# Patient Record
Sex: MALE | Race: WHITE | NOT HISPANIC OR LATINO | Employment: OTHER | URBAN - METROPOLITAN AREA
[De-identification: names, ages, dates, MRNs, and addresses within clinical notes are randomized per-mention and may not be internally consistent; named-entity substitution may affect disease eponyms.]

---

## 2017-03-08 RX ORDER — AMLODIPINE BESYLATE 10 MG/1
TABLET ORAL
Qty: 90 TABLET | Refills: 1 | Status: SHIPPED | OUTPATIENT
Start: 2017-03-08 | End: 2017-06-12 | Stop reason: SDUPTHER

## 2017-03-27 ENCOUNTER — TELEPHONE (OUTPATIENT)
Dept: FAMILY MEDICINE CLINIC | Facility: CLINIC | Age: 82
End: 2017-03-27

## 2017-03-28 RX ORDER — ALLOPURINOL 300 MG/1
300 TABLET ORAL DAILY
Qty: 90 TABLET | Refills: 0 | Status: SHIPPED | OUTPATIENT
Start: 2017-03-28 | End: 2017-08-02 | Stop reason: SDUPTHER

## 2017-06-02 RX ORDER — LOVASTATIN 20 MG/1
TABLET ORAL
Qty: 90 TABLET | Refills: 0 | Status: SHIPPED | OUTPATIENT
Start: 2017-06-02 | End: 2017-08-09 | Stop reason: SDUPTHER

## 2017-06-12 RX ORDER — AMLODIPINE BESYLATE 10 MG/1
TABLET ORAL
Qty: 90 TABLET | Refills: 0 | Status: SHIPPED | OUTPATIENT
Start: 2017-06-12 | End: 2017-10-03 | Stop reason: SDUPTHER

## 2017-06-29 RX ORDER — AMLODIPINE BESYLATE 10 MG/1
TABLET ORAL
Qty: 90 TABLET | Refills: 1 | OUTPATIENT
Start: 2017-06-29

## 2017-08-02 RX ORDER — ALLOPURINOL 300 MG/1
TABLET ORAL
Qty: 90 TABLET | Refills: 1 | Status: SHIPPED | OUTPATIENT
Start: 2017-08-02 | End: 2018-04-29 | Stop reason: SDUPTHER

## 2017-08-09 RX ORDER — LOVASTATIN 20 MG/1
TABLET ORAL
Qty: 90 TABLET | Refills: 0 | Status: SHIPPED | OUTPATIENT
Start: 2017-08-09 | End: 2017-10-16 | Stop reason: SDUPTHER

## 2017-10-04 RX ORDER — AMLODIPINE BESYLATE 10 MG/1
TABLET ORAL
Qty: 90 TABLET | Refills: 0 | Status: SHIPPED | OUTPATIENT
Start: 2017-10-04 | End: 2018-04-07 | Stop reason: SDUPTHER

## 2017-10-17 RX ORDER — LOVASTATIN 20 MG/1
TABLET ORAL
Qty: 90 TABLET | Refills: 0 | Status: SHIPPED | OUTPATIENT
Start: 2017-10-17 | End: 2018-04-07 | Stop reason: SDUPTHER

## 2018-04-09 RX ORDER — AMLODIPINE BESYLATE 10 MG/1
TABLET ORAL
Qty: 90 TABLET | Refills: 0 | Status: SHIPPED | OUTPATIENT
Start: 2018-04-09 | End: 2018-06-25 | Stop reason: SDUPTHER

## 2018-04-09 RX ORDER — LOVASTATIN 20 MG/1
TABLET ORAL
Qty: 90 TABLET | Refills: 0 | Status: SHIPPED | OUTPATIENT
Start: 2018-04-09 | End: 2018-06-25 | Stop reason: SDUPTHER

## 2018-04-30 RX ORDER — ALLOPURINOL 300 MG/1
TABLET ORAL
Qty: 90 TABLET | Refills: 1 | Status: SHIPPED | OUTPATIENT
Start: 2018-04-30 | End: 2018-10-28 | Stop reason: SDUPTHER

## 2018-06-08 ENCOUNTER — HOSPITAL ENCOUNTER (OUTPATIENT)
Dept: CT IMAGING | Facility: HOSPITAL | Age: 83
Discharge: HOME | End: 2018-06-08
Admitting: FAMILY MEDICINE

## 2018-06-08 ENCOUNTER — OFFICE VISIT (OUTPATIENT)
Dept: FAMILY MEDICINE CLINIC | Facility: CLINIC | Age: 83
End: 2018-06-08

## 2018-06-08 VITALS
HEART RATE: 66 BPM | OXYGEN SATURATION: 98 % | DIASTOLIC BLOOD PRESSURE: 76 MMHG | WEIGHT: 201.5 LBS | BODY MASS INDEX: 28.21 KG/M2 | SYSTOLIC BLOOD PRESSURE: 151 MMHG | TEMPERATURE: 98 F | HEIGHT: 71 IN

## 2018-06-08 DIAGNOSIS — R10.9 ACUTE RIGHT FLANK PAIN: ICD-10-CM

## 2018-06-08 DIAGNOSIS — M10.9 GOUT WITHOUT TOPHUS: ICD-10-CM

## 2018-06-08 DIAGNOSIS — R35.89 POLYURIA: Primary | ICD-10-CM

## 2018-06-08 DIAGNOSIS — I10 ESSENTIAL HYPERTENSION: ICD-10-CM

## 2018-06-08 DIAGNOSIS — E78.5 HYPERLIPIDEMIA, UNSPECIFIED HYPERLIPIDEMIA TYPE: ICD-10-CM

## 2018-06-08 LAB
ALBUMIN SERPL-MCNC: 4 G/DL (ref 3.5–5.2)
ALBUMIN/GLOB SERPL: 1.7 G/DL
ALP SERPL-CCNC: 73 U/L (ref 39–117)
ALT SERPL-CCNC: 15 U/L (ref 1–41)
AST SERPL-CCNC: 11 U/L (ref 1–40)
BASOPHILS # BLD AUTO: 0.04 10*3/MM3 (ref 0–0.2)
BASOPHILS NFR BLD AUTO: 0.5 % (ref 0–1.5)
BILIRUB BLD-MCNC: NEGATIVE MG/DL
BILIRUB SERPL-MCNC: 1 MG/DL (ref 0.1–1.2)
BUN SERPL-MCNC: 17 MG/DL (ref 8–23)
BUN/CREAT SERPL: 15.9 (ref 7–25)
CALCIUM SERPL-MCNC: 9.5 MG/DL (ref 8.6–10.5)
CHLORIDE SERPL-SCNC: 104 MMOL/L (ref 98–107)
CLARITY, POC: CLEAR
CO2 SERPL-SCNC: 24 MMOL/L (ref 22–29)
COLOR UR: YELLOW
CREAT SERPL-MCNC: 1.07 MG/DL (ref 0.76–1.27)
EOSINOPHIL # BLD AUTO: 0.23 10*3/MM3 (ref 0–0.7)
EOSINOPHIL NFR BLD AUTO: 2.9 % (ref 0.3–6.2)
ERYTHROCYTE [DISTWIDTH] IN BLOOD BY AUTOMATED COUNT: 13.3 % (ref 11.5–14.5)
GFR SERPLBLD CREATININE-BSD FMLA CKD-EPI: 66 ML/MIN/1.73
GFR SERPLBLD CREATININE-BSD FMLA CKD-EPI: 79 ML/MIN/1.73
GLOBULIN SER CALC-MCNC: 2.4 GM/DL
GLUCOSE SERPL-MCNC: 87 MG/DL (ref 65–99)
GLUCOSE UR STRIP-MCNC: NEGATIVE MG/DL
HCT VFR BLD AUTO: 48.2 % (ref 40.4–52.2)
HGB BLD-MCNC: 16 G/DL (ref 13.7–17.6)
IMM GRANULOCYTES # BLD: 0.04 10*3/MM3 (ref 0–0.03)
IMM GRANULOCYTES NFR BLD: 0.5 % (ref 0–0.5)
KETONES UR QL: NEGATIVE
LEUKOCYTE EST, POC: ABNORMAL
LYMPHOCYTES # BLD AUTO: 2.1 10*3/MM3 (ref 0.9–4.8)
LYMPHOCYTES NFR BLD AUTO: 26.9 % (ref 19.6–45.3)
MCH RBC QN AUTO: 29.3 PG (ref 27–32.7)
MCHC RBC AUTO-ENTMCNC: 33.2 G/DL (ref 32.6–36.4)
MCV RBC AUTO: 88.3 FL (ref 79.8–96.2)
MONOCYTES # BLD AUTO: 0.71 10*3/MM3 (ref 0.2–1.2)
MONOCYTES NFR BLD AUTO: 9.1 % (ref 5–12)
NEUTROPHILS # BLD AUTO: 4.72 10*3/MM3 (ref 1.9–8.1)
NEUTROPHILS NFR BLD AUTO: 60.6 % (ref 42.7–76)
NITRITE UR-MCNC: NEGATIVE MG/ML
PH UR: 5 [PH] (ref 5–8)
PLATELET # BLD AUTO: 201 10*3/MM3 (ref 140–500)
POTASSIUM SERPL-SCNC: 4.2 MMOL/L (ref 3.5–5.2)
PROT SERPL-MCNC: 6.4 G/DL (ref 6–8.5)
PROT UR STRIP-MCNC: NEGATIVE MG/DL
RBC # BLD AUTO: 5.46 10*6/MM3 (ref 4.6–6)
RBC # UR STRIP: NEGATIVE /UL
SODIUM SERPL-SCNC: 141 MMOL/L (ref 136–145)
SP GR UR: 1 (ref 1–1.03)
URATE SERPL-MCNC: 4 MG/DL (ref 3.4–7)
UROBILINOGEN UR QL: NORMAL
WBC # BLD AUTO: 7.8 10*3/MM3 (ref 4.5–10.7)

## 2018-06-08 PROCEDURE — 81003 URINALYSIS AUTO W/O SCOPE: CPT | Performed by: FAMILY MEDICINE

## 2018-06-08 PROCEDURE — 74176 CT ABD & PELVIS W/O CONTRAST: CPT

## 2018-06-08 PROCEDURE — 99214 OFFICE O/P EST MOD 30 MIN: CPT | Performed by: FAMILY MEDICINE

## 2018-06-08 NOTE — PROGRESS NOTES
Subjective   Izaiah Garcia is a 86 y.o. male presenting with   Chief Complaint   Patient presents with   • Urinary Frequency     pain on the right side , dark urine        86-year-old  white male nonsmoker comes in today with the complaint that for the last 2 weeks he has had bright sided flank pain and he has had polyuria with nocturia every couple of hours.  He denies any burning on urination or fever or chills.  He does not have any nausea or vomiting.  He also denies any chest pain or shortness of breath.  Also denies any change in his bowel movements.  Specifically he has not noticed pale stools.      Urinary Frequency    Associated symptoms include flank pain and frequency.        The following portions of the patient's history were reviewed and updated as appropriate: current medications, past family history, past medical history, past social history, past surgical history and problem list.    Review of Systems   Genitourinary: Positive for flank pain and frequency.   All other systems reviewed and are negative.      Objective   Physical Exam   Constitutional: He is oriented to person, place, and time. He appears well-developed and well-nourished. No distress.   HENT:   Head: Normocephalic and atraumatic.   Mouth/Throat: Oropharynx is clear and moist.   Eyes: EOM are normal. Pupils are equal, round, and reactive to light.   Neck: Normal range of motion. Neck supple. No thyromegaly present.   Cardiovascular: Normal rate and regular rhythm.    No murmur heard.  Pulmonary/Chest: Effort normal and breath sounds normal. No respiratory distress. He has no wheezes.   Abdominal: Soft. Bowel sounds are normal. He exhibits distension (large asymptomatic ventral hernia). He exhibits no mass. There is tenderness (minor tenderness to firm palpation in the right mid abdomen laterally). There is no guarding.   Genitourinary: Rectum normal and prostate normal. Prostate is not enlarged and not tender.    Musculoskeletal: Normal range of motion. He exhibits no edema or tenderness.   Lymphadenopathy:     He has no cervical adenopathy.   Neurological: He is alert and oriented to person, place, and time.   Skin: Skin is warm and dry. He is not diaphoretic.   Psychiatric: He has a normal mood and affect. His behavior is normal.   Nursing note and vitals reviewed.      Assessment/Plan   Izaiah was seen today for urinary frequency.    Diagnoses and all orders for this visit:    Polyuria  -     Comprehensive Metabolic Panel  -     CBC & Differential  -     CT Abdomen Pelvis With Contrast    Hyperlipidemia, unspecified hyperlipidemia type    Essential hypertension  -     Comprehensive Metabolic Panel    Acute right flank pain  -     Comprehensive Metabolic Panel  -     CBC & Differential  -     CT Abdomen Pelvis With Contrast  -     Uric Acid    Gout without tophus  -     Uric Acid                   I would like him to return for another visit in 6 month(s)

## 2018-06-08 NOTE — PATIENT INSTRUCTIONS
This is a very nice 86-year-old gentleman who has had right flank pain and frequent urination with nocturia for the last 2 weeks.  I will request a CT and blood work and call him when the results are available.  If he gets really bad he should go to the emergency department.

## 2018-06-10 LAB
BACTERIA UR CULT: NORMAL
BACTERIA UR CULT: NORMAL

## 2018-06-11 RX ORDER — BACLOFEN 10 MG/1
10 TABLET ORAL 3 TIMES DAILY PRN
Qty: 20 TABLET | Refills: 2 | Status: SHIPPED | OUTPATIENT
Start: 2018-06-11 | End: 2018-06-12 | Stop reason: SDUPTHER

## 2018-06-13 RX ORDER — BACLOFEN 10 MG/1
10 TABLET ORAL 3 TIMES DAILY PRN
Qty: 20 TABLET | Refills: 2 | OUTPATIENT
Start: 2018-06-13 | End: 2018-12-05

## 2018-06-25 RX ORDER — AMLODIPINE BESYLATE 10 MG/1
TABLET ORAL
Qty: 90 TABLET | Refills: 3 | Status: SHIPPED | OUTPATIENT
Start: 2018-06-25 | End: 2019-05-02 | Stop reason: SDUPTHER

## 2018-06-25 RX ORDER — LOVASTATIN 20 MG/1
TABLET ORAL
Qty: 90 TABLET | Refills: 3 | Status: SHIPPED | OUTPATIENT
Start: 2018-06-25 | End: 2019-05-02

## 2018-10-29 RX ORDER — ALLOPURINOL 300 MG/1
TABLET ORAL
Qty: 90 TABLET | Refills: 1 | Status: SHIPPED | OUTPATIENT
Start: 2018-10-29 | End: 2019-05-22

## 2019-05-02 ENCOUNTER — OFFICE VISIT (OUTPATIENT)
Dept: INTERNAL MEDICINE | Facility: CLINIC | Age: 84
End: 2019-05-02

## 2019-05-02 VITALS
OXYGEN SATURATION: 96 % | HEIGHT: 71 IN | DIASTOLIC BLOOD PRESSURE: 74 MMHG | HEART RATE: 61 BPM | WEIGHT: 202 LBS | BODY MASS INDEX: 28.28 KG/M2 | SYSTOLIC BLOOD PRESSURE: 180 MMHG

## 2019-05-02 DIAGNOSIS — R42 WEAKNESS WITH DIZZINESS: ICD-10-CM

## 2019-05-02 DIAGNOSIS — R29.898 WEAKNESS OF BOTH LOWER EXTREMITIES: ICD-10-CM

## 2019-05-02 DIAGNOSIS — I10 BENIGN ESSENTIAL HYPERTENSION: Chronic | ICD-10-CM

## 2019-05-02 DIAGNOSIS — R73.01 IMPAIRED FASTING GLUCOSE: Chronic | ICD-10-CM

## 2019-05-02 DIAGNOSIS — Z87.39 HISTORY OF GOUT: Chronic | ICD-10-CM

## 2019-05-02 DIAGNOSIS — Z86.73 HISTORY OF STROKE: Chronic | ICD-10-CM

## 2019-05-02 DIAGNOSIS — Z91.81 AT RISK FOR FALLS: Chronic | ICD-10-CM

## 2019-05-02 DIAGNOSIS — E55.9 VITAMIN D DEFICIENCY: ICD-10-CM

## 2019-05-02 DIAGNOSIS — R53.1 WEAKNESS WITH DIZZINESS: ICD-10-CM

## 2019-05-02 DIAGNOSIS — Z87.19 HISTORY OF ESOPHAGEAL STRICTURE: Chronic | ICD-10-CM

## 2019-05-02 DIAGNOSIS — E78.5 HYPERLIPIDEMIA, UNSPECIFIED HYPERLIPIDEMIA TYPE: Chronic | ICD-10-CM

## 2019-05-02 DIAGNOSIS — H53.453 DECREASED PERIPHERAL VISION OF BOTH EYES: ICD-10-CM

## 2019-05-02 DIAGNOSIS — R76.8 RHEUMATOID FACTOR POSITIVE: Chronic | ICD-10-CM

## 2019-05-02 DIAGNOSIS — R42 LOSS OF EQUILIBRIUM: Primary | ICD-10-CM

## 2019-05-02 DIAGNOSIS — I63.89 OTHER CEREBRAL INFARCTION (HCC): ICD-10-CM

## 2019-05-02 DIAGNOSIS — G20 PARKINSON'S DISEASE (HCC): ICD-10-CM

## 2019-05-02 DIAGNOSIS — D75.89 MACROCYTOSIS: ICD-10-CM

## 2019-05-02 DIAGNOSIS — R60.0 BILATERAL LOWER EXTREMITY EDEMA: ICD-10-CM

## 2019-05-02 DIAGNOSIS — Z51.81 THERAPEUTIC DRUG MONITORING: ICD-10-CM

## 2019-05-02 PROBLEM — R10.9 ACUTE RIGHT FLANK PAIN: Status: RESOLVED | Noted: 2018-06-08 | Resolved: 2019-05-02

## 2019-05-02 PROBLEM — G20.A1 PARKINSON'S DISEASE: Status: ACTIVE | Noted: 2019-05-02

## 2019-05-02 PROBLEM — H90.3 BILATERAL HIGH FREQUENCY SENSORINEURAL HEARING LOSS: Status: ACTIVE | Noted: 2019-05-02

## 2019-05-02 PROBLEM — H90.3 BILATERAL HIGH FREQUENCY SENSORINEURAL HEARING LOSS: Chronic | Status: ACTIVE | Noted: 2019-05-02

## 2019-05-02 PROBLEM — M06.9 RHEUMATOID ARTHRITIS: Status: ACTIVE | Noted: 2019-05-02

## 2019-05-02 PROBLEM — R35.89 POLYURIA: Status: RESOLVED | Noted: 2018-06-08 | Resolved: 2019-05-02

## 2019-05-02 PROCEDURE — 99204 OFFICE O/P NEW MOD 45 MIN: CPT | Performed by: INTERNAL MEDICINE

## 2019-05-02 RX ORDER — AMLODIPINE BESYLATE 10 MG/1
TABLET ORAL
Qty: 90 TABLET | Refills: 3
Start: 2019-05-02 | End: 2019-05-22

## 2019-05-02 RX ORDER — BENAZEPRIL HYDROCHLORIDE AND HYDROCHLOROTHIAZIDE 20; 12.5 MG/1; MG/1
TABLET ORAL
Qty: 30 TABLET | Refills: 1 | Status: SHIPPED | OUTPATIENT
Start: 2019-05-02 | End: 2019-05-22 | Stop reason: SDUPTHER

## 2019-05-02 NOTE — PROGRESS NOTES
"             05/02/2019    Patient Information  Izaiah Garcia                                                                                          [id number] [facility] [address]      8/31/1931  [unfilled]  There is no work phone number on file.    Chief Complaint:     Complaining of worsening loss of balance/dizziness and weakness in legs.  Follow-up impaired fasting glucose, hypertension, hyperlipidemia, history of gout, esophageal stricture.  Complaining of worsening swelling in lower extremities.  Complaining of decreased peripheral vision.  Complaining of worsening tremor.    History of Present Illness:    Patient with a history of mild impaired fasting glucose, hypertension, hyperlipidemia, gout, recurrent esophageal stricture, history of rheumatoid factor positivity but negative CCP antibodies.  He presents today as a new patient and has several complaints as noted above.  Prior to this, patient has been doing fairly well but his primary issue seems to be worsening weakness in his legs associated with loss of equilibrium.  Patient also has complaints of 6-month history of worsening swelling in his legs that gets better overnight.  The history regarding this issues will be described below.  His past medical history reviewed and updated were necessary including health maintenance parameters.  This reveals he is up-to-date or else accounted for except he does need lab work and subsequent Medicare wellness visit.    The history regarding worsening loss of balance/equilibrium, weakness in both legs, and previous history of cerebellar stroke is as follows:    May 2, 2019--continues to have complaints of \"dizziness\" associated with weakness in his lower extremities.  He is not describing a spinning sensation or anything remotely close to vertigo.  Instead he is describing an off-balance sensation.  He tends to fall forward if not careful and he tends to list towards the right side.  He has marked " difficulty going down an incline.  He has to ambulate with the assistance of a cane.  Patient also is complaining of a hand tremor, right greater than left and he also has episodes where the rest of his body seems to have a tremor.  On exam, patient does have a definite hand tremor.  His Romberg is positive.  He ambulates with a broad-based ataxic gait and his arms do not swing normally.  The tremor seems to be accentuated with ambulation.  He has to push off from a seated position in order to stand up and even then it is quite difficult.  I am concerned about the possibility of parkinsonism and I explained to the patient that I think he needs to see a neurologist to help assist us in proper diagnosis and possible treatment.  I explained to him that there is really nothing we can do regarding the old cerebellar stroke but there is possibly something we can do regarding his worsening difficulty walking if indeed parkinsonism is playing a role.  Neurology referral placed.    June 29, 2016--MRI of the brain performed for complaints of dizziness and weakness. IMPRESSION:  1. There is susceptibility artifact streaking through the anterior left frontal scalp through the anterior left frontal bone in the anterior tipof the left frontal lobe likely from a tiny piece of metal in the  anterior left frontal scalp slightly limits evaluation of these structures.  2. There is mild small vessel disease in the cerebral white matter.  3. Moderate-sized old infarct in the inferior left cerebellum measuring 4.9 x 4 x 3 cm in largest anterior posterior, medial lateral and craniocaudal dimension compatible with moderate-sized old left PICA territory infarct.  4. The remainder of the MRI of the head is normal.     June 29, 2016--84-year-old  white male nonsmoker comes in today for evaluation of persistent dizziness with difficulty walking and proximal leg weakness.  He says he has to use his hands to get up out of a chair.  He  also says he had transient double vision and went to his eye doctor and was given eyedrops.  He mentions that when he urinates standing up he has to hold onto the wall or else he might fall forward.  He has occasional headaches but they are minor without any thunderclap headache or stiff neck or confusion.  He does not have any problem with incontinence of bowel or bladder.  He does not have any difficulty speaking or writing.  He does not have any difficulty finding words to speak.  He does not have any confusion. He is on lovastatin and does not describe muscle soreness, but does describe fairly substantial muscle weakness.    History regarding worsening lower extremity edema:    May 2, 2019--patient who has hypertension and is on amlodipine 10 mg/day is complaining of progressively worsening swelling of the lower extremities that extends up to about mid calf.  Gets worse at the end of the day and tends to get better overnight when he props his feet up.  I think this is definitely related to the amlodipine although patient may have some venous insufficiency.  Medication adjustment indicated.  We will have patient start cutting amlodipine in half each day.  His blood pressure is elevated at 180/74 but patient did not take his amlodipine this morning.  After reviewing his previous blood pressure readings, I do not think that 5 mg of amlodipine alone will control his blood pressure.  Therefore, we will add benazepril HCT.  Plan is as follows: Amlodipine 10 mg, one half p.o. daily.  Benazepril HCT 20/12.5, 1 p.o. daily.  We will reassess his blood pressure in about 2 to 3 weeks.    The history regarding muscular weakness in the legs:    May 2, 2019--patient with signs and symptoms of parkinsonism as well as a previous history of left cerebellar stroke who is on statin therapy for hyperlipidemia.  He presents with complaints of lower extremity muscular weakness bilaterally.  He has a difficulty arising from a seated  position without pushing off.  He has no myalgia.  Although his symptoms may be related to underlying yet to be diagnosed parkinsonism, I do think that it would be worthwhile to discontinue statin therapy at least temporarily to see if the muscular weakness improves.    Review of Systems   Constitution: Negative.   HENT: Negative.    Eyes: Negative.    Cardiovascular: Positive for leg swelling.   Respiratory: Negative.    Endocrine: Negative.    Hematologic/Lymphatic: Negative.    Skin: Negative.    Musculoskeletal: Positive for arthritis, joint pain and muscle weakness. Negative for myalgias.   Gastrointestinal: Negative.    Genitourinary: Negative.    Neurological: Positive for disturbances in coordination, focal weakness, loss of balance and tremors. Negative for aphonia, brief paralysis, difficulty with concentration, excessive daytime sleepiness, dizziness, headaches, light-headedness, numbness, paresthesias, sensory change and vertigo.   Psychiatric/Behavioral: Negative.    Allergic/Immunologic: Negative.        Active Problems:    Patient Active Problem List   Diagnosis   • Hyperlipidemia   • Benign essential hypertension   • History of gout   • Weakness of both lower extremities   • History of esophageal stricture   • History of stroke, 6/29/2016--4.9 x 4 x 3 cm inferior left cerebellar infarct   • Rheumatoid factor positive   • Impaired fasting glucose   • At risk for falls   • Weakness with dizziness   • Bilateral high frequency sensorineural hearing loss, wears hearing aids   • Decreased peripheral vision of both eyes   • Loss of equilibrium   • Bilateral lower extremity edema   • Parkinson's disease (CMS/HCC)   • Therapeutic drug monitoring   • Vitamin D deficiency   • Macrocytosis         Past Medical History:   Diagnosis Date   • At risk for falls 5/2/2019   • Bilateral high frequency sensorineural hearing loss, wears hearing aids 5/2/2019   • History of aspiration pneumonia 5/4/2015   • History of  esophageal stricture 5/4/2015    July 3, 2018--EGD revealed a distal esophageal stricture that was dilated to 18 mm.  There was a small hiatal hernia.  There was mild streaky erythema in the proximal stomach.  Duodenal bulb normal.  April 26, 2016--EGD performed for dysphagia reveals a distal esophageal stricture that was dilated 16.5 mm.   • History of stroke, 6/29/2016--4.9 x 4 x 3 cm inferior left cerebellar infarct 5/4/2015 June 29, 2016--MRI of the brain performed for complaints of dizziness and weakness. IMPRESSION: 1. There is susceptibility artifact streaking through the anterior left frontal scalp through the anterior left frontal bone in the anterior tipof the left frontal lobe likely from a tiny piece of metal in the anterior left frontal scalp slightly limits evaluation of these structures. 2. There is mild s   • Rheumatoid factor positive 5/2/2019 March 25, 2014--rheumatoid factor returned positive at 95.  However, CCP antibodies normal at 8, SHIV negative, both C&P ANCA negative, C-reactive protein normal at 0.24, sed rate normal at 2.         Past Surgical History:   Procedure Laterality Date   • CATARACT EXTRACTION EXTRACAPSULAR W/ INTRAOCULAR LENS IMPLANTATION Bilateral     Bilateral cataract extirpation with intraocular lens implantation   • CHOLECYSTECTOMY     • ESOPHAGEAL DILATATION  04/26/2016 April 26, 2016--EGD performed for dysphagia reveals a distal esophageal stricture that was dilated 16.5 mm.   • ESOPHAGEAL DILATATION  07/03/2018    July 3, 2018--EGD revealed a distal esophageal stricture that was dilated to 18 mm.  There was a small hiatal hernia.  There was mild streaky erythema in the proximal stomach.  Duodenal bulb normal.         No Known Allergies        Current Outpatient Medications:   •  allopurinol (ZYLOPRIM) 300 MG tablet, TAKE 1 TABLET EVERY DAY, Disp: 90 tablet, Rfl: 1  •  amLODIPine (NORVASC) 10 MG tablet, TAKE 1 TABLET EVERY DAY, Disp: 90 tablet, Rfl: 3  •  aspirin  "(ECOTRIN) 325 MG EC tablet, Take 325 mg by mouth Every Other Day., Disp: , Rfl:   •  lovastatin (MEVACOR) 20 MG tablet, TAKE 1 TABLET EVERY NIGHT (NEED MD APPOINTMENT), Disp: 90 tablet, Rfl: 3  •  benazepril-hydrochlorthiazide (LOTENSIN HCT) 20-12.5 MG per tablet, Take 1 p.o. every morning for high blood pressure and leg swelling, Disp: 30 tablet, Rfl: 1      Family History   Problem Relation Age of Onset   • No Known Problems Mother    • No Known Problems Father          Social History     Socioeconomic History   • Marital status:      Spouse name: Not on file   • Number of children: 2   • Years of education: Not on file   • Highest education level: 9th grade   Social Needs   • Financial resource strain: Not hard at all   • Food insecurity:     Worry: Never true     Inability: Never true   • Transportation needs:     Medical: No     Non-medical: No   Tobacco Use   • Smoking status: Never Smoker   • Smokeless tobacco: Never Used   Substance and Sexual Activity   • Alcohol use: Yes     Frequency: 2-4 times a month     Drinks per session: 1 or 2     Comment: occ   • Drug use: No   • Sexual activity: Not Currently     Partners: Female   Lifestyle   • Physical activity:     Days per week: 0 days     Minutes per session: 0 min   • Stress: Not at all   Relationships   • Social connections:     Talks on phone: Once a week     Gets together: Twice a week     Attends [employer] service: Never     Active member of club or organization: Yes     Attends meetings of clubs or organizations: More than 4 times per year     Relationship status:          Vitals:    05/02/19 0906   BP: 180/74   Pulse: 61   SpO2: 96%   Weight: 91.6 kg (202 lb)   Height: 180.3 cm (70.98\")          Physical Exam:    General: Alert and oriented x 3.  No acute distress.  Normal affect. Overweight. HEENT: Pupils equal, round, reactive to light; extraocular movements intact; sclerae nonicteric; pharynx, ear canals and TMs normal.  Neck: Without " JVD, thyromegaly, bruit, or adenopathy.  Lungs: Clear to auscultation in all fields.  Heart: Regular rate and rhythm without murmur, rub, gallop, or click.  Abdomen: Soft, nontender, without hepatosplenomegaly or hernia.  Bowel sounds normal.  : Deferred.  Rectal: Deferred.  Extremities: Without clubbing, cyanosis, edema, or pulse deficit.  Neurologic: Intact without focal deficit.  However, patient ambulates with a broad-based ataxic gait and has to use a cane or else hold onto the wall or furniture.  Romberg is positive.  Patient has a definite hand tremor that seems to accentuate with ambulation.  He also does not swing his arm normally when ambulating.  He also has definite cogwheeling of the upper extremities.  Skin: Without significant lesion.  Musculoskeletal: Changes consistent with diffuse degenerative osteoarthritis.  I see no signs of active synovitis.    Lab/other results:    I reviewed the previous lab work ordered by his previous doctor.  Also reviewed several office visit notes as well as a urgent care visit.  I specifically reviewed the MRI of the brain from 2015.  Also reviewed his EGD reports regarding the esophageal stricture.    Assessment/Plan:     Diagnosis Plan   1. Loss of equilibrium     2. Weakness with dizziness     3. Parkinson's disease (CMS/HCC)     4. History of stroke, 6/29/2016--4.9 x 4 x 3 cm inferior left cerebellar infarct     5. Decreased peripheral vision of both eyes     6. At risk for falls     7. Impaired fasting glucose     8. Benign essential hypertension  benazepril-hydrochlorthiazide (LOTENSIN HCT) 20-12.5 MG per tablet   9. Hyperlipidemia, unspecified hyperlipidemia type     10. History of gout     11. History of esophageal stricture     12. Rheumatoid factor positive     13. Bilateral lower extremity edema  benazepril-hydrochlorthiazide (LOTENSIN HCT) 20-12.5 MG per tablet   14. Weakness of both lower extremities     15. Therapeutic drug monitoring     16. Vitamin D  deficiency     17. Macrocytosis       New patient to get established presents with complaints of worsening loss of balance/equilibrium associated with weakness in his lower extremities but without any sensory complaints.  Patient has a previous history of a cerebellar stroke and I do think that is playing a role.  However, he has several features of parkinsonism and I think that may be the primary issue.  He is complaining of decreased peripheral vision in both eyes but has seen the eye doctor in the not too distant past but did not complain of this.  Patient does have an appointment in the near future.  He is definitely at increased risk for falls.  His impaired fasting glucose as well as hyperlipidemia and gout needs to be assessed with blood work.  His blood pressure is little elevated today but he has not taken his blood pressure medication.  However, his lower extremity edema is the result of amlodipine added 10 mg dose and I do think medication adjustment as indicated.  He has had macrocytosis in the past and given his problems with balance, homocysteine and methylmalonic acid needs to be evaluated.    Plan is as follows: Fasting blood work today including homocysteine, methylmalonic acid, vitamin B12 and folic acid.  Neurology referral given.  Discontinue lovastatin.  I will have patient follow-up in about 2 weeks to reassess the blood pressure, leg swelling, and lab work.  Neurology referral given.  Encourage patient to see the eye doctor in the near future.    Note: Greater than 45 minutes was spent evaluating this new patient with multiple medical problems that are potentially very serious.  [id number] level service justified.  Procedures

## 2019-05-04 LAB
25(OH)D3+25(OH)D2 SERPL-MCNC: 26.8 NG/ML (ref 30–100)
ALBUMIN SERPL-MCNC: 3.7 G/DL (ref 3.5–5.2)
ALBUMIN/GLOB SERPL: 1.4 G/DL
ALP SERPL-CCNC: 66 U/L (ref 39–117)
ALT SERPL-CCNC: 25 U/L (ref 1–41)
AST SERPL-CCNC: 17 U/L (ref 1–40)
BILIRUB SERPL-MCNC: 1.2 MG/DL (ref 0.2–1.2)
BUN SERPL-MCNC: 14 MG/DL (ref 8–23)
BUN/CREAT SERPL: 15.6 (ref 7–25)
CALCIUM SERPL-MCNC: 9.7 MG/DL (ref 8.6–10.5)
CHLORIDE SERPL-SCNC: 108 MMOL/L (ref 98–107)
CHOLEST SERPL-MCNC: 121 MG/DL (ref 100–199)
CK SERPL-CCNC: 35 U/L (ref 20–200)
CO2 SERPL-SCNC: 26.3 MMOL/L (ref 22–29)
CREAT SERPL-MCNC: 0.9 MG/DL (ref 0.76–1.27)
ERYTHROCYTE [DISTWIDTH] IN BLOOD BY AUTOMATED COUNT: 12.6 % (ref 12.3–15.4)
FOLATE SERPL-MCNC: 16.3 NG/ML (ref 4.78–24.2)
GLOBULIN SER CALC-MCNC: 2.7 GM/DL
GLUCOSE SERPL-MCNC: 103 MG/DL (ref 65–99)
HBA1C MFR BLD: 5.7 % (ref 4.8–5.6)
HCT VFR BLD AUTO: 49.3 % (ref 37.5–51)
HCYS SERPL-SCNC: 11.6 UMOL/L (ref 0–15)
HDL SERPL-SCNC: 27.4 UMOL/L
HDLC SERPL-MCNC: 38 MG/DL
HGB BLD-MCNC: 16.2 G/DL (ref 13–17.7)
LDL SERPL QN: 20.5 NM
LDL SERPL-SCNC: 704 NMOL/L
LDL SMALL SERPL-SCNC: 315 NMOL/L
LDLC SERPL CALC-MCNC: 67 MG/DL (ref 0–99)
Lab: ABNORMAL
MCH RBC QN AUTO: 28.3 PG (ref 26.6–33)
MCHC RBC AUTO-ENTMCNC: 32.9 G/DL (ref 31.5–35.7)
MCV RBC AUTO: 86.2 FL (ref 79–97)
METHYLMALONATE SERPL-SCNC: 380 NMOL/L (ref 0–378)
PLATELET # BLD AUTO: 222 10*3/MM3 (ref 140–450)
POTASSIUM SERPL-SCNC: 4.1 MMOL/L (ref 3.5–5.2)
PROT SERPL-MCNC: 6.4 G/DL (ref 6–8.5)
RBC # BLD AUTO: 5.72 10*6/MM3 (ref 4.14–5.8)
SODIUM SERPL-SCNC: 145 MMOL/L (ref 136–145)
T3FREE SERPL-MCNC: 3.2 PG/ML (ref 2–4.4)
T4 FREE SERPL-MCNC: 1.68 NG/DL (ref 0.93–1.7)
TRIGL SERPL-MCNC: 80 MG/DL (ref 0–149)
TSH SERPL DL<=0.005 MIU/L-ACNC: 1.15 MIU/ML (ref 0.27–4.2)
URATE SERPL-MCNC: 3.2 MG/DL (ref 3.4–7)
VIT B12 SERPL-MCNC: 550 PG/ML (ref 211–946)
WBC # BLD AUTO: 7.25 10*3/MM3 (ref 3.4–10.8)

## 2019-05-22 ENCOUNTER — OFFICE VISIT (OUTPATIENT)
Dept: INTERNAL MEDICINE | Facility: CLINIC | Age: 84
End: 2019-05-22

## 2019-05-22 VITALS
OXYGEN SATURATION: 98 % | DIASTOLIC BLOOD PRESSURE: 68 MMHG | SYSTOLIC BLOOD PRESSURE: 168 MMHG | HEIGHT: 71 IN | BODY MASS INDEX: 28.03 KG/M2 | WEIGHT: 200.2 LBS | HEART RATE: 55 BPM

## 2019-05-22 DIAGNOSIS — Z51.81 THERAPEUTIC DRUG MONITORING: ICD-10-CM

## 2019-05-22 DIAGNOSIS — H90.3 BILATERAL HIGH FREQUENCY SENSORINEURAL HEARING LOSS: Chronic | ICD-10-CM

## 2019-05-22 DIAGNOSIS — Z91.81 AT RISK FOR FALLS: Chronic | ICD-10-CM

## 2019-05-22 DIAGNOSIS — I10 BENIGN ESSENTIAL HYPERTENSION: Chronic | ICD-10-CM

## 2019-05-22 DIAGNOSIS — Z00.00 MEDICARE ANNUAL WELLNESS VISIT, SUBSEQUENT: Primary | ICD-10-CM

## 2019-05-22 DIAGNOSIS — R76.8 RHEUMATOID FACTOR POSITIVE: Chronic | ICD-10-CM

## 2019-05-22 DIAGNOSIS — R42 WEAKNESS WITH DIZZINESS: ICD-10-CM

## 2019-05-22 DIAGNOSIS — R60.0 BILATERAL LOWER EXTREMITY EDEMA: Chronic | ICD-10-CM

## 2019-05-22 DIAGNOSIS — D75.89 MACROCYTOSIS: Chronic | ICD-10-CM

## 2019-05-22 DIAGNOSIS — Z86.73 HISTORY OF STROKE: Chronic | ICD-10-CM

## 2019-05-22 DIAGNOSIS — G20 PARKINSON'S DISEASE (HCC): ICD-10-CM

## 2019-05-22 DIAGNOSIS — R73.01 IMPAIRED FASTING GLUCOSE: Chronic | ICD-10-CM

## 2019-05-22 DIAGNOSIS — R42 LOSS OF EQUILIBRIUM: ICD-10-CM

## 2019-05-22 DIAGNOSIS — Z87.39 HISTORY OF GOUT: Chronic | ICD-10-CM

## 2019-05-22 DIAGNOSIS — R29.898 WEAKNESS OF BOTH LOWER EXTREMITIES: ICD-10-CM

## 2019-05-22 DIAGNOSIS — R53.1 WEAKNESS WITH DIZZINESS: ICD-10-CM

## 2019-05-22 DIAGNOSIS — Z87.19 HISTORY OF ESOPHAGEAL STRICTURE: Chronic | ICD-10-CM

## 2019-05-22 DIAGNOSIS — E78.5 HYPERLIPIDEMIA, UNSPECIFIED HYPERLIPIDEMIA TYPE: Chronic | ICD-10-CM

## 2019-05-22 DIAGNOSIS — E53.8 VITAMIN B12 DEFICIENCY: ICD-10-CM

## 2019-05-22 DIAGNOSIS — E55.9 VITAMIN D DEFICIENCY: Chronic | ICD-10-CM

## 2019-05-22 PROCEDURE — G0439 PPPS, SUBSEQ VISIT: HCPCS | Performed by: INTERNAL MEDICINE

## 2019-05-22 PROCEDURE — 96160 PT-FOCUSED HLTH RISK ASSMT: CPT | Performed by: INTERNAL MEDICINE

## 2019-05-22 PROCEDURE — 99214 OFFICE O/P EST MOD 30 MIN: CPT | Performed by: INTERNAL MEDICINE

## 2019-05-22 RX ORDER — BENAZEPRIL HYDROCHLORIDE AND HYDROCHLOROTHIAZIDE 20; 12.5 MG/1; MG/1
TABLET ORAL
Qty: 60 TABLET | Refills: 3 | Status: SHIPPED | OUTPATIENT
Start: 2019-05-22 | End: 2019-06-06

## 2019-05-22 RX ORDER — ALLOPURINOL 300 MG/1
TABLET ORAL
Qty: 90 TABLET | Refills: 1
Start: 2019-05-22 | End: 2019-05-24 | Stop reason: SDUPTHER

## 2019-05-22 NOTE — PROGRESS NOTES
QUICK REFERENCE INFORMATION:  The ABCs of the Annual Wellness Visit    Subsequent Medicare Wellness Visit     HEALTH RISK ASSESSMENT    : 1931     Recent Hospitalizations:  No hospitalization(s) within the last year..  ccc      Current Medical Providers:  Patient Care Team:  Uriah Godinez MD as PCP - General (Internal Medicine)        Smoking Status:  Social History     Tobacco Use   Smoking Status Never Smoker   Smokeless Tobacco Never Used       Alcohol Consumption:  Social History     Substance and Sexual Activity   Alcohol Use Yes   • Frequency: 2-4 times a month   • Drinks per session: 1 or 2    Comment: occ       Depression Screen:   PHQ-2/PHQ-9 Depression Screening 2019   Little interest or pleasure in doing things 0   Feeling down, depressed, or hopeless 0   Trouble falling or staying asleep, or sleeping too much 0   Feeling tired or having little energy 3   Poor appetite or overeating 0   Feeling bad about yourself - or that you are a failure or have let yourself or your family down 3   Trouble concentrating on things, such as reading the newspaper or watching television 0   Moving or speaking so slowly that other people could have noticed. Or the opposite - being so fidgety or restless that you have been moving around a lot more than usual 0   Thoughts that you would be better off dead, or of hurting yourself in some way 0   Total Score 6       Health Habits and Functional and Cognitive Screening:  Functional & Cognitive Status 2019   Do you have difficulty preparing food and eating? No   Do you have difficulty bathing yourself, getting dressed or grooming yourself? No   Do you have difficulty using the toilet? No   Do you have difficulty moving around from place to place? No   Do you have trouble with steps or getting out of a bed or a chair? No   In the past year have you fallen or experienced a near fall? Yes   Current Diet Unhealthy Diet   Dental Exam Up to date   Eye Exam Up to  date   Exercise (times per week) 0 times per week   Current Exercise Activities Include None   Do you need help using the phone?  No   Are you deaf or do you have serious difficulty hearing?  Yes   Do you need help with transportation? No   Do you need help shopping? No   Do you need help preparing meals?  No   Do you need help with housework?  No   Do you need help with laundry? No   Do you need help taking your medications? No   Do you need help managing money? No   Do you ever drive or ride in a car without wearing a seat belt? Yes   Have you felt unusual stress, anger or loneliness in the last month? No   Who do you live with? Spouse   If you need help, do you have trouble finding someone available to you? No   Have you been bothered in the last four weeks by sexual problems? No   Do you have difficulty concentrating, remembering or making decisions? No           Does the patient have evidence of cognitive impairment? No    Asiprin use counseling: Taking ASA appropriately as indicated      Recent Lab Results:    Lab Results   Component Value Date     (H) 05/02/2019     Lab Results   Component Value Date    HGBA1C 5.70 (H) 05/02/2019     Lab Results   Component Value Date    TRIG 80 05/02/2019    HDL 35 (L) 06/29/2016    VLDL 19.4 06/29/2016    LDLHDL 2.13 06/29/2016           Age-appropriate Screening Schedule:  Refer to the list below for future screening recommendations based on patient's age, sex and/or medical conditions. Orders for these recommended tests are listed in the plan section. The patient has been provided with a written plan.    Health Maintenance   Topic Date Due   • INFLUENZA VACCINE  08/01/2019   • LIPID PANEL  05/02/2020   • PNEUMOCOCCAL VACCINES (65+ LOW/MEDIUM RISK)  Discontinued   • TDAP/TD VACCINES  Discontinued   • ZOSTER VACCINE  Discontinued        Subjective   History of Present Illness    Izaiah Garcia is a 87 y.o. male who presents for an Annual Wellness Visit.    The  following portions of the patient's history were reviewed and updated as appropriate: allergies, current medications, past family history, past medical history, past social history, past surgical history and problem list.    Outpatient Medications Prior to Visit   Medication Sig Dispense Refill   • aspirin (ECOTRIN) 325 MG EC tablet Take 325 mg by mouth Every Other Day.     • allopurinol (ZYLOPRIM) 300 MG tablet TAKE 1 TABLET EVERY DAY 90 tablet 1   • amLODIPine (NORVASC) 10 MG tablet Take a half a pill every morning for high blood pressure 90 tablet 3   • benazepril-hydrochlorthiazide (LOTENSIN HCT) 20-12.5 MG per tablet Take 1 p.o. every morning for high blood pressure and leg swelling 30 tablet 1     No facility-administered medications prior to visit.        Patient Active Problem List   Diagnosis   • Hyperlipidemia   • Benign essential hypertension   • History of gout   • Weakness of both lower extremities   • History of esophageal stricture   • History of stroke, 6/29/2016--4.9 x 4 x 3 cm inferior left cerebellar infarct   • Rheumatoid factor positive   • Impaired fasting glucose   • At risk for falls   • Weakness with dizziness   • Bilateral high frequency sensorineural hearing loss, wears hearing aids   • Decreased peripheral vision of both eyes   • Loss of equilibrium   • Bilateral lower extremity edema   • Parkinson's disease (CMS/HCC)   • Therapeutic drug monitoring   • Vitamin D deficiency   • Macrocytosis       Advance Care Planning:  Patient has an advance directive - a copy has not been provided. Have asked the patient to send this to us to add to record    Identification of Risk Factors:  Risk factors include: weight , cardiovascular risk, increased fall risk and hearing limitations.    Review of Systems   Musculoskeletal: Positive for arthralgias, gait problem and myalgias.   Neurological: Positive for tremors and weakness. Negative for dizziness, seizures, syncope, facial asymmetry, speech  "difficulty, light-headedness, numbness and headaches.   All other systems reviewed and are negative.      Compared to one year ago, the patient feels his physical health is better.  Compared to one year ago, the patient feels his mental health is better.    Objective       Physical Exam      General: Alert and oriented x 3.  No acute distress.  Normal affect.  HEENT: Pupils equal, round, reactive to light; extraocular movements intact; sclerae nonicteric; pharynx, ear canals and TMs normal.  Neck: Without JVD, thyromegaly, bruit, or adenopathy.  Lungs: Clear to auscultation in all fields.  Heart: Regular rate and rhythm without murmur, rub, gallop, or click.  Abdomen: Soft, nontender, without hepatosplenomegaly or hernia.  Bowel sounds normal.  : Deferred.  Rectal: Deferred.  Extremities: Without clubbing, cyanosis, edema, or pulse deficit.  Neurologic: Intact without focal deficit.  Patient ambulates with a broad-based ataxic gait with the assistance of a cane.  His tremor is much less evident.  Skin: Without significant lesion.  Musculoskeletal: Unremarkable.    Vitals:    05/22/19 0845   BP: 168/68   BP Location: Right arm   Pulse: 55   SpO2: 98%   Weight: 90.8 kg (200 lb 3.2 oz)   Height: 180.3 cm (70.98\")   PainSc: 0-No pain       Patient's Body mass index is 27.93 kg/m². BMI is above normal parameters. Recommendations include: exercise counseling, nutrition counseling and referral to primary care.      Assessment/Plan   Patient Self-Management and Personalized Health Advice  The patient has been provided with information about: diet, exercise, weight management, prevention of cardiac or vascular disease and fall prevention and preventive services including:   · Counseling for cardiovascular disease risk reduction, Diabetes screening, see lab orders, Exercise counseling provided, Fall Risk assessment done, Glaucoma screening recommended, Nutrition counseling provided, Prostate cancer screening " discussed.    Visit Diagnoses:    ICD-10-CM ICD-9-CM   1. Medicare annual wellness visit, subsequent Z00.00 V70.0   2. Hyperlipidemia, unspecified hyperlipidemia type E78.5 272.4   3. Benign essential hypertension I10 401.1   4. History of gout Z87.39 V12.29   5. History of stroke, 6/29/2016--4.9 x 4 x 3 cm inferior left cerebellar infarct Z86.73 V12.54   6. History of esophageal stricture Z87.19 V12.79   7. Rheumatoid factor positive R76.8 795.79   8. Impaired fasting glucose R73.01 790.21   9. Bilateral lower extremity edema R60.0 782.3   10. Vitamin D deficiency E55.9 268.9   11. Macrocytosis D75.89 289.89   12. Weakness of both lower extremities R29.898 729.89   13. Weakness with dizziness R53.1 780.79    R42 780.4   14. Loss of equilibrium R42 780.4   15. Parkinson's disease (CMS/[facility]) G20 332.0   16. Bilateral high frequency sensorineural hearing loss, wears hearing aids H90.3 389.18   17. At risk for falls Z91.81 V15.88   18. Therapeutic drug monitoring Z51.81 V58.83   19. Vitamin B12 deficiency E53.8 266.2       Orders Placed This Encounter   Procedures   • Methylmalonic Acid, Serum       Outpatient Encounter Medications as of 5/22/2019   Medication Sig Dispense Refill   • allopurinol (ZYLOPRIM) 300 MG tablet Take 1 p.o. daily for gout 90 tablet 1   • aspirin (ECOTRIN) 325 MG EC tablet Take 325 mg by mouth Every Other Day.     • benazepril-hydrochlorthiazide (LOTENSIN HCT) 20-12.5 MG per tablet Take 2 p.o. every morning for high blood pressure and leg swelling 60 tablet 3   • [DISCONTINUED] allopurinol (ZYLOPRIM) 300 MG tablet TAKE 1 TABLET EVERY DAY 90 tablet 1   • [DISCONTINUED] amLODIPine (NORVASC) 10 MG tablet Take a half a pill every morning for high blood pressure 90 tablet 3   • [DISCONTINUED] benazepril-hydrochlorthiazide (LOTENSIN HCT) 20-12.5 MG per tablet Take 1 p.o. every morning for high blood pressure and leg swelling 30 tablet 1     No facility-administered encounter medications on file as of  5/22/2019.        Reviewed use of high risk medication in the elderly: yes  Reviewed for potential of harmful drug interactions in the elderly: yes    Follow Up:  Return in about 3 weeks (around 6/12/2019) for Recheck.     An After Visit Summary and PPPS with all of these plans were given to the patient.

## 2019-05-22 NOTE — PROGRESS NOTES
05/22/2019    Patient Information  Izaiah Garcia                                                                                          [id number]  [facility] [address]      8/31/1931  [unfilled]  There is no work phone number on file.    Chief Complaint:     Subsequent Medicare wellness visit.  Follow-up hyperlipidemia, hypertension, gout, history of stroke, history of esophageal stricture, rheumatoid factor positivity, impaired fasting glucose, bilateral lower extremity edema, evaluation of macrocytosis, complaints of weakness of both lower extremities as well as weakness with dizziness and loss of equilibrium and possible parkinsonism.  Patient reports he feels somewhat better after medication adjustment.    History of Present Illness:    Patient with a history of medical problems as outlined in chief complaint presents today for subsequent Medicare wellness visit.  He is also here to follow-up on several medical issues as listed in chief complaint.  At the last visit I decreased his amlodipine from 10 mg down to 5 mg/day.  Patient is currently cutting the 10 mg in half.  I did this because he had complaints of lower extremity swelling.  Patient reports his swelling is somewhat better after reduction of amlodipine but still is not totally resolved.  Also I discontinued patient's statin medication because of complaints of weakness of both lower extremities.  Patient reports that after medication adjustment he notes his tremor is not as bad but he continues to have the weakness of his lower extremities as well as problems with loss of equilibrium.  His past medical history reviewed and updated were necessary including health maintenance parameters.  This reveals he will be up-to-date after today's visit or else accounted for.    Review of Systems   Constitution: Negative.   HENT: Negative.    Eyes: Negative.    Cardiovascular: Negative.    Respiratory: Negative.    Endocrine: Negative.   "  Hematologic/Lymphatic: Negative.    Skin: Negative.    Musculoskeletal: Positive for muscle weakness.   Gastrointestinal: Negative.    Genitourinary: Negative.    Neurological: Positive for disturbances in coordination, loss of balance and tremors. Negative for difficulty with concentration, dizziness, light-headedness, numbness and paresthesias.   Psychiatric/Behavioral: Negative.    Allergic/Immunologic: Negative.        Active Problems:    Patient Active Problem List   Diagnosis   • Hyperlipidemia   • Benign essential hypertension   • History of gout   • Weakness of both lower extremities   • History of esophageal stricture   • History of stroke, 6/29/2016--4.9 x 4 x 3 cm inferior left cerebellar infarct   • Rheumatoid factor positive   • Impaired fasting glucose   • At risk for falls   • Weakness with dizziness   • Bilateral high frequency sensorineural hearing loss, wears hearing aids   • Decreased peripheral vision of both eyes   • Loss of equilibrium   • Bilateral lower extremity edema   • Parkinson's disease (CMS/HCC)   • Therapeutic drug monitoring   • Vitamin D deficiency   • Macrocytosis         Past Medical History:   Diagnosis Date   • At risk for falls 5/2/2019   • Benign essential hypertension 6/29/2016   • Bilateral high frequency sensorineural hearing loss, wears hearing aids 5/2/2019   • Bilateral lower extremity edema 5/2/2019    May 2, 2019--patient who has hypertension and is on amlodipine 10 mg/day is complaining of progressively worsening swelling of the lower extremities that extends up to about mid calf.  Gets worse at the end of the day and tends to get better overnight when he props his feet up.  I think this is definitely related to the amlodipine although patient may have some venous insufficiency.  Medication ad   • Decreased peripheral vision of both eyes 5/2/2019    May 2, 2019--continues to have complaints of \"dizziness\" associated with weakness in his lower extremities.  He is not " "describing a spinning sensation or anything remotely close to vertigo.  Instead he is describing an off-balance sensation.  He tends to fall forward if not careful and he tends to list towards the right side.  He has marked difficulty going down an incline.  He has to ambulate wit   • History of aspiration pneumonia 5/4/2015   • History of esophageal stricture 5/4/2015    July 3, 2018--EGD revealed a distal esophageal stricture that was dilated to 18 mm.  There was a small hiatal hernia.  There was mild streaky erythema in the proximal stomach.  Duodenal bulb normal.  April 26, 2016--EGD performed for dysphagia reveals a distal esophageal stricture that was dilated 16.5 mm.   • History of gout 6/29/2016   • History of stroke, 6/29/2016--4.9 x 4 x 3 cm inferior left cerebellar infarct 5/4/2015 June 29, 2016--MRI of the brain performed for complaints of dizziness and weakness. IMPRESSION: 1. There is susceptibility artifact streaking through the anterior left frontal scalp through the anterior left frontal bone in the anterior tipof the left frontal lobe likely from a tiny piece of metal in the anterior left frontal scalp slightly limits evaluation of these structures. 2. There is mild s   • Hyperlipidemia 6/29/2016   • Impaired fasting glucose 5/2/2019 April 14, 2015--hemoglobin A1c 5.9.   • Macrocytosis 5/2/2019   • Parkinson's disease (CMS/[facility]) 5/2/2019    May 2, 2019--continues to have complaints of \"dizziness\" associated with weakness in his lower extremities.  He is not describing a spinning sensation or anything remotely close to vertigo.  Instead he is describing an off-balance sensation.  He tends to fall forward if not careful and he tends to list towards the right side.  He has marked difficulty going down an incline.  He has to ambulate wit   • Rheumatoid factor positive 5/2/2019 March 25, 2014--rheumatoid factor returned positive at 95.  However, CCP antibodies normal at 8, SHIV negative, both C&P " ANCA negative, C-reactive protein normal at 0.24, sed rate normal at 2.   • Vitamin D deficiency 5/2/2019         Past Surgical History:   Procedure Laterality Date   • CATARACT EXTRACTION EXTRACAPSULAR W/ INTRAOCULAR LENS IMPLANTATION Bilateral     Bilateral cataract extirpation with intraocular lens implantation   • CHOLECYSTECTOMY     • ESOPHAGEAL DILATATION  04/26/2016 April 26, 2016--EGD performed for dysphagia reveals a distal esophageal stricture that was dilated 16.5 mm.   • ESOPHAGEAL DILATATION  07/03/2018    July 3, 2018--EGD revealed a distal esophageal stricture that was dilated to 18 mm.  There was a small hiatal hernia.  There was mild streaky erythema in the proximal stomach.  Duodenal bulb normal.         No Known Allergies        Current Outpatient Medications:   •  allopurinol (ZYLOPRIM) 300 MG tablet, TAKE 1 TABLET EVERY DAY, Disp: 90 tablet, Rfl: 1  •  amLODIPine (NORVASC) 10 MG tablet, Take a half a pill every morning for high blood pressure, Disp: 90 tablet, Rfl: 3  •  aspirin (ECOTRIN) 325 MG EC tablet, Take 325 mg by mouth Every Other Day., Disp: , Rfl:   •  benazepril-hydrochlorthiazide (LOTENSIN HCT) 20-12.5 MG per tablet, Take 1 p.o. every morning for high blood pressure and leg swelling, Disp: 30 tablet, Rfl: 1      Family History   Problem Relation Age of Onset   • No Known Problems Mother    • No Known Problems Father          Social History     Socioeconomic History   • Marital status:      Spouse name: Not on file   • Number of children: 2   • Years of education: Not on file   • Highest education level: 9th grade   Social Needs   • Financial resource strain: Not hard at all   • Food insecurity:     Worry: Never true     Inability: Never true   • Transportation needs:     Medical: No     Non-medical: No   Tobacco Use   • Smoking status: Never Smoker   • Smokeless tobacco: Never Used   Substance and Sexual Activity   • Alcohol use: Yes     Frequency: 2-4 times a month      "Drinks per session: 1 or 2     Comment: occ   • Drug use: No   • Sexual activity: Not Currently     Partners: Female   Lifestyle   • Physical activity:     Days per week: 0 days     Minutes per session: 0 min   • Stress: Not at all   Relationships   • Social connections:     Talks on phone: Once a week     Gets together: Twice a week     Attends [employer] service: Never     Active member of club or organization: Yes     Attends meetings of clubs or organizations: More than 4 times per year     Relationship status:          Vitals:    05/22/19 0845   BP: 168/68   BP Location: Right arm   Pulse: 55   SpO2: 98%   Weight: 90.8 kg (200 lb 3.2 oz)   Height: 180.3 cm (70.98\")          Physical Exam:    General: Alert and oriented x 3.  No acute distress.  Normal affect.  HEENT: Pupils equal, round, reactive to light; extraocular movements intact; sclerae nonicteric; pharynx, ear canals and TMs normal.  Neck: Without JVD, thyromegaly, bruit, or adenopathy.  Lungs: Clear to auscultation in all fields.  Heart: Regular rate and rhythm without murmur, rub, gallop, or click.  Abdomen: Soft, nontender, without hepatosplenomegaly or hernia.  Bowel sounds normal.  : Deferred.  Rectal: Deferred.  Extremities: Without clubbing, cyanosis, edema, or pulse deficit.  Neurologic: Intact without focal deficit.  Patient ambulates with a broad-based ataxic gait with the assistance of a cane.  His tremor is much less evident.  Skin: Without significant lesion.  Musculoskeletal: Unremarkable.    Lab/other results:    NMR reveals a total cholesterol 121.  Triglycerides are 80.  LDL particle number excellent at 704.  Small LDL particle number excellent at 315.  HDL particle numbers a little low at 27.4.  CMP normal except blood sugar slightly elevated 103.  Chloride is little high at 108.  CBC is normal.  Methylmalonic acid is elevated at 380.  Homocystine is normal at 11.6.  Vitamin B12 is normal at 550.  Thyroid function tests are " normal.  Folic acid is normal.  Vitamin D is a little low at 26.8.  Uric acid low at 3.2.  CPK normal.    Assessment/Plan:     Diagnosis Plan   1. Medicare annual wellness visit, subsequent     2. Hyperlipidemia, unspecified hyperlipidemia type     3. Benign essential hypertension     4. History of gout     5. History of stroke, 6/29/2016--4.9 x 4 x 3 cm inferior left cerebellar infarct     6. History of esophageal stricture     7. Rheumatoid factor positive     8. Impaired fasting glucose     9. Bilateral lower extremity edema     10. Vitamin D deficiency     11. Macrocytosis     12. Weakness of both lower extremities     13. Weakness with dizziness     14. Loss of equilibrium     15. Parkinson's disease (CMS/[facility])     16. Bilateral high frequency sensorineural hearing loss, wears hearing aids     17. At risk for falls     18. Therapeutic drug monitoring       The subsequent Medicare wellness visit is documented on separate note.    Patient has a history of hyperlipidemia and was previously on on lovastatin which I discontinued because of his weakness in the lower extremities and other complaints.  The weakness is really not any better although patient does feel better after the medication adjustment consisting of decreased amlodipine and discontinuation of the lovastatin.  We will reassess the cholesterol status at a later date off of medication and then make a final decision.  His blood pressure is better controlled but not at goal and therefore medication adjustment is indicated.  His lower extremity edema seems improved but not totally resolved after reducing the amlodipine.  Patient does have a remote history of stroke and that may be playing a role in his vision problems as well as his ataxia.  He does have some signs consistent with parkinsonism and has a neurology appointment in July.  He has microcytosis and it appears that he has a mild elevation of methylmalonic acid indicating possible vitamin B12  deficiency.  Certainly this can cause some neurologic issues.  Even though his serum vitamin B12 is normal, patients can still be clinically deficient in vitamin B12 if there methylmalonic acid levels are elevated.  Given that is only mildly elevated, that should be repeated first before we commit him to monthly B12 injections for the rest of his life.    Plan is as follows: Recheck methylmalonic acid.  Discontinue amlodipine altogether and start taking benazepril HCT, 2 p.o. daily.  I will have patient follow-up in about 3 to 4 weeks to reassess the blood pressure as well as the other issues noted above.  Also given the low uric acid levels, we could consider reduction of allopurinol although increasing the hydrochlorothiazide will likely increase the uric acid levels and therefore I will not make a change at this time.    Procedures

## 2019-05-24 DIAGNOSIS — Z87.39 HISTORY OF GOUT: Chronic | ICD-10-CM

## 2019-05-24 RX ORDER — ALLOPURINOL 300 MG/1
TABLET ORAL
Qty: 90 TABLET | Refills: 0
Start: 2019-05-24 | End: 2019-05-30 | Stop reason: SDUPTHER

## 2019-05-25 LAB
Lab: NORMAL
METHYLMALONATE SERPL-SCNC: 356 NMOL/L (ref 0–378)

## 2019-05-30 DIAGNOSIS — Z87.39 HISTORY OF GOUT: Chronic | ICD-10-CM

## 2019-05-30 RX ORDER — ALLOPURINOL 300 MG/1
TABLET ORAL
Qty: 90 TABLET | Refills: 2 | Status: SHIPPED | OUTPATIENT
Start: 2019-05-30 | End: 2019-08-12 | Stop reason: SDUPTHER

## 2019-06-06 ENCOUNTER — OFFICE VISIT (OUTPATIENT)
Dept: INTERNAL MEDICINE | Facility: CLINIC | Age: 84
End: 2019-06-06

## 2019-06-06 VITALS
OXYGEN SATURATION: 98 % | BODY MASS INDEX: 27.86 KG/M2 | SYSTOLIC BLOOD PRESSURE: 160 MMHG | HEIGHT: 71 IN | WEIGHT: 199 LBS | HEART RATE: 65 BPM | DIASTOLIC BLOOD PRESSURE: 84 MMHG

## 2019-06-06 DIAGNOSIS — R53.1 WEAKNESS WITH DIZZINESS: ICD-10-CM

## 2019-06-06 DIAGNOSIS — Z86.73 HISTORY OF STROKE: Chronic | ICD-10-CM

## 2019-06-06 DIAGNOSIS — R60.0 BILATERAL LOWER EXTREMITY EDEMA: Primary | Chronic | ICD-10-CM

## 2019-06-06 DIAGNOSIS — R42 WEAKNESS WITH DIZZINESS: ICD-10-CM

## 2019-06-06 DIAGNOSIS — R42 LOSS OF EQUILIBRIUM: ICD-10-CM

## 2019-06-06 DIAGNOSIS — D75.89 MACROCYTOSIS: Chronic | ICD-10-CM

## 2019-06-06 DIAGNOSIS — I10 BENIGN ESSENTIAL HYPERTENSION: Chronic | ICD-10-CM

## 2019-06-06 DIAGNOSIS — Z87.39 HISTORY OF GOUT: Chronic | ICD-10-CM

## 2019-06-06 DIAGNOSIS — G20 PARKINSON'S DISEASE (HCC): ICD-10-CM

## 2019-06-06 DIAGNOSIS — R29.898 WEAKNESS OF BOTH LOWER EXTREMITIES: ICD-10-CM

## 2019-06-06 DIAGNOSIS — E53.8 VITAMIN B12 DEFICIENCY: ICD-10-CM

## 2019-06-06 PROCEDURE — 99214 OFFICE O/P EST MOD 30 MIN: CPT | Performed by: INTERNAL MEDICINE

## 2019-06-06 PROCEDURE — 96372 THER/PROPH/DIAG INJ SC/IM: CPT | Performed by: INTERNAL MEDICINE

## 2019-06-06 RX ORDER — CYANOCOBALAMIN 1000 UG/ML
2000 INJECTION, SOLUTION INTRAMUSCULAR; SUBCUTANEOUS ONCE
Status: COMPLETED | OUTPATIENT
Start: 2019-06-06 | End: 2019-06-06

## 2019-06-06 RX ADMIN — CYANOCOBALAMIN 2000 MCG: 1000 INJECTION, SOLUTION INTRAMUSCULAR; SUBCUTANEOUS at 10:14

## 2019-06-06 NOTE — PROGRESS NOTES
06/06/2019    Patient Information  Izaiah Garcia                                                                                          [address] [facility] [address]      8/31/1931  [unfilled]  There is no work phone number on file.    Chief Complaint:     Follow-up hypertension, lower extremity edema, suspected vitamin B12 deficiency and macrocytosis, suspected parkinsonism with loss of equilibrium, weakness and dizziness, and weakness in his lower extremities.  No new acute complaints.    History of Present Illness:    Patient with a history of medical problems as outlined in the chief complaint presents today to follow-up on several issues.  His blood pressure not quite been at goal and he has had complaints of lower extremity edema and therefore amlodipine was discontinued.  His lower extremity swelling is much better after discontinuation of the amlodipine.  Also patient was noted to have an elevated methylmalonic acid and may have vitamin B12 deficiency and we will review that today as well.  Also patient has problems with lower extremity weakness and has difficulty with his balance and he has features consistent with parkinsonism.  I have been holding off treatment for this condition because I would like for him to be evaluated by the neurologist while not on medication obviously.  His past medical history reviewed and updated were necessary including health maintenance parameters.  This reveals he is up-to-date or else accounted for.    Review of Systems   Constitution: Negative.   HENT: Negative.    Eyes: Negative.    Cardiovascular: Negative.    Respiratory: Negative.    Endocrine: Negative.    Hematologic/Lymphatic: Negative.    Skin: Negative.    Musculoskeletal: Positive for arthritis and muscle weakness.   Gastrointestinal: Negative.    Genitourinary: Negative.    Neurological: Positive for disturbances in coordination, loss of balance and tremors. Negative for numbness and  "paresthesias.   Psychiatric/Behavioral: Negative.    Allergic/Immunologic: Negative.        Active Problems:    Patient Active Problem List   Diagnosis   • Hyperlipidemia   • Benign essential hypertension   • History of gout   • Weakness of both lower extremities   • History of esophageal stricture   • History of stroke, 6/29/2016--4.9 x 4 x 3 cm inferior left cerebellar infarct   • Rheumatoid factor positive   • Impaired fasting glucose   • At risk for falls   • Weakness with dizziness   • Bilateral high frequency sensorineural hearing loss, wears hearing aids   • Decreased peripheral vision of both eyes   • Loss of equilibrium   • Bilateral lower extremity edema   • Parkinson's disease (CMS/HCC)   • Therapeutic drug monitoring   • Vitamin D deficiency   • Macrocytosis   • Vitamin B12 deficiency         Past Medical History:   Diagnosis Date   • At risk for falls 5/2/2019   • Benign essential hypertension 6/29/2016   • Bilateral high frequency sensorineural hearing loss, wears hearing aids 5/2/2019   • Bilateral lower extremity edema 5/2/2019    May 2, 2019--patient who has hypertension and is on amlodipine 10 mg/day is complaining of progressively worsening swelling of the lower extremities that extends up to about mid calf.  Gets worse at the end of the day and tends to get better overnight when he props his feet up.  I think this is definitely related to the amlodipine although patient may have some venous insufficiency.  Medication ad   • Decreased peripheral vision of both eyes 5/2/2019    May 2, 2019--continues to have complaints of \"dizziness\" associated with weakness in his lower extremities.  He is not describing a spinning sensation or anything remotely close to vertigo.  Instead he is describing an off-balance sensation.  He tends to fall forward if not careful and he tends to list towards the right side.  He has marked difficulty going down an incline.  He has to ambulate wit   • History of aspiration " "pneumonia 5/4/2015   • History of esophageal stricture 5/4/2015    July 3, 2018--EGD revealed a distal esophageal stricture that was dilated to 18 mm.  There was a small hiatal hernia.  There was mild streaky erythema in the proximal stomach.  Duodenal bulb normal.  April 26, 2016--EGD performed for dysphagia reveals a distal esophageal stricture that was dilated 16.5 mm.   • History of gout 6/29/2016   • History of stroke, 6/29/2016--4.9 x 4 x 3 cm inferior left cerebellar infarct 5/4/2015 June 29, 2016--MRI of the brain performed for complaints of dizziness and weakness. IMPRESSION: 1. There is susceptibility artifact streaking through the anterior left frontal scalp through the anterior left frontal bone in the anterior tipof the left frontal lobe likely from a tiny piece of metal in the anterior left frontal scalp slightly limits evaluation of these structures. 2. There is mild s   • Hyperlipidemia 6/29/2016   • Impaired fasting glucose 5/2/2019 April 14, 2015--hemoglobin A1c 5.9.   • Macrocytosis 5/2/2019   • Parkinson's disease (CMS/HCC) 5/2/2019    May 2, 2019--continues to have complaints of \"dizziness\" associated with weakness in his lower extremities.  He is not describing a spinning sensation or anything remotely close to vertigo.  Instead he is describing an off-balance sensation.  He tends to fall forward if not careful and he tends to list towards the right side.  He has marked difficulty going down an incline.  He has to ambulate wit   • Rheumatoid factor positive 5/2/2019 March 25, 2014--rheumatoid factor returned positive at 95.  However, CCP antibodies normal at 8, SHIV negative, both C&P ANCA negative, C-reactive protein normal at 0.24, sed rate normal at 2.   • Vitamin B12 deficiency 6/6/2019 June 6, 2019--patient recently had mildly elevated methylmalonic acid of 380.  Repeat methylmalonic acid was upper limit of normal at 356.  Given patient's neurologic symptoms and suspected " parkinsonism, I have a low threshold for treating vitamin B12 deficiency.  We will initiate vitamin B12 injections today.  2000 mcg subcutaneously given today.   • Vitamin D deficiency 5/2/2019         Past Surgical History:   Procedure Laterality Date   • CATARACT EXTRACTION EXTRACAPSULAR W/ INTRAOCULAR LENS IMPLANTATION Bilateral     Bilateral cataract extirpation with intraocular lens implantation   • CHOLECYSTECTOMY     • ESOPHAGEAL DILATATION  04/26/2016 April 26, 2016--EGD performed for dysphagia reveals a distal esophageal stricture that was dilated 16.5 mm.   • ESOPHAGEAL DILATATION  07/03/2018    July 3, 2018--EGD revealed a distal esophageal stricture that was dilated to 18 mm.  There was a small hiatal hernia.  There was mild streaky erythema in the proximal stomach.  Duodenal bulb normal.         No Known Allergies        Current Outpatient Medications:   •  allopurinol (ZYLOPRIM) 300 MG tablet, Take 1 p.o. daily for gout, Disp: 90 tablet, Rfl: 2  •  aspirin (ECOTRIN) 325 MG EC tablet, Take 325 mg by mouth Every Other Day., Disp: , Rfl:   •  Azilsartan-Chlorthalidone (EDARBYCLOR) 40-25 MG tablet, Take 1 p.o. every morning for high blood pressure, Disp: 30 tablet, Rfl: 6      Family History   Problem Relation Age of Onset   • No Known Problems Mother    • No Known Problems Father          Social History     Socioeconomic History   • Marital status:      Spouse name: Not on file   • Number of children: 2   • Years of education: Not on file   • Highest education level: 9th grade   Social Needs   • Financial resource strain: Not hard at all   • Food insecurity:     Worry: Never true     Inability: Never true   • Transportation needs:     Medical: No     Non-medical: No   Tobacco Use   • Smoking status: Never Smoker   • Smokeless tobacco: Never Used   Substance and Sexual Activity   • Alcohol use: Yes     Frequency: 2-4 times a month     Drinks per session: 1 or 2     Comment: occ   • Drug use: No  "  • Sexual activity: Not Currently     Partners: Female   Lifestyle   • Physical activity:     Days per week: 0 days     Minutes per session: 0 min   • Stress: To some extent   Relationships   • Social connections:     Talks on phone: Once a week     Gets together: Twice a week     Attends Yazidi service: Never     Active member of club or organization: Yes     Attends meetings of clubs or organizations: More than 4 times per year     Relationship status:          Vitals:    06/06/19 0923   BP: 160/84   BP Location: Left arm   Pulse: 65   SpO2: 98%   Weight: 90.3 kg (199 lb)   Height: 180.3 cm (70.98\")          Physical Exam:    General: Alert and oriented x 3.  No acute distress.  Normal affect.  HEENT: Pupils equal, round, reactive to light; extraocular movements intact; sclerae nonicteric; pharynx, ear canals and TMs normal.  Neck: Without JVD, thyromegaly, bruit, or adenopathy.  Lungs: Clear to auscultation in all fields.  Heart: Regular rate and rhythm without murmur, rub, gallop, or click.  Abdomen: Soft, nontender, without hepatosplenomegaly or hernia.  Bowel sounds normal.  : Deferred.  Rectal: Deferred.  Extremities: Without clubbing, cyanosis, or pulse deficit.  Lower extremity edema bilaterally is now down to trace to 1+.  Neurologic: Intact without focal deficit.  Patient ambulates with a somewhat ataxic gait with the assistance of a cane.  Skin: Without significant lesion.  Musculoskeletal: Unremarkable.    Lab/other results:    Initial methylmalonic acid elevated at 380.  Repeat methylmalonic acid upper range of normal at 356.    Assessment/Plan:     Diagnosis Plan   1. Bilateral lower extremity edema     2. Benign essential hypertension  Azilsartan-Chlorthalidone (EDARBYCLOR) 40-25 MG tablet   3. Vitamin B12 deficiency     4. Macrocytosis     5. Parkinson's disease (CMS/HCC)     6. Loss of equilibrium     7. Weakness with dizziness     8. Weakness of both lower extremities       Patient's " lower extremity edema continues to improve.  However, his blood pressure is not at goal.  It appears that patient may have a mild vitamin B12 deficiency and that would explain his macrocytosis.  Given his neurologic problems and suspected parkinsonism, I would have a low threshold for initiation vitamin D supplementation.  Patient continues to have loss of equilibrium and a sense of being off balance as well as weakness in both of his lower extremities.  He does have a neurology appointment but this is not until July.    Plan is as follows: Discontinue benazepril HCT and start Edarbyclor 40/25, 1 p.o. daily.  Cyanocobalamin 2000 mcg subcutaneously x1.  Patient will follow-up in 3 weeks to reassess her blood pressure, leg swelling, and lower extremity weakness.        Procedures

## 2019-06-10 ENCOUNTER — TELEPHONE (OUTPATIENT)
Dept: INTERNAL MEDICINE | Facility: CLINIC | Age: 84
End: 2019-06-10

## 2019-06-10 NOTE — TELEPHONE ENCOUNTER
Get a PA.  He has failed multiple other medications.  We might try that pharmacy up in [facility].  In the meantime, give him samples of 40/12.5.

## 2019-06-10 NOTE — TELEPHONE ENCOUNTER
Pt called and said that you told him to stop his old BP medicine and to start the edarbyclor 40/25.    This medicine is not covered.  Do you want me to do a PA?  What do you want him to do in the meantime?  We only have samples of 40/12.5

## 2019-06-13 ENCOUNTER — TELEPHONE (OUTPATIENT)
Dept: INTERNAL MEDICINE | Facility: CLINIC | Age: 84
End: 2019-06-13

## 2019-06-13 NOTE — TELEPHONE ENCOUNTER
PA (prior authorization) office called requesting additional information for medication AZILSARTAN-CHLORTHALIDONE 40-25 mg tablet.    Reference #[id number].  Ph #[phone].    Thank you.

## 2019-06-26 ENCOUNTER — TELEPHONE (OUTPATIENT)
Dept: INTERNAL MEDICINE | Facility: CLINIC | Age: 84
End: 2019-06-26

## 2019-06-26 NOTE — TELEPHONE ENCOUNTER
Spoke with pt's wife regarding upcoming appt and elevated BP, per Dr Godinez it is ok to keep original Friday appt 6/28/19.

## 2019-06-26 NOTE — TELEPHONE ENCOUNTER
Pt's wife called this morning stating that her  Izaiah's BP has been running a bit high. She stated that this mornings reading was 170/ 80 something. Pt has appt on Friday 6/28/19, wife would like to know if he should be seen sooner or just wait until Friday to be seen, she did say that is has been running a bit high for the past 10 days.

## 2019-06-28 DIAGNOSIS — I10 BENIGN ESSENTIAL HYPERTENSION: Chronic | ICD-10-CM

## 2019-06-28 DIAGNOSIS — I10 BENIGN ESSENTIAL HYPERTENSION: Primary | Chronic | ICD-10-CM

## 2019-06-28 RX ORDER — AMLODIPINE BESYLATE 2.5 MG/1
TABLET ORAL
Qty: 30 TABLET | Refills: 1 | Status: SHIPPED | OUTPATIENT
Start: 2019-06-28 | End: 2019-07-15

## 2019-06-28 NOTE — TELEPHONE ENCOUNTER
Pt's wife (on HIPAA) cld to confirm Dr. Godinez wants patient to stay on Edarbyclor.  Per Dr. Godinez, he does want patient to continue medication.  Per wife, sending refill to [facility] mail order.

## 2019-07-15 ENCOUNTER — OFFICE VISIT (OUTPATIENT)
Dept: INTERNAL MEDICINE | Facility: CLINIC | Age: 84
End: 2019-07-15

## 2019-07-15 VITALS
DIASTOLIC BLOOD PRESSURE: 80 MMHG | HEART RATE: 88 BPM | SYSTOLIC BLOOD PRESSURE: 160 MMHG | BODY MASS INDEX: 27.27 KG/M2 | WEIGHT: 194.8 LBS | HEIGHT: 71 IN | OXYGEN SATURATION: 98 %

## 2019-07-15 DIAGNOSIS — R42 WEAKNESS WITH DIZZINESS: ICD-10-CM

## 2019-07-15 DIAGNOSIS — R29.898 WEAKNESS OF BOTH LOWER EXTREMITIES: ICD-10-CM

## 2019-07-15 DIAGNOSIS — I10 BENIGN ESSENTIAL HYPERTENSION: Primary | Chronic | ICD-10-CM

## 2019-07-15 DIAGNOSIS — R42 LOSS OF EQUILIBRIUM: ICD-10-CM

## 2019-07-15 DIAGNOSIS — G20 PARKINSON'S DISEASE (HCC): ICD-10-CM

## 2019-07-15 DIAGNOSIS — R53.1 WEAKNESS WITH DIZZINESS: ICD-10-CM

## 2019-07-15 DIAGNOSIS — E53.8 VITAMIN B12 DEFICIENCY: ICD-10-CM

## 2019-07-15 PROCEDURE — 99214 OFFICE O/P EST MOD 30 MIN: CPT | Performed by: INTERNAL MEDICINE

## 2019-07-15 PROCEDURE — 96372 THER/PROPH/DIAG INJ SC/IM: CPT | Performed by: INTERNAL MEDICINE

## 2019-07-15 RX ORDER — AMLODIPINE BESYLATE 5 MG/1
TABLET ORAL
Qty: 30 TABLET | Refills: 3 | Status: SHIPPED | OUTPATIENT
Start: 2019-07-15 | End: 2019-08-12 | Stop reason: SDUPTHER

## 2019-07-15 RX ORDER — VALSARTAN AND HYDROCHLOROTHIAZIDE 320; 25 MG/1; MG/1
1 TABLET, FILM COATED ORAL DAILY
Qty: 30 TABLET | Refills: 11 | Status: SHIPPED | OUTPATIENT
Start: 2019-07-15 | End: 2019-08-12 | Stop reason: SDUPTHER

## 2019-07-15 RX ORDER — CYANOCOBALAMIN 1000 UG/ML
2000 INJECTION, SOLUTION INTRAMUSCULAR; SUBCUTANEOUS ONCE
Status: COMPLETED | OUTPATIENT
Start: 2019-07-15 | End: 2019-07-15

## 2019-07-15 RX ADMIN — CYANOCOBALAMIN 2000 MCG: 1000 INJECTION, SOLUTION INTRAMUSCULAR; SUBCUTANEOUS at 12:23

## 2019-07-15 NOTE — PROGRESS NOTES
07/15/2019    Patient Information  Izaiah Garcia                                                                                          [address] [facility] [address]      8/31/1931  [unfilled]  There is no work phone number on file.    Chief Complaint:     Follow-up hypertension, recent medication adjustment, vitamin B12 deficiency, problems with loss of equilibrium and weakness in the lower extremities, questionable parkinsonism, recent neurology consultation.    History of Present Illness:    Patient with a history of medical problems as outlined in chief complaint presents today to follow-up on several issues.  The first issue is that of his blood pressure which has not been well controlled.  We have made several adjustments and apparently will need to continue to do so to get it under control.  Also patient was recently diagnosed with vitamin B12 deficiency and needs to have a B12 injection today.  Patient also have problems with his balance and he has a questionable history consistent with possible Parkinson's.  He saw the neurologist recently but unfortunately I do not have any of that documentation.  Patient's wife indicates that the neurologist does not feel that patient has parkinsonism or at least he does not have anything that he thinks we should put him on a medication at the present time.  Past medical history reviewed and updated were necessary including health maintenance parameters.  This reveals he is up-to-date or else accounted for.    Review of Systems   Constitution: Negative.   HENT: Negative.    Eyes: Negative.    Cardiovascular: Negative.    Respiratory: Negative.    Endocrine: Negative.    Hematologic/Lymphatic: Negative.    Skin: Negative.    Musculoskeletal: Positive for muscle weakness.   Gastrointestinal: Negative.    Genitourinary: Negative.    Neurological: Positive for disturbances in coordination and loss of balance. Negative for numbness and paresthesias.  "  Psychiatric/Behavioral: Negative.    Allergic/Immunologic: Negative.        Active Problems:    Patient Active Problem List   Diagnosis   • Hyperlipidemia   • Benign essential hypertension   • History of gout   • Weakness of both lower extremities   • History of esophageal stricture   • History of stroke, 6/29/2016--4.9 x 4 x 3 cm inferior left cerebellar infarct   • Rheumatoid factor positive   • Impaired fasting glucose   • At risk for falls   • Weakness with dizziness   • Bilateral high frequency sensorineural hearing loss, wears hearing aids   • Decreased peripheral vision of both eyes   • Loss of equilibrium   • Bilateral lower extremity edema   • Parkinson's disease (CMS/HCC)   • Therapeutic drug monitoring   • Vitamin D deficiency   • Macrocytosis   • Vitamin B12 deficiency         Past Medical History:   Diagnosis Date   • At risk for falls 5/2/2019   • Benign essential hypertension 6/29/2016   • Bilateral high frequency sensorineural hearing loss, wears hearing aids 5/2/2019   • Bilateral lower extremity edema 5/2/2019    May 2, 2019--patient who has hypertension and is on amlodipine 10 mg/day is complaining of progressively worsening swelling of the lower extremities that extends up to about mid calf.  Gets worse at the end of the day and tends to get better overnight when he props his feet up.  I think this is definitely related to the amlodipine although patient may have some venous insufficiency.  Medication ad   • Decreased peripheral vision of both eyes 5/2/2019    May 2, 2019--continues to have complaints of \"dizziness\" associated with weakness in his lower extremities.  He is not describing a spinning sensation or anything remotely close to vertigo.  Instead he is describing an off-balance sensation.  He tends to fall forward if not careful and he tends to list towards the right side.  He has marked difficulty going down an incline.  He has to ambulate wit   • History of aspiration pneumonia " 5/4/2015   • History of esophageal stricture 5/4/2015    July 3, 2018--EGD revealed a distal esophageal stricture that was dilated to 18 mm.  There was a small hiatal hernia.  There was mild streaky erythema in the proximal stomach.  Duodenal bulb normal.  April 26, 2016--EGD performed for dysphagia reveals a distal esophageal stricture that was dilated 16.5 mm.   • History of gout 6/29/2016   • History of stroke, 6/29/2016--4.9 x 4 x 3 cm inferior left cerebellar infarct 5/4/2015 June 29, 2016--MRI of the brain performed for complaints of dizziness and weakness. IMPRESSION: 1. There is susceptibility artifact streaking through the anterior left frontal scalp through the anterior left frontal bone in the anterior tipof the left frontal lobe likely from a tiny piece of metal in the anterior left frontal scalp slightly limits evaluation of these structures. 2. There is mild s   • Hyperlipidemia 6/29/2016   • Impaired fasting glucose 5/2/2019 April 14, 2015--hemoglobin A1c 5.9.   • Macrocytosis 5/2/2019   • Rheumatoid factor positive 5/2/2019 March 25, 2014--rheumatoid factor returned positive at 95.  However, CCP antibodies normal at 8, SHIV negative, both C&P ANCA negative, C-reactive protein normal at 0.24, sed rate normal at 2.   • Vitamin B12 deficiency 6/6/2019 June 6, 2019--patient recently had mildly elevated methylmalonic acid of 380.  Repeat methylmalonic acid was upper limit of normal at 356.  Given patient's neurologic symptoms and suspected parkinsonism, I have a low threshold for treating vitamin B12 deficiency.  We will initiate vitamin B12 injections today.  2000 mcg subcutaneously given today.   • Vitamin D deficiency 5/2/2019         Past Surgical History:   Procedure Laterality Date   • CATARACT EXTRACTION EXTRACAPSULAR W/ INTRAOCULAR LENS IMPLANTATION Bilateral     Bilateral cataract extirpation with intraocular lens implantation   • CHOLECYSTECTOMY     • ESOPHAGEAL DILATATION  04/26/2016     April 26, 2016--EGD performed for dysphagia reveals a distal esophageal stricture that was dilated 16.5 mm.   • ESOPHAGEAL DILATATION  07/03/2018    July 3, 2018--EGD revealed a distal esophageal stricture that was dilated to 18 mm.  There was a small hiatal hernia.  There was mild streaky erythema in the proximal stomach.  Duodenal bulb normal.         No Known Allergies        Current Outpatient Medications:   •  allopurinol (ZYLOPRIM) 300 MG tablet, Take 1 p.o. daily for gout, Disp: 90 tablet, Rfl: 2  •  amLODIPine (NORVASC) 2.5 MG tablet, Take 1 p.o. every morning for high blood pressure, Disp: 30 tablet, Rfl: 1  •  aspirin (ECOTRIN) 325 MG EC tablet, Take 325 mg by mouth Every Other Day., Disp: , Rfl:   •  Azilsartan-Chlorthalidone (EDARBYCLOR) 40-25 MG tablet, Take 1 p.o. every morning for high blood pressure, Disp: 90 tablet, Rfl: 2      Family History   Problem Relation Age of Onset   • No Known Problems Mother    • No Known Problems Father          Social History     Socioeconomic History   • Marital status:      Spouse name: Not on file   • Number of children: 2   • Years of education: Not on file   • Highest education level: 9th grade   Social Needs   • Financial resource strain: Not hard at all   • Food insecurity:     Worry: Never true     Inability: Never true   • Transportation needs:     Medical: No     Non-medical: No   Tobacco Use   • Smoking status: Never Smoker   • Smokeless tobacco: Never Used   Substance and Sexual Activity   • Alcohol use: Yes     Frequency: 2-4 times a month     Drinks per session: 1 or 2     Comment: occ   • Drug use: No   • Sexual activity: Not Currently     Partners: Female   Lifestyle   • Physical activity:     Days per week: 0 days     Minutes per session: 0 min   • Stress: Only a little   Relationships   • Social connections:     Talks on phone: Once a week     Gets together: Twice a week     Attends [employer]ism service: Never     Active member of club or  "organization: Yes     Attends meetings of clubs or organizations: More than 4 times per year     Relationship status:          Vitals:    07/15/19 1130   BP: 160/80   BP Location: Left arm   Pulse: 88   SpO2: 98%   Weight: 88.4 kg (194 lb 12.8 oz)   Height: 180.3 cm (70.98\")          Physical Exam:    General: Alert and oriented x 3.  No acute distress.  Normal affect.  HEENT: Pupils equal, round, reactive to light; extraocular movements intact; sclerae nonicteric; pharynx, ear canals and TMs normal.  Neck: Without JVD, thyromegaly, bruit, or adenopathy.  Lungs: Clear to auscultation in all fields.  Heart: Regular rate and rhythm without murmur, rub, gallop, or click.  Abdomen: Soft, nontender, without hepatosplenomegaly or hernia.  Bowel sounds normal.  : Deferred.  Rectal: Deferred.  Extremities: Without clubbing, cyanosis, edema, or pulse deficit.  Neurologic: Intact without focal deficit.  Patient ambulates with a broad-based gait with the assistance of a cane.  Ataxia is present.  Skin: Without significant lesion.  Musculoskeletal: Unremarkable.    Lab/other results:      Assessment/Plan:     Diagnosis Plan   1. Benign essential hypertension     2. Vitamin B12 deficiency     3. Loss of equilibrium     4. Parkinson's disease (CMS/HCC)     5. Weakness with dizziness     6. Weakness of both lower extremities       Patient's blood pressure remains poorly controlled despite addition of 5 mg of amlodipine.  Medication adjustment further indicated.  Patient has a vitamin B12 deficiency which may or may not be contributing to his ataxia/lower extremity weakness.  Patient was recently evaluated by the neurologist and I do not have that documentation and will obtain it today.    Plan is as follows: Cyanocobalamin 2000 mcg subcutaneously x1.  Increase amlodipine to a total of 5 mg/day.  Stay on Edarbyclor 40/25, 1 p.o. daily and also stay on allopurinol.  I will have patient follow-up in about 4 weeks to reassess " the situation.    Addendum: At the end of the visit, I was made aware that the Edarbyclor was going to cost $100 per month which is too costly.  Discontinue Edarbyclor and start valsartan /25, 1 p.o. daily.  Keep follow-up in 1 month as planned.  Proceed with increased dose of amlodipine to 5 mg/day.    Procedures

## 2019-08-12 ENCOUNTER — OFFICE VISIT (OUTPATIENT)
Dept: INTERNAL MEDICINE | Facility: CLINIC | Age: 84
End: 2019-08-12

## 2019-08-12 VITALS
SYSTOLIC BLOOD PRESSURE: 142 MMHG | DIASTOLIC BLOOD PRESSURE: 60 MMHG | BODY MASS INDEX: 26.91 KG/M2 | HEART RATE: 63 BPM | HEIGHT: 71 IN | WEIGHT: 192.2 LBS | OXYGEN SATURATION: 98 %

## 2019-08-12 DIAGNOSIS — G20 PARKINSON'S DISEASE (HCC): ICD-10-CM

## 2019-08-12 DIAGNOSIS — R42 LOSS OF EQUILIBRIUM: ICD-10-CM

## 2019-08-12 DIAGNOSIS — Z91.81 AT RISK FOR FALLS: Chronic | ICD-10-CM

## 2019-08-12 DIAGNOSIS — E78.5 HYPERLIPIDEMIA, UNSPECIFIED HYPERLIPIDEMIA TYPE: Chronic | ICD-10-CM

## 2019-08-12 DIAGNOSIS — E53.8 VITAMIN B12 DEFICIENCY: ICD-10-CM

## 2019-08-12 DIAGNOSIS — I10 BENIGN ESSENTIAL HYPERTENSION: Primary | Chronic | ICD-10-CM

## 2019-08-12 DIAGNOSIS — Z86.73 HISTORY OF STROKE: Chronic | ICD-10-CM

## 2019-08-12 DIAGNOSIS — Z87.39 HISTORY OF GOUT: Chronic | ICD-10-CM

## 2019-08-12 DIAGNOSIS — R29.898 WEAKNESS OF BOTH LOWER EXTREMITIES: ICD-10-CM

## 2019-08-12 DIAGNOSIS — D75.89 MACROCYTOSIS: Chronic | ICD-10-CM

## 2019-08-12 DIAGNOSIS — R73.01 IMPAIRED FASTING GLUCOSE: Chronic | ICD-10-CM

## 2019-08-12 DIAGNOSIS — R60.0 BILATERAL LOWER EXTREMITY EDEMA: Chronic | ICD-10-CM

## 2019-08-12 LAB
ALBUMIN SERPL-MCNC: 4 G/DL (ref 3.5–5.2)
ALBUMIN/GLOB SERPL: 2 G/DL
ALP SERPL-CCNC: 86 U/L (ref 39–117)
ALT SERPL-CCNC: 19 U/L (ref 1–41)
AST SERPL-CCNC: 16 U/L (ref 1–40)
BILIRUB SERPL-MCNC: 1.1 MG/DL (ref 0.2–1.2)
BUN SERPL-MCNC: 18 MG/DL (ref 8–23)
BUN/CREAT SERPL: 17 (ref 7–25)
CALCIUM SERPL-MCNC: 9.2 MG/DL (ref 8.6–10.5)
CHLORIDE SERPL-SCNC: 101 MMOL/L (ref 98–107)
CHOLEST SERPL-MCNC: 131 MG/DL (ref 0–200)
CHOLEST/HDLC SERPL: 4.37 {RATIO}
CO2 SERPL-SCNC: 27.4 MMOL/L (ref 22–29)
CREAT SERPL-MCNC: 1.06 MG/DL (ref 0.76–1.27)
GLOBULIN SER CALC-MCNC: 2 GM/DL
GLUCOSE SERPL-MCNC: 103 MG/DL (ref 65–99)
HDLC SERPL-MCNC: 30 MG/DL (ref 40–60)
LDLC SERPL CALC-MCNC: 80 MG/DL (ref 0–100)
POTASSIUM SERPL-SCNC: 4.6 MMOL/L (ref 3.5–5.2)
PROT SERPL-MCNC: 6 G/DL (ref 6–8.5)
SODIUM SERPL-SCNC: 139 MMOL/L (ref 136–145)
TRIGL SERPL-MCNC: 106 MG/DL (ref 0–150)
VLDLC SERPL CALC-MCNC: 21.2 MG/DL

## 2019-08-12 PROCEDURE — 96372 THER/PROPH/DIAG INJ SC/IM: CPT | Performed by: INTERNAL MEDICINE

## 2019-08-12 PROCEDURE — 99214 OFFICE O/P EST MOD 30 MIN: CPT | Performed by: INTERNAL MEDICINE

## 2019-08-12 RX ORDER — VALSARTAN AND HYDROCHLOROTHIAZIDE 320; 25 MG/1; MG/1
1 TABLET, FILM COATED ORAL DAILY
Qty: 90 TABLET | Refills: 3 | Status: SHIPPED | OUTPATIENT
Start: 2019-08-12 | End: 2020-03-20 | Stop reason: HOSPADM

## 2019-08-12 RX ORDER — CYANOCOBALAMIN 1000 UG/ML
1000 INJECTION, SOLUTION INTRAMUSCULAR; SUBCUTANEOUS
Status: DISCONTINUED | OUTPATIENT
Start: 2019-08-12 | End: 2021-12-02

## 2019-08-12 RX ORDER — ALLOPURINOL 300 MG/1
TABLET ORAL
Qty: 90 TABLET | Refills: 3 | Status: SHIPPED | OUTPATIENT
Start: 2019-08-12 | End: 2020-07-13

## 2019-08-12 RX ORDER — AMLODIPINE BESYLATE 5 MG/1
TABLET ORAL
Qty: 90 TABLET | Refills: 3 | Status: SHIPPED | OUTPATIENT
Start: 2019-08-12 | End: 2020-03-20 | Stop reason: HOSPADM

## 2019-08-12 RX ADMIN — CYANOCOBALAMIN 1000 MCG: 1000 INJECTION, SOLUTION INTRAMUSCULAR; SUBCUTANEOUS at 09:39

## 2019-08-12 NOTE — PROGRESS NOTES
08/12/2019    Patient Information  Izaiah Garcia                                                                                          [address] [facility] [address]      8/31/1931  [unfilled]  There is no work phone number on file.    Chief Complaint:     Follow-up hypertension, recent medication adjustments, impaired fasting glucose, lower extremity edema, hyperlipidemia, macrocytosis and vitamin B12 deficiency, possible Parkinson's disease with weakness of both lower extremities, loss of equilibrium, and at increased risk for falls.  Patient continued to complain of generalized weakness, particularly his lower extremities.    History of Present Illness:    Patient with a history of medical problems as outlined in chief complaint presents today for follow-up.  We have been adjusting his blood pressure medication in order to try to get it under good control.  It appears we may be very close to accomplishing that.  Also patient recently diagnosed with vitamin B12 deficiency and needs a B12 injection today.  His lower extremity edema seems to be controlled on the current regimen.  We added amlodipine and increase the dose at the last visit and this has not resulted in worsening lower extremity edema.  Patient has seen a neurologist regarding his problems with equilibrium and suspected Parkinson's.  I still have not yet received any documentation from the neurologist despite requesting it.  Apparently the neurologist does not feel there is enough at the present time to make of diagnosis of parkinsonism but feels that close observation is indicated.  Also patient was referred for physical therapy and is not sure if that is doing him any good although the therapist thinks that it may be.  We are trying to help with his lower extremity weakness and gait problems.  Past medical history reviewed and updated were necessary including health maintenance parameters.  This reveals he is up-to-date or  else accounted for after today's visit.    Review of Systems   Constitution: Negative.   HENT: Negative.    Eyes: Negative.    Cardiovascular: Negative.    Respiratory: Negative.    Endocrine: Negative.    Hematologic/Lymphatic: Negative.    Skin: Negative.    Musculoskeletal: Positive for arthritis and muscle weakness.   Gastrointestinal: Negative.    Genitourinary: Negative.    Neurological: Positive for disturbances in coordination and loss of balance.   Psychiatric/Behavioral: Negative.    Allergic/Immunologic: Negative.        Active Problems:    Patient Active Problem List   Diagnosis   • Hyperlipidemia   • Benign essential hypertension   • History of gout   • Weakness of both lower extremities   • History of esophageal stricture   • History of stroke, 6/29/2016--4.9 x 4 x 3 cm inferior left cerebellar infarct   • Rheumatoid factor positive   • Impaired fasting glucose   • At risk for falls   • Weakness with dizziness   • Bilateral high frequency sensorineural hearing loss, wears hearing aids   • Decreased peripheral vision of both eyes   • Loss of equilibrium   • Bilateral lower extremity edema   • Parkinson's disease (CMS/HCC)   • Therapeutic drug monitoring   • Vitamin D deficiency   • Macrocytosis   • Vitamin B12 deficiency         Past Medical History:   Diagnosis Date   • At risk for falls 5/2/2019   • Benign essential hypertension 6/29/2016   • Bilateral high frequency sensorineural hearing loss, wears hearing aids 5/2/2019   • Bilateral lower extremity edema 5/2/2019    May 2, 2019--patient who has hypertension and is on amlodipine 10 mg/day is complaining of progressively worsening swelling of the lower extremities that extends up to about mid calf.  Gets worse at the end of the day and tends to get better overnight when he props his feet up.  I think this is definitely related to the amlodipine although patient may have some venous insufficiency.  Medication ad   • Decreased peripheral vision of both  "eyes 5/2/2019    May 2, 2019--continues to have complaints of \"dizziness\" associated with weakness in his lower extremities.  He is not describing a spinning sensation or anything remotely close to vertigo.  Instead he is describing an off-balance sensation.  He tends to fall forward if not careful and he tends to list towards the right side.  He has marked difficulty going down an incline.  He has to ambulate wit   • History of aspiration pneumonia 5/4/2015   • History of esophageal stricture 5/4/2015    July 3, 2018--EGD revealed a distal esophageal stricture that was dilated to 18 mm.  There was a small hiatal hernia.  There was mild streaky erythema in the proximal stomach.  Duodenal bulb normal.  April 26, 2016--EGD performed for dysphagia reveals a distal esophageal stricture that was dilated 16.5 mm.   • History of gout 6/29/2016   • History of stroke, 6/29/2016--4.9 x 4 x 3 cm inferior left cerebellar infarct 5/4/2015 June 29, 2016--MRI of the brain performed for complaints of dizziness and weakness. IMPRESSION: 1. There is susceptibility artifact streaking through the anterior left frontal scalp through the anterior left frontal bone in the anterior tipof the left frontal lobe likely from a tiny piece of metal in the anterior left frontal scalp slightly limits evaluation of these structures. 2. There is mild s   • Hyperlipidemia 6/29/2016   • Impaired fasting glucose 5/2/2019 April 14, 2015--hemoglobin A1c 5.9.   • Macrocytosis 5/2/2019   • Rheumatoid factor positive 5/2/2019 March 25, 2014--rheumatoid factor returned positive at 95.  However, CCP antibodies normal at 8, SHIV negative, both C&P ANCA negative, C-reactive protein normal at 0.24, sed rate normal at 2.   • Vitamin B12 deficiency 6/6/2019 June 6, 2019--patient recently had mildly elevated methylmalonic acid of 380.  Repeat methylmalonic acid was upper limit of normal at 356.  Given patient's neurologic symptoms and suspected " parkinsonism, I have a low threshold for treating vitamin B12 deficiency.  We will initiate vitamin B12 injections today.  2000 mcg subcutaneously given today.   • Vitamin D deficiency 5/2/2019         Past Surgical History:   Procedure Laterality Date   • CATARACT EXTRACTION EXTRACAPSULAR W/ INTRAOCULAR LENS IMPLANTATION Bilateral     Bilateral cataract extirpation with intraocular lens implantation   • CHOLECYSTECTOMY     • ESOPHAGEAL DILATATION  04/26/2016 April 26, 2016--EGD performed for dysphagia reveals a distal esophageal stricture that was dilated 16.5 mm.   • ESOPHAGEAL DILATATION  07/03/2018    July 3, 2018--EGD revealed a distal esophageal stricture that was dilated to 18 mm.  There was a small hiatal hernia.  There was mild streaky erythema in the proximal stomach.  Duodenal bulb normal.         No Known Allergies        Current Outpatient Medications:   •  allopurinol (ZYLOPRIM) 300 MG tablet, Take 1 p.o. daily for gout, Disp: 90 tablet, Rfl: 2  •  amLODIPine (NORVASC) 5 MG tablet, Take 1 p.o. every morning for high blood pressure, Disp: 30 tablet, Rfl: 3  •  aspirin (ECOTRIN) 325 MG EC tablet, Take 325 mg by mouth Every Other Day., Disp: , Rfl:   •  valsartan-hydrochlorothiazide (DIOVAN-HCT) 320-25 MG per tablet, Take 1 tablet by mouth Daily., Disp: 30 tablet, Rfl: 11      Family History   Problem Relation Age of Onset   • No Known Problems Mother    • No Known Problems Father          Social History     Socioeconomic History   • Marital status:      Spouse name: Not on file   • Number of children: 2   • Years of education: Not on file   • Highest education level: 9th grade   Social Needs   • Financial resource strain: Not hard at all   • Food insecurity:     Worry: Never true     Inability: Never true   • Transportation needs:     Medical: No     Non-medical: No   Tobacco Use   • Smoking status: Never Smoker   • Smokeless tobacco: Never Used   Substance and Sexual Activity   • Alcohol use:  "Yes     Frequency: 2-4 times a month     Drinks per session: 1 or 2     Comment: occ   • Drug use: No   • Sexual activity: Not Currently     Partners: Female   Lifestyle   • Physical activity:     Days per week: 0 days     Minutes per session: 0 min   • Stress: Only a little   Relationships   • Social connections:     Talks on phone: Once a week     Gets together: Twice a week     Attends [employer] service: Never     Active member of club or organization: Yes     Attends meetings of clubs or organizations: More than 4 times per year     Relationship status:          Vitals:    08/12/19 0848   BP: 142/60   BP Location: Left arm   Patient Position: Sitting   Cuff Size: Adult   Pulse: 63   SpO2: 98%   Weight: 87.2 kg (192 lb 3.2 oz)   Height: 180.3 cm (70.98\")          Physical Exam:    General: Alert and oriented x 3.  No acute distress.  Normal affect.  HEENT: Pupils equal, round, reactive to light; extraocular movements intact; sclerae nonicteric; pharynx, ear canals and TMs normal.  Neck: Without JVD, thyromegaly, bruit, or adenopathy.  Lungs: Clear to auscultation in all fields.  Heart: Regular rate and rhythm without murmur, rub, gallop, or click.  Abdomen: Soft, nontender, without hepatosplenomegaly or hernia.  Bowel sounds normal.  : Deferred.  Rectal: Deferred.  Extremities: Without clubbing, cyanosis, edema, or pulse deficit.  Neurologic: Intact without focal deficit.  Patient walks with a broad-based ataxic gait and uses a cane for assistance.  Skin: Without significant lesion.  Musculoskeletal: Weakness in the lower extremities noted.    Lab/other results:      Assessment/Plan:     Diagnosis Plan   1. Benign essential hypertension     2. Impaired fasting glucose     3. Bilateral lower extremity edema     4. Hyperlipidemia, unspecified hyperlipidemia type     5. Macrocytosis     6. Vitamin B12 deficiency     7. Parkinson's disease (CMS/HCC)     8. Weakness of both lower extremities     9. Loss of " equilibrium     10. At risk for falls     11. History of stroke, 6/29/2016--4.9 x 4 x 3 cm inferior left cerebellar infarct       Patient's blood pressure is now adequately controlled.  He has impaired fasting glucose and no overt diabetes.  His lower extremity edema is well controlled at present time.  Patient has hyperlipidemia but we discontinued statin therapy due to his lower extremity weakness.  This did not seem to make any difference in his symptoms but I am somewhat reluctant at this time to reinitiate lipid therapy.  This does need to be reassessed with lab work however.  Patient has vitamin B12 deficiency which may or may not be contributing to his neurologic symptoms.  He needs to start monthly B12 injections.  We have given him to loading dose B12 injections and we will start 1000 mcg every month beginning today.  The diagnosis of parkinsonism is not for certain at the present time.  I really cannot come up with any other cause for his weakness and loss of equilibrium.  Patient is at risk for falls.    Plan is as follows: Check CMP and NMR profile today.  Patient will follow-up on the phone for the results and possible further instructions.  We will not make any changes in his current medical regimen.  I will have him follow-up in 3 months to reassess the situation.  We can check lab at that time if necessary.  I encouraged patient to get his monthly B12 injections about the middle of each month.        Procedures

## 2019-08-13 ENCOUNTER — TELEPHONE (OUTPATIENT)
Dept: INTERNAL MEDICINE | Facility: CLINIC | Age: 84
End: 2019-08-13

## 2019-08-13 NOTE — TELEPHONE ENCOUNTER
----- Message from Uriah Spaulding MD sent at 8/13/2019  7:11 AM EDT -----  Tell patient or patient's wife that the lab work looks great and his kidney function is actually better.  His cholesterol also looks good and I do not think he needs medication at this time.  Keep follow-up in November as planned.    I spoke with pt and informed him per dr spaulding

## 2019-08-28 ENCOUNTER — TELEPHONE (OUTPATIENT)
Dept: INTERNAL MEDICINE | Facility: CLINIC | Age: 84
End: 2019-08-28

## 2019-08-28 ENCOUNTER — CLINICAL SUPPORT (OUTPATIENT)
Dept: INTERNAL MEDICINE | Facility: CLINIC | Age: 84
End: 2019-08-28

## 2019-08-28 DIAGNOSIS — Z51.81 THERAPEUTIC DRUG MONITORING: ICD-10-CM

## 2019-08-28 DIAGNOSIS — E53.8 VITAMIN B12 DEFICIENCY: ICD-10-CM

## 2019-08-28 PROCEDURE — 96372 THER/PROPH/DIAG INJ SC/IM: CPT | Performed by: INTERNAL MEDICINE

## 2019-08-28 RX ADMIN — CYANOCOBALAMIN 1000 MCG: 1000 INJECTION, SOLUTION INTRAMUSCULAR; SUBCUTANEOUS at 10:23

## 2019-10-02 ENCOUNTER — CLINICAL SUPPORT (OUTPATIENT)
Dept: INTERNAL MEDICINE | Facility: CLINIC | Age: 84
End: 2019-10-02

## 2019-10-02 DIAGNOSIS — E53.8 VITAMIN B12 DEFICIENCY: ICD-10-CM

## 2019-10-02 DIAGNOSIS — E55.9 VITAMIN D DEFICIENCY: Primary | Chronic | ICD-10-CM

## 2019-10-02 PROCEDURE — 96372 THER/PROPH/DIAG INJ SC/IM: CPT | Performed by: INTERNAL MEDICINE

## 2019-10-02 RX ADMIN — CYANOCOBALAMIN 1000 MCG: 1000 INJECTION, SOLUTION INTRAMUSCULAR; SUBCUTANEOUS at 10:45

## 2019-10-23 ENCOUNTER — CLINICAL SUPPORT (OUTPATIENT)
Dept: INTERNAL MEDICINE | Facility: CLINIC | Age: 84
End: 2019-10-23

## 2019-10-23 DIAGNOSIS — E53.8 VITAMIN B12 DEFICIENCY: Primary | ICD-10-CM

## 2019-10-23 PROCEDURE — 96372 THER/PROPH/DIAG INJ SC/IM: CPT | Performed by: INTERNAL MEDICINE

## 2019-10-23 RX ADMIN — CYANOCOBALAMIN 1000 MCG: 1000 INJECTION, SOLUTION INTRAMUSCULAR; SUBCUTANEOUS at 10:28

## 2019-11-14 ENCOUNTER — OFFICE VISIT (OUTPATIENT)
Dept: INTERNAL MEDICINE | Facility: CLINIC | Age: 84
End: 2019-11-14

## 2019-11-14 VITALS
DIASTOLIC BLOOD PRESSURE: 78 MMHG | BODY MASS INDEX: 27.72 KG/M2 | SYSTOLIC BLOOD PRESSURE: 140 MMHG | OXYGEN SATURATION: 96 % | HEIGHT: 71 IN | HEART RATE: 60 BPM | WEIGHT: 198 LBS

## 2019-11-14 DIAGNOSIS — R53.1 WEAKNESS WITH DIZZINESS: ICD-10-CM

## 2019-11-14 DIAGNOSIS — N40.0 BENIGN PROSTATIC HYPERPLASIA WITHOUT LOWER URINARY TRACT SYMPTOMS: ICD-10-CM

## 2019-11-14 DIAGNOSIS — D75.89 MACROCYTOSIS: Chronic | ICD-10-CM

## 2019-11-14 DIAGNOSIS — R42 LOSS OF EQUILIBRIUM: ICD-10-CM

## 2019-11-14 DIAGNOSIS — R42 WEAKNESS WITH DIZZINESS: ICD-10-CM

## 2019-11-14 DIAGNOSIS — R73.01 IMPAIRED FASTING GLUCOSE: Primary | Chronic | ICD-10-CM

## 2019-11-14 DIAGNOSIS — E55.9 VITAMIN D DEFICIENCY: Chronic | ICD-10-CM

## 2019-11-14 DIAGNOSIS — I10 BENIGN ESSENTIAL HYPERTENSION: Chronic | ICD-10-CM

## 2019-11-14 DIAGNOSIS — Z86.73 HISTORY OF STROKE: Chronic | ICD-10-CM

## 2019-11-14 DIAGNOSIS — R60.0 BILATERAL LOWER EXTREMITY EDEMA: Chronic | ICD-10-CM

## 2019-11-14 DIAGNOSIS — Z51.81 THERAPEUTIC DRUG MONITORING: ICD-10-CM

## 2019-11-14 DIAGNOSIS — Z87.39 HISTORY OF GOUT: Chronic | ICD-10-CM

## 2019-11-14 DIAGNOSIS — Z23 NEED FOR INFLUENZA VACCINATION: ICD-10-CM

## 2019-11-14 DIAGNOSIS — E78.5 HYPERLIPIDEMIA, UNSPECIFIED HYPERLIPIDEMIA TYPE: Chronic | ICD-10-CM

## 2019-11-14 DIAGNOSIS — Z91.81 AT RISK FOR FALLS: Chronic | ICD-10-CM

## 2019-11-14 DIAGNOSIS — E53.8 VITAMIN B12 DEFICIENCY: ICD-10-CM

## 2019-11-14 DIAGNOSIS — R29.898 WEAKNESS OF BOTH LOWER EXTREMITIES: ICD-10-CM

## 2019-11-14 DIAGNOSIS — G20 PARKINSON'S DISEASE (HCC): ICD-10-CM

## 2019-11-14 PROCEDURE — G0008 ADMIN INFLUENZA VIRUS VAC: HCPCS | Performed by: INTERNAL MEDICINE

## 2019-11-14 PROCEDURE — 99214 OFFICE O/P EST MOD 30 MIN: CPT | Performed by: INTERNAL MEDICINE

## 2019-11-14 PROCEDURE — 96372 THER/PROPH/DIAG INJ SC/IM: CPT | Performed by: INTERNAL MEDICINE

## 2019-11-14 PROCEDURE — 90653 IIV ADJUVANT VACCINE IM: CPT | Performed by: INTERNAL MEDICINE

## 2019-11-14 RX ORDER — CYANOCOBALAMIN 1000 UG/ML
1000 INJECTION, SOLUTION INTRAMUSCULAR; SUBCUTANEOUS ONCE
Status: COMPLETED | OUTPATIENT
Start: 2019-11-14 | End: 2019-11-14

## 2019-11-14 RX ADMIN — CYANOCOBALAMIN 1000 MCG: 1000 INJECTION, SOLUTION INTRAMUSCULAR; SUBCUTANEOUS at 10:17

## 2019-11-14 NOTE — PROGRESS NOTES
11/14/2019    Patient Information  Izaiah Garcia                                                                                          [address]  [facility] [address]      8/31/1931  [unfilled]  There is no work phone number on file.    Chief Complaint:     Follow-up impaired fasting glucose, hyperlipidemia, hypertension, history of gout, history of stroke, at risk for falls due to parkinsonism, vitamin B12 deficiency and vitamin D deficiency, macrocytosis, bilateral lower extremity edema, weakness of both lower extremities as well as generalized weakness and dizziness and loss of equilibrium.  No new acute complaints.    History of Present Illness:    Patient with a history of medical problems as outlined in the chief complaint presents today for a follow-up of his multiple medical issues.  His past medical history reviewed and updated were necessary including health maintenance parameters.  This reveals he is up-to-date or else accounted for after today's visit.    Review of Systems   Constitution: Negative.   HENT: Negative.    Eyes: Negative.    Cardiovascular: Negative.    Respiratory: Negative.    Endocrine: Negative.    Hematologic/Lymphatic: Negative.    Skin: Negative.    Musculoskeletal: Positive for arthritis and muscle weakness.   Gastrointestinal: Negative.    Genitourinary: Negative.    Neurological: Positive for disturbances in coordination, loss of balance and tremors.   Psychiatric/Behavioral: Negative.    Allergic/Immunologic: Negative.        Active Problems:    Patient Active Problem List   Diagnosis   • Hyperlipidemia   • Benign essential hypertension   • History of gout   • Weakness of both lower extremities   • History of esophageal stricture   • History of stroke, 6/29/2016--4.9 x 4 x 3 cm inferior left cerebellar infarct   • Rheumatoid factor positive   • Impaired fasting glucose   • At risk for falls   • Weakness with dizziness   • Bilateral high frequency  "sensorineural hearing loss, wears hearing aids   • Decreased peripheral vision of both eyes   • Loss of equilibrium   • Bilateral lower extremity edema   • Parkinson's disease (CMS/HCC)   • Therapeutic drug monitoring   • Vitamin D deficiency   • Macrocytosis   • Vitamin B12 deficiency         Past Medical History:   Diagnosis Date   • At risk for falls 5/2/2019   • Benign essential hypertension 6/29/2016   • Bilateral high frequency sensorineural hearing loss, wears hearing aids 5/2/2019   • Bilateral lower extremity edema 5/2/2019    May 2, 2019--patient who has hypertension and is on amlodipine 10 mg/day is complaining of progressively worsening swelling of the lower extremities that extends up to about mid calf.  Gets worse at the end of the day and tends to get better overnight when he props his feet up.  I think this is definitely related to the amlodipine although patient may have some venous insufficiency.  Medication ad   • Decreased peripheral vision of both eyes 5/2/2019    May 2, 2019--continues to have complaints of \"dizziness\" associated with weakness in his lower extremities.  He is not describing a spinning sensation or anything remotely close to vertigo.  Instead he is describing an off-balance sensation.  He tends to fall forward if not careful and he tends to list towards the right side.  He has marked difficulty going down an incline.  He has to ambulate wit   • History of aspiration pneumonia 5/4/2015   • History of esophageal stricture 5/4/2015    July 3, 2018--EGD revealed a distal esophageal stricture that was dilated to 18 mm.  There was a small hiatal hernia.  There was mild streaky erythema in the proximal stomach.  Duodenal bulb normal.  April 26, 2016--EGD performed for dysphagia reveals a distal esophageal stricture that was dilated 16.5 mm.   • History of gout 6/29/2016   • History of stroke, 6/29/2016--4.9 x 4 x 3 cm inferior left cerebellar infarct 5/4/2015 June 29, 2016--MRI of " the brain performed for complaints of dizziness and weakness. IMPRESSION: 1. There is susceptibility artifact streaking through the anterior left frontal scalp through the anterior left frontal bone in the anterior tipof the left frontal lobe likely from a tiny piece of metal in the anterior left frontal scalp slightly limits evaluation of these structures. 2. There is mild s   • Hyperlipidemia 6/29/2016   • Impaired fasting glucose 5/2/2019 April 14, 2015--hemoglobin A1c 5.9.   • Macrocytosis 5/2/2019   • Rheumatoid factor positive 5/2/2019 March 25, 2014--rheumatoid factor returned positive at 95.  However, CCP antibodies normal at 8, SHIV negative, both C&P ANCA negative, C-reactive protein normal at 0.24, sed rate normal at 2.   • Vitamin B12 deficiency 6/6/2019 June 6, 2019--patient recently had mildly elevated methylmalonic acid of 380.  Repeat methylmalonic acid was upper limit of normal at 356.  Given patient's neurologic symptoms and suspected parkinsonism, I have a low threshold for treating vitamin B12 deficiency.  We will initiate vitamin B12 injections today.  2000 mcg subcutaneously given today.   • Vitamin D deficiency 5/2/2019         Past Surgical History:   Procedure Laterality Date   • CATARACT EXTRACTION EXTRACAPSULAR W/ INTRAOCULAR LENS IMPLANTATION Bilateral     Bilateral cataract extirpation with intraocular lens implantation   • CHOLECYSTECTOMY     • ESOPHAGEAL DILATATION  04/26/2016 April 26, 2016--EGD performed for dysphagia reveals a distal esophageal stricture that was dilated 16.5 mm.   • ESOPHAGEAL DILATATION  07/03/2018    July 3, 2018--EGD revealed a distal esophageal stricture that was dilated to 18 mm.  There was a small hiatal hernia.  There was mild streaky erythema in the proximal stomach.  Duodenal bulb normal.         No Known Allergies        Current Outpatient Medications:   •  allopurinol (ZYLOPRIM) 300 MG tablet, Take 1 p.o. daily for gout, Disp: 90 tablet, Rfl:  "3  •  amLODIPine (NORVASC) 5 MG tablet, Take 1 p.o. every morning for high blood pressure, Disp: 90 tablet, Rfl: 3  •  aspirin (ECOTRIN) 325 MG EC tablet, Take 325 mg by mouth Every Other Day., Disp: , Rfl:   •  valsartan-hydrochlorothiazide (DIOVAN-HCT) 320-25 MG per tablet, Take 1 tablet by mouth Daily., Disp: 90 tablet, Rfl: 3    Current Facility-Administered Medications:   •  cyanocobalamin injection 1,000 mcg, 1,000 mcg, Subcutaneous, Q30 Days, Uriah Godinez MD, 1,000 mcg at 10/23/19 1028      Family History   Problem Relation Age of Onset   • No Known Problems Mother    • No Known Problems Father          Social History     Socioeconomic History   • Marital status:      Spouse name: Not on file   • Number of children: 2   • Years of education: Not on file   • Highest education level: 9th grade   Social Needs   • Financial resource strain: Not hard at all   • Food insecurity:     Worry: Never true     Inability: Never true   • Transportation needs:     Medical: No     Non-medical: No   Tobacco Use   • Smoking status: Never Smoker   • Smokeless tobacco: Never Used   Substance and Sexual Activity   • Alcohol use: Yes     Frequency: 2-4 times a month     Drinks per session: 1 or 2     Binge frequency: Never     Comment: occ   • Drug use: No   • Sexual activity: Not Currently     Partners: Female   Lifestyle   • Physical activity:     Days per week: 0 days     Minutes per session: 0 min   • Stress: Not at all   Relationships   • Social connections:     Talks on phone: Once a week     Gets together: Twice a week     Attends [employer] service: Never     Active member of club or organization: Yes     Attends meetings of clubs or organizations: More than 4 times per year     Relationship status:          Vitals:    11/14/19 0920   BP: 140/78   BP Location: Left arm   Pulse: 60   SpO2: 96%   Weight: 89.8 kg (198 lb)   Height: 180.3 cm (70.98\")        Body mass index is 27.63 kg/m².      Physical " Exam:    General: Alert and oriented x 3.  No acute distress.  Normal affect.  HEENT: Pupils equal, round, reactive to light; extraocular movements intact; sclerae nonicteric; pharynx, ear canals and TMs normal.  Neck: Without JVD, thyromegaly, bruit, or adenopathy.  Lungs: Clear to auscultation in all fields.  Heart: Regular rate and rhythm without murmur, rub, gallop, or click.  Abdomen: Soft, nontender, without hepatosplenomegaly or hernia.  Bowel sounds normal.  : Deferred.  Rectal: Deferred.  Extremities: Without clubbing, cyanosis, edema, or pulse deficit.  Neurologic: Intact without focal deficit.  Patient ambulates with a broad-based ataxic gait.  Cogwheeling of the upper extremities noted.  There is a tremor of his right hand.  Skin: Without significant lesion.  Musculoskeletal: Unremarkable.    Lab/other results:    I reviewed the lab work from 3 months ago and his total cholesterol is 131.  Triglycerides 106.  HDL cholesterol low at 30, LDL cholesterol fairly good at 80.  CMP normal except blood sugar 103.    Assessment/Plan:     Diagnosis Plan   1. Impaired fasting glucose     2. Hyperlipidemia, unspecified hyperlipidemia type     3. Benign essential hypertension     4. History of gout     5. History of stroke, 6/29/2016--4.9 x 4 x 3 cm inferior left cerebellar infarct     6. At risk for falls     7. Parkinson's disease (CMS/HCC)     8. Vitamin B12 deficiency     9. Macrocytosis     10. Vitamin D deficiency     11. Bilateral lower extremity edema     12. Weakness of both lower extremities     13. Weakness with dizziness     14. Loss of equilibrium     15. Therapeutic drug monitoring     16. Need for influenza vaccination       Patient has impaired fasting glucose which appears to be mild and does not require medication.  His blood pressure is under adequate control, particularly given his suspected parkinsonism and risk for low blood pressure.  Patient has a history of gout and is on allopurinol.  He  had a history of stroke back in 2016 with no definite residual.  Patient is at risk for falls and I feel he has features of parkinsonism although the neurologist evaluated him back in July and did not feel that he had any signs or symptoms consistent with this.  However, he does have an appointment to see him next Monday and I will be very interested to see what his opinion is.  Patient has vitamin B12 deficiency and macrocytosis and and has been getting monthly B12 injections.  He is not sure if he feels any different but I explained to him that that does not indicate that he does not need to get them.  We certainly do not need any other neurologic complications which lack of vitamin B12 can certainly cause.  The weakness in the lower extremities and loss of equilibrium I feel is related to his neurologic problem.  Lower extremity edema seems to be well controlled.    Plan is as follows: High-dose influenza vaccine given.  Cyanocobalamin 1000 mcg subcutaneously x1.  We will schedule fasting lab work and follow-up sometime in January 2020.  Otherwise, patient will follow-up as needed.        Procedures

## 2019-12-18 ENCOUNTER — CLINICAL SUPPORT (OUTPATIENT)
Dept: INTERNAL MEDICINE | Facility: CLINIC | Age: 84
End: 2019-12-18

## 2019-12-18 DIAGNOSIS — E53.8 VITAMIN B12 DEFICIENCY: Primary | ICD-10-CM

## 2019-12-18 PROCEDURE — 96372 THER/PROPH/DIAG INJ SC/IM: CPT | Performed by: INTERNAL MEDICINE

## 2019-12-18 RX ADMIN — CYANOCOBALAMIN 1000 MCG: 1000 INJECTION, SOLUTION INTRAMUSCULAR; SUBCUTANEOUS at 10:22

## 2020-01-03 DIAGNOSIS — N40.0 BENIGN PROSTATIC HYPERPLASIA WITHOUT LOWER URINARY TRACT SYMPTOMS: ICD-10-CM

## 2020-01-03 DIAGNOSIS — E55.9 VITAMIN D DEFICIENCY: Chronic | ICD-10-CM

## 2020-01-03 DIAGNOSIS — Z87.39 HISTORY OF GOUT: Chronic | ICD-10-CM

## 2020-01-03 DIAGNOSIS — E78.5 HYPERLIPIDEMIA, UNSPECIFIED HYPERLIPIDEMIA TYPE: Chronic | ICD-10-CM

## 2020-01-03 DIAGNOSIS — R73.01 IMPAIRED FASTING GLUCOSE: Chronic | ICD-10-CM

## 2020-01-03 DIAGNOSIS — D75.89 MACROCYTOSIS: Chronic | ICD-10-CM

## 2020-01-04 LAB
25(OH)D3+25(OH)D2 SERPL-MCNC: 25 NG/ML (ref 30–100)
ALBUMIN SERPL-MCNC: 4 G/DL (ref 3.5–5.2)
ALBUMIN/GLOB SERPL: 1.7 G/DL
ALP SERPL-CCNC: 89 U/L (ref 39–117)
ALT SERPL-CCNC: 12 U/L (ref 1–41)
AST SERPL-CCNC: 12 U/L (ref 1–40)
BILIRUB SERPL-MCNC: 1.2 MG/DL (ref 0.2–1.2)
BUN SERPL-MCNC: 22 MG/DL (ref 8–23)
BUN/CREAT SERPL: 19 (ref 7–25)
CALCIUM SERPL-MCNC: 9.6 MG/DL (ref 8.6–10.5)
CHLORIDE SERPL-SCNC: 104 MMOL/L (ref 98–107)
CHOLEST SERPL-MCNC: 177 MG/DL (ref 100–199)
CO2 SERPL-SCNC: 24.1 MMOL/L (ref 22–29)
CREAT SERPL-MCNC: 1.16 MG/DL (ref 0.76–1.27)
ERYTHROCYTE [DISTWIDTH] IN BLOOD BY AUTOMATED COUNT: 13.2 % (ref 12.3–15.4)
GLOBULIN SER CALC-MCNC: 2.4 GM/DL
GLUCOSE SERPL-MCNC: 138 MG/DL (ref 65–99)
HBA1C MFR BLD: 5.8 % (ref 4.8–5.6)
HCT VFR BLD AUTO: 46.1 % (ref 37.5–51)
HDL SERPL-SCNC: 23.9 UMOL/L
HDLC SERPL-MCNC: 31 MG/DL
HGB BLD-MCNC: 15.5 G/DL (ref 13–17.7)
LDL SERPL QN: 20.3 NM
LDL SERPL-SCNC: 1244 NMOL/L
LDL SMALL SERPL-SCNC: 670 NMOL/L
LDLC SERPL CALC-MCNC: 115 MG/DL (ref 0–99)
MCH RBC QN AUTO: 28.1 PG (ref 26.6–33)
MCHC RBC AUTO-ENTMCNC: 33.6 G/DL (ref 31.5–35.7)
MCV RBC AUTO: 83.7 FL (ref 79–97)
PLATELET # BLD AUTO: 280 10*3/MM3 (ref 140–450)
POTASSIUM SERPL-SCNC: 4.5 MMOL/L (ref 3.5–5.2)
PROT SERPL-MCNC: 6.4 G/DL (ref 6–8.5)
PSA SERPL-MCNC: 1.57 NG/ML (ref 0–4)
RBC # BLD AUTO: 5.51 10*6/MM3 (ref 4.14–5.8)
SODIUM SERPL-SCNC: 141 MMOL/L (ref 136–145)
T3FREE SERPL-MCNC: 2.8 PG/ML (ref 2–4.4)
T4 FREE SERPL-MCNC: 1.53 NG/DL (ref 0.93–1.7)
TRIGL SERPL-MCNC: 157 MG/DL (ref 0–149)
TSH SERPL DL<=0.005 MIU/L-ACNC: 0.99 UIU/ML (ref 0.27–4.2)
URATE SERPL-MCNC: 4.4 MG/DL (ref 3.4–7)
WBC # BLD AUTO: 9.82 10*3/MM3 (ref 3.4–10.8)

## 2020-01-15 ENCOUNTER — OFFICE VISIT (OUTPATIENT)
Dept: INTERNAL MEDICINE | Facility: CLINIC | Age: 85
End: 2020-01-15

## 2020-01-15 VITALS
SYSTOLIC BLOOD PRESSURE: 140 MMHG | BODY MASS INDEX: 27.58 KG/M2 | WEIGHT: 197 LBS | DIASTOLIC BLOOD PRESSURE: 62 MMHG | HEART RATE: 64 BPM | HEIGHT: 71 IN | OXYGEN SATURATION: 94 %

## 2020-01-15 DIAGNOSIS — Z86.73 HISTORY OF STROKE: Chronic | ICD-10-CM

## 2020-01-15 DIAGNOSIS — Z91.81 AT RISK FOR FALLS: Chronic | ICD-10-CM

## 2020-01-15 DIAGNOSIS — R73.01 IMPAIRED FASTING GLUCOSE: Primary | Chronic | ICD-10-CM

## 2020-01-15 DIAGNOSIS — E55.9 VITAMIN D DEFICIENCY: Chronic | ICD-10-CM

## 2020-01-15 DIAGNOSIS — Z51.81 THERAPEUTIC DRUG MONITORING: ICD-10-CM

## 2020-01-15 DIAGNOSIS — I10 BENIGN ESSENTIAL HYPERTENSION: Chronic | ICD-10-CM

## 2020-01-15 DIAGNOSIS — R60.0 BILATERAL LOWER EXTREMITY EDEMA: Chronic | ICD-10-CM

## 2020-01-15 DIAGNOSIS — E53.8 VITAMIN B12 DEFICIENCY: Chronic | ICD-10-CM

## 2020-01-15 DIAGNOSIS — R76.8 RHEUMATOID FACTOR POSITIVE: Chronic | ICD-10-CM

## 2020-01-15 DIAGNOSIS — E78.2 MIXED HYPERLIPIDEMIA: Chronic | ICD-10-CM

## 2020-01-15 DIAGNOSIS — G20 PARKINSON'S DISEASE (HCC): Chronic | ICD-10-CM

## 2020-01-15 DIAGNOSIS — Z87.39 HISTORY OF GOUT: Chronic | ICD-10-CM

## 2020-01-15 DIAGNOSIS — D75.89 MACROCYTOSIS: Chronic | ICD-10-CM

## 2020-01-15 DIAGNOSIS — Z23 NEED FOR PNEUMOCOCCAL VACCINATION: ICD-10-CM

## 2020-01-15 PROBLEM — G20.A1 PARKINSON'S DISEASE: Chronic | Status: ACTIVE | Noted: 2019-05-02

## 2020-01-15 PROBLEM — R42 LOSS OF EQUILIBRIUM: Status: RESOLVED | Noted: 2019-05-02 | Resolved: 2020-01-15

## 2020-01-15 PROBLEM — R53.1 WEAKNESS WITH DIZZINESS: Status: RESOLVED | Noted: 2019-05-02 | Resolved: 2020-01-15

## 2020-01-15 PROBLEM — H53.453 DECREASED PERIPHERAL VISION OF BOTH EYES: Status: RESOLVED | Noted: 2019-05-02 | Resolved: 2020-01-15

## 2020-01-15 PROBLEM — R42 WEAKNESS WITH DIZZINESS: Status: RESOLVED | Noted: 2019-05-02 | Resolved: 2020-01-15

## 2020-01-15 PROCEDURE — 96372 THER/PROPH/DIAG INJ SC/IM: CPT | Performed by: INTERNAL MEDICINE

## 2020-01-15 PROCEDURE — G0009 ADMIN PNEUMOCOCCAL VACCINE: HCPCS | Performed by: INTERNAL MEDICINE

## 2020-01-15 PROCEDURE — 90732 PPSV23 VACC 2 YRS+ SUBQ/IM: CPT | Performed by: INTERNAL MEDICINE

## 2020-01-15 PROCEDURE — 99214 OFFICE O/P EST MOD 30 MIN: CPT | Performed by: INTERNAL MEDICINE

## 2020-01-15 RX ORDER — LOVASTATIN 20 MG/1
TABLET ORAL
Qty: 90 TABLET | Refills: 3 | Status: SHIPPED | OUTPATIENT
Start: 2020-01-15 | End: 2020-12-10

## 2020-01-15 RX ADMIN — CYANOCOBALAMIN 1000 MCG: 1000 INJECTION, SOLUTION INTRAMUSCULAR; SUBCUTANEOUS at 09:31

## 2020-01-15 NOTE — PROGRESS NOTES
01/15/2020    Patient Information  Izaiah Garcia                                                                                          [address] [facility] [address]      8/31/1931  [unfilled]  There is no work phone number on file.    Chief Complaint:     Follow-up lab work in order to monitor several chronic medical issues.  No new acute complaints.  Patient reports he thinks he is getting better.    History of Present Illness:    Patient with a history of impaired fasting glucose, hyperlipidemia, hypertension, history of gout, history of stroke, rheumatoid factor positive, Parkinson's and high risk for falls, bilateral lower extremity edema, vitamin D deficiency, macrocytosis due to vitamin B12 deficiency.  He presents today for follow-up with lab prior in order to monitor his chronic medical issues.  He currently is feeling well and reports he is actually getting better after the neurologist placed him on carbidopa/levodopa for suspected parkinsonism.  This has definitely help with his movement disorder and tremor.  Past medical history reviewed and updated were necessary including health maintenance parameters.  This reveals he needs pneumococcal vaccine.    Review of Systems   Constitution: Negative.   HENT: Negative.    Eyes: Negative.    Cardiovascular: Negative.    Respiratory: Negative.    Endocrine: Negative.    Hematologic/Lymphatic: Negative.    Skin: Negative.    Musculoskeletal: Negative.    Gastrointestinal: Negative.    Genitourinary: Negative.    Neurological: Positive for disturbances in coordination and loss of balance.   Psychiatric/Behavioral: Negative.    Allergic/Immunologic: Negative.        Active Problems:    Patient Active Problem List   Diagnosis   • Hyperlipidemia   • Benign essential hypertension   • History of gout   • History of esophageal stricture   • History of stroke, 6/29/2016--4.9 x 4 x 3 cm inferior left cerebellar infarct   • Rheumatoid factor  "positive   • Impaired fasting glucose   • At risk for falls   • Bilateral high frequency sensorineural hearing loss, wears hearing aids   • Bilateral lower extremity edema   • Parkinson's disease (CMS/HCC)   • Therapeutic drug monitoring   • Vitamin D deficiency   • Macrocytosis   • Vitamin B12 deficiency         Past Medical History:   Diagnosis Date   • At risk for falls 5/2/2019   • Benign essential hypertension 6/29/2016   • Bilateral high frequency sensorineural hearing loss, wears hearing aids 5/2/2019   • Bilateral lower extremity edema 5/2/2019    May 2, 2019--patient who has hypertension and is on amlodipine 10 mg/day is complaining of progressively worsening swelling of the lower extremities that extends up to about mid calf.  Gets worse at the end of the day and tends to get better overnight when he props his feet up.  I think this is definitely related to the amlodipine although patient may have some venous insufficiency.  Medication ad   • Decreased peripheral vision of both eyes 5/2/2019    May 2, 2019--continues to have complaints of \"dizziness\" associated with weakness in his lower extremities.  He is not describing a spinning sensation or anything remotely close to vertigo.  Instead he is describing an off-balance sensation.  He tends to fall forward if not careful and he tends to list towards the right side.  He has marked difficulty going down an incline.  He has to ambulate wit   • History of aspiration pneumonia 5/4/2015   • History of esophageal stricture 5/4/2015    July 3, 2018--EGD revealed a distal esophageal stricture that was dilated to 18 mm.  There was a small hiatal hernia.  There was mild streaky erythema in the proximal stomach.  Duodenal bulb normal.  April 26, 2016--EGD performed for dysphagia reveals a distal esophageal stricture that was dilated 16.5 mm.   • History of gout 6/29/2016   • History of stroke, 6/29/2016--4.9 x 4 x 3 cm inferior left cerebellar infarct 5/4/2015    " "June 29, 2016--MRI of the brain performed for complaints of dizziness and weakness. IMPRESSION: 1. There is susceptibility artifact streaking through the anterior left frontal scalp through the anterior left frontal bone in the anterior tipof the left frontal lobe likely from a tiny piece of metal in the anterior left frontal scalp slightly limits evaluation of these structures. 2. There is mild s   • Hyperlipidemia 6/29/2016   • Impaired fasting glucose 5/2/2019 April 14, 2015--hemoglobin A1c 5.9.   • Macrocytosis 5/2/2019   • Parkinson's disease (CMS/HCC) 5/2/2019    May 2, 2019--continues to have complaints of \"dizziness\" associated with weakness in his lower extremities.  He is not describing a spinning sensation or anything remotely close to vertigo.  Instead he is describing an off-balance sensation.  He tends to fall forward if not careful and he tends to list towards the right side.  He has marked difficulty going down an incline.  He has to ambulate wit   • Rheumatoid factor positive 5/2/2019 March 25, 2014--rheumatoid factor returned positive at 95.  However, CCP antibodies normal at 8, SHIV negative, both C&P ANCA negative, C-reactive protein normal at 0.24, sed rate normal at 2.   • Vitamin B12 deficiency 6/6/2019 June 6, 2019--patient recently had mildly elevated methylmalonic acid of 380.  Repeat methylmalonic acid was upper limit of normal at 356.  Given patient's neurologic symptoms and suspected parkinsonism, I have a low threshold for treating vitamin B12 deficiency.  We will initiate vitamin B12 injections today.  2000 mcg subcutaneously given today.   • Vitamin D deficiency 5/2/2019         Past Surgical History:   Procedure Laterality Date   • CATARACT EXTRACTION EXTRACAPSULAR W/ INTRAOCULAR LENS IMPLANTATION Bilateral     Bilateral cataract extirpation with intraocular lens implantation   • CHOLECYSTECTOMY     • ESOPHAGEAL DILATATION  04/26/2016 April 26, 2016--EGD performed for " dysphagia reveals a distal esophageal stricture that was dilated 16.5 mm.   • ESOPHAGEAL DILATATION  07/03/2018    July 3, 2018--EGD revealed a distal esophageal stricture that was dilated to 18 mm.  There was a small hiatal hernia.  There was mild streaky erythema in the proximal stomach.  Duodenal bulb normal.         No Known Allergies        Current Outpatient Medications:   •  allopurinol (ZYLOPRIM) 300 MG tablet, Take 1 p.o. daily for gout, Disp: 90 tablet, Rfl: 3  •  amLODIPine (NORVASC) 5 MG tablet, Take 1 p.o. every morning for high blood pressure, Disp: 90 tablet, Rfl: 3  •  aspirin (ECOTRIN) 325 MG EC tablet, Take 325 mg by mouth Every Other Day., Disp: , Rfl:   •  carbidopa-levodopa (SINEMET)  MG per tablet, Take 1 tablet by mouth 2 (Two) Times a Day., Disp: , Rfl:   •  valsartan-hydrochlorothiazide (DIOVAN-HCT) 320-25 MG per tablet, Take 1 tablet by mouth Daily., Disp: 90 tablet, Rfl: 3  •  lovastatin (MEVACOR) 20 MG tablet, Take 1 p.o. daily for high cholesterol, Disp: 90 tablet, Rfl: 3    Current Facility-Administered Medications:   •  cyanocobalamin injection 1,000 mcg, 1,000 mcg, Subcutaneous, Q30 Days, Uriah Godinez MD, 1,000 mcg at 01/15/20 0931      Family History   Problem Relation Age of Onset   • No Known Problems Mother    • No Known Problems Father          Social History     Socioeconomic History   • Marital status:      Spouse name: Not on file   • Number of children: 2   • Years of education: Not on file   • Highest education level: 9th grade   Social Needs   • Financial resource strain: Not hard at all   • Food insecurity:     Worry: Never true     Inability: Never true   • Transportation needs:     Medical: No     Non-medical: No   Tobacco Use   • Smoking status: Never Smoker   • Smokeless tobacco: Never Used   Substance and Sexual Activity   • Alcohol use: Yes     Frequency: 2-4 times a month     Drinks per session: 1 or 2     Binge frequency: Never     Comment: occ  "  • Drug use: No   • Sexual activity: Not Currently     Partners: Female   Lifestyle   • Physical activity:     Days per week: 0 days     Minutes per session: 0 min   • Stress: Not at all   Relationships   • Social connections:     Talks on phone: Once a week     Gets together: Twice a week     Attends Bahai service: Never     Active member of club or organization: Yes     Attends meetings of clubs or organizations: More than 4 times per year     Relationship status:          Vitals:    01/15/20 0914   BP: 140/62   BP Location: Left arm   Pulse: 64   SpO2: 94%   Weight: 89.4 kg (197 lb)   Height: 180.3 cm (70.98\")        Body mass index is 27.49 kg/m².      Physical Exam:    General: Alert and oriented x 3.  No acute distress.  Normal affect.  HEENT: Pupils equal, round, reactive to light; extraocular movements intact; sclerae nonicteric; pharynx, ear canals and TMs normal.  Neck: Without JVD, thyromegaly, bruit, or adenopathy.  Lungs: Clear to auscultation in all fields.  Heart: Regular rate and rhythm without murmur, rub, gallop, or click.  Abdomen: Soft, nontender, without hepatosplenomegaly or hernia.  Bowel sounds normal.  : Deferred.  Rectal: Deferred.  Extremities: Without clubbing, cyanosis, edema, or pulse deficit.  Neurologic: Intact without focal deficit.  Normal station and gait observed during ingress and egress from the examination room.  Skin: Without significant lesion.  Musculoskeletal: Unremarkable.    Lab/other results:    NMR reveals a total cholesterol 177.  Triglycerides slightly elevated 157.  LDL particle number mildly elevated 1244.  Small LDL particle number elevated at 670.  HDL particle number low at 23.9.  CMP normal except glucose 138.  CBC is normal.  Hemoglobin A1c 5.7.  Thyroid function tests are normal.  PSA normal at 1.57.  Vitamin D low at 25.  Uric acid normal.    Assessment/Plan:     Diagnosis Plan   1. Impaired fasting glucose  Comprehensive Metabolic Panel    " Hemoglobin A1c   2. Hyperlipidemia  Comprehensive Metabolic Panel    NMR LipoProfile    TSH    T4, Free    T3, Free    lovastatin (MEVACOR) 20 MG tablet   3. Benign essential hypertension     4. History of gout     5. History of stroke, 6/29/2016--4.9 x 4 x 3 cm inferior left cerebellar infarct     6. Rheumatoid factor positive     7. At risk for falls     8. Parkinson's disease (CMS/HCC)     9. Bilateral lower extremity edema     10. Vitamin D deficiency     11. Macrocytosis  CBC (No Diff)   12. Vitamin B12 deficiency  CBC (No Diff)   13. Therapeutic drug monitoring  CBC (No Diff)     Patient has impaired fasting glucose that does not require medication.  He has hyperlipidemia and his NMR profile is not all that bad and given his age as well as other underlying conditions, I do not think we will start a statin at this time.  Blood pressure seems to be adequately controlled.  He has a history of gout and his uric acid levels are in the therapeutic/normal range with allopurinol.  Patient has a history of remote stroke with no significant residual.  He has rheumatoid factor positivity of uncertain significance.  His Parkinson's disease seems better after the neurologist initiated carbidopa.  His vitamin D is a little low and given his history of kidney stones, I would prefer that he not take vitamin D.  Macrocytosis has resolved and I think this is directly related to his B12 injections.    Plan is as follows: No change in current medical regimen.  Patient will follow-up after May 22, 2020 with lab prior and this will also be his subsequent Medicare wellness visit.    Addendum: After his wife brought it to my attention, patient was previously on Mevacor and tolerating it well.  I discontinued it about 9 months ago when he was complaining of the weakness which was likely related to the parkinsonism.  We will have him restart the generic Mevacor.    Procedures

## 2020-01-30 ENCOUNTER — TELEPHONE (OUTPATIENT)
Dept: INTERNAL MEDICINE | Facility: CLINIC | Age: 85
End: 2020-01-30

## 2020-02-03 ENCOUNTER — OFFICE VISIT (OUTPATIENT)
Dept: INTERNAL MEDICINE | Facility: CLINIC | Age: 85
End: 2020-02-03

## 2020-02-03 VITALS
HEIGHT: 71 IN | DIASTOLIC BLOOD PRESSURE: 50 MMHG | OXYGEN SATURATION: 97 % | BODY MASS INDEX: 28 KG/M2 | SYSTOLIC BLOOD PRESSURE: 102 MMHG | WEIGHT: 200 LBS | HEART RATE: 62 BPM

## 2020-02-03 DIAGNOSIS — R19.7 ACUTE DIARRHEA: Primary | ICD-10-CM

## 2020-02-03 PROCEDURE — 99214 OFFICE O/P EST MOD 30 MIN: CPT | Performed by: INTERNAL MEDICINE

## 2020-02-03 RX ORDER — CIPROFLOXACIN 500 MG/1
TABLET, FILM COATED ORAL
Qty: 20 TABLET | Refills: 0 | Status: SHIPPED | OUTPATIENT
Start: 2020-02-03 | End: 2020-02-22 | Stop reason: HOSPADM

## 2020-02-03 RX ORDER — METRONIDAZOLE 500 MG/1
TABLET ORAL
Qty: 20 TABLET | Refills: 0 | Status: SHIPPED | OUTPATIENT
Start: 2020-02-03 | End: 2020-02-22 | Stop reason: HOSPADM

## 2020-02-03 NOTE — PROGRESS NOTES
02/03/2020    Patient Information  Izaiah Garcia                                                                                          [address] [facility] [address]      8/31/1931  [unfilled]  There is no work phone number on file.    Chief Complaint:     Complaining of acute but persistent diarrhea.    History of Present Illness:    Patient with a history of hyperlipidemia, hypertension, gout, history of stroke, impaired fasting glucose, parkinsonism.  He presents today with complaints of acute diarrhea as described below.  Past medical history reviewed and updated were necessary including health maintenance parameters.  This reveals he is up-to-date or else accounted for.    Review of Systems   Constitution: Negative. Negative for chills and fever.   HENT: Negative.    Eyes: Negative.    Cardiovascular: Negative.    Respiratory: Negative.    Endocrine: Negative.    Hematologic/Lymphatic: Negative.    Skin: Negative.    Musculoskeletal: Negative.    Gastrointestinal: Positive for diarrhea and flatus. Negative for hematemesis, hematochezia, melena, nausea and vomiting.   Genitourinary: Negative.    Neurological: Negative.    Psychiatric/Behavioral: Negative.    Allergic/Immunologic: Negative.        Active Problems:    Patient Active Problem List   Diagnosis   • Hyperlipidemia   • Benign essential hypertension   • History of gout   • History of esophageal stricture   • History of stroke, 6/29/2016--4.9 x 4 x 3 cm inferior left cerebellar infarct   • Rheumatoid factor positive   • Impaired fasting glucose   • At risk for falls   • Bilateral high frequency sensorineural hearing loss, wears hearing aids   • Bilateral lower extremity edema   • Parkinson's disease (CMS/HCC)   • Therapeutic drug monitoring   • Vitamin D deficiency   • Macrocytosis   • Vitamin B12 deficiency   • Acute diarrhea         Past Medical History:   Diagnosis Date   • At risk for falls 5/2/2019   • Benign essential  "hypertension 6/29/2016   • Bilateral high frequency sensorineural hearing loss, wears hearing aids 5/2/2019   • Bilateral lower extremity edema 5/2/2019    May 2, 2019--patient who has hypertension and is on amlodipine 10 mg/day is complaining of progressively worsening swelling of the lower extremities that extends up to about mid calf.  Gets worse at the end of the day and tends to get better overnight when he props his feet up.  I think this is definitely related to the amlodipine although patient may have some venous insufficiency.  Medication ad   • Decreased peripheral vision of both eyes 5/2/2019    May 2, 2019--continues to have complaints of \"dizziness\" associated with weakness in his lower extremities.  He is not describing a spinning sensation or anything remotely close to vertigo.  Instead he is describing an off-balance sensation.  He tends to fall forward if not careful and he tends to list towards the right side.  He has marked difficulty going down an incline.  He has to ambulate wit   • History of aspiration pneumonia 5/4/2015   • History of esophageal stricture 5/4/2015    July 3, 2018--EGD revealed a distal esophageal stricture that was dilated to 18 mm.  There was a small hiatal hernia.  There was mild streaky erythema in the proximal stomach.  Duodenal bulb normal.  April 26, 2016--EGD performed for dysphagia reveals a distal esophageal stricture that was dilated 16.5 mm.   • History of gout 6/29/2016   • History of stroke, 6/29/2016--4.9 x 4 x 3 cm inferior left cerebellar infarct 5/4/2015 June 29, 2016--MRI of the brain performed for complaints of dizziness and weakness. IMPRESSION: 1. There is susceptibility artifact streaking through the anterior left frontal scalp through the anterior left frontal bone in the anterior tipof the left frontal lobe likely from a tiny piece of metal in the anterior left frontal scalp slightly limits evaluation of these structures. 2. There is mild s   • " "Hyperlipidemia 6/29/2016   • Impaired fasting glucose 5/2/2019 April 14, 2015--hemoglobin A1c 5.9.   • Macrocytosis 5/2/2019   • Parkinson's disease (CMS/[facility]) 5/2/2019    May 2, 2019--continues to have complaints of \"dizziness\" associated with weakness in his lower extremities.  He is not describing a spinning sensation or anything remotely close to vertigo.  Instead he is describing an off-balance sensation.  He tends to fall forward if not careful and he tends to list towards the right side.  He has marked difficulty going down an incline.  He has to ambulate wit   • Rheumatoid factor positive 5/2/2019 March 25, 2014--rheumatoid factor returned positive at 95.  However, CCP antibodies normal at 8, SHIV negative, both C&P ANCA negative, C-reactive protein normal at 0.24, sed rate normal at 2.   • Vitamin B12 deficiency 6/6/2019 June 6, 2019--patient recently had mildly elevated methylmalonic acid of 380.  Repeat methylmalonic acid was upper limit of normal at 356.  Given patient's neurologic symptoms and suspected parkinsonism, I have a low threshold for treating vitamin B12 deficiency.  We will initiate vitamin B12 injections today.  2000 mcg subcutaneously given today.   • Vitamin D deficiency 5/2/2019         Past Surgical History:   Procedure Laterality Date   • CATARACT EXTRACTION EXTRACAPSULAR W/ INTRAOCULAR LENS IMPLANTATION Bilateral     Bilateral cataract extirpation with intraocular lens implantation   • CHOLECYSTECTOMY     • ESOPHAGEAL DILATATION  04/26/2016 April 26, 2016--EGD performed for dysphagia reveals a distal esophageal stricture that was dilated 16.5 mm.   • ESOPHAGEAL DILATATION  07/03/2018    July 3, 2018--EGD revealed a distal esophageal stricture that was dilated to 18 mm.  There was a small hiatal hernia.  There was mild streaky erythema in the proximal stomach.  Duodenal bulb normal.         No Known Allergies        Current Outpatient Medications:   •  allopurinol (ZYLOPRIM) " 300 MG tablet, Take 1 p.o. daily for gout, Disp: 90 tablet, Rfl: 3  •  amLODIPine (NORVASC) 5 MG tablet, Take 1 p.o. every morning for high blood pressure, Disp: 90 tablet, Rfl: 3  •  aspirin (ECOTRIN) 325 MG EC tablet, Take 325 mg by mouth Every Other Day., Disp: , Rfl:   •  carbidopa-levodopa (SINEMET)  MG per tablet, Take 1 tablet by mouth 2 (Two) Times a Day., Disp: , Rfl:   •  lovastatin (MEVACOR) 20 MG tablet, Take 1 p.o. daily for high cholesterol, Disp: 90 tablet, Rfl: 3  •  valsartan-hydrochlorothiazide (DIOVAN-HCT) 320-25 MG per tablet, Take 1 tablet by mouth Daily., Disp: 90 tablet, Rfl: 3    Current Facility-Administered Medications:   •  cyanocobalamin injection 1,000 mcg, 1,000 mcg, Subcutaneous, Q30 Days, Uriah Godinez MD, 1,000 mcg at 01/15/20 0931      Family History   Problem Relation Age of Onset   • No Known Problems Mother    • No Known Problems Father          Social History     Socioeconomic History   • Marital status:      Spouse name: Not on file   • Number of children: 2   • Years of education: Not on file   • Highest education level: 9th grade   Social Needs   • Financial resource strain: Not hard at all   • Food insecurity:     Worry: Never true     Inability: Never true   • Transportation needs:     Medical: No     Non-medical: No   Tobacco Use   • Smoking status: Never Smoker   • Smokeless tobacco: Never Used   Substance and Sexual Activity   • Alcohol use: Yes     Frequency: 2-4 times a month     Drinks per session: 1 or 2     Binge frequency: Never     Comment: occ   • Drug use: No   • Sexual activity: Not Currently     Partners: Female   Lifestyle   • Physical activity:     Days per week: 0 days     Minutes per session: 0 min   • Stress: Not at all   Relationships   • Social connections:     Talks on phone: Once a week     Gets together: Twice a week     Attends Sabianism service: Never     Active member of club or organization: Yes     Attends meetings of clubs or  "organizations: More than 4 times per year     Relationship status:          Vitals:    02/03/20 1042   BP: 102/50   BP Location: Left arm   Pulse: 62   SpO2: 97%   Weight: 90.7 kg (200 lb)   Height: 180.3 cm (70.98\")        Body mass index is 27.91 kg/m².      Physical Exam:    General: Alert and oriented x 3.  No acute distress.  Normal affect.  HEENT: Pupils equal, round, reactive to light; extraocular movements intact; sclerae nonicteric; pharynx, ear canals and TMs normal.  Neck: Without JVD, thyromegaly, bruit, or adenopathy.  Lungs: Clear to auscultation in all fields.  Heart: Regular rate and rhythm without murmur, rub, gallop, or click.  Abdomen: Soft, nontender, without hepatosplenomegaly or hernia.  Bowel sounds normal.  : Deferred.  Rectal: Deferred.  Extremities: Without clubbing, cyanosis, edema, or pulse deficit.  Neurologic: Intact without focal deficit.  Patient ambulates with the assistance of a cane.  Skin: Without significant lesion.  Musculoskeletal: Unremarkable.    Lab/other results:      Assessment/Plan:     Diagnosis Plan   1. Acute diarrhea       February 3, 2020--patient presents with less than 1 week history of watery diarrhea that is variable in intensity, amount, and frequency.  No associated fever, chills, or other systemic signs or symptoms and there is no noticeable blood.  The stools are not black.  Very foul-smelling.  The urgency is so bad that he can barely make it to the bathroom.  He does not feel ill.  No significant abdominal pain.  Abdominal exam is benign.  I think the best course of action is to go ahead and treat him for infectious diarrhea.  Patient has been using Imodium A.D. but this only works temporarily.      Plan is to treat with empiric Cipro 500 mg p.o. twice daily and Flagyl 500 mg p.o. twice daily for 10 days.  Patient instructed to contact me if his symptoms persist and certainly if they worsen or if he develops severe abdominal pain, fever, or obvious " bleeding/dark stools.            Procedures

## 2020-02-07 ENCOUNTER — CLINICAL SUPPORT (OUTPATIENT)
Dept: INTERNAL MEDICINE | Facility: CLINIC | Age: 85
End: 2020-02-07

## 2020-02-07 ENCOUNTER — TELEPHONE (OUTPATIENT)
Dept: INTERNAL MEDICINE | Facility: CLINIC | Age: 85
End: 2020-02-07

## 2020-02-07 DIAGNOSIS — E53.8 VITAMIN B12 DEFICIENCY: Primary | Chronic | ICD-10-CM

## 2020-02-07 PROCEDURE — 96372 THER/PROPH/DIAG INJ SC/IM: CPT | Performed by: INTERNAL MEDICINE

## 2020-02-07 RX ORDER — ONDANSETRON 4 MG/1
TABLET, FILM COATED ORAL
Qty: 20 TABLET | Refills: 0 | Status: SHIPPED | OUTPATIENT
Start: 2020-02-07 | End: 2021-06-02

## 2020-02-07 RX ADMIN — CYANOCOBALAMIN 1000 MCG: 1000 INJECTION, SOLUTION INTRAMUSCULAR; SUBCUTANEOUS at 10:16

## 2020-02-07 NOTE — TELEPHONE ENCOUNTER
He will have to use his own judgment in regards to flying.  I will send in a prescription for nausea medicine.

## 2020-02-07 NOTE — TELEPHONE ENCOUNTER
Says pt is still going to the bathroom a lot and he's been nauseous.  She says they are going out of town soon and wants to know if it would be ok for him to fly. She also wants to know if he could be prescribed something for nausea

## 2020-02-16 ENCOUNTER — HOSPITAL ENCOUNTER (INPATIENT)
Facility: HOSPITAL | Age: 85
LOS: 5 days | Discharge: NURSING, REHAB OR LONG-TERM CARE | End: 2020-02-22
Attending: EMERGENCY MEDICINE | Admitting: HOSPITALIST

## 2020-02-16 ENCOUNTER — APPOINTMENT (OUTPATIENT)
Dept: GENERAL RADIOLOGY | Facility: HOSPITAL | Age: 85
End: 2020-02-16

## 2020-02-16 DIAGNOSIS — N39.0 ACUTE UTI: ICD-10-CM
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
## 2020-02-16 DIAGNOSIS — A41.9 SEPSIS, DUE TO UNSPECIFIED ORGANISM, UNSPECIFIED WHETHER ACUTE ORGAN DYSFUNCTION PRESENT (HCC): ICD-10-CM

## 2020-02-16 DIAGNOSIS — A09 DIARRHEA OF INFECTIOUS ORIGIN: ICD-10-CM

## 2020-02-16 DIAGNOSIS — J18.9 HAP (HOSPITAL-ACQUIRED PNEUMONIA): Primary | ICD-10-CM

## 2020-02-16 LAB
ALBUMIN SERPL-MCNC: 2.8 G/DL (ref 3.5–5.2)
ALBUMIN/GLOB SERPL: 1 G/DL
ALP SERPL-CCNC: 40 U/L (ref 39–117)
ALT SERPL W P-5'-P-CCNC: 10 U/L (ref 1–41)
ANION GAP SERPL CALCULATED.3IONS-SCNC: 11.6 MMOL/L (ref 5–15)
AST SERPL-CCNC: 13 U/L (ref 1–40)
BASOPHILS # BLD AUTO: 0.08 10*3/MM3 (ref 0–0.2)
BASOPHILS NFR BLD AUTO: 0.3 % (ref 0–1.5)
BILIRUB SERPL-MCNC: 0.8 MG/DL (ref 0.2–1.2)
BUN BLD-MCNC: 18 MG/DL (ref 8–23)
BUN/CREAT SERPL: 15.8 (ref 7–25)
CALCIUM SPEC-SCNC: 8.5 MG/DL (ref 8.6–10.5)
CHLORIDE SERPL-SCNC: 98 MMOL/L (ref 98–107)
CO2 SERPL-SCNC: 22.4 MMOL/L (ref 22–29)
CREAT BLD-MCNC: 1.14 MG/DL (ref 0.76–1.27)
D-LACTATE SERPL-SCNC: 1.6 MMOL/L (ref 0.5–2)
DEPRECATED RDW RBC AUTO: 40.2 FL (ref 37–54)
EOSINOPHIL # BLD AUTO: 0.02 10*3/MM3 (ref 0–0.4)
EOSINOPHIL NFR BLD AUTO: 0.1 % (ref 0.3–6.2)
ERYTHROCYTE [DISTWIDTH] IN BLOOD BY AUTOMATED COUNT: 13.1 % (ref 12.3–15.4)
GFR SERPL CREATININE-BSD FRML MDRD: 61 ML/MIN/1.73
GLOBULIN UR ELPH-MCNC: 2.9 GM/DL
GLUCOSE BLD-MCNC: 153 MG/DL (ref 65–99)
HCT VFR BLD AUTO: 40.2 % (ref 37.5–51)
HGB BLD-MCNC: 13.5 G/DL (ref 13–17.7)
IMM GRANULOCYTES # BLD AUTO: 0.57 10*3/MM3 (ref 0–0.05)
IMM GRANULOCYTES NFR BLD AUTO: 2.3 % (ref 0–0.5)
LYMPHOCYTES # BLD AUTO: 1.97 10*3/MM3 (ref 0.7–3.1)
LYMPHOCYTES NFR BLD AUTO: 7.8 % (ref 19.6–45.3)
MCH RBC QN AUTO: 28.3 PG (ref 26.6–33)
MCHC RBC AUTO-ENTMCNC: 33.6 G/DL (ref 31.5–35.7)
MCV RBC AUTO: 84.3 FL (ref 79–97)
MONOCYTES # BLD AUTO: 3.19 10*3/MM3 (ref 0.1–0.9)
MONOCYTES NFR BLD AUTO: 12.7 % (ref 5–12)
NEUTROPHILS # BLD AUTO: 19.3 10*3/MM3 (ref 1.7–7)
NEUTROPHILS NFR BLD AUTO: 76.8 % (ref 42.7–76)
NRBC BLD AUTO-RTO: 0 /100 WBC (ref 0–0.2)
PLATELET # BLD AUTO: 269 10*3/MM3 (ref 140–450)
PMV BLD AUTO: 9.9 FL (ref 6–12)
POTASSIUM BLD-SCNC: 3.4 MMOL/L (ref 3.5–5.2)
PROCALCITONIN SERPL-MCNC: 0.44 NG/ML (ref 0.1–0.25)
PROT SERPL-MCNC: 5.7 G/DL (ref 6–8.5)
RBC # BLD AUTO: 4.77 10*6/MM3 (ref 4.14–5.8)
SODIUM BLD-SCNC: 132 MMOL/L (ref 136–145)
WBC NRBC COR # BLD: 25.13 10*3/MM3 (ref 3.4–10.8)

## 2020-02-16 PROCEDURE — 85025 COMPLETE CBC W/AUTO DIFF WBC: CPT | Performed by: PHYSICIAN ASSISTANT

## 2020-02-16 PROCEDURE — 36415 COLL VENOUS BLD VENIPUNCTURE: CPT

## 2020-02-16 PROCEDURE — 80053 COMPREHEN METABOLIC PANEL: CPT | Performed by: PHYSICIAN ASSISTANT

## 2020-02-16 PROCEDURE — 87040 BLOOD CULTURE FOR BACTERIA: CPT | Performed by: PHYSICIAN ASSISTANT

## 2020-02-16 PROCEDURE — 83605 ASSAY OF LACTIC ACID: CPT | Performed by: PHYSICIAN ASSISTANT

## 2020-02-16 PROCEDURE — 71046 X-RAY EXAM CHEST 2 VIEWS: CPT

## 2020-02-16 PROCEDURE — 99285 EMERGENCY DEPT VISIT HI MDM: CPT

## 2020-02-16 PROCEDURE — 84145 PROCALCITONIN (PCT): CPT | Performed by: PHYSICIAN ASSISTANT

## 2020-02-16 PROCEDURE — P9612 CATHETERIZE FOR URINE SPEC: HCPCS

## 2020-02-16 RX ORDER — SODIUM CHLORIDE 0.9 % (FLUSH) 0.9 %
10 SYRINGE (ML) INJECTION AS NEEDED
Status: DISCONTINUED | OUTPATIENT
Start: 2020-02-16 | End: 2020-02-22 | Stop reason: HOSPADM

## 2020-02-16 RX ORDER — SODIUM CHLORIDE 9 MG/ML
125 INJECTION, SOLUTION INTRAVENOUS CONTINUOUS
Status: DISCONTINUED | OUTPATIENT
Start: 2020-02-16 | End: 2020-02-17

## 2020-02-16 RX ADMIN — SODIUM CHLORIDE 125 ML/HR: 9 INJECTION, SOLUTION INTRAVENOUS at 22:33

## 2020-02-17 ENCOUNTER — APPOINTMENT (OUTPATIENT)
Dept: CT IMAGING | Facility: HOSPITAL | Age: 85
End: 2020-02-17

## 2020-02-17 ENCOUNTER — TELEPHONE (OUTPATIENT)
Dept: INTERNAL MEDICINE | Facility: CLINIC | Age: 85
End: 2020-02-17

## 2020-02-17 PROBLEM — J18.9 HOSPITAL-ACQUIRED PNEUMONIA: Status: ACTIVE | Noted: 2020-02-17

## 2020-02-17 PROBLEM — G20 PARKINSON DISEASE: Status: ACTIVE | Noted: 2020-02-10

## 2020-02-17 PROBLEM — G20.A1 PARKINSON DISEASE: Status: ACTIVE | Noted: 2020-02-10

## 2020-02-17 PROBLEM — N39.0 ACUTE UTI (URINARY TRACT INFECTION): Status: ACTIVE | Noted: 2020-02-17

## 2020-02-17 PROBLEM — K52.9 COLITIS: Status: ACTIVE | Noted: 2020-02-10

## 2020-02-17 PROBLEM — J18.9 HAP (HOSPITAL-ACQUIRED PNEUMONIA): Status: ACTIVE | Noted: 2020-02-17
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
## 2020-02-17 PROBLEM — R53.1 WEAKNESS: Status: ACTIVE | Noted: 2020-02-17
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
## 2020-02-17 LAB
ADV 40+41 DNA STL QL NAA+NON-PROBE: NOT DETECTED
ANION GAP SERPL CALCULATED.3IONS-SCNC: 11.2 MMOL/L (ref 5–15)
ASTRO TYP 1-8 RNA STL QL NAA+NON-PROBE: NOT DETECTED
B PARAPERT DNA SPEC QL NAA+PROBE: NOT DETECTED
B PERT DNA SPEC QL NAA+PROBE: NOT DETECTED
BACTERIA UR QL AUTO: ABNORMAL /HPF
BASOPHILS # BLD AUTO: 0.07 10*3/MM3 (ref 0–0.2)
BASOPHILS NFR BLD AUTO: 0.3 % (ref 0–1.5)
BILIRUB UR QL STRIP: NEGATIVE
BUN BLD-MCNC: 18 MG/DL (ref 8–23)
BUN/CREAT SERPL: 17.6 (ref 7–25)
C CAYETANENSIS DNA STL QL NAA+NON-PROBE: NOT DETECTED
C DIFF TOX GENS STL QL NAA+PROBE: NEGATIVE
C PNEUM DNA NPH QL NAA+NON-PROBE: NOT DETECTED
CALCIUM SPEC-SCNC: 8.2 MG/DL (ref 8.6–10.5)
CAMPY SP DNA.DIARRHEA STL QL NAA+PROBE: NOT DETECTED
CHLORIDE SERPL-SCNC: 103 MMOL/L (ref 98–107)
CLARITY UR: CLEAR
CO2 SERPL-SCNC: 18.8 MMOL/L (ref 22–29)
COLOR UR: ABNORMAL
CREAT BLD-MCNC: 1.02 MG/DL (ref 0.76–1.27)
CRYPTOSP STL CULT: NOT DETECTED
DEPRECATED RDW RBC AUTO: 39.6 FL (ref 37–54)
E COLI DNA SPEC QL NAA+PROBE: NOT DETECTED
E HISTOLYT AG STL-ACNC: NOT DETECTED
EAEC PAA PLAS AGGR+AATA ST NAA+NON-PRB: NOT DETECTED
EC STX1 + STX2 GENES STL NAA+PROBE: NOT DETECTED
EOSINOPHIL # BLD AUTO: 0.02 10*3/MM3 (ref 0–0.4)
EOSINOPHIL NFR BLD AUTO: 0.1 % (ref 0.3–6.2)
EPEC EAE GENE STL QL NAA+NON-PROBE: NOT DETECTED
ERYTHROCYTE [DISTWIDTH] IN BLOOD BY AUTOMATED COUNT: 13.1 % (ref 12.3–15.4)
ETEC LTA+ST1A+ST1B TOX ST NAA+NON-PROBE: NOT DETECTED
FLUAV H1 2009 PAND RNA NPH QL NAA+PROBE: NOT DETECTED
FLUAV H1 HA GENE NPH QL NAA+PROBE: NOT DETECTED
FLUAV H3 RNA NPH QL NAA+PROBE: NOT DETECTED
FLUAV SUBTYP SPEC NAA+PROBE: NOT DETECTED
FLUBV RNA ISLT QL NAA+PROBE: NOT DETECTED
G LAMBLIA DNA SPEC QL NAA+PROBE: NOT DETECTED
GFR SERPL CREATININE-BSD FRML MDRD: 69 ML/MIN/1.73
GLUCOSE BLD-MCNC: 123 MG/DL (ref 65–99)
GLUCOSE UR STRIP-MCNC: ABNORMAL MG/DL
HADV DNA SPEC NAA+PROBE: NOT DETECTED
HCOV 229E RNA SPEC QL NAA+PROBE: NOT DETECTED
HCOV HKU1 RNA SPEC QL NAA+PROBE: NOT DETECTED
HCOV NL63 RNA SPEC QL NAA+PROBE: NOT DETECTED
HCOV OC43 RNA SPEC QL NAA+PROBE: NOT DETECTED
HCT VFR BLD AUTO: 36 % (ref 37.5–51)
HGB BLD-MCNC: 12.2 G/DL (ref 13–17.7)
HGB UR QL STRIP.AUTO: NEGATIVE
HMPV RNA NPH QL NAA+NON-PROBE: NOT DETECTED
HPIV1 RNA SPEC QL NAA+PROBE: NOT DETECTED
HPIV2 RNA SPEC QL NAA+PROBE: NOT DETECTED
HPIV3 RNA NPH QL NAA+PROBE: NOT DETECTED
HPIV4 P GENE NPH QL NAA+PROBE: NOT DETECTED
HYALINE CASTS UR QL AUTO: ABNORMAL /LPF
IMM GRANULOCYTES # BLD AUTO: 0.57 10*3/MM3 (ref 0–0.05)
IMM GRANULOCYTES NFR BLD AUTO: 2.5 % (ref 0–0.5)
KETONES UR QL STRIP: ABNORMAL
LEUKOCYTE ESTERASE UR QL STRIP.AUTO: ABNORMAL
LYMPHOCYTES # BLD AUTO: 1.35 10*3/MM3 (ref 0.7–3.1)
LYMPHOCYTES NFR BLD AUTO: 5.9 % (ref 19.6–45.3)
M PNEUMO IGG SER IA-ACNC: NOT DETECTED
MCH RBC QN AUTO: 28.3 PG (ref 26.6–33)
MCHC RBC AUTO-ENTMCNC: 33.9 G/DL (ref 31.5–35.7)
MCV RBC AUTO: 83.5 FL (ref 79–97)
MONOCYTES # BLD AUTO: 2.57 10*3/MM3 (ref 0.1–0.9)
MONOCYTES NFR BLD AUTO: 11.2 % (ref 5–12)
MRSA DNA SPEC QL NAA+PROBE: NORMAL
NEUTROPHILS # BLD AUTO: 18.46 10*3/MM3 (ref 1.7–7)
NEUTROPHILS NFR BLD AUTO: 80 % (ref 42.7–76)
NITRITE UR QL STRIP: POSITIVE
NOROVIRUS GI+II RNA STL QL NAA+NON-PROBE: NOT DETECTED
NRBC BLD AUTO-RTO: 0 /100 WBC (ref 0–0.2)
P SHIGELLOIDES DNA STL QL NAA+PROBE: NOT DETECTED
PH UR STRIP.AUTO: 5.5 [PH] (ref 5–8)
PLATELET # BLD AUTO: 260 10*3/MM3 (ref 140–450)
PMV BLD AUTO: 10 FL (ref 6–12)
POTASSIUM BLD-SCNC: 3.4 MMOL/L (ref 3.5–5.2)
PROT UR QL STRIP: ABNORMAL
RBC # BLD AUTO: 4.31 10*6/MM3 (ref 4.14–5.8)
RBC # UR: ABNORMAL /HPF
REF LAB TEST METHOD: ABNORMAL
RHINOVIRUS RNA SPEC NAA+PROBE: NOT DETECTED
RSV RNA NPH QL NAA+NON-PROBE: NOT DETECTED
RV RNA STL NAA+PROBE: NOT DETECTED
SALMONELLA DNA SPEC QL NAA+PROBE: NOT DETECTED
SAPO I+II+IV+V RNA STL QL NAA+NON-PROBE: NOT DETECTED
SHIGELLA SP+EIEC IPAH STL QL NAA+PROBE: NOT DETECTED
SODIUM BLD-SCNC: 133 MMOL/L (ref 136–145)
SP GR UR STRIP: >=1.03 (ref 1–1.03)
SQUAMOUS #/AREA URNS HPF: ABNORMAL /HPF
UROBILINOGEN UR QL STRIP: ABNORMAL
V CHOLERAE DNA SPEC QL NAA+PROBE: NOT DETECTED
VIBRIO DNA SPEC NAA+PROBE: NOT DETECTED
WBC NRBC COR # BLD: 23.04 10*3/MM3 (ref 3.4–10.8)
WBC UR QL AUTO: ABNORMAL /HPF
YERSINIA STL CULT: NOT DETECTED

## 2020-02-17 PROCEDURE — 36415 COLL VENOUS BLD VENIPUNCTURE: CPT | Performed by: NURSE PRACTITIONER

## 2020-02-17 PROCEDURE — 71250 CT THORAX DX C-: CPT

## 2020-02-17 PROCEDURE — 81001 URINALYSIS AUTO W/SCOPE: CPT | Performed by: PHYSICIAN ASSISTANT

## 2020-02-17 PROCEDURE — 0100U HC BIOFIRE FILMARRAY RESP PANEL 2: CPT | Performed by: HOSPITALIST

## 2020-02-17 PROCEDURE — 25010000002 PIPERACILLIN SOD-TAZOBACTAM PER 1 G: Performed by: NURSE PRACTITIONER

## 2020-02-17 PROCEDURE — 85025 COMPLETE CBC W/AUTO DIFF WBC: CPT | Performed by: NURSE PRACTITIONER

## 2020-02-17 PROCEDURE — 87493 C DIFF AMPLIFIED PROBE: CPT | Performed by: PHYSICIAN ASSISTANT

## 2020-02-17 PROCEDURE — 97162 PT EVAL MOD COMPLEX 30 MIN: CPT

## 2020-02-17 PROCEDURE — 0097U HC BIOFIRE FILMARRAY GI PANEL: CPT | Performed by: PHYSICIAN ASSISTANT

## 2020-02-17 PROCEDURE — 25010000002 PIPERACILLIN SOD-TAZOBACTAM PER 1 G: Performed by: PHYSICIAN ASSISTANT

## 2020-02-17 PROCEDURE — 25010000002 ONDANSETRON PER 1 MG: Performed by: NURSE PRACTITIONER

## 2020-02-17 PROCEDURE — 97110 THERAPEUTIC EXERCISES: CPT

## 2020-02-17 PROCEDURE — 80048 BASIC METABOLIC PNL TOTAL CA: CPT | Performed by: NURSE PRACTITIONER

## 2020-02-17 PROCEDURE — 87641 MR-STAPH DNA AMP PROBE: CPT | Performed by: NURSE PRACTITIONER

## 2020-02-17 PROCEDURE — 25010000002 VANCOMYCIN 10 G RECONSTITUTED SOLUTION: Performed by: PHYSICIAN ASSISTANT

## 2020-02-17 RX ORDER — CEFDINIR 300 MG/1
300 CAPSULE ORAL
COMMUNITY
Start: 2020-02-14 | End: 2020-02-22 | Stop reason: HOSPADM

## 2020-02-17 RX ORDER — ACETAMINOPHEN 325 MG/1
650 TABLET ORAL EVERY 4 HOURS PRN
Status: DISCONTINUED | OUTPATIENT
Start: 2020-02-17 | End: 2020-02-22 | Stop reason: HOSPADM

## 2020-02-17 RX ORDER — POTASSIUM CHLORIDE 1.5 G/1.77G
40 POWDER, FOR SOLUTION ORAL AS NEEDED
Status: DISCONTINUED | OUTPATIENT
Start: 2020-02-17 | End: 2020-02-17

## 2020-02-17 RX ORDER — POTASSIUM CHLORIDE 750 MG/1
40 CAPSULE, EXTENDED RELEASE ORAL AS NEEDED
Status: DISCONTINUED | OUTPATIENT
Start: 2020-02-17 | End: 2020-02-17

## 2020-02-17 RX ORDER — SODIUM CHLORIDE 9 MG/ML
100 INJECTION, SOLUTION INTRAVENOUS CONTINUOUS
Status: DISCONTINUED | OUTPATIENT
Start: 2020-02-17 | End: 2020-02-17

## 2020-02-17 RX ORDER — ACETAMINOPHEN 650 MG/1
650 SUPPOSITORY RECTAL EVERY 4 HOURS PRN
Status: DISCONTINUED | OUTPATIENT
Start: 2020-02-17 | End: 2020-02-22 | Stop reason: HOSPADM

## 2020-02-17 RX ORDER — ATORVASTATIN CALCIUM 10 MG/1
10 TABLET, FILM COATED ORAL DAILY
Status: DISCONTINUED | OUTPATIENT
Start: 2020-02-17 | End: 2020-02-22 | Stop reason: HOSPADM

## 2020-02-17 RX ORDER — NITROGLYCERIN 0.4 MG/1
0.4 TABLET SUBLINGUAL
Status: DISCONTINUED | OUTPATIENT
Start: 2020-02-17 | End: 2020-02-22 | Stop reason: HOSPADM

## 2020-02-17 RX ORDER — POTASSIUM CHLORIDE 750 MG/1
40 CAPSULE, EXTENDED RELEASE ORAL EVERY 4 HOURS
Status: COMPLETED | OUTPATIENT
Start: 2020-02-17 | End: 2020-02-17

## 2020-02-17 RX ORDER — ONDANSETRON 2 MG/ML
4 INJECTION INTRAMUSCULAR; INTRAVENOUS EVERY 6 HOURS PRN
Status: DISCONTINUED | OUTPATIENT
Start: 2020-02-17 | End: 2020-02-22 | Stop reason: HOSPADM

## 2020-02-17 RX ORDER — SODIUM CHLORIDE 0.9 % (FLUSH) 0.9 %
10 SYRINGE (ML) INJECTION AS NEEDED
Status: DISCONTINUED | OUTPATIENT
Start: 2020-02-17 | End: 2020-02-22 | Stop reason: HOSPADM

## 2020-02-17 RX ORDER — POTASSIUM CHLORIDE 7.45 MG/ML
10 INJECTION INTRAVENOUS
Status: DISCONTINUED | OUTPATIENT
Start: 2020-02-17 | End: 2020-02-17

## 2020-02-17 RX ORDER — AMLODIPINE BESYLATE 5 MG/1
5 TABLET ORAL
Status: DISCONTINUED | OUTPATIENT
Start: 2020-02-17 | End: 2020-02-22 | Stop reason: HOSPADM

## 2020-02-17 RX ORDER — SODIUM CHLORIDE 0.9 % (FLUSH) 0.9 %
10 SYRINGE (ML) INJECTION EVERY 12 HOURS SCHEDULED
Status: DISCONTINUED | OUTPATIENT
Start: 2020-02-17 | End: 2020-02-22 | Stop reason: HOSPADM

## 2020-02-17 RX ORDER — ACETAMINOPHEN 160 MG/5ML
650 SOLUTION ORAL EVERY 4 HOURS PRN
Status: DISCONTINUED | OUTPATIENT
Start: 2020-02-17 | End: 2020-02-22 | Stop reason: HOSPADM

## 2020-02-17 RX ORDER — VALSARTAN 320 MG/1
320 TABLET ORAL
Status: DISCONTINUED | OUTPATIENT
Start: 2020-02-17 | End: 2020-02-22 | Stop reason: HOSPADM

## 2020-02-17 RX ORDER — ASPIRIN 81 MG/1
81 TABLET ORAL DAILY
Status: DISCONTINUED | OUTPATIENT
Start: 2020-02-17 | End: 2020-02-22 | Stop reason: HOSPADM

## 2020-02-17 RX ADMIN — AMLODIPINE BESYLATE 5 MG: 5 TABLET ORAL at 09:03

## 2020-02-17 RX ADMIN — ATORVASTATIN CALCIUM 10 MG: 10 TABLET, FILM COATED ORAL at 09:03

## 2020-02-17 RX ADMIN — ONDANSETRON 4 MG: 2 INJECTION INTRAMUSCULAR; INTRAVENOUS at 19:03

## 2020-02-17 RX ADMIN — CARBIDOPA AND LEVODOPA 1 TABLET: 25; 100 TABLET ORAL at 09:03

## 2020-02-17 RX ADMIN — POTASSIUM CHLORIDE 40 MEQ: 750 CAPSULE, EXTENDED RELEASE ORAL at 06:50

## 2020-02-17 RX ADMIN — SODIUM CHLORIDE 100 ML/HR: 9 INJECTION, SOLUTION INTRAVENOUS at 06:52

## 2020-02-17 RX ADMIN — CARBIDOPA AND LEVODOPA 1 TABLET: 25; 100 TABLET ORAL at 20:06

## 2020-02-17 RX ADMIN — TAZOBACTAM SODIUM AND PIPERACILLIN SODIUM 3.38 G: 375; 3 INJECTION, SOLUTION INTRAVENOUS at 23:33

## 2020-02-17 RX ADMIN — SODIUM CHLORIDE, PRESERVATIVE FREE 10 ML: 5 INJECTION INTRAVENOUS at 01:39

## 2020-02-17 RX ADMIN — TAZOBACTAM SODIUM AND PIPERACILLIN SODIUM 3.38 G: 375; 3 INJECTION, SOLUTION INTRAVENOUS at 01:02

## 2020-02-17 RX ADMIN — TAZOBACTAM SODIUM AND PIPERACILLIN SODIUM 3.38 G: 375; 3 INJECTION, SOLUTION INTRAVENOUS at 14:57

## 2020-02-17 RX ADMIN — TAZOBACTAM SODIUM AND PIPERACILLIN SODIUM 3.38 G: 375; 3 INJECTION, SOLUTION INTRAVENOUS at 06:50

## 2020-02-17 RX ADMIN — POTASSIUM CHLORIDE 40 MEQ: 750 CAPSULE, EXTENDED RELEASE ORAL at 17:59

## 2020-02-17 RX ADMIN — POTASSIUM CHLORIDE 40 MEQ: 750 CAPSULE, EXTENDED RELEASE ORAL at 13:14

## 2020-02-17 RX ADMIN — SODIUM CHLORIDE, PRESERVATIVE FREE 10 ML: 5 INJECTION INTRAVENOUS at 09:03

## 2020-02-17 RX ADMIN — VALSARTAN 320 MG: 320 TABLET ORAL at 14:57

## 2020-02-17 RX ADMIN — ASPIRIN 81 MG: 81 TABLET, COATED ORAL at 09:03

## 2020-02-17 RX ADMIN — SODIUM CHLORIDE, PRESERVATIVE FREE 10 ML: 5 INJECTION INTRAVENOUS at 20:07

## 2020-02-17 RX ADMIN — ACETAMINOPHEN 650 MG: 325 TABLET, FILM COATED ORAL at 20:06

## 2020-02-17 RX ADMIN — VANCOMYCIN HYDROCHLORIDE 2000 MG: 10 INJECTION, POWDER, LYOPHILIZED, FOR SOLUTION INTRAVENOUS at 01:38

## 2020-02-18 ENCOUNTER — APPOINTMENT (OUTPATIENT)
Dept: CARDIOLOGY | Facility: HOSPITAL | Age: 85
End: 2020-02-18

## 2020-02-18 PROBLEM — J69.0 ASPIRATION PNEUMONIA OF RIGHT LOWER LOBE DUE TO VOMIT (HCC): Status: ACTIVE | Noted: 2020-02-18

## 2020-02-18 LAB
AORTIC DIMENSIONLESS INDEX: 0.9 (DI)
BH CV ECHO MEAS - ACS: 2.1 CM
BH CV ECHO MEAS - AO MAX PG: 9 MMHG
BH CV ECHO MEAS - AO MEAN PG (FULL): 1 MMHG
BH CV ECHO MEAS - AO MEAN PG: 4 MMHG
BH CV ECHO MEAS - AO ROOT AREA (BSA CORRECTED): 1.6
BH CV ECHO MEAS - AO ROOT AREA: 9.1 CM^2
BH CV ECHO MEAS - AO ROOT DIAM: 3.4 CM
BH CV ECHO MEAS - AO V2 MAX: 150 CM/SEC
BH CV ECHO MEAS - AO V2 MEAN: 88 CM/SEC
BH CV ECHO MEAS - AO V2 VTI: 29.8 CM
BH CV ECHO MEAS - AVA(I,A): 3.9 CM^2
BH CV ECHO MEAS - AVA(I,D): 3.9 CM^2
BH CV ECHO MEAS - BSA(HAYCOCK): 2.2 M^2
BH CV ECHO MEAS - BSA: 2.2 M^2
BH CV ECHO MEAS - BZI_BMI: 29.4 KILOGRAMS/M^2
BH CV ECHO MEAS - BZI_METRIC_HEIGHT: 180.3 CM
BH CV ECHO MEAS - BZI_METRIC_WEIGHT: 95.7 KG
BH CV ECHO MEAS - EDV(CUBED): 157.5 ML
BH CV ECHO MEAS - EDV(MOD-SP2): 71 ML
BH CV ECHO MEAS - EDV(MOD-SP4): 75 ML
BH CV ECHO MEAS - EDV(TEICH): 141.3 ML
BH CV ECHO MEAS - EF(CUBED): 72.8 %
BH CV ECHO MEAS - EF(MOD-BP): 71 %
BH CV ECHO MEAS - EF(MOD-SP2): 67.6 %
BH CV ECHO MEAS - EF(MOD-SP4): 74.7 %
BH CV ECHO MEAS - EF(TEICH): 64 %
BH CV ECHO MEAS - ESV(CUBED): 42.9 ML
BH CV ECHO MEAS - ESV(MOD-SP2): 23 ML
BH CV ECHO MEAS - ESV(MOD-SP4): 19 ML
BH CV ECHO MEAS - ESV(TEICH): 50.9 ML
BH CV ECHO MEAS - FS: 35.2 %
BH CV ECHO MEAS - IVS/LVPW: 0.92
BH CV ECHO MEAS - IVSD: 1.2 CM
BH CV ECHO MEAS - LAT PEAK E' VEL: 7 CM/SEC
BH CV ECHO MEAS - LV DIASTOLIC VOL/BSA (35-75): 34.8 ML/M^2
BH CV ECHO MEAS - LV MASS(C)D: 279.8 GRAMS
BH CV ECHO MEAS - LV MASS(C)DI: 129.7 GRAMS/M^2
BH CV ECHO MEAS - LV MAX PG: 7 MMHG
BH CV ECHO MEAS - LV MEAN PG: 3 MMHG
BH CV ECHO MEAS - LV SYSTOLIC VOL/BSA (12-30): 8.8 ML/M^2
BH CV ECHO MEAS - LV V1 MAX: 129 CM/SEC
BH CV ECHO MEAS - LV V1 MEAN: 80.2 CM/SEC
BH CV ECHO MEAS - LV V1 VTI: 25.9 CM
BH CV ECHO MEAS - LVIDD: 5.4 CM
BH CV ECHO MEAS - LVIDS: 3.5 CM
BH CV ECHO MEAS - LVLD AP2: 7.5 CM
BH CV ECHO MEAS - LVLD AP4: 7.4 CM
BH CV ECHO MEAS - LVLS AP2: 6.4 CM
BH CV ECHO MEAS - LVLS AP4: 6.2 CM
BH CV ECHO MEAS - LVOT AREA (M): 4.5 CM^2
BH CV ECHO MEAS - LVOT AREA: 4.5 CM^2
BH CV ECHO MEAS - LVOT DIAM: 2.4 CM
BH CV ECHO MEAS - LVPWD: 1.3 CM
BH CV ECHO MEAS - MED PEAK E' VEL: 7 CM/SEC
BH CV ECHO MEAS - MR MAX PG: 59.6 MMHG
BH CV ECHO MEAS - MR MAX VEL: 386 CM/SEC
BH CV ECHO MEAS - MV A DUR: 0.11 SEC
BH CV ECHO MEAS - MV A MAX VEL: 112 CM/SEC
BH CV ECHO MEAS - MV DEC SLOPE: 187 CM/SEC^2
BH CV ECHO MEAS - MV DEC TIME: 250 SEC
BH CV ECHO MEAS - MV E MAX VEL: 76.1 CM/SEC
BH CV ECHO MEAS - MV E/A: 0.68
BH CV ECHO MEAS - MV MEAN PG: 1 MMHG
BH CV ECHO MEAS - MV P1/2T MAX VEL: 70.4 CM/SEC
BH CV ECHO MEAS - MV P1/2T: 110.3 MSEC
BH CV ECHO MEAS - MV V2 MEAN: 56.5 CM/SEC
BH CV ECHO MEAS - MV V2 VTI: 26.8 CM
BH CV ECHO MEAS - MVA P1/2T LCG: 3.1 CM^2
BH CV ECHO MEAS - MVA(P1/2T): 2 CM^2
BH CV ECHO MEAS - MVA(VTI): 4.4 CM^2
BH CV ECHO MEAS - PA ACC SLOPE: 891 CM/SEC^2
BH CV ECHO MEAS - PA ACC TIME: 0.08 SEC
BH CV ECHO MEAS - PA MAX PG (FULL): 1.2 MMHG
BH CV ECHO MEAS - PA MAX PG: 2.3 MMHG
BH CV ECHO MEAS - PA PR(ACCEL): 42.1 MMHG
BH CV ECHO MEAS - PA V2 MAX: 76.4 CM/SEC
BH CV ECHO MEAS - PULM A REVS DUR: 0.13 SEC
BH CV ECHO MEAS - PULM A REVS VEL: 39.8 CM/SEC
BH CV ECHO MEAS - PULM DIAS VEL: 40.3 CM/SEC
BH CV ECHO MEAS - PULM S/D: 1.3
BH CV ECHO MEAS - PULM SYS VEL: 51.1 CM/SEC
BH CV ECHO MEAS - PVA(V,A): 5.6 CM^2
BH CV ECHO MEAS - PVA(V,D): 5.6 CM^2
BH CV ECHO MEAS - QP/QS: 0.69
BH CV ECHO MEAS - RAP SYSTOLE: 3 MMHG
BH CV ECHO MEAS - RV MAX PG: 1.1 MMHG
BH CV ECHO MEAS - RV MEAN PG: 1 MMHG
BH CV ECHO MEAS - RV V1 MAX: 52.9 CM/SEC
BH CV ECHO MEAS - RV V1 MEAN: 38.2 CM/SEC
BH CV ECHO MEAS - RV V1 VTI: 10.1 CM
BH CV ECHO MEAS - RVOT AREA: 8 CM^2
BH CV ECHO MEAS - RVOT DIAM: 3.2 CM
BH CV ECHO MEAS - RVSP: 28.6 MMHG
BH CV ECHO MEAS - SI(AO): 125.4 ML/M^2
BH CV ECHO MEAS - SI(CUBED): 53.1 ML/M^2
BH CV ECHO MEAS - SI(LVOT): 54.3 ML/M^2
BH CV ECHO MEAS - SI(MOD-SP2): 22.2 ML/M^2
BH CV ECHO MEAS - SI(MOD-SP4): 26 ML/M^2
BH CV ECHO MEAS - SI(TEICH): 41.9 ML/M^2
BH CV ECHO MEAS - SV(AO): 270.6 ML
BH CV ECHO MEAS - SV(CUBED): 114.6 ML
BH CV ECHO MEAS - SV(LVOT): 117.2 ML
BH CV ECHO MEAS - SV(MOD-SP2): 48 ML
BH CV ECHO MEAS - SV(MOD-SP4): 56 ML
BH CV ECHO MEAS - SV(RVOT): 81.2 ML
BH CV ECHO MEAS - SV(TEICH): 90.4 ML
BH CV ECHO MEAS - TAPSE (>1.6): 2 CM2
BH CV ECHO MEAS - TR MAX VEL: 253 CM/SEC
BH CV ECHO MEASUREMENTS AVERAGE E/E' RATIO: 10.87
BH CV VAS BP RIGHT ARM: NORMAL MMHG
BH CV XLRA - RV BASE: 3 CM
BH CV XLRA - TDI S': 16 CM/SEC
DEPRECATED RDW RBC AUTO: 40.1 FL (ref 37–54)
ERYTHROCYTE [DISTWIDTH] IN BLOOD BY AUTOMATED COUNT: 13.1 % (ref 12.3–15.4)
HCT VFR BLD AUTO: 35.9 % (ref 37.5–51)
HGB BLD-MCNC: 12.2 G/DL (ref 13–17.7)
LEFT ATRIUM VOLUME INDEX: 34 ML/M2
MAXIMAL PREDICTED HEART RATE: 132 BPM
MCH RBC QN AUTO: 28.4 PG (ref 26.6–33)
MCHC RBC AUTO-ENTMCNC: 34 G/DL (ref 31.5–35.7)
MCV RBC AUTO: 83.7 FL (ref 79–97)
PLATELET # BLD AUTO: 261 10*3/MM3 (ref 140–450)
PMV BLD AUTO: 9.9 FL (ref 6–12)
RBC # BLD AUTO: 4.29 10*6/MM3 (ref 4.14–5.8)
STRESS TARGET HR: 112 BPM
WBC NRBC COR # BLD: 17.83 10*3/MM3 (ref 3.4–10.8)

## 2020-02-18 PROCEDURE — 85027 COMPLETE CBC AUTOMATED: CPT | Performed by: HOSPITALIST

## 2020-02-18 PROCEDURE — 25010000002 PIPERACILLIN SOD-TAZOBACTAM PER 1 G: Performed by: NURSE PRACTITIONER

## 2020-02-18 PROCEDURE — 93306 TTE W/DOPPLER COMPLETE: CPT

## 2020-02-18 PROCEDURE — 93306 TTE W/DOPPLER COMPLETE: CPT | Performed by: INTERNAL MEDICINE

## 2020-02-18 RX ORDER — POTASSIUM CHLORIDE 750 MG/1
40 CAPSULE, EXTENDED RELEASE ORAL AS NEEDED
Status: DISCONTINUED | OUTPATIENT
Start: 2020-02-18 | End: 2020-02-19

## 2020-02-18 RX ORDER — POTASSIUM CHLORIDE 7.45 MG/ML
10 INJECTION INTRAVENOUS
Status: DISCONTINUED | OUTPATIENT
Start: 2020-02-18 | End: 2020-02-19

## 2020-02-18 RX ORDER — FAMOTIDINE 20 MG/1
20 TABLET, FILM COATED ORAL
Status: DISCONTINUED | OUTPATIENT
Start: 2020-02-18 | End: 2020-02-22 | Stop reason: HOSPADM

## 2020-02-18 RX ORDER — POTASSIUM CHLORIDE 1.5 G/1.77G
40 POWDER, FOR SOLUTION ORAL AS NEEDED
Status: DISCONTINUED | OUTPATIENT
Start: 2020-02-18 | End: 2020-02-19

## 2020-02-18 RX ADMIN — TAZOBACTAM SODIUM AND PIPERACILLIN SODIUM 3.38 G: 375; 3 INJECTION, SOLUTION INTRAVENOUS at 23:57

## 2020-02-18 RX ADMIN — ASPIRIN 81 MG: 81 TABLET, COATED ORAL at 08:11

## 2020-02-18 RX ADMIN — TAZOBACTAM SODIUM AND PIPERACILLIN SODIUM 3.38 G: 375; 3 INJECTION, SOLUTION INTRAVENOUS at 14:00

## 2020-02-18 RX ADMIN — CARBIDOPA AND LEVODOPA 1 TABLET: 25; 100 TABLET ORAL at 08:11

## 2020-02-18 RX ADMIN — SODIUM CHLORIDE, PRESERVATIVE FREE 10 ML: 5 INJECTION INTRAVENOUS at 08:11

## 2020-02-18 RX ADMIN — AMLODIPINE BESYLATE 5 MG: 5 TABLET ORAL at 08:11

## 2020-02-18 RX ADMIN — ATORVASTATIN CALCIUM 10 MG: 10 TABLET, FILM COATED ORAL at 08:11

## 2020-02-18 RX ADMIN — VALSARTAN 320 MG: 320 TABLET ORAL at 08:11

## 2020-02-18 RX ADMIN — TAZOBACTAM SODIUM AND PIPERACILLIN SODIUM 3.38 G: 375; 3 INJECTION, SOLUTION INTRAVENOUS at 06:29

## 2020-02-18 RX ADMIN — ACETAMINOPHEN 650 MG: 325 TABLET, FILM COATED ORAL at 08:17

## 2020-02-18 RX ADMIN — ACETAMINOPHEN 650 MG: 325 TABLET, FILM COATED ORAL at 20:31

## 2020-02-18 RX ADMIN — FAMOTIDINE 20 MG: 20 TABLET, FILM COATED ORAL at 16:40

## 2020-02-19 LAB
ANION GAP SERPL CALCULATED.3IONS-SCNC: 9.4 MMOL/L (ref 5–15)
BUN BLD-MCNC: 21 MG/DL (ref 8–23)
BUN/CREAT SERPL: 28 (ref 7–25)
CALCIUM SPEC-SCNC: 8.5 MG/DL (ref 8.6–10.5)
CHLORIDE SERPL-SCNC: 105 MMOL/L (ref 98–107)
CO2 SERPL-SCNC: 19.6 MMOL/L (ref 22–29)
CREAT BLD-MCNC: 0.75 MG/DL (ref 0.76–1.27)
DEPRECATED RDW RBC AUTO: 39.4 FL (ref 37–54)
ERYTHROCYTE [DISTWIDTH] IN BLOOD BY AUTOMATED COUNT: 13.1 % (ref 12.3–15.4)
GFR SERPL CREATININE-BSD FRML MDRD: 98 ML/MIN/1.73
GLUCOSE BLD-MCNC: 104 MG/DL (ref 65–99)
HCT VFR BLD AUTO: 35.5 % (ref 37.5–51)
HGB BLD-MCNC: 12.1 G/DL (ref 13–17.7)
MCH RBC QN AUTO: 28.3 PG (ref 26.6–33)
MCHC RBC AUTO-ENTMCNC: 34.1 G/DL (ref 31.5–35.7)
MCV RBC AUTO: 82.9 FL (ref 79–97)
PLATELET # BLD AUTO: 283 10*3/MM3 (ref 140–450)
PMV BLD AUTO: 10.1 FL (ref 6–12)
POTASSIUM BLD-SCNC: 3.6 MMOL/L (ref 3.5–5.2)
RBC # BLD AUTO: 4.28 10*6/MM3 (ref 4.14–5.8)
SODIUM BLD-SCNC: 134 MMOL/L (ref 136–145)
WBC NRBC COR # BLD: 15.53 10*3/MM3 (ref 3.4–10.8)

## 2020-02-19 PROCEDURE — 25010000002 FUROSEMIDE PER 20 MG: Performed by: HOSPITALIST

## 2020-02-19 PROCEDURE — 25010000002 PIPERACILLIN SOD-TAZOBACTAM PER 1 G: Performed by: NURSE PRACTITIONER

## 2020-02-19 PROCEDURE — 80048 BASIC METABOLIC PNL TOTAL CA: CPT | Performed by: HOSPITALIST

## 2020-02-19 PROCEDURE — 85027 COMPLETE CBC AUTOMATED: CPT | Performed by: HOSPITALIST

## 2020-02-19 PROCEDURE — 92610 EVALUATE SWALLOWING FUNCTION: CPT

## 2020-02-19 PROCEDURE — 97110 THERAPEUTIC EXERCISES: CPT

## 2020-02-19 RX ORDER — SACCHAROMYCES BOULARDII 250 MG
250 CAPSULE ORAL 2 TIMES DAILY
Status: DISCONTINUED | OUTPATIENT
Start: 2020-02-19 | End: 2020-02-22 | Stop reason: HOSPADM

## 2020-02-19 RX ORDER — FUROSEMIDE 10 MG/ML
40 INJECTION INTRAMUSCULAR; INTRAVENOUS ONCE
Status: COMPLETED | OUTPATIENT
Start: 2020-02-19 | End: 2020-02-19

## 2020-02-19 RX ORDER — POTASSIUM CHLORIDE 750 MG/1
40 CAPSULE, EXTENDED RELEASE ORAL EVERY 4 HOURS
Status: COMPLETED | OUTPATIENT
Start: 2020-02-19 | End: 2020-02-19

## 2020-02-19 RX ADMIN — ASPIRIN 81 MG: 81 TABLET, COATED ORAL at 09:44

## 2020-02-19 RX ADMIN — POTASSIUM CHLORIDE 40 MEQ: 750 CAPSULE, EXTENDED RELEASE ORAL at 12:34

## 2020-02-19 RX ADMIN — SODIUM CHLORIDE, PRESERVATIVE FREE 10 ML: 5 INJECTION INTRAVENOUS at 09:49

## 2020-02-19 RX ADMIN — AMLODIPINE BESYLATE 5 MG: 5 TABLET ORAL at 09:39

## 2020-02-19 RX ADMIN — TAZOBACTAM SODIUM AND PIPERACILLIN SODIUM 3.38 G: 375; 3 INJECTION, SOLUTION INTRAVENOUS at 06:21

## 2020-02-19 RX ADMIN — TAZOBACTAM SODIUM AND PIPERACILLIN SODIUM 3.38 G: 375; 3 INJECTION, SOLUTION INTRAVENOUS at 14:18

## 2020-02-19 RX ADMIN — FAMOTIDINE 20 MG: 20 TABLET, FILM COATED ORAL at 17:36

## 2020-02-19 RX ADMIN — Medication 250 MG: at 20:21

## 2020-02-19 RX ADMIN — SODIUM CHLORIDE, PRESERVATIVE FREE 10 ML: 5 INJECTION INTRAVENOUS at 20:21

## 2020-02-19 RX ADMIN — FAMOTIDINE 20 MG: 20 TABLET, FILM COATED ORAL at 06:22

## 2020-02-19 RX ADMIN — Medication 250 MG: at 12:33

## 2020-02-19 RX ADMIN — ATORVASTATIN CALCIUM 10 MG: 10 TABLET, FILM COATED ORAL at 09:45

## 2020-02-19 RX ADMIN — CARBIDOPA AND LEVODOPA 1 TABLET: 25; 100 TABLET ORAL at 20:21

## 2020-02-19 RX ADMIN — CARBIDOPA AND LEVODOPA 1 TABLET: 25; 100 TABLET ORAL at 09:46

## 2020-02-19 RX ADMIN — POTASSIUM CHLORIDE 40 MEQ: 750 CAPSULE, EXTENDED RELEASE ORAL at 17:36

## 2020-02-19 RX ADMIN — TAZOBACTAM SODIUM AND PIPERACILLIN SODIUM 3.38 G: 375; 3 INJECTION, SOLUTION INTRAVENOUS at 23:22

## 2020-02-19 RX ADMIN — FUROSEMIDE 40 MG: 10 INJECTION, SOLUTION INTRAMUSCULAR; INTRAVENOUS at 12:34

## 2020-02-19 RX ADMIN — VALSARTAN 320 MG: 320 TABLET ORAL at 09:47

## 2020-02-20 ENCOUNTER — APPOINTMENT (OUTPATIENT)
Dept: GENERAL RADIOLOGY | Facility: HOSPITAL | Age: 85
End: 2020-02-20

## 2020-02-20 LAB
ANION GAP SERPL CALCULATED.3IONS-SCNC: 11.7 MMOL/L (ref 5–15)
BUN BLD-MCNC: 21 MG/DL (ref 8–23)
BUN/CREAT SERPL: 22.8 (ref 7–25)
CALCIUM SPEC-SCNC: 8.5 MG/DL (ref 8.6–10.5)
CHLORIDE SERPL-SCNC: 104 MMOL/L (ref 98–107)
CO2 SERPL-SCNC: 21.3 MMOL/L (ref 22–29)
CREAT BLD-MCNC: 0.92 MG/DL (ref 0.76–1.27)
DEPRECATED RDW RBC AUTO: 39 FL (ref 37–54)
ERYTHROCYTE [DISTWIDTH] IN BLOOD BY AUTOMATED COUNT: 13 % (ref 12.3–15.4)
GFR SERPL CREATININE-BSD FRML MDRD: 78 ML/MIN/1.73
GLUCOSE BLD-MCNC: 111 MG/DL (ref 65–99)
HCT VFR BLD AUTO: 35.9 % (ref 37.5–51)
HGB BLD-MCNC: 11.8 G/DL (ref 13–17.7)
MCH RBC QN AUTO: 27.6 PG (ref 26.6–33)
MCHC RBC AUTO-ENTMCNC: 32.9 G/DL (ref 31.5–35.7)
MCV RBC AUTO: 84.1 FL (ref 79–97)
PLATELET # BLD AUTO: 312 10*3/MM3 (ref 140–450)
PMV BLD AUTO: 10.1 FL (ref 6–12)
POTASSIUM BLD-SCNC: 4.1 MMOL/L (ref 3.5–5.2)
RBC # BLD AUTO: 4.27 10*6/MM3 (ref 4.14–5.8)
SODIUM BLD-SCNC: 137 MMOL/L (ref 136–145)
WBC NRBC COR # BLD: 15.66 10*3/MM3 (ref 3.4–10.8)

## 2020-02-20 PROCEDURE — 92611 MOTION FLUOROSCOPY/SWALLOW: CPT

## 2020-02-20 PROCEDURE — 85027 COMPLETE CBC AUTOMATED: CPT | Performed by: HOSPITALIST

## 2020-02-20 PROCEDURE — 80048 BASIC METABOLIC PNL TOTAL CA: CPT | Performed by: HOSPITALIST

## 2020-02-20 PROCEDURE — 74230 X-RAY XM SWLNG FUNCJ C+: CPT

## 2020-02-20 PROCEDURE — 25010000002 PIPERACILLIN SOD-TAZOBACTAM PER 1 G: Performed by: NURSE PRACTITIONER

## 2020-02-20 RX ORDER — HYDROCHLOROTHIAZIDE 25 MG/1
25 TABLET ORAL DAILY
Status: DISCONTINUED | OUTPATIENT
Start: 2020-02-20 | End: 2020-02-21

## 2020-02-20 RX ADMIN — TAZOBACTAM SODIUM AND PIPERACILLIN SODIUM 3.38 G: 375; 3 INJECTION, SOLUTION INTRAVENOUS at 22:58

## 2020-02-20 RX ADMIN — HYDROCHLOROTHIAZIDE 25 MG: 25 TABLET ORAL at 14:15

## 2020-02-20 RX ADMIN — Medication 250 MG: at 20:04

## 2020-02-20 RX ADMIN — TAZOBACTAM SODIUM AND PIPERACILLIN SODIUM 3.38 G: 375; 3 INJECTION, SOLUTION INTRAVENOUS at 06:45

## 2020-02-20 RX ADMIN — BARIUM SULFATE 4 ML: 980 POWDER, FOR SUSPENSION ORAL at 09:11

## 2020-02-20 RX ADMIN — ASPIRIN 81 MG: 81 TABLET, COATED ORAL at 10:16

## 2020-02-20 RX ADMIN — CARBIDOPA AND LEVODOPA 1 TABLET: 25; 100 TABLET ORAL at 10:15

## 2020-02-20 RX ADMIN — Medication 250 MG: at 10:15

## 2020-02-20 RX ADMIN — TAZOBACTAM SODIUM AND PIPERACILLIN SODIUM 3.38 G: 375; 3 INJECTION, SOLUTION INTRAVENOUS at 16:13

## 2020-02-20 RX ADMIN — ATORVASTATIN CALCIUM 10 MG: 10 TABLET, FILM COATED ORAL at 10:16

## 2020-02-20 RX ADMIN — SODIUM CHLORIDE, PRESERVATIVE FREE 10 ML: 5 INJECTION INTRAVENOUS at 10:16

## 2020-02-20 RX ADMIN — VALSARTAN 320 MG: 320 TABLET ORAL at 10:15

## 2020-02-20 RX ADMIN — SODIUM CHLORIDE, PRESERVATIVE FREE 10 ML: 5 INJECTION INTRAVENOUS at 20:03

## 2020-02-20 RX ADMIN — BARIUM SULFATE 50 ML: 400 SUSPENSION ORAL at 09:11

## 2020-02-20 RX ADMIN — BARIUM SULFATE 55 ML: 0.81 POWDER, FOR SUSPENSION ORAL at 09:11

## 2020-02-20 RX ADMIN — CARBIDOPA AND LEVODOPA 1 TABLET: 25; 100 TABLET ORAL at 20:04

## 2020-02-20 RX ADMIN — FAMOTIDINE 20 MG: 20 TABLET, FILM COATED ORAL at 06:45

## 2020-02-20 RX ADMIN — FAMOTIDINE 20 MG: 20 TABLET, FILM COATED ORAL at 17:44

## 2020-02-20 RX ADMIN — AMLODIPINE BESYLATE 5 MG: 5 TABLET ORAL at 10:16

## 2020-02-21 ENCOUNTER — APPOINTMENT (OUTPATIENT)
Dept: GENERAL RADIOLOGY | Facility: HOSPITAL | Age: 85
End: 2020-02-21

## 2020-02-21 LAB
BACTERIA SPEC AEROBE CULT: NORMAL
BACTERIA SPEC AEROBE CULT: NORMAL
DEPRECATED RDW RBC AUTO: 41.5 FL (ref 37–54)
ERYTHROCYTE [DISTWIDTH] IN BLOOD BY AUTOMATED COUNT: 13.2 % (ref 12.3–15.4)
HCT VFR BLD AUTO: 35.4 % (ref 37.5–51)
HGB BLD-MCNC: 11.5 G/DL (ref 13–17.7)
MCH RBC QN AUTO: 27.6 PG (ref 26.6–33)
MCHC RBC AUTO-ENTMCNC: 32.5 G/DL (ref 31.5–35.7)
MCV RBC AUTO: 84.9 FL (ref 79–97)
PLATELET # BLD AUTO: 339 10*3/MM3 (ref 140–450)
PMV BLD AUTO: 10.2 FL (ref 6–12)
RBC # BLD AUTO: 4.17 10*6/MM3 (ref 4.14–5.8)
WBC NRBC COR # BLD: 18.45 10*3/MM3 (ref 3.4–10.8)

## 2020-02-21 PROCEDURE — 25010000002 PIPERACILLIN SOD-TAZOBACTAM PER 1 G: Performed by: NURSE PRACTITIONER

## 2020-02-21 PROCEDURE — 85027 COMPLETE CBC AUTOMATED: CPT | Performed by: HOSPITALIST

## 2020-02-21 PROCEDURE — 71046 X-RAY EXAM CHEST 2 VIEWS: CPT

## 2020-02-21 RX ORDER — POTASSIUM CHLORIDE 750 MG/1
40 CAPSULE, EXTENDED RELEASE ORAL ONCE
Status: COMPLETED | OUTPATIENT
Start: 2020-02-21 | End: 2020-02-21

## 2020-02-21 RX ORDER — FUROSEMIDE 40 MG/1
40 TABLET ORAL DAILY
Status: DISCONTINUED | OUTPATIENT
Start: 2020-02-21 | End: 2020-02-22 | Stop reason: HOSPADM

## 2020-02-21 RX ADMIN — FAMOTIDINE 20 MG: 20 TABLET, FILM COATED ORAL at 17:38

## 2020-02-21 RX ADMIN — ATORVASTATIN CALCIUM 10 MG: 10 TABLET, FILM COATED ORAL at 09:02

## 2020-02-21 RX ADMIN — Medication 250 MG: at 09:02

## 2020-02-21 RX ADMIN — FUROSEMIDE 40 MG: 40 TABLET ORAL at 15:18

## 2020-02-21 RX ADMIN — VALSARTAN 320 MG: 320 TABLET ORAL at 09:02

## 2020-02-21 RX ADMIN — TAZOBACTAM SODIUM AND PIPERACILLIN SODIUM 3.38 G: 375; 3 INJECTION, SOLUTION INTRAVENOUS at 06:48

## 2020-02-21 RX ADMIN — SODIUM CHLORIDE, PRESERVATIVE FREE 10 ML: 5 INJECTION INTRAVENOUS at 20:00

## 2020-02-21 RX ADMIN — CARBIDOPA AND LEVODOPA 1 TABLET: 25; 100 TABLET ORAL at 20:00

## 2020-02-21 RX ADMIN — ASPIRIN 81 MG: 81 TABLET, COATED ORAL at 09:02

## 2020-02-21 RX ADMIN — SODIUM CHLORIDE, PRESERVATIVE FREE 10 ML: 5 INJECTION INTRAVENOUS at 09:02

## 2020-02-21 RX ADMIN — TAZOBACTAM SODIUM AND PIPERACILLIN SODIUM 3.38 G: 375; 3 INJECTION, SOLUTION INTRAVENOUS at 15:18

## 2020-02-21 RX ADMIN — Medication 250 MG: at 20:00

## 2020-02-21 RX ADMIN — CARBIDOPA AND LEVODOPA 1 TABLET: 25; 100 TABLET ORAL at 09:02

## 2020-02-21 RX ADMIN — HYDROCHLOROTHIAZIDE 25 MG: 25 TABLET ORAL at 09:02

## 2020-02-21 RX ADMIN — FAMOTIDINE 20 MG: 20 TABLET, FILM COATED ORAL at 06:49

## 2020-02-21 RX ADMIN — AMLODIPINE BESYLATE 5 MG: 5 TABLET ORAL at 09:02

## 2020-02-21 RX ADMIN — TAZOBACTAM SODIUM AND PIPERACILLIN SODIUM 3.38 G: 375; 3 INJECTION, SOLUTION INTRAVENOUS at 22:26

## 2020-02-21 RX ADMIN — POTASSIUM CHLORIDE 40 MEQ: 750 CAPSULE, EXTENDED RELEASE ORAL at 15:18

## 2020-02-22 VITALS
HEART RATE: 86 BPM | WEIGHT: 211 LBS | SYSTOLIC BLOOD PRESSURE: 130 MMHG | TEMPERATURE: 98.1 F | OXYGEN SATURATION: 94 % | BODY MASS INDEX: 29.54 KG/M2 | RESPIRATION RATE: 18 BRPM | DIASTOLIC BLOOD PRESSURE: 74 MMHG | HEIGHT: 71 IN

## 2020-02-22 LAB
ANION GAP SERPL CALCULATED.3IONS-SCNC: 11 MMOL/L (ref 5–15)
BUN BLD-MCNC: 15 MG/DL (ref 8–23)
BUN/CREAT SERPL: 16 (ref 7–25)
CALCIUM SPEC-SCNC: 8.5 MG/DL (ref 8.6–10.5)
CHLORIDE SERPL-SCNC: 103 MMOL/L (ref 98–107)
CO2 SERPL-SCNC: 23 MMOL/L (ref 22–29)
CREAT BLD-MCNC: 0.94 MG/DL (ref 0.76–1.27)
DEPRECATED RDW RBC AUTO: 38.8 FL (ref 37–54)
ERYTHROCYTE [DISTWIDTH] IN BLOOD BY AUTOMATED COUNT: 12.8 % (ref 12.3–15.4)
GFR SERPL CREATININE-BSD FRML MDRD: 76 ML/MIN/1.73
GLUCOSE BLD-MCNC: 136 MG/DL (ref 65–99)
HCT VFR BLD AUTO: 34.1 % (ref 37.5–51)
HGB BLD-MCNC: 11.3 G/DL (ref 13–17.7)
MCH RBC QN AUTO: 27.7 PG (ref 26.6–33)
MCHC RBC AUTO-ENTMCNC: 33.1 G/DL (ref 31.5–35.7)
MCV RBC AUTO: 83.6 FL (ref 79–97)
PLATELET # BLD AUTO: 329 10*3/MM3 (ref 140–450)
PMV BLD AUTO: 10 FL (ref 6–12)
POTASSIUM BLD-SCNC: 3.5 MMOL/L (ref 3.5–5.2)
RBC # BLD AUTO: 4.08 10*6/MM3 (ref 4.14–5.8)
SODIUM BLD-SCNC: 137 MMOL/L (ref 136–145)
WBC NRBC COR # BLD: 16.42 10*3/MM3 (ref 3.4–10.8)

## 2020-02-22 PROCEDURE — 85027 COMPLETE CBC AUTOMATED: CPT | Performed by: HOSPITALIST

## 2020-02-22 PROCEDURE — 80048 BASIC METABOLIC PNL TOTAL CA: CPT | Performed by: HOSPITALIST

## 2020-02-22 RX ORDER — AMOXICILLIN AND CLAVULANATE POTASSIUM 875; 125 MG/1; MG/1
1 TABLET, FILM COATED ORAL EVERY 12 HOURS SCHEDULED
Status: DISCONTINUED | OUTPATIENT
Start: 2020-02-22 | End: 2020-02-22 | Stop reason: HOSPADM

## 2020-02-22 RX ORDER — SACCHAROMYCES BOULARDII 250 MG
250 CAPSULE ORAL 2 TIMES DAILY
Start: 2020-02-22 | End: 2020-03-23 | Stop reason: SDUPTHER

## 2020-02-22 RX ORDER — AMOXICILLIN AND CLAVULANATE POTASSIUM 875; 125 MG/1; MG/1
1 TABLET, FILM COATED ORAL EVERY 12 HOURS SCHEDULED
Qty: 10 TABLET | Refills: 0
Start: 2020-02-22 | End: 2020-02-27

## 2020-02-22 RX ADMIN — FAMOTIDINE 20 MG: 20 TABLET, FILM COATED ORAL at 06:32

## 2020-02-22 RX ADMIN — CARBIDOPA AND LEVODOPA 1 TABLET: 25; 100 TABLET ORAL at 09:55

## 2020-02-22 RX ADMIN — SODIUM CHLORIDE, PRESERVATIVE FREE 10 ML: 5 INJECTION INTRAVENOUS at 09:58

## 2020-02-22 RX ADMIN — AMOXICILLIN AND CLAVULANATE POTASSIUM 1 TABLET: 875; 125 TABLET, FILM COATED ORAL at 12:20

## 2020-02-22 RX ADMIN — AMLODIPINE BESYLATE 5 MG: 5 TABLET ORAL at 09:56

## 2020-02-22 RX ADMIN — FUROSEMIDE 40 MG: 40 TABLET ORAL at 09:54

## 2020-02-22 RX ADMIN — ATORVASTATIN CALCIUM 10 MG: 10 TABLET, FILM COATED ORAL at 09:55

## 2020-02-22 RX ADMIN — VALSARTAN 320 MG: 320 TABLET ORAL at 09:55

## 2020-02-22 RX ADMIN — ASPIRIN 81 MG: 81 TABLET, COATED ORAL at 09:56

## 2020-02-22 RX ADMIN — Medication 250 MG: at 09:55

## 2020-02-29 ENCOUNTER — APPOINTMENT (OUTPATIENT)
Dept: CARDIOLOGY | Facility: HOSPITAL | Age: 85
End: 2020-02-29

## 2020-02-29 ENCOUNTER — APPOINTMENT (OUTPATIENT)
Dept: CT IMAGING | Facility: HOSPITAL | Age: 85
End: 2020-02-29

## 2020-02-29 ENCOUNTER — APPOINTMENT (OUTPATIENT)
Dept: GENERAL RADIOLOGY | Facility: HOSPITAL | Age: 85
End: 2020-02-29

## 2020-02-29 ENCOUNTER — HOSPITAL ENCOUNTER (INPATIENT)
Facility: HOSPITAL | Age: 85
LOS: 20 days | Discharge: HOME HEALTH | End: 2020-03-20
Attending: EMERGENCY MEDICINE | Admitting: INTERNAL MEDICINE

## 2020-02-29 ENCOUNTER — APPOINTMENT (OUTPATIENT)
Dept: ULTRASOUND IMAGING | Facility: HOSPITAL | Age: 85
End: 2020-02-29

## 2020-02-29 DIAGNOSIS — J18.9 HEALTHCARE-ASSOCIATED PNEUMONIA: Primary | ICD-10-CM

## 2020-02-29 DIAGNOSIS — J96.01 ACUTE HYPOXEMIC RESPIRATORY FAILURE (HCC): ICD-10-CM

## 2020-02-29 DIAGNOSIS — I26.02 ACUTE SADDLE PULMONARY EMBOLISM WITH ACUTE COR PULMONALE (HCC): ICD-10-CM

## 2020-02-29 DIAGNOSIS — I26.99 BILATERAL PULMONARY EMBOLISM (HCC): ICD-10-CM

## 2020-02-29 LAB
ALBUMIN SERPL-MCNC: 2.9 G/DL (ref 3.5–5.2)
ALBUMIN/GLOB SERPL: 0.9 G/DL
ALP SERPL-CCNC: 123 U/L (ref 39–117)
ALT SERPL W P-5'-P-CCNC: 25 U/L (ref 1–41)
ANION GAP SERPL CALCULATED.3IONS-SCNC: 14.8 MMOL/L (ref 5–15)
AORTIC DIMENSIONLESS INDEX: 0.7 (DI)
APTT PPP: 28.9 SECONDS (ref 22.7–35.4)
APTT PPP: 30.9 SECONDS (ref 22.7–35.4)
ARTERIAL PATENCY WRIST A: POSITIVE
AST SERPL-CCNC: 32 U/L (ref 1–40)
ATMOSPHERIC PRESS: 756.9 MMHG
B PARAPERT DNA SPEC QL NAA+PROBE: NOT DETECTED
B PERT DNA SPEC QL NAA+PROBE: NOT DETECTED
BACTERIA UR QL AUTO: ABNORMAL /HPF
BASE EXCESS BLDA CALC-SCNC: 5 MMOL/L (ref 0–2)
BASOPHILS # BLD AUTO: 0.05 10*3/MM3 (ref 0–0.2)
BASOPHILS # BLD AUTO: 0.12 10*3/MM3 (ref 0–0.2)
BASOPHILS NFR BLD AUTO: 0.2 % (ref 0–1.5)
BASOPHILS NFR BLD AUTO: 0.8 % (ref 0–1.5)
BDY SITE: ABNORMAL
BH CV ECHO MEAS - AO MAX PG (FULL): 3.9 MMHG
BH CV ECHO MEAS - AO MAX PG: 7.4 MMHG
BH CV ECHO MEAS - AO MEAN PG (FULL): 2 MMHG
BH CV ECHO MEAS - AO MEAN PG: 4 MMHG
BH CV ECHO MEAS - AO ROOT AREA (BSA CORRECTED): 1.6
BH CV ECHO MEAS - AO ROOT AREA: 9.1 CM^2
BH CV ECHO MEAS - AO ROOT DIAM: 3.4 CM
BH CV ECHO MEAS - AO V2 MAX: 136 CM/SEC
BH CV ECHO MEAS - AO V2 MEAN: 93.7 CM/SEC
BH CV ECHO MEAS - AO V2 VTI: 25.4 CM
BH CV ECHO MEAS - AVA(I,A): 3.3 CM^2
BH CV ECHO MEAS - AVA(I,D): 3.3 CM^2
BH CV ECHO MEAS - AVA(V,A): 3.1 CM^2
BH CV ECHO MEAS - AVA(V,D): 3.1 CM^2
BH CV ECHO MEAS - BSA(HAYCOCK): 2.2 M^2
BH CV ECHO MEAS - BSA: 2.1 M^2
BH CV ECHO MEAS - BZI_BMI: 30 KILOGRAMS/M^2
BH CV ECHO MEAS - BZI_METRIC_HEIGHT: 177.8 CM
BH CV ECHO MEAS - BZI_METRIC_WEIGHT: 94.8 KG
BH CV ECHO MEAS - EDV(CUBED): 91.1 ML
BH CV ECHO MEAS - EDV(MOD-SP2): 75 ML
BH CV ECHO MEAS - EDV(MOD-SP4): 90 ML
BH CV ECHO MEAS - EDV(TEICH): 92.4 ML
BH CV ECHO MEAS - EF(CUBED): 56.9 %
BH CV ECHO MEAS - EF(MOD-BP): 54 %
BH CV ECHO MEAS - EF(MOD-SP2): 57.3 %
BH CV ECHO MEAS - EF(MOD-SP4): 51.1 %
BH CV ECHO MEAS - EF(TEICH): 48.7 %
BH CV ECHO MEAS - ESV(CUBED): 39.3 ML
BH CV ECHO MEAS - ESV(MOD-SP2): 32 ML
BH CV ECHO MEAS - ESV(MOD-SP4): 44 ML
BH CV ECHO MEAS - ESV(TEICH): 47.4 ML
BH CV ECHO MEAS - FS: 24.4 %
BH CV ECHO MEAS - IVS/LVPW: 1.2
BH CV ECHO MEAS - IVSD: 1.6 CM
BH CV ECHO MEAS - LV DIASTOLIC VOL/BSA (35-75): 42.3 ML/M^2
BH CV ECHO MEAS - LV MASS(C)D: 261.9 GRAMS
BH CV ECHO MEAS - LV MASS(C)DI: 123.2 GRAMS/M^2
BH CV ECHO MEAS - LV MAX PG: 3.5 MMHG
BH CV ECHO MEAS - LV MEAN PG: 2 MMHG
BH CV ECHO MEAS - LV SYSTOLIC VOL/BSA (12-30): 20.7 ML/M^2
BH CV ECHO MEAS - LV V1 MAX: 93.1 CM/SEC
BH CV ECHO MEAS - LV V1 MEAN: 63.9 CM/SEC
BH CV ECHO MEAS - LV V1 VTI: 18.7 CM
BH CV ECHO MEAS - LVIDD: 4.5 CM
BH CV ECHO MEAS - LVIDS: 3.4 CM
BH CV ECHO MEAS - LVLD AP2: 7.3 CM
BH CV ECHO MEAS - LVLD AP4: 7.7 CM
BH CV ECHO MEAS - LVLS AP2: 6.2 CM
BH CV ECHO MEAS - LVLS AP4: 6.9 CM
BH CV ECHO MEAS - LVOT AREA (M): 4.5 CM^2
BH CV ECHO MEAS - LVOT AREA: 4.5 CM^2
BH CV ECHO MEAS - LVOT DIAM: 2.4 CM
BH CV ECHO MEAS - LVPWD: 1.3 CM
BH CV ECHO MEAS - MED PEAK E' VEL: 3.4 CM/SEC
BH CV ECHO MEAS - MV A MAX VEL: 84.4 CM/SEC
BH CV ECHO MEAS - MV DEC SLOPE: 243 CM/SEC^2
BH CV ECHO MEAS - MV DEC TIME: 0.19 SEC
BH CV ECHO MEAS - MV E MAX VEL: 51.9 CM/SEC
BH CV ECHO MEAS - MV E/A: 0.61
BH CV ECHO MEAS - MV MAX PG: 3.9 MMHG
BH CV ECHO MEAS - MV MEAN PG: 2 MMHG
BH CV ECHO MEAS - MV P1/2T MAX VEL: 56.4 CM/SEC
BH CV ECHO MEAS - MV P1/2T: 68 MSEC
BH CV ECHO MEAS - MV V2 MAX: 99.2 CM/SEC
BH CV ECHO MEAS - MV V2 MEAN: 61.3 CM/SEC
BH CV ECHO MEAS - MV V2 VTI: 20.7 CM
BH CV ECHO MEAS - MVA P1/2T LCG: 3.9 CM^2
BH CV ECHO MEAS - MVA(P1/2T): 3.2 CM^2
BH CV ECHO MEAS - MVA(VTI): 4.1 CM^2
BH CV ECHO MEAS - RAP SYSTOLE: 8 MMHG
BH CV ECHO MEAS - SI(AO): 108.4 ML/M^2
BH CV ECHO MEAS - SI(CUBED): 24.4 ML/M^2
BH CV ECHO MEAS - SI(LVOT): 39.8 ML/M^2
BH CV ECHO MEAS - SI(MOD-SP2): 20.2 ML/M^2
BH CV ECHO MEAS - SI(MOD-SP4): 21.6 ML/M^2
BH CV ECHO MEAS - SI(TEICH): 21.2 ML/M^2
BH CV ECHO MEAS - SV(AO): 230.6 ML
BH CV ECHO MEAS - SV(CUBED): 51.8 ML
BH CV ECHO MEAS - SV(LVOT): 84.6 ML
BH CV ECHO MEAS - SV(MOD-SP2): 43 ML
BH CV ECHO MEAS - SV(MOD-SP4): 46 ML
BH CV ECHO MEAS - SV(TEICH): 45 ML
BH CV ECHO MEAS - TAPSE (>1.6): 2.5 CM2
BH CV XLRA - RV BASE: 4.6 CM
BH CV XLRA - TDI S': 15.3 CM/SEC
BILIRUB SERPL-MCNC: 1.4 MG/DL (ref 0.2–1.2)
BILIRUB UR QL STRIP: NEGATIVE
BUN BLD-MCNC: 20 MG/DL (ref 8–23)
BUN/CREAT SERPL: 22 (ref 7–25)
C PNEUM DNA NPH QL NAA+NON-PROBE: NOT DETECTED
CALCIUM SPEC-SCNC: 8.3 MG/DL (ref 8.6–10.5)
CHLORIDE SERPL-SCNC: 96 MMOL/L (ref 98–107)
CLARITY UR: CLEAR
CO2 SERPL-SCNC: 27.2 MMOL/L (ref 22–29)
COARSE GRAN CASTS URNS QL MICRO: ABNORMAL /LPF
COLOR UR: ABNORMAL
CREAT BLD-MCNC: 0.91 MG/DL (ref 0.76–1.27)
D DIMER PPP FEU-MCNC: 6.5 MCGFEU/ML (ref 0–0.49)
D-LACTATE SERPL-SCNC: 1.5 MMOL/L (ref 0.5–2)
DEPRECATED RDW RBC AUTO: 36.4 FL (ref 37–54)
DEPRECATED RDW RBC AUTO: 38.5 FL (ref 37–54)
EOSINOPHIL # BLD AUTO: 0.01 10*3/MM3 (ref 0–0.4)
EOSINOPHIL # BLD AUTO: 0.01 10*3/MM3 (ref 0–0.4)
EOSINOPHIL NFR BLD AUTO: 0 % (ref 0.3–6.2)
EOSINOPHIL NFR BLD AUTO: 0.1 % (ref 0.3–6.2)
ERYTHROCYTE [DISTWIDTH] IN BLOOD BY AUTOMATED COUNT: 12.1 % (ref 12.3–15.4)
ERYTHROCYTE [DISTWIDTH] IN BLOOD BY AUTOMATED COUNT: 12.6 % (ref 12.3–15.4)
FLUAV H1 2009 PAND RNA NPH QL NAA+PROBE: NOT DETECTED
FLUAV H1 HA GENE NPH QL NAA+PROBE: NOT DETECTED
FLUAV H3 RNA NPH QL NAA+PROBE: NOT DETECTED
FLUAV SUBTYP SPEC NAA+PROBE: NOT DETECTED
FLUBV RNA ISLT QL NAA+PROBE: NOT DETECTED
GFR SERPL CREATININE-BSD FRML MDRD: 79 ML/MIN/1.73
GLOBULIN UR ELPH-MCNC: 3.4 GM/DL
GLUCOSE BLD-MCNC: 177 MG/DL (ref 65–99)
GLUCOSE BLDC GLUCOMTR-MCNC: 172 MG/DL (ref 70–130)
GLUCOSE BLDC GLUCOMTR-MCNC: 194 MG/DL (ref 70–130)
GLUCOSE UR STRIP-MCNC: NEGATIVE MG/DL
HADV DNA SPEC NAA+PROBE: NOT DETECTED
HCO3 BLDA-SCNC: 28.4 MMOL/L (ref 22–28)
HCOV 229E RNA SPEC QL NAA+PROBE: NOT DETECTED
HCOV HKU1 RNA SPEC QL NAA+PROBE: NOT DETECTED
HCOV NL63 RNA SPEC QL NAA+PROBE: NOT DETECTED
HCOV OC43 RNA SPEC QL NAA+PROBE: NOT DETECTED
HCT VFR BLD AUTO: 33.4 % (ref 37.5–51)
HCT VFR BLD AUTO: 49.5 % (ref 37.5–51)
HGB BLD-MCNC: 10.9 G/DL (ref 13–17.7)
HGB BLD-MCNC: 16.4 G/DL (ref 13–17.7)
HGB UR QL STRIP.AUTO: NEGATIVE
HMPV RNA NPH QL NAA+NON-PROBE: NOT DETECTED
HOROWITZ INDEX BLD+IHG-RTO: 100 %
HPIV1 RNA SPEC QL NAA+PROBE: NOT DETECTED
HPIV2 RNA SPEC QL NAA+PROBE: NOT DETECTED
HPIV3 RNA NPH QL NAA+PROBE: NOT DETECTED
HPIV4 P GENE NPH QL NAA+PROBE: NOT DETECTED
HYALINE CASTS UR QL AUTO: ABNORMAL /LPF
IMM GRANULOCYTES # BLD AUTO: 0.68 10*3/MM3 (ref 0–0.05)
IMM GRANULOCYTES # BLD AUTO: 0.88 10*3/MM3 (ref 0–0.05)
IMM GRANULOCYTES NFR BLD AUTO: 4.2 % (ref 0–0.5)
IMM GRANULOCYTES NFR BLD AUTO: 4.3 % (ref 0–0.5)
INR PPP: 1.34 (ref 0.9–1.1)
INR PPP: 1.39 (ref 0.9–1.1)
KETONES UR QL STRIP: ABNORMAL
LEFT ATRIUM VOLUME INDEX: 15 ML/M2
LEUKOCYTE ESTERASE UR QL STRIP.AUTO: ABNORMAL
LV EF 2D ECHO EST: 54 %
LYMPHOCYTES # BLD AUTO: 1.16 10*3/MM3 (ref 0.7–3.1)
LYMPHOCYTES # BLD AUTO: 1.64 10*3/MM3 (ref 0.7–3.1)
LYMPHOCYTES NFR BLD AUTO: 10.3 % (ref 19.6–45.3)
LYMPHOCYTES NFR BLD AUTO: 5.6 % (ref 19.6–45.3)
M PNEUMO IGG SER IA-ACNC: NOT DETECTED
MCH RBC QN AUTO: 27.4 PG (ref 26.6–33)
MCH RBC QN AUTO: 28.3 PG (ref 26.6–33)
MCHC RBC AUTO-ENTMCNC: 32.6 G/DL (ref 31.5–35.7)
MCHC RBC AUTO-ENTMCNC: 33.1 G/DL (ref 31.5–35.7)
MCV RBC AUTO: 83.9 FL (ref 79–97)
MCV RBC AUTO: 85.3 FL (ref 79–97)
MODALITY: ABNORMAL
MONOCYTES # BLD AUTO: 0.7 10*3/MM3 (ref 0.1–0.9)
MONOCYTES # BLD AUTO: 1.03 10*3/MM3 (ref 0.1–0.9)
MONOCYTES NFR BLD AUTO: 3.4 % (ref 5–12)
MONOCYTES NFR BLD AUTO: 6.5 % (ref 5–12)
NEUTROPHILS # BLD AUTO: 12.45 10*3/MM3 (ref 1.7–7)
NEUTROPHILS # BLD AUTO: 18.08 10*3/MM3 (ref 1.7–7)
NEUTROPHILS NFR BLD AUTO: 78 % (ref 42.7–76)
NEUTROPHILS NFR BLD AUTO: 86.6 % (ref 42.7–76)
NITRITE UR QL STRIP: NEGATIVE
NRBC BLD AUTO-RTO: 0 /100 WBC (ref 0–0.2)
NRBC BLD AUTO-RTO: 0.1 /100 WBC (ref 0–0.2)
NT-PROBNP SERPL-MCNC: 5113 PG/ML (ref 5–1800)
O2 A-A PPRESDIFF RESPIRATORY: 0.1 MMHG
PCO2 BLDA: 36.8 MM HG (ref 35–45)
PH BLDA: 7.5 PH UNITS (ref 7.35–7.45)
PH UR STRIP.AUTO: <=5 [PH] (ref 5–8)
PLATELET # BLD AUTO: 291 10*3/MM3 (ref 140–450)
PLATELET # BLD AUTO: 381 10*3/MM3 (ref 140–450)
PMV BLD AUTO: 10 FL (ref 6–12)
PMV BLD AUTO: 10.2 FL (ref 6–12)
PO2 BLDA: 90.7 MM HG (ref 80–100)
POTASSIUM BLD-SCNC: 3 MMOL/L (ref 3.5–5.2)
PROCALCITONIN SERPL-MCNC: 0.21 NG/ML (ref 0.1–0.25)
PROT SERPL-MCNC: 6.3 G/DL (ref 6–8.5)
PROT UR QL STRIP: ABNORMAL
PROTHROMBIN TIME: 16.3 SECONDS (ref 11.7–14.2)
PROTHROMBIN TIME: 16.7 SECONDS (ref 11.7–14.2)
RBC # BLD AUTO: 3.98 10*6/MM3 (ref 4.14–5.8)
RBC # BLD AUTO: 5.8 10*6/MM3 (ref 4.14–5.8)
RBC # UR: ABNORMAL /HPF
REF LAB TEST METHOD: ABNORMAL
RHINOVIRUS RNA SPEC NAA+PROBE: NOT DETECTED
RSV RNA NPH QL NAA+NON-PROBE: NOT DETECTED
SAO2 % BLDCOA: 97.7 % (ref 92–99)
SET MECH RESP RATE: 16
SODIUM BLD-SCNC: 138 MMOL/L (ref 136–145)
SP GR UR STRIP: 1.02 (ref 1–1.03)
SQUAMOUS #/AREA URNS HPF: ABNORMAL /HPF
TOTAL RATE: 34 BREATHS/MINUTE
TRI-PHOS CRY URNS QL MICRO: ABNORMAL /HPF
TROPONIN T SERPL-MCNC: 0.18 NG/ML (ref 0–0.03)
UROBILINOGEN UR QL STRIP: ABNORMAL
VENTILATOR MODE: ABNORMAL
VT ON VENT VENT: 550 ML
WBC NRBC COR # BLD: 15.93 10*3/MM3 (ref 3.4–10.8)
WBC NRBC COR # BLD: 20.88 10*3/MM3 (ref 3.4–10.8)
WBC UR QL AUTO: ABNORMAL /HPF

## 2020-02-29 PROCEDURE — 85025 COMPLETE CBC W/AUTO DIFF WBC: CPT | Performed by: INTERNAL MEDICINE

## 2020-02-29 PROCEDURE — 36600 WITHDRAWAL OF ARTERIAL BLOOD: CPT

## 2020-02-29 PROCEDURE — 94799 UNLISTED PULMONARY SVC/PX: CPT

## 2020-02-29 PROCEDURE — 99223 1ST HOSP IP/OBS HIGH 75: CPT | Performed by: INTERNAL MEDICINE

## 2020-02-29 PROCEDURE — C1769 GUIDE WIRE: HCPCS | Performed by: INTERNAL MEDICINE

## 2020-02-29 PROCEDURE — 3E06317 INTRODUCTION OF OTHER THROMBOLYTIC INTO CENTRAL ARTERY, PERCUTANEOUS APPROACH: ICD-10-PCS | Performed by: INTERNAL MEDICINE

## 2020-02-29 PROCEDURE — 85730 THROMBOPLASTIN TIME PARTIAL: CPT | Performed by: INTERNAL MEDICINE

## 2020-02-29 PROCEDURE — C1751 CATH, INF, PER/CENT/MIDLINE: HCPCS | Performed by: INTERNAL MEDICINE

## 2020-02-29 PROCEDURE — 87040 BLOOD CULTURE FOR BACTERIA: CPT | Performed by: EMERGENCY MEDICINE

## 2020-02-29 PROCEDURE — 85610 PROTHROMBIN TIME: CPT | Performed by: EMERGENCY MEDICINE

## 2020-02-29 PROCEDURE — C1894 INTRO/SHEATH, NON-LASER: HCPCS | Performed by: INTERNAL MEDICINE

## 2020-02-29 PROCEDURE — 82962 GLUCOSE BLOOD TEST: CPT

## 2020-02-29 PROCEDURE — 84484 ASSAY OF TROPONIN QUANT: CPT | Performed by: EMERGENCY MEDICINE

## 2020-02-29 PROCEDURE — 94640 AIRWAY INHALATION TREATMENT: CPT

## 2020-02-29 PROCEDURE — 02HQ33Z INSERTION OF INFUSION DEVICE INTO RIGHT PULMONARY ARTERY, PERCUTANEOUS APPROACH: ICD-10-PCS | Performed by: INTERNAL MEDICINE

## 2020-02-29 PROCEDURE — 84145 PROCALCITONIN (PCT): CPT | Performed by: EMERGENCY MEDICINE

## 2020-02-29 PROCEDURE — 85025 COMPLETE CBC W/AUTO DIFF WBC: CPT | Performed by: EMERGENCY MEDICINE

## 2020-02-29 PROCEDURE — 83605 ASSAY OF LACTIC ACID: CPT | Performed by: EMERGENCY MEDICINE

## 2020-02-29 PROCEDURE — 85347 COAGULATION TIME ACTIVATED: CPT

## 2020-02-29 PROCEDURE — 85379 FIBRIN DEGRADATION QUANT: CPT | Performed by: EMERGENCY MEDICINE

## 2020-02-29 PROCEDURE — 85610 PROTHROMBIN TIME: CPT | Performed by: INTERNAL MEDICINE

## 2020-02-29 PROCEDURE — 82803 BLOOD GASES ANY COMBINATION: CPT

## 2020-02-29 PROCEDURE — 81001 URINALYSIS AUTO W/SCOPE: CPT | Performed by: EMERGENCY MEDICINE

## 2020-02-29 PROCEDURE — 25010000002 CEFEPIME PER 500 MG: Performed by: EMERGENCY MEDICINE

## 2020-02-29 PROCEDURE — 25010000002 FENTANYL CITRATE (PF) 100 MCG/2ML SOLUTION: Performed by: INTERNAL MEDICINE

## 2020-02-29 PROCEDURE — 85730 THROMBOPLASTIN TIME PARTIAL: CPT | Performed by: EMERGENCY MEDICINE

## 2020-02-29 PROCEDURE — 25010000002 VANCOMYCIN 10 G RECONSTITUTED SOLUTION: Performed by: EMERGENCY MEDICINE

## 2020-02-29 PROCEDURE — 36014 PLACE CATHETER IN ARTERY: CPT | Performed by: INTERNAL MEDICINE

## 2020-02-29 PROCEDURE — 25010000002 MIDAZOLAM PER 1 MG: Performed by: INTERNAL MEDICINE

## 2020-02-29 PROCEDURE — 0100U HC BIOFIRE FILMARRAY RESP PANEL 2: CPT | Performed by: EMERGENCY MEDICINE

## 2020-02-29 PROCEDURE — 94660 CPAP INITIATION&MGMT: CPT

## 2020-02-29 PROCEDURE — 71275 CT ANGIOGRAPHY CHEST: CPT

## 2020-02-29 PROCEDURE — 25010000002 ALTEPLASE PER 1 MG: Performed by: INTERNAL MEDICINE

## 2020-02-29 PROCEDURE — 76536 US EXAM OF HEAD AND NECK: CPT

## 2020-02-29 PROCEDURE — 0 IOPAMIDOL PER 1 ML: Performed by: INTERNAL MEDICINE

## 2020-02-29 PROCEDURE — 93306 TTE W/DOPPLER COMPLETE: CPT | Performed by: INTERNAL MEDICINE

## 2020-02-29 PROCEDURE — 93005 ELECTROCARDIOGRAM TRACING: CPT | Performed by: EMERGENCY MEDICINE

## 2020-02-29 PROCEDURE — 80053 COMPREHEN METABOLIC PANEL: CPT | Performed by: EMERGENCY MEDICINE

## 2020-02-29 PROCEDURE — 25010000002 HEPARIN (PORCINE) PER 1000 UNITS: Performed by: INTERNAL MEDICINE

## 2020-02-29 PROCEDURE — 99285 EMERGENCY DEPT VISIT HI MDM: CPT

## 2020-02-29 PROCEDURE — 83880 ASSAY OF NATRIURETIC PEPTIDE: CPT | Performed by: EMERGENCY MEDICINE

## 2020-02-29 PROCEDURE — 37211 THROMBOLYTIC ART THERAPY: CPT | Performed by: INTERNAL MEDICINE

## 2020-02-29 PROCEDURE — 93010 ELECTROCARDIOGRAM REPORT: CPT | Performed by: INTERNAL MEDICINE

## 2020-02-29 PROCEDURE — 93306 TTE W/DOPPLER COMPLETE: CPT

## 2020-02-29 PROCEDURE — 02HR33Z INSERTION OF INFUSION DEVICE INTO LEFT PULMONARY ARTERY, PERCUTANEOUS APPROACH: ICD-10-PCS | Performed by: INTERNAL MEDICINE

## 2020-02-29 PROCEDURE — 71045 X-RAY EXAM CHEST 1 VIEW: CPT

## 2020-02-29 PROCEDURE — 4A023N6 MEASUREMENT OF CARDIAC SAMPLING AND PRESSURE, RIGHT HEART, PERCUTANEOUS APPROACH: ICD-10-PCS | Performed by: INTERNAL MEDICINE

## 2020-02-29 RX ORDER — FENTANYL CITRATE 50 UG/ML
INJECTION, SOLUTION INTRAMUSCULAR; INTRAVENOUS AS NEEDED
Status: DISCONTINUED | OUTPATIENT
Start: 2020-02-29 | End: 2020-02-29 | Stop reason: HOSPADM

## 2020-02-29 RX ORDER — IPRATROPIUM BROMIDE AND ALBUTEROL SULFATE 2.5; .5 MG/3ML; MG/3ML
3 SOLUTION RESPIRATORY (INHALATION)
Status: DISCONTINUED | OUTPATIENT
Start: 2020-02-29 | End: 2020-03-20 | Stop reason: HOSPADM

## 2020-02-29 RX ORDER — SACCHAROMYCES BOULARDII 250 MG
250 CAPSULE ORAL 2 TIMES DAILY
Status: DISCONTINUED | OUTPATIENT
Start: 2020-02-29 | End: 2020-03-20 | Stop reason: HOSPADM

## 2020-02-29 RX ORDER — AMOXICILLIN AND CLAVULANATE POTASSIUM 875; 125 MG/1; MG/1
1 TABLET, FILM COATED ORAL 2 TIMES DAILY
COMMUNITY
End: 2020-03-20 | Stop reason: HOSPADM

## 2020-02-29 RX ORDER — HEPARIN SODIUM 5000 [USP'U]/ML
80 INJECTION, SOLUTION INTRAVENOUS; SUBCUTANEOUS ONCE
Status: COMPLETED | OUTPATIENT
Start: 2020-02-29 | End: 2020-02-29

## 2020-02-29 RX ORDER — HEPARIN SODIUM 10000 [USP'U]/100ML
400 INJECTION, SOLUTION INTRAVENOUS
Status: DISCONTINUED | OUTPATIENT
Start: 2020-02-29 | End: 2020-03-01

## 2020-02-29 RX ORDER — BUDESONIDE 0.5 MG/2ML
0.5 INHALANT ORAL
COMMUNITY
End: 2021-06-02

## 2020-02-29 RX ORDER — HEPARIN SODIUM 1000 [USP'U]/ML
INJECTION, SOLUTION INTRAVENOUS; SUBCUTANEOUS AS NEEDED
Status: DISCONTINUED | OUTPATIENT
Start: 2020-02-29 | End: 2020-02-29 | Stop reason: HOSPADM

## 2020-02-29 RX ORDER — BUDESONIDE 0.5 MG/2ML
0.5 INHALANT ORAL
Status: DISCONTINUED | OUTPATIENT
Start: 2020-02-29 | End: 2020-03-20 | Stop reason: HOSPADM

## 2020-02-29 RX ORDER — HEPARIN SODIUM 5000 [USP'U]/ML
40-80 INJECTION, SOLUTION INTRAVENOUS; SUBCUTANEOUS EVERY 6 HOURS PRN
Status: DISCONTINUED | OUTPATIENT
Start: 2020-02-29 | End: 2020-02-29

## 2020-02-29 RX ORDER — SODIUM CHLORIDE 9 MG/ML
INJECTION, SOLUTION INTRAVENOUS CONTINUOUS PRN
Status: COMPLETED | OUTPATIENT
Start: 2020-02-29 | End: 2020-02-29

## 2020-02-29 RX ORDER — SODIUM CHLORIDE 0.9 % (FLUSH) 0.9 %
10 SYRINGE (ML) INJECTION AS NEEDED
Status: DISCONTINUED | OUTPATIENT
Start: 2020-02-29 | End: 2020-03-20 | Stop reason: HOSPADM

## 2020-02-29 RX ORDER — IPRATROPIUM BROMIDE AND ALBUTEROL SULFATE 2.5; .5 MG/3ML; MG/3ML
3 SOLUTION RESPIRATORY (INHALATION)
COMMUNITY
End: 2021-06-02

## 2020-02-29 RX ORDER — SODIUM CHLORIDE 9 MG/ML
125 INJECTION, SOLUTION INTRAVENOUS CONTINUOUS
Status: DISCONTINUED | OUTPATIENT
Start: 2020-02-29 | End: 2020-03-01

## 2020-02-29 RX ORDER — GUAIFENESIN 600 MG/1
1200 TABLET, EXTENDED RELEASE ORAL 2 TIMES DAILY
COMMUNITY
End: 2020-03-20 | Stop reason: HOSPADM

## 2020-02-29 RX ORDER — ASPIRIN 81 MG/1
81 TABLET ORAL DAILY
Status: DISCONTINUED | OUTPATIENT
Start: 2020-02-29 | End: 2020-03-03

## 2020-02-29 RX ORDER — HEPARIN SODIUM 10000 [USP'U]/100ML
200 INJECTION, SOLUTION INTRAVENOUS CONTINUOUS
Status: DISCONTINUED | OUTPATIENT
Start: 2020-02-29 | End: 2020-03-01

## 2020-02-29 RX ORDER — DOXYCYCLINE HYCLATE 100 MG
100 TABLET ORAL 2 TIMES DAILY
COMMUNITY
End: 2020-03-20 | Stop reason: HOSPADM

## 2020-02-29 RX ORDER — LIDOCAINE HYDROCHLORIDE 20 MG/ML
INJECTION, SOLUTION INFILTRATION; PERINEURAL AS NEEDED
Status: DISCONTINUED | OUTPATIENT
Start: 2020-02-29 | End: 2020-02-29 | Stop reason: HOSPADM

## 2020-02-29 RX ORDER — MIDAZOLAM HYDROCHLORIDE 1 MG/ML
INJECTION INTRAMUSCULAR; INTRAVENOUS AS NEEDED
Status: DISCONTINUED | OUTPATIENT
Start: 2020-02-29 | End: 2020-02-29 | Stop reason: HOSPADM

## 2020-02-29 RX ORDER — SODIUM CHLORIDE 0.9 % (FLUSH) 0.9 %
10 SYRINGE (ML) INJECTION EVERY 12 HOURS SCHEDULED
Status: DISCONTINUED | OUTPATIENT
Start: 2020-02-29 | End: 2020-03-20 | Stop reason: HOSPADM

## 2020-02-29 RX ORDER — HEPARIN SODIUM 10000 [USP'U]/100ML
18 INJECTION, SOLUTION INTRAVENOUS
Status: DISCONTINUED | OUTPATIENT
Start: 2020-02-29 | End: 2020-02-29

## 2020-02-29 RX ORDER — ACETAMINOPHEN 650 MG/1
650 SUPPOSITORY RECTAL ONCE
Status: COMPLETED | OUTPATIENT
Start: 2020-02-29 | End: 2020-02-29

## 2020-02-29 RX ADMIN — SODIUM CHLORIDE, PRESERVATIVE FREE 10 ML: 5 INJECTION INTRAVENOUS at 18:47

## 2020-02-29 RX ADMIN — SODIUM CHLORIDE 125 ML/HR: 9 INJECTION, SOLUTION INTRAVENOUS at 08:25

## 2020-02-29 RX ADMIN — HEPARIN SODIUM 6500 UNITS: 5000 INJECTION INTRAVENOUS; SUBCUTANEOUS at 14:19

## 2020-02-29 RX ADMIN — SODIUM CHLORIDE 500 ML: 9 INJECTION, SOLUTION INTRAVENOUS at 09:39

## 2020-02-29 RX ADMIN — HEPARIN SODIUM 18 UNITS/KG/HR: 10000 INJECTION, SOLUTION INTRAVENOUS at 14:22

## 2020-02-29 RX ADMIN — SODIUM CHLORIDE, PRESERVATIVE FREE 10 ML: 5 INJECTION INTRAVENOUS at 20:19

## 2020-02-29 RX ADMIN — IPRATROPIUM BROMIDE AND ALBUTEROL SULFATE 3 ML: 2.5; .5 SOLUTION RESPIRATORY (INHALATION) at 15:43

## 2020-02-29 RX ADMIN — ALTEPLASE 0.5 MG/HR: KIT at 17:16

## 2020-02-29 RX ADMIN — ALTEPLASE 0.5 MG/HR: KIT at 17:25

## 2020-02-29 RX ADMIN — CEFEPIME HYDROCHLORIDE 2 G: 2 INJECTION, POWDER, FOR SOLUTION INTRAVENOUS at 08:30

## 2020-02-29 RX ADMIN — SODIUM CHLORIDE 125 ML/HR: 9 INJECTION, SOLUTION INTRAVENOUS at 14:26

## 2020-02-29 RX ADMIN — VANCOMYCIN HYDROCHLORIDE 2000 MG: 10 INJECTION, POWDER, LYOPHILIZED, FOR SOLUTION INTRAVENOUS at 08:22

## 2020-02-29 RX ADMIN — SODIUM CHLORIDE 125 ML/HR: 9 INJECTION, SOLUTION INTRAVENOUS at 09:44

## 2020-02-29 RX ADMIN — BUDESONIDE 0.5 MG: 0.5 INHALANT RESPIRATORY (INHALATION) at 15:50

## 2020-02-29 RX ADMIN — IOPAMIDOL 95 ML: 755 INJECTION, SOLUTION INTRAVENOUS at 12:02

## 2020-02-29 RX ADMIN — ACETAMINOPHEN 650 MG: 650 SUPPOSITORY RECTAL at 08:34

## 2020-02-29 RX ADMIN — SODIUM CHLORIDE 125 ML/HR: 9 INJECTION, SOLUTION INTRAVENOUS at 22:25

## 2020-03-01 ENCOUNTER — APPOINTMENT (OUTPATIENT)
Dept: CARDIOLOGY | Facility: HOSPITAL | Age: 85
End: 2020-03-01

## 2020-03-01 LAB
ALBUMIN SERPL-MCNC: 2.3 G/DL (ref 3.5–5.2)
ANION GAP SERPL CALCULATED.3IONS-SCNC: 14.5 MMOL/L (ref 5–15)
APTT PPP: 30.3 SECONDS (ref 22.7–35.4)
APTT PPP: 31.1 SECONDS (ref 22.7–35.4)
APTT PPP: 35.7 SECONDS (ref 22.7–35.4)
APTT PPP: 64 SECONDS (ref 22.7–35.4)
BASOPHILS # BLD MANUAL: 0.2 10*3/MM3 (ref 0–0.2)
BASOPHILS # BLD MANUAL: 0.21 10*3/MM3 (ref 0–0.2)
BASOPHILS NFR BLD AUTO: 1 % (ref 0–1.5)
BASOPHILS NFR BLD AUTO: 1 % (ref 0–1.5)
BH CV LOW VAS LEFT EXTERNAL ILIAC AUGMENT: NORMAL
BH CV LOW VAS LEFT EXTERNAL ILIAC COMPRESS: NORMAL
BH CV LOW VAS LEFT GASTRONEMIUS VESSEL: 1
BH CV LOW VAS RIGHT COMMON FEMORAL SPONT: 1
BH CV LOW VAS RIGHT EXTERNAL ILIAC SPONT: 1
BH CV LOW VAS RIGHT GASTRONEMIUS VESSEL: 1
BH CV LOWER VASCULAR LEFT COMMON FEMORAL AUGMENT: NORMAL
BH CV LOWER VASCULAR LEFT COMMON FEMORAL COMPETENT: NORMAL
BH CV LOWER VASCULAR LEFT COMMON FEMORAL COMPRESS: NORMAL
BH CV LOWER VASCULAR LEFT COMMON FEMORAL PHASIC: NORMAL
BH CV LOWER VASCULAR LEFT COMMON FEMORAL SPONT: NORMAL
BH CV LOWER VASCULAR LEFT DISTAL FEMORAL COMPRESS: NORMAL
BH CV LOWER VASCULAR LEFT EXTERNAL ILIAC COMPETENT: NORMAL
BH CV LOWER VASCULAR LEFT EXTERNAL ILIAC PHASIC: NORMAL
BH CV LOWER VASCULAR LEFT EXTERNAL ILIAC SPONT: NORMAL
BH CV LOWER VASCULAR LEFT GASTRONEMIUS COMPRESS: NORMAL
BH CV LOWER VASCULAR LEFT GASTRONEMIUS THROMBUS: NORMAL
BH CV LOWER VASCULAR LEFT GREATER SAPH AK COMPRESS: NORMAL
BH CV LOWER VASCULAR LEFT GREATER SAPH BK COMPRESS: NORMAL
BH CV LOWER VASCULAR LEFT MID FEMORAL AUGMENT: NORMAL
BH CV LOWER VASCULAR LEFT MID FEMORAL COMPETENT: NORMAL
BH CV LOWER VASCULAR LEFT MID FEMORAL COMPRESS: NORMAL
BH CV LOWER VASCULAR LEFT MID FEMORAL PHASIC: NORMAL
BH CV LOWER VASCULAR LEFT MID FEMORAL SPONT: NORMAL
BH CV LOWER VASCULAR LEFT PERONEAL COMPRESS: NORMAL
BH CV LOWER VASCULAR LEFT POPLITEAL AUGMENT: NORMAL
BH CV LOWER VASCULAR LEFT POPLITEAL COMPETENT: NORMAL
BH CV LOWER VASCULAR LEFT POPLITEAL COMPRESS: NORMAL
BH CV LOWER VASCULAR LEFT POPLITEAL PHASIC: NORMAL
BH CV LOWER VASCULAR LEFT POPLITEAL SPONT: NORMAL
BH CV LOWER VASCULAR LEFT POSTERIOR TIBIAL COMPRESS: NORMAL
BH CV LOWER VASCULAR LEFT PROFUNDA FEMORAL COMPRESS: NORMAL
BH CV LOWER VASCULAR LEFT PROXIMAL FEMORAL COMPRESS: NORMAL
BH CV LOWER VASCULAR LEFT SAPHENOFEMORAL JUNCTION COMPRESS: NORMAL
BH CV LOWER VASCULAR RIGHT COMMON FEMORAL AUGMENT: NORMAL
BH CV LOWER VASCULAR RIGHT COMMON FEMORAL COMPETENT: NORMAL
BH CV LOWER VASCULAR RIGHT COMMON FEMORAL COMPRESS: NORMAL
BH CV LOWER VASCULAR RIGHT COMMON FEMORAL PHASIC: NORMAL
BH CV LOWER VASCULAR RIGHT COMMON FEMORAL SPONT: NORMAL
BH CV LOWER VASCULAR RIGHT COMMON FEMORAL THROMBUS: NORMAL
BH CV LOWER VASCULAR RIGHT DISTAL FEMORAL COMPRESS: NORMAL
BH CV LOWER VASCULAR RIGHT EXTERNAL ILIAC AUGMENT: NORMAL
BH CV LOWER VASCULAR RIGHT EXTERNAL ILIAC COMPETENT: NORMAL
BH CV LOWER VASCULAR RIGHT EXTERNAL ILIAC COMPRESS: NORMAL
BH CV LOWER VASCULAR RIGHT EXTERNAL ILIAC PHASIC: NORMAL
BH CV LOWER VASCULAR RIGHT EXTERNAL ILIAC SPONT: NORMAL
BH CV LOWER VASCULAR RIGHT EXTERNAL ILIAC THROMBUS: NORMAL
BH CV LOWER VASCULAR RIGHT GASTRONEMIUS COMPRESS: NORMAL
BH CV LOWER VASCULAR RIGHT GASTRONEMIUS THROMBUS: NORMAL
BH CV LOWER VASCULAR RIGHT GREATER SAPH AK COMPRESS: NORMAL
BH CV LOWER VASCULAR RIGHT GREATER SAPH BK COMPRESS: NORMAL
BH CV LOWER VASCULAR RIGHT MID FEMORAL AUGMENT: NORMAL
BH CV LOWER VASCULAR RIGHT MID FEMORAL COMPETENT: NORMAL
BH CV LOWER VASCULAR RIGHT MID FEMORAL COMPRESS: NORMAL
BH CV LOWER VASCULAR RIGHT MID FEMORAL PHASIC: NORMAL
BH CV LOWER VASCULAR RIGHT MID FEMORAL SPONT: NORMAL
BH CV LOWER VASCULAR RIGHT PERONEAL COMPRESS: NORMAL
BH CV LOWER VASCULAR RIGHT POPLITEAL AUGMENT: NORMAL
BH CV LOWER VASCULAR RIGHT POPLITEAL COMPETENT: NORMAL
BH CV LOWER VASCULAR RIGHT POPLITEAL COMPRESS: NORMAL
BH CV LOWER VASCULAR RIGHT POPLITEAL PHASIC: NORMAL
BH CV LOWER VASCULAR RIGHT POPLITEAL SPONT: NORMAL
BH CV LOWER VASCULAR RIGHT POSTERIOR TIBIAL COMPRESS: NORMAL
BH CV LOWER VASCULAR RIGHT PROFUNDA FEMORAL COMPRESS: NORMAL
BH CV LOWER VASCULAR RIGHT PROXIMAL FEMORAL COMPRESS: NORMAL
BH CV LOWER VASCULAR RIGHT SAPHENOFEMORAL JUNCTION COMPRESS: NORMAL
BUN BLD-MCNC: 26 MG/DL (ref 8–23)
BUN/CREAT SERPL: 31.7 (ref 7–25)
CALCIUM SPEC-SCNC: 7.6 MG/DL (ref 8.6–10.5)
CHLORIDE SERPL-SCNC: 104 MMOL/L (ref 98–107)
CO2 SERPL-SCNC: 25.5 MMOL/L (ref 22–29)
CREAT BLD-MCNC: 0.82 MG/DL (ref 0.76–1.27)
DEPRECATED RDW RBC AUTO: 36.6 FL (ref 37–54)
DEPRECATED RDW RBC AUTO: 36.7 FL (ref 37–54)
DEPRECATED RDW RBC AUTO: 37.9 FL (ref 37–54)
DEPRECATED RDW RBC AUTO: 38.2 FL (ref 37–54)
ERYTHROCYTE [DISTWIDTH] IN BLOOD BY AUTOMATED COUNT: 12.1 % (ref 12.3–15.4)
ERYTHROCYTE [DISTWIDTH] IN BLOOD BY AUTOMATED COUNT: 12.2 % (ref 12.3–15.4)
ERYTHROCYTE [DISTWIDTH] IN BLOOD BY AUTOMATED COUNT: 12.2 % (ref 12.3–15.4)
ERYTHROCYTE [DISTWIDTH] IN BLOOD BY AUTOMATED COUNT: 12.3 % (ref 12.3–15.4)
GFR SERPL CREATININE-BSD FRML MDRD: 89 ML/MIN/1.73
GLUCOSE BLD-MCNC: 145 MG/DL (ref 65–99)
GLUCOSE BLDC GLUCOMTR-MCNC: 150 MG/DL (ref 70–130)
GLUCOSE BLDC GLUCOMTR-MCNC: 155 MG/DL (ref 70–130)
HCT VFR BLD AUTO: 29.9 % (ref 37.5–51)
HCT VFR BLD AUTO: 31 % (ref 37.5–51)
HCT VFR BLD AUTO: 31.2 % (ref 37.5–51)
HCT VFR BLD AUTO: 32 % (ref 37.5–51)
HGB BLD-MCNC: 10 G/DL (ref 13–17.7)
HGB BLD-MCNC: 10.1 G/DL (ref 13–17.7)
HGB BLD-MCNC: 10.2 G/DL (ref 13–17.7)
HGB BLD-MCNC: 10.5 G/DL (ref 13–17.7)
INR PPP: 1.34 (ref 0.9–1.1)
INR PPP: 1.35 (ref 0.9–1.1)
INR PPP: 1.36 (ref 0.9–1.1)
INR PPP: 1.36 (ref 0.9–1.1)
LYMPHOCYTES # BLD MANUAL: 1 10*3/MM3 (ref 0.7–3.1)
LYMPHOCYTES # BLD MANUAL: 1.79 10*3/MM3 (ref 0.7–3.1)
LYMPHOCYTES # BLD MANUAL: 1.99 10*3/MM3 (ref 0.7–3.1)
LYMPHOCYTES # BLD MANUAL: 2.68 10*3/MM3 (ref 0.7–3.1)
LYMPHOCYTES NFR BLD MANUAL: 1 % (ref 5–12)
LYMPHOCYTES NFR BLD MANUAL: 10 % (ref 19.6–45.3)
LYMPHOCYTES NFR BLD MANUAL: 13 % (ref 19.6–45.3)
LYMPHOCYTES NFR BLD MANUAL: 4 % (ref 5–12)
LYMPHOCYTES NFR BLD MANUAL: 5 % (ref 19.6–45.3)
LYMPHOCYTES NFR BLD MANUAL: 5 % (ref 5–12)
LYMPHOCYTES NFR BLD MANUAL: 5 % (ref 5–12)
LYMPHOCYTES NFR BLD MANUAL: 9 % (ref 19.6–45.3)
MCH RBC QN AUTO: 27.7 PG (ref 26.6–33)
MCH RBC QN AUTO: 27.9 PG (ref 26.6–33)
MCHC RBC AUTO-ENTMCNC: 32.4 G/DL (ref 31.5–35.7)
MCHC RBC AUTO-ENTMCNC: 32.8 G/DL (ref 31.5–35.7)
MCHC RBC AUTO-ENTMCNC: 32.9 G/DL (ref 31.5–35.7)
MCHC RBC AUTO-ENTMCNC: 33.4 G/DL (ref 31.5–35.7)
MCV RBC AUTO: 83.3 FL (ref 79–97)
MCV RBC AUTO: 84.7 FL (ref 79–97)
MCV RBC AUTO: 84.9 FL (ref 79–97)
MCV RBC AUTO: 85.7 FL (ref 79–97)
METAMYELOCYTES NFR BLD MANUAL: 1 % (ref 0–0)
MONOCYTES # BLD AUTO: 0.2 10*3/MM3 (ref 0.1–0.9)
MONOCYTES # BLD AUTO: 0.8 10*3/MM3 (ref 0.1–0.9)
MONOCYTES # BLD AUTO: 1 10*3/MM3 (ref 0.1–0.9)
MONOCYTES # BLD AUTO: 1.03 10*3/MM3 (ref 0.1–0.9)
NEUTROPHILS # BLD AUTO: 16.73 10*3/MM3 (ref 1.7–7)
NEUTROPHILS # BLD AUTO: 16.92 10*3/MM3 (ref 1.7–7)
NEUTROPHILS # BLD AUTO: 17.51 10*3/MM3 (ref 1.7–7)
NEUTROPHILS # BLD AUTO: 18.15 10*3/MM3 (ref 1.7–7)
NEUTROPHILS NFR BLD MANUAL: 81 % (ref 42.7–76)
NEUTROPHILS NFR BLD MANUAL: 85 % (ref 42.7–76)
NEUTROPHILS NFR BLD MANUAL: 88 % (ref 42.7–76)
NEUTROPHILS NFR BLD MANUAL: 91 % (ref 42.7–76)
PHOSPHATE SERPL-MCNC: 3.5 MG/DL (ref 2.5–4.5)
PLAT MORPH BLD: NORMAL
PLATELET # BLD AUTO: 358 10*3/MM3 (ref 140–450)
PLATELET # BLD AUTO: 367 10*3/MM3 (ref 140–450)
PLATELET # BLD AUTO: 378 10*3/MM3 (ref 140–450)
PLATELET # BLD AUTO: 398 10*3/MM3 (ref 140–450)
PMV BLD AUTO: 10.2 FL (ref 6–12)
PMV BLD AUTO: 10.5 FL (ref 6–12)
POTASSIUM BLD-SCNC: 3.1 MMOL/L (ref 3.5–5.2)
PROTHROMBIN TIME: 16.3 SECONDS (ref 11.7–14.2)
PROTHROMBIN TIME: 16.3 SECONDS (ref 11.7–14.2)
PROTHROMBIN TIME: 16.4 SECONDS (ref 11.7–14.2)
PROTHROMBIN TIME: 16.5 SECONDS (ref 11.7–14.2)
RBC # BLD AUTO: 3.59 10*6/MM3 (ref 4.14–5.8)
RBC # BLD AUTO: 3.64 10*6/MM3 (ref 4.14–5.8)
RBC # BLD AUTO: 3.66 10*6/MM3 (ref 4.14–5.8)
RBC # BLD AUTO: 3.77 10*6/MM3 (ref 4.14–5.8)
RBC MORPH BLD: NORMAL
SODIUM BLD-SCNC: 144 MMOL/L (ref 136–145)
TROPONIN T SERPL-MCNC: 0.1 NG/ML (ref 0–0.03)
WBC MORPH BLD: NORMAL
WBC NRBC COR # BLD: 19.9 10*3/MM3 (ref 3.4–10.8)
WBC NRBC COR # BLD: 19.91 10*3/MM3 (ref 3.4–10.8)
WBC NRBC COR # BLD: 19.95 10*3/MM3 (ref 3.4–10.8)
WBC NRBC COR # BLD: 20.65 10*3/MM3 (ref 3.4–10.8)

## 2020-03-01 PROCEDURE — 85730 THROMBOPLASTIN TIME PARTIAL: CPT | Performed by: INTERNAL MEDICINE

## 2020-03-01 PROCEDURE — 80069 RENAL FUNCTION PANEL: CPT | Performed by: INTERNAL MEDICINE

## 2020-03-01 PROCEDURE — 25010000002 FUROSEMIDE PER 20 MG: Performed by: INTERNAL MEDICINE

## 2020-03-01 PROCEDURE — 85025 COMPLETE CBC W/AUTO DIFF WBC: CPT | Performed by: INTERNAL MEDICINE

## 2020-03-01 PROCEDURE — 85007 BL SMEAR W/DIFF WBC COUNT: CPT | Performed by: INTERNAL MEDICINE

## 2020-03-01 PROCEDURE — 99232 SBSQ HOSP IP/OBS MODERATE 35: CPT | Performed by: INTERNAL MEDICINE

## 2020-03-01 PROCEDURE — 94799 UNLISTED PULMONARY SVC/PX: CPT

## 2020-03-01 PROCEDURE — 85610 PROTHROMBIN TIME: CPT | Performed by: INTERNAL MEDICINE

## 2020-03-01 PROCEDURE — 82962 GLUCOSE BLOOD TEST: CPT

## 2020-03-01 PROCEDURE — 84484 ASSAY OF TROPONIN QUANT: CPT | Performed by: INTERNAL MEDICINE

## 2020-03-01 PROCEDURE — 93970 EXTREMITY STUDY: CPT

## 2020-03-01 RX ORDER — POTASSIUM CHLORIDE 1.5 G/1.77G
40 POWDER, FOR SOLUTION ORAL AS NEEDED
Status: DISCONTINUED | OUTPATIENT
Start: 2020-03-01 | End: 2020-03-20 | Stop reason: HOSPADM

## 2020-03-01 RX ORDER — HEPARIN SODIUM 5000 [USP'U]/ML
40-80 INJECTION, SOLUTION INTRAVENOUS; SUBCUTANEOUS EVERY 6 HOURS PRN
Status: DISCONTINUED | OUTPATIENT
Start: 2020-03-01 | End: 2020-03-03

## 2020-03-01 RX ORDER — HEPARIN SODIUM 10000 [USP'U]/100ML
18 INJECTION, SOLUTION INTRAVENOUS
Status: DISCONTINUED | OUTPATIENT
Start: 2020-03-01 | End: 2020-03-03

## 2020-03-01 RX ORDER — FUROSEMIDE 10 MG/ML
20 INJECTION INTRAMUSCULAR; INTRAVENOUS ONCE
Status: COMPLETED | OUTPATIENT
Start: 2020-03-01 | End: 2020-03-01

## 2020-03-01 RX ORDER — POTASSIUM CHLORIDE 7.45 MG/ML
10 INJECTION INTRAVENOUS
Status: DISCONTINUED | OUTPATIENT
Start: 2020-03-01 | End: 2020-03-20 | Stop reason: HOSPADM

## 2020-03-01 RX ORDER — POTASSIUM CHLORIDE 750 MG/1
40 CAPSULE, EXTENDED RELEASE ORAL AS NEEDED
Status: DISCONTINUED | OUTPATIENT
Start: 2020-03-01 | End: 2020-03-20 | Stop reason: HOSPADM

## 2020-03-01 RX ADMIN — BUDESONIDE 0.5 MG: 0.5 INHALANT RESPIRATORY (INHALATION) at 08:00

## 2020-03-01 RX ADMIN — POTASSIUM CHLORIDE 40 MEQ: 750 CAPSULE, EXTENDED RELEASE ORAL at 20:15

## 2020-03-01 RX ADMIN — IPRATROPIUM BROMIDE AND ALBUTEROL SULFATE 3 ML: 2.5; .5 SOLUTION RESPIRATORY (INHALATION) at 08:00

## 2020-03-01 RX ADMIN — Medication 250 MG: at 20:15

## 2020-03-01 RX ADMIN — POTASSIUM CHLORIDE 40 MEQ: 750 CAPSULE, EXTENDED RELEASE ORAL at 15:23

## 2020-03-01 RX ADMIN — CARBIDOPA AND LEVODOPA 1 TABLET: 25; 100 TABLET ORAL at 10:13

## 2020-03-01 RX ADMIN — FUROSEMIDE 20 MG: 10 INJECTION, SOLUTION INTRAMUSCULAR; INTRAVENOUS at 11:53

## 2020-03-01 RX ADMIN — BUDESONIDE 0.5 MG: 0.5 INHALANT RESPIRATORY (INHALATION) at 20:35

## 2020-03-01 RX ADMIN — CARBIDOPA AND LEVODOPA 1 TABLET: 25; 100 TABLET ORAL at 20:15

## 2020-03-01 RX ADMIN — SODIUM CHLORIDE, PRESERVATIVE FREE 10 ML: 5 INJECTION INTRAVENOUS at 20:17

## 2020-03-01 RX ADMIN — IPRATROPIUM BROMIDE AND ALBUTEROL SULFATE 3 ML: 2.5; .5 SOLUTION RESPIRATORY (INHALATION) at 20:35

## 2020-03-01 RX ADMIN — SODIUM CHLORIDE 125 ML/HR: 9 INJECTION, SOLUTION INTRAVENOUS at 06:29

## 2020-03-01 RX ADMIN — ASPIRIN 81 MG: 81 TABLET, COATED ORAL at 10:13

## 2020-03-01 RX ADMIN — POTASSIUM CHLORIDE 40 MEQ: 750 CAPSULE, EXTENDED RELEASE ORAL at 10:13

## 2020-03-01 RX ADMIN — Medication 250 MG: at 10:13

## 2020-03-01 RX ADMIN — SODIUM CHLORIDE, PRESERVATIVE FREE 10 ML: 5 INJECTION INTRAVENOUS at 10:13

## 2020-03-01 NOTE — PROGRESS NOTES
"  Daily Progress Note.   [facility] CORONARY CARE  3/1/2020    Patient:  Name:  Izaiah Garcia  MRN:  [id number]  8/31/1931  88 y.o.  male         CC: Short of breath hypoxia     History of Present Illness:  Patient is an 88-year-old male who was recently discharged from the hospital after stay treated for pneumonia.  I reviewed his discharge summary from the 22nd.  He presented to the emergency room today from [place] after he was found to be hypoxic despite his 6 L nasal cannula.  Oxygen increased to 15 L and he was brought in by emergency medical services.  He is little bit more short of breath ongoing for the past few days however this acutely changed and worsened earlier today.  He was being given IV antibiotics at the nursing home.  He had a PICC line for this.     Patient denies chest pain hemoptysis.  Denies fever or chills.  No rigors.  Denies any emesis difficulty swallowing pain sputum production or diarrhea.  Does feel generalized weakness really has not gotten much strength since hospital discharge.     History somewhat limited by use of BIPAP.    Interval History:  Feeling much better after intervention, ekos placed yesterday - bilateral.  No chest pain.  Up in bed eating a banana.  He is hard of hearing however denies worsening shortness of breath feels much better than arrival no pleuritic chest pain no chest pressure no hemoptysis.  Tolerated procedure well.  Still on high flow (was dced last visit on 5-6LNC)      ROS: No fever, no diarrhea, no chest pain  PMFSSH: no change    Physical Exam:  /74   Pulse 82   Temp 98.5 °F (36.9 °C) (Oral)   Resp 18   Ht 177.8 cm (70\")   Wt 92 kg (202 lb 13.2 oz)   SpO2 91%   BMI 29.10 kg/m²   Body mass index is 29.1 kg/m².    Intake/Output Summary (Last 24 hours) at 3/1/2020 0816  Last data filed at 3/1/2020 0545  Gross per 24 hour   Intake 4016 ml   Output 400 ml   Net 3616 ml     General appearance:non toxic awake alert conversant "   Eyes: anicteric sclerae, moist conjunctivae; no lid-lag; PERRLA  HENT: cant palpate any mass, no lad, mmm  Neck:trachela midline, supple  Lungs: CTAB without wheeze or rhonchi, diminished at bilateral base, with normal respiratory effort and nointercostal retractions  CV: RRR, no rub  Abdomen: Soft, non-tender; no masses or HSM  Extremities: +pitting LE peripheral edema left > right or extremity lymphadenopathy  Skin: Normal temperature, turgor and texture; no rash, ulcers or subcutaneous nodules  Psych: Appropriate affect, alert and oriented to person, place and time  Neuro: noteable [place], CNs 2-12 intact sensation intact moves ext  Data Review:  Notable Labs:  Results from last 7 days   Lab Units 03/01/20  0356 03/01/20  0009 02/29/20  1319 02/29/20  0808   WBC 10*3/mm3 20.65* 19.90* 20.88* 15.93*   HEMOGLOBIN g/dL 10.0* 10.2* 10.9* 16.4   PLATELETS 10*3/mm3 367 358 381 291     Results from last 7 days   Lab Units 03/01/20  0356 02/29/20  0808   SODIUM mmol/L 144 138   POTASSIUM mmol/L 3.1* 3.0*   CHLORIDE mmol/L 104 96*   CO2 mmol/L 25.5 27.2   BUN mg/dL 26* 20   CREATININE mg/dL 0.82 0.91   GLUCOSE mg/dL 145* 177*   CALCIUM mg/dL 7.6* 8.3*   PHOSPHORUS mg/dL 3.5  --    Estimated Creatinine Clearance: 71 mL/min (by C-G formula based on SCr of 0.82 mg/dL).    Imaging:  Reviewed chest images personally from past 3 days  Thyroid us neg    Scheduled meds:      aspirin 81 mg Oral Daily   budesonide 0.5 mg Nebulization BID - RT   carbidopa-levodopa 1 tablet Oral BID   ipratropium-albuterol 3 mL Nebulization BID - RT   saccharomyces boulardii 250 mg Oral BID   sodium chloride 10 mL Intravenous Q12H       ASSESSMENT  /  PLAN:  Acute hypoxic resp failure  Management of NIV  BPE s/p ekos catheter on 2/29  Troponemia suspect due to BPE  RV strain  HLD  Esphageal stricture  Gen weakness  Parkinsons Disease  Vit D def  RF factor positive  HTN  Abnormal mass around thyroid   Bilateral pleural effusion R greater than  Left  hypokalemia    S/p B ekos with tpa infusion locally with good clinical response  D/w Dr Katz  Will see if can wean down oxygen needs, if not may have to look at brief hold of heparin for thoracentesis tomorrow pending on cxr in AM.    Ill try a  20mgh IV dose of lasix today to see if some diuresis can have some therapeutic effect monitoring his hemodynamics closely.  Hold any further IVFs - were given appropriately to promote dye excretion post procedure.    Le duplex pending    Thyroid us normal.    Chetan Eugene MD  [facility]  03/01/20  1047am

## 2020-03-01 NOTE — PLAN OF CARE
Problem: Patient Care Overview  Goal: Plan of Care Review  Outcome: Ongoing (interventions implemented as appropriate)  Flowsheets  Taken 3/1/2020 0453  Progress: improving  Outcome Summary: Pt remains on CCU. Stayed on Bipap until around 0330, and went to 15L High flow NC, maintaining in the upper 80's-low 90's. Alteplase treatment completed at 0325. Heparin ordered changed from 400 units/hr to VTE prophylaxis and weight based protocol, started at 18 units/kg/hr. Pt slightly disoriented, but comes back around very quickly. Other VSS. CTM.  Taken 3/1/2020 0400  Plan of Care Reviewed With: patient

## 2020-03-01 NOTE — PROGRESS NOTES
"Patient Care Team:  Uriah Godinez MD as PCP - General (Internal Medicine)    Chief Complaint: Follow-up bilateral pulmonary emboli with acute cor pulmonale    Interval History:   Patient states he is not short of breath, however still with significant oxygen requirements.  No chest pain.    Objective   Vital Signs  Temp:  [98.2 °F (36.8 °C)-99 °F (37.2 °C)] 98.5 °F (36.9 °C)  Heart Rate:  [80-95] 82  Resp:  [18-35] 18  BP: (133-158)/() 143/74  FiO2 (%):  [100 %] 100 %    Intake/Output Summary (Last 24 hours) at 3/1/2020 0817  Last data filed at 3/1/2020 0545  Gross per 24 hour   Intake 4016 ml   Output 400 ml   Net 3616 ml     Flowsheet Rows      First Filed Value   Admission Height  177.8 cm (70\") Documented at 02/29/2020 0800   Admission Weight  95 kg (209 lb 7 oz) Documented at 02/29/2020 0800          General Appearance:    Alert, cooperative, in no acute distress.  Elderly.   Head:    Normocephalic, without obvious abnormality, atraumatic       Neck:   No adenopathy, supple, no thyromegaly, no carotid bruit, no    JVD   Lungs:    Decreased breath sounds bilateral bases.    Heart:    Normal rate, regular rhythm, no murmur, no rub, no gallop   Chest Wall:    No abnormalities observed   Abdomen:     Normal bowel sounds, soft, nontender, nondistended,            no rebound tenderness   Extremities:   No cyanosis, clubbing, or edema   Pulses:   Pulses palpable and equal bilaterally   Skin:   No bleeding or rash               aspirin 81 mg Oral Daily   budesonide 0.5 mg Nebulization BID - RT   carbidopa-levodopa 1 tablet Oral BID   ipratropium-albuterol 3 mL Nebulization BID - RT   saccharomyces boulardii 250 mg Oral BID   sodium chloride 10 mL Intravenous Q12H         alteplase (ACTIVASE) 10 mg in 0.9% NaCl 250 mL- Cathflow for Angiography 0.5 mg/hr Last Rate: Stopped (03/01/20 0334)   alteplase (ACTIVASE) 10 mg in 0.9% NaCl 250 mL- Cathflow for Angiography 0.5 mg/hr Last Rate: Stopped (03/01/20 0334) "   heparin (porcine) 18 Units/kg/hr Last Rate: 18 Units/kg/hr (03/01/20 0338)   sodium chloride 125 mL/hr Last Rate: 125 mL/hr (02/29/20 2225)   sodium chloride 125 mL/hr Last Rate: 125 mL/hr (03/01/20 0629)       Results Review:    Results from last 7 days   Lab Units 03/01/20  0356   SODIUM mmol/L 144   POTASSIUM mmol/L 3.1*   CHLORIDE mmol/L 104   CO2 mmol/L 25.5   BUN mg/dL 26*   CREATININE mg/dL 0.82   GLUCOSE mg/dL 145*   CALCIUM mg/dL 7.6*     Results from last 7 days   Lab Units 03/01/20  0356 02/29/20  0808   TROPONIN T ng/mL 0.103* 0.183*     Results from last 7 days   Lab Units 03/01/20  0356   WBC 10*3/mm3 20.65*   HEMOGLOBIN g/dL 10.0*   HEMATOCRIT % 29.9*   PLATELETS 10*3/mm3 367     Results from last 7 days   Lab Units 03/01/20  0356 03/01/20  0009 02/29/20  1319   INR  1.34* 1.36* 1.34*   APTT seconds 30.3 31.1 28.9                 @LABNT(bnp)@  I reviewed the patient's new clinical results.  I personally viewed and interpreted the patient's EKG/Telemetry data          Assessment/Plan   1.  Bilateral pulmonary emboli with acute cor pulmonale  2.  Acute hypoxic respiratory failure  3.  Pleural effusions: Bilateral  4.  Anemia  5.  Leukocytosis   6. HCAP    -Blood pressure mildly elevated but acceptable.  -Still with significant hypoxia.  Heart rate within normal limits.  -I do think we probably need to consider thoracentesis if his oxygenation does not improve over the next 24 hours.  Really should not require a break in his heparin for more than 2 hours to do this.  I think that would be low risk.  -I will leave him on a heparin drip for now until we decide about the thoracentesis.

## 2020-03-01 NOTE — PLAN OF CARE
VSS, EKOS catheter removed this am, sheath removed as well. Site WNL. No complaints of pain. Doppler positive for multiple DVT's in BLE. IV Lasix administered with appropriate results. BLE edema worse today. Weaned from 15L HF to 9L HF. Patient and family updated on plan of care. Will continue to closely monitor.       Problem: Patient Care Overview  Goal: Plan of Care Review  Outcome: Ongoing (interventions implemented as appropriate)  Goal: Individualization and Mutuality  Outcome: Ongoing (interventions implemented as appropriate)  Goal: Discharge Needs Assessment  Outcome: Ongoing (interventions implemented as appropriate)  Goal: Interprofessional Rounds/Family Conf  Outcome: Ongoing (interventions implemented as appropriate)     Problem: Skin Injury Risk (Adult)  Goal: Identify Related Risk Factors and Signs and Symptoms  Outcome: Ongoing (interventions implemented as appropriate)  Goal: Skin Health and Integrity  Outcome: Ongoing (interventions implemented as appropriate)     Problem: Fall Risk (Adult)  Goal: Identify Related Risk Factors and Signs and Symptoms  Outcome: Ongoing (interventions implemented as appropriate)  Goal: Absence of Fall  Outcome: Ongoing (interventions implemented as appropriate)     Problem: VTE, DVT and PE (Adult)  Goal: Signs and Symptoms of Listed Potential Problems Will be Absent, Minimized or Managed (VTE, DVT and PE)  Outcome: Ongoing (interventions implemented as appropriate)     Problem: Pain, Acute (Adult)  Goal: Identify Related Risk Factors and Signs and Symptoms  Outcome: Ongoing (interventions implemented as appropriate)  Goal: Acceptable Pain Control/Comfort Level  Outcome: Ongoing (interventions implemented as appropriate)

## 2020-03-02 ENCOUNTER — APPOINTMENT (OUTPATIENT)
Dept: ULTRASOUND IMAGING | Facility: HOSPITAL | Age: 85
End: 2020-03-02

## 2020-03-02 ENCOUNTER — APPOINTMENT (OUTPATIENT)
Dept: GENERAL RADIOLOGY | Facility: HOSPITAL | Age: 85
End: 2020-03-02

## 2020-03-02 LAB
ACT BLD: 175 SECONDS (ref 82–152)
ALBUMIN SERPL-MCNC: 2.4 G/DL (ref 3.5–5.2)
ANION GAP SERPL CALCULATED.3IONS-SCNC: 10.4 MMOL/L (ref 5–15)
APPEARANCE FLD: ABNORMAL
APTT PPP: 28.2 SECONDS (ref 22.7–35.4)
APTT PPP: 36.5 SECONDS (ref 22.7–35.4)
APTT PPP: 98 SECONDS (ref 22.7–35.4)
BUN BLD-MCNC: 29 MG/DL (ref 8–23)
BUN/CREAT SERPL: 34.9 (ref 7–25)
CALCIUM SPEC-SCNC: 7.7 MG/DL (ref 8.6–10.5)
CHLORIDE SERPL-SCNC: 106 MMOL/L (ref 98–107)
CO2 SERPL-SCNC: 25.6 MMOL/L (ref 22–29)
COLOR FLD: ABNORMAL
CREAT BLD-MCNC: 0.83 MG/DL (ref 0.76–1.27)
DEPRECATED RDW RBC AUTO: 39 FL (ref 37–54)
ERYTHROCYTE [DISTWIDTH] IN BLOOD BY AUTOMATED COUNT: 12.5 % (ref 12.3–15.4)
GFR SERPL CREATININE-BSD FRML MDRD: 87 ML/MIN/1.73
GIE STN SPEC: NORMAL
GLUCOSE BLD-MCNC: 141 MG/DL (ref 65–99)
GLUCOSE FLD-MCNC: 123 MG/DL
HCT VFR BLD AUTO: 32 % (ref 37.5–51)
HGB BLD-MCNC: 10.3 G/DL (ref 13–17.7)
LDH FLD-CCNC: 610 U/L
LYMPHOCYTES # BLD MANUAL: 0.65 10*3/MM3 (ref 0.7–3.1)
LYMPHOCYTES NFR BLD MANUAL: 2 % (ref 5–12)
LYMPHOCYTES NFR BLD MANUAL: 4 % (ref 19.6–45.3)
LYMPHOCYTES NFR FLD MANUAL: 27 %
MCH RBC QN AUTO: 27.7 PG (ref 26.6–33)
MCHC RBC AUTO-ENTMCNC: 32.2 G/DL (ref 31.5–35.7)
MCV RBC AUTO: 86 FL (ref 79–97)
METAMYELOCYTES NFR BLD MANUAL: 2 % (ref 0–0)
MONOCYTES # BLD AUTO: 0.33 10*3/MM3 (ref 0.1–0.9)
MONOS+MACROS NFR FLD: 15 %
NEUTROPHILS # BLD AUTO: 15.05 10*3/MM3 (ref 1.7–7)
NEUTROPHILS NFR BLD MANUAL: 92 % (ref 42.7–76)
NEUTROPHILS NFR FLD MANUAL: 58 %
OTHER CELLS FLUID PER 100/WBCS: 2 /100 WBCS
PH FLD: 7.84 [PH]
PHOSPHATE SERPL-MCNC: 2.1 MG/DL (ref 2.5–4.5)
PLAT MORPH BLD: NORMAL
PLATELET # BLD AUTO: 416 10*3/MM3 (ref 140–450)
PMV BLD AUTO: 10.2 FL (ref 6–12)
POTASSIUM BLD-SCNC: 3.9 MMOL/L (ref 3.5–5.2)
PROT FLD-MCNC: 2.3 G/DL
RBC # BLD AUTO: 3.72 10*6/MM3 (ref 4.14–5.8)
RBC # FLD AUTO: ABNORMAL /MM3
RBC MORPH BLD: NORMAL
SODIUM BLD-SCNC: 142 MMOL/L (ref 136–145)
WBC # FLD AUTO: 589 /MM3
WBC MORPH BLD: NORMAL
WBC NRBC COR # BLD: 16.36 10*3/MM3 (ref 3.4–10.8)

## 2020-03-02 PROCEDURE — 85730 THROMBOPLASTIN TIME PARTIAL: CPT | Performed by: INTERNAL MEDICINE

## 2020-03-02 PROCEDURE — 87205 SMEAR GRAM STAIN: CPT | Performed by: INTERNAL MEDICINE

## 2020-03-02 PROCEDURE — 87186 SC STD MICRODIL/AGAR DIL: CPT | Performed by: INTERNAL MEDICINE

## 2020-03-02 PROCEDURE — 87206 SMEAR FLUORESCENT/ACID STAI: CPT | Performed by: INTERNAL MEDICINE

## 2020-03-02 PROCEDURE — 80069 RENAL FUNCTION PANEL: CPT | Performed by: INTERNAL MEDICINE

## 2020-03-02 PROCEDURE — 84311 SPECTROPHOTOMETRY: CPT | Performed by: INTERNAL MEDICINE

## 2020-03-02 PROCEDURE — 87070 CULTURE OTHR SPECIMN AEROBIC: CPT | Performed by: INTERNAL MEDICINE

## 2020-03-02 PROCEDURE — 88305 TISSUE EXAM BY PATHOLOGIST: CPT | Performed by: INTERNAL MEDICINE

## 2020-03-02 PROCEDURE — 94799 UNLISTED PULMONARY SVC/PX: CPT

## 2020-03-02 PROCEDURE — 25010000002 FUROSEMIDE PER 20 MG: Performed by: INTERNAL MEDICINE

## 2020-03-02 PROCEDURE — 88185 FLOWCYTOMETRY/TC ADD-ON: CPT

## 2020-03-02 PROCEDURE — 99222 1ST HOSP IP/OBS MODERATE 55: CPT | Performed by: INTERNAL MEDICINE

## 2020-03-02 PROCEDURE — 87015 SPECIMEN INFECT AGNT CONCNTJ: CPT | Performed by: INTERNAL MEDICINE

## 2020-03-02 PROCEDURE — 71045 X-RAY EXAM CHEST 1 VIEW: CPT

## 2020-03-02 PROCEDURE — 83615 LACTATE (LD) (LDH) ENZYME: CPT | Performed by: INTERNAL MEDICINE

## 2020-03-02 PROCEDURE — 82945 GLUCOSE OTHER FLUID: CPT | Performed by: INTERNAL MEDICINE

## 2020-03-02 PROCEDURE — 88182 CELL MARKER STUDY: CPT | Performed by: INTERNAL MEDICINE

## 2020-03-02 PROCEDURE — 85007 BL SMEAR W/DIFF WBC COUNT: CPT | Performed by: INTERNAL MEDICINE

## 2020-03-02 PROCEDURE — 88184 FLOWCYTOMETRY/ TC 1 MARKER: CPT

## 2020-03-02 PROCEDURE — 88112 CYTOPATH CELL ENHANCE TECH: CPT | Performed by: INTERNAL MEDICINE

## 2020-03-02 PROCEDURE — 87075 CULTR BACTERIA EXCEPT BLOOD: CPT | Performed by: INTERNAL MEDICINE

## 2020-03-02 PROCEDURE — 87077 CULTURE AEROBIC IDENTIFY: CPT | Performed by: INTERNAL MEDICINE

## 2020-03-02 PROCEDURE — 84157 ASSAY OF PROTEIN OTHER: CPT | Performed by: INTERNAL MEDICINE

## 2020-03-02 PROCEDURE — 89051 BODY FLUID CELL COUNT: CPT | Performed by: INTERNAL MEDICINE

## 2020-03-02 PROCEDURE — 87102 FUNGUS ISOLATION CULTURE: CPT | Performed by: INTERNAL MEDICINE

## 2020-03-02 PROCEDURE — 83986 ASSAY PH BODY FLUID NOS: CPT | Performed by: INTERNAL MEDICINE

## 2020-03-02 PROCEDURE — 25010000002 HEPARIN (PORCINE) PER 1000 UNITS: Performed by: INTERNAL MEDICINE

## 2020-03-02 PROCEDURE — 87116 MYCOBACTERIA CULTURE: CPT | Performed by: INTERNAL MEDICINE

## 2020-03-02 PROCEDURE — 0W993ZX DRAINAGE OF RIGHT PLEURAL CAVITY, PERCUTANEOUS APPROACH, DIAGNOSTIC: ICD-10-PCS | Performed by: RADIOLOGY

## 2020-03-02 PROCEDURE — 25010000003 LIDOCAINE 1 % SOLUTION: Performed by: RADIOLOGY

## 2020-03-02 PROCEDURE — 76942 ECHO GUIDE FOR BIOPSY: CPT

## 2020-03-02 PROCEDURE — 85025 COMPLETE CBC W/AUTO DIFF WBC: CPT | Performed by: INTERNAL MEDICINE

## 2020-03-02 PROCEDURE — 99232 SBSQ HOSP IP/OBS MODERATE 35: CPT | Performed by: INTERNAL MEDICINE

## 2020-03-02 RX ORDER — FUROSEMIDE 10 MG/ML
40 INJECTION INTRAMUSCULAR; INTRAVENOUS ONCE
Status: DISCONTINUED | OUTPATIENT
Start: 2020-03-02 | End: 2020-03-02

## 2020-03-02 RX ORDER — FUROSEMIDE 10 MG/ML
40 INJECTION INTRAMUSCULAR; INTRAVENOUS ONCE
Status: COMPLETED | OUTPATIENT
Start: 2020-03-02 | End: 2020-03-02

## 2020-03-02 RX ORDER — LIDOCAINE HYDROCHLORIDE 10 MG/ML
20 INJECTION, SOLUTION INFILTRATION; PERINEURAL ONCE
Status: COMPLETED | OUTPATIENT
Start: 2020-03-02 | End: 2020-03-02

## 2020-03-02 RX ADMIN — SODIUM CHLORIDE, PRESERVATIVE FREE 10 ML: 5 INJECTION INTRAVENOUS at 10:42

## 2020-03-02 RX ADMIN — CARBIDOPA AND LEVODOPA 1 TABLET: 25; 100 TABLET ORAL at 21:59

## 2020-03-02 RX ADMIN — BUDESONIDE 0.5 MG: 0.5 INHALANT RESPIRATORY (INHALATION) at 19:28

## 2020-03-02 RX ADMIN — SODIUM CHLORIDE, PRESERVATIVE FREE 10 ML: 5 INJECTION INTRAVENOUS at 10:41

## 2020-03-02 RX ADMIN — HEPARIN SODIUM 24 UNITS/KG/HR: 10000 INJECTION, SOLUTION INTRAVENOUS at 01:30

## 2020-03-02 RX ADMIN — SODIUM CHLORIDE, PRESERVATIVE FREE 10 ML: 5 INJECTION INTRAVENOUS at 22:01

## 2020-03-02 RX ADMIN — SODIUM CHLORIDE, PRESERVATIVE FREE 10 ML: 5 INJECTION INTRAVENOUS at 14:11

## 2020-03-02 RX ADMIN — CARBIDOPA AND LEVODOPA 1 TABLET: 25; 100 TABLET ORAL at 10:40

## 2020-03-02 RX ADMIN — LIDOCAINE HYDROCHLORIDE 18 ML: 10 INJECTION, SOLUTION INFILTRATION; PERINEURAL at 13:35

## 2020-03-02 RX ADMIN — IPRATROPIUM BROMIDE AND ALBUTEROL SULFATE 3 ML: 2.5; .5 SOLUTION RESPIRATORY (INHALATION) at 08:07

## 2020-03-02 RX ADMIN — HEPARIN SODIUM 18 UNITS/KG/HR: 10000 INJECTION, SOLUTION INTRAVENOUS at 21:59

## 2020-03-02 RX ADMIN — FUROSEMIDE 40 MG: 10 INJECTION, SOLUTION INTRAMUSCULAR; INTRAVENOUS at 10:41

## 2020-03-02 RX ADMIN — SODIUM CHLORIDE, PRESERVATIVE FREE 10 ML: 5 INJECTION INTRAVENOUS at 10:43

## 2020-03-02 RX ADMIN — IPRATROPIUM BROMIDE AND ALBUTEROL SULFATE 3 ML: 2.5; .5 SOLUTION RESPIRATORY (INHALATION) at 19:27

## 2020-03-02 RX ADMIN — Medication 250 MG: at 21:59

## 2020-03-02 RX ADMIN — Medication 250 MG: at 10:40

## 2020-03-02 RX ADMIN — ASPIRIN 81 MG: 81 TABLET, COATED ORAL at 18:43

## 2020-03-02 RX ADMIN — BUDESONIDE 0.5 MG: 0.5 INHALANT RESPIRATORY (INHALATION) at 08:07

## 2020-03-02 NOTE — PROGRESS NOTES
"Izaiah Garcia  8/31/1931 88 y.o.  [id number]      Patient Care Team:  Uriah Godinez MD as PCP - General (Internal Medicine)    CC: Submassive pulmonary embolus, recent pneumonia, bilateral lower extremity DVTs    Interval History: He is improved but is still requiring a fair amount of oxygen      Objective   Vital Signs  Temp:  [98.3 °F (36.8 °C)-98.7 °F (37.1 °C)] 98.4 °F (36.9 °C)  Heart Rate:  [75-86] 81  Resp:  [18-20] 20  BP: (137-158)/(68-89) 153/74    Intake/Output Summary (Last 24 hours) at 3/2/2020 0838  Last data filed at 3/2/2020 0500  Gross per 24 hour   Intake 1301.07 ml   Output 1325 ml   Net -23.93 ml     Flowsheet Rows      First Filed Value   Admission Height  177.8 cm (70\") Documented at 02/29/2020 0800   Admission Weight  95 kg (209 lb 7 oz) Documented at 02/29/2020 0800          Physical Exam:   General Appearance:    Alert,oriented, in no acute distress   Lungs:     Clear to auscultation,BS are equal    Heart:    Normal S1 and S2, RRR without murmur, gallop or rub   HEENT:    Sclerae are clear, no JVD or adenopathy   Abdomen:     Normal bowel sounds, soft non-tender, non-distended, no HSM   Extremities:   Moves all extremities well, no edema, no cyanosis, no             Redness, no rash     Medication Review:        aspirin 81 mg Oral Daily   budesonide 0.5 mg Nebulization BID - RT   carbidopa-levodopa 1 tablet Oral BID   ipratropium-albuterol 3 mL Nebulization BID - RT   saccharomyces boulardii 250 mg Oral BID   sodium chloride 10 mL Intravenous Q12H       alteplase (ACTIVASE) 10 mg in 0.9% NaCl 250 mL- Cathflow for Angiography 0.5 mg/hr Last Rate: Stopped (03/01/20 0334)   alteplase (ACTIVASE) 10 mg in 0.9% NaCl 250 mL- Cathflow for Angiography 0.5 mg/hr Last Rate: Stopped (03/01/20 0334)   heparin (porcine) 18 Units/kg/hr Last Rate: 22 Units/kg/hr (03/02/20 0159)         I reviewed the patient's new clinical results.  I personally viewed and interpreted the patient's EKG/Telemetry " data    Assessment/Plan  Active Hospital Problems    Diagnosis  POA   • **Acute saddle pulmonary embolism with acute cor pulmonale (CMS/HCC) [I26.02]  Unknown     Priority: High   • Pulmonary emboli (CMS/HCC) [I26.99]  Yes      Resolved Hospital Problems   No resolved problems to display.       From a PE standpoint he is much improved.  Still requiring a lot of oxygen I will talk with pulmonary about a possible thoracentesis see if it would help.  Once we are sure that were not going to do any more procedures on him I would start him on Sarah Griffiths MD  03/02/20  8:38 AM

## 2020-03-02 NOTE — CONSULTS
"     [facility] CONSULT      Patient Identification:  NAME:  Izaiah Garcia  Age:  88 y.o.   Sex:  male   :  1931   MRN:  [id number]       Chief complaint: Gross hematuria    History of present illness:    88 year old man with history of recent pneumonia, admitted 2020 with sudden shortness of breath - Bilateral DVT, bilateral pulmonary embolism and at high risk. Started on EKOX with TPA infusion locally.  Now on Aspirin and Heparin gtt.    Gross blood at urethral meatus developed this admission. No prior contact with [facility]. No history of BPH or urologic intervention in the past. Denies penile trauma.        Past medical history:  Past Medical History:   Diagnosis Date   • At risk for falls 2019   • Benign essential hypertension 2016   • Bilateral high frequency sensorineural hearing loss, wears hearing aids 2019   • Bilateral lower extremity edema 2019    May 2, 2019--patient who has hypertension and is on amlodipine 10 mg/day is complaining of progressively worsening swelling of the lower extremities that extends up to about mid calf.  Gets worse at the end of the day and tends to get better overnight when he props his feet up.  I think this is definitely related to the amlodipine although patient may have some venous insufficiency.  Medication ad   • Decreased peripheral vision of both eyes 2019    May 2, 2019--continues to have complaints of \"dizziness\" associated with weakness in his lower extremities.  He is not describing a spinning sensation or anything remotely close to vertigo.  Instead he is describing an off-balance sensation.  He tends to fall forward if not careful and he tends to list towards the right side.  He has marked difficulty going down an incline.  He has to ambulate wit   • History of aspiration pneumonia 2015   • History of esophageal stricture 2015    July 3, 2018--EGD revealed a distal esophageal stricture that was dilated to 18 mm.  " "There was a small hiatal hernia.  There was mild streaky erythema in the proximal stomach.  Duodenal bulb normal.  April 26, 2016--EGD performed for dysphagia reveals a distal esophageal stricture that was dilated 16.5 mm.   • History of gout 6/29/2016   • History of stroke, 6/29/2016--4.9 x 4 x 3 cm inferior left cerebellar infarct 5/4/2015 June 29, 2016--MRI of the brain performed for complaints of dizziness and weakness. IMPRESSION: 1. There is susceptibility artifact streaking through the anterior left frontal scalp through the anterior left frontal bone in the anterior tipof the left frontal lobe likely from a tiny piece of metal in the anterior left frontal scalp slightly limits evaluation of these structures. 2. There is mild s   • Hyperlipidemia 6/29/2016   • Impaired fasting glucose 5/2/2019 April 14, 2015--hemoglobin A1c 5.9.   • Macrocytosis 5/2/2019   • Parkinson's disease (CMS/[facility]) 5/2/2019    May 2, 2019--continues to have complaints of \"dizziness\" associated with weakness in his lower extremities.  He is not describing a spinning sensation or anything remotely close to vertigo.  Instead he is describing an off-balance sensation.  He tends to fall forward if not careful and he tends to list towards the right side.  He has marked difficulty going down an incline.  He has to ambulate wit   • Rheumatoid factor positive 5/2/2019 March 25, 2014--rheumatoid factor returned positive at 95.  However, CCP antibodies normal at 8, SIHV negative, both C&P ANCA negative, C-reactive protein normal at 0.24, sed rate normal at 2.   • Vitamin B12 deficiency 6/6/2019 June 6, 2019--patient recently had mildly elevated methylmalonic acid of 380.  Repeat methylmalonic acid was upper limit of normal at 356.  Given patient's neurologic symptoms and suspected parkinsonism, I have a low threshold for treating vitamin B12 deficiency.  We will initiate vitamin B12 injections today.  2000 mcg subcutaneously given " today.   • Vitamin D deficiency 5/2/2019       Past surgical history:  Past Surgical History:   Procedure Laterality Date   • CATARACT EXTRACTION EXTRACAPSULAR W/ INTRAOCULAR LENS IMPLANTATION Bilateral     Bilateral cataract extirpation with intraocular lens implantation   • CHOLECYSTECTOMY     • ESOPHAGEAL DILATATION  04/26/2016 April 26, 2016--EGD performed for dysphagia reveals a distal esophageal stricture that was dilated 16.5 mm.   • ESOPHAGEAL DILATATION  07/03/2018    July 3, 2018--EGD revealed a distal esophageal stricture that was dilated to 18 mm.  There was a small hiatal hernia.  There was mild streaky erythema in the proximal stomach.  Duodenal bulb normal.       Allergies:  Patient has no known allergies.    Home medications:  Facility-Administered Medications Prior to Admission   Medication Dose Route Frequency Provider Last Rate Last Dose   • cyanocobalamin injection 1,000 mcg  1,000 mcg Subcutaneous Q30 Days Uriah Godinez MD   1,000 mcg at 02/07/20 1016     Medications Prior to Admission   Medication Sig Dispense Refill Last Dose   • allopurinol (ZYLOPRIM) 300 MG tablet Take 1 p.o. daily for gout 90 tablet 3 Taking   • amLODIPine (NORVASC) 5 MG tablet Take 1 p.o. every morning for high blood pressure 90 tablet 3 Taking   • amoxicillin-clavulanate (AUGMENTIN) 875-125 MG per tablet Take 1 tablet by mouth 2 (Two) Times a Day.      • aspirin (ECOTRIN) 325 MG EC tablet Take 81 mg by mouth Daily.   Taking   • budesonide (PULMICORT) 0.5 MG/2ML nebulizer solution Take 0.5 mg by nebulization 2 (Two) Times a Day.      • carbidopa-levodopa (SINEMET)  MG per tablet Take 1 tablet by mouth 2 (Two) Times a Day.   Taking   • cefTRIAXone Sodium (ROCEPHIN IV) Infuse 1 vial into a venous catheter. 1 vial is equivalent to 1 gram      • doxycycline (VIBRAMYICN) 100 MG tablet Take 100 mg by mouth 2 (Two) Times a Day.      • guaiFENesin (MUCINEX) 600 MG 12 hr tablet Take 1,200 mg by mouth 2 (Two) Times a  Day.      • ipratropium-albuterol (DUO-NEB) 0.5-2.5 mg/3 ml nebulizer Take 3 mL by nebulization 2 (Two) Times a Day.      • lovastatin (MEVACOR) 20 MG tablet Take 1 p.o. daily for high cholesterol 90 tablet 3 Taking   • ondansetron (ZOFRAN) 4 MG tablet 1 p.o. every 6 to 8 hours as needed nausea 20 tablet 0    • saccharomyces boulardii (FLORASTOR) 250 MG capsule Take 1 capsule by mouth 2 (Two) Times a Day.      • valsartan-hydrochlorothiazide (DIOVAN-HCT) 320-25 MG per tablet Take 1 tablet by mouth Daily. 90 tablet 3 Taking        Hospital medications:    aspirin 81 mg Oral Daily   budesonide 0.5 mg Nebulization BID - RT   carbidopa-levodopa 1 tablet Oral BID   ipratropium-albuterol 3 mL Nebulization BID - RT   saccharomyces boulardii 250 mg Oral BID   sodium chloride 10 mL Intravenous Q12H       heparin (porcine) 18 Units/kg/hr Last Rate: 18 Units/kg/hr (03/02/20 1411)   hold 1 each      heparin (porcine)  •  hold  •  potassium chloride **OR** potassium chloride **OR** potassium chloride  •  [COMPLETED] Insert peripheral IV **AND** sodium chloride  •  sodium chloride    Family history:  Family History   Problem Relation Age of Onset   • No Known Problems Mother    • No Known Problems Father        Social history:  Social History     Tobacco Use   • Smoking status: Never Smoker   • Smokeless tobacco: Never Used   Substance Use Topics   • Alcohol use: Yes     Frequency: 2-4 times a month     Drinks per session: 1 or 2     Binge frequency: Never     Comment: occ   • Drug use: No       REVIEW OF SYSTEMS:  Constitutional - Negative for fevers/chills  Eyes/Ears/Nose/Mouth/Throat - Negative for changes in vision  Cardiovascular - Currently on Anticoagulation for bilateral DVT/PE  Respiratory - See HPI  Gastrointestinal - Negative for nausea or vomiting  Genitourinary - Negative for dysuria  Hematologic/Lymphatic - Negative for bruising  Skin - Negative for erythema  Endocrine - Negative for history of  diabetes    Objective:  TMax 24 hours:   Temp (24hrs), Av.5 °F (36.9 °C), Min:98.3 °F (36.8 °C), Max:98.7 °F (37.1 °C)      Vitals Ranges:   Temp:  [98.3 °F (36.8 °C)-98.7 °F (37.1 °C)] 98.3 °F (36.8 °C)  Heart Rate:  [71-88] 73  Resp:  [18-20] 18  BP: (122-167)/(70-88) 159/80    Intake/Output Last 3 shifts:  I/O last 3 completed shifts:  In: 5217.1 [P.O.:850; I.V.:4367.1]  Out: 1725 [Urine:1725]     Physical Exam:    General Appearance:    Alert, cooperative, NAD   HEENT:    No trauma, pupils reactive, hearing intact   Back:     No CVA tenderness   Lungs:     Respirations unlabored, no wheezing    Heart:    RRR, intact peripheral pulses   Abdomen:     Soft, NDNT, no masses, no guarding   :    Testes descended bilaterally, no nodules.  Penis normal - dried blood at urethral meatus - catheter in place draining clear/yellow urine.    No scrotal or penile rashes noted   Extremities:   No edema, no deformity   Lymphatic:   No neck or groin LAD   Skin:   No bleeding, bruising or rashes   Neuro/Psych:   Orientation intact, mood/affect pleasant, no focal findings       Results review:   I reviewed the patient's new clinical results.    Data review:  Lab Results (last 24 hours)     Procedure Component Value Units Date/Time    AFB Culture - Body Fluid, Pleural Cavity [[id number]] Collected:  20    Specimen:  Body Fluid from Pleural Cavity Updated:  20 134    Body Fluid Culture - Body Fluid, Pleural Cavity [[id number]] Collected:  20 133    Specimen:  Body Fluid from Pleural Cavity Updated:  20 134    Fungus Culture - Body Fluid, Pleural Cavity [[id number]] Collected:  20 133    Specimen:  Body Fluid from Pleural Cavity Updated:  20 134    KOH Prep - Body Fluid, Pleural Cavity [[id number]] Collected:  20 133    Specimen:  Body Fluid from Pleural Cavity Updated:  20 1345    Fungus Smear - Body Fluid, Pleural Cavity [[id number]] Collected:  20 1336    Specimen:   Body Fluid from Pleural Cavity Updated:  03/02/20 1345    Body Fluid Cell Count With Differential - Pleural Fluid, Pleural Cavity [[id number]] Collected:  03/02/20 1337    Specimen:  Pleural Fluid from Pleural Cavity Updated:  03/02/20 1345    Narrative:       The following orders were created for panel order Body Fluid Cell Count With Differential - Pleural Fluid, Pleural Cavity.  Procedure                               Abnormality         Status                     ---------                               -----------         ------                     Body fluid cell count - ...[[id number]]                      In process                   Please view results for these tests on the individual orders.    pH, Body Fluid - Pleural Fluid, Pleural Cavity [[id number]] Collected:  03/02/20 1337    Specimen:  Pleural Fluid from Pleural Cavity Updated:  03/02/20 1345    Anaerobic Culture - Pleural Fluid, Pleural Cavity [[id number]] Collected:  03/02/20 1337    Specimen:  Pleural Fluid from Pleural Cavity Updated:  03/02/20 1345    Body fluid cell count - Pleural Fluid, Pleural Cavity [[id number]] Collected:  03/02/20 1337    Specimen:  Pleural Fluid from Pleural Cavity Updated:  03/02/20 1345    Protein, Body Fluid - Pleural Fluid, Pleural Cavity [[id number]] Collected:  03/02/20 1337    Specimen:  Pleural Fluid from Pleural Cavity Updated:  03/02/20 1345    Lactate Dehydrogenase, Body Fluid - Pleural Fluid, Pleural Cavity [[id number]] Collected:  03/02/20 1337    Specimen:  Pleural Fluid from Pleural Cavity Updated:  03/02/20 1345    Glucose, Body Fluid - Pleural Fluid, Pleural Cavity [[id number]] Collected:  03/02/20 1337    Specimen:  Pleural Fluid from Pleural Cavity Updated:  03/02/20 1345    Cholesterol, Body Fluid - Pleural Fluid, Pleural Cavity [[id number]] Collected:  03/02/20 1337    Specimen:  Pleural Fluid from Pleural Cavity Updated:  03/02/20 1345    Flow Cytometry [[id number]] Collected:  03/02/20 1336    Specimen:   Body Fluid from Pleural Cavity Updated:  03/02/20 1345    Blood Culture - Blood, Arm, Left [[id number]] Collected:  02/29/20 0807    Specimen:  Blood from Arm, Left Updated:  03/02/20 0845     Blood Culture No growth at 2 days    Blood Culture - Blood, Arm, Right [[id number]] Collected:  02/29/20 0820    Specimen:  Blood from Arm, Right Updated:  03/02/20 0830     Blood Culture No growth at 2 days    POC Activated Clotting Time [[id number]]  (Abnormal) Collected:  02/29/20 1701    Specimen:  Blood Updated:  03/02/20 0620     Activated Clotting Time  175 Seconds      Comment: Serial Number: [id number]:  [id number]       CBC & Differential [[id number]] Collected:  03/02/20 0311    Specimen:  Blood Updated:  03/02/20 0413    Narrative:       The following orders were created for panel order CBC & Differential.  Procedure                               Abnormality         Status                     ---------                               -----------         ------                     CBC Auto Differential[[id number]]        Abnormal            Final result                 Please view results for these tests on the individual orders.    CBC Auto Differential [[id number]]  (Abnormal) Collected:  03/02/20 0311    Specimen:  Blood Updated:  03/02/20 0413     WBC 16.36 10*3/mm3      RBC 3.72 10*6/mm3      Hemoglobin 10.3 g/dL      Hematocrit 32.0 %      MCV 86.0 fL      MCH 27.7 pg      MCHC 32.2 g/dL      RDW 12.5 %      RDW-SD 39.0 fl      MPV 10.2 fL      Platelets 416 10*3/mm3     Manual Differential [[id number]]  (Abnormal) Collected:  03/02/20 0311    Specimen:  Blood Updated:  03/02/20 0413     Neutrophil % 92.0 %      Lymphocyte % 4.0 %      Monocyte % 2.0 %      Metamyelocyte % 2.0 %      Neutrophils Absolute 15.05 10*3/mm3      Lymphocytes Absolute 0.65 10*3/mm3      Monocytes Absolute 0.33 10*3/mm3      RBC Morphology Normal     WBC Morphology Normal     Platelet Morphology Normal    Renal Function Panel [[id number]]   (Abnormal) Collected:  03/02/20 0311    Specimen:  Blood Updated:  03/02/20 0411     Glucose 141 mg/dL      BUN 29 mg/dL      Creatinine 0.83 mg/dL      Sodium 142 mmol/L      Potassium 3.9 mmol/L      Chloride 106 mmol/L      CO2 25.6 mmol/L      Calcium 7.7 mg/dL      Albumin 2.40 g/dL      Phosphorus 2.1 mg/dL      Anion Gap 10.4 mmol/L      BUN/Creatinine Ratio 34.9     eGFR Non African Amer 87 mL/min/1.73     Narrative:       GFR Normal >60  Chronic Kidney Disease <60  Kidney Failure <15      aPTT [[id number]]  (Abnormal) Collected:  03/02/20 0032    Specimen:  Blood Updated:  03/02/20 0139     PTT 98.0 seconds     CBC & Differential [[id number]] Collected:  03/01/20 1750    Specimen:  Blood Updated:  03/01/20 1838    Narrative:       The following orders were created for panel order CBC & Differential.  Procedure                               Abnormality         Status                     ---------                               -----------         ------                     CBC Auto Differential[[id number]]        Abnormal            Final result                 Please view results for these tests on the individual orders.    CBC Auto Differential [[id number]]  (Abnormal) Collected:  03/01/20 1750    Specimen:  Blood Updated:  03/01/20 1838     WBC 19.95 10*3/mm3      RBC 3.77 10*6/mm3      Hemoglobin 10.5 g/dL      Hematocrit 32.0 %      MCV 84.9 fL      MCH 27.9 pg      MCHC 32.8 g/dL      RDW 12.2 %      RDW-SD 37.9 fl      MPV 10.2 fL      Platelets 398 10*3/mm3     Manual Differential [[id number]]  (Abnormal) Collected:  03/01/20 1750    Specimen:  Blood Updated:  03/01/20 1838     Neutrophil % 91.0 %      Lymphocyte % 5.0 %      Monocyte % 4.0 %      Neutrophils Absolute 18.15 10*3/mm3      Lymphocytes Absolute 1.00 10*3/mm3      Monocytes Absolute 0.80 10*3/mm3      RBC Morphology Normal     WBC Morphology Normal     Platelet Morphology Normal    Protime-INR [[id number]]  (Abnormal) Collected:  03/01/20 1750     Specimen:  Blood Updated:  03/01/20 1830     Protime 16.4 Seconds      INR 1.36    aPTT [[id number]]  (Abnormal) Collected:  03/01/20 1750    Specimen:  Blood Updated:  03/01/20 1830     PTT 64.0 seconds     POC Glucose Once [[id number]]  (Abnormal) Collected:  03/01/20 1742    Specimen:  Blood Updated:  03/01/20 1743     Glucose 155 mg/dL            Imaging:  Imaging Results (Last 24 Hours)     Procedure Component Value Units Date/Time    US Thoracentesis [[id number]] Resulted:  03/02/20 1321    Specimen:  Body Fluid Updated:  03/02/20 1350    XR Chest 1 View [[id number]] Collected:  03/02/20 0748     Updated:  03/02/20 0748    Narrative:       PORTABLE CHEST X-RAY     CLINICAL HISTORY: eval effusion infiltrates; J18.9-Pneumonia,  unspecified organism; J96.01-Acute respiratory failure with hypoxia;  I26.99-Other pulmonary embolism without acute cor pulmonale;  I26.02-Saddle embolus of pulmonary artery with acute cor pulmonale     COMPARISON: 02/29/2020.     FINDINGS: Portable AP view of the chest was obtained with overlying  monitor leads in place. Right greater than left basilar opacities likely  airspace disease and effusions are similar to previous. Stable  cardiomegaly. Normal vascularity. Right PICC line unchanged.             Impression:          Stable appearance of the chest by portable radiography.                US Thyroid [[id number]] Collected:  03/01/20 0725     Updated:  03/01/20 1928    Narrative:       THYROID ULTRASOUND     HISTORY: Abnormal CT; J18.9-Pneumonia, unspecified organism;  J96.01-Acute respiratory failure with hypoxia; I26.99-Other pulmonary  embolism without acute cor pulmonale; I26.02-Saddle embolus of pulmonary  artery with acute cor pulmonale.      COMPARISON: None.      FINDINGS: Longitudinal and transverse images of the thyroid gland were  obtained. The right thyroid lobe measures 5.1 x 2 x 1.8 cm.  The left  thyroid lobe measures 3.1 x 2 x 1.4 cm.  Isthmus measures 3 mm. There  are  solitary bilateral thyroid lobe cysts. The slightly larger is on the  left side measuring 11 mm. The right-sided cyst measures 8 mm. No solid  or suspicious mass.       Impression:       Solitary small cysts bilaterally, no solid or suspicious  abnormality.      This report was finalized on 3/1/2020 7:25 PM by Isrrael Wang M.D.              Assessment:       Acute saddle pulmonary embolism with acute cor pulmonale (CMS/HCC)    Pulmonary emboli (CMS/HCC)    Gross blood at urethral meatus - dried, resolved currently with urethral Ledezma catheter in place.  Like the result of recent Ledezma catheter placement and anticoagulation on board - Does not appear to be gross hematuria.    Plan:     1. Maintain catheter until tomorrow AM and then Voiding trial at 0600  2. Will follow for any urinary retention of hematuria.  3. No acute urologic intervention     Colten Russell MD  03/02/20  2:41 PM

## 2020-03-02 NOTE — CONSULTS
Subjective     REASON FOR CONSULTATION:    1.  DVT with bilateral pulmonary emboli  2.  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  Provide an opinion on any further workup or treatment                             REQUESTING PHYSICIAN: Chetan Eugene MD    RECORDS OBTAINED:  Records of the patients history including those obtained from the referring provider were reviewed and summarized in detail.    HISTORY OF PRESENT ILLNESS:  The patient is a 88 y.o. year old male who is here for an opinion about the above issue.  He had recently been discharged from the hospital after treatment for an aspiration pneumonia.  He was at the [facility] but was transferred to the emergency room and admitted on 2/29/2020 with shortness of breath and hypoxic respiratory failure.    He underwent a CT angiogram in the ER showing bilateral pulmonary emboli.  The radiologist also commented on a right paratracheal mass.  He subsequently underwent a thyroid ultrasound and it appears that this is a cystic mass from the thyroid.    He currently is in the CCU and on a heparin drip for therapeutic PTT of 98 seconds.  His venous Doppler study demonstrated proximal and distal acute DVT in the right leg and an acute calf vein thrombus in the left leg..    History of Present Illness     Past Medical History:   Diagnosis Date   • At risk for falls 5/2/2019   • Benign essential hypertension 6/29/2016   • Bilateral high frequency sensorineural hearing loss, wears hearing aids 5/2/2019   • Bilateral lower extremity edema 5/2/2019    May 2, 2019--patient who has hypertension and is on amlodipine 10 mg/day is complaining of progressively worsening swelling of the lower extremities that extends up to about mid calf.  Gets worse at the end of the day and tends to get better overnight when he props his feet up.  I think this is definitely related to the amlodipine although patient may have some venous  "insufficiency.  Medication ad   • Decreased peripheral vision of both eyes 5/2/2019    May 2, 2019--continues to have complaints of \"dizziness\" associated with weakness in his lower extremities.  He is not describing a spinning sensation or anything remotely close to vertigo.  Instead he is describing an off-balance sensation.  He tends to fall forward if not careful and he tends to list towards the right side.  He has marked difficulty going down an incline.  He has to ambulate wit   • History of aspiration pneumonia 5/4/2015   • History of esophageal stricture 5/4/2015    July 3, 2018--EGD revealed a distal esophageal stricture that was dilated to 18 mm.  There was a small hiatal hernia.  There was mild streaky erythema in the proximal stomach.  Duodenal bulb normal.  April 26, 2016--EGD performed for dysphagia reveals a distal esophageal stricture that was dilated 16.5 mm.   • History of gout 6/29/2016   • History of stroke, 6/29/2016--4.9 x 4 x 3 cm inferior left cerebellar infarct 5/4/2015 June 29, 2016--MRI of the brain performed for complaints of dizziness and weakness. IMPRESSION: 1. There is susceptibility artifact streaking through the anterior left frontal scalp through the anterior left frontal bone in the anterior tipof the left frontal lobe likely from a tiny piece of metal in the anterior left frontal scalp slightly limits evaluation of these structures. 2. There is mild s   • Hyperlipidemia 6/29/2016   • Impaired fasting glucose 5/2/2019 April 14, 2015--hemoglobin A1c 5.9.   • Macrocytosis 5/2/2019   • Parkinson's disease (CMS/[facility]) 5/2/2019    May 2, 2019--continues to have complaints of \"dizziness\" associated with weakness in his lower extremities.  He is not describing a spinning sensation or anything remotely close to vertigo.  Instead he is describing an off-balance sensation.  He tends to fall forward if not careful and he tends to list towards the right side.  He has marked difficulty " going down an incline.  He has to ambulate wit   • Rheumatoid factor positive 5/2/2019 March 25, 2014--rheumatoid factor returned positive at 95.  However, CCP antibodies normal at 8, SHIV negative, both C&P ANCA negative, C-reactive protein normal at 0.24, sed rate normal at 2.   • Vitamin B12 deficiency 6/6/2019 June 6, 2019--patient recently had mildly elevated methylmalonic acid of 380.  Repeat methylmalonic acid was upper limit of normal at 356.  Given patient's neurologic symptoms and suspected parkinsonism, I have a low threshold for treating vitamin B12 deficiency.  We will initiate vitamin B12 injections today.  2000 mcg subcutaneously given today.   • Vitamin D deficiency 5/2/2019        Past Surgical History:   Procedure Laterality Date   • CATARACT EXTRACTION EXTRACAPSULAR W/ INTRAOCULAR LENS IMPLANTATION Bilateral     Bilateral cataract extirpation with intraocular lens implantation   • CHOLECYSTECTOMY     • ESOPHAGEAL DILATATION  04/26/2016 April 26, 2016--EGD performed for dysphagia reveals a distal esophageal stricture that was dilated 16.5 mm.   • ESOPHAGEAL DILATATION  07/03/2018    July 3, 2018--EGD revealed a distal esophageal stricture that was dilated to 18 mm.  There was a small hiatal hernia.  There was mild streaky erythema in the proximal stomach.  Duodenal bulb normal.        No current facility-administered medications on file prior to encounter.      Current Outpatient Medications on File Prior to Encounter   Medication Sig Dispense Refill   • allopurinol (ZYLOPRIM) 300 MG tablet Take 1 p.o. daily for gout 90 tablet 3   • amLODIPine (NORVASC) 5 MG tablet Take 1 p.o. every morning for high blood pressure 90 tablet 3   • amoxicillin-clavulanate (AUGMENTIN) 875-125 MG per tablet Take 1 tablet by mouth 2 (Two) Times a Day.     • aspirin (ECOTRIN) 325 MG EC tablet Take 81 mg by mouth Daily.     • budesonide (PULMICORT) 0.5 MG/2ML nebulizer solution Take 0.5 mg by nebulization 2 (Two)  Times a Day.     • carbidopa-levodopa (SINEMET)  MG per tablet Take 1 tablet by mouth 2 (Two) Times a Day.     • cefTRIAXone Sodium (ROCEPHIN IV) Infuse 1 vial into a venous catheter. 1 vial is equivalent to 1 gram     • doxycycline (VIBRAMYICN) 100 MG tablet Take 100 mg by mouth 2 (Two) Times a Day.     • guaiFENesin (MUCINEX) 600 MG 12 hr tablet Take 1,200 mg by mouth 2 (Two) Times a Day.     • ipratropium-albuterol (DUO-NEB) 0.5-2.5 mg/3 ml nebulizer Take 3 mL by nebulization 2 (Two) Times a Day.     • lovastatin (MEVACOR) 20 MG tablet Take 1 p.o. daily for high cholesterol 90 tablet 3   • ondansetron (ZOFRAN) 4 MG tablet 1 p.o. every 6 to 8 hours as needed nausea 20 tablet 0   • saccharomyces boulardii (FLORASTOR) 250 MG capsule Take 1 capsule by mouth 2 (Two) Times a Day.     • valsartan-hydrochlorothiazide (DIOVAN-HCT) 320-25 MG per tablet Take 1 tablet by mouth Daily. 90 tablet 3        ALLERGIES:  No Known Allergies     Social History     Socioeconomic History   • Marital status:      Spouse name: Not on file   • Number of children: 2   • Years of education: Not on file   • Highest education level: 9th grade   Social Needs   • Financial resource strain: Not hard at all   • Food insecurity:     Worry: Never true     Inability: Never true   • Transportation needs:     Medical: No     Non-medical: No   Tobacco Use   • Smoking status: Never Smoker   • Smokeless tobacco: Never Used   Substance and Sexual Activity   • Alcohol use: Yes     Frequency: 2-4 times a month     Drinks per session: 1 or 2     Binge frequency: Never     Comment: occ   • Drug use: No   • Sexual activity: Not Currently     Partners: Female   Lifestyle   • Physical activity:     Days per week: 0 days     Minutes per session: 0 min   • Stress: Not at all   Relationships   • Social connections:     Talks on phone: Once a week     Gets together: Twice a week     Attends Anabaptist service: Never     Active member of club or  organization: Yes     Attends meetings of clubs or organizations: More than 4 times per year     Relationship status:         Family History   Problem Relation Age of Onset   • No Known Problems Mother    • No Known Problems Father         Review of Systems   Constitutional: Negative for activity change, chills, fatigue and fever.   HENT: Negative for mouth sores, trouble swallowing and voice change.    Eyes: Negative for pain and visual disturbance.   Respiratory: Positive for cough and shortness of breath. Negative for wheezing.    Cardiovascular: Positive for chest pain. Negative for palpitations.   Gastrointestinal: Negative for abdominal pain, constipation, diarrhea, nausea and vomiting.   Genitourinary: Negative for difficulty urinating, frequency and urgency.   Musculoskeletal: Negative for arthralgias and joint swelling.   Skin: Negative for rash.   Neurological: Negative for dizziness, seizures, weakness and headaches.   Hematological: Negative for adenopathy. Does not bruise/bleed easily.   Psychiatric/Behavioral: Negative for behavioral problems and confusion. The patient is not nervous/anxious.         Objective     Vitals:    03/02/20 1000 03/02/20 1109 03/02/20 1200 03/02/20 1300   BP: 134/88  167/87 156/82   BP Location:   Left arm    Patient Position:   Lying    Pulse: 84  81 76   Resp:   18    Temp:  98.3 °F (36.8 °C)     TempSrc:  Oral     SpO2: 91%  94% 92%   Weight:       Height:         No flowsheet data found.    Physical Exam   Constitutional: He is oriented to person, place, and time. He appears well-developed and well-nourished. No distress.   HENT:   Head: Normocephalic.   Eyes: Pupils are equal, round, and reactive to light. Conjunctivae and EOM are normal. No scleral icterus.   Neck: Normal range of motion. Neck supple. No JVD present. No thyromegaly present.   Cardiovascular: Normal rate and regular rhythm. Exam reveals no gallop and no friction rub.   No murmur  heard.  Pulmonary/Chest: Effort normal and breath sounds normal. He has no wheezes. He has no rales.   Abdominal: Soft. He exhibits no distension and no mass. There is no tenderness.   Musculoskeletal: Normal range of motion. He exhibits no edema or deformity.   Lymphadenopathy:     He has no cervical adenopathy.   Neurological: He is alert and oriented to person, place, and time. He has normal reflexes. No cranial nerve deficit.   Skin: Skin is warm and dry. No rash noted. No erythema.   Psychiatric: He has a normal mood and affect. His behavior is normal. Judgment normal.         RECENT LABS:  Hematology WBC   Date Value Ref Range Status   03/02/2020 16.36 (H) 3.40 - 10.80 10*3/mm3 Final     RBC   Date Value Ref Range Status   03/02/2020 3.72 (L) 4.14 - 5.80 10*6/mm3 Final     Hemoglobin   Date Value Ref Range Status   03/02/2020 10.3 (L) 13.0 - 17.7 g/dL Final     Hematocrit   Date Value Ref Range Status   03/02/2020 32.0 (L) 37.5 - 51.0 % Final     Platelets   Date Value Ref Range Status   03/02/2020 416 140 - 450 10*3/mm3 Final        Lab Results   Component Value Date    PTT 98.0 (H) 03/02/2020       Lab Results   Component Value Date    GLUCOSE 141 (H) 03/02/2020    BUN 29 (H) 03/02/2020    CREATININE 0.83 03/02/2020    EGFRIFNONA 87 03/02/2020    EGFRIFAFRI 72 01/03/2020    BCR 34.9 (H) 03/02/2020    K 3.9 03/02/2020    CO2 25.6 03/02/2020    CALCIUM 7.7 (L) 03/02/2020    PROTENTOTREF 6.4 01/03/2020    ALBUMIN 2.40 (L) 03/02/2020    LABIL2 1.7 01/03/2020    AST 32 02/29/2020    ALT 25 02/29/2020     VENOUS DOPPLER 3/1/2020  Interpretation Summary     · Acute right lower extremity deep vein thrombosis noted in the common femoral and gastrocnemius/soleal.  · Acute left lower extremity deep vein thrombosis noted in the gastrocnemius/soleal.  · All other veins appeared normal bilaterally.       CT ANGIOGRAM CHEST-2/29/2020  1.Critical test result. Fairly extensive bilateral pulmonary embolic  disease with  increased RV LV ratio compatible with right heart strain.  Right more than left pleural effusions and lower lung atelectasis.  2. Borderline prominent mediastinal lymph nodes. Indeterminate right  paratracheal mass, as described.    THYROID ULTRASOUND   HISTORY: Abnormal CT; J18.9-Pneumonia, unspecified organism;  J96.01-Acute respiratory failure with hypoxia; I26.99-Other pulmonary  embolism without acute cor pulmonale; I26.02-Saddle embolus of pulmonary  artery with acute cor pulmonale.      COMPARISON: None.      FINDINGS: Longitudinal and transverse images of the thyroid gland were  obtained. The right thyroid lobe measures 5.1 x 2 x 1.8 cm.  The left  thyroid lobe measures 3.1 x 2 x 1.4 cm.  Isthmus measures 3 mm. There  are solitary bilateral thyroid lobe cysts. The slightly larger is on the  left side measuring 11 mm. The right-sided cyst measures 8 mm. No solid  or suspicious mass.     IMPRESSION:  Solitary small cysts bilaterally, no solid or suspicious  abnormality.     Assessment/Plan   1.  Bilateral DVT with bilateral pulmonary emboli in a gentleman who had recently been hospitalized for treatment of aspiration pneumonia.  Immobility was probably his biggest risk factor.  2.  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  We do not think any additional work-up would be necessary for this issue.  3.  Therapeutic PTT on a heparin drip.  Patient has normal renal function should be a candidate for transition to Eliquis at some point    Recommendations  1.  As noted above, we do not think further work-up is necessary at this point for the cystic lesion noted on CT scan and thyroid ultrasound.  2.  We do not see any indication at this time to pursue a laboratory hypercoagulable work-up as we do not think it would change our approach in this gentleman.  He likely will require long-term anticoagulation.  3.  We feel that he could be transition from heparin drip to  Eliquis since he does have normal renal function.  Because of his advanced age of 88 years it may be wise to keep him on a lower dose of Eliquis long-term 2.5 mg p.o. every 12 hours.    We will not be following actively but please feel free to call us again if new concerns arise.

## 2020-03-02 NOTE — PROGRESS NOTES
Discharge Planning Assessment  [facility]     Patient Name: Izaiah Gacria  MRN: [id number]  Today's Date: 3/2/2020    Admit Date: 2/29/2020    Discharge Needs Assessment     Row Name 03/02/20 0163       Living Environment    Lives With  spouse    Current Living Arrangements  home/apartment/condo    Primary Care Provided by  self;spouse/significant other    Provides Primary Care For  no one    Family Caregiver if Needed  spouse;child(jess), adult;grandchild(jess), adult    Quality of Family Relationships  involved;supportive    Able to Return to Prior Arrangements  yes       Resource/Environmental Concerns    Resource/Environmental Concerns  none    Transportation Concerns  car, none       Transition Planning    Patient/Family Anticipates Transition to  home with family    Patient/Family Anticipated Services at Transition  rehabilitation services       Discharge Needs Assessment    Concerns to be Addressed  discharge planning    Equipment Currently Used at Home  walker, rolling;cane, straight    Anticipated Changes Related to Illness  none    Equipment Needed After Discharge  none    Discharge Facility/Level of Care Needs  nursing facility, skilled    Provided Post Acute Provider List?  N/A        Discharge Plan     Row Name 03/02/20 5709       Plan    Plan  Return to [place]     Provided Post Acute Provider Quality & Resource List?  N/A    Patient/Family in Agreement with Plan  yes    Plan Comments  IMM noted.  CCP spoke with patient's wife Chago and her grandson Jared at bedside to discuss d/c planning. Face sheet verified. CCP role explained.  Pt emergency contact is his wife [phone].    His primary care provider is Dr. Uriah Godinez.  Pt is dependent with some ADL's.  He uses a cane or a walker  to ambulate.   He lives in a one story house with his wife however he is currently in rehab at [facility].  Pt has no Home Health history.   Plan is to return to [facility].  CCP following  [place] childress for  [facility]         Destination      Coordination has not been started for this encounter.      Durable Medical Equipment      Coordination has not been started for this encounter.      Dialysis/Infusion      Coordination has not been started for this encounter.      Home Medical Care      Coordination has not been started for this encounter.      Therapy      Coordination has not been started for this encounter.      Community Resources      Coordination has not been started for this encounter.          Demographic Summary    No documentation.       Functional Status    No documentation.       Psychosocial    No documentation.       Abuse/Neglect    No documentation.       Legal    No documentation.       Substance Abuse    No documentation.       Patient Forms    No documentation.           Vivian Ramey RN

## 2020-03-02 NOTE — PLAN OF CARE
Problem: Patient Care Overview  Goal: Plan of Care Review  Outcome: Ongoing (interventions implemented as appropriate)  Flowsheets (Taken 3/2/2020 3842)  Progress: improving  Plan of Care Reviewed With: patient; family  Outcome Summary: Had thoracentesis today. Remains on oxygen. Still not much appetite, able to drink strawberry boost. Remains on heparin drip.

## 2020-03-02 NOTE — PROGRESS NOTES
"  Daily Progress Note.   [facility] CORONARY CARE  3/2/2020    Patient:  Name:  Izaiah Garcia  MRN:  [id number]  8/31/1931  88 y.o.  male         CC: Short of breath hypoxia     History of Present Illness:  Patient is an 88-year-old male who was recently discharged from the hospital after stay treated for pneumonia.  I reviewed his discharge summary from the 22nd.  He presented to the emergency room today from [place] after he was found to be hypoxic despite his 6 L nasal cannula.  Oxygen increased to 15 L and he was brought in by emergency medical services.  He is little bit more short of breath ongoing for the past few days however this acutely changed and worsened earlier today.  He was being given IV antibiotics at the nursing home.  He had a PICC line for this.     Patient denies chest pain hemoptysis.  Denies fever or chills.  No rigors.  Denies any emesis difficulty swallowing pain sputum production or diarrhea.  Does feel generalized weakness really has not gotten much strength since hospital discharge.  History somewhat limited by use of BIPAP.    Feeling much better after intervention, ekos placed 2/29- bilateral.    Interval History/ROS:   Doing well  Appetite down  No chest pain  No hemoptysis  C/o swelling  No abd pain  No diarrhea no melena no hematochezia    No fever, no diarrhea, no chest pain  PMFSSH: no change    Physical Exam:  /74   Pulse 81   Temp 98.4 °F (36.9 °C) (Oral)   Resp 20   Ht 177.8 cm (70\")   Wt 94.6 kg (208 lb 8.9 oz)   SpO2 91%   BMI 29.92 kg/m²   Body mass index is 29.92 kg/m².    Intake/Output Summary (Last 24 hours) at 3/2/2020 0900  Last data filed at 3/2/2020 0500  Gross per 24 hour   Intake 1301.07 ml   Output 1325 ml   Net -23.93 ml     General appearance:non toxic awake alert conversant   Eyes: anicteric sclerae, moist conjunctivae; no lid-lag; PERRLA  HENT: cant palpate any mass, no lad, mmm  Neck:trachela midline, supple  Lungs: CTAB " without wheeze or rhonchi, diminished at bilateral base, with normal respiratory effort and nointercostal retractions  CV: RRR, no rub  Abdomen: Soft, non-tender; no masses or HSM  Extremities: +pitting LE peripheral edema left > right or extremity lymphadenopathy  Skin: Normal temperature, turgor and texture; no rash, ulcers or subcutaneous nodules  Psych: Appropriate affect, alert and oriented to person, place and time  Neuro: noteable [place], CNs 2-12 intact sensation intact moves ext  Data Review:  Notable Labs:  Results from last 7 days   Lab Units 03/02/20  0311 03/01/20  1750 03/01/20  1159 03/01/20  0356 03/01/20  0009 02/29/20  1319 02/29/20  0808   WBC 10*3/mm3 16.36* 19.95* 19.91* 20.65* 19.90* 20.88* 15.93*   HEMOGLOBIN g/dL 10.3* 10.5* 10.1* 10.0* 10.2* 10.9* 16.4   PLATELETS 10*3/mm3 416 398 378 367 358 381 291     Results from last 7 days   Lab Units 03/02/20  0311 03/01/20  0356 02/29/20  0808   SODIUM mmol/L 142 144 138   POTASSIUM mmol/L 3.9 3.1* 3.0*   CHLORIDE mmol/L 106 104 96*   CO2 mmol/L 25.6 25.5 27.2   BUN mg/dL 29* 26* 20   CREATININE mg/dL 0.83 0.82 0.91   GLUCOSE mg/dL 141* 145* 177*   CALCIUM mg/dL 7.7* 7.6* 8.3*   PHOSPHORUS mg/dL 2.1* 3.5  --    Estimated Creatinine Clearance: 71 mL/min (by C-G formula based on SCr of 0.83 mg/dL).    Imaging:  Reviewed chest images personally from past 3 days  Thyroid us neg    Scheduled meds:      aspirin 81 mg Oral Daily   budesonide 0.5 mg Nebulization BID - RT   carbidopa-levodopa 1 tablet Oral BID   furosemide 40 mg Intravenous Once   ipratropium-albuterol 3 mL Nebulization BID - RT   saccharomyces boulardii 250 mg Oral BID   sodium chloride 10 mL Intravenous Q12H       ASSESSMENT  /  PLAN:  Acute hypoxic resp failure  Management of NIV  BPE s/p ekos catheter on 2/29  Troponemia suspect due to BPE  RV strain  HLD  Esphageal stricture  Gen weakness  Parkinsons Disease  Vit D def  RF factor positive  HTN  Abnormal mass around thyroid   Bilateral  pleural effusion R greater than Left  hypokalemia    S/p B ekos with tpa infusion locally with good clinical response  Pt on 5-5L when dc from hosopital last admission, 8 now down from bipap when he arrived.  Still with fluid in settin gof recent pna, will perform thora for diagnostic and therapeutic measures.  D/w Dr Griffiths, agrees that at this time, risks of holding heparin for brief time less risk than undiagnosed empyema parapneumonic effusion and may help us wean his oxygen down to a level he can live outside hospital at a reasonable o2 requirement.  Best to do this now when on heparin vs NOAC.  D/w patient and familymember at his permission / request.    Another dose of lasix iv today 40mg    Thyroid us normal but ct chest showing abn mass around thyroid but US neg.    Not sure that he would be stable for any surgery or biopsy at this point.  I will hold off on consult, will need outpatient follow up - pet scan as outpatient may be best option.  Ill get oncology to see to help brainstorm best approach    Chetan Eugene MD  [facility]  03/02/20  828d

## 2020-03-02 NOTE — DISCHARGE PLACEMENT REQUEST
"Sarkis Garcia (88 y.o. Male)     Date of Birth Social Security Number Address Home Phone MRN    08/31/1931  [address] [phone] [id number]    Hindu Marital Status          Jehovah's witness        Admission Date Admission Type Admitting Provider Attending Provider Department, Room/Bed    2/29/20 Emergency Chetan Eugene MD Kellie, Brandon John, MD [facility] CORONARY CARE, N333/1    Discharge Date Discharge Disposition Discharge Destination                       Attending Provider:  Chetan Eugene MD    Allergies:  No Known Allergies    Isolation:  None   Infection:  None   Code Status:  CPR    Ht:  177.8 cm (70\")   Wt:  94.6 kg (208 lb 8.9 oz)    Admission Cmt:  None   Principal Problem:  Acute saddle pulmonary embolism with acute cor pulmonale (CMS/HCC) [I26.02] More...                 Active Insurance as of 2/29/2020     Primary Coverage     Payor Plan Insurance Group Employer/Plan Group    HUMANA MEDICARE REPLACEMENT HUMANA MEDICARE REPLACEMENT [id number]     Payor Plan Address Payor Plan Phone Number Payor Plan Fax Number Effective Dates    [address] [address] [phone]  1/1/2014 - None Entered    [facility] [id number]       Subscriber Name Subscriber Birth Date Member ID       SARKIS GARCIA 8/31/1931 [id number]                 Emergency Contacts      (Rel.) Home Phone Work Phone Mobile Phone    RadhaRosy whelancarisa (Spouse) [phone] -- --    Jared Ward (Grandchild) -- -- [phone]              "

## 2020-03-02 NOTE — PROGRESS NOTES
1630 BLE Venous doppler study complete. Preliminary RLE  positive for acute DVT and Left positive for acute calf DVT.Report to Celine POWERS

## 2020-03-03 LAB
ALBUMIN SERPL-MCNC: 2.4 G/DL (ref 3.5–5.2)
ANION GAP SERPL CALCULATED.3IONS-SCNC: 9.8 MMOL/L (ref 5–15)
APTT PPP: 177 SECONDS (ref 22.7–35.4)
BUN BLD-MCNC: 25 MG/DL (ref 8–23)
BUN/CREAT SERPL: 32.9 (ref 7–25)
C DIFF TOX GENS STL QL NAA+PROBE: NEGATIVE
CALCIUM SPEC-SCNC: 7.7 MG/DL (ref 8.6–10.5)
CHLORIDE SERPL-SCNC: 106 MMOL/L (ref 98–107)
CO2 SERPL-SCNC: 26.2 MMOL/L (ref 22–29)
CREAT BLD-MCNC: 0.76 MG/DL (ref 0.76–1.27)
DEPRECATED RDW RBC AUTO: 37.6 FL (ref 37–54)
EOSINOPHIL # BLD MANUAL: 0.14 10*3/MM3 (ref 0–0.4)
EOSINOPHIL NFR BLD MANUAL: 1 % (ref 0.3–6.2)
ERYTHROCYTE [DISTWIDTH] IN BLOOD BY AUTOMATED COUNT: 12.2 % (ref 12.3–15.4)
GFR SERPL CREATININE-BSD FRML MDRD: 97 ML/MIN/1.73
GLUCOSE BLD-MCNC: 134 MG/DL (ref 65–99)
GLUCOSE BLDC GLUCOMTR-MCNC: 127 MG/DL (ref 70–130)
GLUCOSE BLDC GLUCOMTR-MCNC: 135 MG/DL (ref 70–130)
GLUCOSE BLDC GLUCOMTR-MCNC: 139 MG/DL (ref 70–130)
GLUCOSE BLDC GLUCOMTR-MCNC: 148 MG/DL (ref 70–130)
HCT VFR BLD AUTO: 29.7 % (ref 37.5–51)
HGB BLD-MCNC: 9.9 G/DL (ref 13–17.7)
LYMPHOCYTES # BLD MANUAL: 0.68 10*3/MM3 (ref 0.7–3.1)
LYMPHOCYTES NFR BLD MANUAL: 10 % (ref 5–12)
LYMPHOCYTES NFR BLD MANUAL: 5 % (ref 19.6–45.3)
MCH RBC QN AUTO: 28.2 PG (ref 26.6–33)
MCHC RBC AUTO-ENTMCNC: 33.3 G/DL (ref 31.5–35.7)
MCV RBC AUTO: 84.6 FL (ref 79–97)
MONOCYTES # BLD AUTO: 1.37 10*3/MM3 (ref 0.1–0.9)
NEUTROPHILS # BLD AUTO: 11.47 10*3/MM3 (ref 1.7–7)
NEUTROPHILS NFR BLD MANUAL: 84 % (ref 42.7–76)
PHOSPHATE SERPL-MCNC: 3.3 MG/DL (ref 2.5–4.5)
PLAT MORPH BLD: NORMAL
PLATELET # BLD AUTO: 359 10*3/MM3 (ref 140–450)
PMV BLD AUTO: 10.2 FL (ref 6–12)
POLYCHROMASIA BLD QL SMEAR: ABNORMAL
POTASSIUM BLD-SCNC: 3.6 MMOL/L (ref 3.5–5.2)
RBC # BLD AUTO: 3.51 10*6/MM3 (ref 4.14–5.8)
REF LAB TEST METHOD: NORMAL
SODIUM BLD-SCNC: 142 MMOL/L (ref 136–145)
WBC MORPH BLD: NORMAL
WBC NRBC COR # BLD: 13.65 10*3/MM3 (ref 3.4–10.8)

## 2020-03-03 PROCEDURE — 94799 UNLISTED PULMONARY SVC/PX: CPT

## 2020-03-03 PROCEDURE — 80069 RENAL FUNCTION PANEL: CPT | Performed by: INTERNAL MEDICINE

## 2020-03-03 PROCEDURE — 85730 THROMBOPLASTIN TIME PARTIAL: CPT | Performed by: INTERNAL MEDICINE

## 2020-03-03 PROCEDURE — 87493 C DIFF AMPLIFIED PROBE: CPT | Performed by: INTERNAL MEDICINE

## 2020-03-03 PROCEDURE — 85007 BL SMEAR W/DIFF WBC COUNT: CPT | Performed by: INTERNAL MEDICINE

## 2020-03-03 PROCEDURE — 99232 SBSQ HOSP IP/OBS MODERATE 35: CPT | Performed by: INTERNAL MEDICINE

## 2020-03-03 PROCEDURE — 25010000002 HEPARIN (PORCINE) PER 1000 UNITS: Performed by: INTERNAL MEDICINE

## 2020-03-03 PROCEDURE — 25010000002 FUROSEMIDE PER 20 MG: Performed by: INTERNAL MEDICINE

## 2020-03-03 PROCEDURE — 82962 GLUCOSE BLOOD TEST: CPT

## 2020-03-03 PROCEDURE — 85025 COMPLETE CBC W/AUTO DIFF WBC: CPT | Performed by: INTERNAL MEDICINE

## 2020-03-03 RX ORDER — HYDRALAZINE HYDROCHLORIDE 20 MG/ML
10 INJECTION INTRAMUSCULAR; INTRAVENOUS EVERY 6 HOURS PRN
Status: DISCONTINUED | OUTPATIENT
Start: 2020-03-03 | End: 2020-03-20 | Stop reason: HOSPADM

## 2020-03-03 RX ORDER — FUROSEMIDE 10 MG/ML
40 INJECTION INTRAMUSCULAR; INTRAVENOUS EVERY 12 HOURS
Status: DISCONTINUED | OUTPATIENT
Start: 2020-03-03 | End: 2020-03-06

## 2020-03-03 RX ADMIN — Medication 250 MG: at 08:00

## 2020-03-03 RX ADMIN — SODIUM CHLORIDE, PRESERVATIVE FREE 10 ML: 5 INJECTION INTRAVENOUS at 08:35

## 2020-03-03 RX ADMIN — FUROSEMIDE 40 MG: 10 INJECTION, SOLUTION INTRAMUSCULAR; INTRAVENOUS at 21:34

## 2020-03-03 RX ADMIN — APIXABAN 10 MG: 5 TABLET, FILM COATED ORAL at 12:43

## 2020-03-03 RX ADMIN — CARBIDOPA AND LEVODOPA 1 TABLET: 25; 100 TABLET ORAL at 20:48

## 2020-03-03 RX ADMIN — HEPARIN SODIUM 7600 UNITS: 5000 INJECTION INTRAVENOUS; SUBCUTANEOUS at 00:46

## 2020-03-03 RX ADMIN — APIXABAN 10 MG: 5 TABLET, FILM COATED ORAL at 20:48

## 2020-03-03 RX ADMIN — IPRATROPIUM BROMIDE AND ALBUTEROL SULFATE 3 ML: 2.5; .5 SOLUTION RESPIRATORY (INHALATION) at 08:19

## 2020-03-03 RX ADMIN — FUROSEMIDE 40 MG: 10 INJECTION, SOLUTION INTRAMUSCULAR; INTRAVENOUS at 11:36

## 2020-03-03 RX ADMIN — IPRATROPIUM BROMIDE AND ALBUTEROL SULFATE 3 ML: 2.5; .5 SOLUTION RESPIRATORY (INHALATION) at 19:54

## 2020-03-03 RX ADMIN — BUDESONIDE 0.5 MG: 0.5 INHALANT RESPIRATORY (INHALATION) at 19:54

## 2020-03-03 RX ADMIN — BUDESONIDE 0.5 MG: 0.5 INHALANT RESPIRATORY (INHALATION) at 08:19

## 2020-03-03 RX ADMIN — ASPIRIN 81 MG: 81 TABLET, COATED ORAL at 08:00

## 2020-03-03 RX ADMIN — Medication 250 MG: at 20:48

## 2020-03-03 RX ADMIN — CARBIDOPA AND LEVODOPA 1 TABLET: 25; 100 TABLET ORAL at 08:00

## 2020-03-03 NOTE — PROGRESS NOTES
"Izaiah Garcia  8/31/1931 88 y.o.  [id number]      Patient Care Team:  Uriah Godinez MD as PCP - General (Internal Medicine)    CC: Submassive pulmonary embolus, recent pneumonia, bilateral lower extremity DVTs    Interval History: He is improved still requiring oxygen      Objective   Vital Signs  Temp:  [98.3 °F (36.8 °C)-98.8 °F (37.1 °C)] 98.4 °F (36.9 °C)  Heart Rate:  [68-90] 77  Resp:  [18-30] 24  BP: (134-173)/() 159/100    Intake/Output Summary (Last 24 hours) at 3/3/2020 0947  Last data filed at 3/3/2020 0755  Gross per 24 hour   Intake 1533 ml   Output 2490 ml   Net -957 ml     Flowsheet Rows      First Filed Value   Admission Height  177.8 cm (70\") Documented at 02/29/2020 0800   Admission Weight  95 kg (209 lb 7 oz) Documented at 02/29/2020 0800          Physical Exam:   General Appearance:    Alert,oriented, in no acute distress   Lungs:     Clear to auscultation,BS are equal    Heart:    Normal S1 and S2, RRR without murmur, gallop or rub   HEENT:    Sclerae are clear, no JVD or adenopathy   Abdomen:     Normal bowel sounds, soft non-tender, non-distended, no HSM   Extremities:   Moves all extremities well, no edema, no cyanosis, no             Redness, no rash     Medication Review:        aspirin 81 mg Oral Daily   budesonide 0.5 mg Nebulization BID - RT   carbidopa-levodopa 1 tablet Oral BID   ipratropium-albuterol 3 mL Nebulization BID - RT   saccharomyces boulardii 250 mg Oral BID   sodium chloride 10 mL Intravenous Q12H       heparin (porcine) 18 Units/kg/hr Last Rate: 19 Units/kg/hr (03/03/20 0833)   hold 1 each          I reviewed the patient's new clinical results.  I personally viewed and interpreted the patient's EKG/Telemetry data    Assessment/Plan  Active Hospital Problems    Diagnosis  POA   • **Acute saddle pulmonary embolism with acute cor pulmonale (CMS/HCC) [I26.02]  Unknown     Priority: High   • Pulmonary emboli (CMS/HCC) [I26.99]  Yes      Resolved Hospital Problems "   No resolved problems to display.       Is doing better I think we can probably stop the heparin and switch him over to Eliquis I would empirically give him a little bit of diuretic and see if that helps with his oxygenation    Jason Griffiths MD  03/03/20  9:47 AM

## 2020-03-03 NOTE — PROGRESS NOTES
"  FIRST UROLOGY DAILY PROGRESS NOTE    Patient Identification  Name: Izaiah Garcia  Age: 88 y.o.  Sex: male  :  1931  MRN: [id number]    Date: 3/3/2020             Subjective:  Interval History:   Sleeping  Mildly labored breathing     Objective:    Scheduled Meds:  aspirin 81 mg Oral Daily   budesonide 0.5 mg Nebulization BID - RT   carbidopa-levodopa 1 tablet Oral BID   ipratropium-albuterol 3 mL Nebulization BID - RT   saccharomyces boulardii 250 mg Oral BID   sodium chloride 10 mL Intravenous Q12H     Continuous Infusions:  heparin (porcine) 18 Units/kg/hr Last Rate: 19 Units/kg/hr (20 0833)   hold 1 each      PRN Meds:heparin (porcine)  •  hold  •  potassium chloride **OR** potassium chloride **OR** potassium chloride  •  [COMPLETED] Insert peripheral IV **AND** sodium chloride  •  sodium chloride    Vital signs in last 24 hours:  Temp:  [98.3 °F (36.8 °C)-98.8 °F (37.1 °C)] 98.4 °F (36.9 °C)  Heart Rate:  [68-90] 77  Resp:  [18-30] 24  BP: (134-173)/() 159/100    Intake/Output:    Intake/Output Summary (Last 24 hours) at 3/3/2020 0913  Last data filed at 3/3/2020 0755  Gross per 24 hour   Intake 1533 ml   Output 2490 ml   Net -957 ml       Exam:  /100   Pulse 77   Temp 98.4 °F (36.9 °C) (Oral)   Resp 24   Ht 177.8 cm (70\")   Wt 94.1 kg (207 lb 7.3 oz)   SpO2 (!) 87%   BMI 29.77 kg/m²     General Appearance:    Alert, cooperative, no distress, appears stated age   Back:     Symmetric, no CVA tenderness   Lungs:     Clear to auscultation bilaterally, respirations unlabored   Heart:    Regular rate and rhythm, strong peripheral pulses   Abdomen:    Soft   Genitalia:   Dried blood at meatus   Extremities:   Extremities normal, atraumatic, no cyanosis or edema   Skin:   Skin color, texture, turgor normal, no rashes or lesions        Data Review:  All labs (24hrs):   Recent Results (from the past 24 hour(s))   Body Fluid Culture - Body Fluid, Pleural Cavity    Collection Time: " 03/02/20  1:36 PM   Result Value Ref Range    Body Fluid Culture No growth at 2 days    Fungus Smear - Body Fluid, Pleural Cavity    Collection Time: 03/02/20  1:36 PM   Result Value Ref Range    Fungal Stain No fungal elements seen    pH, Body Fluid - Pleural Fluid, Pleural Cavity    Collection Time: 03/02/20  1:37 PM   Result Value Ref Range    pH, Fluid 7.84    Protein, Body Fluid - Pleural Fluid, Pleural Cavity    Collection Time: 03/02/20  1:37 PM   Result Value Ref Range    Protein, Total, Fluid 2.3 g/dL   Lactate Dehydrogenase, Body Fluid - Pleural Fluid, Pleural Cavity    Collection Time: 03/02/20  1:37 PM   Result Value Ref Range    Lactate Dehydrogenase (LD), Fluid 610 U/L   Glucose, Body Fluid - Pleural Fluid, Pleural Cavity    Collection Time: 03/02/20  1:37 PM   Result Value Ref Range    Glucose, Fluid 123 mg/dL   Body fluid cell count - Pleural Fluid, Pleural Cavity    Collection Time: 03/02/20  1:37 PM   Result Value Ref Range    Color, Fluid Red     Appearance, Fluid Turbid (A) Clear    WBC, Fluid 589 /mm3    RBC, Fluid 46,000 /mm3   Body fluid differential - Pleural Fluid, Pleural Cavity    Collection Time: 03/02/20  1:37 PM   Result Value Ref Range    Neutrophils, Fluid 58 %    Lymphocytes, Fluid 27 %    Mononuclear, Fluid 15 %    Other Cells/100 WBCs, Fluid 2 /100 WBCs   aPTT    Collection Time: 03/02/20  2:20 PM   Result Value Ref Range    PTT 28.2 22.7 - 35.4 seconds   aPTT    Collection Time: 03/02/20 10:09 PM   Result Value Ref Range    PTT 36.5 (H) 22.7 - 35.4 seconds   POC Glucose Once    Collection Time: 03/03/20  1:02 AM   Result Value Ref Range    Glucose 139 (H) 70 - 130 mg/dL   aPTT    Collection Time: 03/03/20  6:16 AM   Result Value Ref Range    .0 (C) 22.7 - 35.4 seconds   Renal Function Panel    Collection Time: 03/03/20  6:16 AM   Result Value Ref Range    Glucose 134 (H) 65 - 99 mg/dL    BUN 25 (H) 8 - 23 mg/dL    Creatinine 0.76 0.76 - 1.27 mg/dL    Sodium 142 136 - 145  mmol/L    Potassium 3.6 3.5 - 5.2 mmol/L    Chloride 106 98 - 107 mmol/L    CO2 26.2 22.0 - 29.0 mmol/L    Calcium 7.7 (L) 8.6 - 10.5 mg/dL    Albumin 2.40 (L) 3.50 - 5.20 g/dL    Phosphorus 3.3 2.5 - 4.5 mg/dL    Anion Gap 9.8 5.0 - 15.0 mmol/L    BUN/Creatinine Ratio 32.9 (H) 7.0 - 25.0    eGFR Non African Amer 97 >60 mL/min/1.73   CBC Auto Differential    Collection Time: 03/03/20  6:16 AM   Result Value Ref Range    WBC 13.65 (H) 3.40 - 10.80 10*3/mm3    RBC 3.51 (L) 4.14 - 5.80 10*6/mm3    Hemoglobin 9.9 (L) 13.0 - 17.7 g/dL    Hematocrit 29.7 (L) 37.5 - 51.0 %    MCV 84.6 79.0 - 97.0 fL    MCH 28.2 26.6 - 33.0 pg    MCHC 33.3 31.5 - 35.7 g/dL    RDW 12.2 (L) 12.3 - 15.4 %    RDW-SD 37.6 37.0 - 54.0 fl    MPV 10.2 6.0 - 12.0 fL    Platelets 359 140 - 450 10*3/mm3   Manual Differential    Collection Time: 03/03/20  6:16 AM   Result Value Ref Range    Neutrophil % 84.0 (H) 42.7 - 76.0 %    Lymphocyte % 5.0 (L) 19.6 - 45.3 %    Monocyte % 10.0 5.0 - 12.0 %    Eosinophil % 1.0 0.3 - 6.2 %    Neutrophils Absolute 11.47 (H) 1.70 - 7.00 10*3/mm3    Lymphocytes Absolute 0.68 (L) 0.70 - 3.10 10*3/mm3    Monocytes Absolute 1.37 (H) 0.10 - 0.90 10*3/mm3    Eosinophils Absolute 0.14 0.00 - 0.40 10*3/mm3    Polychromasia Slight/1+ None Seen    WBC Morphology Normal Normal    Platelet Morphology Normal Normal   POC Glucose Once    Collection Time: 03/03/20  7:18 AM   Result Value Ref Range    Glucose 127 70 - 130 mg/dL       Assessment:    Acute saddle pulmonary embolism with acute cor pulmonale (CMS/HCC)    Pulmonary emboli (CMS/HCC)      Gross blood at urethral meatus - dried, resolved, catheter removed this AM  Like the result of recent Ledezma catheter placement and anticoagulation on board - Does not appear to be gross hematuria.    Plan:    1. Undergoing Voiding trial presently with serial PVRs  2. Will follow for any urinary retention of hematuria.  3. No acute urologic intervention - call for any issues       [place]  ANDIE Russell MD  3/3/2020  9:13 AM

## 2020-03-03 NOTE — PROGRESS NOTES
"  Daily Progress Note.   [facility] CORONARY CARE  3/3/2020    Patient:  Name:  Izaiah Garcia  MRN:  [id number]  8/31/1931  88 y.o.  male         CC: Short of breath hypoxia     History of Present Illness:  Patient is an 88-year-old male who was recently discharged from the hospital after stay treated for pneumonia.  I reviewed his discharge summary from the 22nd.  He presented to the emergency room today from [place] after he was found to be hypoxic despite his 6 L nasal cannula.  Oxygen increased to 15 L and he was brought in by emergency medical services.  He is little bit more short of breath ongoing for the past few days however this acutely changed and worsened earlier today.  He was being given IV antibiotics at the nursing home.  He had a PICC line for this.     Patient denies chest pain hemoptysis.  Denies fever or chills.  No rigors.  Denies any emesis difficulty swallowing pain sputum production or diarrhea.  Does feel generalized weakness really has not gotten much strength since hospital discharge.  History somewhat limited by use of BIPAP.    Feeling much better after intervention, ekos placed 2/29- bilateral.    Interval History/ROS:   Doing well  Patient is awake alert.  He is diuresing well.  No acute events overnight tolerated thoracentesis no pain in his back no shoulder pain.  He has had multiple bowel movements.  They are very liquid.  Ongoing for 3 bowel movements already today.  Upon discussion the patient's wife states that she was recently hospitalized and he had been treated for 2 weeks worth of antibiotics for diarrhea.  Not sure if it was termed C. difficile      No fever, no diarrhea, no chest pain  PMFSSH: no change    Physical Exam:  /71   Pulse 72   Temp 98 °F (36.7 °C) (Oral)   Resp 24   Ht 177.8 cm (70\")   Wt 94.1 kg (207 lb 7.3 oz)   SpO2 92%   BMI 29.77 kg/m²   Body mass index is 29.77 kg/m².    Intake/Output Summary (Last 24 hours) at 3/3/2020 " 1357  Last data filed at 3/3/2020 0755  Gross per 24 hour   Intake 1173 ml   Output 1275 ml   Net -102 ml     General appearance:non toxic awake alert conversant   Eyes: anicteric sclerae, moist conjunctivae; no lid-lag; PERRLA  HENT: cant palpate any mass, no lad, mmm  Neck:trachela midline, supple  Lungs: CTAB without wheeze or rhonchi, diminished at bilateral base, with normal respiratory effort and nointercostal retractions  CV: RRR, no rub  Abdomen: Soft, non-tender; no masses or HSM  Extremities: +pitting LE peripheral edema left > right or extremity lymphadenopathy  Skin: Normal temperature, turgor and texture; no rash, ulcers or subcutaneous nodules  Psych: Appropriate affect, alert and oriented to person, place and time  Neuro: noteable [place], CNs 2-12 intact sensation intact moves ext  Data Review:  Notable Labs:  Results from last 7 days   Lab Units 03/03/20  0616 03/02/20  0311 03/01/20  1750 03/01/20  1159 03/01/20  0356 03/01/20  0009 02/29/20  1319   WBC 10*3/mm3 13.65* 16.36* 19.95* 19.91* 20.65* 19.90* 20.88*   HEMOGLOBIN g/dL 9.9* 10.3* 10.5* 10.1* 10.0* 10.2* 10.9*   PLATELETS 10*3/mm3 359 416 398 378 367 358 381     Results from last 7 days   Lab Units 03/03/20  0616 03/02/20  0311 03/01/20  0356 02/29/20  0808   SODIUM mmol/L 142 142 144 138   POTASSIUM mmol/L 3.6 3.9 3.1* 3.0*   CHLORIDE mmol/L 106 106 104 96*   CO2 mmol/L 26.2 25.6 25.5 27.2   BUN mg/dL 25* 29* 26* 20   CREATININE mg/dL 0.76 0.83 0.82 0.91   GLUCOSE mg/dL 134* 141* 145* 177*   CALCIUM mg/dL 7.7* 7.7* 7.6* 8.3*   PHOSPHORUS mg/dL 3.3 2.1* 3.5  --    Estimated Creatinine Clearance: 73.5 mL/min (by C-G formula based on SCr of 0.76 mg/dL).    Imaging:  Reviewed chest images personally from past 3 days  Thyroid us neg    Scheduled meds:      apixaban 10 mg Oral Q12H   Followed by      [START ON 3/10/2020] apixaban 5 mg Oral Q12H   budesonide 0.5 mg Nebulization BID - RT   carbidopa-levodopa 1 tablet Oral BID   furosemide 40 mg  Intravenous Q12H   ipratropium-albuterol 3 mL Nebulization BID - RT   saccharomyces boulardii 250 mg Oral BID   sodium chloride 10 mL Intravenous Q12H       ASSESSMENT  /  PLAN:  Acute hypoxic resp failure  Management of NIV  BPE s/p ekos catheter on 2/29  Troponemia suspect due to BPE  RV strain  HLD  Esphageal stricture  Gen weakness  Parkinsons Disease  Vit D def  RF factor positive  HTN  Abnormal mass around thyroid   Bilateral pleural effusion R greater than Left  hypokalemia    S/p B ekos with tpa infusion locally with good clinical response  Effusion studies look like parapneumonic.  Does not look to be actively infected    Continue with diuresis  Send for C. difficile testing of stool  Lasix twice daily for now monitoring renal function      Thyroid us normal but ct chest showing abn mass around thyroid but US neg.    Not sure that he would be stable for any surgery or biopsy at this point.  I will hold off on consult, will need outpatient follow up - pet scan as outpatient may be best option.  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    Chetan Eugene MD  [facility]  03/03/20  3675

## 2020-03-04 ENCOUNTER — APPOINTMENT (OUTPATIENT)
Dept: GENERAL RADIOLOGY | Facility: HOSPITAL | Age: 85
End: 2020-03-04

## 2020-03-04 LAB
ALBUMIN SERPL-MCNC: 2.2 G/DL (ref 3.5–5.2)
ANION GAP SERPL CALCULATED.3IONS-SCNC: 9.4 MMOL/L (ref 5–15)
BUN BLD-MCNC: 20 MG/DL (ref 8–23)
BUN/CREAT SERPL: 26.3 (ref 7–25)
CALCIUM SPEC-SCNC: 8 MG/DL (ref 8.6–10.5)
CHLORIDE SERPL-SCNC: 99 MMOL/L (ref 98–107)
CHOLEST FLD-MCNC: 38 MG/DL
CO2 SERPL-SCNC: 26.6 MMOL/L (ref 22–29)
CREAT BLD-MCNC: 0.76 MG/DL (ref 0.76–1.27)
CYTO UR: NORMAL
DEPRECATED RDW RBC AUTO: 37.4 FL (ref 37–54)
EOSINOPHIL # BLD MANUAL: 0.41 10*3/MM3 (ref 0–0.4)
EOSINOPHIL NFR BLD MANUAL: 3 % (ref 0.3–6.2)
ERYTHROCYTE [DISTWIDTH] IN BLOOD BY AUTOMATED COUNT: 12.2 % (ref 12.3–15.4)
GFR SERPL CREATININE-BSD FRML MDRD: 97 ML/MIN/1.73
GLUCOSE BLD-MCNC: 132 MG/DL (ref 65–99)
HCT VFR BLD AUTO: 31.1 % (ref 37.5–51)
HGB BLD-MCNC: 10.2 G/DL (ref 13–17.7)
LAB AP CASE REPORT: NORMAL
LAB AP DIAGNOSIS COMMENT: NORMAL
LYMPHOCYTES # BLD MANUAL: 1.08 10*3/MM3 (ref 0.7–3.1)
LYMPHOCYTES NFR BLD MANUAL: 6 % (ref 5–12)
LYMPHOCYTES NFR BLD MANUAL: 8 % (ref 19.6–45.3)
MCH RBC QN AUTO: 27.6 PG (ref 26.6–33)
MCHC RBC AUTO-ENTMCNC: 32.8 G/DL (ref 31.5–35.7)
MCV RBC AUTO: 84.3 FL (ref 79–97)
MONOCYTES # BLD AUTO: 0.81 10*3/MM3 (ref 0.1–0.9)
NEUTROPHILS # BLD AUTO: 11.25 10*3/MM3 (ref 1.7–7)
NEUTROPHILS NFR BLD MANUAL: 83 % (ref 42.7–76)
PATH REPORT.FINAL DX SPEC: NORMAL
PATH REPORT.GROSS SPEC: NORMAL
PHOSPHATE SERPL-MCNC: 3.5 MG/DL (ref 2.5–4.5)
PLAT MORPH BLD: NORMAL
PLATELET # BLD AUTO: 328 10*3/MM3 (ref 140–450)
PMV BLD AUTO: 9.9 FL (ref 6–12)
POTASSIUM BLD-SCNC: 3.2 MMOL/L (ref 3.5–5.2)
POTASSIUM BLD-SCNC: 3.8 MMOL/L (ref 3.5–5.2)
RBC # BLD AUTO: 3.69 10*6/MM3 (ref 4.14–5.8)
RBC MORPH BLD: NORMAL
SODIUM BLD-SCNC: 135 MMOL/L (ref 136–145)
WBC MORPH BLD: NORMAL
WBC NRBC COR # BLD: 13.55 10*3/MM3 (ref 3.4–10.8)

## 2020-03-04 PROCEDURE — 85025 COMPLETE CBC W/AUTO DIFF WBC: CPT | Performed by: INTERNAL MEDICINE

## 2020-03-04 PROCEDURE — 84132 ASSAY OF SERUM POTASSIUM: CPT | Performed by: INTERNAL MEDICINE

## 2020-03-04 PROCEDURE — 71045 X-RAY EXAM CHEST 1 VIEW: CPT

## 2020-03-04 PROCEDURE — 97162 PT EVAL MOD COMPLEX 30 MIN: CPT

## 2020-03-04 PROCEDURE — 94799 UNLISTED PULMONARY SVC/PX: CPT

## 2020-03-04 PROCEDURE — 80069 RENAL FUNCTION PANEL: CPT | Performed by: INTERNAL MEDICINE

## 2020-03-04 PROCEDURE — 25010000002 HYDRALAZINE PER 20 MG: Performed by: INTERNAL MEDICINE

## 2020-03-04 PROCEDURE — 85007 BL SMEAR W/DIFF WBC COUNT: CPT | Performed by: INTERNAL MEDICINE

## 2020-03-04 PROCEDURE — 99232 SBSQ HOSP IP/OBS MODERATE 35: CPT | Performed by: INTERNAL MEDICINE

## 2020-03-04 PROCEDURE — 97110 THERAPEUTIC EXERCISES: CPT

## 2020-03-04 PROCEDURE — 97165 OT EVAL LOW COMPLEX 30 MIN: CPT | Performed by: OCCUPATIONAL THERAPIST

## 2020-03-04 PROCEDURE — 25010000002 FUROSEMIDE PER 20 MG: Performed by: INTERNAL MEDICINE

## 2020-03-04 PROCEDURE — 94660 CPAP INITIATION&MGMT: CPT

## 2020-03-04 RX ORDER — LIDOCAINE 50 MG/G
1 PATCH TOPICAL
Status: DISCONTINUED | OUTPATIENT
Start: 2020-03-04 | End: 2020-03-20 | Stop reason: HOSPADM

## 2020-03-04 RX ADMIN — APIXABAN 10 MG: 5 TABLET, FILM COATED ORAL at 09:06

## 2020-03-04 RX ADMIN — APIXABAN 10 MG: 5 TABLET, FILM COATED ORAL at 20:33

## 2020-03-04 RX ADMIN — Medication 250 MG: at 20:33

## 2020-03-04 RX ADMIN — POTASSIUM CHLORIDE 40 MEQ: 750 CAPSULE, EXTENDED RELEASE ORAL at 13:51

## 2020-03-04 RX ADMIN — FUROSEMIDE 40 MG: 10 INJECTION, SOLUTION INTRAMUSCULAR; INTRAVENOUS at 09:06

## 2020-03-04 RX ADMIN — IPRATROPIUM BROMIDE AND ALBUTEROL SULFATE 3 ML: 2.5; .5 SOLUTION RESPIRATORY (INHALATION) at 10:55

## 2020-03-04 RX ADMIN — BUDESONIDE 0.5 MG: 0.5 INHALANT RESPIRATORY (INHALATION) at 20:00

## 2020-03-04 RX ADMIN — LIDOCAINE 1 PATCH: 50 PATCH CUTANEOUS at 20:32

## 2020-03-04 RX ADMIN — IPRATROPIUM BROMIDE AND ALBUTEROL SULFATE 3 ML: 2.5; .5 SOLUTION RESPIRATORY (INHALATION) at 20:00

## 2020-03-04 RX ADMIN — Medication 250 MG: at 09:06

## 2020-03-04 RX ADMIN — POTASSIUM CHLORIDE 40 MEQ: 750 CAPSULE, EXTENDED RELEASE ORAL at 06:58

## 2020-03-04 RX ADMIN — BUDESONIDE 0.5 MG: 0.5 INHALANT RESPIRATORY (INHALATION) at 10:55

## 2020-03-04 RX ADMIN — SODIUM CHLORIDE, PRESERVATIVE FREE 10 ML: 5 INJECTION INTRAVENOUS at 20:33

## 2020-03-04 RX ADMIN — CARBIDOPA AND LEVODOPA 1 TABLET: 25; 100 TABLET ORAL at 20:33

## 2020-03-04 RX ADMIN — CARBIDOPA AND LEVODOPA 1 TABLET: 25; 100 TABLET ORAL at 09:06

## 2020-03-04 RX ADMIN — HYDRALAZINE HYDROCHLORIDE 10 MG: 20 INJECTION INTRAMUSCULAR; INTRAVENOUS at 02:27

## 2020-03-04 RX ADMIN — FUROSEMIDE 40 MG: 10 INJECTION, SOLUTION INTRAMUSCULAR; INTRAVENOUS at 22:32

## 2020-03-04 NOTE — PLAN OF CARE
Problem: Patient Care Overview  Goal: Plan of Care Review  Flowsheets  Taken 3/4/2020 1029  Plan of Care Reviewed With: patient (Pended)  Taken 3/4/2020 1024  Outcome Summary: Pt presents to therapy following admission for B LE DVT and PE. Pt exhibits decreased strength, endurance, balance and coordination that impact gait and functional mobility. PT will follow pt to address these deficits and monitor progress to plan discharge appropriately. PT anticipates pt to attend SNF to continue to address remaining deficits. (Pended)

## 2020-03-04 NOTE — PROGRESS NOTES
"  Daily Progress Note.   [facility] CARDIOVASC UNIT  3/4/2020    Patient:  Name:  Izaiah Garcia  MRN:  [id number]  8/31/1931  88 y.o.  male         CC: Short of breath hypoxia     History of Present Illness:  Patient is an 88-year-old male who was recently discharged from the hospital after stay treated for pneumonia.  I reviewed his discharge summary from the 22nd.  He presented to the emergency room today from [place] after he was found to be hypoxic despite his 6 L nasal cannula.  Oxygen increased to 15 L and he was brought in by emergency medical services.  He is little bit more short of breath ongoing for the past few days however this acutely changed and worsened earlier today.  He was being given IV antibiotics at the nursing home.  He had a PICC line for this.     Patient denies chest pain hemoptysis.  Denies fever or chills.  No rigors.  Denies any emesis difficulty swallowing pain sputum production or diarrhea.  Does feel generalized weakness really has not gotten much strength since hospital discharge.  History somewhat limited by use of BIPAP.    Feeling much better after intervention, ekos placed 2/29- bilateral.    Interval History/ROS:   Doing well no acute events.  Seems having some left upper quadrant pain this is new complaint to me and his wife however the patient says is been going on for 2 to 3 days.  No increase shortness of breath in fact he says he feels much better.  He is down to 2 L oxygen requirement.  No hemoptysis no melena no hematochezia    No fever, no diarrhea, no chest pain  PMFSSH: no change    Physical Exam:  /72 (BP Location: Left arm, Patient Position: Lying)   Pulse 69   Temp 98 °F (36.7 °C) (Oral)   Resp 18   Ht 177.8 cm (70\")   Wt 90.4 kg (199 lb 4.7 oz)   SpO2 94%   BMI 28.60 kg/m²   Body mass index is 28.6 kg/m².    Intake/Output Summary (Last 24 hours) at 3/4/2020 1847  Last data filed at 3/4/2020 1737  Gross per 24 hour   Intake 1040 ml "   Output 2025 ml   Net -985 ml     General appearance:non toxic awake alert conversant   Eyes: anicteric sclerae, moist conjunctivae; no lid-lag; PERRLA  HENT: cant palpate any mass, no lad, mmm  Neck:trachela midline, supple  Lungs: CTAB without wheeze or rhonchi, diminished at bilateral base, with normal respiratory effort and nointercostal retractions  CV: RRR, no rub  Abdomen: Soft, non-tender; no masses or HSM no pain in left upper abd no ecchymosis  Extremities: +pitting LE peripheral edema left > right or extremity lymphadenopathy  Skin: Normal temperature, turgor and texture; no rash, ulcers or subcutaneous nodules  Psych: Appropriate affect, alert and oriented to person, place and time  Neuro: noteable [place], CNs 2-12 intact sensation intact moves ext  Data Review:  Notable Labs:  Results from last 7 days   Lab Units 03/04/20  0440 03/03/20  0616 03/02/20  0311 03/01/20  1750 03/01/20  1159 03/01/20  0356 03/01/20  0009   WBC 10*3/mm3 13.55* 13.65* 16.36* 19.95* 19.91* 20.65* 19.90*   HEMOGLOBIN g/dL 10.2* 9.9* 10.3* 10.5* 10.1* 10.0* 10.2*   PLATELETS 10*3/mm3 328 359 416 398 378 367 358     Results from last 7 days   Lab Units 03/04/20  1724 03/04/20  0440 03/03/20  0616 03/02/20  0311 03/01/20  0356 02/29/20  0808   SODIUM mmol/L  --  135* 142 142 144 138   POTASSIUM mmol/L 3.8 3.2* 3.6 3.9 3.1* 3.0*   CHLORIDE mmol/L  --  99 106 106 104 96*   CO2 mmol/L  --  26.6 26.2 25.6 25.5 27.2   BUN mg/dL  --  20 25* 29* 26* 20   CREATININE mg/dL  --  0.76 0.76 0.83 0.82 0.91   GLUCOSE mg/dL  --  132* 134* 141* 145* 177*   CALCIUM mg/dL  --  8.0* 7.7* 7.7* 7.6* 8.3*   PHOSPHORUS mg/dL  --  3.5 3.3 2.1* 3.5  --    Estimated Creatinine Clearance: 72.2 mL/min (by C-G formula based on SCr of 0.76 mg/dL).    Imaging:  Reviewed chest images personally from past 3 days  Thyroid us neg    Scheduled meds:      apixaban 10 mg Oral Q12H   Followed by      [START ON 3/10/2020] apixaban 5 mg Oral Q12H   budesonide 0.5 mg  Nebulization BID - RT   carbidopa-levodopa 1 tablet Oral BID   furosemide 40 mg Intravenous Q12H   ipratropium-albuterol 3 mL Nebulization BID - RT   saccharomyces boulardii 250 mg Oral BID   sodium chloride 10 mL Intravenous Q12H       ASSESSMENT  /  PLAN:  Acute hypoxic resp failure  BPE s/p ekos catheter on 2/29  Troponemia suspect due to BPE  RV strain  HLD  Esphageal stricture  Gen weakness  Parkinsons Disease  Vit D def  RF factor positive  HTN  Abnormal mass around thyroid   Bilateral pleural effusion R greater than Left  hypokalemia    S/p B ekos with tpa infusion locally with good clinical response  Effusion studies look like parapneumonic.  Does not look to be actively infected  Continue with diuresis  Lasix twice daily for now monitoring renal function  Down from 5-6L last discharge, nrb then bipap on admission to now 2L with good saturation.  Great to see    Thyroid us normal but ct chest showing abn mass around thyroid but US neg.    Not sure that he would be stable for any surgery or biopsy at this point.  I will hold off on consult, will need outpatient follow up - pet scan as outpatient may be best option.  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    Lidoderm patch for pain      Chetan Eugene MD  [facility]  03/04/20  649p    cxr for am ordered.  Chetan Eugene MD  [facility]  03/04/20  6:49 PM

## 2020-03-04 NOTE — PLAN OF CARE
Problem: Patient Care Overview  Goal: Plan of Care Review  Flowsheets (Taken 3/4/2020 2408)  Progress: improving  Plan of Care Reviewed With: patient;spouse;family  Outcome Summary: pt evaluated for OT. pt was up not too long ago for a few hrs. already washed up w. a lot of A from [facility]. per family. Pt completed B UE AROM to incr strength and endurance for ADLs and tsf. pt to cont OT and recommend rehab at [facility]

## 2020-03-04 NOTE — THERAPY EVALUATION
Acute Care - Occupational Therapy Initial Evaluation  [facility]     Patient Name: Izaiah Garcia  : 1931  MRN: [id number]  Today's Date: 3/4/2020  Onset of Illness/Injury or Date of Surgery: 20          Admit Date: 2020       ICD-10-CM ICD-9-CM   1. Healthcare-associated pneumonia J18.9 486   2. Acute hypoxemic respiratory failure (CMS/[facility]) J96.01 518.81   3. Bilateral pulmonary embolism (CMS/[facility]) I26.99 415.19   4. Acute saddle pulmonary embolism with acute cor pulmonale (CMS/[facility]) I26.02 415.13     415.0     Patient Active Problem List   Diagnosis   • Hyperlipidemia   • Benign essential hypertension   • History of gout   • History of esophageal stricture   • History of stroke, 2016--4.9 x 4 x 3 cm inferior left cerebellar infarct   • Rheumatoid factor positive   • Impaired fasting glucose   • At risk for falls   • Generalized weakness   • Bilateral high frequency sensorineural hearing loss, wears hearing aids   • Bilateral lower extremity edema   • Parkinson's disease (CMS/[facility])   • Therapeutic drug monitoring   • Vitamin D deficiency   • Macrocytosis   • Vitamin B12 deficiency   • Acute diarrhea   • Hospital-acquired pneumonia   • HAP (hospital-acquired pneumonia)   • Acute UTI (urinary tract infection)   • Colitis   • Parkinson disease (CMS/[facility])   • Weakness   • Aspiration pneumonia of right lower lobe due to vomit (CMS/[facility])   • Pulmonary emboli (CMS/[facility])   • Acute saddle pulmonary embolism with acute cor pulmonale (CMS/[facility])     Past Medical History:   Diagnosis Date   • At risk for falls 2019   • Benign essential hypertension 2016   • Bilateral high frequency sensorineural hearing loss, wears hearing aids 2019   • Bilateral lower extremity edema 2019    May 2, 2019--patient who has hypertension and is on amlodipine 10 mg/day is complaining of progressively worsening swelling of the lower extremities that extends up to about mid calf.  Gets worse at the end of the day  "and tends to get better overnight when he props his feet up.  I think this is definitely related to the amlodipine although patient may have some venous insufficiency.  Medication ad   • Decreased peripheral vision of both eyes 5/2/2019    May 2, 2019--continues to have complaints of \"dizziness\" associated with weakness in his lower extremities.  He is not describing a spinning sensation or anything remotely close to vertigo.  Instead he is describing an off-balance sensation.  He tends to fall forward if not careful and he tends to list towards the right side.  He has marked difficulty going down an incline.  He has to ambulate wit   • History of aspiration pneumonia 5/4/2015   • History of esophageal stricture 5/4/2015    July 3, 2018--EGD revealed a distal esophageal stricture that was dilated to 18 mm.  There was a small hiatal hernia.  There was mild streaky erythema in the proximal stomach.  Duodenal bulb normal.  April 26, 2016--EGD performed for dysphagia reveals a distal esophageal stricture that was dilated 16.5 mm.   • History of gout 6/29/2016   • History of stroke, 6/29/2016--4.9 x 4 x 3 cm inferior left cerebellar infarct 5/4/2015 June 29, 2016--MRI of the brain performed for complaints of dizziness and weakness. IMPRESSION: 1. There is susceptibility artifact streaking through the anterior left frontal scalp through the anterior left frontal bone in the anterior tipof the left frontal lobe likely from a tiny piece of metal in the anterior left frontal scalp slightly limits evaluation of these structures. 2. There is mild s   • Hyperlipidemia 6/29/2016   • Impaired fasting glucose 5/2/2019 April 14, 2015--hemoglobin A1c 5.9.   • Macrocytosis 5/2/2019   • Parkinson's disease (CMS/[facility]) 5/2/2019    May 2, 2019--continues to have complaints of \"dizziness\" associated with weakness in his lower extremities.  He is not describing a spinning sensation or anything remotely close to vertigo.  Instead he is " describing an off-balance sensation.  He tends to fall forward if not careful and he tends to list towards the right side.  He has marked difficulty going down an incline.  He has to ambulate wit   • Rheumatoid factor positive 5/2/2019 March 25, 2014--rheumatoid factor returned positive at 95.  However, CCP antibodies normal at 8, SHIV negative, both C&P ANCA negative, C-reactive protein normal at 0.24, sed rate normal at 2.   • Vitamin B12 deficiency 6/6/2019 June 6, 2019--patient recently had mildly elevated methylmalonic acid of 380.  Repeat methylmalonic acid was upper limit of normal at 356.  Given patient's neurologic symptoms and suspected parkinsonism, I have a low threshold for treating vitamin B12 deficiency.  We will initiate vitamin B12 injections today.  2000 mcg subcutaneously given today.   • Vitamin D deficiency 5/2/2019     Past Surgical History:   Procedure Laterality Date   • CARDIAC CATHETERIZATION N/A 2/29/2020    Procedure: Thrombolytic Therapy- EKOS;  Surgeon: Jason Griffiths MD;  Location: [facility] CATH INVASIVE LOCATION;  Service: Cardiovascular;  Laterality: N/A;   • CATARACT EXTRACTION EXTRACAPSULAR W/ INTRAOCULAR LENS IMPLANTATION Bilateral     Bilateral cataract extirpation with intraocular lens implantation   • CHOLECYSTECTOMY     • EKOS CATHETER PLACEMENT Bilateral 2/29/2020    Procedure: Ekos catheter placement;  Surgeon: Jason Griffiths MD;  Location:  [facility] CATH INVASIVE LOCATION;  Service: Cardiovascular;  Laterality: Bilateral;   • ESOPHAGEAL DILATATION  04/26/2016 April 26, 2016--EGD performed for dysphagia reveals a distal esophageal stricture that was dilated 16.5 mm.   • ESOPHAGEAL DILATATION  07/03/2018    July 3, 2018--EGD revealed a distal esophageal stricture that was dilated to 18 mm.  There was a small hiatal hernia.  There was mild streaky erythema in the proximal stomach.  Duodenal bulb normal.   • INTERVENTIONAL RADIOLOGY PROCEDURE Bilateral 2/29/2020     Procedure: Pulmonary Angiogram;  Surgeon: Jason Griffiths MD;  Location:  [facility] CATH INVASIVE LOCATION;  Service: Cardiovascular;  Laterality: Bilateral;          OT ASSESSMENT FLOWSHEET (last 12 hours)      Occupational Therapy Evaluation     Row Name 03/04/20 1518                   OT Evaluation Time/Intention    Subjective Information  complains of;fatigue  -        Document Type  evaluation  -        Mode of Treatment  individual therapy;occupational therapy  -        Patient Effort  good  -KP        Symptoms Noted During/After Treatment  none  -           General Information    Patient Profile Reviewed?  yes  -        Onset of Illness/Injury or Date of Surgery  02/29/20  -        Patient Observations  alert;cooperative;agree to therapy  -        Patient/Family Observations  pt supine in bed. family present.  -        Prior Level of Function  independent:;transfer;ADL's  -        Existing Precautions/Restrictions  fall  -        Benefits Reviewed  patient and family:;improve function;increase independence;increase strength;increase balance  -           Cognitive Assessment/Intervention- PT/OT    Orientation Status (Cognition)  oriented x 3  -        Follows Commands (Cognition)  WFL  -        Safety Deficit (Cognitive)  mild deficit  -           Bed Mobility Assessment/Treatment    Comment (Bed Mobility)  pt was just up to chair earlier and tired.  -           ADL Assessment/Intervention    BADL Assessment/Intervention  grooming  -           Grooming Assessment/Training    Comment (Grooming)  already washed up, required a lot of A per family. has been through a lot  -           General ROM    GENERAL ROM COMMENTS  B UE 8/8  -KP           MMT (Manual Muscle Testing)    General MMT Comments  B UE 3+/5  -KP           Motor Assessment/Interventions    Additional Documentation  Balance (Group);Balance Interventions (Group);Fine Motor Testing & Training (Group);Therapeutic Exercise  (Group);Therapeutic Exercise Interventions (Group)  -           Therapeutic Exercise    Upper Extremity (Therapeutic Exercise)  bicep curl, left;bicep curl, right  -KP        Upper Extremity Range of Motion (Therapeutic Exercise)  shoulder flexion/extension, left;shoulder flexion/extension, right;elbow flexion/extension, right;elbow flexion/extension, left;forearm supination/pronation, left;forearm supination/pronation, right;other (see comments) chest press  -KP        Exercise Type (Therapeutic Exercise)  AROM (active range of motion)  -KP        Position (Therapeutic Exercise)  supine  -KP        Sets/Reps (Therapeutic Exercise)  10x2  -KP           Fine Motor Testing & Training    Comment, Fine Motor Coordination  FMC imp R intact L for opposition  -KP           Positioning and Restraints    Pre-Treatment Position  in bed  -KP        Post Treatment Position  bed  -KP        In Bed  supine;call light within reach;encouraged to call for assist;exit alarm on;notified ns;with family/caregiver  -           Pain Scale: Numbers Pre/Post-Treatment    Pain Scale: Numbers, Pretreatment  0/10 - no pain  -KP        Pain Scale: Numbers, Post-Treatment  0/10 - no pain  -KP           Wound 03/01/20  Right anterior groin Puncture    Wound - Properties Group Date first assessed: 03/01/20  -KB Time first assessed: --  -KB, when EKOS pulled, puncture wound left in place  Present on Hospital Admission: N  -KB Side: Right  -KB Orientation: anterior  -KB Location: groin  -KB Primary Wound Type: Puncture  -KB       Plan of Care Review    Plan of Care Reviewed With  patient;spouse;family  -        Progress  improving  -        Outcome Summary  pt evaluated for OT. pt was up not too long ago for a few hrs. already washed up w. a lot of A from nsg aid. per family. Pt completed B UE AROM to incr strength and endurance for ADLs and tsf. pt to cont OT and recommend rehab at [facility]           Clinical Impression (OT)    Criteria for  Skilled Therapeutic Interventions Met (OT Eval)  treatment indicated;yes  -        Rehab Potential (OT Eval)  good, to achieve stated therapy goals  -        Therapy Frequency (OT Eval)  5 times/wk  -        Anticipated Discharge Disposition (OT)  skilled nursing facility  -           OT Goals    Bathing Goal Selection (OT)  bathing, OT goal 1  -        Strength Goal Selection (OT)  strength, OT goal 1  -        Additional Documentation  Strength Goal Selection (OT) (Row)  -           Bathing Goal 1 (OT)    Activity/Assistive Device (Bathing Goal 1, OT)  bathing skills, all;grab bar/tub rail  -        [place] Level/Cues Needed (Bathing Goal 1, OT)  set-up required;moderate assist (50-74% patient effort)  -        Time Frame (Bathing Goal 1, OT)  long term goal (LTG);by discharge  -        Progress/Outcomes (Bathing Goal 1, OT)  goal ongoing  -           Strength Goal 1 (OT)    Strength Goal 1 (OT)  to incr B UE to 4/5  -        Time Frame (Strength Goal 1, OT)  long term goal (LTG);by discharge  -        Progress/Outcome (Strength Goal 1, OT)  goal ongoing  -          User Key  (r) = Recorded By, (t) = Taken By, (c) = Cosigned By    Initials Name Effective Dates     Stacy Chiu, OTR 06/08/18 -     Tootie Pan, RN 06/20/16 -                OT Recommendation and Plan  Outcome Summary/Treatment Plan (OT)  Anticipated Discharge Disposition (OT): skilled nursing facility  Therapy Frequency (OT Eval): 5 times/wk  Plan of Care Review  Plan of Care Reviewed With: patient, spouse, family  Plan of Care Reviewed With: patient, spouse, family  Outcome Summary: pt evaluated for OT. pt was up not too long ago for a few hrs. already washed up w. a lot of A from nsg aid. per family. Pt completed B UE AROM to incr strength and endurance for ADLs and tsf. pt to cont OT and recommend rehab at [facility]    Outcome Measures     Row Name 03/04/20 5964             How much help from another  is currently needed...    Putting on and taking off regular lower body clothing?  1  -KP      Bathing (including washing, rinsing, and drying)  1  -KP      Toileting (which includes using toilet bed pan or urinal)  1  -KP      Putting on and taking off regular upper body clothing  2  -KP      Taking care of personal grooming (such as brushing teeth)  2  -KP      Eating meals  1  -KP      AM-PAC 6 Clicks Score (OT)  8  -         Functional Assessment    Outcome Measure Options  AM-PAC 6 Clicks Daily Activity (OT)  -        User Key  (r) = Recorded By, (t) = Taken By, (c) = Cosigned By    Initials Name Provider Type    Stacy Mchugh OTR Occupational Therapist          Time Calculation:   Time Calculation- OT     Row Name 03/04/20 1524             Time Calculation- OT    OT Start Time  1502  -      OT Stop Time  1515  -      OT Time Calculation (min)  13 min  -      OT Received On  03/04/20  -      OT Goal Re-Cert Due Date  03/11/20  -        User Key  (r) = Recorded By, (t) = Taken By, (c) = Cosigned By    Initials Name Provider Type    Stacy Mchugh OTR Occupational Therapist        Therapy Charges for Today     Code Description Service Date Service Provider Modifiers Qty    79158053712 HC OT EVAL LOW COMPLEXITY 2 3/4/2020 Stacy Chiu OTR GO 1               ABIOLA Maria  3/4/2020

## 2020-03-04 NOTE — PLAN OF CARE
Pt remains in CCU, waiting on tele bed.  Pt is on 5L NC with BiPap PRN.  Heparin gtt stopped and pt transitioned to PO Eliquis.  Pt had multiple liquid BM's today, sent sample down to test for c-diff,  test came back negative.   Will continue to monitor.        Problem: Patient Care Overview  Goal: Plan of Care Review  Outcome: Ongoing (interventions implemented as appropriate)  Flowsheets (Taken 3/3/2020 1205)  Plan of Care Reviewed With: patient;family     Problem: VTE, DVT and PE (Adult)  Goal: Signs and Symptoms of Listed Potential Problems Will be Absent, Minimized or Managed (VTE, DVT and PE)  Outcome: Ongoing (interventions implemented as appropriate)  Flowsheets  Taken 3/3/2020 1902 by Jeri William RN  Problems Assessed (VTE, DVT, PE): all  Taken 3/2/2020 1839 by Su Keen RN  Problems Present (VTE, DVT, PE): deep vein thrombosis;pulmonary embolism     Problem: Pain, Acute (Adult)  Goal: Identify Related Risk Factors and Signs and Symptoms  Outcome: Ongoing (interventions implemented as appropriate)  Goal: Acceptable Pain Control/Comfort Level  Outcome: Ongoing (interventions implemented as appropriate)  Flowsheets (Taken 3/3/2020 1902)  Acceptable Pain Control/Comfort Level: making progress toward outcome

## 2020-03-04 NOTE — PROGRESS NOTES
Discharge Planning Assessment  [facility]     Patient Name: Izaiah Garcia  MRN: [id number]  Today's Date: 3/4/2020    Admit Date: 2/29/2020      Discharge Plan     Row Name 03/04/20 1214       Plan    Plan  Possibly return to [facility]    Plan Comments  I met with and his wife Chago due to CCP referral about possibly needing anothter place at discharge.  Pt's wife said she wants to speak with her grandchildren before deciding on SNF, she said there was a couple of things that went on when he was there last time that she was not happy with, she said she will make up her mind by tomorrow, she confirmed she has the list of area SNF and their medicare rating.     Sisi Ross RN        Destination      Service Provider Request Status Selected Services Address Phone Number Fax Number    [facility] Pending - Request Sent N/A [id number] [phone] [phone]         Sisi Ross, PRIYA

## 2020-03-04 NOTE — THERAPY EVALUATION
Patient Name: Izaiah Garcia  : 1931    MRN: [id number]                              Today's Date: 3/4/2020       Admit Date: 2020    Visit Dx:     ICD-10-CM ICD-9-CM   1. Healthcare-associated pneumonia J18.9 486   2. Acute hypoxemic respiratory failure (CMS/HCC) J96.01 518.81   3. Bilateral pulmonary embolism (CMS/HCC) I26.99 415.19   4. Acute saddle pulmonary embolism with acute cor pulmonale (CMS/HCC) I26.02 415.13     415.0     Patient Active Problem List   Diagnosis   • Hyperlipidemia   • Benign essential hypertension   • History of gout   • History of esophageal stricture   • History of stroke, 2016--4.9 x 4 x 3 cm inferior left cerebellar infarct   • Rheumatoid factor positive   • Impaired fasting glucose   • At risk for falls   • Generalized weakness   • Bilateral high frequency sensorineural hearing loss, wears hearing aids   • Bilateral lower extremity edema   • Parkinson's disease (CMS/HCC)   • Therapeutic drug monitoring   • Vitamin D deficiency   • Macrocytosis   • Vitamin B12 deficiency   • Acute diarrhea   • Hospital-acquired pneumonia   • HAP (hospital-acquired pneumonia)   • Acute UTI (urinary tract infection)   • Colitis   • Parkinson disease (CMS/HCC)   • Weakness   • Aspiration pneumonia of right lower lobe due to vomit (CMS/HCC)   • Pulmonary emboli (CMS/HCC)   • Acute saddle pulmonary embolism with acute cor pulmonale (CMS/HCC)     Past Medical History:   Diagnosis Date   • At risk for falls 2019   • Benign essential hypertension 2016   • Bilateral high frequency sensorineural hearing loss, wears hearing aids 2019   • Bilateral lower extremity edema 2019    May 2, 2019--patient who has hypertension and is on amlodipine 10 mg/day is complaining of progressively worsening swelling of the lower extremities that extends up to about mid calf.  Gets worse at the end of the day and tends to get better overnight when he props his feet up.  I think this is definitely  "related to the amlodipine although patient may have some venous insufficiency.  Medication ad   • Decreased peripheral vision of both eyes 5/2/2019    May 2, 2019--continues to have complaints of \"dizziness\" associated with weakness in his lower extremities.  He is not describing a spinning sensation or anything remotely close to vertigo.  Instead he is describing an off-balance sensation.  He tends to fall forward if not careful and he tends to list towards the right side.  He has marked difficulty going down an incline.  He has to ambulate wit   • History of aspiration pneumonia 5/4/2015   • History of esophageal stricture 5/4/2015    July 3, 2018--EGD revealed a distal esophageal stricture that was dilated to 18 mm.  There was a small hiatal hernia.  There was mild streaky erythema in the proximal stomach.  Duodenal bulb normal.  April 26, 2016--EGD performed for dysphagia reveals a distal esophageal stricture that was dilated 16.5 mm.   • History of gout 6/29/2016   • History of stroke, 6/29/2016--4.9 x 4 x 3 cm inferior left cerebellar infarct 5/4/2015 June 29, 2016--MRI of the brain performed for complaints of dizziness and weakness. IMPRESSION: 1. There is susceptibility artifact streaking through the anterior left frontal scalp through the anterior left frontal bone in the anterior tipof the left frontal lobe likely from a tiny piece of metal in the anterior left frontal scalp slightly limits evaluation of these structures. 2. There is mild s   • Hyperlipidemia 6/29/2016   • Impaired fasting glucose 5/2/2019 April 14, 2015--hemoglobin A1c 5.9.   • Macrocytosis 5/2/2019   • Parkinson's disease (CMS/[facility]) 5/2/2019    May 2, 2019--continues to have complaints of \"dizziness\" associated with weakness in his lower extremities.  He is not describing a spinning sensation or anything remotely close to vertigo.  Instead he is describing an off-balance sensation.  He tends to fall forward if not careful and he " tends to list towards the right side.  He has marked difficulty going down an incline.  He has to ambulate wit   • Rheumatoid factor positive 5/2/2019 March 25, 2014--rheumatoid factor returned positive at 95.  However, CCP antibodies normal at 8, SHIV negative, both C&P ANCA negative, C-reactive protein normal at 0.24, sed rate normal at 2.   • Vitamin B12 deficiency 6/6/2019 June 6, 2019--patient recently had mildly elevated methylmalonic acid of 380.  Repeat methylmalonic acid was upper limit of normal at 356.  Given patient's neurologic symptoms and suspected parkinsonism, I have a low threshold for treating vitamin B12 deficiency.  We will initiate vitamin B12 injections today.  2000 mcg subcutaneously given today.   • Vitamin D deficiency 5/2/2019     Past Surgical History:   Procedure Laterality Date   • CARDIAC CATHETERIZATION N/A 2/29/2020    Procedure: Thrombolytic Therapy- EKOS;  Surgeon: Jason Griffiths MD;  Location: [facility] INVASIVE LOCATION;  Service: Cardiovascular;  Laterality: N/A;   • CATARACT EXTRACTION EXTRACAPSULAR W/ INTRAOCULAR LENS IMPLANTATION Bilateral     Bilateral cataract extirpation with intraocular lens implantation   • CHOLECYSTECTOMY     • EKOS CATHETER PLACEMENT Bilateral 2/29/2020    Procedure: Ekos catheter placement;  Surgeon: Jason Griffiths MD;  Location:  [facility];  Service: Cardiovascular;  Laterality: Bilateral;   • ESOPHAGEAL DILATATION  04/26/2016 April 26, 2016--EGD performed for dysphagia reveals a distal esophageal stricture that was dilated 16.5 mm.   • ESOPHAGEAL DILATATION  07/03/2018    July 3, 2018--EGD revealed a distal esophageal stricture that was dilated to 18 mm.  There was a small hiatal hernia.  There was mild streaky erythema in the proximal stomach.  Duodenal bulb normal.   • INTERVENTIONAL RADIOLOGY PROCEDURE Bilateral 2/29/2020    Procedure: Pulmonary Angiogram;  Surgeon: Jason Griffiths MD;  Location: [facility]  INVASIVE LOCATION;  Service: Cardiovascular;  Laterality: Bilateral;     General Information     [facility] Name 03/04/20 1017          PT Evaluation Time/Intention    Document Type  evaluation  (Pended)   -TW     Mode of Treatment  individual therapy;physical therapy  (Pended)   -     Row Name 03/04/20 1017          General Information    Prior Level of Function  independent:;gait;transfer;bed mobility  (Pended)  Pt lives at an assisted living facility so is not completely independent, but was able to ambulate short distances and get around in bed  -TW     Existing Precautions/Restrictions  fall  (Pended)   -TW     Barriers to Rehab  medically complex;hearing deficit  (Pended)   -     Row Name 03/04/20 1017          Relationship/Environment    Lives With  spouse  (Pended)   -     Row Name 03/04/20 1017          Resource/Environmental Concerns    Current Living Arrangements  extended care facility;independent/assisted living facility  (Pended)   -[facility] Name 03/04/20 1017          Cognitive Assessment/Intervention- PT/OT    Orientation Status (Cognition)  oriented x 3  (Pended)   -[facility] Name 03/04/20 1017          Safety Issues, Functional Mobility    Impairments Affecting Function (Mobility)  balance;coordination;endurance/activity tolerance;strength;shortness of breath  (Pended)   -TW       User Key  (r) = Recorded By, (t) = Taken By, (c) = Cosigned By    Initials Name Provider Type    TW Rufino Pradhan, PT Student PT Student        Mobility     [facility] Name 03/04/20 1019          Bed Mobility Assessment/Treatment    Bed Mobility Assessment/Treatment  supine-sit  (Pended)   -TW     Supine-Sit [place] (Bed Mobility)  moderate assist (50% patient effort);2 person assist;verbal cues  (Pended)   -TW     Comment (Bed Mobility)  Pt required some assistance with LE and trunk control to come to seated position  (Pended)   -     Row Name 03/04/20 1019          Transfer Assessment/Treatment    Comment (Transfers)   Pt was able to come to standing position with mod A x2 and was a little unstable at first, but was able to find his balance.  (Pended)   -     Row Name 03/04/20 1019          Sit-Stand Transfer    Sit-Stand [place] (Transfers)  moderate assist (50% patient effort);2 person assist;verbal cues  (Pended)   -TW     Assistive Device (Sit-Stand Transfers)  walker, front-wheeled  (Pended)   -     Row Name 03/04/20 1019          Gait/Stairs Assessment/Training    Gait/Stairs Assessment/Training  gait/ambulation assistive device  (Pended)   -TW     [place] Level (Gait)  moderate assist (50% patient effort);2 person assist;verbal cues  (Pended)   -TW     Assistive Device (Gait)  walker, front-wheeled  (Pended)   -TW     Distance in Feet (Gait)  6  (Pended)   -TW     Pattern (Gait)  step-to  (Pended)   -TW     Deviations/Abnormal Patterns (Gait)  erika decreased;festinating/shuffling;gait speed decreased;stride length decreased  (Pended)   -TW     Bilateral Gait Deviations  forward flexed posture;heel strike decreased  (Pended)   -TW     Comment (Gait/Stairs)  Pt was able to ambulate from bedside to nearby chair. Pt was very slow, but showed no LOB. Pt had decreased heel strike and exhibited shuffling pattern.   (Pended)   -TW       User Key  (r) = Recorded By, (t) = Taken By, (c) = Cosigned By    Initials Name Provider Type    TW Rufino Pradhan, PT Student PT Student        Obj/Interventions     Row Name 03/04/20 1022          General ROM    GENERAL ROM COMMENTS  ROM slightly limited in B ankle due to swelling. All other WFL  (Pended)   -     Row Name 03/04/20 1022          MMT (Manual Muscle Testing)    General MMT Comments  MMT grossly 3+/5  (Pended)   -     Row Name 03/04/20 1022          Therapeutic Exercise    Comment (Therapeutic Exercise)  B AP and LAQ x 5 reps  (Pended)   -     Row Name 03/04/20 1022          Static Sitting Balance    Level of [place] (Unsupported Sitting, Static Balance)   contact guard assist;1 person assist  (Pended)   -     Sitting Position (Unsupported Sitting, Static Balance)  sitting on edge of bed  (Pended)   -[facility] Name 03/04/20 1022          Dynamic Sitting Balance    Level of [place], Reaches Outside Midline (Sitting, Dynamic Balance)  contact guard assist;1 person assist  (Pended)   -     Sitting Position, Reaches Outside Midline (Sitting, Dynamic Balance)  sitting on edge of bed  (Pended)   -[facility] Name 03/04/20 1022          Static Standing Balance    Level of [place] (Supported Standing, Static Balance)  minimal assist, 75% patient effort;1 person assist  (Pended)   -     Assistive Device Utilized (Supported Standing, Static Balance)  walker, rolling  (Pended)   -[facility] Name 03/04/20 1022          Dynamic Standing Balance    Level of [place], Reaches Outside Midline (Standing, Dynamic Balance)  moderate assist, 50 to 74% patient effort;2 person assist  (Pended)   -     Assistive Device Utilized (Supported Standing, Dynamic Balance)  walker, rolling  (Pended)   -       User Key  (r) = Recorded By, (t) = Taken By, (c) = Cosigned By    Initials Name Provider Type    TW Rufino Pradhan, PT Student PT Student        Goals/Plan     [facility] Name 03/04/20 1027          Bed Mobility Goal 1 (PT)    Activity/Assistive Device (Bed Mobility Goal 1, PT)  bed mobility activities, all  (Pended)   -     [place] Level/Cues Needed (Bed Mobility Goal 1, PT)  supervision required  (Pended)   -     Time Frame (Bed Mobility Goal 1, PT)  1 week  (Pended)   -[facility] Name 03/04/20 1027          Transfer Goal 1 (PT)    Activity/Assistive Device (Transfer Goal 1, PT)  transfers, all  (Pended)   -     [place] Level/Cues Needed (Transfer Goal 1, PT)  supervision required  (Pended)   -     Time Frame (Transfer Goal 1, PT)  1 week  (Pended)   -[facility] Name 03/04/20 1027          Gait Training Goal 1 (PT)    Activity/Assistive Device (Gait Training  Goal 1, PT)  gait (walking locomotion);assistive device use;walker, rolling  (Pended)   -TW     [place] Level (Gait Training Goal 1, PT)  contact guard assist  (Pended)   -TW     Distance (Gait Goal 1, PT)  100  (Pended)   -TW     Time Frame (Gait Training Goal 1, PT)  1 week  (Pended)   -TW       User Key  (r) = Recorded By, (t) = Taken By, (c) = Cosigned By    Initials Name Provider Type    TW Rufino Pradhan, PT Student PT Student        Clinical Impression     Row Name 03/04/20 1024          Pain Assessment    Additional Documentation  Pain Scale: Numbers Pre/Post-Treatment (Group)  (Pended)   -     Row Name 03/04/20 1024          Pain Scale: Numbers Pre/Post-Treatment    Pain Scale: Numbers, Pretreatment  0/10 - no pain  (Pended)   -TW     Pain Scale: Numbers, Post-Treatment  0/10 - no pain  (Pended)   -TW     Pre/Post Treatment Pain Comment  Pt states that he is not in any pain at the moment  (Pended)   -     Row Name 03/04/20 1024          Plan of Care Review    Plan of Care Reviewed With  patient;family  (Pended)   -TW     Outcome Summary  Pt presents to therapy following admission for B LE DVT and PE. Pt exhibits decreased strength, endurance, balance and coordination that impact gait and functional mobility. PT will follow pt to address these deficits and monitor progress to plan discharge appropriately. PT anticipates pt to attend SNF to continue to address remaining deficits.  (Pended)   -     Row Name 03/04/20 1024          Physical Therapy Clinical Impression    Patient/Family Goals Statement (PT Clinical Impression)  Return to PLOF  (Pended)   -TW     Criteria for Skilled Interventions Met (PT Clinical Impression)  yes;treatment indicated  (Pended)   -TW     Rehab Potential (PT Clinical Summary)  good, to achieve stated therapy goals  (Pended)   -     Row Name 03/04/20 1024          Vital Signs    Pre SpO2 (%)  93  (Pended)   -TW     O2 Delivery Pre Treatment  supplemental O2  (Pended)    -TW     Intra SpO2 (%)  95  (Pended)   -TW     O2 Delivery Intra Treatment  supplemental O2  (Pended)   -TW     Post SpO2 (%)  95  (Pended)   -TW     O2 Delivery Post Treatment  supplemental O2  (Pended)   -TW     Row Name 03/04/20 1024          Positioning and Restraints    Pre-Treatment Position  in bed  (Pended)   -TW     Post Treatment Position  chair  (Pended)   -TW     In Chair  sitting;call light within reach;encouraged to call for assist;with family/caregiver  (Pended)   -TW       User Key  (r) = Recorded By, (t) = Taken By, (c) = Cosigned By    Initials Name Provider Type    TW Ruifno Pradhan, PT Student PT Student        Outcome Measures     Row Name 03/04/20 1028          How much help from another person do you currently need...    Turning from your back to your side while in flat bed without using bedrails?  2  (Pended)   -TW     Moving from lying on back to sitting on the side of a flat bed without bedrails?  2  (Pended)   -TW     Moving to and from a bed to a chair (including a wheelchair)?  2  (Pended)   -TW     Standing up from a chair using your arms (e.g., wheelchair, bedside chair)?  2  (Pended)   -TW     Climbing 3-5 steps with a railing?  1  (Pended)   -TW     To walk in hospital room?  2  (Pended)   -TW     AM-PAC 6 Clicks Score (PT)  11  (Pended)   -TW     Row Name 03/04/20 1028          Functional Assessment    Outcome Measure Options  AM-PAC 6 Clicks Basic Mobility (PT)  (Pended)   -TW       User Key  (r) = Recorded By, (t) = Taken By, (c) = Cosigned By    Initials Name Provider Type    Rufino Velez, PT Student PT Student          PT Recommendation and Plan  Planned Therapy Interventions (PT Eval): (P) balance training, bed mobility training, gait training, home exercise program, patient/family education, stair training, strengthening, transfer training  Outcome Summary/Treatment Plan (PT)  Anticipated Discharge Disposition (PT): (P) skilled nursing facility  Plan of Care Reviewed With:  (P) patient  Outcome Summary: (P) Pt presents to therapy following admission for B LE DVT and PE. Pt exhibits decreased strength, endurance, balance and coordination that impact gait and functional mobility. PT will follow pt to address these deficits and monitor progress to plan discharge appropriately. PT anticipates pt to attend SNF to continue to address remaining deficits.     Time Calculation:   PT Charges     Row Name 03/04/20 1029             Time Calculation    Start Time  0955  (Pended)   -TW      Stop Time  1012  (Pended)   -TW      Time Calculation (min)  17 min  (Pended)   -TW      PT Received On  03/04/20  (Pended)   -TW      PT - Next Appointment  03/05/20  (Pended)   -TW      PT Goal Re-Cert Due Date  03/11/20  (Pended)   -TW         Time Calculation- PT    Total Timed Code Minutes- PT  9 minute(s)  (Pended)   -TW        User Key  (r) = Recorded By, (t) = Taken By, (c) = Cosigned By    Initials Name Provider Type    TW Rufino Pradhan, PT Student PT Student        Therapy Charges for Today     Code Description Service Date Service Provider Modifiers Qty    17425483416 HC PT EVAL MOD COMPLEXITY 2 3/4/2020 Rufino Pradhan, PT Student GP 1    16451982896 HC PT THER PROC EA 15 MIN 3/4/2020 Rufino Pradhan, PT Student GP 1    45785355181 HC PT THER SUPP EA 15 MIN 3/4/2020 Rufino Pradhan, PT Student GP 1          PT G-Codes  Outcome Measure Options: (P) AM-PAC 6 Clicks Basic Mobility (PT)  AM-PAC 6 Clicks Score (PT): (P) 11    Rufino Pradhan PT Student  3/4/2020

## 2020-03-05 DIAGNOSIS — I26.99 ACUTE MASSIVE PULMONARY EMBOLISM (HCC): Primary | ICD-10-CM

## 2020-03-05 LAB
ALBUMIN SERPL-MCNC: 2.2 G/DL (ref 3.5–5.2)
ANION GAP SERPL CALCULATED.3IONS-SCNC: 11.6 MMOL/L (ref 5–15)
BACTERIA SPEC AEROBE CULT: NORMAL
BACTERIA SPEC AEROBE CULT: NORMAL
BUN BLD-MCNC: 18 MG/DL (ref 8–23)
BUN/CREAT SERPL: 24.7 (ref 7–25)
CALCIUM SPEC-SCNC: 8 MG/DL (ref 8.6–10.5)
CHLORIDE SERPL-SCNC: 99 MMOL/L (ref 98–107)
CO2 SERPL-SCNC: 24.4 MMOL/L (ref 22–29)
CREAT BLD-MCNC: 0.73 MG/DL (ref 0.76–1.27)
DEPRECATED RDW RBC AUTO: 39.4 FL (ref 37–54)
ERYTHROCYTE [DISTWIDTH] IN BLOOD BY AUTOMATED COUNT: 12.6 % (ref 12.3–15.4)
GFR SERPL CREATININE-BSD FRML MDRD: 101 ML/MIN/1.73
GLUCOSE BLD-MCNC: 118 MG/DL (ref 65–99)
HCT VFR BLD AUTO: 31.2 % (ref 37.5–51)
HGB BLD-MCNC: 10 G/DL (ref 13–17.7)
LYMPHOCYTES # BLD MANUAL: 1.76 10*3/MM3 (ref 0.7–3.1)
LYMPHOCYTES NFR BLD MANUAL: 12.1 % (ref 19.6–45.3)
LYMPHOCYTES NFR BLD MANUAL: 4 % (ref 5–12)
MCH RBC QN AUTO: 27.5 PG (ref 26.6–33)
MCHC RBC AUTO-ENTMCNC: 32.1 G/DL (ref 31.5–35.7)
MCV RBC AUTO: 85.7 FL (ref 79–97)
MONOCYTES # BLD AUTO: 0.58 10*3/MM3 (ref 0.1–0.9)
NEUTROPHILS # BLD AUTO: 12.06 10*3/MM3 (ref 1.7–7)
NEUTROPHILS NFR BLD MANUAL: 82.8 % (ref 42.7–76)
PHOSPHATE SERPL-MCNC: 3.3 MG/DL (ref 2.5–4.5)
PLAT MORPH BLD: NORMAL
PLATELET # BLD AUTO: 305 10*3/MM3 (ref 140–450)
PMV BLD AUTO: 10 FL (ref 6–12)
POTASSIUM BLD-SCNC: 3.9 MMOL/L (ref 3.5–5.2)
RBC # BLD AUTO: 3.64 10*6/MM3 (ref 4.14–5.8)
RBC MORPH BLD: NORMAL
SODIUM BLD-SCNC: 135 MMOL/L (ref 136–145)
VARIANT LYMPHS NFR BLD MANUAL: 1 % (ref 0–5)
WBC MORPH BLD: NORMAL
WBC NRBC COR # BLD: 14.57 10*3/MM3 (ref 3.4–10.8)

## 2020-03-05 PROCEDURE — 25010000002 FUROSEMIDE PER 20 MG: Performed by: INTERNAL MEDICINE

## 2020-03-05 PROCEDURE — 97112 NEUROMUSCULAR REEDUCATION: CPT | Performed by: OCCUPATIONAL THERAPIST

## 2020-03-05 PROCEDURE — 97110 THERAPEUTIC EXERCISES: CPT

## 2020-03-05 PROCEDURE — 80069 RENAL FUNCTION PANEL: CPT | Performed by: INTERNAL MEDICINE

## 2020-03-05 PROCEDURE — 25010000002 VANCOMYCIN 10 G RECONSTITUTED SOLUTION: Performed by: INTERNAL MEDICINE

## 2020-03-05 PROCEDURE — 99232 SBSQ HOSP IP/OBS MODERATE 35: CPT | Performed by: INTERNAL MEDICINE

## 2020-03-05 PROCEDURE — 94799 UNLISTED PULMONARY SVC/PX: CPT

## 2020-03-05 PROCEDURE — 97110 THERAPEUTIC EXERCISES: CPT | Performed by: OCCUPATIONAL THERAPIST

## 2020-03-05 PROCEDURE — 97530 THERAPEUTIC ACTIVITIES: CPT

## 2020-03-05 PROCEDURE — 25010000002 CEFEPIME 2 G/NS 100 ML SOLUTION: Performed by: INTERNAL MEDICINE

## 2020-03-05 PROCEDURE — 85007 BL SMEAR W/DIFF WBC COUNT: CPT | Performed by: INTERNAL MEDICINE

## 2020-03-05 PROCEDURE — 85025 COMPLETE CBC W/AUTO DIFF WBC: CPT | Performed by: INTERNAL MEDICINE

## 2020-03-05 RX ORDER — VANCOMYCIN HYDROCHLORIDE 1 G/200ML
1000 INJECTION, SOLUTION INTRAVENOUS EVERY 12 HOURS
Status: DISCONTINUED | OUTPATIENT
Start: 2020-03-05 | End: 2020-03-05 | Stop reason: DRUGHIGH

## 2020-03-05 RX ADMIN — SODIUM CHLORIDE, PRESERVATIVE FREE 10 ML: 5 INJECTION INTRAVENOUS at 08:59

## 2020-03-05 RX ADMIN — APIXABAN 10 MG: 5 TABLET, FILM COATED ORAL at 21:08

## 2020-03-05 RX ADMIN — BUDESONIDE 0.5 MG: 0.5 INHALANT RESPIRATORY (INHALATION) at 08:08

## 2020-03-05 RX ADMIN — SODIUM CHLORIDE, PRESERVATIVE FREE 10 ML: 5 INJECTION INTRAVENOUS at 21:08

## 2020-03-05 RX ADMIN — FUROSEMIDE 40 MG: 10 INJECTION, SOLUTION INTRAMUSCULAR; INTRAVENOUS at 22:18

## 2020-03-05 RX ADMIN — VANCOMYCIN HYDROCHLORIDE 1750 MG: 10 INJECTION, POWDER, LYOPHILIZED, FOR SOLUTION INTRAVENOUS at 22:18

## 2020-03-05 RX ADMIN — CARBIDOPA AND LEVODOPA 1 TABLET: 25; 100 TABLET ORAL at 21:08

## 2020-03-05 RX ADMIN — Medication 250 MG: at 08:59

## 2020-03-05 RX ADMIN — APIXABAN 10 MG: 5 TABLET, FILM COATED ORAL at 08:59

## 2020-03-05 RX ADMIN — FUROSEMIDE 40 MG: 10 INJECTION, SOLUTION INTRAMUSCULAR; INTRAVENOUS at 09:00

## 2020-03-05 RX ADMIN — Medication 250 MG: at 21:08

## 2020-03-05 RX ADMIN — IPRATROPIUM BROMIDE AND ALBUTEROL SULFATE 3 ML: 2.5; .5 SOLUTION RESPIRATORY (INHALATION) at 08:07

## 2020-03-05 RX ADMIN — LIDOCAINE 1 PATCH: 50 PATCH CUTANEOUS at 09:00

## 2020-03-05 RX ADMIN — CARBIDOPA AND LEVODOPA 1 TABLET: 25; 100 TABLET ORAL at 08:59

## 2020-03-05 RX ADMIN — CEFEPIME 2 G: 2 INJECTION, POWDER, FOR SOLUTION INTRAVENOUS at 19:42

## 2020-03-05 NOTE — THERAPY TREATMENT NOTE
Acute Care - Occupational Therapy Treatment Note  [facility]     Patient Name: Izaiah Garcia  : 1931  MRN: [id number]  Today's Date: 3/5/2020  Onset of Illness/Injury or Date of Surgery: 20          Admit Date: 2020       ICD-10-CM ICD-9-CM   1. Healthcare-associated pneumonia J18.9 486   2. Acute hypoxemic respiratory failure (CMS/[facility]) J96.01 518.81   3. Bilateral pulmonary embolism (CMS/[facility]) I26.99 415.19   4. Acute saddle pulmonary embolism with acute cor pulmonale (CMS/[facility]) I26.02 415.13     415.0     Patient Active Problem List   Diagnosis   • Hyperlipidemia   • Benign essential hypertension   • History of gout   • History of esophageal stricture   • History of stroke, 2016--4.9 x 4 x 3 cm inferior left cerebellar infarct   • Rheumatoid factor positive   • Impaired fasting glucose   • At risk for falls   • Generalized weakness   • Bilateral high frequency sensorineural hearing loss, wears hearing aids   • Bilateral lower extremity edema   • Parkinson's disease (CMS/[facility])   • Therapeutic drug monitoring   • Vitamin D deficiency   • Macrocytosis   • Vitamin B12 deficiency   • Acute diarrhea   • Hospital-acquired pneumonia   • HAP (hospital-acquired pneumonia)   • Acute UTI (urinary tract infection)   • Colitis   • Parkinson disease (CMS/[facility])   • Weakness   • Aspiration pneumonia of right lower lobe due to vomit (CMS/[facility])   • Pulmonary emboli (CMS/[facility])   • Acute saddle pulmonary embolism with acute cor pulmonale (CMS/HCC)     Past Medical History:   Diagnosis Date   • At risk for falls 2019   • Benign essential hypertension 2016   • Bilateral high frequency sensorineural hearing loss, wears hearing aids 2019   • Bilateral lower extremity edema 2019    May 2, 2019--patient who has hypertension and is on amlodipine 10 mg/day is complaining of progressively worsening swelling of the lower extremities that extends up to about mid calf.  Gets worse at the end of the day and  "tends to get better overnight when he props his feet up.  I think this is definitely related to the amlodipine although patient may have some venous insufficiency.  Medication ad   • Decreased peripheral vision of both eyes 5/2/2019    May 2, 2019--continues to have complaints of \"dizziness\" associated with weakness in his lower extremities.  He is not describing a spinning sensation or anything remotely close to vertigo.  Instead he is describing an off-balance sensation.  He tends to fall forward if not careful and he tends to list towards the right side.  He has marked difficulty going down an incline.  He has to ambulate wit   • History of aspiration pneumonia 5/4/2015   • History of esophageal stricture 5/4/2015    July 3, 2018--EGD revealed a distal esophageal stricture that was dilated to 18 mm.  There was a small hiatal hernia.  There was mild streaky erythema in the proximal stomach.  Duodenal bulb normal.  April 26, 2016--EGD performed for dysphagia reveals a distal esophageal stricture that was dilated 16.5 mm.   • History of gout 6/29/2016   • History of stroke, 6/29/2016--4.9 x 4 x 3 cm inferior left cerebellar infarct 5/4/2015 June 29, 2016--MRI of the brain performed for complaints of dizziness and weakness. IMPRESSION: 1. There is susceptibility artifact streaking through the anterior left frontal scalp through the anterior left frontal bone in the anterior tipof the left frontal lobe likely from a tiny piece of metal in the anterior left frontal scalp slightly limits evaluation of these structures. 2. There is mild s   • Hyperlipidemia 6/29/2016   • Impaired fasting glucose 5/2/2019 April 14, 2015--hemoglobin A1c 5.9.   • Macrocytosis 5/2/2019   • Parkinson's disease (CMS/[facility]) 5/2/2019    May 2, 2019--continues to have complaints of \"dizziness\" associated with weakness in his lower extremities.  He is not describing a spinning sensation or anything remotely close to vertigo.  Instead he is " describing an off-balance sensation.  He tends to fall forward if not careful and he tends to list towards the right side.  He has marked difficulty going down an incline.  He has to ambulate wit   • Rheumatoid factor positive 5/2/2019 March 25, 2014--rheumatoid factor returned positive at 95.  However, CCP antibodies normal at 8, SHIV negative, both C&P ANCA negative, C-reactive protein normal at 0.24, sed rate normal at 2.   • Vitamin B12 deficiency 6/6/2019 June 6, 2019--patient recently had mildly elevated methylmalonic acid of 380.  Repeat methylmalonic acid was upper limit of normal at 356.  Given patient's neurologic symptoms and suspected parkinsonism, I have a low threshold for treating vitamin B12 deficiency.  We will initiate vitamin B12 injections today.  2000 mcg subcutaneously given today.   • Vitamin D deficiency 5/2/2019     Past Surgical History:   Procedure Laterality Date   • CARDIAC CATHETERIZATION N/A 2/29/2020    Procedure: Thrombolytic Therapy- EKOS;  Surgeon: Jason Griffiths MD;  Location: [facility] CATH INVASIVE LOCATION;  Service: Cardiovascular;  Laterality: N/A;   • CATARACT EXTRACTION EXTRACAPSULAR W/ INTRAOCULAR LENS IMPLANTATION Bilateral     Bilateral cataract extirpation with intraocular lens implantation   • CHOLECYSTECTOMY     • EKOS CATHETER PLACEMENT Bilateral 2/29/2020    Procedure: Ekos catheter placement;  Surgeon: Jaosn Griffiths MD;  Location:  [facility] CATH INVASIVE LOCATION;  Service: Cardiovascular;  Laterality: Bilateral;   • ESOPHAGEAL DILATATION  04/26/2016 April 26, 2016--EGD performed for dysphagia reveals a distal esophageal stricture that was dilated 16.5 mm.   • ESOPHAGEAL DILATATION  07/03/2018    July 3, 2018--EGD revealed a distal esophageal stricture that was dilated to 18 mm.  There was a small hiatal hernia.  There was mild streaky erythema in the proximal stomach.  Duodenal bulb normal.   • INTERVENTIONAL RADIOLOGY PROCEDURE Bilateral 2/29/2020     Procedure: Pulmonary Angiogram;  Surgeon: Jason Griffiths MD;  Location: [facility] INVASIVE LOCATION;  Service: Cardiovascular;  Laterality: Bilateral;       Therapy Treatment    Rehabilitation Treatment Summary     Row Name 03/05/20 1520             Treatment Time/Intention    Discipline  occupational therapist  -SG      Document Type  therapy note (daily note)  -SG      Subjective Information  no complaints  -SG      Mode of Treatment  individual therapy;occupational therapy  -SG      Patient/Family Observations  Pt up in chair  -SG      Patient Effort  good  -SG      Existing Precautions/Restrictions  fall  -SG      Recorded by [SG] Dary Monzon OTR 03/05/20 1522      Row Name 03/05/20 1520             Cognitive Assessment/Intervention- PT/OT    Orientation Status (Cognition)  oriented x 3  -SG      Follows Commands (Cognition)  WFL  -SG      Recorded by [SG] Dary Monzon OT 03/05/20 1522      Row Name 03/05/20 1520             Transfer Assessment/Treatment    Transfer Assessment/Treatment  sit-stand transfer;stand-sit transfer  -SG      Recorded by [SG] Dary Monzon OTR 03/05/20 1522      Row Name 03/05/20 1520             Sit-Stand Transfer    Sit-Stand [place] (Transfers)  moderate assist (50% patient effort);verbal cues;nonverbal cues (demo/gesture) 2x  -SG      Assistive Device (Sit-Stand Transfers)  walker, front-wheeled  -SG      Recorded by [SG] Dary Monzon OTR 03/05/20 1522      Row Name 03/05/20 1520             Stand-Sit Transfer    Stand-Sit [place] (Transfers)  moderate assist (50% patient effort);verbal cues;nonverbal cues (demo/gesture)  -SG      Recorded by [SG] Dary Monzon OTR 03/05/20 1522      Row Name 03/05/20 1520             ADL Assessment/Intervention    BADL Assessment/Intervention  grooming  -SG      Recorded by [SG] Dary Monzon OTR 03/05/20 1522      Row Name 03/05/20 1520             Grooming Assessment/Training    [place] Level  (Grooming)  grooming skills;hair care, combing/brushing;wash face, hands;minimum assist (75% patient effort);verbal cues;nonverbal cues (demo/gesture)  -SG      Assistive Devices (Grooming)  hand over hand  -SG      Grooming Position  supported sitting  -SG      Recorded by [SG] Dary Monzon OTR 03/05/20 1522      Row Name 03/05/20 1520             Motor Skills Assessment/Interventions    Additional Documentation  Balance (Group);Therapeutic Exercise (Group)  -SG      Recorded by [SG] Dary Monzon OTR 03/05/20 1522      Row Name 03/05/20 1520             Therapeutic Exercise    Therapeutic Exercise  seated, upper extremities  -SG      Additional Documentation  Therapeutic Exercise (Row)  -SG      Recorded by [SG] Dary Monzon OTR 03/05/20 1522      Row Name 03/05/20 1520             Upper Extremity Seated Therapeutic Exercise    Performed, Seated Upper Extremity (Therapeutic Exercise)  shoulder flexion/extension;scapular protraction/retraction;elbow flexion/extension  -SG      Exercise Type, Seated Upper Extremity (Therapeutic Exercise)  AROM (active range of motion)  -SG      Expected Outcomes, Seated Upper Extremity (Therapeutic Exercise)  improve functional tolerance, self-care activity;improve functional tolerance, single extremity activity;improve performance, reaching for objects;improve performance, reaching/manipulating objects;improve performance, reaching above shoulder level  -SG      Sets/Reps Detail, Seated Upper Extremity (Therapeutic Exercise)  2/10  -SG      Recorded by [SG] Dary Monzon OTR 03/05/20 1522      Row Name 03/05/20 1520             Static Standing Balance    Level of [place] (Supported Standing, Static Balance)  moderate assist, 50 to 74% patient effort  -SG      Assistive Device Utilized (Supported Standing, Static Balance)  walker, rolling  -SG      Recorded by [SG] Dary Monzon OTR 03/05/20 1522      Row Name 03/05/20 1520             Positioning and  Restraints    Pre-Treatment Position  sitting in chair/recliner  -SG      Post Treatment Position  chair  -SG      In Chair  sitting;call light within reach;encouraged to call for assist;exit alarm on;with family/caregiver  -SG      Recorded by [SG] Dary Monzon OTR 03/05/20 1522      Row Name                Wound 03/01/20  Right anterior groin Puncture    Wound - Properties Group Date first assessed: 03/01/20 [KB] Time first assessed: -- [KB], when EKOS pulled, puncture wound left in place  Present on Hospital Admission: N [KB] Side: Right [KB] Orientation: anterior [KB] Location: groin [KB] Primary Wound Type: Puncture [KB] Recorded by:  [KB] Tootie Decker RN 03/02/20 7641      User Key  (r) = Recorded By, (t) = Taken By, (c) = Cosigned By    Initials Name Effective Dates Discipline    SG Dary Monzon OTR 12/26/18 -  OT    Tootie Pan RN 06/20/16 -  Nurse        Wound 03/01/20  Right anterior groin Puncture (Active)   Dressing Appearance open to air 3/5/2020 12:22 PM   Closure Open to air 3/5/2020 12:22 PM   Periwound intact 3/5/2020 12:22 PM   Periwound Temperature warm 3/5/2020 12:22 PM   Periwound Skin Turgor soft 3/5/2020 12:22 PM   Dressing Care, Wound open to air 3/5/2020  4:02 AM           OT Recommendation and Plan     Outcome Summary: Pt worked on standing balance and UE exercises this pm. Pt with very poor standing balance and tolerance, requires modA with rwx for several minutes. Very fatigued after ther ex.  Outcome Measures     Row Name 03/05/20 1500 03/04/20 1524          How much help from another is currently needed...    Putting on and taking off regular lower body clothing?  1  -SG  1  -KP     Bathing (including washing, rinsing, and drying)  1  -SG  1  -KP     Toileting (which includes using toilet bed pan or urinal)  1  -SG  1  -KP     Putting on and taking off regular upper body clothing  2  -SG  2  -KP     Taking care of personal grooming (such as brushing teeth)  2   -SG  2  -     Eating meals  2  -SG  1  -     AM-PAC 6 Clicks Score (OT)  9  -SG  8  -        Functional Assessment    Outcome Measure Options  AM-PAC 6 Clicks Daily Activity (OT)  -SG  AM-PAC 6 Clicks Daily Activity (OT)  -       User Key  (r) = Recorded By, (t) = Taken By, (c) = Cosigned By    Initials Name Provider Type    Dary Hernandez OTR Occupational Therapist     tSacy Chiu OTR Occupational Therapist           Time Calculation:   Time Calculation- OT     Row Name 03/05/20 1524 03/05/20 1159          Time Calculation- OT    OT Start Time  1409  -  --     OT Stop Time  1432  -  --     OT Time Calculation (min)  23 min  -  --     Total Timed Code Minutes- OT  23 minute(s)  -  --     OT Received On  03/05/20  -  --     OT - Next Appointment  --  03/06/20  -       User Key  (r) = Recorded By, (t) = Taken By, (c) = Cosigned By    Initials Name Provider Type     Dary Monzon OTR Occupational Therapist        Therapy Charges for Today     Code Description Service Date Service Provider Modifiers Qty    41890481158 HC OT NEUROMUSC RE EDUCATION EA 15 MIN 3/5/2020 Dary Monzon OTR GO 1    40176570554 HC OT THER PROC EA 15 MIN 3/5/2020 Dary Monzon OTR GO 1               ABIOLA Fink  3/5/2020

## 2020-03-05 NOTE — PLAN OF CARE
Problem: Patient Care Overview  Goal: Plan of Care Review  Outcome: Ongoing (interventions implemented as appropriate)  Flowsheets (Taken 3/5/2020 5417)  Progress: improving  Plan of Care Reviewed With: patient  Outcome Summary: VSS throughout shift. 2L NC. 1.2L output, still 3-4+ edema to BLE. Q2 turn.  Lidoderm patch applied to left lower abdomen/ flank with pain relief per pt. WBC 14.57. Albumin 2.20. Will continue to monitor.

## 2020-03-05 NOTE — PROGRESS NOTES
"Izaiah Garcia  8/31/1931 88 y.o.  [id number]      Patient Care Team:  Uriah Godinez MD as PCP - General (Internal Medicine)    CC: Submassive pulmonary emboli status post treatment    Interval History: He is feeling better      Objective   Vital Signs  Temp:  [98 °F (36.7 °C)-99.4 °F (37.4 °C)] 99.4 °F (37.4 °C)  Heart Rate:  [69-81] 81  Resp:  [18-20] 18  BP: (130-151)/(65-72) 130/65    Intake/Output Summary (Last 24 hours) at 3/5/2020 1119  Last data filed at 3/5/2020 0600  Gross per 24 hour   Intake 530 ml   Output 1675 ml   Net -1145 ml     Flowsheet Rows      First Filed Value   Admission Height  177.8 cm (70\") Documented at 02/29/2020 0800   Admission Weight  95 kg (209 lb 7 oz) Documented at 02/29/2020 0800          Physical Exam:   General Appearance:    Alert,oriented, in no acute distress   Lungs:     Clear to auscultation,BS are equal    Heart:    Normal S1 and S2, RRR without murmur, gallop or rub   HEENT:    Sclerae are clear, no JVD or adenopathy   Abdomen:     Normal bowel sounds, soft non-tender, non-distended, no HSM   Extremities:   Moves all extremities well, no edema, no cyanosis, no             Redness, no rash     Medication Review:        apixaban 10 mg Oral Q12H   Followed by      [START ON 3/10/2020] apixaban 5 mg Oral Q12H   budesonide 0.5 mg Nebulization BID - RT   carbidopa-levodopa 1 tablet Oral BID   furosemide 40 mg Intravenous Q12H   ipratropium-albuterol 3 mL Nebulization BID - RT   lidocaine 1 patch Transdermal Q24H   saccharomyces boulardii 250 mg Oral BID   sodium chloride 10 mL Intravenous Q12H       hold 1 each         I reviewed the patient's new clinical results.  I personally viewed and interpreted the patient's EKG/Telemetry data    Assessment/Plan  Active Hospital Problems    Diagnosis  POA   • **Acute saddle pulmonary embolism with acute cor pulmonale (CMS/HCC) [I26.02]  Unknown     Priority: High   • Pulmonary emboli (CMS/HCC) [I26.99]  Yes      Resolved Hospital " Problems   No resolved problems to display.       I think from cardiac standpoint he is doing well I would switch him over to Eliquis which he has been on we will see him in a month for follow-up and make sure his echo is recovered    Jason Griffiths MD  03/05/20  11:19 AM

## 2020-03-05 NOTE — PROGRESS NOTES
Pharmacy Services-Eliquis Teaching    Izaiah Garcia is a new start on Eliquis for acute saddle PE. Counseling points included the followin) Eliquis' indication, patient's need for the medication, and dosing/frequency.  2) Enforced the importance of taking Eliquis as instructed every day, and that it is a twice a day medication.  3) Explained possible side effects of Eliquis therapy, including increased risk of bleeding, s/sx of bleeding, and increase in cold intolerance. Also talked about ways to control bleeding for minor cuts and scrapes.  4) Emphasized the importance of going to the emergency room if any of the following occur: Falling and hitting your head; noticing bright red blood in urine or dark/tarry stools; vomiting up blood or vomit has a coffee-ground like texture; coughing up blood.  5) Discussed the importance of informing any physician or dentist that they have been started on Eliquis, in case they need to be taken off for a procedure.  6) Discussed all important drug interactions, including over-the-counter medications and supplements.  7) Instructed the patient not to begin or discontinue any medications without informing his physician/pharmacist.     I also explained to the patient that this medication may have a high copay associated with it and to let the cardiologist know if it is unaffordable.     Patient expressed understanding and had no further questions.  Patient's wife was also involved in the conversation.     Tootie Torres, Pharm.D., [facility]  Clinical Staff Pharmacist

## 2020-03-05 NOTE — PROGRESS NOTES
"Pharmacokinetic Consult - Vancomycin Dosing (Initial Note)    Pharmacy has been consulted to dose vancomycin for Izaiah Garcia for an indication of sepsis and gram+ fluid cx per Dr. Chetan Eugene's request. Goal trough: 15-20 mcg/mL.    Duration of Therapy: 7 days    Other Antimicrobials: Cefepime 2g IV q8h    Relevant clinical data and objective history reviewed:  88 y.o. male 177.8 cm (70\") 91.3 kg (201 lb 4.8 oz)    Vitals:    03/05/20 0710 03/05/20 0808 03/05/20 1030 03/05/20 1620   BP: 150/65  130/65 140/68   BP Location:       Pulse: 72 74 81 75   Resp: 18 18 18 18   Temp:    98.8 °F (37.1 °C)   TempSrc:    Oral   SpO2: 91% 96% 94% 93%     Creatinine   Date Value Ref Range Status   03/05/2020 0.73 (L) 0.76 - 1.27 mg/dL Final   03/04/2020 0.76 0.76 - 1.27 mg/dL Final   03/03/2020 0.76 0.76 - 1.27 mg/dL Final     BUN   Date Value Ref Range Status   03/05/2020 18 8 - 23 mg/dL Final     Estimated Creatinine Clearance: 72.5 mL/min (A) (by C-G formula based on SCr of 0.73 mg/dL (L)).    Lab Results   Component Value Date    WBC 14.57 (H) 03/05/2020     Temp Readings from Last 3 Encounters:   03/05/20 98.8 °F (37.1 °C) (Oral)      Baseline culture/source/susceptibility:   2/29: Bcx-negative  2/29: RVP-negative  3/2: Pleural cavity fluid cx-GPC  3/2: AFB cx-NGTD  3/2: Fungal cx-pending  3/3: C. Diff antigen-negative    Assessment/Plan    1. Will give a vancomycin loading dose of 1750 mg (20 mg/kg) IV once now, then start a maintenance dose of 750 mg (~8 mg/kg) IV q12h tomorrow based on patient parameters.     2. Will schedule a trough on Saturday morning 3/7 before the 0900 dose (before the 4th total dose) to determine if regimen is appropriate.     3. Will monitor serum creatinine every 24 hours for the first 3 days then at least every 48 hours per dosing recommendations. SCr continues to be stable at 0.73. If SCr starts to rise or UOP decreases, check a level sooner to Saturday morning    4. Pharmacy will " continue to follow daily while on vancomycin and adjust as needed.     Thank you for allowing me to participate in your patient's care,  Tootie Torres, Pharm.D., [facility]  Clinical Staff Pharmacist

## 2020-03-05 NOTE — PROGRESS NOTES
"  Daily Progress Note.   [facility] CARDIOVASC UNIT  3/5/2020    Patient:  Name:  Izaiah Garcia  MRN:  [id number]  8/31/1931  88 y.o.  male         CC: Short of breath hypoxia     History of Present Illness:  Patient is an 88-year-old male who was recently discharged from the hospital after stay treated for pneumonia.  I reviewed his discharge summary from the 22nd.  He presented to the emergency room today from [facility] after he was found to be hypoxic despite his 6 L nasal cannula.  Oxygen increased to 15 L and he was brought in by emergency medical services.  He is little bit more short of breath ongoing for the past few days however this acutely changed and worsened earlier today.  He was being given IV antibiotics at the nursing home.  He had a PICC line for this.     Patient denies chest pain hemoptysis.  Denies fever or chills.  No rigors.  Denies any emesis difficulty swallowing pain sputum production or diarrhea.  Does feel generalized weakness really has not gotten much strength since hospital discharge.  History somewhat limited by use of BIPAP.    Feeling much better after intervention, ekos placed 2/29- bilateral.    Interval History/ROS:   .  Pain in his flank is much better at present time.  No hemoptysis no worsening shortness of breath ox requirements have steadily decreased now on 2 L less than he was discharged on last admission a week ago.  Decreased appetite chronic issue for him.  Generalized weakness    No fever, no diarrhea, no chest pain  PMFSSH: no change    Physical Exam:  /68   Pulse 75   Temp 98.8 °F (37.1 °C) (Oral)   Resp 18   Ht 177.8 cm (70\")   Wt 91.3 kg (201 lb 4.8 oz)   SpO2 93%   BMI 28.88 kg/m²   Body mass index is 28.88 kg/m².    Intake/Output Summary (Last 24 hours) at 3/5/2020 1702  Last data filed at 3/5/2020 1224  Gross per 24 hour   Intake 410 ml   Output 2250 ml   Net -1840 ml     General appearance:non toxic awake alert conversant   "   Eyes: anicteric sclerae, moist conjunctivae; no lid-lag; PERRLA  HENT: cant palpate any mass, no lad, mmm  Neck:trachela midline, supple  Lungs: CTAB without wheeze or rhonchi, diminished at bilateral base, with normal respiratory effort and nointercostal retractions  CV: RRR, no rub  Abdomen: Soft, non-tender; no masses or HSM no pain in left upper abd no ecchymosis  Extremities: +pitting LE peripheral edema left > right or extremity lymphadenopathy  Skin: Normal temperature, turgor and texture; no rash, ulcers or subcutaneous nodules  Psych: Appropriate affect, alert and oriented to person, place and time  Neuro: noteable [place], CNs 2-12 intact sensation intact moves ext  Data Review:  Notable Labs:  Results from last 7 days   Lab Units 03/05/20  0400 03/04/20  0440 03/03/20  0616 03/02/20  0311 03/01/20  1750 03/01/20  1159 03/01/20  0356   WBC 10*3/mm3 14.57* 13.55* 13.65* 16.36* 19.95* 19.91* 20.65*   HEMOGLOBIN g/dL 10.0* 10.2* 9.9* 10.3* 10.5* 10.1* 10.0*   PLATELETS 10*3/mm3 305 328 359 416 398 378 367     Results from last 7 days   Lab Units 03/05/20  0400 03/04/20  1724 03/04/20  0440 03/03/20  0616 03/02/20  0311 03/01/20  0356 02/29/20  0808   SODIUM mmol/L 135*  --  135* 142 142 144 138   POTASSIUM mmol/L 3.9 3.8 3.2* 3.6 3.9 3.1* 3.0*   CHLORIDE mmol/L 99  --  99 106 106 104 96*   CO2 mmol/L 24.4  --  26.6 26.2 25.6 25.5 27.2   BUN mg/dL 18  --  20 25* 29* 26* 20   CREATININE mg/dL 0.73*  --  0.76 0.76 0.83 0.82 0.91   GLUCOSE mg/dL 118*  --  132* 134* 141* 145* 177*   CALCIUM mg/dL 8.0*  --  8.0* 7.7* 7.7* 7.6* 8.3*   PHOSPHORUS mg/dL 3.3  --  3.5 3.3 2.1* 3.5  --    Estimated Creatinine Clearance: 72.5 mL/min (A) (by C-G formula based on SCr of 0.73 mg/dL (L)).    Imaging:  Reviewed chest images personally from past 3 days  Thyroid us neg    Scheduled meds:      apixaban 10 mg Oral Q12H   Followed by      [START ON 3/10/2020] apixaban 5 mg Oral Q12H   budesonide 0.5 mg Nebulization BID - RT    carbidopa-levodopa 1 tablet Oral BID   furosemide 40 mg Intravenous Q12H   ipratropium-albuterol 3 mL Nebulization BID - RT   lidocaine 1 patch Transdermal Q24H   saccharomyces boulardii 250 mg Oral BID   sodium chloride 10 mL Intravenous Q12H       ASSESSMENT  /  PLAN:  Acute hypoxic resp failure  BPE s/p ekos catheter on 2/29  Troponemia suspect due to BPE  RV strain  HLD  Esphageal stricture  Gen weakness  Parkinsons Disease  Vit D def  RF factor positive  HTN  Abnormal mass around thyroid   Bilateral pleural effusion R greater than Left exudative with gram positive cocci growth now  hypokalemia    S/p B ekos with tpa infusion locally with good clinical response  Effusion studies look like parapneumonic.  Does not look to be actively infected  Lasix to po  Start vanco, gram positive cocci growing from pleural fluid  Consult to thoracic surgery    Down from 5-6L last discharge, nrb then bipap on admission to now 2L with good saturation.  Great to see    Thyroid us normal but ct chest showing abn mass around thyroid but US neg.    Not sure that he would be stable for any surgery or biopsy at this point.  I will hold off on consult, will need outpatient follow up - pet scan as outpatient may be best option.  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    Lidoderm patch for pain      Chetan Eugene MD  [facility]  03/05/20  255s

## 2020-03-05 NOTE — PROGRESS NOTES
Continued Stay Note  Psychiatric     Patient Name: Izaiah Garcia  MRN: [id number]  Today's Date: 3/5/2020    Admit Date: 2/29/2020    Discharge Plan     Row Name 03/05/20 1555       Plan    Plan  [facility];  MD please advise when you want PRECERT started.      Plan Comments  S/W Pt at bedside and she informed CCP that she spoke with [facility] liaison at length about her concerns with their facility.  Pt and spouse are now agreeable to Pt returning to [facility] at discharge.  Pt will require [facility] Precert.  Md to advise CCP when they want precert started.  Packet on CCP desk.  Pt will require ambulance transport at d/c.  S.PRIYA PANDYA/CCP         Discharge Codes    No documentation.             Yulia Pandya RN

## 2020-03-05 NOTE — PLAN OF CARE
Problem: Patient Care Overview  Goal: Plan of Care Review  Outcome: Ongoing (interventions implemented as appropriate)  Flowsheets (Taken 3/5/2020 9383)  Progress: improving  Plan of Care Reviewed With: patient  Outcome Summary: Pt tolerated treatment with c/o pain. Pt is ModA for supine to sit with verbal cueing. Pt is MinAX2 for STS transfers. Pt performed 2 transfers with verbal cueing to correct posterior lean. Pt very shaky with standing today and having difficulty taking steps. Pt took a few shuffling steps from bed to chair. Pt became more fatigued and bilateral knees became more flexed. Constant cues for pt to stand up straight. Pt performed a few bilateral LE exercises to improve strength. Will continue to progress pt as able.

## 2020-03-05 NOTE — THERAPY TREATMENT NOTE
Patient Name: Izaiah Garcia  : 1931    MRN: [id number]                              Today's Date: 3/5/2020       Admit Date: 2020    Visit Dx:     ICD-10-CM ICD-9-CM   1. Healthcare-associated pneumonia J18.9 486   2. Acute hypoxemic respiratory failure (CMS/HCC) J96.01 518.81   3. Bilateral pulmonary embolism (CMS/HCC) I26.99 415.19   4. Acute saddle pulmonary embolism with acute cor pulmonale (CMS/HCC) I26.02 415.13     415.0     Patient Active Problem List   Diagnosis   • Hyperlipidemia   • Benign essential hypertension   • History of gout   • History of esophageal stricture   • History of stroke, 2016--4.9 x 4 x 3 cm inferior left cerebellar infarct   • Rheumatoid factor positive   • Impaired fasting glucose   • At risk for falls   • Generalized weakness   • Bilateral high frequency sensorineural hearing loss, wears hearing aids   • Bilateral lower extremity edema   • Parkinson's disease (CMS/HCC)   • Therapeutic drug monitoring   • Vitamin D deficiency   • Macrocytosis   • Vitamin B12 deficiency   • Acute diarrhea   • Hospital-acquired pneumonia   • HAP (hospital-acquired pneumonia)   • Acute UTI (urinary tract infection)   • Colitis   • Parkinson disease (CMS/HCC)   • Weakness   • Aspiration pneumonia of right lower lobe due to vomit (CMS/HCC)   • Pulmonary emboli (CMS/HCC)   • Acute saddle pulmonary embolism with acute cor pulmonale (CMS/HCC)     Past Medical History:   Diagnosis Date   • At risk for falls 2019   • Benign essential hypertension 2016   • Bilateral high frequency sensorineural hearing loss, wears hearing aids 2019   • Bilateral lower extremity edema 2019    May 2, 2019--patient who has hypertension and is on amlodipine 10 mg/day is complaining of progressively worsening swelling of the lower extremities that extends up to about mid calf.  Gets worse at the end of the day and tends to get better overnight when he props his feet up.  I think this is definitely  "related to the amlodipine although patient may have some venous insufficiency.  Medication ad   • Decreased peripheral vision of both eyes 5/2/2019    May 2, 2019--continues to have complaints of \"dizziness\" associated with weakness in his lower extremities.  He is not describing a spinning sensation or anything remotely close to vertigo.  Instead he is describing an off-balance sensation.  He tends to fall forward if not careful and he tends to list towards the right side.  He has marked difficulty going down an incline.  He has to ambulate wit   • History of aspiration pneumonia 5/4/2015   • History of esophageal stricture 5/4/2015    July 3, 2018--EGD revealed a distal esophageal stricture that was dilated to 18 mm.  There was a small hiatal hernia.  There was mild streaky erythema in the proximal stomach.  Duodenal bulb normal.  April 26, 2016--EGD performed for dysphagia reveals a distal esophageal stricture that was dilated 16.5 mm.   • History of gout 6/29/2016   • History of stroke, 6/29/2016--4.9 x 4 x 3 cm inferior left cerebellar infarct 5/4/2015 June 29, 2016--MRI of the brain performed for complaints of dizziness and weakness. IMPRESSION: 1. There is susceptibility artifact streaking through the anterior left frontal scalp through the anterior left frontal bone in the anterior tipof the left frontal lobe likely from a tiny piece of metal in the anterior left frontal scalp slightly limits evaluation of these structures. 2. There is mild s   • Hyperlipidemia 6/29/2016   • Impaired fasting glucose 5/2/2019 April 14, 2015--hemoglobin A1c 5.9.   • Macrocytosis 5/2/2019   • Parkinson's disease (CMS/[facility]) 5/2/2019    May 2, 2019--continues to have complaints of \"dizziness\" associated with weakness in his lower extremities.  He is not describing a spinning sensation or anything remotely close to vertigo.  Instead he is describing an off-balance sensation.  He tends to fall forward if not careful and he " tends to list towards the right side.  He has marked difficulty going down an incline.  He has to ambulate wit   • Rheumatoid factor positive 5/2/2019 March 25, 2014--rheumatoid factor returned positive at 95.  However, CCP antibodies normal at 8, SHIV negative, both C&P ANCA negative, C-reactive protein normal at 0.24, sed rate normal at 2.   • Vitamin B12 deficiency 6/6/2019 June 6, 2019--patient recently had mildly elevated methylmalonic acid of 380.  Repeat methylmalonic acid was upper limit of normal at 356.  Given patient's neurologic symptoms and suspected parkinsonism, I have a low threshold for treating vitamin B12 deficiency.  We will initiate vitamin B12 injections today.  2000 mcg subcutaneously given today.   • Vitamin D deficiency 5/2/2019     Past Surgical History:   Procedure Laterality Date   • CARDIAC CATHETERIZATION N/A 2/29/2020    Procedure: Thrombolytic Therapy- EKOS;  Surgeon: Jason Griffiths MD;  Location: [facility] INVASIVE LOCATION;  Service: Cardiovascular;  Laterality: N/A;   • CATARACT EXTRACTION EXTRACAPSULAR W/ INTRAOCULAR LENS IMPLANTATION Bilateral     Bilateral cataract extirpation with intraocular lens implantation   • CHOLECYSTECTOMY     • EKOS CATHETER PLACEMENT Bilateral 2/29/2020    Procedure: Ekos catheter placement;  Surgeon: Jason Griffiths MD;  Location:  [facility] INVASIVE LOCATION;  Service: Cardiovascular;  Laterality: Bilateral;   • ESOPHAGEAL DILATATION  04/26/2016 April 26, 2016--EGD performed for dysphagia reveals a distal esophageal stricture that was dilated 16.5 mm.   • ESOPHAGEAL DILATATION  07/03/2018    July 3, 2018--EGD revealed a distal esophageal stricture that was dilated to 18 mm.  There was a small hiatal hernia.  There was mild streaky erythema in the proximal stomach.  Duodenal bulb normal.   • INTERVENTIONAL RADIOLOGY PROCEDURE Bilateral 2/29/2020    Procedure: Pulmonary Angiogram;  Surgeon: Jason Griffiths MD;  Location: [facility]  INVASIVE LOCATION;  Service: Cardiovascular;  Laterality: Bilateral;     General Information     [facility] Name 03/05/20 0936          PT Evaluation Time/Intention    Document Type  therapy note (daily note)  -     Mode of Treatment  occupational therapy  -[facility] Name 03/05/20 09          General Information    Existing Precautions/Restrictions  fall  -EH     Row Name 03/05/20 09          Cognitive Assessment/Intervention- PT/OT    Orientation Status (Cognition)  oriented x 3  -[facility] Name 03/05/20 09          Safety Issues, Functional Mobility    Impairments Affecting Function (Mobility)  balance;endurance/activity tolerance;strength;shortness of breath;coordination  -       User Key  (r) = Recorded By, (t) = Taken By, (c) = Cosigned By    Initials Name Provider Type     Ivis Magallon PTA Physical Therapy Assistant        Mobility     [facility] Name 03/05/20 0936          Bed Mobility Assessment/Treatment    Supine-Sit [place] (Bed Mobility)  moderate assist (50% patient effort);verbal cues  -     Assistive Device (Bed Mobility)  bed rails;head of bed elevated;draw sheet  -EH     Row Name 03/05/20 09          Transfer Assessment/Treatment    Comment (Transfers)  2 STS transfers.    -EH     Row Name 03/05/20 09          Bed-Chair Transfer    Bed-Chair [place] (Transfers)  moderate assist (50% patient effort);2 person assist;verbal cues  -     Assistive Device (Bed-Chair Transfers)  walker, front-wheeled  -EH     Row Name 03/05/20 09          Sit-Stand Transfer    Sit-Stand [place] (Transfers)  minimum assist (75% patient effort);2 person assist;verbal cues  -     Assistive Device (Sit-Stand Transfers)  walker, front-wheeled  -EH     Row Name 03/05/20 0936          Gait/Stairs Assessment/Training    Comment (Gait/Stairs)  Pt having difficulty advancing gait. Pt very shaky with standing. Cues given for pt to correct posture as pt leaning back upon standing.  Pt able to take a few  shuffling steps from bed to chair.   -       User Key  (r) = Recorded By, (t) = Taken By, (c) = Cosigned By    Initials Name Provider Type     Ivis Magallon PTA Physical Therapy Assistant        Obj/Interventions     Row Name 03/05/20 0939          Therapeutic Exercise    Lower Extremity (Therapeutic Exercise)  LAQ (long arc quad), bilateral;quad sets, bilateral  -     Lower Extremity Range of Motion (Therapeutic Exercise)  hip abduction/adduction, bilateral;ankle dorsiflexion/plantar flexion, bilateral  -     Exercise Type (Therapeutic Exercise)  AROM (active range of motion)  -     Position (Therapeutic Exercise)  seated reclined  -     Sets/Reps (Therapeutic Exercise)  X10  -     Row Name 03/05/20 0939          Static Sitting Balance    Level of [place] (Unsupported Sitting, Static Balance)  contact guard assist  -     Sitting Position (Unsupported Sitting, Static Balance)  sitting on edge of bed  -       User Key  (r) = Recorded By, (t) = Taken By, (c) = Cosigned By    Initials Name Provider Type     Ivis Magallon PTA Physical Therapy Assistant        Goals/Plan    No documentation.       Clinical Impression     Row Name 03/05/20 0940          Plan of Care Review    Plan of Care Reviewed With  patient  -     Progress  improving  -     Outcome Summary  Pt tolerated treatment with c/o pain. Pt is ModA for supine to sit with verbal cueing. Pt is MinAX2 for STS transfers. Pt performed 2 transfers with verbal cueing to correct posterior lean. Pt very shaky with standing today and having difficulty taking steps. Pt took a few shuffling steps from bed to chair. Pt became more fatigued and bilateral knees became more flexed. Constant cues for pt to stand up straight. Pt performed a few bilateral LE exercises to improve strength. Will continue to progress pt as able.   -     Row Name 03/05/20 0940          Vital Signs    O2 Delivery Pre Treatment  supplemental O2  -     O2 Delivery  Intra Treatment  supplemental O2  -EH     O2 Delivery Post Treatment  supplemental O2  -EH     Row Name 03/05/20 4616          Positioning and Restraints    Pre-Treatment Position  in bed  -EH     Post Treatment Position  chair  -EH     In Chair  reclined;call light within reach;encouraged to call for assist;exit alarm on  -       User Key  (r) = Recorded By, (t) = Taken By, (c) = Cosigned By    Initials Name Provider Type     Ivis Magallon PTA Physical Therapy Assistant        Outcome Measures     Row Name 03/05/20 6544          How much help from another person do you currently need...    Turning from your back to your side while in flat bed without using bedrails?  2  -EH     Moving from lying on back to sitting on the side of a flat bed without bedrails?  2  -EH     Moving to and from a bed to a chair (including a wheelchair)?  2  -EH     Standing up from a chair using your arms (e.g., wheelchair, bedside chair)?  2  -EH     Climbing 3-5 steps with a railing?  1  -EH     To walk in hospital room?  2  -EH     AM-PAC 6 Clicks Score (PT)  11  -EH       User Key  (r) = Recorded By, (t) = Taken By, (c) = Cosigned By    Initials Name Provider Type     Ivis Magallon PTA Physical Therapy Assistant          PT Recommendation and Plan     Plan of Care Reviewed With: patient  Progress: improving  Outcome Summary: Pt tolerated treatment with c/o pain. Pt is ModA for supine to sit with verbal cueing. Pt is MinAX2 for STS transfers. Pt performed 2 transfers with verbal cueing to correct posterior lean. Pt very shaky with standing today and having difficulty taking steps. Pt took a few shuffling steps from bed to chair. Pt became more fatigued and bilateral knees became more flexed. Constant cues for pt to stand up straight. Pt performed a few bilateral LE exercises to improve strength. Will continue to progress pt as able.      Time Calculation:   PT Charges     Row Name 03/05/20 6157             Time Calculation     Start Time  0903  -      Stop Time  0926  -      Time Calculation (min)  23 min  -      PT Received On  03/05/20  -      PT - Next Appointment  03/06/20  -         Time Calculation- PT    Total Timed Code Minutes- PT  23 minute(s)  -        User Key  (r) = Recorded By, (t) = Taken By, (c) = Cosigned By    Initials Name Provider Type     Ivis Magallon PTA Physical Therapy Assistant        Therapy Charges for Today     Code Description Service Date Service Provider Modifiers Qty    72523096810 HC PT THER PROC EA 15 MIN 3/5/2020 Ivis Magallon PTA GP 1    64411659496 HC PT THERAPEUTIC ACT EA 15 MIN 3/5/2020 Ivis Magallon PTA GP 1    38575882971 HC PT THER SUPP EA 15 MIN 3/5/2020 Ivsi Magallon PTA GP 2          PT G-Codes  Outcome Measure Options: AM-PAC 6 Clicks Daily Activity (OT)  AM-PAC 6 Clicks Score (PT): 11  AM-PAC 6 Clicks Score (OT): 8    Ivis Magallon PTA  3/5/2020

## 2020-03-05 NOTE — PLAN OF CARE
Problem: Patient Care Overview  Goal: Plan of Care Review  Flowsheets (Taken 3/5/2020 5203)  Outcome Summary: Pt worked on standing balance and UE exercises this pm. Pt with very poor standing balance and tolerance, requires modA with rwx for several minutes. Very fatigued after ther ex.

## 2020-03-06 ENCOUNTER — APPOINTMENT (OUTPATIENT)
Dept: CT IMAGING | Facility: HOSPITAL | Age: 85
End: 2020-03-06

## 2020-03-06 PROBLEM — J86.9 EMPYEMA OF PLEURA (HCC): Status: ACTIVE | Noted: 2020-03-06

## 2020-03-06 LAB
ALBUMIN SERPL-MCNC: 2.3 G/DL (ref 3.5–5.2)
ANION GAP SERPL CALCULATED.3IONS-SCNC: 9.7 MMOL/L (ref 5–15)
APTT PPP: 39.6 SECONDS (ref 22.7–35.4)
APTT PPP: 56.2 SECONDS (ref 22.7–35.4)
BACTERIA FLD CULT: ABNORMAL
BASOPHILS # BLD AUTO: 0.06 10*3/MM3 (ref 0–0.2)
BASOPHILS # BLD AUTO: 0.1 10*3/MM3 (ref 0–0.2)
BASOPHILS NFR BLD AUTO: 0.3 % (ref 0–1.5)
BASOPHILS NFR BLD AUTO: 0.6 % (ref 0–1.5)
BUN BLD-MCNC: 17 MG/DL (ref 8–23)
BUN/CREAT SERPL: 22.7 (ref 7–25)
CALCIUM SPEC-SCNC: 8 MG/DL (ref 8.6–10.5)
CHLORIDE SERPL-SCNC: 97 MMOL/L (ref 98–107)
CO2 SERPL-SCNC: 26.3 MMOL/L (ref 22–29)
CREAT BLD-MCNC: 0.75 MG/DL (ref 0.76–1.27)
DEPRECATED RDW RBC AUTO: 37.6 FL (ref 37–54)
DEPRECATED RDW RBC AUTO: 40.5 FL (ref 37–54)
EOSINOPHIL # BLD AUTO: 0.09 10*3/MM3 (ref 0–0.4)
EOSINOPHIL # BLD AUTO: 0.15 10*3/MM3 (ref 0–0.4)
EOSINOPHIL NFR BLD AUTO: 0.5 % (ref 0.3–6.2)
EOSINOPHIL NFR BLD AUTO: 0.8 % (ref 0.3–6.2)
ERYTHROCYTE [DISTWIDTH] IN BLOOD BY AUTOMATED COUNT: 12.7 % (ref 12.3–15.4)
ERYTHROCYTE [DISTWIDTH] IN BLOOD BY AUTOMATED COUNT: 12.8 % (ref 12.3–15.4)
GFR SERPL CREATININE-BSD FRML MDRD: 98 ML/MIN/1.73
GLUCOSE BLD-MCNC: 101 MG/DL (ref 65–99)
GRAM STN SPEC: ABNORMAL
GRAM STN SPEC: ABNORMAL
HCT VFR BLD AUTO: 33.1 % (ref 37.5–51)
HCT VFR BLD AUTO: 33.3 % (ref 37.5–51)
HGB BLD-MCNC: 10.6 G/DL (ref 13–17.7)
HGB BLD-MCNC: 10.7 G/DL (ref 13–17.7)
IMM GRANULOCYTES # BLD AUTO: 0.63 10*3/MM3 (ref 0–0.05)
IMM GRANULOCYTES # BLD AUTO: 0.72 10*3/MM3 (ref 0–0.05)
IMM GRANULOCYTES NFR BLD AUTO: 3.3 % (ref 0–0.5)
IMM GRANULOCYTES NFR BLD AUTO: 4 % (ref 0–0.5)
INR PPP: 1.98 (ref 0.9–1.1)
LYMPHOCYTES # BLD AUTO: 1.79 10*3/MM3 (ref 0.7–3.1)
LYMPHOCYTES # BLD AUTO: 1.9 10*3/MM3 (ref 0.7–3.1)
LYMPHOCYTES NFR BLD AUTO: 10.5 % (ref 19.6–45.3)
LYMPHOCYTES NFR BLD AUTO: 9.3 % (ref 19.6–45.3)
MCH RBC QN AUTO: 27.4 PG (ref 26.6–33)
MCH RBC QN AUTO: 27.7 PG (ref 26.6–33)
MCHC RBC AUTO-ENTMCNC: 31.8 G/DL (ref 31.5–35.7)
MCHC RBC AUTO-ENTMCNC: 32.3 G/DL (ref 31.5–35.7)
MCV RBC AUTO: 84.7 FL (ref 79–97)
MCV RBC AUTO: 87.2 FL (ref 79–97)
MONOCYTES # BLD AUTO: 1.45 10*3/MM3 (ref 0.1–0.9)
MONOCYTES # BLD AUTO: 1.45 10*3/MM3 (ref 0.1–0.9)
MONOCYTES NFR BLD AUTO: 7.5 % (ref 5–12)
MONOCYTES NFR BLD AUTO: 8 % (ref 5–12)
NEUTROPHILS # BLD AUTO: 13.74 10*3/MM3 (ref 1.7–7)
NEUTROPHILS # BLD AUTO: 15.2 10*3/MM3 (ref 1.7–7)
NEUTROPHILS NFR BLD AUTO: 76.1 % (ref 42.7–76)
NEUTROPHILS NFR BLD AUTO: 79.1 % (ref 42.7–76)
NRBC BLD AUTO-RTO: 0 /100 WBC (ref 0–0.2)
NRBC BLD AUTO-RTO: 0 /100 WBC (ref 0–0.2)
PHOSPHATE SERPL-MCNC: 3.4 MG/DL (ref 2.5–4.5)
PLATELET # BLD AUTO: 273 10*3/MM3 (ref 140–450)
PLATELET # BLD AUTO: 295 10*3/MM3 (ref 140–450)
PMV BLD AUTO: 9.8 FL (ref 6–12)
PMV BLD AUTO: 9.8 FL (ref 6–12)
POTASSIUM BLD-SCNC: 3.3 MMOL/L (ref 3.5–5.2)
PROCALCITONIN SERPL-MCNC: 0.2 NG/ML (ref 0.1–0.25)
PROTHROMBIN TIME: 22.2 SECONDS (ref 11.7–14.2)
RBC # BLD AUTO: 3.82 10*6/MM3 (ref 4.14–5.8)
RBC # BLD AUTO: 3.91 10*6/MM3 (ref 4.14–5.8)
SODIUM BLD-SCNC: 133 MMOL/L (ref 136–145)
WBC NRBC COR # BLD: 18.06 10*3/MM3 (ref 3.4–10.8)
WBC NRBC COR # BLD: 19.22 10*3/MM3 (ref 3.4–10.8)

## 2020-03-06 PROCEDURE — 25010000002 CEFEPIME PER 500 MG: Performed by: INTERNAL MEDICINE

## 2020-03-06 PROCEDURE — 71250 CT THORAX DX C-: CPT

## 2020-03-06 PROCEDURE — 94799 UNLISTED PULMONARY SVC/PX: CPT

## 2020-03-06 PROCEDURE — 97112 NEUROMUSCULAR REEDUCATION: CPT | Performed by: OCCUPATIONAL THERAPIST

## 2020-03-06 PROCEDURE — 85025 COMPLETE CBC W/AUTO DIFF WBC: CPT | Performed by: INTERNAL MEDICINE

## 2020-03-06 PROCEDURE — 85730 THROMBOPLASTIN TIME PARTIAL: CPT | Performed by: INTERNAL MEDICINE

## 2020-03-06 PROCEDURE — 25010000002 HEPARIN (PORCINE) PER 1000 UNITS: Performed by: NURSE PRACTITIONER

## 2020-03-06 PROCEDURE — 80069 RENAL FUNCTION PANEL: CPT | Performed by: INTERNAL MEDICINE

## 2020-03-06 PROCEDURE — 99223 1ST HOSP IP/OBS HIGH 75: CPT | Performed by: NURSE PRACTITIONER

## 2020-03-06 PROCEDURE — 85025 COMPLETE CBC W/AUTO DIFF WBC: CPT | Performed by: NURSE PRACTITIONER

## 2020-03-06 PROCEDURE — 99223 1ST HOSP IP/OBS HIGH 75: CPT | Performed by: INTERNAL MEDICINE

## 2020-03-06 PROCEDURE — 25010000002 VANCOMYCIN 750 MG RECONSTITUTED SOLUTION: Performed by: INTERNAL MEDICINE

## 2020-03-06 PROCEDURE — 25010000002 FUROSEMIDE PER 20 MG: Performed by: INTERNAL MEDICINE

## 2020-03-06 PROCEDURE — 97110 THERAPEUTIC EXERCISES: CPT

## 2020-03-06 PROCEDURE — 85730 THROMBOPLASTIN TIME PARTIAL: CPT | Performed by: NURSE PRACTITIONER

## 2020-03-06 PROCEDURE — 84145 PROCALCITONIN (PCT): CPT | Performed by: INTERNAL MEDICINE

## 2020-03-06 PROCEDURE — 85610 PROTHROMBIN TIME: CPT | Performed by: NURSE PRACTITIONER

## 2020-03-06 RX ORDER — HEPARIN SODIUM 10000 [USP'U]/100ML
18 INJECTION, SOLUTION INTRAVENOUS
Status: DISCONTINUED | OUTPATIENT
Start: 2020-03-06 | End: 2020-03-17

## 2020-03-06 RX ORDER — HEPARIN SODIUM 5000 [USP'U]/ML
40-80 INJECTION, SOLUTION INTRAVENOUS; SUBCUTANEOUS EVERY 6 HOURS PRN
Status: DISCONTINUED | OUTPATIENT
Start: 2020-03-06 | End: 2020-03-19

## 2020-03-06 RX ORDER — FUROSEMIDE 40 MG/1
40 TABLET ORAL DAILY
Status: DISCONTINUED | OUTPATIENT
Start: 2020-03-06 | End: 2020-03-20 | Stop reason: HOSPADM

## 2020-03-06 RX ADMIN — BUDESONIDE 0.5 MG: 0.5 INHALANT RESPIRATORY (INHALATION) at 20:31

## 2020-03-06 RX ADMIN — POTASSIUM CHLORIDE 40 MEQ: 750 CAPSULE, EXTENDED RELEASE ORAL at 18:44

## 2020-03-06 RX ADMIN — CEFEPIME HYDROCHLORIDE 2 G: 2 INJECTION, POWDER, FOR SOLUTION INTRAVENOUS at 02:57

## 2020-03-06 RX ADMIN — Medication 250 MG: at 20:45

## 2020-03-06 RX ADMIN — HEPARIN SODIUM 18 UNITS/KG/HR: 10000 INJECTION, SOLUTION INTRAVENOUS at 15:18

## 2020-03-06 RX ADMIN — SODIUM CHLORIDE, PRESERVATIVE FREE 10 ML: 5 INJECTION INTRAVENOUS at 20:45

## 2020-03-06 RX ADMIN — IPRATROPIUM BROMIDE AND ALBUTEROL SULFATE 3 ML: 2.5; .5 SOLUTION RESPIRATORY (INHALATION) at 08:36

## 2020-03-06 RX ADMIN — FUROSEMIDE 40 MG: 40 TABLET ORAL at 18:44

## 2020-03-06 RX ADMIN — HEPARIN SODIUM 6776 UNITS: 5000 INJECTION INTRAVENOUS; SUBCUTANEOUS at 22:25

## 2020-03-06 RX ADMIN — FUROSEMIDE 40 MG: 10 INJECTION, SOLUTION INTRAMUSCULAR; INTRAVENOUS at 09:15

## 2020-03-06 RX ADMIN — CARBIDOPA AND LEVODOPA 1 TABLET: 25; 100 TABLET ORAL at 20:45

## 2020-03-06 RX ADMIN — SODIUM CHLORIDE 750 MG: 900 INJECTION, SOLUTION INTRAVENOUS at 09:17

## 2020-03-06 RX ADMIN — SODIUM CHLORIDE, PRESERVATIVE FREE 10 ML: 5 INJECTION INTRAVENOUS at 09:15

## 2020-03-06 RX ADMIN — CEFEPIME HYDROCHLORIDE 2 G: 2 INJECTION, POWDER, FOR SOLUTION INTRAVENOUS at 12:16

## 2020-03-06 RX ADMIN — Medication 250 MG: at 09:15

## 2020-03-06 RX ADMIN — CARBIDOPA AND LEVODOPA 1 TABLET: 25; 100 TABLET ORAL at 09:15

## 2020-03-06 RX ADMIN — IPRATROPIUM BROMIDE AND ALBUTEROL SULFATE 3 ML: 2.5; .5 SOLUTION RESPIRATORY (INHALATION) at 20:31

## 2020-03-06 RX ADMIN — LIDOCAINE 1 PATCH: 50 PATCH CUTANEOUS at 09:14

## 2020-03-06 RX ADMIN — BUDESONIDE 0.5 MG: 0.5 INHALANT RESPIRATORY (INHALATION) at 08:36

## 2020-03-06 NOTE — THERAPY TREATMENT NOTE
Acute Care - Occupational Therapy Treatment Note  [facility]     Patient Name: Izaiah Garcia  : 1931  MRN: [id number]  Today's Date: 3/6/2020  Onset of Illness/Injury or Date of Surgery: 20          Admit Date: 2020       ICD-10-CM ICD-9-CM   1. Healthcare-associated pneumonia J18.9 486   2. Acute hypoxemic respiratory failure (CMS/[facility]) J96.01 518.81   3. Bilateral pulmonary embolism (CMS/[facility]) I26.99 415.19   4. Acute saddle pulmonary embolism with acute cor pulmonale (CMS/HCC) I26.02 415.13     415.0     Patient Active Problem List   Diagnosis   • Hyperlipidemia   • Benign essential hypertension   • History of gout   • History of esophageal stricture   • History of stroke, 2016--4.9 x 4 x 3 cm inferior left cerebellar infarct   • Rheumatoid factor positive   • Impaired fasting glucose   • At risk for falls   • Generalized weakness   • Bilateral high frequency sensorineural hearing loss, wears hearing aids   • Bilateral lower extremity edema   • Parkinson's disease (CMS/[facility])   • Therapeutic drug monitoring   • Vitamin D deficiency   • Macrocytosis   • Vitamin B12 deficiency   • Acute diarrhea   • Hospital-acquired pneumonia   • HAP (hospital-acquired pneumonia)   • Acute UTI (urinary tract infection)   • Colitis   • Parkinson disease (CMS/[facility])   • Weakness   • Aspiration pneumonia of right lower lobe due to vomit (CMS/HCC)   • Pulmonary emboli (CMS/[facility])   • Acute saddle pulmonary embolism with acute cor pulmonale (CMS/[facility])   • Empyema of pleura (CMS/[facility])     Past Medical History:   Diagnosis Date   • At risk for falls 2019   • Benign essential hypertension 2016   • Bilateral high frequency sensorineural hearing loss, wears hearing aids 2019   • Bilateral lower extremity edema 2019    May 2, 2019--patient who has hypertension and is on amlodipine 10 mg/day is complaining of progressively worsening swelling of the lower extremities that extends up to about mid calf.  Gets  "worse at the end of the day and tends to get better overnight when he props his feet up.  I think this is definitely related to the amlodipine although patient may have some venous insufficiency.  Medication ad   • Decreased peripheral vision of both eyes 5/2/2019    May 2, 2019--continues to have complaints of \"dizziness\" associated with weakness in his lower extremities.  He is not describing a spinning sensation or anything remotely close to vertigo.  Instead he is describing an off-balance sensation.  He tends to fall forward if not careful and he tends to list towards the right side.  He has marked difficulty going down an incline.  He has to ambulate wit   • History of aspiration pneumonia 5/4/2015   • History of esophageal stricture 5/4/2015    July 3, 2018--EGD revealed a distal esophageal stricture that was dilated to 18 mm.  There was a small hiatal hernia.  There was mild streaky erythema in the proximal stomach.  Duodenal bulb normal.  April 26, 2016--EGD performed for dysphagia reveals a distal esophageal stricture that was dilated 16.5 mm.   • History of gout 6/29/2016   • History of stroke, 6/29/2016--4.9 x 4 x 3 cm inferior left cerebellar infarct 5/4/2015 June 29, 2016--MRI of the brain performed for complaints of dizziness and weakness. IMPRESSION: 1. There is susceptibility artifact streaking through the anterior left frontal scalp through the anterior left frontal bone in the anterior tipof the left frontal lobe likely from a tiny piece of metal in the anterior left frontal scalp slightly limits evaluation of these structures. 2. There is mild s   • Hyperlipidemia 6/29/2016   • Impaired fasting glucose 5/2/2019 April 14, 2015--hemoglobin A1c 5.9.   • Macrocytosis 5/2/2019   • Parkinson's disease (CMS/[facility]) 5/2/2019    May 2, 2019--continues to have complaints of \"dizziness\" associated with weakness in his lower extremities.  He is not describing a spinning sensation or anything remotely " close to vertigo.  Instead he is describing an off-balance sensation.  He tends to fall forward if not careful and he tends to list towards the right side.  He has marked difficulty going down an incline.  He has to ambulate wit   • Rheumatoid factor positive 5/2/2019 March 25, 2014--rheumatoid factor returned positive at 95.  However, CCP antibodies normal at 8, SHIV negative, both C&P ANCA negative, C-reactive protein normal at 0.24, sed rate normal at 2.   • Vitamin B12 deficiency 6/6/2019 June 6, 2019--patient recently had mildly elevated methylmalonic acid of 380.  Repeat methylmalonic acid was upper limit of normal at 356.  Given patient's neurologic symptoms and suspected parkinsonism, I have a low threshold for treating vitamin B12 deficiency.  We will initiate vitamin B12 injections today.  2000 mcg subcutaneously given today.   • Vitamin D deficiency 5/2/2019     Past Surgical History:   Procedure Laterality Date   • CARDIAC CATHETERIZATION N/A 2/29/2020    Procedure: Thrombolytic Therapy- EKOS;  Surgeon: Jason Griffiths MD;  Location: [facility] INVASIVE LOCATION;  Service: Cardiovascular;  Laterality: N/A;   • CATARACT EXTRACTION EXTRACAPSULAR W/ INTRAOCULAR LENS IMPLANTATION Bilateral     Bilateral cataract extirpation with intraocular lens implantation   • CHOLECYSTECTOMY     • EKOS CATHETER PLACEMENT Bilateral 2/29/2020    Procedure: Ekos catheter placement;  Surgeon: Jason Griffiths MD;  Location: [facility] CATH INVASIVE LOCATION;  Service: Cardiovascular;  Laterality: Bilateral;   • ESOPHAGEAL DILATATION  04/26/2016 April 26, 2016--EGD performed for dysphagia reveals a distal esophageal stricture that was dilated 16.5 mm.   • ESOPHAGEAL DILATATION  07/03/2018    July 3, 2018--EGD revealed a distal esophageal stricture that was dilated to 18 mm.  There was a small hiatal hernia.  There was mild streaky erythema in the proximal stomach.  Duodenal bulb normal.   • INTERVENTIONAL RADIOLOGY  PROCEDURE Bilateral 2/29/2020    Procedure: Pulmonary Angiogram;  Surgeon: Jason Griffiths MD;  Location: [facility] INVASIVE LOCATION;  Service: Cardiovascular;  Laterality: Bilateral;       Therapy Treatment    Rehabilitation Treatment Summary     Row Name 03/06/20 1154             Treatment Time/Intention    Discipline  occupational therapist  -SG      Document Type  therapy note (daily note)  -SG      Subjective Information  complains of;fatigue  -SG      Mode of Treatment  individual therapy;occupational therapy  -SG      Patient/Family Observations  Pt sitting up in chair  -SG      Patient Effort  good  -SG      Existing Precautions/Restrictions  fall  -SG      Recorded by [SG] Dary Monzon OTR 03/06/20 1155      Row Name 03/06/20 1154             Cognitive Assessment/Intervention- PT/OT    Orientation Status (Cognition)  oriented to;person;place  -SG      Follows Commands (Cognition)  WFL  -SG      Recorded by [SG] Dary Monzon OTR 03/06/20 1155      Row Name 03/06/20 1154             Bed Mobility Assessment/Treatment    Comment (Bed Mobility)  Up in chair  -SG      Recorded by [SG] Dary Monzon OTR 03/06/20 1158      Row Name 03/06/20 1154             Transfer Assessment/Treatment    Transfer Assessment/Treatment  sit-stand transfer;stand-sit transfer  -SG      Recorded by [SG] Dary Monzon OTR 03/06/20 1158      Row Name 03/06/20 1154             Sit-Stand Transfer    Sit-Stand [place] (Transfers)  moderate assist (50% patient effort);2 person assist;verbal cues;nonverbal cues (demo/gesture)  -SG      Assistive Device (Sit-Stand Transfers)  walker, front-wheeled  -SG      Recorded by [SG] Dary Monzon OTR 03/06/20 1158      Row Name 03/06/20 1154             Stand-Sit Transfer    Stand-Sit [place] (Transfers)  moderate assist (50% patient effort);2 person assist;verbal cues;nonverbal cues (demo/gesture)  -SG      Recorded by [SG] Dary Monzon OTR 03/06/20 1159       Row Name 03/06/20 1154             Upper Extremity Seated Therapeutic Exercise    Performed, Seated Upper Extremity (Therapeutic Exercise)  shoulder flexion/extension;shoulder horizontal abduction/adduction;scapular protraction/retraction;elbow flexion/extension  -SG      Exercise Type, Seated Upper Extremity (Therapeutic Exercise)  AROM (active range of motion)  -SG      Expected Outcomes, Seated Upper Extremity (Therapeutic Exercise)  improve functional tolerance, self-care activity;improve functional tolerance, single extremity activity;improve postural control;strengthen normal movement patterns;strengthen, facilitate independent active range of motion  -SG      Comment, Seated Upper Extremity (Therapeutic Exercise)  Tactile cues for full ROM  -SG      Recorded by [SG] Dary Monzon OTR 03/06/20 1158      Row Name 03/06/20 1154             Static Standing Balance    Level of [place] (Supported Standing, Static Balance)  minimal assist, 75% patient effort;2 person assist  -SG      Assistive Device Utilized (Supported Standing, Static Balance)  -- 1 minx2  -SG      Recorded by [SG] Dary Monzon OTR 03/06/20 1158      Row Name 03/06/20 1154             Positioning and Restraints    Pre-Treatment Position  sitting in chair/recliner  -SG      Post Treatment Position  chair  -SG      In Chair  sitting;call light within reach;encouraged to call for assist;exit alarm on;with family/caregiver  -SG      Recorded by [SG] Dary Monzon OTR 03/06/20 1158      Row Name                Wound 03/01/20  Right anterior groin Puncture    Wound - Properties Group Date first assessed: 03/01/20 [KB] Time first assessed: -- [KB], when EKOS pulled, puncture wound left in place  Present on Hospital Admission: N [KB] Side: Right [KB] Orientation: anterior [KB] Location: groin [KB] Primary Wound Type: Puncture [KB] Recorded by:  [KB] Tootie Decker, RN 03/02/20 2228      User Key  (r) = Recorded By, (t) = Taken By, (c)  = Cosigned By    Initials Name Effective Dates Discipline    Dary Hernandez OTR 12/26/18 -  OT    Tootie Pan, RN 06/20/16 -  Nurse        Wound 03/01/20  Right anterior groin Puncture (Active)   Dressing Appearance open to air 3/6/2020  8:00 AM   Closure None 3/6/2020  8:00 AM   Base clean;dry;pink 3/6/2020  8:00 AM   Periwound ecchymotic 3/6/2020  8:00 AM   Periwound Temperature warm 3/6/2020  8:00 AM   Periwound Skin Turgor soft 3/6/2020  8:00 AM   Dressing Care, Wound open to air 3/6/2020  4:56 AM           OT Recommendation and Plan     Plan of Care Review  Plan of Care Reviewed With: patient, spouse  Plan of Care Reviewed With: patient, spouse  Outcome Summary: Progressing slowly, static standing balance slightly improved but does not tolerate >1 min standing. Completes UE ther ex.  Outcome Measures     Row Name 03/06/20 1100 03/05/20 1500 03/04/20 1524       How much help from another is currently needed...    Putting on and taking off regular lower body clothing?  1  -SG  1  -SG  1  -KP    Bathing (including washing, rinsing, and drying)  1  -SG  1  -SG  1  -KP    Toileting (which includes using toilet bed pan or urinal)  1  -SG  1  -SG  1  -KP    Putting on and taking off regular upper body clothing  2  -SG  2  -SG  2  -KP    Taking care of personal grooming (such as brushing teeth)  2  -SG  2  -SG  2  -KP    Eating meals  2  -SG  2  -SG  1  -KP    AM-PAC 6 Clicks Score (OT)  9  -SG  9  -SG  8  -KP       Functional Assessment    Outcome Measure Options  AM-PAC 6 Clicks Daily Activity (OT)  -SG  AM-PAC 6 Clicks Daily Activity (OT)  -SG  AM-PAC 6 Clicks Daily Activity (OT)  -KP      User Key  (r) = Recorded By, (t) = Taken By, (c) = Cosigned By    Initials Name Provider Type    Dary Hernandez, OTR Occupational Therapist    Stacy Mchugh OTR Occupational Therapist           Time Calculation:   Time Calculation- OT     Row Name 03/06/20 1159             Time Calculation- OT     OT Start Time  1052  -      OT Stop Time  1107  -      OT Time Calculation (min)  15 min  -      Total Timed Code Minutes- OT  15 minute(s)  -      OT Non-Billable Time (min)  15 min  -      OT Received On  03/06/20  -        User Key  (r) = Recorded By, (t) = Taken By, (c) = Cosigned By    Initials Name Provider Type     Dary Monzon OTR Occupational Therapist        Therapy Charges for Today     Code Description Service Date Service Provider Modifiers Qty    97041566222 HC OT NEUROMUSC RE EDUCATION EA 15 MIN 3/5/2020 Dary Monzon OTR GO 1    97035152435 HC OT THER PROC EA 15 MIN 3/5/2020 Dary Monzon OTR GO 1    34317398774 HC OT THER SUPP EA 15 MIN 3/6/2020 Dary Monzon OTR GO 1    75887503827 HC OT NEUROMUSC RE EDUCATION EA 15 MIN 3/6/2020 Dary Monzon OTR GO 1               ABIOLA Fink  3/6/2020

## 2020-03-06 NOTE — THERAPY TREATMENT NOTE
Patient Name: Izaiah Garcia  : 1931    MRN: [id number]                              Today's Date: 3/6/2020       Admit Date: 2020    Visit Dx:     ICD-10-CM ICD-9-CM   1. Healthcare-associated pneumonia J18.9 486   2. Acute hypoxemic respiratory failure (CMS/HCC) J96.01 518.81   3. Bilateral pulmonary embolism (CMS/HCC) I26.99 415.19   4. Acute saddle pulmonary embolism with acute cor pulmonale (CMS/HCC) I26.02 415.13     415.0     Patient Active Problem List   Diagnosis   • Hyperlipidemia   • Benign essential hypertension   • History of gout   • History of esophageal stricture   • History of stroke, 2016--4.9 x 4 x 3 cm inferior left cerebellar infarct   • Rheumatoid factor positive   • Impaired fasting glucose   • At risk for falls   • Generalized weakness   • Bilateral high frequency sensorineural hearing loss, wears hearing aids   • Bilateral lower extremity edema   • Parkinson's disease (CMS/HCC)   • Therapeutic drug monitoring   • Vitamin D deficiency   • Macrocytosis   • Vitamin B12 deficiency   • Acute diarrhea   • Hospital-acquired pneumonia   • HAP (hospital-acquired pneumonia)   • Acute UTI (urinary tract infection)   • Colitis   • Parkinson disease (CMS/HCC)   • Weakness   • Aspiration pneumonia of right lower lobe due to vomit (CMS/HCC)   • Pulmonary emboli (CMS/HCC)   • Acute saddle pulmonary embolism with acute cor pulmonale (CMS/HCC)     Past Medical History:   Diagnosis Date   • At risk for falls 2019   • Benign essential hypertension 2016   • Bilateral high frequency sensorineural hearing loss, wears hearing aids 2019   • Bilateral lower extremity edema 2019    May 2, 2019--patient who has hypertension and is on amlodipine 10 mg/day is complaining of progressively worsening swelling of the lower extremities that extends up to about mid calf.  Gets worse at the end of the day and tends to get better overnight when he props his feet up.  I think this is definitely  "related to the amlodipine although patient may have some venous insufficiency.  Medication ad   • Decreased peripheral vision of both eyes 5/2/2019    May 2, 2019--continues to have complaints of \"dizziness\" associated with weakness in his lower extremities.  He is not describing a spinning sensation or anything remotely close to vertigo.  Instead he is describing an off-balance sensation.  He tends to fall forward if not careful and he tends to list towards the right side.  He has marked difficulty going down an incline.  He has to ambulate wit   • History of aspiration pneumonia 5/4/2015   • History of esophageal stricture 5/4/2015    July 3, 2018--EGD revealed a distal esophageal stricture that was dilated to 18 mm.  There was a small hiatal hernia.  There was mild streaky erythema in the proximal stomach.  Duodenal bulb normal.  April 26, 2016--EGD performed for dysphagia reveals a distal esophageal stricture that was dilated 16.5 mm.   • History of gout 6/29/2016   • History of stroke, 6/29/2016--4.9 x 4 x 3 cm inferior left cerebellar infarct 5/4/2015 June 29, 2016--MRI of the brain performed for complaints of dizziness and weakness. IMPRESSION: 1. There is susceptibility artifact streaking through the anterior left frontal scalp through the anterior left frontal bone in the anterior tipof the left frontal lobe likely from a tiny piece of metal in the anterior left frontal scalp slightly limits evaluation of these structures. 2. There is mild s   • Hyperlipidemia 6/29/2016   • Impaired fasting glucose 5/2/2019 April 14, 2015--hemoglobin A1c 5.9.   • Macrocytosis 5/2/2019   • Parkinson's disease (CMS/formerly [facility]) 5/2/2019    May 2, 2019--continues to have complaints of \"dizziness\" associated with weakness in his lower extremities.  He is not describing a spinning sensation or anything remotely close to vertigo.  Instead he is describing an off-balance sensation.  He tends to fall forward if not careful and he " tends to list towards the right side.  He has marked difficulty going down an incline.  He has to ambulate wit   • Rheumatoid factor positive 5/2/2019 March 25, 2014--rheumatoid factor returned positive at 95.  However, CCP antibodies normal at 8, SHIV negative, both C&P ANCA negative, C-reactive protein normal at 0.24, sed rate normal at 2.   • Vitamin B12 deficiency 6/6/2019 June 6, 2019--patient recently had mildly elevated methylmalonic acid of 380.  Repeat methylmalonic acid was upper limit of normal at 356.  Given patient's neurologic symptoms and suspected parkinsonism, I have a low threshold for treating vitamin B12 deficiency.  We will initiate vitamin B12 injections today.  2000 mcg subcutaneously given today.   • Vitamin D deficiency 5/2/2019     Past Surgical History:   Procedure Laterality Date   • CARDIAC CATHETERIZATION N/A 2/29/2020    Procedure: Thrombolytic Therapy- EKOS;  Surgeon: Jason Griffiths MD;  Location: [facility] INVASIVE LOCATION;  Service: Cardiovascular;  Laterality: N/A;   • CATARACT EXTRACTION EXTRACAPSULAR W/ INTRAOCULAR LENS IMPLANTATION Bilateral     Bilateral cataract extirpation with intraocular lens implantation   • CHOLECYSTECTOMY     • EKOS CATHETER PLACEMENT Bilateral 2/29/2020    Procedure: Ekos catheter placement;  Surgeon: Jason Griffiths MD;  Location:  [facility] INVASIVE LOCATION;  Service: Cardiovascular;  Laterality: Bilateral;   • ESOPHAGEAL DILATATION  04/26/2016 April 26, 2016--EGD performed for dysphagia reveals a distal esophageal stricture that was dilated 16.5 mm.   • ESOPHAGEAL DILATATION  07/03/2018    July 3, 2018--EGD revealed a distal esophageal stricture that was dilated to 18 mm.  There was a small hiatal hernia.  There was mild streaky erythema in the proximal stomach.  Duodenal bulb normal.   • INTERVENTIONAL RADIOLOGY PROCEDURE Bilateral 2/29/2020    Procedure: Pulmonary Angiogram;  Surgeon: Jason Griffiths MD;  Location: [facility]  INVASIVE LOCATION;  Service: Cardiovascular;  Laterality: Bilateral;     General Information     Row Name 03/06/20 0922          PT Evaluation Time/Intention    Document Type  therapy note (daily note)  -AR     Mode of Treatment  physical therapy  -AR     Row Name 03/06/20 0922          General Information    Patient Profile Reviewed?  yes  -AR     Existing Precautions/Restrictions  fall  -AR     Row Name 03/06/20 0922          Cognitive Assessment/Intervention- PT/OT    Orientation Status (Cognition)  oriented to;person;place  -AR     Row Name 03/06/20 0922          Safety Issues, Functional Mobility    Impairments Affecting Function (Mobility)  balance;endurance/activity tolerance;strength;shortness of breath;coordination  -AR       User Key  (r) = Recorded By, (t) = Taken By, (c) = Cosigned By    Initials Name Provider Type    AR Idania Madrid PT Physical Therapist        Mobility     Row Name 03/06/20 0923          Bed Mobility Assessment/Treatment    Bed Mobility Assessment/Treatment  supine-sit  -AR     Supine-Sit Muir (Bed Mobility)  moderate assist (50% patient effort)  -AR     Assistive Device (Bed Mobility)  bed rails;draw sheet;head of bed elevated  -AR     Comment (Bed Mobility)  VC for UE placement and sequencing  -AR     Row Name 03/06/20 0923          Transfer Assessment/Treatment    Comment (Transfers)  once from bed and twice from chair w/ mod A then min-mod A x2  -AR     Row Name 03/06/20 0923          Bed-Chair Transfer    Bed-Chair Muir (Transfers)  moderate assist (50% patient effort)  -AR     Assistive Device (Bed-Chair Transfers)  walker, front-wheeled  -AR     Row Name 03/06/20 0923          Sit-Stand Transfer    Sit-Stand Muir (Transfers)  moderate assist (50% patient effort)  -AR     Assistive Device (Sit-Stand Transfers)  walker, front-wheeled  -AR     Row Name 03/06/20 0923          Gait/Stairs Assessment/Training    Gait/Stairs Assessment/Training   gait/ambulation assistive device  -AR     Tyler Level (Gait)  moderate assist (50% patient effort)  -AR     Assistive Device (Gait)  walker, front-wheeled  -AR     Distance in Feet (Gait)  1' few shuffling steps bed>chair, cues for posture, limited by SOB   -AR     Pattern (Gait)  step-to  -AR     Deviations/Abnormal Patterns (Gait)  erika decreased;festinating/shuffling;gait speed decreased;stride length decreased  -AR     Bilateral Gait Deviations  forward flexed posture;heel strike decreased  -AR       User Key  (r) = Recorded By, (t) = Taken By, (c) = Cosigned By    Initials Name Provider Type    AR Idania Madrid, PT Physical Therapist        Obj/Interventions     Row Name 03/06/20 0930          Therapeutic Exercise    Comment (Therapeutic Exercise)  B AP, LAQ 10x   -AR     Row Name 03/06/20 0930          Static Sitting Balance    Level of Tyler (Unsupported Sitting, Static Balance)  contact guard assist  -AR     Sitting Position (Unsupported Sitting, Static Balance)  sitting on edge of bed  -AR     Time Able to Maintain Position (Unsupported Sitting, Static Balance)  more than 5 minutes  -AR     Row Name 03/06/20 0930          Dynamic Sitting Balance    Level of [place], Reaches Outside Midline (Sitting, Dynamic Balance)  contact guard assist;minimal assist, 75% patient effort  -AR     Sitting Position, Reaches Outside Midline (Sitting, Dynamic Balance)  sitting on edge of bed  -AR     Row Name 03/06/20 0930          Static Standing Balance    Level of [place] (Supported Standing, Static Balance)  minimal assist, 75% patient effort;moderate assist, 50 to 74% patient effort  -AR     Assistive Device Utilized (Supported Standing, Static Balance)  walker, rolling  -AR     Row Name 03/06/20 0930          Dynamic Standing Balance    Level of [place], Reaches Outside Midline (Standing, Dynamic Balance)  moderate assist, 50 to 74% patient effort  -AR     Assistive Device Utilized  (Supported Standing, Dynamic Balance)  walker, rolling  -AR       User Key  (r) = Recorded By, (t) = Taken By, (c) = Cosigned By    Initials Name Provider Type    Idania Erazo, PT Physical Therapist        Goals/Plan    No documentation.       Clinical Impression     Row Name 03/06/20 0930          Pain Assessment    Additional Documentation  Pain Scale: Numbers Pre/Post-Treatment (Group)  -AR     Row Name 03/06/20 0930          Pain Scale: Numbers Pre/Post-Treatment    Pain Scale: Numbers, Pretreatment  0/10 - no pain  -AR     Pain Scale: Numbers, Post-Treatment  0/10 - no pain  -AR     Pain Intervention(s)  Repositioned  -AR     Row Name 03/06/20 0930          Plan of Care Review    Plan of Care Reviewed With  patient  -AR     Outcome Summary  Progress towards goals during PT.  Able to stand and take few shuffling steps bed>chair w/ mod assist using RW, limited by fatigue, weakness and shortness of breath.  Education with pt, family, and nursing staff on pt performance and activity recommendations.    -AR     Row Name 03/06/20 0930          Physical Therapy Clinical Impression    Criteria for Skilled Interventions Met (PT Clinical Impression)  yes  -AR     Rehab Potential (PT Clinical Summary)  good, to achieve stated therapy goals  -AR     Row Name 03/06/20 0930          Vital Signs    Pre SpO2 (%)  90 3L  -AR     O2 Delivery Pre Treatment  supplemental O2  -AR     Intra SpO2 (%)  86 4L  -AR     O2 Delivery Intra Treatment  supplemental O2  -AR     Post SpO2 (%)  -- 4L  -AR     O2 Delivery Post Treatment  supplemental O2  -AR     Row Name 03/06/20 0930          Positioning and Restraints    Pre-Treatment Position  in bed  -AR     Post Treatment Position  chair  -AR     In Chair  notified nsg;reclined;call light within reach;encouraged to call for assist;sitting;exit alarm on;with family/caregiver;with nsg  -AR       User Key  (r) = Recorded By, (t) = Taken By, (c) = Cosigned By    Initials Name Provider  Type    AR Idania Madrid PT Physical Therapist        Outcome Measures     Row Name 03/06/20 0932          How much help from another person do you currently need...    Turning from your back to your side while in flat bed without using bedrails?  2  -AR     Moving from lying on back to sitting on the side of a flat bed without bedrails?  2  -AR     Moving to and from a bed to a chair (including a wheelchair)?  2  -AR     Standing up from a chair using your arms (e.g., wheelchair, bedside chair)?  2  -AR     Climbing 3-5 steps with a railing?  1  -AR     To walk in hospital room?  1  -AR     AM-PAC 6 Clicks Score (PT)  10  -AR     Row Name 03/06/20 0932          Functional Assessment    Outcome Measure Options  AM-PAC 6 Clicks Basic Mobility (PT)  -AR       User Key  (r) = Recorded By, (t) = Taken By, (c) = Cosigned By    Initials Name Provider Type    AR Idania Madrid PT Physical Therapist          PT Recommendation and Plan     Outcome Summary/Treatment Plan (PT)  Anticipated Discharge Disposition (PT): skilled nursing facility  Plan of Care Reviewed With: patient  Outcome Summary: Progress towards goals during PT.  Able to stand and take few shuffling steps bed>chair w/ mod assist using RW, limited by fatigue, weakness and shortness of breath.  Education with pt, family, and nursing staff on pt performance and activity recommendations.       Time Calculation:   PT Charges     Row Name 03/06/20 0921             Time Calculation    Start Time  0857  -AR      Stop Time  0921  -AR      Time Calculation (min)  24 min  -AR      PT Received On  03/06/20  -AR      PT - Next Appointment  03/07/20  -AR        User Key  (r) = Recorded By, (t) = Taken By, (c) = Cosigned By    Initials Name Provider Type    Idania Erazo PT Physical Therapist        Therapy Charges for Today     Code Description Service Date Service Provider Modifiers Qty    29076158114 HC PT THER PROC EA 15 MIN 3/6/2020 Idania Madrid, ALEX GP  2          PT G-Codes  Outcome Measure Options: AM-PAC 6 Clicks Basic Mobility (PT)  AM-PAC 6 Clicks Score (PT): 10  AM-PAC 6 Clicks Score (OT): 9    Idania Madrid PT  3/6/2020

## 2020-03-06 NOTE — PROGRESS NOTES
"  Daily Progress Note.   [facility] CARDIOVASC UNIT  3/6/2020    Patient:  Name:  Izaiah Garcia  MRN:  [id number]  8/31/1931  88 y.o.  male         CC: Short of breath hypoxia     History of Present Illness:  Patient is an 88-year-old male who was recently discharged from the hospital after stay treated for pneumonia.  I reviewed his discharge summary from the 22nd.  He presented to the emergency room today from [facility] after he was found to be hypoxic despite his 6 L nasal cannula.  Oxygen increased to 15 L and he was brought in by emergency medical services.  He is little bit more short of breath ongoing for the past few days however this acutely changed and worsened earlier today.  He was being given IV antibiotics at the nursing home.  He had a PICC line for this.     Patient denies chest pain hemoptysis.  Denies fever or chills.  No rigors.  Denies any emesis difficulty swallowing pain sputum production or diarrhea.  Does feel generalized weakness really has not gotten much strength since hospital discharge.  History somewhat limited by use of BIPAP.    Feeling much better after intervention, ekos placed 2/29- bilateral.    Interval History/ROS:   No increased soa.  Follow up for pleural fluid  Still no appetite  No chills  No fever, no diarrhea, no chest pain  PMFSSH: no change    Physical Exam:  /68 (BP Location: Left arm, Patient Position: Lying)   Pulse 72   Temp 97.2 °F (36.2 °C) (Oral)   Resp 16   Ht 177.8 cm (70\")   Wt 84.7 kg (186 lb 11.2 oz) Comment: Verified with nurse  SpO2 96%   BMI 26.79 kg/m²   Body mass index is 26.79 kg/m².    Intake/Output Summary (Last 24 hours) at 3/6/2020 1228  Last data filed at 3/6/2020 1216  Gross per 24 hour   Intake 400 ml   Output 725 ml   Net -325 ml     General appearance:non toxic awake alert conversant   Eyes: anicteric sclerae, moist conjunctivae; no lid-lag; PERRLA  HENT: cant palpate any mass, no lad, mmm  Neck:trachela " midline, supple  Lungs: CTAB without wheeze or rhonchi, diminished at bilateral base, with normal respiratory effort and nointercostal retractions  CV: RRR, no rub  Abdomen: Soft, non-tender; no masses or HSM no pain in left upper abd no ecchymosis  Extremities: +pitting LE peripheral edema left > right or extremity lymphadenopathy  Skin: Normal temperature, turgor and texture; no rash, ulcers or subcutaneous nodules  Psych: Appropriate affect, alert and oriented to person, place and time  Neuro: noteable [place], CNs 2-12 intact sensation intact moves ext  Data Review:  Notable Labs:  Results from last 7 days   Lab Units 03/06/20  0729 03/05/20  0400 03/04/20  0440 03/03/20  0616 03/02/20  0311 03/01/20  1750 03/01/20  1159   WBC 10*3/mm3 18.06* 14.57* 13.55* 13.65* 16.36* 19.95* 19.91*   HEMOGLOBIN g/dL 10.6* 10.0* 10.2* 9.9* 10.3* 10.5* 10.1*   PLATELETS 10*3/mm3 273 305 328 359 416 398 378     Results from last 7 days   Lab Units 03/06/20  0729 03/05/20  0400 03/04/20  1724 03/04/20  0440 03/03/20  0616 03/02/20  0311 03/01/20  0356 02/29/20  0808   SODIUM mmol/L 133* 135*  --  135* 142 142 144 138   POTASSIUM mmol/L 3.3* 3.9 3.8 3.2* 3.6 3.9 3.1* 3.0*   CHLORIDE mmol/L 97* 99  --  99 106 106 104 96*   CO2 mmol/L 26.3 24.4  --  26.6 26.2 25.6 25.5 27.2   BUN mg/dL 17 18  --  20 25* 29* 26* 20   CREATININE mg/dL 0.75* 0.73*  --  0.76 0.76 0.83 0.82 0.91   GLUCOSE mg/dL 101* 118*  --  132* 134* 141* 145* 177*   CALCIUM mg/dL 8.0* 8.0*  --  8.0* 7.7* 7.7* 7.6* 8.3*   PHOSPHORUS mg/dL 3.4 3.3  --  3.5 3.3 2.1* 3.5  --    Estimated Creatinine Clearance: 76.5 mL/min (A) (by C-G formula based on SCr of 0.75 mg/dL (L)).    Imaging:  Reviewed chest images personally from past 3 days  Thyroid us neg    Scheduled meds:      apixaban 10 mg Oral Q12H   Followed by      [START ON 3/10/2020] apixaban 5 mg Oral Q12H   budesonide 0.5 mg Nebulization BID - RT   carbidopa-levodopa 1 tablet Oral BID   cefepime 2 g Intravenous Q8H      furosemide 40 mg Intravenous Q12H   ipratropium-albuterol 3 mL Nebulization BID - RT   lidocaine 1 patch Transdermal Q24H   saccharomyces boulardii 250 mg Oral BID   sodium chloride 10 mL Intravenous Q12H   vancomycin 750 mg Intravenous Q12H       ASSESSMENT  /  PLAN:  Acute hypoxic resp failure  BPE s/p ekos catheter on 2/29  Troponemia suspect due to BPE  RV strain  HLD  Esphageal stricture  Gen weakness  Parkinsons Disease  Vit D def  RF factor positive  HTN  Abnormal mass around thyroid   Bilateral pleural effusion R greater than Left exudative with gram positive cocci growth now  hypokalemia    S/p B ekos with tpa infusion locally with good clinical response  Effusion studies look like parapneumonic.  Does not look to be actively infected but has growth - further growth looks like contaminint which would correlate with fluid analysis more than empyema.  Lasix to po today  Ab per ID, will monitor off  Consult to thoracic surgery - will see what ct chest shows   D/w thoracic surgery team.    Down from 5-6L last discharge, nrb then bipap on admission to now 2L with good saturation.  Great to see    Thyroid us normal but ct chest showing abn mass around thyroid but US neg.    Not sure that he would be stable for any surgery or biopsy at this point. - pet scan as outpatient may be best option.  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    Lidoderm patch for pain      Chetan Eugene MD  [facility]  03/06/20  718p

## 2020-03-06 NOTE — PLAN OF CARE
Problem: Patient Care Overview  Goal: Plan of Care Review  Flowsheets (Taken 3/6/2020 2842)  Outcome Summary: Progress towards goals during PT.  Able to stand and take few shuffling steps bed>chair w/ mod assist using RW, limited by fatigue, weakness and shortness of breath.  Education with pt, family, and nursing staff on pt performance and activity recommendations.

## 2020-03-06 NOTE — CONSULTS
Referring Provider: Dr PHOEBE Eugene    Subjective   History of present illness:    This very nice 88-year-old we are asked for evaluation opinion regarding positive pleural culture.    Per review of the past medical records, patient was recently discharged from [facility] on 2/22/2020 for aspiration pneumonia.  He was initially treated with IV antibiotics including Zosyn and changed over to Augmentin to complete 5 days of therapy.  He was readmitted to [facility] on 2/29/2020 with severe, acute dyspnea and found to have large bilateral saddle pulmonary emboli which were treated with EKOS catheter placement for TPA.  He improved but with more recently found to have pleural effusion and underwent thoracentesis on 3/2/2020.  He had about 100 mL of blood colored fluid drained from the right pleural space.  Those cultures today resulted and staph hominis and broth alone so we were asked to evaluate in case of empyema.  He did receive a dose of vancomycin and cefepime upon admission but these were held up until yesterday when he was restarted on vancomycin and cefepime.    Today, he is remarkably asymptomatic.  He reports that his shortness of breath is significantly improved since admission with management of his PE as well as diuresis.  He still does not have much appetite but denies any associated fevers or chills or night sweats    Past medical history: Stroke, gout, hyperlipidemia, RF positive, Parkinson's disease, esophageal stricture      Past Surgical History:   Procedure Laterality Date   • CARDIAC CATHETERIZATION N/A 2/29/2020    Procedure: Thrombolytic Therapy- EKOS;  Surgeon: Jason Griffiths MD;  Location: [facility] INVASIVE LOCATION;  Service: Cardiovascular;  Laterality: N/A;   • CATARACT EXTRACTION EXTRACAPSULAR W/ INTRAOCULAR LENS IMPLANTATION Bilateral     Bilateral cataract extirpation with intraocular lens implantation   • CHOLECYSTECTOMY     • EKOS CATHETER PLACEMENT  Bilateral 2/29/2020    Procedure: Ekos catheter placement;  Surgeon: Jason Griffiths MD;  Location:  [facility];  Service: Cardiovascular;  Laterality: Bilateral;   • ESOPHAGEAL DILATATION  04/26/2016 April 26, 2016--EGD performed for dysphagia reveals a distal esophageal stricture that was dilated 16.5 mm.   • ESOPHAGEAL DILATATION  07/03/2018    July 3, 2018--EGD revealed a distal esophageal stricture that was dilated to 18 mm.  There was a small hiatal hernia.  There was mild streaky erythema in the proximal stomach.  Duodenal bulb normal.   • INTERVENTIONAL RADIOLOGY PROCEDURE Bilateral 2/29/2020    Procedure: Pulmonary Angiogram;  Surgeon: Jason Griffiths MD;  Location:  [facility];  Service: Cardiovascular;  Laterality: Bilateral;     No family history infectious diseases  Social history: He is retired and lives with his wife here in [place], [place]    No Known Allergies    Medication:  Antibiotics:  Anti-Infectives (From admission, onward)    Ordered     Dose/Rate Route Frequency Start Stop    03/05/20 1751  vancomycin 750 mg/250 mL 0.9% NS add-vantage  Review   Ordering Provider:  Chetan Eugene MD    750 mg  over 75 Minutes Intravenous Every 12 Hours 03/06/20 0900 03/13/20 0859    03/05/20 1712  cefepime (MAXIPIME) 2 g/100 mL 0.9% NS (mbp)  Review   Ordering Provider:  Chetan Eugene MD    2 g  over 4 Hours Intravenous Every 8 Hours 03/06/20 0200 03/13/20 0159    03/05/20 1746  vancomycin 1750 mg/500 mL 0.9% NS IVPB ([facility])     Ordering Provider:  Chetan Eugene MD    20 mg/kg × 91.3 kg  over 150 Minutes Intravenous Once 03/05/20 2000 03/06/20 0048    03/05/20 1712  cefepime (MAXIPIME) 2 g/100 mL 0.9% NS (mbp)     Ordering Provider:  Chetan Eugene MD    2 g  200 mL/hr over 30 Minutes Intravenous Once 03/05/20 1800 03/05/20 2012 03/05/20 1711  Pharmacy to dose vancomycin  Review   Ordering Provider:  Chetan Eugene MD     Does  not apply Continuous PRN 03/05/20 1711 03/12/20 1810    02/29/20 0806  vancomycin 2000 mg/500 mL 0.9% NS IVPB ([facility])     Ordering Provider:  Jason Durbin MD    20 mg/kg × 95 kg Intravenous Once 02/29/20 0808 02/29/20 0822    02/29/20 0806  cefepime (MAXIPIME) 2 g/100 mL 0.9% NS (mbp)     Ordering Provider:  Jason Durbin MD    2 g  200 mL/hr over 30 Minutes Intravenous Once 02/29/20 0808 02/29/20 0906          Review of Systems  Pertinent items are noted in HPI, all other systems reviewed and negative    Objective     Physical Exam:   Vital Signs   Temp:  [97.2 °F (36.2 °C)-98.8 °F (37.1 °C)] 97.2 °F (36.2 °C)  Heart Rate:  [71-77] 72  Resp:  [16-18] 16  BP: (140-152)/(68-70) 152/68    GENERAL: Awake and alert, in no acute distress.   HEENT: Oropharynx is clear. Hearing is grossly hard of hearing.   EYES: PERRL. No conjunctival injection. No lid lag.   LYMPHATICS: No lymphadenopathy of the neck or inguinal regions.   HEART: Regular rate and regular rhythm.  2-3+ peripheral edema.   LUNGS: Diminished at the bases with normal respiratory effort.   GI: Soft, nontender, nondistended. No appreciable organomegaly.   SKIN: Warm and dry without cutaneous eruptions   PSYCHIATRIC: Appropriate mood, affect, insight, and judgment.     Results Review:  WBC 18.1    H/H 10.6/33  Cr 0.75    Microbiology:  2/16 BCx neg  2/17 c diff/gi pcr neg  2/17 MRSA screen, RPP neg  2/29 BCx neg  2/29 RPP neg  3/2 Pleural studies: WBC 64528;  (P58, L 27, M 15), , glc 123 cx-staph hominis in broth alone    3/3 c diff neg      Radiology:  Duplex venography from 2/29/2020 acute right left lower DVT in the gastrocnemius/soleal with right DVT in the common femoral  Chest x-ray and apparently interpreted from 3/2/2020 shows right greater than left opacities with small effusions    Assessment/Plan   1.  Pleural effusion  2.  Bilateral PE    +pleural culture grew skin tyra in broth alone and is contaminant.  Has bloody  effusion (only 589 wbc compared to 46K WBC) possibly related to large PE and lung necrosis or alternatively due to parapneumonic effusion made more bloody from anticoagulation.  Doubt empyema.  F/u CT chest and I'll check procal but if both reassuring then I will stop abx.    Discussed with Dr. Chetan Eugene  regarding above  Thank you for this consult.        Jose Davidson MD  03/06/20  1:02 PM

## 2020-03-06 NOTE — PROGRESS NOTES
"Pharmacokinetics by Pharmacy - Vancomycin    Izaiah Garcia is a 88 y.o. male receiving vancomycin 750mg IV Q12H day 2 for pneumonia  Patient is also receiving cefepime    Objective:  [Ht: 177.8 cm (70\"); Wt: 84.7 kg (186 lb 11.2 oz)]     Lab Results   Component Value Date    WBC 18.06 (H) 03/06/2020    WBC 14.57 (H) 03/05/2020    WBC 13.55 (H) 03/04/2020    WBC 14.66 (H) 02/14/2020    WBC 18.15 (H) 02/13/2020    WBC 14.99 (H) 02/12/2020      Lab Results   Component Value Date    CRP 0.24 03/25/2014    LACTATE 1.5 02/29/2020    LACTATE 1.6 02/16/2020      Temp Readings from Last 1 Encounters:   03/06/20 97.2 °F (36.2 °C) (Oral)     Estimated Creatinine Clearance: 76.5 mL/min (A) (by C-G formula based on SCr of 0.75 mg/dL (L)).   Lab Results   Component Value Date    CREATININE 0.75 (L) 03/06/2020    CREATININE 0.73 (L) 03/05/2020    CREATININE 0.76 03/04/2020    CREATININE 0.8 02/14/2020    CREATININE 0.9 02/13/2020    CREATININE 0.9 02/12/2020       No results found for: VANCOPEAK, VANCABIOLAOUGH, VANCCARRIEOM    Culture Results:  Microbiology Results (last 10 days)       Procedure Component Value - Date/Time    Clostridium Difficile Toxin - Stool, Per Rectum [[id number]] Collected:  03/03/20 1407    Lab Status:  Final result Specimen:  Stool from Per Rectum Updated:  03/03/20 1533    Narrative:       The following orders were created for panel order Clostridium Difficile Toxin - Stool, Per Rectum.  Procedure                               Abnormality         Status                     ---------                               -----------         ------                     Clostridium Difficile To...[[id number]]  Normal              Final result                 Please view results for these tests on the individual orders.    Clostridium Difficile Toxin, PCR - Stool, Per Rectum [[id number]]  (Normal) Collected:  03/03/20 1407    Lab Status:  Final result Specimen:  Stool from Per Rectum Updated:  03/03/20 1533     C. " Difficile Toxins by PCR Negative    AFB Culture - Body Fluid, Pleural Cavity [[id number]] Collected:  03/02/20 1336    Lab Status:  Preliminary result Specimen:  Body Fluid from Pleural Cavity Updated:  03/03/20 1344     AFB Stain No acid fast bacilli seen on direct smear    Body Fluid Culture - Body Fluid, Pleural Cavity [[id number]]  (Abnormal)  (Susceptibility) Collected:  03/02/20 1336    Lab Status:  Final result Specimen:  Body Fluid from Pleural Cavity Updated:  03/06/20 0730     Body Fluid Culture Staphylococcus hominis ssp hominis     Comment: In broth only.        Gram Stain Rare (1+) WBCs seen      No organisms seen    Susceptibility        Staphylococcus hominis ssp hominis     ERIC     Clindamycin Resistant     Oxacillin Resistant     Tetracycline Resistant     Trimethoprim + Sulfamethoxazole Resistant     Vancomycin Susceptible                  Susceptibility Comments       Staphylococcus hominis ssp hominis    This isolate is presumed to be clindamycin resistant based on detection of inducible clindamycin resistance.  Clindamycin may still be effective in some patients.               Fungus Smear - Body Fluid, Pleural Cavity [[id number]] Collected:  03/02/20 1336    Lab Status:  Final result Specimen:  Body Fluid from Pleural Cavity Updated:  03/02/20 1659     Fungal Stain No fungal elements seen    Respiratory Panel, PCR - Swab, Nasopharynx [[id number]]  (Normal) Collected:  02/29/20 0843    Lab Status:  Final result Specimen:  Swab from Nasopharynx Updated:  02/29/20 1011     ADENOVIRUS, PCR Not Detected     Coronavirus 229E Not Detected     Coronavirus HKU1 Not Detected     Coronavirus NL63 Not Detected     Coronavirus OC43 Not Detected     Human Metapneumovirus Not Detected     Human Rhinovirus/Enterovirus Not Detected     Influenza B PCR Not Detected     Parainfluenza Virus 1 Not Detected     Parainfluenza Virus 2 Not Detected     Parainfluenza Virus 3 Not Detected     Parainfluenza Virus 4 Not  Detected     Bordetella pertussis pcr Not Detected     Influenza A H1 2009 PCR Not Detected     Chlamydophila pneumoniae PCR Not Detected     Mycoplasma pneumo by PCR Not Detected     Influenza A PCR Not Detected     Influenza A H3 Not Detected     Influenza A H1 Not Detected     RSV, PCR Not Detected     Bordetella parapertussis PCR Not Detected    Blood Culture - Blood, Arm, Right [[id number]] Collected:  02/29/20 0820    Lab Status:  Final result Specimen:  Blood from Arm, Right Updated:  03/05/20 0830     Blood Culture No growth at 5 days    Blood Culture - Blood, Arm, Left [[id number]] Collected:  02/29/20 0807    Lab Status:  Final result Specimen:  Blood from Arm, Left Updated:  03/05/20 0845     Blood Culture No growth at 5 days          No results found for: RESPCX      Assessment:  Continue vancomycin as ordered.  Renal function remains stable (SCr stable, urine output stable), afebrile, vss, WBC 18.06.     Vancomycin trough goal 15-20    Plan:  1. Continue vancomycin 750mg IV Q12H  2. Vancomycin trough ordered for 3/7   3. Pharmacy will monitor renal function and adjust dose accordingly.      Jose Deras, Js, BCIDP, BCPS  03/06/20 1:17 PM

## 2020-03-06 NOTE — CONSULTS
Inpatient Thoracic Surgery Consult  Consult performed by: Verito Roldan APRN  Consult ordered by: Chetan Eugene MD  Reason for consult: Exudative effusion with positive culture growth          Patient Care Team:  Uriah Godinez MD as PCP - General (Internal Medicine)    Chief Complaint   Patient presents with   • Respiratory Distress   • Shortness of Breath       Subjective     History of Present Illness     Izaiah Garcia is a pleasant 88-year-old gentleman who was recently discharged after a hospitalization last month for pneumonia.  Apparently, he presented to the emergency department at [facility] on 2/29/2020 via EMS from the [place] after he was found to be hypoxic despite his 6 L nasal cannula.  He had a PICC line for administration of antibiotics at the nursing home for his pneumonia.  The patient was placed on BiPAP.  CT angiogram demonstrates large bilateral pulmonary emboli with elevated RV to LV ratio, increased troponin and proBNP.  Echo demonstrated coarse dilated RV with decreased RV function.  No clot in transit..  Patient was initiated on heparin drip and interventional cardiology team was consulted.  EKOS catheter was placed.  Doppler identified bilateral lower extremity DVTs.  So identified a right parapneumonic effusion.  Thoracentesis performed on 3/2/2020.  Approximately 100 cc of blood colored fluid was drained from his chest.  Fluid was positive for Staphylococcus hominis.  Vancomycin has been initiated and we have been consulted for further assessment and treatment.    Review of Systems   HENT: Negative.    Respiratory: Positive for shortness of breath. Negative for cough.    Cardiovascular: Positive for leg swelling.   Gastrointestinal: Negative for diarrhea, nausea and vomiting.   Genitourinary: Positive for flank pain and hematuria.   Neurological: Positive for weakness.   All other systems reviewed and are negative.       Patient Active Problem  "List   Diagnosis   • Hyperlipidemia   • Benign essential hypertension   • History of gout   • History of esophageal stricture   • History of stroke, 6/29/2016--4.9 x 4 x 3 cm inferior left cerebellar infarct   • Rheumatoid factor positive   • Impaired fasting glucose   • At risk for falls   • Generalized weakness   • Bilateral high frequency sensorineural hearing loss, wears hearing aids   • Bilateral lower extremity edema   • Parkinson's disease (CMS/HCC)   • Therapeutic drug monitoring   • Vitamin D deficiency   • Macrocytosis   • Vitamin B12 deficiency   • Acute diarrhea   • Hospital-acquired pneumonia   • HAP (hospital-acquired pneumonia)   • Acute UTI (urinary tract infection)   • Colitis   • Parkinson disease (CMS/HCC)   • Weakness   • Aspiration pneumonia of right lower lobe due to vomit (CMS/HCC)   • Pulmonary emboli (CMS/HCC)   • Acute saddle pulmonary embolism with acute cor pulmonale (CMS/HCC)   • Empyema of pleura (CMS/HCC)     Past Medical History:   Diagnosis Date   • At risk for falls 5/2/2019   • Benign essential hypertension 6/29/2016   • Bilateral high frequency sensorineural hearing loss, wears hearing aids 5/2/2019   • Bilateral lower extremity edema 5/2/2019    May 2, 2019--patient who has hypertension and is on amlodipine 10 mg/day is complaining of progressively worsening swelling of the lower extremities that extends up to about mid calf.  Gets worse at the end of the day and tends to get better overnight when he props his feet up.  I think this is definitely related to the amlodipine although patient may have some venous insufficiency.  Medication ad   • Decreased peripheral vision of both eyes 5/2/2019    May 2, 2019--continues to have complaints of \"dizziness\" associated with weakness in his lower extremities.  He is not describing a spinning sensation or anything remotely close to vertigo.  Instead he is describing an off-balance sensation.  He tends to fall forward if not careful and he " "tends to list towards the right side.  He has marked difficulty going down an incline.  He has to ambulate wit   • History of aspiration pneumonia 5/4/2015   • History of esophageal stricture 5/4/2015    July 3, 2018--EGD revealed a distal esophageal stricture that was dilated to 18 mm.  There was a small hiatal hernia.  There was mild streaky erythema in the proximal stomach.  Duodenal bulb normal.  April 26, 2016--EGD performed for dysphagia reveals a distal esophageal stricture that was dilated 16.5 mm.   • History of gout 6/29/2016   • History of stroke, 6/29/2016--4.9 x 4 x 3 cm inferior left cerebellar infarct 5/4/2015 June 29, 2016--MRI of the brain performed for complaints of dizziness and weakness. IMPRESSION: 1. There is susceptibility artifact streaking through the anterior left frontal scalp through the anterior left frontal bone in the anterior tipof the left frontal lobe likely from a tiny piece of metal in the anterior left frontal scalp slightly limits evaluation of these structures. 2. There is mild s   • Hyperlipidemia 6/29/2016   • Impaired fasting glucose 5/2/2019 April 14, 2015--hemoglobin A1c 5.9.   • Macrocytosis 5/2/2019   • Parkinson's disease (CMS/[facility]) 5/2/2019    May 2, 2019--continues to have complaints of \"dizziness\" associated with weakness in his lower extremities.  He is not describing a spinning sensation or anything remotely close to vertigo.  Instead he is describing an off-balance sensation.  He tends to fall forward if not careful and he tends to list towards the right side.  He has marked difficulty going down an incline.  He has to ambulate wit   • Rheumatoid factor positive 5/2/2019 March 25, 2014--rheumatoid factor returned positive at 95.  However, CCP antibodies normal at 8, SHIV negative, both C&P ANCA negative, C-reactive protein normal at 0.24, sed rate normal at 2.   • Vitamin B12 deficiency 6/6/2019 June 6, 2019--patient recently had mildly elevated " methylmalonic acid of 380.  Repeat methylmalonic acid was upper limit of normal at 356.  Given patient's neurologic symptoms and suspected parkinsonism, I have a low threshold for treating vitamin B12 deficiency.  We will initiate vitamin B12 injections today.  2000 mcg subcutaneously given today.   • Vitamin D deficiency 5/2/2019     Past Surgical History:   Procedure Laterality Date   • CARDIAC CATHETERIZATION N/A 2/29/2020    Procedure: Thrombolytic Therapy- EKOS;  Surgeon: Jason Griffiths MD;  Location: [facility] CATH INVASIVE LOCATION;  Service: Cardiovascular;  Laterality: N/A;   • CATARACT EXTRACTION EXTRACAPSULAR W/ INTRAOCULAR LENS IMPLANTATION Bilateral     Bilateral cataract extirpation with intraocular lens implantation   • CHOLECYSTECTOMY     • EKOS CATHETER PLACEMENT Bilateral 2/29/2020    Procedure: Ekos catheter placement;  Surgeon: Jason Griffiths MD;  Location: [facility] CATH INVASIVE LOCATION;  Service: Cardiovascular;  Laterality: Bilateral;   • ESOPHAGEAL DILATATION  04/26/2016 April 26, 2016--EGD performed for dysphagia reveals a distal esophageal stricture that was dilated 16.5 mm.   • ESOPHAGEAL DILATATION  07/03/2018    July 3, 2018--EGD revealed a distal esophageal stricture that was dilated to 18 mm.  There was a small hiatal hernia.  There was mild streaky erythema in the proximal stomach.  Duodenal bulb normal.   • INTERVENTIONAL RADIOLOGY PROCEDURE Bilateral 2/29/2020    Procedure: Pulmonary Angiogram;  Surgeon: Jason Griffiths MD;  Location: [facility] INVASIVE LOCATION;  Service: Cardiovascular;  Laterality: Bilateral;     Family History   Problem Relation Age of Onset   • No Known Problems Mother    • No Known Problems Father      Social History     Socioeconomic History   • Marital status:      Spouse name: Not on file   • Number of children: 2   • Years of education: Not on file   • Highest education level: 9th grade   Social Needs   • Financial resource strain: Not hard at  all   • Food insecurity:     Worry: Never true     Inability: Never true   • Transportation needs:     Medical: No     Non-medical: No   Tobacco Use   • Smoking status: Never Smoker   • Smokeless tobacco: Never Used   Substance and Sexual Activity   • Alcohol use: Yes     Frequency: 2-4 times a month     Drinks per session: 1 or 2     Binge frequency: Never     Comment: occ   • Drug use: No   • Sexual activity: Not Currently     Partners: Female   Lifestyle   • Physical activity:     Days per week: 0 days     Minutes per session: 0 min   • Stress: Not at all   Relationships   • Social connections:     Talks on phone: Once a week     Gets together: Twice a week     Attends [employer] service: Never     Active member of club or organization: Yes     Attends meetings of clubs or organizations: More than 4 times per year     Relationship status:      Facility-Administered Medications Prior to Admission   Medication Dose Route Frequency Provider Last Rate Last Dose   • cyanocobalamin injection 1,000 mcg  1,000 mcg Subcutaneous Q30 Days Uriah Godinez MD   1,000 mcg at 02/07/20 1016     Medications Prior to Admission   Medication Sig Dispense Refill Last Dose   • allopurinol (ZYLOPRIM) 300 MG tablet Take 1 p.o. daily for gout 90 tablet 3 Taking   • amLODIPine (NORVASC) 5 MG tablet Take 1 p.o. every morning for high blood pressure 90 tablet 3 Taking   • amoxicillin-clavulanate (AUGMENTIN) 875-125 MG per tablet Take 1 tablet by mouth 2 (Two) Times a Day.      • aspirin (ECOTRIN) 325 MG EC tablet Take 81 mg by mouth Daily.   Taking   • budesonide (PULMICORT) 0.5 MG/2ML nebulizer solution Take 0.5 mg by nebulization 2 (Two) Times a Day.      • carbidopa-levodopa (SINEMET)  MG per tablet Take 1 tablet by mouth 2 (Two) Times a Day.   Taking   • cefTRIAXone Sodium (ROCEPHIN IV) Infuse 1 vial into a venous catheter. 1 vial is equivalent to 1 gram      • doxycycline (VIBRAMYICN) 100 MG tablet Take 100 mg by mouth 2  (Two) Times a Day.      • guaiFENesin (MUCINEX) 600 MG 12 hr tablet Take 1,200 mg by mouth 2 (Two) Times a Day.      • ipratropium-albuterol (DUO-NEB) 0.5-2.5 mg/3 ml nebulizer Take 3 mL by nebulization 2 (Two) Times a Day.      • lovastatin (MEVACOR) 20 MG tablet Take 1 p.o. daily for high cholesterol 90 tablet 3 Taking   • ondansetron (ZOFRAN) 4 MG tablet 1 p.o. every 6 to 8 hours as needed nausea 20 tablet 0    • saccharomyces boulardii (FLORASTOR) 250 MG capsule Take 1 capsule by mouth 2 (Two) Times a Day.      • valsartan-hydrochlorothiazide (DIOVAN-HCT) 320-25 MG per tablet Take 1 tablet by mouth Daily. 90 tablet 3 Taking     No Known Allergies    Objective      Vital Signs  Temp:  [97.2 °F (36.2 °C)-98.8 °F (37.1 °C)] 97.2 °F (36.2 °C)  Heart Rate:  [71-77] 72  Resp:  [16-18] 16  BP: (140-152)/(68-70) 152/68    Intake & Output (last day)       03/05 0701 - 03/06 0700 03/06 0701 - 03/07 0700    P.O. 50     IV Piggyback  250    Total Intake(mL/kg) 50 (0.6) 250 (3)    Urine (mL/kg/hr) 1550 (0.8)     Stool 0     Total Output 1550     Net -1500 +250          Urine Unmeasured Occurrence 3 x     Stool Unmeasured Occurrence 3 x 1 x          Physical Exam   Constitutional: He is oriented to person, place, and time. He appears well-developed and well-nourished. No distress.   HENT:   Head: Normocephalic and atraumatic.   Eyes: Conjunctivae and EOM are normal. No scleral icterus.   Neck: Normal range of motion. Neck supple. No JVD present. No tracheal deviation present.   Cardiovascular: Normal rate, regular rhythm, normal heart sounds and intact distal pulses.   No murmur heard.  Pulmonary/Chest: Effort normal. No stridor. No respiratory distress. He has decreased breath sounds in the right lower field and the left lower field. He has no wheezes. He has no rhonchi. He has no rales.   Abdominal: Soft. Bowel sounds are normal. He exhibits no mass.   Musculoskeletal: Normal range of motion. He exhibits edema (BLE).      Lymphadenopathy:     He has no cervical adenopathy.   Neurological: He is alert and oriented to person, place, and time.   Skin: Skin is warm and dry. Capillary refill takes less than 2 seconds. He is not diaphoretic.   Psychiatric: He has a normal mood and affect. His behavior is normal. Judgment and thought content normal.   Nursing note and vitals reviewed.      Results Review:    I reviewed the patient's new clinical results.  I reviewed the patient's new imaging results and agree with the interpretation.  I reviewed the patient's other test results and agree with the interpretation  Discussed with patient, RN and Dr. Leon.    Imaging Results (Last 24 Hours)     ** No results found for the last 24 hours. **          Lab Results:  Lab Results (last 24 hours)     Procedure Component Value Units Date/Time    Renal Function Panel [[id number]]  (Abnormal) Collected:  03/06/20 0729    Specimen:  Blood from Hand, Left Updated:  03/06/20 0804     Glucose 101 mg/dL      BUN 17 mg/dL      Creatinine 0.75 mg/dL      Sodium 133 mmol/L      Potassium 3.3 mmol/L      Chloride 97 mmol/L      CO2 26.3 mmol/L      Calcium 8.0 mg/dL      Albumin 2.30 g/dL      Phosphorus 3.4 mg/dL      Anion Gap 9.7 mmol/L      BUN/Creatinine Ratio 22.7     eGFR Non African Amer 98 mL/min/1.73     Narrative:       GFR Normal >60  Chronic Kidney Disease <60  Kidney Failure <15      CBC & Differential [[id number]] Collected:  03/06/20 0729    Specimen:  Blood Updated:  03/06/20 0738    Narrative:       The following orders were created for panel order CBC & Differential.  Procedure                               Abnormality         Status                     ---------                               -----------         ------                     CBC Auto Differential[[id number]]        Abnormal            Final result                 Please view results for these tests on the individual orders.    CBC Auto Differential [[id number]]  (Abnormal)  Collected:  03/06/20 0729    Specimen:  Blood Updated:  03/06/20 0738     WBC 18.06 10*3/mm3      RBC 3.82 10*6/mm3      Hemoglobin 10.6 g/dL      Hematocrit 33.3 %      MCV 87.2 fL      MCH 27.7 pg      MCHC 31.8 g/dL      RDW 12.8 %      RDW-SD 40.5 fl      MPV 9.8 fL      Platelets 273 10*3/mm3      Neutrophil % 76.1 %      Lymphocyte % 10.5 %      Monocyte % 8.0 %      Eosinophil % 0.8 %      Basophil % 0.6 %      Immature Grans % 4.0 %      Neutrophils, Absolute 13.74 10*3/mm3      Lymphocytes, Absolute 1.90 10*3/mm3      Monocytes, Absolute 1.45 10*3/mm3      Eosinophils, Absolute 0.15 10*3/mm3      Basophils, Absolute 0.10 10*3/mm3      Immature Grans, Absolute 0.72 10*3/mm3      nRBC 0.0 /100 WBC     Body Fluid Culture - Body Fluid, Pleural Cavity [[id number]]  (Abnormal)  (Susceptibility) Collected:  03/02/20 1336    Specimen:  Body Fluid from Pleural Cavity Updated:  03/06/20 0730     Body Fluid Culture Staphylococcus hominis ssp hominis     Comment: In broth only.        Gram Stain Rare (1+) WBCs seen      No organisms seen    Susceptibility      Staphylococcus hominis ssp hominis     ERIC     Clindamycin Resistant     Oxacillin Resistant     Tetracycline Resistant     Trimethoprim + Sulfamethoxazole Resistant     Vancomycin Susceptible                Susceptibility Comments     Staphylococcus hominis ssp hominis    This isolate is presumed to be clindamycin resistant based on detection of inducible clindamycin resistance.  Clindamycin may still be effective in some patients.                       Assessment/Plan       Acute saddle pulmonary embolism with acute cor pulmonale (CMS/HCC)    Pulmonary emboli (CMS/HCC)    Empyema of pleura (CMS/HCC)      Assessment & Plan     We will get a CT of the chest without contrast to better assess the current status of the effusion. Given his comorbidities, he is not a candidate for surgical intervention, however he may need a CT-guided smallbore chest tube placed to  drain the fluid with possible TPA intrapleural administration.  I did asked the RN to hold this morning's dose of Eliquis just until we can review the CT imaging make a determination if intervention is needed. It may be necessary to reinitiate his heparin gtt if Eliquis needs to be held for a procedure.    I discussed the patients findings and our recommendations with patient, nursing staff and Dr. Leon.    Thank you for this consult and allowing us to participate in the care of your patient.  We will follow along with you during this hospitalization.       EBEN Rivers  [facility]  03/06/20  11:02 AM

## 2020-03-06 NOTE — PLAN OF CARE
Problem: Patient Care Overview  Goal: Plan of Care Review  Flowsheets (Taken 3/6/2020 1285)  Progress: improving  Plan of Care Reviewed With: patient; spouse  Outcome Summary: Progressing slowly, static standing balance slightly improved but does not tolerate >1 min standing. Completes UE ther ex.

## 2020-03-06 NOTE — PLAN OF CARE
Problem: Patient Care Overview  Goal: Plan of Care Review  Outcome: Ongoing (interventions implemented as appropriate)  Flowsheets (Taken 3/6/2020 0502)  Progress: improving  Plan of Care Reviewed With: patient  Outcome Summary: VSS. SR. 4L NC with sleep, pt still refusing BiPaP. Thoracic surgery to see today. Will continue to monitor.  Goal: Individualization and Mutuality  Outcome: Ongoing (interventions implemented as appropriate)  Goal: Discharge Needs Assessment  Outcome: Ongoing (interventions implemented as appropriate)  Goal: Interprofessional Rounds/Family Conf  Outcome: Ongoing (interventions implemented as appropriate)     Problem: Skin Injury Risk (Adult)  Goal: Identify Related Risk Factors and Signs and Symptoms  Outcome: Ongoing (interventions implemented as appropriate)  Flowsheets (Taken 3/6/2020 0502)  Related Risk Factors (Skin Injury Risk): edema; mobility impaired  Goal: Skin Health and Integrity  Outcome: Ongoing (interventions implemented as appropriate)  Flowsheets (Taken 3/6/2020 0502)  Skin Health and Integrity: making progress toward outcome     Problem: Fall Risk (Adult)  Goal: Identify Related Risk Factors and Signs and Symptoms  Outcome: Ongoing (interventions implemented as appropriate)  Flowsheets (Taken 3/6/2020 0502)  Related Risk Factors (Fall Risk): gait/mobility problems; environment unfamiliar  Signs and Symptoms (Fall Risk): presence of risk factors  Goal: Absence of Fall  Outcome: Ongoing (interventions implemented as appropriate)  Flowsheets (Taken 3/6/2020 0502)  Absence of Fall: making progress toward outcome     Problem: VTE, DVT and PE (Adult)  Goal: Signs and Symptoms of Listed Potential Problems Will be Absent, Minimized or Managed (VTE, DVT and PE)  Outcome: Ongoing (interventions implemented as appropriate)  Flowsheets (Taken 3/6/2020 0502)  Problems Assessed (VTE, DVT, PE): all  Problems Present (VTE, DVT, PE): pulmonary embolism; deep vein thrombosis     Problem:  Pain, Acute (Adult)  Goal: Identify Related Risk Factors and Signs and Symptoms  Outcome: Ongoing (interventions implemented as appropriate)  Flowsheets (Taken 3/6/2020 0502)  Related Risk Factors (Acute Pain): disease process; procedure/treatment  Signs and Symptoms (Acute Pain): verbalization of pain descriptors  Goal: Acceptable Pain Control/Comfort Level  Outcome: Ongoing (interventions implemented as appropriate)  Flowsheets (Taken 3/6/2020 0502)  Acceptable Pain Control/Comfort Level: making progress toward outcome

## 2020-03-06 NOTE — PROGRESS NOTES
"  FIRST UROLOGY DAILY PROGRESS NOTE    Patient Identification  Name: Izaiah Garcia  Age: 88 y.o.  Sex: male  :  1931  MRN: [id number]    Date: 3/6/2020             Subjective:  Interval History:   Sleeping  Mildly labored breathing     Objective:    Scheduled Meds:    apixaban 10 mg Oral Q12H   Followed by      [START ON 3/10/2020] apixaban 5 mg Oral Q12H   budesonide 0.5 mg Nebulization BID - RT   carbidopa-levodopa 1 tablet Oral BID   cefepime 2 g Intravenous Q8H   furosemide 40 mg Intravenous Q12H   ipratropium-albuterol 3 mL Nebulization BID - RT   lidocaine 1 patch Transdermal Q24H   saccharomyces boulardii 250 mg Oral BID   sodium chloride 10 mL Intravenous Q12H   vancomycin 750 mg Intravenous Q12H     Continuous Infusions:    Pharmacy to dose vancomycin      PRN Meds:hydrALAZINE  •  Pharmacy to dose vancomycin  •  potassium chloride **OR** potassium chloride **OR** potassium chloride  •  [COMPLETED] Insert peripheral IV **AND** sodium chloride  •  sodium chloride    Vital signs in last 24 hours:  Temp:  [98.2 °F (36.8 °C)-98.8 °F (37.1 °C)] 98.4 °F (36.9 °C)  Heart Rate:  [72-81] 76  Resp:  [18] 18  BP: (130-152)/(65-70) 152/68    Intake/Output:    Intake/Output Summary (Last 24 hours) at 3/6/2020 0655  Last data filed at 3/6/2020 0055  Gross per 24 hour   Intake 50 ml   Output 1550 ml   Net -1500 ml       Exam:  /68 (BP Location: Left arm, Patient Position: Lying)   Pulse 76   Temp 98.4 °F (36.9 °C) (Oral)   Resp 18   Ht 177.8 cm (70\")   Wt 84.7 kg (186 lb 11.2 oz) Comment: Verified with nurse  SpO2 93%   BMI 26.79 kg/m²     General Appearance:    Alert, cooperative, no distress, appears stated age   Back:     Symmetric, no CVA tenderness   Lungs:     Clear to auscultation bilaterally, respirations unlabored   Heart:    Regular rate and rhythm, strong peripheral pulses   Abdomen:    Soft   Genitalia:    Soft, no blood or evidence of previous bleeding from meatus   Extremities:   " Extremities normal, atraumatic, no cyanosis or edema   Skin:   Skin color, texture, turgor normal, no rashes or lesions        Data Review:  All labs (24hrs):   No results found for this or any previous visit (from the past 24 hour(s)).    Assessment:    Acute saddle pulmonary embolism with acute cor pulmonale (CMS/HCC)    Pulmonary emboli (CMS/HCC)      Gross blood at urethral meatus - resolved  Like the result of recent Ledezma catheter placement and anticoagulation on board  Voiding on his own    Plan:    1. Recommend serial PVR to make sure he isn't retaining urine  2. No acute urologic intervention - call for any issues       oClten Russell MD  3/6/2020  6:55 AM

## 2020-03-07 LAB
ALBUMIN SERPL-MCNC: 2 G/DL (ref 3.5–5.2)
ANION GAP SERPL CALCULATED.3IONS-SCNC: 10 MMOL/L (ref 5–15)
APTT PPP: 95.8 SECONDS (ref 22.7–35.4)
APTT PPP: 98.5 SECONDS (ref 22.7–35.4)
BACTERIA SPEC ANAEROBE CULT: NORMAL
BASOPHILS # BLD AUTO: 0.06 10*3/MM3 (ref 0–0.2)
BASOPHILS NFR BLD AUTO: 0.4 % (ref 0–1.5)
BUN BLD-MCNC: 17 MG/DL (ref 8–23)
BUN/CREAT SERPL: 21 (ref 7–25)
CALCIUM SPEC-SCNC: 7.8 MG/DL (ref 8.6–10.5)
CHLORIDE SERPL-SCNC: 98 MMOL/L (ref 98–107)
CO2 SERPL-SCNC: 26 MMOL/L (ref 22–29)
CREAT BLD-MCNC: 0.81 MG/DL (ref 0.76–1.27)
DEPRECATED RDW RBC AUTO: 37.6 FL (ref 37–54)
EOSINOPHIL # BLD AUTO: 0.22 10*3/MM3 (ref 0–0.4)
EOSINOPHIL NFR BLD AUTO: 1.5 % (ref 0.3–6.2)
ERYTHROCYTE [DISTWIDTH] IN BLOOD BY AUTOMATED COUNT: 12.3 % (ref 12.3–15.4)
GFR SERPL CREATININE-BSD FRML MDRD: 90 ML/MIN/1.73
GLUCOSE BLD-MCNC: 120 MG/DL (ref 65–99)
HCT VFR BLD AUTO: 29.8 % (ref 37.5–51)
HGB BLD-MCNC: 9.8 G/DL (ref 13–17.7)
IMM GRANULOCYTES # BLD AUTO: 0.64 10*3/MM3 (ref 0–0.05)
IMM GRANULOCYTES NFR BLD AUTO: 4.2 % (ref 0–0.5)
LYMPHOCYTES # BLD AUTO: 1.62 10*3/MM3 (ref 0.7–3.1)
LYMPHOCYTES NFR BLD AUTO: 10.7 % (ref 19.6–45.3)
MCH RBC QN AUTO: 27.8 PG (ref 26.6–33)
MCHC RBC AUTO-ENTMCNC: 32.9 G/DL (ref 31.5–35.7)
MCV RBC AUTO: 84.7 FL (ref 79–97)
MONOCYTES # BLD AUTO: 1.59 10*3/MM3 (ref 0.1–0.9)
MONOCYTES NFR BLD AUTO: 10.6 % (ref 5–12)
NEUTROPHILS # BLD AUTO: 10.94 10*3/MM3 (ref 1.7–7)
NEUTROPHILS NFR BLD AUTO: 72.6 % (ref 42.7–76)
NRBC BLD AUTO-RTO: 0 /100 WBC (ref 0–0.2)
PHOSPHATE SERPL-MCNC: 3.3 MG/DL (ref 2.5–4.5)
PLATELET # BLD AUTO: 252 10*3/MM3 (ref 140–450)
PMV BLD AUTO: 10 FL (ref 6–12)
POTASSIUM BLD-SCNC: 3.6 MMOL/L (ref 3.5–5.2)
RBC # BLD AUTO: 3.52 10*6/MM3 (ref 4.14–5.8)
SODIUM BLD-SCNC: 134 MMOL/L (ref 136–145)
VANCOMYCIN TROUGH SERPL-MCNC: 8.5 MCG/ML (ref 5–20)
WBC NRBC COR # BLD: 15.07 10*3/MM3 (ref 3.4–10.8)

## 2020-03-07 PROCEDURE — 85025 COMPLETE CBC W/AUTO DIFF WBC: CPT | Performed by: NURSE PRACTITIONER

## 2020-03-07 PROCEDURE — 85730 THROMBOPLASTIN TIME PARTIAL: CPT | Performed by: INTERNAL MEDICINE

## 2020-03-07 PROCEDURE — 94799 UNLISTED PULMONARY SVC/PX: CPT

## 2020-03-07 PROCEDURE — 99232 SBSQ HOSP IP/OBS MODERATE 35: CPT | Performed by: THORACIC SURGERY (CARDIOTHORACIC VASCULAR SURGERY)

## 2020-03-07 PROCEDURE — 25010000003 POTASSIUM CHLORIDE 10 MEQ/100ML SOLUTION: Performed by: INTERNAL MEDICINE

## 2020-03-07 PROCEDURE — 99232 SBSQ HOSP IP/OBS MODERATE 35: CPT | Performed by: INTERNAL MEDICINE

## 2020-03-07 PROCEDURE — 80069 RENAL FUNCTION PANEL: CPT | Performed by: INTERNAL MEDICINE

## 2020-03-07 PROCEDURE — 80202 ASSAY OF VANCOMYCIN: CPT | Performed by: INTERNAL MEDICINE

## 2020-03-07 PROCEDURE — 97110 THERAPEUTIC EXERCISES: CPT

## 2020-03-07 PROCEDURE — 25010000002 HEPARIN (PORCINE) PER 1000 UNITS: Performed by: NURSE PRACTITIONER

## 2020-03-07 PROCEDURE — 85730 THROMBOPLASTIN TIME PARTIAL: CPT | Performed by: THORACIC SURGERY (CARDIOTHORACIC VASCULAR SURGERY)

## 2020-03-07 RX ORDER — ACETAMINOPHEN 325 MG/1
650 TABLET ORAL EVERY 4 HOURS PRN
Status: DISCONTINUED | OUTPATIENT
Start: 2020-03-07 | End: 2020-03-20 | Stop reason: HOSPADM

## 2020-03-07 RX ADMIN — IPRATROPIUM BROMIDE AND ALBUTEROL SULFATE 3 ML: 2.5; .5 SOLUTION RESPIRATORY (INHALATION) at 20:20

## 2020-03-07 RX ADMIN — POTASSIUM CHLORIDE 10 MEQ: 7.46 INJECTION, SOLUTION INTRAVENOUS at 10:39

## 2020-03-07 RX ADMIN — POTASSIUM CHLORIDE 10 MEQ: 7.46 INJECTION, SOLUTION INTRAVENOUS at 12:43

## 2020-03-07 RX ADMIN — HEPARIN SODIUM 22 UNITS/KG/HR: 10000 INJECTION, SOLUTION INTRAVENOUS at 05:11

## 2020-03-07 RX ADMIN — Medication 250 MG: at 09:25

## 2020-03-07 RX ADMIN — BUDESONIDE 0.5 MG: 0.5 INHALANT RESPIRATORY (INHALATION) at 20:20

## 2020-03-07 RX ADMIN — SODIUM CHLORIDE, PRESERVATIVE FREE 10 ML: 5 INJECTION INTRAVENOUS at 09:28

## 2020-03-07 RX ADMIN — BUDESONIDE 0.5 MG: 0.5 INHALANT RESPIRATORY (INHALATION) at 07:46

## 2020-03-07 RX ADMIN — LIDOCAINE 1 PATCH: 50 PATCH CUTANEOUS at 09:23

## 2020-03-07 RX ADMIN — POTASSIUM CHLORIDE 10 MEQ: 7.46 INJECTION, SOLUTION INTRAVENOUS at 11:49

## 2020-03-07 RX ADMIN — CARBIDOPA AND LEVODOPA 1 TABLET: 25; 100 TABLET ORAL at 09:23

## 2020-03-07 RX ADMIN — FUROSEMIDE 40 MG: 40 TABLET ORAL at 09:23

## 2020-03-07 RX ADMIN — POTASSIUM CHLORIDE 10 MEQ: 7.46 INJECTION, SOLUTION INTRAVENOUS at 09:34

## 2020-03-07 RX ADMIN — CARBIDOPA AND LEVODOPA 1 TABLET: 25; 100 TABLET ORAL at 20:32

## 2020-03-07 RX ADMIN — Medication 250 MG: at 20:32

## 2020-03-07 RX ADMIN — ACETAMINOPHEN 650 MG: 325 TABLET, FILM COATED ORAL at 22:46

## 2020-03-07 RX ADMIN — HEPARIN SODIUM 20 UNITS/KG/HR: 10000 INJECTION, SOLUTION INTRAVENOUS at 20:36

## 2020-03-07 RX ADMIN — IPRATROPIUM BROMIDE AND ALBUTEROL SULFATE 3 ML: 2.5; .5 SOLUTION RESPIRATORY (INHALATION) at 07:46

## 2020-03-07 NOTE — NURSING NOTE
Patient educated about need for potassium replacement. Patient stated he did not want to take replacement at this time.

## 2020-03-07 NOTE — PROGRESS NOTES
INFECTIOUS DISEASES PROGRESS NOTE    CC: Follow-up pleural effusion    S:   Feels well.  He says his breathing continues to improve.  No fevers or chills or night sweats    O:  Physical Exam:  Temp:  [97.4 °F (36.3 °C)-98.4 °F (36.9 °C)] 98 °F (36.7 °C)  Heart Rate:  [68-76] 72  Resp:  [15-18] 18  BP: (137-150)/(64-72) 141/72  Physical Exam   Constitutional: He appears well-developed. No distress.   Pulmonary/Chest: Effort normal. He has decreased breath sounds.   Abdominal: Soft. He exhibits no distension. There is no tenderness.   Neurological: He is alert.   Skin: Skin is warm.        Diagnostics:    White count 15.1  Hemoglobin 9.8  Platelets 252  Creatinine 0.81    Microbiology:  2/16 BCx neg  2/17 c diff/gi pcr neg  2/17 MRSA screen, RPP neg  2/29 BCx neg  2/29 RPP neg  3/2 Pleural studies: WBC 04355;  (P58, L 27, M 15), , glc 123 cx-staph hominis in broth alone  3/3 c diff neg         Assessment/Plan   1.  Pleural effusion  2.  Bilateral PE  3.  Leukocytosis, secondary to above    Doubt pleural space or intrapulmonary infection.  Continue to hold antibiotics.  White count has come down and procalcitonin negative.  Possible parapneumonic effusion versus effusion secondary to PE and anticoagulation      Jose Davidson MD  10:16 AM  03/07/20

## 2020-03-07 NOTE — PLAN OF CARE
Problem: Patient Care Overview  Goal: Plan of Care Review  Outcome: Ongoing (interventions implemented as appropriate)  Flowsheets (Taken 3/7/2020 1010)  Outcome Summary: pt extremely [place], req tactile cues to keep on task-easily distracted; seems anxious/shaky w/amb; still req assist of 2 and rwx to get bed to chair

## 2020-03-07 NOTE — THERAPY TREATMENT NOTE
Acute Care - Physical Therapy Treatment Note  [facility]     Patient Name: Izaiah Garcia  : 1931  MRN: [id number]  Today's Date: 3/7/2020  Onset of Illness/Injury or Date of Surgery: 20          Admit Date: 2020    Visit Dx:    ICD-10-CM ICD-9-CM   1. Healthcare-associated pneumonia J18.9 486   2. Acute hypoxemic respiratory failure (CMS/HCC) J96.01 518.81   3. Bilateral pulmonary embolism (CMS/HCC) I26.99 415.19   4. Acute saddle pulmonary embolism with acute cor pulmonale (CMS/HCC) I26.02 415.13     415.0     Patient Active Problem List   Diagnosis   • Hyperlipidemia   • Benign essential hypertension   • History of gout   • History of esophageal stricture   • History of stroke, 2016--4.9 x 4 x 3 cm inferior left cerebellar infarct   • Rheumatoid factor positive   • Impaired fasting glucose   • At risk for falls   • Generalized weakness   • Bilateral high frequency sensorineural hearing loss, wears hearing aids   • Bilateral lower extremity edema   • Parkinson's disease (CMS/HCC)   • Therapeutic drug monitoring   • Vitamin D deficiency   • Macrocytosis   • Vitamin B12 deficiency   • Acute diarrhea   • Hospital-acquired pneumonia   • HAP (hospital-acquired pneumonia)   • Acute UTI (urinary tract infection)   • Colitis   • Parkinson disease (CMS/HCC)   • Weakness   • Aspiration pneumonia of right lower lobe due to vomit (CMS/HCC)   • Pulmonary emboli (CMS/HCC)   • Acute saddle pulmonary embolism with acute cor pulmonale (CMS/HCC)   • Empyema of pleura (CMS/HCC)       Therapy Treatment    Rehabilitation Treatment Summary     Row Name 20 0940             Treatment Time/Intention    Discipline  physical therapy assistant  -      Document Type  therapy note (daily note)  -ANGELO      Subjective Information  complains of;weakness;fatigue;pain;dyspnea  -ANGELO      Mode of Treatment  individual therapy;physical therapy  -ANGELO      Care Plan Review  patient/other agree to care plan  -ANGELO       Comment  [place]  -      Existing Precautions/Restrictions  fall;oxygen therapy device and L/min 4L  -      Treatment Considerations/Comments  fatigues quickly, easily off task  -      Recorded by [] Eliana Cook, PTA 03/07/20 0946      Row Name 03/07/20 0940             Bed Mobility Assessment/Treatment    Supine-Sit [place] (Bed Mobility)  2 person assist;moderate assist (50% patient effort)  -      Bed Mobility, Safety Issues  decreased use of arms for pushing/pulling;decreased use of legs for bridging/pushing  -      Assistive Device (Bed Mobility)  draw sheet  -      Recorded by [] Eliana Cook, PTA 03/07/20 0946      Row Name 03/07/20 0940             Sit-Stand Transfer    Sit-Stand [place] (Transfers)  2 person assist;moderate assist (50% patient effort) elevated bed  -      Assistive Device (Sit-Stand Transfers)  walker, front-wheeled  -      Recorded by [] Eliana Cook PTA 03/07/20 0946      Row Name 03/07/20 0940             Stand-Sit Transfer    Stand-Sit [place] (Transfers)  2 person assist;minimum assist (75% patient effort)  -      Assistive Device (Stand-Sit Transfers)  walker, front-wheeled  -      Recorded by [] Eliana Cook, PTA 03/07/20 0946      Row Name 03/07/20 0940             Gait/Stairs Assessment/Training    [place] Level (Gait)  2 person assist;moderate assist (50% patient effort)  -      Assistive Device (Gait)  walker, front-wheeled  -      Deviations/Abnormal Patterns (Gait)  base of support, wide;erika decreased;festinating/shuffling;stride length decreased  -      Bilateral Gait Deviations  forward flexed posture  -      Comment (Gait/Stairs)  6 steps bed to chair, assist to guide rwx and cues to stay on task, anxious/shaky  -      Recorded by [] Eliana Cook, PTA 03/07/20 0946      Row Name 03/07/20 0940             Therapeutic Exercise    Lower Extremity (Therapeutic Exercise)  LAQ (long arc quad),  bilateral;marching while seated  -      Lower Extremity Range of Motion (Therapeutic Exercise)  ankle dorsiflexion/plantar flexion, bilateral  -      Sets/Reps (Therapeutic Exercise)  10 reps  -      Recorded by [] Eliana Cook PTA 03/07/20 0946      Row Name 03/07/20 0940             Positioning and Restraints    Pre-Treatment Position  in bed  -      In Chair  reclined;call light within reach;encouraged to call for assist;exit alarm on;heels elevated  -      Recorded by [] Eliana Cook PTA 03/07/20 0946      Row Name 03/07/20 0940             Pain Scale: Numbers Pre/Post-Treatment    Pain Scale: Numbers, Pretreatment  0/10 - no pain  -      Pain Location - Side  Left  -      Pain Location  chest  -      Recorded by [] Eliana Cook PTA 03/07/20 0946      Row Name 03/07/20 0940             Pain Scale: Word Pre/Post-Treatment    Pain: Word Scale, Post-Treatment  2 - mild pain  -      Recorded by [] Eliana Cook PTA 03/07/20 0946      Row Name                Wound 03/01/20  Right anterior groin Puncture    Wound - Properties Group Date first assessed: 03/01/20 [KB] Time first assessed: -- [KB], when EKOS pulled, puncture wound left in place  Present on Hospital Admission: N [KB] Side: Right [KB] Orientation: anterior [KB] Location: groin [KB] Primary Wound Type: Puncture [KB] Recorded by:  [KB] Tootie Decker, RN 03/02/20 1721      User Key  (r) = Recorded By, (t) = Taken By, (c) = Cosigned By    Initials Name Effective Dates Discipline     Eliana Cook PTA 03/07/18 -  PT    Tootie Pan, RN 06/20/16 -  Nurse          Wound 03/01/20  Right anterior groin Puncture (Active)   Dressing Appearance open to air 3/7/2020  8:55 AM   Closure None 3/7/2020 12:02 AM   Base clean;dry;pink 3/7/2020  8:55 AM   Periwound ecchymotic 3/7/2020 12:02 AM   Periwound Temperature warm 3/6/2020  4:41 PM   Periwound Skin Turgor soft 3/7/2020  8:55 AM   Care, Wound barrier  applied 3/6/2020  8:45 PM   Dressing Care, Wound open to air 3/7/2020  8:55 AM               PT Recommendation and Plan     Outcome Summary: pt extremely [place], req tactile cues to keep on task-easily distracted; seems anxious/shaky w/amb; still req assist of 2 and rwx to get bed to chair  Outcome Measures     Row Name 03/07/20 1000 03/06/20 1100 03/05/20 1500       How much help from another person do you currently need...    Turning from your back to your side while in flat bed without using bedrails?  2  -JM  --  --    Moving from lying on back to sitting on the side of a flat bed without bedrails?  2  -JM  --  --    Moving to and from a bed to a chair (including a wheelchair)?  2  -JM  --  --    Standing up from a chair using your arms (e.g., wheelchair, bedside chair)?  2  -JM  --  --    Climbing 3-5 steps with a railing?  1  -JM  --  --    To walk in hospital room?  1  -JM  --  --    AM-PAC 6 Clicks Score (PT)  10  -JM  --  --       How much help from another is currently needed...    Putting on and taking off regular lower body clothing?  --  1  -SG  1  -SG    Bathing (including washing, rinsing, and drying)  --  1  -SG  1  -SG    Toileting (which includes using toilet bed pan or urinal)  --  1  -SG  1  -SG    Putting on and taking off regular upper body clothing  --  2  -SG  2  -SG    Taking care of personal grooming (such as brushing teeth)  --  2  -SG  2  -SG    Eating meals  --  2  -SG  2  -SG    AM-PAC 6 Clicks Score (OT)  --  9  -SG  9  -SG       Functional Assessment    Outcome Measure Options  --  AM-PAC 6 Clicks Daily Activity (OT)  -SG  AM-PAC 6 Clicks Daily Activity (OT)  -SG    Row Name 03/04/20 1524             How much help from another is currently needed...    Putting on and taking off regular lower body clothing?  1  -KP      Bathing (including washing, rinsing, and drying)  1  -KP      Toileting (which includes using toilet bed pan or urinal)  1  -KP      Putting on and taking off regular  upper body clothing  2  -KP      Taking care of personal grooming (such as brushing teeth)  2  -KP      Eating meals  1  -KP      AM-PAC 6 Clicks Score (OT)  8  -KP         Functional Assessment    Outcome Measure Options  AM-PAC 6 Clicks Daily Activity (OT)  -        User Key  (r) = Recorded By, (t) = Taken By, (c) = Cosigned By    Initials Name Provider Type     Dary Monzon, OTR Occupational Therapist    Stacy Mchugh, OTR Occupational Therapist    Eliana Alcaraz PTA Physical Therapy Assistant         Time Calculation:   PT Charges     Row Name 03/07/20 1013             Time Calculation    Start Time  0920  -      Stop Time  0944  -ANGELO      Time Calculation (min)  24 min  -ANGELO      PT Received On  03/07/20  -ANGELO      PT - Next Appointment  03/08/20  -ANGELO        User Key  (r) = Recorded By, (t) = Taken By, (c) = Cosigned By    Initials Name Provider Type    Eliana Alcaraz PTA Physical Therapy Assistant        Therapy Charges for Today     Code Description Service Date Service Provider Modifiers Qty    84040819409 HC PT THER PROC EA 15 MIN 3/7/2020 Eliana Cook PTA GP 2    14700900243 HC PT THER SUPP EA 15 MIN 3/7/2020 Eliana Cook PTA GP 2          PT G-Codes  Outcome Measure Options: AM-PAC 6 Clicks Daily Activity (OT)  AM-PAC 6 Clicks Score (PT): 10  AM-PAC 6 Clicks Score (OT): 9    Eliana Cook PTA  3/7/2020

## 2020-03-07 NOTE — PROGRESS NOTES
PROGRESS NOTE  Patient Name: Izaiah Garcia  Age/Sex: 88 y.o. male  : 1931  MRN: [id number]    Date of Admission: 2020  Date of Encounter Visit: 20   LOS: 7 days   Patient Care Team:  Uriah Godinez MD as PCP - General (Internal Medicine)    Chief Complaint: Plan for chest tube placement on Monday    Hospital course: Patient was readmitted after an hospital admission for pneumonia with evidence of massive pulmonary embolism, and was managed with EKOS with directed thrombolytic therapy low-dose with good clinical response.  Patient had pleural effusion noted and was not getting better with follow-up so she was seen in consultation by thoracic surgery with repeat CAT scan showing persistent effusion that will be addressed with small bore chest tube placement on Monday.    Interval History: Patient doing better otherwise with improvement in the oxygen requirement, is not requiring any BiPAP at night, family and patient already aware of the plan for the chest tube on Monday    REVIEW OF SYSTEMS:   CONSTITUTIONAL: no fever or chills  CARDIOVASCULAR: No chest pain, chest pressure or chest discomfort. No palpitations or edema.   RESPIRATORY: No significant shortness of breath at rest however limited activity at this point  .   GASTROINTESTINAL: No anorexia, nausea, vomiting or diarrhea. No abdominal pain or blood.   HEMATOLOGIC: No bleeding or bruising.     Ventilator/Non-Invasive Ventilation Settings (From admission, onward)     Start     Ordered    20  NIPPV (CPAP or BIPAP)  Until Discontinued     Comments:  Pt founds on these settings:  IPAP min 12/ max 22  EPAP 8    Rate 18   Question Answer Comment   Indication: Acute Respiratory Failure    Type: BIPAP    NIPPV Mask Interface: Per Patient Preference    Tidal Volume 550    Breath Rate 18    Titrate for SPO2 90%        20                  Vital Signs  Temp:  [97.4 °F (36.3 °C)-98.5 °F (36.9 °C)] 98.5 °F (36.9  "°C)  Heart Rate:  [68-76] 76  Resp:  [15-18] 18  BP: (132-149)/(62-72) 132/62  SpO2:  [93 %-99 %] 95 %  on  Flow (L/min):  [3] 3 Device (Oxygen Therapy): humidified;nasal cannula    Intake/Output Summary (Last 24 hours) at 3/7/2020 1605  Last data filed at 3/7/2020 1210  Gross per 24 hour   Intake 481 ml   Output 425 ml   Net 56 ml     Flowsheet Rows      First Filed Value   Admission Height  177.8 cm (70\") Documented at 02/29/2020 0800   Admission Weight  95 kg (209 lb 7 oz) Documented at 02/29/2020 0800        Body mass index is 26.79 kg/m².      03/04/20  0437 03/05/20  0514 03/06/20  0622   Weight: 90.4 kg (199 lb 4.7 oz) 91.3 kg (201 lb 4.8 oz) 84.7 kg (186 lb 11.2 oz) (Verified with nurse)       Physical Exam:  GEN:  No acute distress, alert, cooperative, well developed, on nasal cannula oxygen  EYES:   Sclerae clear. No icterus. PERRL. Normal EOM  ENT:   External ears/nose normal, no oral lesions, no thrush, dry mucous membranes   NECK:  Supple, midline trachea, no JVD  LUNGS: Normal chest on inspection, breath sounds are diminished bilaterally, minimal crackles posteriorly, there is no wheezes. Respirations regular, even and unlabored.   CV:  Regular rhythm and rate. Normal S1/S2. No murmurs, gallops, or rubs noted.  ABD:  Soft, nontender and nondistended. Normal bowel sounds. No guarding  EXT:  Moves all extremities well. No cyanosis. No redness. No lower extremities edema on the right, minimal on the left  Skin: Dry, intact, no bleeding    Results Review:      Results from last 7 days   Lab Units 03/07/20  0438 03/06/20  0729 03/05/20  0400 03/04/20  1724 03/04/20  0440 03/03/20  0616 03/02/20  0311 03/01/20  0356   SODIUM mmol/L 134* 133* 135*  --  135* 142 142 144   POTASSIUM mmol/L 3.6 3.3* 3.9 3.8 3.2* 3.6 3.9 3.1*   CHLORIDE mmol/L 98 97* 99  --  99 106 106 104   CO2 mmol/L 26.0 26.3 24.4  --  26.6 26.2 25.6 25.5   BUN mg/dL 17 17 18  --  20 25* 29* 26*   CREATININE mg/dL 0.81 0.75* 0.73*  --  0.76 " 0.76 0.83 0.82   CALCIUM mg/dL 7.8* 8.0* 8.0*  --  8.0* 7.7* 7.7* 7.6*   ANION GAP mmol/L 10.0 9.7 11.6  --  9.4 9.8 10.4 14.5   ALBUMIN g/dL 2.00* 2.30* 2.20*  --  2.20* 2.40* 2.40* 2.30*     Results from last 7 days   Lab Units 03/01/20  0356   TROPONIN T ng/mL 0.103*             Results from last 7 days   Lab Units 03/07/20  0438 03/06/20  1442 03/06/20  0729 03/05/20  0400 03/04/20  0440 03/03/20  0616 03/02/20  0311   WBC 10*3/mm3 15.07* 19.22* 18.06* 14.57* 13.55* 13.65* 16.36*   HEMOGLOBIN g/dL 9.8* 10.7* 10.6* 10.0* 10.2* 9.9* 10.3*   HEMATOCRIT % 29.8* 33.1* 33.3* 31.2* 31.1* 29.7* 32.0*   PLATELETS 10*3/mm3 252 295 273 305 328 359 416   MCV fL 84.7 84.7 87.2 85.7 84.3 84.6 86.0   NEUTROPHIL % % 72.6 79.1* 76.1*  --   --   --   --    LYMPHOCYTE % % 10.7* 9.3* 10.5*  --   --   --   --    MONOCYTES % % 10.6 7.5 8.0  --   --   --   --    EOSINOPHIL % % 1.5 0.5 0.8  --   --   --   --    BASOPHIL % % 0.4 0.3 0.6  --   --   --   --    IMM GRAN % % 4.2* 3.3* 4.0*  --   --   --   --      Results from last 7 days   Lab Units 03/07/20  1259 03/07/20  0438 03/06/20 2126 03/06/20  1442 03/03/20  0616 03/02/20  2209 03/02/20  1420  03/01/20  1750 03/01/20  1159   INR   --   --   --  1.98*  --   --   --   --  1.36* 1.35*   APTT seconds 95.8* 98.5* 56.2* 39.6* 177.0* 36.5* 28.2   < > 64.0* 35.7*    < > = values in this interval not displayed.               Invalid input(s): LDLCALC          No results found for: POCGLU  Results from last 7 days   Lab Units 03/06/20  0729   PROCALCITONIN ng/mL 0.20     Results from last 7 days   Lab Units 03/02/20  1336   BODYFLDCX  Staphylococcus hominis ssp hominis*   Results for SARKIS LANDA (MRN [id number]) as of 3/7/2020 16:07   Ref. Range 3/2/2020 13:37   Color, Fluid Unknown Red   Appearance, Fluid Latest Ref Range: Clear  Turbid (A)   WBC, Fluid Latest Units: /mm3 589   RBC, Fluid Latest Units: /mm3 46,000   Neutrophils, Fluid Latest Units: % 58   Lymphocytes, Fluid Latest  Units: % 27   Mononuclear, Fluid Latest Units: % 15   Other Cells/100 WBCs, Fluid Latest Units: /100 WBCs 2   pH, Fluid Unknown 7.84   Cholesterol, Fluid Latest Units: mg/dL 38   Glucose, Fluid Latest Units: mg/dL 123   LD, Fluid Latest Units: U/L 610   Protein, Total, Fluid Latest Units: g/dL 2.3                   Imaging:   Imaging Results (All)     Procedure Component Value Units Date/Time    CT Chest Without Contrast [[id number]] Collected:  03/06/20 1549     Updated:  03/06/20 1711    Narrative:       CT CHEST WITHOUT CONTRAST     HISTORY: 88-year-old male with acute hypoxic respiratory failure.  Pneumonia and empyema. Pulmonary thromboemboli.     TECHNIQUE: Radiation dose reduction techniques were utilized, including  automated exposure control and exposure modulation based on body size.   3 mm images were obtained through the chest without the administration  of IV contrast. Compared with chest CTA from 02/29/2020.     FINDINGS: There is a much larger left lower lobe airspace consolidation  which consolidates nearly the entire left lower lobe. There is also a  larger right lower lobe airspace consolidation than previously. There is  a small-moderate left pleural effusion and there is a small loculated  appearing right pleural effusion (ultrasound-guided right thoracentesis  on 03/02/2020. There is some compressive atelectatic change at the upper  lobes, but the upper lobes are otherwise clear. There is no change in  the necrotic appearing right superior paratracheal node measuring  approximately 3.7 x 2.6 cm. There are also stable shotty mediastinal  nodes which are stable. There is no acute abnormality within the  visualized upper abdomen. There is long-term stability of a lobular left  hepatic lobe cyst. 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       Impression:       1. Larger right lower lobe airspace consolidation and small loculated  right pleural  effusion. Significantly larger left lower lobe  consolidation and significant increase in the small-moderate left  pleural effusion.  2. Stable necrotic appearing 3.7 x 2.6 cm right paratracheal node.     This report was finalized on 3/6/2020 5:08 PM by Dr. Melania De M.D.        I reviewed the patient's new clinical results.  I personally viewed and interpreted the patient's imaging results:        Medication Review:     budesonide 0.5 mg Nebulization BID - RT   carbidopa-levodopa 1 tablet Oral BID   furosemide 40 mg Oral Daily   ipratropium-albuterol 3 mL Nebulization BID - RT   lidocaine 1 patch Transdermal Q24H   saccharomyces boulardii 250 mg Oral BID   sodium chloride 10 mL Intravenous Q12H         heparin (porcine) 18 Units/kg/hr Last Rate: 20 Units/kg/hr (03/07/20 6676)       ASSESSMENT:   1. Acute hypoxic respiratory failure  2. BPE s/p ekos catheter on 2/29/2020 with local TPA infusion with good clinical response  3. Troponemia suspect due to BPE  4. RV strain  5. HLD  6. Esphageal stricture  7. Gen weakness  8. Parkinsons Disease  9. Vit D def  10. RF factor positive  11. HTN  12. Abnormal mass around thyroid   13. Bilateral pleural effusion R greater than Left exudative with gram positive cocci growth now  14. hypokalemia  15. Hypoalbuminemia    PLAN:  The positive culture on the pleural fluid is felt to be a contaminant and patient is being monitored off antibiotics  Plan for CT-guided chest tube placement on Monday  Patient is on the heparin drip until then and will discuss further anticoagulation accordingly  Infectious disease note reviewed as well as thoracic surgery note.  Appreciate the input from the consultant  Discussed with family and with the patient    Disposition: Continue to monitor as an inpatient    Rosa Garcia MD  03/07/20  4:05 PM          Dictated utilizing Dragon dictation

## 2020-03-07 NOTE — PLAN OF CARE
Problem: Patient Care Overview  Goal: Plan of Care Review  Outcome: Ongoing (interventions implemented as appropriate)  Flowsheets (Taken 3/7/2020 1571)  Progress: improving  Plan of Care Reviewed With: patient  Outcome Summary: Patient slept well tonight. Patient denies pain. Patient continues to be short of air at times with movement/repositioning. Patient continues to wear hearing aids. Heparin gtt continues, RN continues to titrate for therapeutic levels. Patient's vital signs remain stable. Plan is for patient to have morning labs. Awaiting results. Continue to monitor closely, continue with current plan of care.

## 2020-03-07 NOTE — PROGRESS NOTES
"    Chief Complaint: Bilateral pulmonary emboli, parapneumonic effusion    Subjective:  Symptoms:  Stable.  He reports shortness of breath and chest pain.    Diet:  Adequate intake.  No nausea or vomiting.    Activity level: Impaired due to weakness.    Pain:  He complains of pain that is mild.  Pain is well controlled.        Vital Signs:  Temp:  [97.4 °F (36.3 °C)-98.5 °F (36.9 °C)] 98.5 °F (36.9 °C)  Heart Rate:  [68-76] 76  Resp:  [15-18] 18  BP: (132-150)/(62-72) 132/62    Intake & Output (last day)       03/06 0701 - 03/07 0700 03/07 0701 - 03/08 0700    P.O. 570 120    I.V. (mL/kg) 241 (2.8)     IV Piggyback 350     Total Intake(mL/kg) 1161 (13.7) 120 (1.4)    Urine (mL/kg/hr) 200 (0.1) 225 (0.5)    Stool 0     Total Output 200 225    Net +961 -105          Urine Unmeasured Occurrence 2 x     Stool Unmeasured Occurrence 3 x           Objective:  General Appearance:  Comfortable and in no acute distress.    Vital signs: (most recent): Blood pressure 132/62, pulse 76, temperature 98.5 °F (36.9 °C), temperature source Oral, resp. rate 18, height 177.8 cm (70\"), weight 84.7 kg (186 lb 11.2 oz), SpO2 95 %.  No fever.    Output: Producing urine.    Lungs:  Normal effort and normal respiratory rate.  There are decreased breath sounds.    Heart: Normal rate.  Regular rhythm.  No murmur.   Abdomen: Abdomen is soft.  Bowel sounds are normal.   There is no abdominal tenderness.   There is no mass.   Neurological: Patient is alert and oriented to person, place and time.  Normal strength.              Results Review:     I reviewed the patient's new clinical results.  I reviewed the patient's new imaging results and agree with the interpretation.    Imaging Results (Last 24 Hours)     Procedure Component Value Units Date/Time    CT Chest Without Contrast [[id number]] Collected:  03/06/20 1549     Updated:  03/06/20 1711    Narrative:       CT CHEST WITHOUT CONTRAST     HISTORY: 88-year-old male with acute hypoxic " respiratory failure.  Pneumonia and empyema. Pulmonary thromboemboli.     TECHNIQUE: Radiation dose reduction techniques were utilized, including  automated exposure control and exposure modulation based on body size.   3 mm images were obtained through the chest without the administration  of IV contrast. Compared with chest CTA from 02/29/2020.     FINDINGS: There is a much larger left lower lobe airspace consolidation  which consolidates nearly the entire left lower lobe. There is also a  larger right lower lobe airspace consolidation than previously. There is  a small-moderate left pleural effusion and there is a small loculated  appearing right pleural effusion (ultrasound-guided right thoracentesis  on 03/02/2020. There is some compressive atelectatic change at the upper  lobes, but the upper lobes are otherwise clear. There is no change in  the necrotic appearing right superior paratracheal node measuring  approximately 3.7 x 2.6 cm. There are also stable shotty mediastinal  nodes which are stable. There is no acute abnormality within the  visualized upper abdomen. There is long-term stability of a lobular left  hepatic lobe cyst. 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       Impression:       1. Larger right lower lobe airspace consolidation and small loculated  right pleural effusion. Significantly larger left lower lobe  consolidation and significant increase in the small-moderate left  pleural effusion.  2. Stable necrotic appearing 3.7 x 2.6 cm right paratracheal node.     This report was finalized on 3/6/2020 5:08 PM by Dr. Melania De M.D.             Lab Results:     Lab Results (last 24 hours)     Procedure Component Value Units Date/Time    Anaerobic Culture - Pleural Fluid, Pleural Cavity [[id number]] Collected:  03/02/20 1337    Specimen:  Pleural Fluid from Pleural Cavity Updated:  03/07/20 1206     Anaerobic Culture No anaerobes isolated at 5  days    Vancomycin, Trough Vancomycin is scheduled at 0900. Please draw trough 30 minutes prior to hanging dose (don't draw level while medication is infusing). [[id number]]  (Normal) Collected:  03/07/20 0836    Specimen:  Blood Updated:  03/07/20 0916     Vancomycin Trough 8.50 mcg/mL     aPTT [[id number]]  (Abnormal) Collected:  03/07/20 0438    Specimen:  Blood from Arm, Left Updated:  03/07/20 0538     PTT 98.5 seconds     Renal Function Panel [[id number]]  (Abnormal) Collected:  03/07/20 0438    Specimen:  Blood Updated:  03/07/20 0519     Glucose 120 mg/dL      BUN 17 mg/dL      Creatinine 0.81 mg/dL      Sodium 134 mmol/L      Potassium 3.6 mmol/L      Chloride 98 mmol/L      CO2 26.0 mmol/L      Calcium 7.8 mg/dL      Albumin 2.00 g/dL      Phosphorus 3.3 mg/dL      Anion Gap 10.0 mmol/L      BUN/Creatinine Ratio 21.0     eGFR Non African Amer 90 mL/min/1.73     Narrative:       GFR Normal >60  Chronic Kidney Disease <60  Kidney Failure <15      CBC & Differential [[id number]] Collected:  03/07/20 0438    Specimen:  Blood Updated:  03/07/20 0456    Narrative:       The following orders were created for panel order CBC & Differential.  Procedure                               Abnormality         Status                     ---------                               -----------         ------                     CBC Auto Differential[[id number]]        Abnormal            Final result                 Please view results for these tests on the individual orders.    CBC Auto Differential [[id number]]  (Abnormal) Collected:  03/07/20 0438    Specimen:  Blood Updated:  03/07/20 0456     WBC 15.07 10*3/mm3      RBC 3.52 10*6/mm3      Hemoglobin 9.8 g/dL      Hematocrit 29.8 %      MCV 84.7 fL      MCH 27.8 pg      MCHC 32.9 g/dL      RDW 12.3 %      RDW-SD 37.6 fl      MPV 10.0 fL      Platelets 252 10*3/mm3      Neutrophil % 72.6 %      Lymphocyte % 10.7 %      Monocyte % 10.6 %      Eosinophil % 1.5 %      Basophil % 0.4 %       Immature Grans % 4.2 %      Neutrophils, Absolute 10.94 10*3/mm3      Lymphocytes, Absolute 1.62 10*3/mm3      Monocytes, Absolute 1.59 10*3/mm3      Eosinophils, Absolute 0.22 10*3/mm3      Basophils, Absolute 0.06 10*3/mm3      Immature Grans, Absolute 0.64 10*3/mm3      nRBC 0.0 /100 WBC     aPTT [[id number]]  (Abnormal) Collected:  03/06/20 2126    Specimen:  Blood Updated:  03/06/20 2208     PTT 56.2 seconds     Protime-INR [[id number]]  (Abnormal) Collected:  03/06/20 1442    Specimen:  Blood Updated:  03/06/20 1523     Protime 22.2 Seconds      INR 1.98    aPTT [[id number]]  (Abnormal) Collected:  03/06/20 1442    Specimen:  Blood Updated:  03/06/20 1523     PTT 39.6 seconds     CBC & Differential [[id number]] Collected:  03/06/20 1442    Specimen:  Blood Updated:  03/06/20 1457    Narrative:       The following orders were created for panel order CBC & Differential.  Procedure                               Abnormality         Status                     ---------                               -----------         ------                     CBC Auto Differential[[id number]]        Abnormal            Final result                 Please view results for these tests on the individual orders.    CBC Auto Differential [[id number]]  (Abnormal) Collected:  03/06/20 1442    Specimen:  Blood Updated:  03/06/20 1457     WBC 19.22 10*3/mm3      RBC 3.91 10*6/mm3      Hemoglobin 10.7 g/dL      Hematocrit 33.1 %      MCV 84.7 fL      MCH 27.4 pg      MCHC 32.3 g/dL      RDW 12.7 %      RDW-SD 37.6 fl      MPV 9.8 fL      Platelets 295 10*3/mm3      Neutrophil % 79.1 %      Lymphocyte % 9.3 %      Monocyte % 7.5 %      Eosinophil % 0.5 %      Basophil % 0.3 %      Immature Grans % 3.3 %      Neutrophils, Absolute 15.20 10*3/mm3      Lymphocytes, Absolute 1.79 10*3/mm3      Monocytes, Absolute 1.45 10*3/mm3      Eosinophils, Absolute 0.09 10*3/mm3      Basophils, Absolute 0.06 10*3/mm3      Immature Grans, Absolute 0.63  "10*3/mm3      nRBC 0.0 /100 WBC     Procalcitonin [[id number]]  (Normal) Collected:  03/06/20 0729    Specimen:  Blood from Hand, Left Updated:  03/06/20 1346     Procalcitonin 0.20 ng/mL     Narrative:       As a Marker for Sepsis (Non-Neonates):   1. <0.5 ng/mL represents a low risk of severe sepsis and/or septic shock.  1. >2 ng/mL represents a high risk of severe sepsis and/or septic shock.    As a Marker for Lower Respiratory Tract Infections that require antibiotic therapy:  PCT on Admission     Antibiotic Therapy             6-12 Hrs later  > 0.5                Strongly Recommended            >0.25 - <0.5         Recommended  0.1 - 0.25           Discouraged                   Remeasure/reassess PCT  <0.1                 Strongly Discouraged          Remeasure/reassess PCT      As 28 day mortality risk marker: \"Change in Procalcitonin Result\" (> 80 % or <=80 %) if Day 0 (or Day 1) and Day 4 values are available. Refer to http://[email or web address]-pct-[email or web address]/   Change in PCT <=80 %   A decrease of PCT levels below or equal to 80 % defines a positive change in PCT test result representing a higher risk for 28-day all-cause mortality of patients diagnosed with severe sepsis or septic shock.  Change in PCT > 80 %   A decrease of PCT levels of more than 80 % defines a negative change in PCT result representing a lower risk for 28-day all-cause mortality of patients diagnosed with severe sepsis or septic shock.                Results may be falsely decreased if patient taking Biotin.            Assessment/Plan       Acute saddle pulmonary embolism with acute cor pulmonale (CMS/HCC)    Pulmonary emboli (CMS/HCC)    Empyema of pleura (CMS/HCC)       Assessment & Plan     Patient is stable.  Plan to hold anticoagulation and have CT directed pleural catheter placed on Monday to drain parapneumonic effusion.  Discussed in detail with patient and granddaughter.  Continue current therapy.    Duncan Segura, III, " MD  [facility]  03/07/20  12:45 PM

## 2020-03-08 LAB
ALBUMIN SERPL-MCNC: 2.1 G/DL (ref 3.5–5.2)
ANION GAP SERPL CALCULATED.3IONS-SCNC: 8.8 MMOL/L (ref 5–15)
APTT PPP: 67.2 SECONDS (ref 22.7–35.4)
APTT PPP: 78.1 SECONDS (ref 22.7–35.4)
BASOPHILS # BLD AUTO: 0.04 10*3/MM3 (ref 0–0.2)
BASOPHILS NFR BLD AUTO: 0.3 % (ref 0–1.5)
BUN BLD-MCNC: 17 MG/DL (ref 8–23)
BUN/CREAT SERPL: 23 (ref 7–25)
CALCIUM SPEC-SCNC: 7.7 MG/DL (ref 8.6–10.5)
CHLORIDE SERPL-SCNC: 95 MMOL/L (ref 98–107)
CO2 SERPL-SCNC: 26.2 MMOL/L (ref 22–29)
CREAT BLD-MCNC: 0.74 MG/DL (ref 0.76–1.27)
DEPRECATED RDW RBC AUTO: 37.4 FL (ref 37–54)
EOSINOPHIL # BLD AUTO: 0.12 10*3/MM3 (ref 0–0.4)
EOSINOPHIL NFR BLD AUTO: 0.9 % (ref 0.3–6.2)
ERYTHROCYTE [DISTWIDTH] IN BLOOD BY AUTOMATED COUNT: 12.4 % (ref 12.3–15.4)
GFR SERPL CREATININE-BSD FRML MDRD: 100 ML/MIN/1.73
GLUCOSE BLD-MCNC: 121 MG/DL (ref 65–99)
HCT VFR BLD AUTO: 28.6 % (ref 37.5–51)
HGB BLD-MCNC: 9.2 G/DL (ref 13–17.7)
IMM GRANULOCYTES # BLD AUTO: 0.3 10*3/MM3 (ref 0–0.05)
IMM GRANULOCYTES NFR BLD AUTO: 2.4 % (ref 0–0.5)
LYMPHOCYTES # BLD AUTO: 1.81 10*3/MM3 (ref 0.7–3.1)
LYMPHOCYTES NFR BLD AUTO: 14.3 % (ref 19.6–45.3)
MCH RBC QN AUTO: 26.7 PG (ref 26.6–33)
MCHC RBC AUTO-ENTMCNC: 32.2 G/DL (ref 31.5–35.7)
MCV RBC AUTO: 83.1 FL (ref 79–97)
MONOCYTES # BLD AUTO: 1.49 10*3/MM3 (ref 0.1–0.9)
MONOCYTES NFR BLD AUTO: 11.8 % (ref 5–12)
NEUTROPHILS # BLD AUTO: 8.9 10*3/MM3 (ref 1.7–7)
NEUTROPHILS NFR BLD AUTO: 70.3 % (ref 42.7–76)
NRBC BLD AUTO-RTO: 0 /100 WBC (ref 0–0.2)
PHOSPHATE SERPL-MCNC: 3.2 MG/DL (ref 2.5–4.5)
PLATELET # BLD AUTO: 280 10*3/MM3 (ref 140–450)
PMV BLD AUTO: 10.8 FL (ref 6–12)
POTASSIUM BLD-SCNC: 3.4 MMOL/L (ref 3.5–5.2)
RBC # BLD AUTO: 3.44 10*6/MM3 (ref 4.14–5.8)
SODIUM BLD-SCNC: 130 MMOL/L (ref 136–145)
WBC NRBC COR # BLD: 12.66 10*3/MM3 (ref 3.4–10.8)

## 2020-03-08 PROCEDURE — 94799 UNLISTED PULMONARY SVC/PX: CPT

## 2020-03-08 PROCEDURE — 85730 THROMBOPLASTIN TIME PARTIAL: CPT | Performed by: NURSE PRACTITIONER

## 2020-03-08 PROCEDURE — 85730 THROMBOPLASTIN TIME PARTIAL: CPT | Performed by: THORACIC SURGERY (CARDIOTHORACIC VASCULAR SURGERY)

## 2020-03-08 PROCEDURE — 80069 RENAL FUNCTION PANEL: CPT | Performed by: INTERNAL MEDICINE

## 2020-03-08 PROCEDURE — 25010000002 HEPARIN (PORCINE) PER 1000 UNITS: Performed by: NURSE PRACTITIONER

## 2020-03-08 PROCEDURE — 99232 SBSQ HOSP IP/OBS MODERATE 35: CPT | Performed by: INTERNAL MEDICINE

## 2020-03-08 PROCEDURE — 25010000002 HEPARIN (PORCINE) 25000-0.45 UT/250ML-% SOLUTION: Performed by: NURSE PRACTITIONER

## 2020-03-08 PROCEDURE — 25010000003 POTASSIUM CHLORIDE 10 MEQ/100ML SOLUTION: Performed by: INTERNAL MEDICINE

## 2020-03-08 PROCEDURE — 97530 THERAPEUTIC ACTIVITIES: CPT

## 2020-03-08 PROCEDURE — 99232 SBSQ HOSP IP/OBS MODERATE 35: CPT | Performed by: THORACIC SURGERY (CARDIOTHORACIC VASCULAR SURGERY)

## 2020-03-08 PROCEDURE — 85025 COMPLETE CBC W/AUTO DIFF WBC: CPT | Performed by: INTERNAL MEDICINE

## 2020-03-08 RX ORDER — HYDROCODONE BITARTRATE AND ACETAMINOPHEN 5; 325 MG/1; MG/1
1 TABLET ORAL EVERY 6 HOURS PRN
Status: DISPENSED | OUTPATIENT
Start: 2020-03-08 | End: 2020-03-18

## 2020-03-08 RX ADMIN — SODIUM CHLORIDE, PRESERVATIVE FREE 10 ML: 5 INJECTION INTRAVENOUS at 21:21

## 2020-03-08 RX ADMIN — CARBIDOPA AND LEVODOPA 1 TABLET: 25; 100 TABLET ORAL at 21:21

## 2020-03-08 RX ADMIN — IPRATROPIUM BROMIDE AND ALBUTEROL SULFATE 3 ML: 2.5; .5 SOLUTION RESPIRATORY (INHALATION) at 07:43

## 2020-03-08 RX ADMIN — HEPARIN SODIUM 5000 UNITS: 5000 INJECTION INTRAVENOUS; SUBCUTANEOUS at 05:04

## 2020-03-08 RX ADMIN — HYDROCODONE BITARTRATE AND ACETAMINOPHEN 1 TABLET: 5; 325 TABLET ORAL at 10:06

## 2020-03-08 RX ADMIN — POTASSIUM CHLORIDE 10 MEQ: 7.46 INJECTION, SOLUTION INTRAVENOUS at 10:45

## 2020-03-08 RX ADMIN — FUROSEMIDE 40 MG: 40 TABLET ORAL at 09:38

## 2020-03-08 RX ADMIN — CARBIDOPA AND LEVODOPA 1 TABLET: 25; 100 TABLET ORAL at 09:38

## 2020-03-08 RX ADMIN — POTASSIUM CHLORIDE 10 MEQ: 7.46 INJECTION, SOLUTION INTRAVENOUS at 12:34

## 2020-03-08 RX ADMIN — IPRATROPIUM BROMIDE AND ALBUTEROL SULFATE 3 ML: 2.5; .5 SOLUTION RESPIRATORY (INHALATION) at 20:03

## 2020-03-08 RX ADMIN — BUDESONIDE 0.5 MG: 0.5 INHALANT RESPIRATORY (INHALATION) at 07:43

## 2020-03-08 RX ADMIN — BUDESONIDE 0.5 MG: 0.5 INHALANT RESPIRATORY (INHALATION) at 20:03

## 2020-03-08 RX ADMIN — SODIUM CHLORIDE, PRESERVATIVE FREE 10 ML: 5 INJECTION INTRAVENOUS at 10:09

## 2020-03-08 RX ADMIN — HEPARIN SODIUM 22 UNITS/KG/HR: 10000 INJECTION, SOLUTION INTRAVENOUS at 11:58

## 2020-03-08 RX ADMIN — POTASSIUM CHLORIDE 40 MEQ: 750 CAPSULE, EXTENDED RELEASE ORAL at 05:13

## 2020-03-08 RX ADMIN — LIDOCAINE 1 PATCH: 50 PATCH CUTANEOUS at 09:38

## 2020-03-08 RX ADMIN — Medication 250 MG: at 21:21

## 2020-03-08 RX ADMIN — Medication 250 MG: at 09:38

## 2020-03-08 RX ADMIN — POTASSIUM CHLORIDE 10 MEQ: 7.46 INJECTION, SOLUTION INTRAVENOUS at 09:38

## 2020-03-08 RX ADMIN — POTASSIUM CHLORIDE 10 MEQ: 7.46 INJECTION, SOLUTION INTRAVENOUS at 11:45

## 2020-03-08 NOTE — PROGRESS NOTES
ID note for pleural effusion  Subjective: The patient complains of some left-sided rib pain which he thinks started after being repositioned in bed.  He is not having fevers or chills or night sweats.  Oxygenation requirement down to 4 L nasal cannula  Objective: Afebrile, no acute distress, tender to palpation over ribs and mid axillary line.,  Normal pulmonary effort    Creatinine 0.74  White count 12.66 (P70, L14, M 12)  Hemoglobin 9.2  Platelets 280    Microbiology:  2/16 BCx neg  2/17 c diff/gi pcr neg  2/17 MRSA screen, RPP neg  2/29 BCx neg  2/29 RPP neg  3/2 Pleural studies: WBC 91286;  (P58, L 27, M 15), , glc 123 cx-staph hominis in broth alone  3/3 c diff neg    Assessment and plan  1.  Pleural effusion  2.  Bilateral PE  3.  Leukocytosis, secondary to above    He is feeling better.  His white count continues to decrease.  Procalcitonin was negative.  I do not think he has a pleural space infection but we will follow-up results of the CT-guided chest tube placement tomorrow just to make sure.  Overall continues to improve and would continue to hold antibiotics

## 2020-03-08 NOTE — THERAPY TREATMENT NOTE
Patient Name: Izaiah Garcia  : 1931    MRN: [id number]                              Today's Date: 3/8/2020       Admit Date: 2020    Visit Dx:     ICD-10-CM ICD-9-CM   1. Healthcare-associated pneumonia J18.9 486   2. Acute hypoxemic respiratory failure (CMS/HCC) J96.01 518.81   3. Bilateral pulmonary embolism (CMS/HCC) I26.99 415.19   4. Acute saddle pulmonary embolism with acute cor pulmonale (CMS/HCC) I26.02 415.13     415.0     Patient Active Problem List   Diagnosis   • Hyperlipidemia   • Benign essential hypertension   • History of gout   • History of esophageal stricture   • History of stroke, 2016--4.9 x 4 x 3 cm inferior left cerebellar infarct   • Rheumatoid factor positive   • Impaired fasting glucose   • At risk for falls   • Generalized weakness   • Bilateral high frequency sensorineural hearing loss, wears hearing aids   • Bilateral lower extremity edema   • Parkinson's disease (CMS/HCC)   • Therapeutic drug monitoring   • Vitamin D deficiency   • Macrocytosis   • Vitamin B12 deficiency   • Acute diarrhea   • Hospital-acquired pneumonia   • HAP (hospital-acquired pneumonia)   • Acute UTI (urinary tract infection)   • Colitis   • Parkinson disease (CMS/HCC)   • Weakness   • Aspiration pneumonia of right lower lobe due to vomit (CMS/HCC)   • Pulmonary emboli (CMS/HCC)   • Acute saddle pulmonary embolism with acute cor pulmonale (CMS/HCC)   • Empyema of pleura (CMS/HCC)     Past Medical History:   Diagnosis Date   • At risk for falls 2019   • Benign essential hypertension 2016   • Bilateral high frequency sensorineural hearing loss, wears hearing aids 2019   • Bilateral lower extremity edema 2019    May 2, 2019--patient who has hypertension and is on amlodipine 10 mg/day is complaining of progressively worsening swelling of the lower extremities that extends up to about mid calf.  Gets worse at the end of the day and tends to get better overnight when he props his feet  "up.  I think this is definitely related to the amlodipine although patient may have some venous insufficiency.  Medication ad   • Decreased peripheral vision of both eyes 5/2/2019    May 2, 2019--continues to have complaints of \"dizziness\" associated with weakness in his lower extremities.  He is not describing a spinning sensation or anything remotely close to vertigo.  Instead he is describing an off-balance sensation.  He tends to fall forward if not careful and he tends to list towards the right side.  He has marked difficulty going down an incline.  He has to ambulate wit   • History of aspiration pneumonia 5/4/2015   • History of esophageal stricture 5/4/2015    July 3, 2018--EGD revealed a distal esophageal stricture that was dilated to 18 mm.  There was a small hiatal hernia.  There was mild streaky erythema in the proximal stomach.  Duodenal bulb normal.  April 26, 2016--EGD performed for dysphagia reveals a distal esophageal stricture that was dilated 16.5 mm.   • History of gout 6/29/2016   • History of stroke, 6/29/2016--4.9 x 4 x 3 cm inferior left cerebellar infarct 5/4/2015 June 29, 2016--MRI of the brain performed for complaints of dizziness and weakness. IMPRESSION: 1. There is susceptibility artifact streaking through the anterior left frontal scalp through the anterior left frontal bone in the anterior tipof the left frontal lobe likely from a tiny piece of metal in the anterior left frontal scalp slightly limits evaluation of these structures. 2. There is mild s   • Hyperlipidemia 6/29/2016   • Impaired fasting glucose 5/2/2019 April 14, 2015--hemoglobin A1c 5.9.   • Macrocytosis 5/2/2019   • Parkinson's disease (CMS/[facility]) 5/2/2019    May 2, 2019--continues to have complaints of \"dizziness\" associated with weakness in his lower extremities.  He is not describing a spinning sensation or anything remotely close to vertigo.  Instead he is describing an off-balance sensation.  He tends to fall " forward if not careful and he tends to list towards the right side.  He has marked difficulty going down an incline.  He has to ambulate wit   • Rheumatoid factor positive 5/2/2019 March 25, 2014--rheumatoid factor returned positive at 95.  However, CCP antibodies normal at 8, SHIV negative, both C&P ANCA negative, C-reactive protein normal at 0.24, sed rate normal at 2.   • Vitamin B12 deficiency 6/6/2019 June 6, 2019--patient recently had mildly elevated methylmalonic acid of 380.  Repeat methylmalonic acid was upper limit of normal at 356.  Given patient's neurologic symptoms and suspected parkinsonism, I have a low threshold for treating vitamin B12 deficiency.  We will initiate vitamin B12 injections today.  2000 mcg subcutaneously given today.   • Vitamin D deficiency 5/2/2019     Past Surgical History:   Procedure Laterality Date   • CARDIAC CATHETERIZATION N/A 2/29/2020    Procedure: Thrombolytic Therapy- EKOS;  Surgeon: Jason Griffiths MD;  Location: [facility] CATH INVASIVE LOCATION;  Service: Cardiovascular;  Laterality: N/A;   • CATARACT EXTRACTION EXTRACAPSULAR W/ INTRAOCULAR LENS IMPLANTATION Bilateral     Bilateral cataract extirpation with intraocular lens implantation   • CHOLECYSTECTOMY     • EKOS CATHETER PLACEMENT Bilateral 2/29/2020    Procedure: Ekos catheter placement;  Surgeon: Jason Griffiths MD;  Location:  [facility] CATH INVASIVE LOCATION;  Service: Cardiovascular;  Laterality: Bilateral;   • ESOPHAGEAL DILATATION  04/26/2016 April 26, 2016--EGD performed for dysphagia reveals a distal esophageal stricture that was dilated 16.5 mm.   • ESOPHAGEAL DILATATION  07/03/2018    July 3, 2018--EGD revealed a distal esophageal stricture that was dilated to 18 mm.  There was a small hiatal hernia.  There was mild streaky erythema in the proximal stomach.  Duodenal bulb normal.   • INTERVENTIONAL RADIOLOGY PROCEDURE Bilateral 2/29/2020    Procedure: Pulmonary Angiogram;  Surgeon: Jason Griffiths  MD;  Location:  INVASIVE LOCATION;  Service: Cardiovascular;  Laterality: Bilateral;     General Information     Row Name 03/08/20 1034          PT Evaluation Time/Intention    Document Type  therapy note (daily note)  -EE     Mode of Treatment  physical therapy  -EE     Row Name 03/08/20 1034          General Information    Existing Precautions/Restrictions  fall;oxygen therapy device and L/min 4 L O2 per nc  -EE     Barriers to Rehab  medically complex;hearing deficit  -EE     Row Name 03/08/20 1034          Cognitive Assessment/Intervention- PT/OT    Orientation Status (Cognition)  oriented to;person;place  -EE     Row Name 03/08/20 1034          Safety Issues, Functional Mobility    Impairments Affecting Function (Mobility)  balance;endurance/activity tolerance;shortness of breath;strength;pain;coordination  -EE       User Key  (r) = Recorded By, (t) = Taken By, (c) = Cosigned By    Initials Name Provider Type    EE Ana Mayes, PT Physical Therapist        Mobility     Row Name 03/08/20 1034          Bed Mobility Assessment/Treatment    Supine-Sit [place] (Bed Mobility)  minimum assist (75% patient effort);2 person assist  -EE     Assistive Device (Bed Mobility)  bed rails;head of bed elevated;draw sheet  -EE     Row Name 03/08/20 1034          Sit-Stand Transfer    Sit-Stand [place] (Transfers)  moderate assist (50% patient effort);2 person assist;verbal cues  -EE     Assistive Device (Sit-Stand Transfers)  walker, front-wheeled  -EE     Row Name 03/08/20 1034          Gait/Stairs Assessment/Training    [place] Level (Gait)  moderate assist (50% patient effort);2 person assist;verbal cues  -EE     Assistive Device (Gait)  walker, front-wheeled  -EE     Distance in Feet (Gait)  3' several shuffled steps from bed to chair  -EE     Pattern (Gait)  step-to  -EE     Deviations/Abnormal Patterns (Gait)  erika decreased;festinating/shuffling;stride length decreased  -EE     Bilateral Gait  Deviations  forward flexed posture;heel strike decreased;weight shift ability decreased  -EE     Comment (Gait/Stairs)  Fatigue, SOA limiting distance. Pt with posterior weight shift thoughout; vc's to stay close to walker; mod A required to guide rwx  -EE       User Key  (r) = Recorded By, (t) = Taken By, (c) = Cosigned By    Initials Name Provider Type    EE Ana Mayes, PT Physical Therapist        Obj/Interventions     Row Name 03/08/20 1035          Therapeutic Exercise    Comment (Therapeutic Exercise)  x10 reps ankle pumps, LAQ, marching while seated  -EE     Row Name 03/08/20 1035          Static Sitting Balance    Level of [place] (Unsupported Sitting, Static Balance)  standby assist  -EE     Sitting Position (Unsupported Sitting, Static Balance)  sitting on edge of bed  -EE     Time Able to Maintain Position (Unsupported Sitting, Static Balance)  3 to 4 minutes  -EE     Row Name 03/08/20 1035          Static Standing Balance    Level of [place] (Supported Standing, Static Balance)  minimal assist, 75% patient effort;2 person assist  -EE     Time Able to Maintain Position (Supported Standing, Static Balance)  1 to 2 minutes  -EE     Assistive Device Utilized (Supported Standing, Static Balance)  walker, rolling  -EE     Comment (Supported Standing, Static Balance)  posterior lean; vc's to correct  -EE       User Key  (r) = Recorded By, (t) = Taken By, (c) = Cosigned By    Initials Name Provider Type    EE Ana Mayes, PT Physical Therapist        Goals/Plan    No documentation.       Clinical Impression     Row Name 03/08/20 1036          Pain Assessment    Additional Documentation  Pain Scale: Numbers Pre/Post-Treatment (Group);Pain Scale: FACES Pre/Post-Treatment (Group)  -EE     Row Name 03/08/20 1036          Pain Scale: Numbers Pre/Post-Treatment    Pain Location  chest  -EE     Pain Intervention(s)  Repositioned;Medication (See MAR);Rest  -EE     Row Name 03/08/20 1036          Pain Scale:  FACES Pre/Post-Treatment    Pain: FACES Scale, Pretreatment  4-->hurts little more  -EE     Pain: FACES Scale, Post-Treatment  4-->hurts little more  -EE     Row Name 03/08/20 1036          Plan of Care Review    Plan of Care Reviewed With  patient  -EE     Progress  no change  -EE     Outcome Summary  Pt able to maintain activity level with PT today. Unable to progress activity due to pain, weakness, and SOA with activity. Pt continues to require assist x 2 for all mobility due to weakness, impaired balance, and decreased endurance. Pt will continue to benefit from PT for strengthening and to increase mobility as tolerated.   -EE     Row Name 03/08/20 1036          Vital Signs    Pre SpO2 (%)  92  -EE     O2 Delivery Pre Treatment  supplemental O2  -EE     Intra SpO2 (%)  87  -EE     O2 Delivery Intra Treatment  supplemental O2  -EE     Post SpO2 (%)  91  -EE     O2 Delivery Post Treatment  supplemental O2  -EE     Pre Patient Position  Supine  -EE     Intra Patient Position  Standing  -EE     Post Patient Position  Sitting  -EE     Row Name 03/08/20 1036          Positioning and Restraints    Pre-Treatment Position  in bed  -EE     Post Treatment Position  chair  -EE     In Chair  reclined;call light within reach;encouraged to call for assist;exit alarm on;notified nsg;legs elevated;RUE elevated;LUE elevated  -EE       User Key  (r) = Recorded By, (t) = Taken By, (c) = Cosigned By    Initials Name Provider Type    Ana Morales, PT Physical Therapist        Outcome Measures     Row Name 03/08/20 1038          How much help from another person do you currently need...    Turning from your back to your side while in flat bed without using bedrails?  2  -EE     Moving from lying on back to sitting on the side of a flat bed without bedrails?  2  -EE     Moving to and from a bed to a chair (including a wheelchair)?  2  -EE     Standing up from a chair using your arms (e.g., wheelchair, bedside chair)?  2  -EE      Climbing 3-5 steps with a railing?  1  -EE     To walk in hospital room?  2  -EE     AM-PAC 6 Clicks Score (PT)  11  -EE     Row Name 03/08/20 1038          Functional Assessment    Outcome Measure Options  AM-PAC 6 Clicks Basic Mobility (PT)  -EE       User Key  (r) = Recorded By, (t) = Taken By, (c) = Cosigned By    Initials Name Provider Type    Ana Morales PT Physical Therapist          PT Recommendation and Plan     Outcome Summary/Treatment Plan (PT)  Anticipated Discharge Disposition (PT): skilled nursing facility  Plan of Care Reviewed With: patient  Progress: no change  Outcome Summary: Pt able to maintain activity level with PT today. Unable to progress activity due to pain, weakness, and SOA with activity. Pt continues to require assist x 2 for all mobility due to weakness, impaired balance, and decreased endurance. Pt will continue to benefit from PT for strengthening and to increase mobility as tolerated.      Time Calculation:   PT Charges     Row Name 03/08/20 1038             Time Calculation    Start Time  1015  -EE      Stop Time  1029  -EE      Time Calculation (min)  14 min  -EE      PT Received On  03/08/20  -EE      PT - Next Appointment  03/09/20  -EE         Time Calculation- PT    Total Timed Code Minutes- PT  14 minute(s)  -EE        User Key  (r) = Recorded By, (t) = Taken By, (c) = Cosigned By    Initials Name Provider Type    Ana Morales PT Physical Therapist        Therapy Charges for Today     Code Description Service Date Service Provider Modifiers Qty    59947493582 HC PT THERAPEUTIC ACT EA 15 MIN 3/8/2020 Ana Mayes, PT GP 1    17708580878 HC PT THER SUPP EA 15 MIN 3/8/2020 Ana Mayes, PT GP 1          PT G-Codes  Outcome Measure Options: AM-PAC 6 Clicks Basic Mobility (PT)  AM-PAC 6 Clicks Score (PT): 11  AM-PAC 6 Clicks Score (OT): 9    Ana Mayes PT  3/8/2020

## 2020-03-08 NOTE — PLAN OF CARE
Problem: Patient Care Overview  Goal: Plan of Care Review  Outcome: Ongoing (interventions implemented as appropriate)  Flowsheets (Taken 3/8/2020 0406)  Progress: no change  Plan of Care Reviewed With: patient  Outcome Summary: VSS. SR. 3L NC. Pt still refusing BiPaP at night. Pain treated with tylenol. Plan for CT surgery Monday.  Goal: Individualization and Mutuality  Outcome: Ongoing (interventions implemented as appropriate)  Goal: Discharge Needs Assessment  Outcome: Ongoing (interventions implemented as appropriate)  Goal: Interprofessional Rounds/Family Conf  Outcome: Ongoing (interventions implemented as appropriate)     Problem: Skin Injury Risk (Adult)  Goal: Skin Health and Integrity  Outcome: Ongoing (interventions implemented as appropriate)  Flowsheets (Taken 3/8/2020 0406)  Skin Health and Integrity: making progress toward outcome     Problem: Fall Risk (Adult)  Goal: Absence of Fall  Outcome: Ongoing (interventions implemented as appropriate)  Flowsheets (Taken 3/8/2020 0406)  Absence of Fall: making progress toward outcome     Problem: VTE, DVT and PE (Adult)  Goal: Signs and Symptoms of Listed Potential Problems Will be Absent, Minimized or Managed (VTE, DVT and PE)  Outcome: Ongoing (interventions implemented as appropriate)  Flowsheets (Taken 3/8/2020 0406)  Problems Assessed (VTE, DVT, PE): all  Problems Present (VTE, DVT, PE): deep vein thrombosis; pulmonary embolism     Problem: Pain, Acute (Adult)  Goal: Identify Related Risk Factors and Signs and Symptoms  Outcome: Ongoing (interventions implemented as appropriate)  Flowsheets (Taken 3/8/2020 0406)  Related Risk Factors (Acute Pain): disease process  Signs and Symptoms (Acute Pain): verbalization of pain descriptors  Goal: Acceptable Pain Control/Comfort Level  Outcome: Ongoing (interventions implemented as appropriate)  Flowsheets (Taken 3/8/2020 0406)  Acceptable Pain Control/Comfort Level: making progress toward outcome

## 2020-03-08 NOTE — PROGRESS NOTES
PROGRESS NOTE  Patient Name: Izaiah Garcia  Age/Sex: 88 y.o. male  : 1931  MRN: [id number]    Date of Admission: 2020  Date of Encounter Visit: 20   LOS: 8 days   Patient Care Team:  Uriah Godinez MD as PCP - General (Internal Medicine)    Chief Complaint: Plan for chest tube placement on Monday    Hospital course: Patient was readmitted after an hospital admission for pneumonia with evidence of massive pulmonary embolism, and was managed with EKOS with directed thrombolytic therapy low-dose with good clinical response.  Patient had pleural effusion noted and was not getting better with follow-up so she was seen in consultation by thoracic surgery with repeat CAT scan showing persistent effusion that will be addressed with small bore chest tube placement on Monday.    Interval History: Patient doing better  he did complain of some chest pain that responded very well to the PRN oral pain medication otherwise no changes    REVIEW OF SYSTEMS:   CONSTITUTIONAL: no fever or chills  CARDIOVASCULAR: No chest pain, chest pressure or chest discomfort. No palpitations or edema.   RESPIRATORY: No significant shortness of breath at rest however limited activity at this point  .   GASTROINTESTINAL: No anorexia, nausea, vomiting or diarrhea. No abdominal pain or blood.   HEMATOLOGIC: No bleeding or bruising.     Ventilator/Non-Invasive Ventilation Settings (From admission, onward)     Start     Ordered    20  NIPPV (CPAP or BIPAP)  Until Discontinued     Comments:  Pt founds on these settings:  IPAP min 12/ max 22  EPAP 8    Rate 18   Question Answer Comment   Indication: Acute Respiratory Failure    Type: BIPAP    NIPPV Mask Interface: Per Patient Preference    Tidal Volume 550    Breath Rate 18    Titrate for SPO2 90%        20                  Vital Signs  Temp:  [97.7 °F (36.5 °C)-99.2 °F (37.3 °C)] 97.7 °F (36.5 °C)  Heart Rate:  [70-81] 71  Resp:  [18] 18  BP:  "(131-143)/(59-97) 136/97  SpO2:  [91 %-97 %] 96 %  on  Flow (L/min):  [3] 3 Device (Oxygen Therapy): nasal cannula    Intake/Output Summary (Last 24 hours) at 3/8/2020 0848  Last data filed at 3/8/2020 0358  Gross per 24 hour   Intake 390 ml   Output 350 ml   Net 40 ml     Flowsheet Rows      First Filed Value   Admission Height  177.8 cm (70\") Documented at 02/29/2020 0800   Admission Weight  95 kg (209 lb 7 oz) Documented at 02/29/2020 0800        Body mass index is 26.53 kg/m².      03/05/20  0514 03/06/20  0622 03/08/20  0500   Weight: 91.3 kg (201 lb 4.8 oz) 84.7 kg (186 lb 11.2 oz) (Verified with nurse) 83.9 kg (184 lb 14.4 oz)       Physical Exam:  GEN:  No acute distress, alert, cooperative, well developed, on nasal cannula oxygen  EYES:   Sclerae clear. No icterus. PERRL. Normal EOM  ENT:   External ears/nose normal, no oral lesions, no thrush, moist mucous membrane  NECK:  Supple, midline trachea, no JVD  LUNGS: Normal chest on inspection, breath sounds are diminished bilaterally, minimal crackles posteriorly, there is no wheezes. Respirations regular, even and unlabored.   CV:  Regular rhythm and rate. Normal S1/S2. No murmurs, gallops, or rubs noted.  ABD:  Soft, nontender and nondistended. Normal bowel sounds. No guarding  EXT:  Moves all extremities well. No cyanosis. No redness. No lower extremities edema on the right, minimal on the left  Skin: Dry, intact, no bleeding    Results Review:      Results from last 7 days   Lab Units 03/08/20  0352 03/07/20  0438 03/06/20  0729 03/05/20  0400 03/04/20  1724 03/04/20  0440 03/03/20  0616 03/02/20  0311   SODIUM mmol/L 130* 134* 133* 135*  --  135* 142 142   POTASSIUM mmol/L 3.4* 3.6 3.3* 3.9 3.8 3.2* 3.6 3.9   CHLORIDE mmol/L 95* 98 97* 99  --  99 106 106   CO2 mmol/L 26.2 26.0 26.3 24.4  --  26.6 26.2 25.6   BUN mg/dL 17 17 17 18  --  20 25* 29*   CREATININE mg/dL 0.74* 0.81 0.75* 0.73*  --  0.76 0.76 0.83   CALCIUM mg/dL 7.7* 7.8* 8.0* 8.0*  --  8.0* " 7.7* 7.7*   ANION GAP mmol/L 8.8 10.0 9.7 11.6  --  9.4 9.8 10.4   ALBUMIN g/dL 2.10* 2.00* 2.30* 2.20*  --  2.20* 2.40* 2.40*                 Results from last 7 days   Lab Units 03/08/20  0352 03/07/20  0438 03/06/20  1442 03/06/20  0729 03/05/20  0400 03/04/20  0440 03/03/20  0616   WBC 10*3/mm3 12.66* 15.07* 19.22* 18.06* 14.57* 13.55* 13.65*   HEMOGLOBIN g/dL 9.2* 9.8* 10.7* 10.6* 10.0* 10.2* 9.9*   HEMATOCRIT % 28.6* 29.8* 33.1* 33.3* 31.2* 31.1* 29.7*   PLATELETS 10*3/mm3 280 252 295 273 305 328 359   MCV fL 83.1 84.7 84.7 87.2 85.7 84.3 84.6   NEUTROPHIL % % 70.3 72.6 79.1* 76.1*  --   --   --    LYMPHOCYTE % % 14.3* 10.7* 9.3* 10.5*  --   --   --    MONOCYTES % % 11.8 10.6 7.5 8.0  --   --   --    EOSINOPHIL % % 0.9 1.5 0.5 0.8  --   --   --    BASOPHIL % % 0.3 0.4 0.3 0.6  --   --   --    IMM GRAN % % 2.4* 4.2* 3.3* 4.0*  --   --   --      Results from last 7 days   Lab Units 03/08/20  0352 03/07/20  1259 03/07/20  0438 03/06/20 2126 03/06/20  1442 03/03/20  0616 03/02/20  2209  03/01/20  1750 03/01/20  1159   INR   --   --   --   --  1.98*  --   --   --  1.36* 1.35*   APTT seconds 67.2* 95.8* 98.5* 56.2* 39.6* 177.0* 36.5*   < > 64.0* 35.7*    < > = values in this interval not displayed.               Invalid input(s): LDLCALC          No results found for: POCGLU  Results from last 7 days   Lab Units 03/06/20  0729   PROCALCITONIN ng/mL 0.20     Results from last 7 days   Lab Units 03/02/20  1336   BODYFLDCX  Staphylococcus hominis ssp hominis*   Results for SARKIS LANDA (MRN [id number]) as of 3/7/2020 16:07   Ref. Range 3/2/2020 13:37   Color, Fluid Unknown Red   Appearance, Fluid Latest Ref Range: Clear  Turbid (A)   WBC, Fluid Latest Units: /mm3 589   RBC, Fluid Latest Units: /mm3 46,000   Neutrophils, Fluid Latest Units: % 58   Lymphocytes, Fluid Latest Units: % 27   Mononuclear, Fluid Latest Units: % 15   Other Cells/100 WBCs, Fluid Latest Units: /100 WBCs 2   pH, Fluid Unknown 7.84      Cholesterol, Fluid Latest Units: mg/dL 38   Glucose, Fluid Latest Units: mg/dL 123   LD, Fluid Latest Units: U/L 610   Protein, Total, Fluid Latest Units: g/dL 2.3                   Imaging:   Imaging Results (All)     Procedure Component Value Units Date/Time    CT Chest Without Contrast [[id number]] Collected:  03/06/20 1549     Updated:  03/06/20 1711    Narrative:       CT CHEST WITHOUT CONTRAST     HISTORY: 88-year-old male with acute hypoxic respiratory failure.  Pneumonia and empyema. Pulmonary thromboemboli.     TECHNIQUE: Radiation dose reduction techniques were utilized, including  automated exposure control and exposure modulation based on body size.   3 mm images were obtained through the chest without the administration  of IV contrast. Compared with chest CTA from 02/29/2020.     FINDINGS: There is a much larger left lower lobe airspace consolidation  which consolidates nearly the entire left lower lobe. There is also a  larger right lower lobe airspace consolidation than previously. There is  a small-moderate left pleural effusion and there is a small loculated  appearing right pleural effusion (ultrasound-guided right thoracentesis  on 03/02/2020. There is some compressive atelectatic change at the upper  lobes, but the upper lobes are otherwise clear. There is no change in  the necrotic appearing right superior paratracheal node measuring  approximately 3.7 x 2.6 cm. There are also stable shotty mediastinal  nodes which are stable. There is no acute abnormality within the  visualized upper abdomen. There is long-term stability of a lobular left  hepatic lobe cyst. 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       Impression:       1. Larger right lower lobe airspace consolidation and small loculated  right pleural effusion. Significantly larger left lower lobe  consolidation and significant increase in the small-moderate left  pleural effusion.  2.  Stable necrotic appearing 3.7 x 2.6 cm right paratracheal node.     This report was finalized on 3/6/2020 5:08 PM by Dr. Melania De M.D.        I reviewed the patient's new clinical results.  I personally viewed and interpreted the patient's imaging results:        Medication Review:     budesonide 0.5 mg Nebulization BID - RT   carbidopa-levodopa 1 tablet Oral BID   furosemide 40 mg Oral Daily   ipratropium-albuterol 3 mL Nebulization BID - RT   lidocaine 1 patch Transdermal Q24H   saccharomyces boulardii 250 mg Oral BID   sodium chloride 10 mL Intravenous Q12H         heparin (porcine) 18 Units/kg/hr Last Rate: 22 Units/kg/hr (03/08/20 7990)       ASSESSMENT:   1. Acute hypoxic respiratory failure  2. BPE s/p ekos catheter on 2/29/2020 with local TPA infusion with good clinical response  3. Troponemia suspect due to BPE  4. RV strain  5. HLD  6. Esphageal stricture  7. Gen weakness  8. Parkinsons Disease  9. Vit D def  10. RF factor positive  11. HTN  12. Abnormal mass around thyroid   13. Bilateral pleural effusion R greater than Left exudative with gram positive cocci growth now  14. hypokalemia  15. Hypoalbuminemia    PLAN:  The positive culture on the pleural fluid is felt to be a contaminant and patient is being monitored off antibiotics  Plan for CT-guided chest tube placement on Monday  Patient is on the heparin drip until then and will discuss further anticoagulation accordingly  We will reassess post chest tube placement on Monday    Disposition: Continue to monitor as an inpatient    Rosa Garcia MD  03/08/20  8:48 AM          Dictated utilizing Dragon dictation

## 2020-03-08 NOTE — PLAN OF CARE
Problem: Patient Care Overview  Goal: Plan of Care Review  Flowsheets (Taken 3/8/2020 1036)  Outcome Summary: Pt able to maintain activity level with PT today. Unable to progress activity due to pain, weakness, and SOA with activity. Pt continues to require assist x 2 for all mobility due to weakness, impaired balance, and decreased endurance. Pt will continue to benefit from PT for strengthening and to increase mobility as tolerated.

## 2020-03-08 NOTE — PROGRESS NOTES
"    Chief Complaint: Bilateral pulmonary emboli, parapneumonic effusion    Subjective:  Symptoms:  Stable.  He reports shortness of breath and weakness.  No chest pain.    Diet:  Adequate intake.  No nausea or vomiting.    Activity level: Impaired due to weakness.    Pain:  He reports no pain.        Vital Signs:  Temp:  [97.7 °F (36.5 °C)-99.2 °F (37.3 °C)] 97.7 °F (36.5 °C)  Heart Rate:  [70-81] 71  Resp:  [18] 18  BP: (123-141)/(59-97) 123/64    Intake & Output (last day)       03/07 0701 - 03/08 0700 03/08 0701 - 03/09 0700    P.O. 390 120    I.V. (mL/kg)      IV Piggyback      Total Intake(mL/kg) 390 (4.6) 120 (1.4)    Urine (mL/kg/hr) 575 (0.3)     Stool      Total Output 575     Net -185 +120                Objective:  General Appearance:  Comfortable and in no acute distress.    Vital signs: (most recent): Blood pressure 123/64, pulse 71, temperature 97.7 °F (36.5 °C), temperature source Oral, resp. rate 18, height 177.8 cm (70\"), weight 83.9 kg (184 lb 14.4 oz), SpO2 93 %.  No fever.    Output: Producing urine.    Lungs:  Normal effort and normal respiratory rate.  There are decreased breath sounds.    Heart: Normal rate.  Regular rhythm.  No murmur.   Abdomen: Abdomen is soft.  Bowel sounds are normal.   There is no abdominal tenderness.   There is no mass.   Extremities: There is no dependent edema.    Neurological: Patient is alert and oriented to person, place and time.  Normal strength.              Results Review:     I reviewed the patient's new clinical results.  I reviewed the patient's new imaging results and agree with the interpretation.    Imaging Results (Last 24 Hours)     ** No results found for the last 24 hours. **          Lab Results:     Lab Results (last 24 hours)     Procedure Component Value Units Date/Time    aPTT [[id number]]  (Abnormal) Collected:  03/08/20 1049    Specimen:  Blood from Hand, Left Updated:  03/08/20 1128     PTT 78.1 seconds     aPTT [[id number]]  (Abnormal) " Collected:  03/08/20 0352    Specimen:  Blood from Hand, Left Updated:  03/08/20 0450     PTT 67.2 seconds     Renal Function Panel [[id number]]  (Abnormal) Collected:  03/08/20 0352    Specimen:  Blood Updated:  03/08/20 0447     Glucose 121 mg/dL      BUN 17 mg/dL      Creatinine 0.74 mg/dL      Sodium 130 mmol/L      Potassium 3.4 mmol/L      Chloride 95 mmol/L      CO2 26.2 mmol/L      Calcium 7.7 mg/dL      Albumin 2.10 g/dL      Phosphorus 3.2 mg/dL      Anion Gap 8.8 mmol/L      BUN/Creatinine Ratio 23.0     eGFR Non African Amer 100 mL/min/1.73     Narrative:       GFR Normal >60  Chronic Kidney Disease <60  Kidney Failure <15      CBC & Differential [[id number]] Collected:  03/08/20 0352    Specimen:  Blood Updated:  03/08/20 0425    Narrative:       The following orders were created for panel order CBC & Differential.  Procedure                               Abnormality         Status                     ---------                               -----------         ------                     CBC Auto Differential[[id number]]        Abnormal            Final result                 Please view results for these tests on the individual orders.    CBC Auto Differential [[id number]]  (Abnormal) Collected:  03/08/20 0352    Specimen:  Blood Updated:  03/08/20 0425     WBC 12.66 10*3/mm3      RBC 3.44 10*6/mm3      Hemoglobin 9.2 g/dL      Hematocrit 28.6 %      MCV 83.1 fL      MCH 26.7 pg      MCHC 32.2 g/dL      RDW 12.4 %      RDW-SD 37.4 fl      MPV 10.8 fL      Platelets 280 10*3/mm3      Neutrophil % 70.3 %      Lymphocyte % 14.3 %      Monocyte % 11.8 %      Eosinophil % 0.9 %      Basophil % 0.3 %      Immature Grans % 2.4 %      Neutrophils, Absolute 8.90 10*3/mm3      Lymphocytes, Absolute 1.81 10*3/mm3      Monocytes, Absolute 1.49 10*3/mm3      Eosinophils, Absolute 0.12 10*3/mm3      Basophils, Absolute 0.04 10*3/mm3      Immature Grans, Absolute 0.30 10*3/mm3      nRBC 0.0 /100 WBC                    Assessment/Plan       Acute saddle pulmonary embolism with acute cor pulmonale (CMS/HCC)    Pulmonary emboli (CMS/HCC)    Empyema of pleura (CMS/HCC)       Assessment & Plan     Plan for percutaneous pleural catheter placement in the morning.  We will stop heparin 4 hours prior to procedure.  Discussed with patient.  All of his questions were answered.    Duncan Segura III, MD  [facility]  03/08/20  4:59 PM

## 2020-03-09 ENCOUNTER — APPOINTMENT (OUTPATIENT)
Dept: CT IMAGING | Facility: HOSPITAL | Age: 85
End: 2020-03-09

## 2020-03-09 LAB
ALBUMIN SERPL-MCNC: 1.9 G/DL (ref 3.5–5.2)
ANION GAP SERPL CALCULATED.3IONS-SCNC: 12 MMOL/L (ref 5–15)
APTT PPP: 43.1 SECONDS (ref 22.7–35.4)
APTT PPP: 67.4 SECONDS (ref 22.7–35.4)
APTT PPP: 81.3 SECONDS (ref 22.7–35.4)
BASOPHILS # BLD AUTO: 0.06 10*3/MM3 (ref 0–0.2)
BASOPHILS NFR BLD AUTO: 0.5 % (ref 0–1.5)
BUN BLD-MCNC: 15 MG/DL (ref 8–23)
BUN/CREAT SERPL: 19.2 (ref 7–25)
CALCIUM SPEC-SCNC: 8 MG/DL (ref 8.6–10.5)
CHLORIDE SERPL-SCNC: 98 MMOL/L (ref 98–107)
CO2 SERPL-SCNC: 23 MMOL/L (ref 22–29)
CREAT BLD-MCNC: 0.78 MG/DL (ref 0.76–1.27)
DEPRECATED RDW RBC AUTO: 37.6 FL (ref 37–54)
EOSINOPHIL # BLD AUTO: 0.2 10*3/MM3 (ref 0–0.4)
EOSINOPHIL NFR BLD AUTO: 1.8 % (ref 0.3–6.2)
ERYTHROCYTE [DISTWIDTH] IN BLOOD BY AUTOMATED COUNT: 12.6 % (ref 12.3–15.4)
GFR SERPL CREATININE-BSD FRML MDRD: 94 ML/MIN/1.73
GLUCOSE BLD-MCNC: 122 MG/DL (ref 65–99)
HCT VFR BLD AUTO: 28.6 % (ref 37.5–51)
HGB BLD-MCNC: 9.6 G/DL (ref 13–17.7)
IMM GRANULOCYTES # BLD AUTO: 0.27 10*3/MM3 (ref 0–0.05)
IMM GRANULOCYTES NFR BLD AUTO: 2.4 % (ref 0–0.5)
INR PPP: 1.4 (ref 0.9–1.1)
LYMPHOCYTES # BLD AUTO: 1.74 10*3/MM3 (ref 0.7–3.1)
LYMPHOCYTES NFR BLD AUTO: 15.6 % (ref 19.6–45.3)
MCH RBC QN AUTO: 28 PG (ref 26.6–33)
MCHC RBC AUTO-ENTMCNC: 33.6 G/DL (ref 31.5–35.7)
MCV RBC AUTO: 83.4 FL (ref 79–97)
MONOCYTES # BLD AUTO: 1.32 10*3/MM3 (ref 0.1–0.9)
MONOCYTES NFR BLD AUTO: 11.8 % (ref 5–12)
NEUTROPHILS # BLD AUTO: 7.57 10*3/MM3 (ref 1.7–7)
NEUTROPHILS NFR BLD AUTO: 67.9 % (ref 42.7–76)
NRBC BLD AUTO-RTO: 0.1 /100 WBC (ref 0–0.2)
PHOSPHATE SERPL-MCNC: 2.6 MG/DL (ref 2.5–4.5)
PLATELET # BLD AUTO: 282 10*3/MM3 (ref 140–450)
PMV BLD AUTO: 10.8 FL (ref 6–12)
POTASSIUM BLD-SCNC: 4.2 MMOL/L (ref 3.5–5.2)
PROTHROMBIN TIME: 16.8 SECONDS (ref 11.7–14.2)
RBC # BLD AUTO: 3.43 10*6/MM3 (ref 4.14–5.8)
SODIUM BLD-SCNC: 133 MMOL/L (ref 136–145)
WBC NRBC COR # BLD: 11.16 10*3/MM3 (ref 3.4–10.8)

## 2020-03-09 PROCEDURE — 97110 THERAPEUTIC EXERCISES: CPT

## 2020-03-09 PROCEDURE — 85730 THROMBOPLASTIN TIME PARTIAL: CPT | Performed by: NURSE PRACTITIONER

## 2020-03-09 PROCEDURE — 25010000003 LIDOCAINE 1 % SOLUTION: Performed by: INTERNAL MEDICINE

## 2020-03-09 PROCEDURE — C1729 CATH, DRAINAGE: HCPCS

## 2020-03-09 PROCEDURE — 99232 SBSQ HOSP IP/OBS MODERATE 35: CPT | Performed by: INTERNAL MEDICINE

## 2020-03-09 PROCEDURE — 75989 ABSCESS DRAINAGE UNDER X-RAY: CPT

## 2020-03-09 PROCEDURE — 99232 SBSQ HOSP IP/OBS MODERATE 35: CPT | Performed by: NURSE PRACTITIONER

## 2020-03-09 PROCEDURE — 85025 COMPLETE CBC W/AUTO DIFF WBC: CPT | Performed by: INTERNAL MEDICINE

## 2020-03-09 PROCEDURE — 0W9930Z DRAINAGE OF RIGHT PLEURAL CAVITY WITH DRAINAGE DEVICE, PERCUTANEOUS APPROACH: ICD-10-PCS | Performed by: RADIOLOGY

## 2020-03-09 PROCEDURE — 80069 RENAL FUNCTION PANEL: CPT | Performed by: INTERNAL MEDICINE

## 2020-03-09 PROCEDURE — 25010000002 HEPARIN (PORCINE) PER 1000 UNITS: Performed by: NURSE PRACTITIONER

## 2020-03-09 PROCEDURE — 85730 THROMBOPLASTIN TIME PARTIAL: CPT | Performed by: THORACIC SURGERY (CARDIOTHORACIC VASCULAR SURGERY)

## 2020-03-09 PROCEDURE — 94799 UNLISTED PULMONARY SVC/PX: CPT

## 2020-03-09 PROCEDURE — 85610 PROTHROMBIN TIME: CPT | Performed by: THORACIC SURGERY (CARDIOTHORACIC VASCULAR SURGERY)

## 2020-03-09 RX ORDER — LIDOCAINE HYDROCHLORIDE 10 MG/ML
20 INJECTION, SOLUTION INFILTRATION; PERINEURAL ONCE
Status: COMPLETED | OUTPATIENT
Start: 2020-03-09 | End: 2020-03-09

## 2020-03-09 RX ADMIN — CARBIDOPA AND LEVODOPA 1 TABLET: 25; 100 TABLET ORAL at 11:39

## 2020-03-09 RX ADMIN — BUDESONIDE 0.5 MG: 0.5 INHALANT RESPIRATORY (INHALATION) at 19:45

## 2020-03-09 RX ADMIN — Medication 250 MG: at 20:41

## 2020-03-09 RX ADMIN — HEPARIN SODIUM 3400 UNITS: 5000 INJECTION INTRAVENOUS; SUBCUTANEOUS at 22:10

## 2020-03-09 RX ADMIN — IPRATROPIUM BROMIDE AND ALBUTEROL SULFATE 3 ML: 2.5; .5 SOLUTION RESPIRATORY (INHALATION) at 19:44

## 2020-03-09 RX ADMIN — LIDOCAINE 1 PATCH: 50 PATCH CUTANEOUS at 09:26

## 2020-03-09 RX ADMIN — SODIUM CHLORIDE, PRESERVATIVE FREE 10 ML: 5 INJECTION INTRAVENOUS at 20:41

## 2020-03-09 RX ADMIN — ACETAMINOPHEN 650 MG: 325 TABLET, FILM COATED ORAL at 23:48

## 2020-03-09 RX ADMIN — FUROSEMIDE 40 MG: 40 TABLET ORAL at 11:39

## 2020-03-09 RX ADMIN — HEPARIN SODIUM 22 UNITS/KG/HR: 10000 INJECTION, SOLUTION INTRAVENOUS at 13:32

## 2020-03-09 RX ADMIN — CARBIDOPA AND LEVODOPA 1 TABLET: 25; 100 TABLET ORAL at 20:41

## 2020-03-09 RX ADMIN — IPRATROPIUM BROMIDE AND ALBUTEROL SULFATE 3 ML: 2.5; .5 SOLUTION RESPIRATORY (INHALATION) at 07:17

## 2020-03-09 RX ADMIN — LIDOCAINE HYDROCHLORIDE 20 ML: 10 INJECTION, SOLUTION INFILTRATION; PERINEURAL at 10:34

## 2020-03-09 RX ADMIN — BUDESONIDE 0.5 MG: 0.5 INHALANT RESPIRATORY (INHALATION) at 07:18

## 2020-03-09 RX ADMIN — Medication 250 MG: at 11:39

## 2020-03-09 RX ADMIN — SODIUM CHLORIDE, PRESERVATIVE FREE 10 ML: 5 INJECTION INTRAVENOUS at 11:40

## 2020-03-09 NOTE — PROGRESS NOTES
Adult Nutrition  Assessment/PES    Patient Name:  Izaiah Garcia  YOB: 1931  MRN: [id number]  Admit Date:  2/29/2020    Assessment Date:  3/9/2020    Comments:  Assess d/t LOS (day 9).     Pt with massive PE (treated), developed pleural effusion and had chest tube placed earlier today. Intake suboptimal, 50% or less. Will try Mighty Shakes with meals of ^ kcal, pro. Following intake.    Reason for Assessment     Row Name 03/09/20 1402          Reason for Assessment    Reason For Assessment  identified at risk by screening criteria LOS     Diagnosis  pulmonary disease Saddle PE         Nutrition/Diet History     Row Name 03/09/20 1402          Nutrition/Diet History    Typical Food/Fluid Intake  Intake not great, 50% or less. NPO earlier today for CT-guided chest tube insertion         Anthropometrics     Row Name 03/09/20 1403          Admit Weight    Admit Weight  91.6 kg (202 lb)        Body Mass Index (BMI)    BMI Assessment  BMI 30-34.9: obesity grade I         Labs/Tests/Procedures/Meds     Row Name 03/09/20 1404          Labs/Procedures/Meds    Lab Results Reviewed  reviewed     Lab Results Comments  Na 133, Glu 133. Alb 1.9, WBC ^^, h/h        Diagnostic Tests/Procedures    Diagnostic Test/Procedure Reviewed  reviewed     Diagnostic Test/Procedures Comments   CT-guided chest tube insertion earlier today. 120cc of serosanguineous fluid drained.        Medications    Pertinent Medications Reviewed  reviewed     Pertinent Medications Comments  L-dopa, diuretic, probiotic, heparin         Physical Findings     Row Name 03/09/20 1405          Physical Findings    Tubes  chest tube     Skin  other (see comments);surgical incision ramya 13; R groin puncture           Nutrition Prescription Ordered     Row Name 03/09/20 1405          Nutrition Prescription PO    Common Modifiers  Cardiac         Evaluation of Received Nutrient/Fluid Intake     Row Name 03/09/20 1407          PO Evaluation    Number  of Days PO Intake Evaluated  3 days     % PO Intake  0-50%               Problem/Interventions:  Problem 1     Row Name 03/09/20 1407          Nutrition Diagnoses Problem 1    Problem 1  Inadequate Nutrient Intake     Etiology (related to)  Medical Diagnosis;Factors Affecting Nutrition     Pulmonary/Critical Care  Pulmonary emboli;Other (comment) pleural effusion     Appetite  Fair               Intervention Goal     Row Name 03/09/20 1414          Intervention Goal    General  Maintain nutrition;Disease management/therapy;Meet nutritional needs for age/condition;Reduce/improve symptoms     PO  Tolerate PO;Increase intake;PO intake (%)     PO Intake %  75 %         Nutrition Intervention     Row Name 03/09/20 1414          Nutrition Intervention    RD/Tech Action  Interview for preference;Encourage intake;Follow Tx progress;Care plan reviewd;Recommend/ordered     Recommended/Ordered  Supplement         Nutrition Prescription     Row Name 03/09/20 1415          Nutrition Prescription PO    PO Prescription  Begin/change supplement     Supplement  Mighty Shake     Supplement Frequency  3 times a day     New PO Prescription Ordered?  Yes         Education/Evaluation     Row Name 03/09/20 1415          Education    Education  Will Instruct as appropriate        Monitor/Evaluation    Monitor  Per protocol           Electronically signed by:  Sarah Lombardi RD  03/09/20 2:16 PM

## 2020-03-09 NOTE — PLAN OF CARE
Problem: Patient Care Overview  Goal: Plan of Care Review  Outcome: Ongoing (interventions implemented as appropriate)  Flowsheets (Taken 3/9/2020 0419)  Progress: improving  Plan of Care Reviewed With: patient  Outcome Summary: VSS. 3L NC. No complaints of pain. CT placement this morning.  Goal: Individualization and Mutuality  Outcome: Ongoing (interventions implemented as appropriate)  Goal: Discharge Needs Assessment  Outcome: Ongoing (interventions implemented as appropriate)  Goal: Interprofessional Rounds/Family Conf  Outcome: Ongoing (interventions implemented as appropriate)     Problem: Skin Injury Risk (Adult)  Goal: Skin Health and Integrity  Outcome: Ongoing (interventions implemented as appropriate)  Flowsheets (Taken 3/9/2020 0419)  Skin Health and Integrity: making progress toward outcome     Problem: Fall Risk (Adult)  Goal: Absence of Fall  Outcome: Ongoing (interventions implemented as appropriate)  Flowsheets (Taken 3/9/2020 0419)  Absence of Fall: making progress toward outcome     Problem: VTE, DVT and PE (Adult)  Goal: Signs and Symptoms of Listed Potential Problems Will be Absent, Minimized or Managed (VTE, DVT and PE)  Outcome: Ongoing (interventions implemented as appropriate)  Flowsheets (Taken 3/9/2020 0419)  Problems Assessed (VTE, DVT, PE): all  Problems Present (VTE, DVT, PE): deep vein thrombosis; pulmonary embolism     Problem: Pain, Acute (Adult)  Goal: Identify Related Risk Factors and Signs and Symptoms  Outcome: Ongoing (interventions implemented as appropriate)  Flowsheets (Taken 3/9/2020 0419)  Related Risk Factors (Acute Pain): disease process  Signs and Symptoms (Acute Pain): verbalization of pain descriptors  Goal: Acceptable Pain Control/Comfort Level  Outcome: Ongoing (interventions implemented as appropriate)  Flowsheets (Taken 3/9/2020 0419)  Acceptable Pain Control/Comfort Level: making progress toward outcome

## 2020-03-09 NOTE — PROGRESS NOTES
"    Chief Complaint: Bilateral pulmonary emboli, parapneumonic effusion  S/P: CT-guided chest tube insertion  POD # 0    Subjective:  Symptoms:  Stable.  He reports shortness of breath and anorexia.    Diet:  Poor intake.  No nausea or vomiting.    Activity level: Impaired due to weakness.    Pain:  He complains of pain that is mild.        Vital Signs:  Temp:  [97.9 °F (36.6 °C)-98.8 °F (37.1 °C)] 98.8 °F (37.1 °C)  Heart Rate:  [73-90] 78  Resp:  [15-18] 18  BP: (137-161)/(57-69) 158/69    Intake & Output (last day)       03/08 0701 - 03/09 0700 03/09 0701 - 03/10 0700    P.O. 840     Total Intake(mL/kg) 840 (9.8)     Urine (mL/kg/hr) 625 (0.3)     Chest Tube  110    Total Output 625 110    Net +215 -110                Objective:  General Appearance:  Comfortable, ill-appearing and in no acute distress.    Vital signs: (most recent): Blood pressure 158/69, pulse 78, temperature 98.8 °F (37.1 °C), temperature source Oral, resp. rate 18, height 177.8 cm (70\"), weight 85.5 kg (188 lb 9.6 oz), SpO2 92 %.  Vital signs are normal.    HEENT: Normal HEENT exam.    Lungs:  Normal effort and normal respiratory rate.  He is not in respiratory distress.  There are decreased breath sounds.    Heart: Normal rate.  Regular rhythm.    Chest: Chest wall tenderness present.    Abdomen: Abdomen is soft.  Bowel sounds are normal.   There is no abdominal tenderness.   There is no mass.   Extremities: Normal range of motion.  There is dependent edema.    Pulses: Distal pulses are intact.    Neurological: Patient is alert and oriented to person, place and time.    Skin:  Warm and dry.          Chest tube:   Site: Right, Clean, Dry, Intact and Securement device intact  Suction: -20 cm  Air Leak: negative  Level: 120cc  24 Hour Total: 120cc    Results Review:     I reviewed the patient's new clinical results.  I reviewed the patient's new imaging results and agree with the interpretation.  I reviewed the patient's other test results and " agree with the interpretation  Discussed with patient, RN and Dr. Segura.    Imaging Results (Last 24 Hours)     Procedure Component Value Units Date/Time    CT Guided Chest Tube [[id number]] Resulted:  03/09/20 1035     Updated:  03/09/20 1042          Lab Results:     Lab Results (last 24 hours)     Procedure Component Value Units Date/Time    aPTT [[id number]]  (Abnormal) Collected:  03/09/20 0757    Specimen:  Blood Updated:  03/09/20 0904     PTT 43.1 seconds     Protime-INR [[id number]]  (Abnormal) Collected:  03/09/20 0757    Specimen:  Blood Updated:  03/09/20 0904     Protime 16.8 Seconds      INR 1.40    Renal Function Panel [[id number]]  (Abnormal) Collected:  03/09/20 0340    Specimen:  Blood Updated:  03/09/20 0446     Glucose 122 mg/dL      BUN 15 mg/dL      Creatinine 0.78 mg/dL      Sodium 133 mmol/L      Potassium 4.2 mmol/L      Chloride 98 mmol/L      CO2 23.0 mmol/L      Calcium 8.0 mg/dL      Albumin 1.90 g/dL      Phosphorus 2.6 mg/dL      Anion Gap 12.0 mmol/L      BUN/Creatinine Ratio 19.2     eGFR Non African Amer 94 mL/min/1.73     Narrative:       GFR Normal >60  Chronic Kidney Disease <60  Kidney Failure <15      aPTT [[id number]]  (Abnormal) Collected:  03/09/20 0340    Specimen:  Blood Updated:  03/09/20 0436     PTT 81.3 seconds     CBC & Differential [[id number]] Collected:  03/09/20 0340    Specimen:  Blood Updated:  03/09/20 0421    Narrative:       The following orders were created for panel order CBC & Differential.  Procedure                               Abnormality         Status                     ---------                               -----------         ------                     CBC Auto Differential[[id number]]        Abnormal            Final result                 Please view results for these tests on the individual orders.    CBC Auto Differential [[id number]]  (Abnormal) Collected:  03/09/20 0340    Specimen:  Blood Updated:  03/09/20 0421     WBC 11.16 10*3/mm3      RBC  3.43 10*6/mm3      Hemoglobin 9.6 g/dL      Hematocrit 28.6 %      MCV 83.4 fL      MCH 28.0 pg      MCHC 33.6 g/dL      RDW 12.6 %      RDW-SD 37.6 fl      MPV 10.8 fL      Platelets 282 10*3/mm3      Neutrophil % 67.9 %      Lymphocyte % 15.6 %      Monocyte % 11.8 %      Eosinophil % 1.8 %      Basophil % 0.5 %      Immature Grans % 2.4 %      Neutrophils, Absolute 7.57 10*3/mm3      Lymphocytes, Absolute 1.74 10*3/mm3      Monocytes, Absolute 1.32 10*3/mm3      Eosinophils, Absolute 0.20 10*3/mm3      Basophils, Absolute 0.06 10*3/mm3      Immature Grans, Absolute 0.27 10*3/mm3      nRBC 0.1 /100 WBC            Assessment/Plan       Acute saddle pulmonary embolism with acute cor pulmonale (CMS/HCC)    Pulmonary emboli (CMS/HCC)    Empyema of pleura (CMS/HCC)       Assessment & Plan     Patient underwent CT-guided chest tube insertion earlier today. 120cc of serosanguineous fluid drained. No lab studies.  Plan for chest tube to -20 cm continuous suction.  We will check a follow-up x-ray in the morning.    EBEN Rivers  [facility]  03/09/20  11:59 AM

## 2020-03-09 NOTE — THERAPY TREATMENT NOTE
Patient Name: Izaiah Garcia  : 1931    MRN: [id number]                              Today's Date: 3/9/2020       Admit Date: 2020    Visit Dx:     ICD-10-CM ICD-9-CM   1. Healthcare-associated pneumonia J18.9 486   2. Acute hypoxemic respiratory failure (CMS/HCC) J96.01 518.81   3. Bilateral pulmonary embolism (CMS/HCC) I26.99 415.19   4. Acute saddle pulmonary embolism with acute cor pulmonale (CMS/HCC) I26.02 415.13     415.0     Patient Active Problem List   Diagnosis   • Hyperlipidemia   • Benign essential hypertension   • History of gout   • History of esophageal stricture   • History of stroke, 2016--4.9 x 4 x 3 cm inferior left cerebellar infarct   • Rheumatoid factor positive   • Impaired fasting glucose   • At risk for falls   • Generalized weakness   • Bilateral high frequency sensorineural hearing loss, wears hearing aids   • Bilateral lower extremity edema   • Parkinson's disease (CMS/HCC)   • Therapeutic drug monitoring   • Vitamin D deficiency   • Macrocytosis   • Vitamin B12 deficiency   • Acute diarrhea   • Hospital-acquired pneumonia   • HAP (hospital-acquired pneumonia)   • Acute UTI (urinary tract infection)   • Colitis   • Parkinson disease (CMS/HCC)   • Weakness   • Aspiration pneumonia of right lower lobe due to vomit (CMS/HCC)   • Pulmonary emboli (CMS/HCC)   • Acute saddle pulmonary embolism with acute cor pulmonale (CMS/HCC)   • Empyema of pleura (CMS/HCC)     Past Medical History:   Diagnosis Date   • At risk for falls 2019   • Benign essential hypertension 2016   • Bilateral high frequency sensorineural hearing loss, wears hearing aids 2019   • Bilateral lower extremity edema 2019    May 2, 2019--patient who has hypertension and is on amlodipine 10 mg/day is complaining of progressively worsening swelling of the lower extremities that extends up to about mid calf.  Gets worse at the end of the day and tends to get better overnight when he props his feet  "up.  I think this is definitely related to the amlodipine although patient may have some venous insufficiency.  Medication ad   • Decreased peripheral vision of both eyes 5/2/2019    May 2, 2019--continues to have complaints of \"dizziness\" associated with weakness in his lower extremities.  He is not describing a spinning sensation or anything remotely close to vertigo.  Instead he is describing an off-balance sensation.  He tends to fall forward if not careful and he tends to list towards the right side.  He has marked difficulty going down an incline.  He has to ambulate wit   • History of aspiration pneumonia 5/4/2015   • History of esophageal stricture 5/4/2015    July 3, 2018--EGD revealed a distal esophageal stricture that was dilated to 18 mm.  There was a small hiatal hernia.  There was mild streaky erythema in the proximal stomach.  Duodenal bulb normal.  April 26, 2016--EGD performed for dysphagia reveals a distal esophageal stricture that was dilated 16.5 mm.   • History of gout 6/29/2016   • History of stroke, 6/29/2016--4.9 x 4 x 3 cm inferior left cerebellar infarct 5/4/2015 June 29, 2016--MRI of the brain performed for complaints of dizziness and weakness. IMPRESSION: 1. There is susceptibility artifact streaking through the anterior left frontal scalp through the anterior left frontal bone in the anterior tipof the left frontal lobe likely from a tiny piece of metal in the anterior left frontal scalp slightly limits evaluation of these structures. 2. There is mild s   • Hyperlipidemia 6/29/2016   • Impaired fasting glucose 5/2/2019 April 14, 2015--hemoglobin A1c 5.9.   • Macrocytosis 5/2/2019   • Parkinson's disease (CMS/[facility]) 5/2/2019    May 2, 2019--continues to have complaints of \"dizziness\" associated with weakness in his lower extremities.  He is not describing a spinning sensation or anything remotely close to vertigo.  Instead he is describing an off-balance sensation.  He tends to fall " forward if not careful and he tends to list towards the right side.  He has marked difficulty going down an incline.  He has to ambulate wit   • Rheumatoid factor positive 5/2/2019 March 25, 2014--rheumatoid factor returned positive at 95.  However, CCP antibodies normal at 8, SHIV negative, both C&P ANCA negative, C-reactive protein normal at 0.24, sed rate normal at 2.   • Vitamin B12 deficiency 6/6/2019 June 6, 2019--patient recently had mildly elevated methylmalonic acid of 380.  Repeat methylmalonic acid was upper limit of normal at 356.  Given patient's neurologic symptoms and suspected parkinsonism, I have a low threshold for treating vitamin B12 deficiency.  We will initiate vitamin B12 injections today.  2000 mcg subcutaneously given today.   • Vitamin D deficiency 5/2/2019     Past Surgical History:   Procedure Laterality Date   • CARDIAC CATHETERIZATION N/A 2/29/2020    Procedure: Thrombolytic Therapy- EKOS;  Surgeon: Jason Griffiths MD;  Location: [facility] CATH INVASIVE LOCATION;  Service: Cardiovascular;  Laterality: N/A;   • CATARACT EXTRACTION EXTRACAPSULAR W/ INTRAOCULAR LENS IMPLANTATION Bilateral     Bilateral cataract extirpation with intraocular lens implantation   • CHOLECYSTECTOMY     • EKOS CATHETER PLACEMENT Bilateral 2/29/2020    Procedure: Ekos catheter placement;  Surgeon: Jason Griffiths MD;  Location:  [facility] CATH INVASIVE LOCATION;  Service: Cardiovascular;  Laterality: Bilateral;   • ESOPHAGEAL DILATATION  04/26/2016 April 26, 2016--EGD performed for dysphagia reveals a distal esophageal stricture that was dilated 16.5 mm.   • ESOPHAGEAL DILATATION  07/03/2018    July 3, 2018--EGD revealed a distal esophageal stricture that was dilated to 18 mm.  There was a small hiatal hernia.  There was mild streaky erythema in the proximal stomach.  Duodenal bulb normal.   • INTERVENTIONAL RADIOLOGY PROCEDURE Bilateral 2/29/2020    Procedure: Pulmonary Angiogram;  Surgeon: Jason Griffiths  MD;  Location: [facility] INVASIVE LOCATION;  Service: Cardiovascular;  Laterality: Bilateral;     General Information     Row Name 03/09/20 0905          PT Evaluation Time/Intention    Document Type  therapy note (daily note)  -     Mode of Treatment  physical therapy;individual therapy  -[facility] Name 03/09/20 0905          General Information    Existing Precautions/Restrictions  fall;oxygen therapy device and L/min  -[facility] Name 03/09/20 0905          Cognitive Assessment/Intervention- PT/OT    Orientation Status (Cognition)  oriented to;person;place  -[facility] Name 03/09/20 0905          Safety Issues, Functional Mobility    Impairments Affecting Function (Mobility)  balance;endurance/activity tolerance;strength;coordination;shortness of breath  -       User Key  (r) = Recorded By, (t) = Taken By, (c) = Cosigned By    Initials Name Provider Type     Ivis Magallon PTA Physical Therapy Assistant        Mobility     Row Name 03/09/20 0905          Bed Mobility Assessment/Treatment    Supine-Sit [place] (Bed Mobility)  minimum assist (75% patient effort);2 person assist;verbal cues  -     Assistive Device (Bed Mobility)  bed rails;head of bed elevated  -[facility] Name 03/09/20 0905          Sit-Stand Transfer    Sit-Stand [place] (Transfers)  minimum assist (75% patient effort);2 person assist;verbal cues  -     Assistive Device (Sit-Stand Transfers)  walker, front-wheeled  -[facility] Name 03/09/20 0905          Gait/Stairs Assessment/Training    [place] Level (Gait)  moderate assist (50% patient effort);2 person assist;verbal cues  -     Assistive Device (Gait)  walker, front-wheeled  -     Distance in Feet (Gait)  3  -     Deviations/Abnormal Patterns (Gait)  festinating/shuffling;stride length decreased;erika decreased  -     Bilateral Gait Deviations  forward flexed posture;weight shift ability decreased;heel strike decreased  -     Comment (Gait/Stairs)  verbal  cues to correct posture. Pt requires guidance assistance. pt requires sequencing cues.  -       User Key  (r) = Recorded By, (t) = Taken By, (c) = Cosigned By    Initials Name Provider Type     Ivis Magallon PTA Physical Therapy Assistant        Obj/Interventions     Row Name 03/09/20 0908          Therapeutic Exercise    Lower Extremity (Therapeutic Exercise)  marching while seated;LAQ (long arc quad), bilateral  -     Lower Extremity Range of Motion (Therapeutic Exercise)  ankle dorsiflexion/plantar flexion, bilateral  -     Exercise Type (Therapeutic Exercise)  AROM (active range of motion)  -     Position (Therapeutic Exercise)  seated  -     Sets/Reps (Therapeutic Exercise)  X10  -       User Key  (r) = Recorded By, (t) = Taken By, (c) = Cosigned By    Initials Name Provider Type     Ivis Magallon PTA Physical Therapy Assistant        Goals/Plan    No documentation.       Clinical Impression     Row Name 03/09/20 0909          Pain Scale: Numbers Pre/Post-Treatment    Pain Scale: Numbers, Pretreatment  0/10 - no pain  -[facility] Name 03/09/20 0909          Plan of Care Review    Plan of Care Reviewed With  patient  -     Progress  improving  -     Outcome Summary  Pt tolerated treatment with no complaints. Pt is MinAX2 for bed mobility and MinAX2 for STS transfers with rwx. Pt leans posteriorly and requires cues to correct posture. Pt ambulated 3ft from bed to chair with ModAX2, with rwx. Pt demos difficulty weight shifting and advancing LEs. Pt takes small shuffling steps, cues for pt to take bigger steps. Will continue to progress pt as able. Pt will continue to benefit from skilled PT to improve strength/balance and endurance.   -     Row Name 03/09/20 0909          Vital Signs    O2 Delivery Pre Treatment  supplemental O2  -     O2 Delivery Intra Treatment  supplemental O2  -     O2 Delivery Post Treatment  supplemental O2  -EH     Row Name 03/09/20 0909          Positioning and  Restraints    Pre-Treatment Position  in bed  -EH     Post Treatment Position  chair  -EH     In Chair  sitting;call light within reach;encouraged to call for assist;with family/caregiver  -       User Key  (r) = Recorded By, (t) = Taken By, (c) = Cosigned By    Initials Name Provider Type    Ivis Longoria PTA Physical Therapy Assistant        Outcome Measures     Row Name 03/09/20 0914          How much help from another person do you currently need...    Turning from your back to your side while in flat bed without using bedrails?  2  -EH     Moving from lying on back to sitting on the side of a flat bed without bedrails?  2  -EH     Moving to and from a bed to a chair (including a wheelchair)?  2  -EH     Standing up from a chair using your arms (e.g., wheelchair, bedside chair)?  2  -EH     Climbing 3-5 steps with a railing?  1  -EH     To walk in hospital room?  2  -EH     AM-PAC 6 Clicks Score (PT)  11  -       User Key  (r) = Recorded By, (t) = Taken By, (c) = Cosigned By    Initials Name Provider Type    Ivis Longoria PTA Physical Therapy Assistant          PT Recommendation and Plan     Plan of Care Reviewed With: patient  Progress: improving  Outcome Summary: Pt tolerated treatment with no complaints. Pt is MinAX2 for bed mobility and MinAX2 for STS transfers with rwx. Pt leans posteriorly and requires cues to correct posture. Pt ambulated 3ft from bed to chair with ModAX2, with rwx. Pt demos difficulty weight shifting and advancing LEs. Pt takes small shuffling steps, cues for pt to take bigger steps. Will continue to progress pt as able. Pt will continue to benefit from skilled PT to improve strength/balance and endurance.      Time Calculation:   PT Charges     Row Name 03/09/20 0904             Time Calculation    Start Time  0851  -      Stop Time  0904  -      Time Calculation (min)  13 min  -      PT Received On  03/09/20  -      PT - Next Appointment  03/10/20  -         Time  Calculation- PT    Total Timed Code Minutes- PT  13 minute(s)  -        User Key  (r) = Recorded By, (t) = Taken By, (c) = Cosigned By    Initials Name Provider Type     Ivis Magallon PTA Physical Therapy Assistant        Therapy Charges for Today     Code Description Service Date Service Provider Modifiers Qty    76355327395 HC PT THER PROC EA 15 MIN 3/9/2020 Ivis Magallon PTA GP 1    55677481687 HC PT THER SUPP EA 15 MIN 3/9/2020 Ivis Magallon PTA GP 1          PT G-Codes  Outcome Measure Options: AM-PAC 6 Clicks Basic Mobility (PT)  AM-PAC 6 Clicks Score (PT): 11  AM-PAC 6 Clicks Score (OT): 9    Ivis Magallon PTA  3/9/2020

## 2020-03-09 NOTE — PROGRESS NOTES
PROGRESS NOTE  Patient Name: Izaiah Garcia  Age/Sex: 88 y.o. male  : 1931  MRN: [id number]    Date of Admission: 2020  Date of Encounter Visit: 20   LOS: 9 days   Patient Care Team:  Uriah Godinez MD as PCP - General (Internal Medicine)    Chief Complaint: Patient just had his chest tube  placed earlier today by radiology    Hospital course: Patient was readmitted after an hospital admission for pneumonia with evidence of massive pulmonary embolism, and was managed with EKOS with directed thrombolytic therapy low-dose with good clinical response.  Patient had pleural effusion, and after evaluation by thoracic surgery decision was to have radiology replaced the chest tube with a CT-guidance.  Patient had this procedure done earlier today, the output was serosanguineous with no more than couple of 100 cc with more fluid felt to be trapped in loculation inside the chest    Interval History: Patient is feeling okay after the procedure, no complaints, he did have some increased pain yesterday that was partially controlled on the Tylenol and was started on some Lortab.      REVIEW OF SYSTEMS:   CONSTITUTIONAL: no fever or chills  CARDIOVASCULAR: Chest pain after chest tube placement . No palpitations or edema.   RESPIRATORY: Minimal shortness of breath with positive pain that is controlled on PRN pain medication.   GASTROINTESTINAL: No anorexia, nausea, vomiting or diarrhea. No abdominal pain or blood.   HEMATOLOGIC: No bleeding or bruising.     Ventilator/Non-Invasive Ventilation Settings (From admission, onward)     Start     Ordered    20  NIPPV (CPAP or BIPAP)  Until Discontinued     Comments:  Pt founds on these settings:  IPAP min 12/ max 22  EPAP 8    Rate 18   Question Answer Comment   Indication: Acute Respiratory Failure    Type: BIPAP    NIPPV Mask Interface: Per Patient Preference    Tidal Volume 550    Breath Rate 18    Titrate for SPO2 90%        20      "              Vital Signs  Temp:  [97.9 °F (36.6 °C)-98.8 °F (37.1 °C)] 98.3 °F (36.8 °C)  Heart Rate:  [73-90] 78  Resp:  [15-18] 18  BP: (137-161)/(57-69) 156/66  SpO2:  [90 %-97 %] 92 %  on  Flow (L/min):  [3-4] 3 Device (Oxygen Therapy): room air    Intake/Output Summary (Last 24 hours) at 3/9/2020 1319  Last data filed at 3/9/2020 1100  Gross per 24 hour   Intake 720 ml   Output 535 ml   Net 185 ml     Flowsheet Rows      First Filed Value   Admission Height  177.8 cm (70\") Documented at 02/29/2020 0800   Admission Weight  95 kg (209 lb 7 oz) Documented at 02/29/2020 0800        Body mass index is 27.06 kg/m².      03/06/20  0622 03/08/20  0500 03/09/20  0520   Weight: 84.7 kg (186 lb 11.2 oz) (Verified with nurse) 83.9 kg (184 lb 14.4 oz) 85.5 kg (188 lb 9.6 oz)       Physical Exam:  GEN:  No acute distress, alert, cooperative, well developed, on nasal cannula oxygen  EYES:   Sclerae clear. No icterus. PERRL. Normal EOM  ENT:   External ears/nose normal, no oral lesions, no thrush, moist mucous membrane  NECK:  Supple, midline trachea, no JVD  LUNGS: Chest tube in position, no air leak, serosanguineous output 125 cc with no active drainage of further fluid, patient is felt to have some loculation since more fluid was seen on the CAT scan.  Denies any significant dyspnea on nasal cannula oxygen with decreased breath sounds bilaterally but no specific wheezes, there is minimal crackles.  No subcutaneous air  CV:  Regular rhythm and rate. Normal S1/S2. No murmurs, gallops, or rubs noted.  ABD:  Soft, nontender and nondistended. Normal bowel sounds. No guarding  EXT:  Moves all extremities well. No cyanosis. No redness. No lower extremities edema on the right, minimal on the left  Skin: Dry, intact, no bleeding    Results Review:      Results from last 7 days   Lab Units 03/09/20  0340 03/08/20  0352 03/07/20  0438 03/06/20  0729 03/05/20  0400 03/04/20  1724 03/04/20  0440 03/03/20  0616   SODIUM mmol/L 133* 130* " 134* 133* 135*  --  135* 142   POTASSIUM mmol/L 4.2 3.4* 3.6 3.3* 3.9 3.8 3.2* 3.6   CHLORIDE mmol/L 98 95* 98 97* 99  --  99 106   CO2 mmol/L 23.0 26.2 26.0 26.3 24.4  --  26.6 26.2   BUN mg/dL 15 17 17 17 18  --  20 25*   CREATININE mg/dL 0.78 0.74* 0.81 0.75* 0.73*  --  0.76 0.76   CALCIUM mg/dL 8.0* 7.7* 7.8* 8.0* 8.0*  --  8.0* 7.7*   ANION GAP mmol/L 12.0 8.8 10.0 9.7 11.6  --  9.4 9.8   ALBUMIN g/dL 1.90* 2.10* 2.00* 2.30* 2.20*  --  2.20* 2.40*                 Results from last 7 days   Lab Units 03/09/20  0340 03/08/20  0352 03/07/20  0438 03/06/20  1442 03/06/20  0729 03/05/20  0400 03/04/20  0440   WBC 10*3/mm3 11.16* 12.66* 15.07* 19.22* 18.06* 14.57* 13.55*   HEMOGLOBIN g/dL 9.6* 9.2* 9.8* 10.7* 10.6* 10.0* 10.2*   HEMATOCRIT % 28.6* 28.6* 29.8* 33.1* 33.3* 31.2* 31.1*   PLATELETS 10*3/mm3 282 280 252 295 273 305 328   MCV fL 83.4 83.1 84.7 84.7 87.2 85.7 84.3   NEUTROPHIL % % 67.9 70.3 72.6 79.1* 76.1*  --   --    LYMPHOCYTE % % 15.6* 14.3* 10.7* 9.3* 10.5*  --   --    MONOCYTES % % 11.8 11.8 10.6 7.5 8.0  --   --    EOSINOPHIL % % 1.8 0.9 1.5 0.5 0.8  --   --    BASOPHIL % % 0.5 0.3 0.4 0.3 0.6  --   --    IMM GRAN % % 2.4* 2.4* 4.2* 3.3* 4.0*  --   --      Results from last 7 days   Lab Units 03/09/20  0757 03/09/20  0340 03/08/20  1049 03/08/20  0352 03/07/20  1259 03/07/20  0438 03/06/20  2126 03/06/20  1442   INR  1.40*  --   --   --   --   --   --  1.98*   APTT seconds 43.1* 81.3* 78.1* 67.2* 95.8* 98.5* 56.2* 39.6*               Invalid input(s): LDLCALC          No results found for: POCGLU  Results from last 7 days   Lab Units 03/06/20  0729   PROCALCITONIN ng/mL 0.20     Results from last 7 days   Lab Units 03/02/20  1336   BODYFLDCX  Staphylococcus hominis ssp hominis*   Results for SARKIS LANDA (MRN [id number]) as of 3/7/2020 16:07   Ref. Range 3/2/2020 13:37   Color, Fluid Unknown Red   Appearance, Fluid Latest Ref Range: Clear  Turbid (A)   WBC, Fluid Latest Units: /mm3 589   RBC,  Fluid Latest Units: /mm3 46,000   Neutrophils, Fluid Latest Units: % 58   Lymphocytes, Fluid Latest Units: % 27   Mononuclear, Fluid Latest Units: % 15   Other Cells/100 WBCs, Fluid Latest Units: /100 WBCs 2   pH, Fluid Unknown 7.84   Cholesterol, Fluid Latest Units: mg/dL 38   Glucose, Fluid Latest Units: mg/dL 123   LD, Fluid Latest Units: U/L 610   Protein, Total, Fluid Latest Units: g/dL 2.3                   Imaging:   Imaging Results (All)     Procedure Component Value Units Date/Time    CT Chest Without Contrast [[id number]] Collected:  03/06/20 1549     Updated:  03/06/20 1711    Narrative:       CT CHEST WITHOUT CONTRAST     HISTORY: 88-year-old male with acute hypoxic respiratory failure.  Pneumonia and empyema. Pulmonary thromboemboli.     TECHNIQUE: Radiation dose reduction techniques were utilized, including  automated exposure control and exposure modulation based on body size.   3 mm images were obtained through the chest without the administration  of IV contrast. Compared with chest CTA from 02/29/2020.     FINDINGS: There is a much larger left lower lobe airspace consolidation  which consolidates nearly the entire left lower lobe. There is also a  larger right lower lobe airspace consolidation than previously. There is  a small-moderate left pleural effusion and there is a small loculated  appearing right pleural effusion (ultrasound-guided right thoracentesis  on 03/02/2020. There is some compressive atelectatic change at the upper  lobes, but the upper lobes are otherwise clear. There is no change in  the necrotic appearing right superior paratracheal node measuring  approximately 3.7 x 2.6 cm. There are also stable shotty mediastinal  nodes which are stable. There is no acute abnormality within the  visualized upper abdomen. There is long-term stability of a lobular left  hepatic lobe cyst. 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        Impression:       1. Larger right lower lobe airspace consolidation and small loculated  right pleural effusion. Significantly larger left lower lobe  consolidation and significant increase in the small-moderate left  pleural effusion.  2. Stable necrotic appearing 3.7 x 2.6 cm right paratracheal node.     This report was finalized on 3/6/2020 5:08 PM by Dr. Melania De M.D.        I reviewed the patient's new clinical results.  I personally viewed and interpreted the patient's imaging results:        Medication Review:     budesonide 0.5 mg Nebulization BID - RT   carbidopa-levodopa 1 tablet Oral BID   furosemide 40 mg Oral Daily   ipratropium-albuterol 3 mL Nebulization BID - RT   lidocaine 1 patch Transdermal Q24H   saccharomyces boulardii 250 mg Oral BID   sodium chloride 10 mL Intravenous Q12H         heparin (porcine) 18 Units/kg/hr Last Rate: Stopped (03/09/20 0348)       ASSESSMENT:   1. Acute hypoxic respiratory failure  2. BPE s/p ekos catheter on 2/29/2020 with local TPA infusion with good clinical response  3. Troponemia suspect due to BPE  4. RV strain  5. Bilateral pleural effusion R greater than Left exudative with chest tube insertion on 3/9/2020  6. HLD  7. Esphageal stricture  8. Gen weakness  9. Parkinsons Disease  10. 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 HTN  13. Abnormal mass around thyroid   14. hypokalemia, corrected  15. Hypoalbuminemia  16. Hyponatremia, mild    PLAN:  As discussed earlier the cultures on the previous fluid are felt to be contaminant, the current draining material does not look purulent  Case was discussed with the thoracic surgery team, patient will be considered for  TPA through the chest tube to help with the fibrin tissues and allow drainage of the remainder of the pleural effusion felt to be trapped in the chest.  Back on the heparin drip  Adjust oxygen as needed  Encourage deep breathing exercises  PRN pain medication to prevent any splinting of the breath and  hypoventilation    Disposition: Continue to monitor as an inpatient    Rosa Garcia MD  03/09/20  1:19 PM          Dictated utilizing Dragon dictation

## 2020-03-09 NOTE — PROGRESS NOTES
INFECTIOUS DISEASES PROGRESS NOTE    CC: Follow-up pleural effusion    S:   He had CT chest tube placed today which shows bloody fluid.  No analysis sent.  Is not having fevers or chills or night sweats.    O:  Physical Exam:  Temp:  [97.9 °F (36.6 °C)-98.8 °F (37.1 °C)] 98.8 °F (37.1 °C)  Heart Rate:  [73-90] 78  Resp:  [15-18] 18  BP: (137-161)/(57-69) 158/69  Physical Exam   Constitutional: He appears well-developed. No distress.   Pulmonary/Chest: Effort normal. He has rales (L).   Abdominal: Soft. He exhibits no distension. There is no tenderness.   Neurological: He is alert.   Skin: Skin is warm and dry.   Psychiatric: He has a normal mood and affect. His behavior is normal.        Diagnostics:    White count 11.2  Hemoglobin 9.6  Platelet 202  Creatinine 0.78  Microbiology:  2/16 BCx neg  2/17 c diff/gi pcr neg  2/17 MRSA screen, RPP neg  2/29 BCx neg  2/29 RPP neg  3/2 Pleural studies: WBC 68117;  (P58, L 27, M 15), , glc 123 cx-staph hominis in broth alone  3/3 c diff neg    Assessment/Plan   1.  Pleural effusion  2.  Bilateral PE  3.  Leukocytosis, secondary to above    He had chest tube placed to drain his pleural space today.  His white count continues to increase. I would continue to hold antibiotics.  I suspect this pleural effusion is either parapneumonic, bloody from his PE or anticoagulation or some combination of the above.  I doubt empyema.    I will be happy to reevaluate should infectious concerns evolve      Jose Davidson MD  11:51 AM  03/09/20

## 2020-03-09 NOTE — PLAN OF CARE
Problem: Patient Care Overview  Goal: Plan of Care Review  Outcome: Ongoing (interventions implemented as appropriate)  Flowsheets (Taken 3/9/2020 0909)  Progress: improving  Plan of Care Reviewed With: patient  Outcome Summary: Pt tolerated treatment with no complaints. Pt is MinAX2 for bed mobility and MinAX2 for STS transfers with rwx. Pt leans posteriorly and requires cues to correct posture. Pt ambulated 3ft from bed to chair with ModAX2, with rwx. Pt demos difficulty weight shifting and advancing LEs. Pt takes small shuffling steps, cues for pt to take bigger steps. Will continue to progress pt as able. Pt will continue to benefit from skilled PT to improve strength/balance and endurance.

## 2020-03-10 ENCOUNTER — APPOINTMENT (OUTPATIENT)
Dept: GENERAL RADIOLOGY | Facility: HOSPITAL | Age: 85
End: 2020-03-10

## 2020-03-10 LAB
ALBUMIN SERPL-MCNC: 2.1 G/DL (ref 3.5–5.2)
ANION GAP SERPL CALCULATED.3IONS-SCNC: 13 MMOL/L (ref 5–15)
APTT PPP: 149.3 SECONDS (ref 22.7–35.4)
APTT PPP: 62.7 SECONDS (ref 22.7–35.4)
APTT PPP: 66.7 SECONDS (ref 22.7–35.4)
BASOPHILS # BLD AUTO: 0.05 10*3/MM3 (ref 0–0.2)
BASOPHILS NFR BLD AUTO: 0.4 % (ref 0–1.5)
BUN BLD-MCNC: 15 MG/DL (ref 8–23)
BUN/CREAT SERPL: 21.4 (ref 7–25)
CALCIUM SPEC-SCNC: 8.2 MG/DL (ref 8.6–10.5)
CHLORIDE SERPL-SCNC: 97 MMOL/L (ref 98–107)
CO2 SERPL-SCNC: 24 MMOL/L (ref 22–29)
CREAT BLD-MCNC: 0.7 MG/DL (ref 0.76–1.27)
DEPRECATED RDW RBC AUTO: 36.8 FL (ref 37–54)
EOSINOPHIL # BLD AUTO: 0.23 10*3/MM3 (ref 0–0.4)
EOSINOPHIL NFR BLD AUTO: 2 % (ref 0.3–6.2)
ERYTHROCYTE [DISTWIDTH] IN BLOOD BY AUTOMATED COUNT: 12.3 % (ref 12.3–15.4)
GFR SERPL CREATININE-BSD FRML MDRD: 106 ML/MIN/1.73
GLUCOSE BLD-MCNC: 129 MG/DL (ref 65–99)
HCT VFR BLD AUTO: 28.4 % (ref 37.5–51)
HGB BLD-MCNC: 9.5 G/DL (ref 13–17.7)
IMM GRANULOCYTES # BLD AUTO: 0.3 10*3/MM3 (ref 0–0.05)
IMM GRANULOCYTES NFR BLD AUTO: 2.6 % (ref 0–0.5)
LYMPHOCYTES # BLD AUTO: 2.03 10*3/MM3 (ref 0.7–3.1)
LYMPHOCYTES NFR BLD AUTO: 17.9 % (ref 19.6–45.3)
MCH RBC QN AUTO: 27.9 PG (ref 26.6–33)
MCHC RBC AUTO-ENTMCNC: 33.5 G/DL (ref 31.5–35.7)
MCV RBC AUTO: 83.3 FL (ref 79–97)
MONOCYTES # BLD AUTO: 1.32 10*3/MM3 (ref 0.1–0.9)
MONOCYTES NFR BLD AUTO: 11.6 % (ref 5–12)
NEUTROPHILS # BLD AUTO: 7.41 10*3/MM3 (ref 1.7–7)
NEUTROPHILS NFR BLD AUTO: 65.5 % (ref 42.7–76)
NRBC BLD AUTO-RTO: 0 /100 WBC (ref 0–0.2)
PHOSPHATE SERPL-MCNC: 3.6 MG/DL (ref 2.5–4.5)
PLATELET # BLD AUTO: 304 10*3/MM3 (ref 140–450)
PMV BLD AUTO: 10.4 FL (ref 6–12)
POTASSIUM BLD-SCNC: 3.9 MMOL/L (ref 3.5–5.2)
RBC # BLD AUTO: 3.41 10*6/MM3 (ref 4.14–5.8)
SODIUM BLD-SCNC: 134 MMOL/L (ref 136–145)
WBC NRBC COR # BLD: 11.34 10*3/MM3 (ref 3.4–10.8)

## 2020-03-10 PROCEDURE — 36593 DECLOT VASCULAR DEVICE: CPT | Performed by: NURSE PRACTITIONER

## 2020-03-10 PROCEDURE — 80069 RENAL FUNCTION PANEL: CPT | Performed by: INTERNAL MEDICINE

## 2020-03-10 PROCEDURE — 99232 SBSQ HOSP IP/OBS MODERATE 35: CPT | Performed by: NURSE PRACTITIONER

## 2020-03-10 PROCEDURE — 94799 UNLISTED PULMONARY SVC/PX: CPT

## 2020-03-10 PROCEDURE — 85730 THROMBOPLASTIN TIME PARTIAL: CPT | Performed by: INTERNAL MEDICINE

## 2020-03-10 PROCEDURE — 97110 THERAPEUTIC EXERCISES: CPT

## 2020-03-10 PROCEDURE — 25010000002 ALTEPLASE PER 1 MG: Performed by: NURSE PRACTITIONER

## 2020-03-10 PROCEDURE — 85025 COMPLETE CBC W/AUTO DIFF WBC: CPT | Performed by: INTERNAL MEDICINE

## 2020-03-10 PROCEDURE — 25010000002 HYDRALAZINE PER 20 MG: Performed by: INTERNAL MEDICINE

## 2020-03-10 PROCEDURE — 25010000002 HEPARIN (PORCINE) PER 1000 UNITS: Performed by: NURSE PRACTITIONER

## 2020-03-10 PROCEDURE — 71045 X-RAY EXAM CHEST 1 VIEW: CPT

## 2020-03-10 RX ADMIN — HEPARIN SODIUM 3400 UNITS: 5000 INJECTION INTRAVENOUS; SUBCUTANEOUS at 22:17

## 2020-03-10 RX ADMIN — Medication 250 MG: at 21:07

## 2020-03-10 RX ADMIN — Medication 250 MG: at 07:59

## 2020-03-10 RX ADMIN — IPRATROPIUM BROMIDE AND ALBUTEROL SULFATE 3 ML: 2.5; .5 SOLUTION RESPIRATORY (INHALATION) at 20:47

## 2020-03-10 RX ADMIN — CARBIDOPA AND LEVODOPA 1 TABLET: 25; 100 TABLET ORAL at 21:07

## 2020-03-10 RX ADMIN — CARBIDOPA AND LEVODOPA 1 TABLET: 25; 100 TABLET ORAL at 07:59

## 2020-03-10 RX ADMIN — BUDESONIDE 0.5 MG: 0.5 INHALANT RESPIRATORY (INHALATION) at 07:57

## 2020-03-10 RX ADMIN — IPRATROPIUM BROMIDE AND ALBUTEROL SULFATE 3 ML: 2.5; .5 SOLUTION RESPIRATORY (INHALATION) at 07:55

## 2020-03-10 RX ADMIN — HYDRALAZINE HYDROCHLORIDE 10 MG: 20 INJECTION INTRAMUSCULAR; INTRAVENOUS at 17:03

## 2020-03-10 RX ADMIN — SODIUM CHLORIDE, PRESERVATIVE FREE 10 ML: 5 INJECTION INTRAVENOUS at 21:06

## 2020-03-10 RX ADMIN — BUDESONIDE 0.5 MG: 0.5 INHALANT RESPIRATORY (INHALATION) at 20:48

## 2020-03-10 RX ADMIN — LIDOCAINE 1 PATCH: 50 PATCH CUTANEOUS at 07:58

## 2020-03-10 RX ADMIN — HEPARIN SODIUM 26 UNITS/KG/HR: 10000 INJECTION, SOLUTION INTRAVENOUS at 05:10

## 2020-03-10 RX ADMIN — HEPARIN SODIUM 3400 UNITS: 5000 INJECTION INTRAVENOUS; SUBCUTANEOUS at 05:09

## 2020-03-10 RX ADMIN — ALTEPLASE 10 MG: KIT at 12:01

## 2020-03-10 RX ADMIN — FUROSEMIDE 40 MG: 40 TABLET ORAL at 07:59

## 2020-03-10 RX ADMIN — SODIUM CHLORIDE, PRESERVATIVE FREE 10 ML: 5 INJECTION INTRAVENOUS at 07:59

## 2020-03-10 RX ADMIN — HEPARIN SODIUM 23 UNITS/KG/HR: 10000 INJECTION, SOLUTION INTRAVENOUS at 14:44

## 2020-03-10 RX ADMIN — HEPARIN SODIUM 24 UNITS/KG/HR: 10000 INJECTION, SOLUTION INTRAVENOUS at 01:55

## 2020-03-10 NOTE — PLAN OF CARE
Problem: Patient Care Overview  Goal: Plan of Care Review  Outcome: Ongoing (interventions implemented as appropriate)  Flowsheets (Taken 3/10/2020 3370)  Progress: improving  Plan of Care Reviewed With: patient  Outcome Summary: Pt tolerated treatment with no complaints. Pt is MinAX2 for bed mobility and STS transfers. Once standing, pt demos forward flexed posture. Cues for pt to stand up straight. Pt is having difficulty weight shifting bilaterally in which making it difficulty to advance LEs. Pt able to stand/pivot from bed to chair with ModAX2 with rwx. Pt performed several bilateral LE/UE exercises to continue to strengthening. Will continue to progress pt as able.

## 2020-03-10 NOTE — PROGRESS NOTES
"    Chief Complaint: Bilateral pulmonary emboli, parapneumonic effusion  S/P: CT-guided chest tube insertion  POD # 1    Subjective:  Symptoms:  Stable.  He reports shortness of breath and anorexia.    Diet:  Poor intake.  No nausea or vomiting.    Activity level: Impaired due to weakness.    Pain:  He complains of pain that is mild.    Patient is very [place].    Vital Signs:  Temp:  [97.9 °F (36.6 °C)-99.8 °F (37.7 °C)] 97.9 °F (36.6 °C)  Heart Rate:  [67-92] 69  Resp:  [18-20] 18  BP: (125-156)/(62-67) 143/66    Intake & Output (last day)       03/09 0701 - 03/10 0700 03/10 0701 - 03/11 0700    P.O. 600     Total Intake(mL/kg) 600 (7)     Urine (mL/kg/hr) 650 (0.3)     Chest Tube 120     Total Output 770     Net -170           Urine Unmeasured Occurrence  2 x    Stool Unmeasured Occurrence  1 x          Objective:  General Appearance:  Comfortable, ill-appearing and in no acute distress.    Vital signs: (most recent): Blood pressure 160/69, pulse 83, temperature 97.7 °F (36.5 °C), temperature source Oral, resp. rate 20, height 177.8 cm (70\"), weight 85.5 kg (188 lb 9.6 oz), SpO2 93 %.  Vital signs are normal.  No fever.    Output: Producing urine.    HEENT: (Very hard of hearing.)    Lungs:  Normal effort and normal respiratory rate.  He is not in respiratory distress.  There are decreased breath sounds.    Heart: Normal rate.  Regular rhythm.    Chest: Chest wall tenderness (at chest tube site) present.    Abdomen: Abdomen is soft.  Bowel sounds are normal.   There is no abdominal tenderness.   There is no mass.   Extremities: Normal range of motion.  There is dependent edema.    Pulses: Distal pulses are intact.    Neurological: Patient is alert and oriented to person, place and time.    Skin:  Warm and dry.        Chest tube:   Site: Right, Clean, Dry, Intact and Securement device intact  Suction: -20 cm  Air Leak: negative  Level: 120cc  24 Hour Total: 120cc    Results Review:     I reviewed the patient's new " clinical results.  I reviewed the patient's new imaging results and agree with the interpretation.  I reviewed the patient's other test results and agree with the interpretation  Discussed with patient, RN and Dr. Leon.    Imaging Results (Last 24 Hours)     Procedure Component Value Units Date/Time    CT Guided Chest Tube [[id number]] Collected:  03/09/20 1432     Updated:  03/10/20 0824    Narrative:       CT-GUIDED RIGHT CHEST TUBE PLACEMENT 03/09/2020     HISTORY: Right pleural effusion.     FINDINGS:   After signed informed consent was obtained patient was prepped and  draped in the left lateral decubitus position. Lidocaine was used for  local anesthesia.     Using CT guidance an 8-[place] pigtail catheter was introduced into the  right pleural fluid collection. Approximately 25 mL of  nonpurulent-appearing serous fluid was removed. The catheter was  connected to atrium suction device. Confirmatory images were obtained.  There were no complications.     Radiation dose reduction techniques were utilized, including automated  exposure control and exposure modulation based on body size.     This report was finalized on 3/10/2020 8:21 AM by Dr. Humphrey Anderson M.D.       XR Chest 1 View [[id number]] Resulted:  03/10/20 0440     Updated:  03/10/20 0440          Lab Results:     Lab Results (last 24 hours)     Procedure Component Value Units Date/Time    Renal Function Panel [[id number]]  (Abnormal) Collected:  03/10/20 0320    Specimen:  Blood Updated:  03/10/20 0445     Glucose 129 mg/dL      BUN 15 mg/dL      Creatinine 0.70 mg/dL      Sodium 134 mmol/L      Potassium 3.9 mmol/L      Chloride 97 mmol/L      CO2 24.0 mmol/L      Calcium 8.2 mg/dL      Albumin 2.10 g/dL      Phosphorus 3.6 mg/dL      Anion Gap 13.0 mmol/L      BUN/Creatinine Ratio 21.4     eGFR Non African Amer 106 mL/min/1.73     Narrative:       GFR Normal >60  Chronic Kidney Disease <60  Kidney Failure <15      aPTT [[id number]]  (Abnormal)  Collected:  03/10/20 0320    Specimen:  Blood Updated:  03/10/20 0429     PTT 62.7 seconds     CBC & Differential [[id number]] Collected:  03/10/20 0320    Specimen:  Blood Updated:  03/10/20 0416    Narrative:       The following orders were created for panel order CBC & Differential.  Procedure                               Abnormality         Status                     ---------                               -----------         ------                     CBC Auto Differential[[id number]]        Abnormal            Final result                 Please view results for these tests on the individual orders.    CBC Auto Differential [[id number]]  (Abnormal) Collected:  03/10/20 0320    Specimen:  Blood Updated:  03/10/20 0416     WBC 11.34 10*3/mm3      RBC 3.41 10*6/mm3      Hemoglobin 9.5 g/dL      Hematocrit 28.4 %      MCV 83.3 fL      MCH 27.9 pg      MCHC 33.5 g/dL      RDW 12.3 %      RDW-SD 36.8 fl      MPV 10.4 fL      Platelets 304 10*3/mm3      Neutrophil % 65.5 %      Lymphocyte % 17.9 %      Monocyte % 11.6 %      Eosinophil % 2.0 %      Basophil % 0.4 %      Immature Grans % 2.6 %      Neutrophils, Absolute 7.41 10*3/mm3      Lymphocytes, Absolute 2.03 10*3/mm3      Monocytes, Absolute 1.32 10*3/mm3      Eosinophils, Absolute 0.23 10*3/mm3      Basophils, Absolute 0.05 10*3/mm3      Immature Grans, Absolute 0.30 10*3/mm3      nRBC 0.0 /100 WBC     aPTT [[id number]]  (Abnormal) Collected:  03/09/20 1926    Specimen:  Blood from Arm, Left Updated:  03/09/20 2022     PTT 67.4 seconds     Fungus Culture - Body Fluid, Pleural Cavity [[id number]] Collected:  03/02/20 1336    Specimen:  Body Fluid from Pleural Cavity Updated:  03/09/20 1500     Fungus Culture No fungus isolated at 1 week    AFB Culture - Body Fluid, Pleural Cavity [[id number]] Collected:  03/02/20 1336    Specimen:  Body Fluid from Pleural Cavity Updated:  03/09/20 1500     AFB Culture No AFB isolated at 1 week     AFB Stain No acid fast bacilli seen  on direct smear           Assessment/Plan       Acute saddle pulmonary embolism with acute cor pulmonale (CMS/HCC)    Pulmonary emboli (CMS/HCC)    Empyema of pleura (CMS/HCC)       Assessment & Plan     Patient underwent CT-guided chest tube insertion yesterday with 120 cc of serosanguineous fluid drained at that time.  No further drainage since.  This morning's chest x-ray does demonstrate some improvement in right pleural effusion.   Utilizing sterile technique I infused 10 mg of Activase (reconstituted and 20 cc normal saline), intrapleurally.  The patient tolerated this without difficulty.  Chest tube then clamped.  Patient will be repositioned every 10 to 15 minutes by RN and nursing aides.  Chest tube will then be unclamped after 90 minutes by RN.  We will check follow-up x-ray in the morning.    Leave chest tube to -20 cm suction once tube is unclamped.     Discussed with patient, family at bedside, RN and Dr. Leon.    EBEN Rivers  [facility]  03/10/20  11:01

## 2020-03-10 NOTE — PROGRESS NOTES
PROGRESS NOTE  Patient Name: Izaiah Garcia  Age/Sex: 88 y.o. male  : 1931  MRN: [id number]    Date of Admission: 2020  Date of Encounter Visit: 03/10/20   LOS: 10 days   Patient Care Team:  Uriah Godinez MD as PCP - General (Internal Medicine)    Chief Complaint: Patient had chest tube placed on 3/9/2020 with less than optimal output    Hospital course: Patient was readmitted after an hospital admission for pneumonia with evidence of massive pulmonary embolism, and was managed with EKOS with directed thrombolytic therapy low-dose with good clinical response.  Patient had pleural effusion, and after evaluation by thoracic surgery decision was to have radiology replaced the chest tube with a CT-guidance.  This was done on 3/9/2020 but the patient had output less than 200 cc since.  Was seen by thoracic surgery and the plan is to do TPA infusion through the chest tube to help break up any fibrin clots and improve on the chest tube output     Interval History: Patient is feeling gradually better, pain is controlled unless he is manipulated, no fever or chills, no  shortness of breath on oxygen nasal cannula supplement .      REVIEW OF SYSTEMS:   CONSTITUTIONAL: no fever or chills  CARDIOVASCULAR: Chest pain after chest tube placement . No palpitations or edema.   RESPIRATORY: Minimal shortness of breath with positive pain that is controlled on PRN pain medication.   GASTROINTESTINAL: No anorexia, nausea, vomiting or diarrhea. No abdominal pain or blood.   HEMATOLOGIC: No bleeding or bruising.     Ventilator/Non-Invasive Ventilation Settings (From admission, onward)     Start     Ordered    20 2019  NIPPV (CPAP or BIPAP)  Until Discontinued     Comments:  Pt founds on these settings:  IPAP min 12/ max 22  EPAP 8    Rate 18   Question Answer Comment   Indication: Acute Respiratory Failure    Type: BIPAP    NIPPV Mask Interface: Per Patient Preference    Tidal Volume 550    Breath Rate 18   "  Titrate for SPO2 90%        02/29/20 2019                  Vital Signs  Temp:  [97.7 °F (36.5 °C)-99.8 °F (37.7 °C)] 97.7 °F (36.5 °C)  Heart Rate:  [67-92] 83  Resp:  [18-20] 20  BP: (125-160)/(62-69) 160/69  SpO2:  [91 %-96 %] 93 %  on  Flow (L/min):  [3] 3 Device (Oxygen Therapy): nasal cannula    Intake/Output Summary (Last 24 hours) at 3/10/2020 1305  Last data filed at 3/10/2020 0345  Gross per 24 hour   Intake 360 ml   Output 560 ml   Net -200 ml     Flowsheet Rows      First Filed Value   Admission Height  177.8 cm (70\") Documented at 02/29/2020 0800   Admission Weight  95 kg (209 lb 7 oz) Documented at 02/29/2020 0800        Body mass index is 27.06 kg/m².      03/08/20  0500 03/09/20  0520 03/10/20  0603   Weight: 83.9 kg (184 lb 14.4 oz) 85.5 kg (188 lb 9.6 oz) (unable to obtain weight due to bed extension)       Physical Exam:  GEN:  No acute distress, alert, cooperative, well developed, on nasal cannula oxygen  EYES:   Sclerae clear. No icterus. PERRL. Normal EOM  ENT:   External ears/nose normal, no oral lesions, no thrush, moist mucous membrane  NECK:  Supple, midline trachea, no JVD  LUNGS: Chest tube in position, no subcutaneous air, no air bubble, limited serosanguineous output of less than 200 cc since initial placement.  Decreased breath sounds but no wheezes or crackles  CV:  Regular rhythm and rate. Normal S1/S2. No murmurs, gallops, or rubs noted.  ABD:  Soft, nontender and nondistended. Normal bowel sounds. No guarding  EXT:  Moves all extremities well. No cyanosis. No redness. No lower extremities edema on the right, minimal on the left  Skin: Dry, intact, no bleeding    Results Review:      Results from last 7 days   Lab Units 03/10/20  0320 03/09/20  0340 03/08/20  0352 03/07/20  0438 03/06/20  0729 03/05/20  0400 03/04/20  1724 03/04/20  0440   SODIUM mmol/L 134* 133* 130* 134* 133* 135*  --  135*   POTASSIUM mmol/L 3.9 4.2 3.4* 3.6 3.3* 3.9 3.8 3.2*   CHLORIDE mmol/L 97* 98 95* 98 97* " 99  --  99   CO2 mmol/L 24.0 23.0 26.2 26.0 26.3 24.4  --  26.6   BUN mg/dL 15 15 17 17 17 18  --  20   CREATININE mg/dL 0.70* 0.78 0.74* 0.81 0.75* 0.73*  --  0.76   CALCIUM mg/dL 8.2* 8.0* 7.7* 7.8* 8.0* 8.0*  --  8.0*   ANION GAP mmol/L 13.0 12.0 8.8 10.0 9.7 11.6  --  9.4   ALBUMIN g/dL 2.10* 1.90* 2.10* 2.00* 2.30* 2.20*  --  2.20*                 Results from last 7 days   Lab Units 03/10/20  0320 03/09/20  0340 03/08/20  0352 03/07/20  0438 03/06/20  1442 03/06/20  0729 03/05/20  0400   WBC 10*3/mm3 11.34* 11.16* 12.66* 15.07* 19.22* 18.06* 14.57*   HEMOGLOBIN g/dL 9.5* 9.6* 9.2* 9.8* 10.7* 10.6* 10.0*   HEMATOCRIT % 28.4* 28.6* 28.6* 29.8* 33.1* 33.3* 31.2*   PLATELETS 10*3/mm3 304 282 280 252 295 273 305   MCV fL 83.3 83.4 83.1 84.7 84.7 87.2 85.7   NEUTROPHIL % % 65.5 67.9 70.3 72.6 79.1* 76.1*  --    LYMPHOCYTE % % 17.9* 15.6* 14.3* 10.7* 9.3* 10.5*  --    MONOCYTES % % 11.6 11.8 11.8 10.6 7.5 8.0  --    EOSINOPHIL % % 2.0 1.8 0.9 1.5 0.5 0.8  --    BASOPHIL % % 0.4 0.5 0.3 0.4 0.3 0.6  --    IMM GRAN % % 2.6* 2.4* 2.4* 4.2* 3.3* 4.0*  --      Results from last 7 days   Lab Units 03/10/20  1115 03/10/20  0320 03/09/20  1926 03/09/20  0757 03/09/20  0340 03/08/20  1049 03/08/20  0352  03/06/20  1442   INR   --   --   --  1.40*  --   --   --   --  1.98*   APTT seconds 149.3* 62.7* 67.4* 43.1* 81.3* 78.1* 67.2*   < > 39.6*    < > = values in this interval not displayed.               Invalid input(s): LDLCALC          No results found for: POCGLU  Results from last 7 days   Lab Units 03/06/20  0729   PROCALCITONIN ng/mL 0.20       Results for SARKIS LANDA (MRN [id number]) as of 3/7/2020 16:07   Ref. Range 3/2/2020 13:37   Color, Fluid Unknown Red   Appearance, Fluid Latest Ref Range: Clear  Turbid (A)   WBC, Fluid Latest Units: /mm3 589   RBC, Fluid Latest Units: /mm3 46,000   Neutrophils, Fluid Latest Units: % 58   Lymphocytes, Fluid Latest Units: % 27   Mononuclear, Fluid Latest Units: % 15   Other  Cells/100 WBCs, Fluid Latest Units: /100 WBCs 2   pH, Fluid Unknown 7.84   Cholesterol, Fluid Latest Units: mg/dL 38   Glucose, Fluid Latest Units: mg/dL 123   LD, Fluid Latest Units: U/L 610   Protein, Total, Fluid Latest Units: g/dL 2.3                   Imaging:   Imaging Results (All)     Procedure Component Value Units Date/Time    CT Chest Without Contrast [[id number]] Collected:  03/06/20 1549     Updated:  03/06/20 1711    Narrative:       CT CHEST WITHOUT CONTRAST     HISTORY: 88-year-old male with acute hypoxic respiratory failure.  Pneumonia and empyema. Pulmonary thromboemboli.     TECHNIQUE: Radiation dose reduction techniques were utilized, including  automated exposure control and exposure modulation based on body size.   3 mm images were obtained through the chest without the administration  of IV contrast. Compared with chest CTA from 02/29/2020.     FINDINGS: There is a much larger left lower lobe airspace consolidation  which consolidates nearly the entire left lower lobe. There is also a  larger right lower lobe airspace consolidation than previously. There is  a small-moderate left pleural effusion and there is a small loculated  appearing right pleural effusion (ultrasound-guided right thoracentesis  on 03/02/2020. There is some compressive atelectatic change at the upper  lobes, but the upper lobes are otherwise clear. There is no change in  the necrotic appearing right superior paratracheal node measuring  approximately 3.7 x 2.6 cm. There are also stable shotty mediastinal  nodes which are stable. There is no acute abnormality within the  visualized upper abdomen. There is long-term stability of a lobular left  hepatic lobe cyst. 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       Impression:       1. Larger right lower lobe airspace consolidation and small loculated  right pleural effusion. Significantly larger left lower lobe  consolidation  and significant increase in the small-moderate left  pleural effusion.  2. Stable necrotic appearing 3.7 x 2.6 cm right paratracheal node.     This report was finalized on 3/6/2020 5:08 PM by Dr. Melania eD M.D.          I reviewed the patient's new clinical results.  I personally viewed and interpreted the patient's imaging results: Good reexpansion of the right lung with minimal residual effusion on the right, seems to be having more effusion on the left on today's film        Medication Review:     budesonide 0.5 mg Nebulization BID - RT   carbidopa-levodopa 1 tablet Oral BID   furosemide 40 mg Oral Daily   ipratropium-albuterol 3 mL Nebulization BID - RT   lidocaine 1 patch Transdermal Q24H   saccharomyces boulardii 250 mg Oral BID   sodium chloride 10 mL Intravenous Q12H         heparin (porcine) 18 Units/kg/hr Last Rate: 26 Units/kg/hr (03/10/20 0510)       ASSESSMENT:   1. Acute hypoxic respiratory failure  2. BPE s/p ekos catheter on 2/29/2020 with local TPA infusion with good clinical response  3. Troponemia suspect due to BPE  4. RV strain  5. Bilateral pleural effusion R greater than Left exudative with chest tube insertion on 3/9/2020  6. HLD  7. Esphageal stricture  8. Gen weakness  9. Parkinsons Disease  10. 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 HTN  13. Abnormal mass around thyroid   14. hypokalemia, corrected  15. Hypoalbuminemia  16. Hyponatremia, mild    PLAN:  Patient is for TPA infusion through the chest tube per thoracic surgery  Continue to follow-up on the chest tube output and the clinical picture  Continue with the pulmonary hygiene measures and deep breathing exercises  Patient just had a recent massive PE and need to be careful about major surgical intervention unless urgently necessary   Appreciate the help from thoracic surgery team  We will continue to follow    Disposition: Continue to monitor as an inpatient    Rosa Garcia MD  03/10/20  13:05          Dictated utilizing Dragon  dictation

## 2020-03-10 NOTE — PLAN OF CARE
Problem: Patient Care Overview  Goal: Plan of Care Review  Outcome: Ongoing (interventions implemented as appropriate)  Flowsheets (Taken 3/10/2020 0952)  Progress: improving  Plan of Care Reviewed With: patient  Outcome Summary: Tylenol given for pain x1. SR on the monitor. Resting well overnight. Chest tube with minimal output this shift. Heparin gtt continues; titrated to 26u. Recheck PTT for 1115 this morning. VSS will continue to monitor.

## 2020-03-10 NOTE — THERAPY TREATMENT NOTE
Patient Name: Izaiah Garcia  : 1931    MRN: [id number]                              Today's Date: 3/10/2020       Admit Date: 2020    Visit Dx:     ICD-10-CM ICD-9-CM   1. Healthcare-associated pneumonia J18.9 486   2. Acute hypoxemic respiratory failure (CMS/HCC) J96.01 518.81   3. Bilateral pulmonary embolism (CMS/HCC) I26.99 415.19   4. Acute saddle pulmonary embolism with acute cor pulmonale (CMS/HCC) I26.02 415.13     415.0     Patient Active Problem List   Diagnosis   • Hyperlipidemia   • Benign essential hypertension   • History of gout   • History of esophageal stricture   • History of stroke, 2016--4.9 x 4 x 3 cm inferior left cerebellar infarct   • Rheumatoid factor positive   • Impaired fasting glucose   • At risk for falls   • Generalized weakness   • Bilateral high frequency sensorineural hearing loss, wears hearing aids   • Bilateral lower extremity edema   • Parkinson's disease (CMS/HCC)   • Therapeutic drug monitoring   • Vitamin D deficiency   • Macrocytosis   • Vitamin B12 deficiency   • Acute diarrhea   • Hospital-acquired pneumonia   • HAP (hospital-acquired pneumonia)   • Acute UTI (urinary tract infection)   • Colitis   • Parkinson disease (CMS/HCC)   • Weakness   • Aspiration pneumonia of right lower lobe due to vomit (CMS/HCC)   • Pulmonary emboli (CMS/HCC)   • Acute saddle pulmonary embolism with acute cor pulmonale (CMS/HCC)   • Empyema of pleura (CMS/HCC)     Past Medical History:   Diagnosis Date   • At risk for falls 2019   • Benign essential hypertension 2016   • Bilateral high frequency sensorineural hearing loss, wears hearing aids 2019   • Bilateral lower extremity edema 2019    May 2, 2019--patient who has hypertension and is on amlodipine 10 mg/day is complaining of progressively worsening swelling of the lower extremities that extends up to about mid calf.  Gets worse at the end of the day and tends to get better overnight when he props his  "feet up.  I think this is definitely related to the amlodipine although patient may have some venous insufficiency.  Medication ad   • Decreased peripheral vision of both eyes 5/2/2019    May 2, 2019--continues to have complaints of \"dizziness\" associated with weakness in his lower extremities.  He is not describing a spinning sensation or anything remotely close to vertigo.  Instead he is describing an off-balance sensation.  He tends to fall forward if not careful and he tends to list towards the right side.  He has marked difficulty going down an incline.  He has to ambulate wit   • History of aspiration pneumonia 5/4/2015   • History of esophageal stricture 5/4/2015    July 3, 2018--EGD revealed a distal esophageal stricture that was dilated to 18 mm.  There was a small hiatal hernia.  There was mild streaky erythema in the proximal stomach.  Duodenal bulb normal.  April 26, 2016--EGD performed for dysphagia reveals a distal esophageal stricture that was dilated 16.5 mm.   • History of gout 6/29/2016   • History of stroke, 6/29/2016--4.9 x 4 x 3 cm inferior left cerebellar infarct 5/4/2015 June 29, 2016--MRI of the brain performed for complaints of dizziness and weakness. IMPRESSION: 1. There is susceptibility artifact streaking through the anterior left frontal scalp through the anterior left frontal bone in the anterior tipof the left frontal lobe likely from a tiny piece of metal in the anterior left frontal scalp slightly limits evaluation of these structures. 2. There is mild s   • Hyperlipidemia 6/29/2016   • Impaired fasting glucose 5/2/2019 April 14, 2015--hemoglobin A1c 5.9.   • Macrocytosis 5/2/2019   • Parkinson's disease (CMS/[facility]) 5/2/2019    May 2, 2019--continues to have complaints of \"dizziness\" associated with weakness in his lower extremities.  He is not describing a spinning sensation or anything remotely close to vertigo.  Instead he is describing an off-balance sensation.  He tends to " fall forward if not careful and he tends to list towards the right side.  He has marked difficulty going down an incline.  He has to ambulate wit   • Rheumatoid factor positive 5/2/2019 March 25, 2014--rheumatoid factor returned positive at 95.  However, CCP antibodies normal at 8, SHIV negative, both C&P ANCA negative, C-reactive protein normal at 0.24, sed rate normal at 2.   • Vitamin B12 deficiency 6/6/2019 June 6, 2019--patient recently had mildly elevated methylmalonic acid of 380.  Repeat methylmalonic acid was upper limit of normal at 356.  Given patient's neurologic symptoms and suspected parkinsonism, I have a low threshold for treating vitamin B12 deficiency.  We will initiate vitamin B12 injections today.  2000 mcg subcutaneously given today.   • Vitamin D deficiency 5/2/2019     Past Surgical History:   Procedure Laterality Date   • CARDIAC CATHETERIZATION N/A 2/29/2020    Procedure: Thrombolytic Therapy- EKOS;  Surgeon: Jason Griffiths MD;  Location: [facility] CATH INVASIVE LOCATION;  Service: Cardiovascular;  Laterality: N/A;   • CATARACT EXTRACTION EXTRACAPSULAR W/ INTRAOCULAR LENS IMPLANTATION Bilateral     Bilateral cataract extirpation with intraocular lens implantation   • CHOLECYSTECTOMY     • EKOS CATHETER PLACEMENT Bilateral 2/29/2020    Procedure: Ekos catheter placement;  Surgeon: Jason Griffiths MD;  Location:  [facility] CATH INVASIVE LOCATION;  Service: Cardiovascular;  Laterality: Bilateral;   • ESOPHAGEAL DILATATION  04/26/2016 April 26, 2016--EGD performed for dysphagia reveals a distal esophageal stricture that was dilated 16.5 mm.   • ESOPHAGEAL DILATATION  07/03/2018    July 3, 2018--EGD revealed a distal esophageal stricture that was dilated to 18 mm.  There was a small hiatal hernia.  There was mild streaky erythema in the proximal stomach.  Duodenal bulb normal.   • INTERVENTIONAL RADIOLOGY PROCEDURE Bilateral 2/29/2020    Procedure: Pulmonary Angiogram;  Surgeon: Tunde  MD Jason;  Location: [facility] INVASIVE LOCATION;  Service: Cardiovascular;  Laterality: Bilateral;     General Information     Row Name 03/10/20 0955          PT Evaluation Time/Intention    Document Type  therapy note (daily note)  -     Mode of Treatment  physical therapy;individual therapy  -     Row Name 03/10/20 0955          General Information    Existing Precautions/Restrictions  fall;oxygen therapy device and L/min  -     Row Name 03/10/20 0955          Cognitive Assessment/Intervention- PT/OT    Orientation Status (Cognition)  oriented to;place;person  -     Row Name 03/10/20 0955          Safety Issues, Functional Mobility    Impairments Affecting Function (Mobility)  balance;coordination;endurance/activity tolerance;strength;shortness of breath  -       User Key  (r) = Recorded By, (t) = Taken By, (c) = Cosigned By    Initials Name Provider Type     Ivis Magallon PTA Physical Therapy Assistant        Mobility     Row Name 03/10/20 0956          Bed Mobility Assessment/Treatment    Supine-Sit Lea (Bed Mobility)  minimum assist (75% patient effort);2 person assist  -     Assistive Device (Bed Mobility)  bed rails;head of bed elevated;draw sheet  -[facility] Name 03/10/20 0956          Bed-Chair Transfer    Bed-Chair Lea (Transfers)  moderate assist (50% patient effort);2 person assist  -     Assistive Device (Bed-Chair Transfers)  walker, front-wheeled  -[facility] Name 03/10/20 0956          Sit-Stand Transfer    Sit-Stand Lea (Transfers)  minimum assist (75% patient effort);2 person assist;verbal cues  -     Assistive Device (Sit-Stand Transfers)  walker, front-wheeled  -[facility] Name 03/10/20 0956          Gait/Stairs Assessment/Training    Comment (Gait/Stairs)  pt has difficulty advancing LEs. Verbal/tactile cues given for sequencing. Pt has difficulty weight shifting on both sides.   -       User Key  (r) = Recorded By, (t) = Taken By, (c) =  Cosigned By    Initials Name Provider Type     Ivis Magallon PTA Physical Therapy Assistant        Obj/Interventions     Row Name 03/10/20 0958          Therapeutic Exercise    Upper Extremity Range of Motion (Therapeutic Exercise)  shoulder flexion/extension, bilateral;shoulder horizontal abduction/adduction, bilateral shoulder shrugs  -     Lower Extremity (Therapeutic Exercise)  quad sets, bilateral;gluteal sets;heel slides, bilateral  -     Lower Extremity Range of Motion (Therapeutic Exercise)  hip abduction/adduction, bilateral  -     Exercise Type (Therapeutic Exercise)  AROM (active range of motion)  -     Position (Therapeutic Exercise)  seated reclined  -     Sets/Reps (Therapeutic Exercise)  X10  -       User Key  (r) = Recorded By, (t) = Taken By, (c) = Cosigned By    Initials Name Provider Type     Ivis Magallon PTA Physical Therapy Assistant        Goals/Plan    No documentation.       Clinical Impression     Row Name 03/10/20 0959          Plan of Care Review    Plan of Care Reviewed With  patient  -     Progress  improving  -     Outcome Summary  Pt tolerated treatment with no complaints. Pt is MinAX2 for bed mobility and STS transfers. Once standing, pt demos forward flexed posture. Cues for pt to stand up straight. Pt is having difficulty weight shifting bilaterally in which making it difficulty to advance LEs. Pt able to stand/pivot from bed to chair with ModAX2 with rwx. Pt performed several bilateral LE/UE exercises to continue to strengthening. Will continue to progress pt as able.   -     Row Name 03/10/20 0959          Vital Signs    O2 Delivery Pre Treatment  supplemental O2  -     O2 Delivery Intra Treatment  supplemental O2  -EH     O2 Delivery Post Treatment  supplemental O2  -     Row Name 03/10/20 0959          Positioning and Restraints    Pre-Treatment Position  in bed  -     Post Treatment Position  chair  -     In Chair  reclined;call light within  reach;encouraged to call for assist;exit alarm on;with family/caregiver  -       User Key  (r) = Recorded By, (t) = Taken By, (c) = Cosigned By    Initials Name Provider Type    Ivis Longoria PTA Physical Therapy Assistant        Outcome Measures     Row Name 03/10/20 1003          How much help from another person do you currently need...    Turning from your back to your side while in flat bed without using bedrails?  2  -EH     Moving from lying on back to sitting on the side of a flat bed without bedrails?  2  -EH     Moving to and from a bed to a chair (including a wheelchair)?  2  -EH     Standing up from a chair using your arms (e.g., wheelchair, bedside chair)?  2  -EH     Climbing 3-5 steps with a railing?  1  -EH     To walk in hospital room?  2  -EH     AM-PAC 6 Clicks Score (PT)  11  -       User Key  (r) = Recorded By, (t) = Taken By, (c) = Cosigned By    Initials Name Provider Type    Ivis Longoria PTA Physical Therapy Assistant          PT Recommendation and Plan     Plan of Care Reviewed With: patient  Progress: improving  Outcome Summary: Pt tolerated treatment with no complaints. Pt is MinAX2 for bed mobility and STS transfers. Once standing, pt demos forward flexed posture. Cues for pt to stand up straight. Pt is having difficulty weight shifting bilaterally in which making it difficulty to advance LEs. Pt able to stand/pivot from bed to chair with ModAX2 with rwx. Pt performed several bilateral LE/UE exercises to continue to strengthening. Will continue to progress pt as able.      Time Calculation:   PT Charges     Row Name 03/10/20 0980             Time Calculation    Start Time  0910  -      Stop Time  0928  -      Time Calculation (min)  18 min  -      PT Received On  03/10/20  -      PT - Next Appointment  03/11/20  -         Time Calculation- PT    Total Timed Code Minutes- PT  18 minute(s)  -        User Key  (r) = Recorded By, (t) = Taken By, (c) = Cosigned By     Initials Name Provider Type     Ivis Magallon PTA Physical Therapy Assistant        Therapy Charges for Today     Code Description Service Date Service Provider Modifiers Qty    03940741872 HC PT THER PROC EA 15 MIN 3/9/2020 Ivis Magallon, MESSI GP 1    15955612456 HC PT THER SUPP EA 15 MIN 3/9/2020 Ivis Magallon PTA GP 1    90672761001 HC PT THER PROC EA 15 MIN 3/10/2020 Ivis Magallon PTA GP 1    40579545468 HC PT THER SUPP EA 15 MIN 3/10/2020 Ivis Magallon PTA GP 1          PT G-Codes  Outcome Measure Options: AM-PAC 6 Clicks Basic Mobility (PT)  AM-PAC 6 Clicks Score (PT): 11  AM-PAC 6 Clicks Score (OT): 9    Ivis Magallon PTA  3/10/2020

## 2020-03-11 ENCOUNTER — APPOINTMENT (OUTPATIENT)
Dept: CT IMAGING | Facility: HOSPITAL | Age: 85
End: 2020-03-11

## 2020-03-11 ENCOUNTER — APPOINTMENT (OUTPATIENT)
Dept: GENERAL RADIOLOGY | Facility: HOSPITAL | Age: 85
End: 2020-03-11

## 2020-03-11 LAB
ALBUMIN SERPL-MCNC: 2.2 G/DL (ref 3.5–5.2)
ANION GAP SERPL CALCULATED.3IONS-SCNC: 12.5 MMOL/L (ref 5–15)
APTT PPP: 110.3 SECONDS (ref 22.7–35.4)
APTT PPP: 116 SECONDS (ref 22.7–35.4)
APTT PPP: 127.6 SECONDS (ref 22.7–35.4)
APTT PPP: 74.2 SECONDS (ref 22.7–35.4)
ARTERIAL PATENCY WRIST A: ABNORMAL
ATMOSPHERIC PRESS: 750.7 MMHG
BASE EXCESS BLDA CALC-SCNC: 4.5 MMOL/L (ref 0–2)
BASOPHILS # BLD AUTO: 0.08 10*3/MM3 (ref 0–0.2)
BASOPHILS NFR BLD AUTO: 0.7 % (ref 0–1.5)
BDY SITE: ABNORMAL
BILIRUB UR QL STRIP: NEGATIVE
BUN BLD-MCNC: 14 MG/DL (ref 8–23)
BUN/CREAT SERPL: 17.1 (ref 7–25)
CALCIUM SPEC-SCNC: 7.7 MG/DL (ref 8.6–10.5)
CHLORIDE SERPL-SCNC: 99 MMOL/L (ref 98–107)
CLARITY UR: CLEAR
CO2 SERPL-SCNC: 24.5 MMOL/L (ref 22–29)
COLOR UR: ABNORMAL
CREAT BLD-MCNC: 0.82 MG/DL (ref 0.76–1.27)
DEPRECATED RDW RBC AUTO: 37.3 FL (ref 37–54)
EOSINOPHIL # BLD AUTO: 0.21 10*3/MM3 (ref 0–0.4)
EOSINOPHIL NFR BLD AUTO: 1.7 % (ref 0.3–6.2)
ERYTHROCYTE [DISTWIDTH] IN BLOOD BY AUTOMATED COUNT: 12.5 % (ref 12.3–15.4)
GAS FLOW AIRWAY: 4 LPM
GFR SERPL CREATININE-BSD FRML MDRD: 89 ML/MIN/1.73
GLUCOSE BLD-MCNC: 124 MG/DL (ref 65–99)
GLUCOSE BLDC GLUCOMTR-MCNC: 126 MG/DL (ref 70–130)
GLUCOSE UR STRIP-MCNC: NEGATIVE MG/DL
HCO3 BLDA-SCNC: 27.8 MMOL/L (ref 22–28)
HCT VFR BLD AUTO: 27.8 % (ref 37.5–51)
HGB BLD-MCNC: 9.2 G/DL (ref 13–17.7)
HGB UR QL STRIP.AUTO: NEGATIVE
IMM GRANULOCYTES # BLD AUTO: 0.31 10*3/MM3 (ref 0–0.05)
IMM GRANULOCYTES NFR BLD AUTO: 2.5 % (ref 0–0.5)
KETONES UR QL STRIP: ABNORMAL
LEUKOCYTE ESTERASE UR QL STRIP.AUTO: ABNORMAL
LYMPHOCYTES # BLD AUTO: 2.04 10*3/MM3 (ref 0.7–3.1)
LYMPHOCYTES NFR BLD AUTO: 16.8 % (ref 19.6–45.3)
MCH RBC QN AUTO: 27.5 PG (ref 26.6–33)
MCHC RBC AUTO-ENTMCNC: 33.1 G/DL (ref 31.5–35.7)
MCV RBC AUTO: 83 FL (ref 79–97)
MODALITY: ABNORMAL
MONOCYTES # BLD AUTO: 1.09 10*3/MM3 (ref 0.1–0.9)
MONOCYTES NFR BLD AUTO: 9 % (ref 5–12)
NEUTROPHILS # BLD AUTO: 8.43 10*3/MM3 (ref 1.7–7)
NEUTROPHILS NFR BLD AUTO: 69.3 % (ref 42.7–76)
NITRITE UR QL STRIP: NEGATIVE
NRBC BLD AUTO-RTO: 0 /100 WBC (ref 0–0.2)
PCO2 BLDA: 35.6 MM HG (ref 35–45)
PH BLDA: 7.5 PH UNITS (ref 7.35–7.45)
PH UR STRIP.AUTO: <=5 [PH] (ref 5–8)
PHOSPHATE SERPL-MCNC: 3.6 MG/DL (ref 2.5–4.5)
PLATELET # BLD AUTO: 346 10*3/MM3 (ref 140–450)
PMV BLD AUTO: 10 FL (ref 6–12)
PO2 BLDA: 53 MM HG (ref 80–100)
POTASSIUM BLD-SCNC: 3.8 MMOL/L (ref 3.5–5.2)
PROT UR QL STRIP: ABNORMAL
RBC # BLD AUTO: 3.35 10*6/MM3 (ref 4.14–5.8)
SAO2 % BLDCOA: 90.1 % (ref 92–99)
SET MECH RESP RATE: 20
SODIUM BLD-SCNC: 136 MMOL/L (ref 136–145)
SP GR UR STRIP: 1.02 (ref 1–1.03)
UROBILINOGEN UR QL STRIP: ABNORMAL
WBC NRBC COR # BLD: 12.16 10*3/MM3 (ref 3.4–10.8)

## 2020-03-11 PROCEDURE — 25010000002 HEPARIN (PORCINE) PER 1000 UNITS: Performed by: NURSE PRACTITIONER

## 2020-03-11 PROCEDURE — 94799 UNLISTED PULMONARY SVC/PX: CPT

## 2020-03-11 PROCEDURE — 70450 CT HEAD/BRAIN W/O DYE: CPT

## 2020-03-11 PROCEDURE — 97110 THERAPEUTIC EXERCISES: CPT

## 2020-03-11 PROCEDURE — 87086 URINE CULTURE/COLONY COUNT: CPT | Performed by: PSYCHIATRY & NEUROLOGY

## 2020-03-11 PROCEDURE — 25010000002 CYANOCOBALAMIN PER 1000 MCG: Performed by: NURSE PRACTITIONER

## 2020-03-11 PROCEDURE — 85730 THROMBOPLASTIN TIME PARTIAL: CPT | Performed by: INTERNAL MEDICINE

## 2020-03-11 PROCEDURE — 71045 X-RAY EXAM CHEST 1 VIEW: CPT

## 2020-03-11 PROCEDURE — 99232 SBSQ HOSP IP/OBS MODERATE 35: CPT | Performed by: NURSE PRACTITIONER

## 2020-03-11 PROCEDURE — 36600 WITHDRAWAL OF ARTERIAL BLOOD: CPT

## 2020-03-11 PROCEDURE — 82803 BLOOD GASES ANY COMBINATION: CPT

## 2020-03-11 PROCEDURE — 80069 RENAL FUNCTION PANEL: CPT | Performed by: INTERNAL MEDICINE

## 2020-03-11 PROCEDURE — 81001 URINALYSIS AUTO W/SCOPE: CPT | Performed by: PSYCHIATRY & NEUROLOGY

## 2020-03-11 PROCEDURE — 82962 GLUCOSE BLOOD TEST: CPT

## 2020-03-11 PROCEDURE — 97535 SELF CARE MNGMENT TRAINING: CPT | Performed by: OCCUPATIONAL THERAPIST

## 2020-03-11 PROCEDURE — 97530 THERAPEUTIC ACTIVITIES: CPT

## 2020-03-11 PROCEDURE — 85025 COMPLETE CBC W/AUTO DIFF WBC: CPT | Performed by: INTERNAL MEDICINE

## 2020-03-11 RX ORDER — CYANOCOBALAMIN 1000 UG/ML
1000 INJECTION, SOLUTION INTRAMUSCULAR; SUBCUTANEOUS
Status: DISCONTINUED | OUTPATIENT
Start: 2020-03-11 | End: 2020-03-20 | Stop reason: HOSPADM

## 2020-03-11 RX ADMIN — LIDOCAINE 1 PATCH: 50 PATCH CUTANEOUS at 12:54

## 2020-03-11 RX ADMIN — Medication 250 MG: at 22:37

## 2020-03-11 RX ADMIN — SODIUM CHLORIDE, PRESERVATIVE FREE 10 ML: 5 INJECTION INTRAVENOUS at 22:37

## 2020-03-11 RX ADMIN — CARBIDOPA AND LEVODOPA 1 TABLET: 25; 100 TABLET ORAL at 09:38

## 2020-03-11 RX ADMIN — CARBIDOPA AND LEVODOPA 1 TABLET: 25; 100 TABLET ORAL at 22:37

## 2020-03-11 RX ADMIN — SODIUM CHLORIDE, PRESERVATIVE FREE 10 ML: 5 INJECTION INTRAVENOUS at 12:30

## 2020-03-11 RX ADMIN — IPRATROPIUM BROMIDE AND ALBUTEROL SULFATE 3 ML: 2.5; .5 SOLUTION RESPIRATORY (INHALATION) at 21:02

## 2020-03-11 RX ADMIN — FUROSEMIDE 40 MG: 40 TABLET ORAL at 09:38

## 2020-03-11 RX ADMIN — HEPARIN SODIUM 23 UNITS/KG/HR: 10000 INJECTION, SOLUTION INTRAVENOUS at 06:38

## 2020-03-11 RX ADMIN — BUDESONIDE 0.5 MG: 0.5 INHALANT RESPIRATORY (INHALATION) at 21:03

## 2020-03-11 RX ADMIN — Medication 250 MG: at 09:38

## 2020-03-11 RX ADMIN — CYANOCOBALAMIN 1000 MCG: 1000 INJECTION, SOLUTION INTRAMUSCULAR at 12:54

## 2020-03-11 RX ADMIN — BUDESONIDE 0.5 MG: 0.5 INHALANT RESPIRATORY (INHALATION) at 08:48

## 2020-03-11 RX ADMIN — IPRATROPIUM BROMIDE AND ALBUTEROL SULFATE 3 ML: 2.5; .5 SOLUTION RESPIRATORY (INHALATION) at 08:46

## 2020-03-11 NOTE — PROGRESS NOTES
"    Chief Complaint: Bilateral pulmonary emboli, parapneumonic effusion  S/P: CT-guided chest tube insertion  POD # 2    Subjective:  Symptoms:  Improved.  He reports shortness of breath, cough, weakness and anorexia.    Diet:  Poor intake.  No nausea or vomiting.    Activity level: Impaired due to weakness.    Pain:  He complains of pain that is mild.    Patient is very [place].    Vital Signs:  Temp:  [97.7 °F (36.5 °C)-99.3 °F (37.4 °C)] 98 °F (36.7 °C)  Heart Rate:  [75-91] 79  Resp:  [16-20] 20  BP: (130-171)/(59-79) 130/61    Intake & Output (last day)       03/10 0701 - 03/11 0700 03/11 0701 - 03/12 0700    P.O. 80     Total Intake(mL/kg) 80 (0.9)     Urine (mL/kg/hr) 325 (0.2)     Stool 0     Chest Tube 630     Total Output 955     Net -875           Urine Unmeasured Occurrence 3 x     Stool Unmeasured Occurrence 1 x           Objective:  General Appearance:  Comfortable, ill-appearing and in no acute distress.    Vital signs: (most recent): Blood pressure 130/61, pulse 79, temperature 98 °F (36.7 °C), temperature source Oral, resp. rate 20, height 177.8 cm (70\"), weight 83.8 kg (184 lb 12.8 oz), SpO2 90 %.  Vital signs are normal.  No fever.    Output: Producing urine.    HEENT: (Very hard of hearing.)    Lungs:  Normal effort and normal respiratory rate.  He is not in respiratory distress.  There are decreased breath sounds.    Heart: Normal rate.  Regular rhythm.    Chest: Chest wall tenderness (at chest tube site) present.    Abdomen: Abdomen is soft.  Bowel sounds are normal.   There is no abdominal tenderness.   There is no mass.   Extremities: Normal range of motion.  There is dependent edema.    Pulses: Distal pulses are intact.    Neurological: Patient is alert and oriented to person, place and time.    Skin:  Warm and dry.          Chest tube:   Site: Right, Clean, Dry, Intact and Securement device intact  Suction: -20 cm  Air Leak: negative  Level: 870cc  24 Hour Total: 630cc    Results Review:     I " reviewed the patient's new clinical results.  I reviewed the patient's new imaging results and agree with the interpretation.  I reviewed the patient's other test results and agree with the interpretation  Discussed with patient, RN and Dr. Leon.    Imaging Results (Last 24 Hours)     Procedure Component Value Units Date/Time    XR Chest 1 View [[id number]] Collected:  03/11/20 0541     Updated:  03/11/20 0546    Narrative:       PORTABLE CHEST RADIOGRAPH     HISTORY: Chest tube     COMPARISON: 03/10/2020     FINDINGS:  Cardiomegaly and vascular congestion are noted. Right-sided pigtail  drainage catheter is present. No pneumothorax is identified. There is a  persistent right pleural effusion. It has increased slightly when  compared to prior study. Left pleural effusion has also increased. There  is bibasilar atelectasis. Right-sided PICC line appears to terminate  within the right axillary vein.       Impression:       Increasing bilateral pleural effusions.     This report was finalized on 3/11/2020 5:43 AM by Dr. Dolores Rivera M.D.       XR Chest 1 View [[id number]] Collected:  03/10/20 1145     Updated:  03/10/20 1304    Narrative:       X-RAY CHEST ONE VIEW     HISTORY: 88-year-old male status post CT-guided right chest tube  placement.     FINDINGS: There has been significant decrease in the right-sided pleural  effusion and improved aeration at the right lower thorax. There is  increased density at the left mid and lower thorax since previous  radiograph from 03/04/2020 suggesting increase in the layering left  pleural effusion. There is also increase in the central pulmonary  vascular prominence. There is no pneumothorax.     This report was finalized on 3/10/2020 1:01 PM by Dr. Melania De M.D.             Lab Results:     Lab Results (last 24 hours)     Procedure Component Value Units Date/Time    aPTT [[id number]]  (Abnormal) Collected:  03/11/20 0556    Specimen:  Blood Updated:  03/11/20 0634      .3 seconds     Renal Function Panel [[id number]]  (Abnormal) Collected:  03/11/20 0404    Specimen:  Blood Updated:  03/11/20 0453     Glucose 124 mg/dL      BUN 14 mg/dL      Creatinine 0.82 mg/dL      Sodium 136 mmol/L      Potassium 3.8 mmol/L      Chloride 99 mmol/L      CO2 24.5 mmol/L      Calcium 7.7 mg/dL      Albumin 2.20 g/dL      Phosphorus 3.6 mg/dL      Anion Gap 12.5 mmol/L      BUN/Creatinine Ratio 17.1     eGFR Non African Amer 89 mL/min/1.73     Narrative:       GFR Normal >60  Chronic Kidney Disease <60  Kidney Failure <15      aPTT [[id number]]  (Abnormal) Collected:  03/11/20 0404    Specimen:  Blood Updated:  03/11/20 0445     .6 seconds     CBC & Differential [[id number]] Collected:  03/11/20 0404    Specimen:  Blood Updated:  03/11/20 0426    Narrative:       The following orders were created for panel order CBC & Differential.  Procedure                               Abnormality         Status                     ---------                               -----------         ------                     CBC Auto Differential[[id number]]        Abnormal            Final result                 Please view results for these tests on the individual orders.    CBC Auto Differential [[id number]]  (Abnormal) Collected:  03/11/20 0404    Specimen:  Blood Updated:  03/11/20 0426     WBC 12.16 10*3/mm3      RBC 3.35 10*6/mm3      Hemoglobin 9.2 g/dL      Hematocrit 27.8 %      MCV 83.0 fL      MCH 27.5 pg      MCHC 33.1 g/dL      RDW 12.5 %      RDW-SD 37.3 fl      MPV 10.0 fL      Platelets 346 10*3/mm3      Neutrophil % 69.3 %      Lymphocyte % 16.8 %      Monocyte % 9.0 %      Eosinophil % 1.7 %      Basophil % 0.7 %      Immature Grans % 2.5 %      Neutrophils, Absolute 8.43 10*3/mm3      Lymphocytes, Absolute 2.04 10*3/mm3      Monocytes, Absolute 1.09 10*3/mm3      Eosinophils, Absolute 0.21 10*3/mm3      Basophils, Absolute 0.08 10*3/mm3      Immature Grans, Absolute 0.31 10*3/mm3      nRBC  0.0 /100 WBC     aPTT [[id number]]  (Abnormal) Collected:  03/10/20 2024    Specimen:  Blood Updated:  03/10/20 2100     PTT 66.7 seconds     aPTT [[id number]]  (Abnormal) Collected:  03/10/20 1115    Specimen:  Blood from Arm, Left Updated:  03/10/20 1221     .3 seconds            Assessment/Plan       Acute saddle pulmonary embolism with acute cor pulmonale (CMS/HCC)    Pulmonary emboli (CMS/HCC)    Empyema of pleura (CMS/HCC)       Assessment & Plan     I personally reviewed this morning's chest x-ray which demonstrates bilateral pleural effusions.  There appears to be some improvement in aeration on the right with chest tube in good position.    Increase in chest tube drainage overnight with infusion of TPA yesterday.  We will plan to leave his chest tube to -20 cm suction today and check a CT of the chest without contrast tomorrow. Patient may need a tube of the left to facilitate drainage of that effusion.    Discussed with patient, family at bedside, RN and Dr. Leon.    EBEN Rivers  [facility]  03/11/20  09:20

## 2020-03-11 NOTE — PROGRESS NOTES
PROGRESS NOTE  Patient Name: Izaiah Garcia  Age/Sex: 88 y.o. male  : 1931  MRN: [id number]    Date of Admission: 2020  Date of Encounter Visit: 20   LOS: 11 days   Patient Care Team:  Uriah Godinez MD as PCP - General (Internal Medicine)    Chief Complaint: Patient had chest tube placed on 3/9/2020, complaining of poor appetite    Hospital course: Patient was readmitted after an hospital admission for pneumonia with evidence of massive pulmonary embolism, and was managed with EKOS with directed thrombolytic therapy low-dose with good clinical response.  Patient had pleural effusion, and after evaluation by thoracic surgery decision was to have radiology replaced the chest tube with a CT-guidance.  This was done on 3/9/2020 but the patient had output less than 200 cc over the first 24 hours, TPA was administered on 3/10/2020 with nearly 500 cc additional output since.  Patient is complaining of poor appetite but he denies any significant chest pain especially if resting.  No productive cough.      REVIEW OF SYSTEMS:   CONSTITUTIONAL: no fever or chills  CARDIOVASCULAR: Chest pain after chest tube placement . No palpitations or edema.   RESPIRATORY: Minimal shortness of breath with positive pain that is controlled on PRN pain medication.   GASTROINTESTINAL: No anorexia, nausea, vomiting or diarrhea. No abdominal pain or blood.   HEMATOLOGIC: No bleeding or bruising.     Ventilator/Non-Invasive Ventilation Settings (From admission, onward)     Start     Ordered    20  NIPPV (CPAP or BIPAP)  Until Discontinued     Comments:  Pt founds on these settings:  IPAP min 12/ max 22  EPAP 8    Rate 18   Question Answer Comment   Indication: Acute Respiratory Failure    Type: BIPAP    NIPPV Mask Interface: Per Patient Preference    Tidal Volume 550    Breath Rate 18    Titrate for SPO2 90%        20                  Vital Signs  Temp:  [98 °F (36.7 °C)-99.3 °F (37.4 °C)] 98  "°F (36.7 °C)  Heart Rate:  [75-91] 79  Resp:  [16-20] 20  BP: (130-171)/(59-79) 130/61  SpO2:  [90 %-97 %] 90 %  on  Flow (L/min):  [3-6] 3.5 Device (Oxygen Therapy): nasal cannula;humidified    Intake/Output Summary (Last 24 hours) at 3/11/2020 1159  Last data filed at 3/11/2020 0624  Gross per 24 hour   Intake 80 ml   Output 955 ml   Net -875 ml     Flowsheet Rows      First Filed Value   Admission Height  177.8 cm (70\") Documented at 02/29/2020 0800   Admission Weight  95 kg (209 lb 7 oz) Documented at 02/29/2020 0800        Body mass index is 26.52 kg/m².      03/09/20  0520 03/10/20  0603 03/11/20  0703   Weight: 85.5 kg (188 lb 9.6 oz) (unable to obtain weight due to bed extension) 83.8 kg (184 lb 12.8 oz)       Physical Exam:  GEN:  No acute distress, alert, cooperative, well developed, on nasal cannula oxygen  EYES:   Sclerae clear. No icterus. PERRL. Normal EOM  ENT:   External ears/nose normal, no oral lesions, no thrush, moist mucous membrane  NECK:  Supple, midline trachea, no JVD  LUNGS: Chest tube in position, no subcutaneous air, no air bubble, limited serosanguineous output of less than was up to more than 600 cc since initial placement.  Decreased breath sounds but no wheezes or crackles  CV:  Regular rhythm and rate. Normal S1/S2. No murmurs, gallops, or rubs noted.  ABD:  Soft, nontender and nondistended. Normal bowel sounds. No guarding  EXT:  Moves all extremities well. No cyanosis. No redness. No lower extremities edema on the right, minimal on the left  Skin: Dry, intact, no bleeding    Results Review:      Results from last 7 days   Lab Units 03/11/20  0404 03/10/20  0320 03/09/20  0340 03/08/20  0352 03/07/20  0438 03/06/20  0729 03/05/20  0400   SODIUM mmol/L 136 134* 133* 130* 134* 133* 135*   POTASSIUM mmol/L 3.8 3.9 4.2 3.4* 3.6 3.3* 3.9   CHLORIDE mmol/L 99 97* 98 95* 98 97* 99   CO2 mmol/L 24.5 24.0 23.0 26.2 26.0 26.3 24.4   BUN mg/dL 14 15 15 17 17 17 18   CREATININE mg/dL 0.82 0.70* " 0.78 0.74* 0.81 0.75* 0.73*   CALCIUM mg/dL 7.7* 8.2* 8.0* 7.7* 7.8* 8.0* 8.0*   ANION GAP mmol/L 12.5 13.0 12.0 8.8 10.0 9.7 11.6   ALBUMIN g/dL 2.20* 2.10* 1.90* 2.10* 2.00* 2.30* 2.20*                 Results from last 7 days   Lab Units 03/11/20  0404 03/10/20  0320 03/09/20  0340 03/08/20  0352 03/07/20  0438 03/06/20  1442 03/06/20  0729   WBC 10*3/mm3 12.16* 11.34* 11.16* 12.66* 15.07* 19.22* 18.06*   HEMOGLOBIN g/dL 9.2* 9.5* 9.6* 9.2* 9.8* 10.7* 10.6*   HEMATOCRIT % 27.8* 28.4* 28.6* 28.6* 29.8* 33.1* 33.3*   PLATELETS 10*3/mm3 346 304 282 280 252 295 273   MCV fL 83.0 83.3 83.4 83.1 84.7 84.7 87.2   NEUTROPHIL % % 69.3 65.5 67.9 70.3 72.6 79.1* 76.1*   LYMPHOCYTE % % 16.8* 17.9* 15.6* 14.3* 10.7* 9.3* 10.5*   MONOCYTES % % 9.0 11.6 11.8 11.8 10.6 7.5 8.0   EOSINOPHIL % % 1.7 2.0 1.8 0.9 1.5 0.5 0.8   BASOPHIL % % 0.7 0.4 0.5 0.3 0.4 0.3 0.6   IMM GRAN % % 2.5* 2.6* 2.4* 2.4* 4.2* 3.3* 4.0*     Results from last 7 days   Lab Units 03/11/20  0556 03/11/20  0404 03/10/20  2024 03/10/20  1115 03/10/20  0320 03/09/20  1926 03/09/20  0757  03/06/20  1442   INR   --   --   --   --   --   --  1.40*  --  1.98*   APTT seconds 110.3* 127.6* 66.7* 149.3* 62.7* 67.4* 43.1*   < > 39.6*    < > = values in this interval not displayed.               Invalid input(s): LDLCALC          No results found for: POCGLU  Results from last 7 days   Lab Units 03/06/20  0729   PROCALCITONIN ng/mL 0.20       Results for SARKIS LANDA (MRN [id number]) as of 3/7/2020 16:07   Ref. Range 3/2/2020 13:37   Color, Fluid Unknown Red   Appearance, Fluid Latest Ref Range: Clear  Turbid (A)   WBC, Fluid Latest Units: /mm3 589   RBC, Fluid Latest Units: /mm3 46,000   Neutrophils, Fluid Latest Units: % 58   Lymphocytes, Fluid Latest Units: % 27   Mononuclear, Fluid Latest Units: % 15   Other Cells/100 WBCs, Fluid Latest Units: /100 WBCs 2   pH, Fluid Unknown 7.84   Cholesterol, Fluid Latest Units: mg/dL 38   Glucose, Fluid Latest Units: mg/dL  123   LD, Fluid Latest Units: U/L 610   Protein, Total, Fluid Latest Units: g/dL 2.3                   Imaging:   Imaging Results (All)     Procedure Component Value Units Date/Time    CT Chest Without Contrast [[id number]] Collected:  03/06/20 1549     Updated:  03/06/20 1711    Narrative:       CT CHEST WITHOUT CONTRAST     HISTORY: 88-year-old male with acute hypoxic respiratory failure.  Pneumonia and empyema. Pulmonary thromboemboli.     TECHNIQUE: Radiation dose reduction techniques were utilized, including  automated exposure control and exposure modulation based on body size.   3 mm images were obtained through the chest without the administration  of IV contrast. Compared with chest CTA from 02/29/2020.     FINDINGS: There is a much larger left lower lobe airspace consolidation  which consolidates nearly the entire left lower lobe. There is also a  larger right lower lobe airspace consolidation than previously. There is  a small-moderate left pleural effusion and there is a small loculated  appearing right pleural effusion (ultrasound-guided right thoracentesis  on 03/02/2020. There is some compressive atelectatic change at the upper  lobes, but the upper lobes are otherwise clear. There is no change in  the necrotic appearing right superior paratracheal node measuring  approximately 3.7 x 2.6 cm. There are also stable shotty mediastinal  nodes which are stable. There is no acute abnormality within the  visualized upper abdomen. There is long-term stability of a lobular left  hepatic lobe cyst. 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       Impression:       1. Larger right lower lobe airspace consolidation and small loculated  right pleural effusion. Significantly larger left lower lobe  consolidation and significant increase in the small-moderate left  pleural effusion.  2. Stable necrotic appearing 3.7 x 2.6 cm right paratracheal node.     This report  was finalized on 3/6/2020 5:08 PM by Dr. Melania De M.D.          I reviewed the patient's new clinical results.  I personally viewed and interpreted the patient's imaging results: Patient has ongoing improvement in the right lung volume and fluid level however there is progressive worsening of the left-sided effusion.  Patient already had CAT scan ordered for tomorrow to better assess that        Medication Review:     budesonide 0.5 mg Nebulization BID - RT   carbidopa-levodopa 1 tablet Oral BID   cyanocobalamin 1,000 mcg Intramuscular Q28 Days   furosemide 40 mg Oral Daily   ipratropium-albuterol 3 mL Nebulization BID - RT   lidocaine 1 patch Transdermal Q24H   saccharomyces boulardii 250 mg Oral BID   sodium chloride 10 mL Intravenous Q12H         heparin (porcine) 18 Units/kg/hr Last Rate: 23 Units/kg/hr (03/11/20 0638)       ASSESSMENT:   1. Acute hypoxic respiratory failure  2. BPE s/p ekos catheter on 2/29/2020 with local TPA infusion with good clinical response  3. Troponemia suspect due to BPE  4. RV strain  5. Bilateral pleural effusion R greater than Left exudative with chest tube insertion on 3/9/2020  6. HLD  7. Esphageal stricture  8. Gen weakness  9. Parkinsons Disease  10. 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 HTN  13. Abnormal mass around thyroid   14. hypokalemia, corrected  15. Hypoalbuminemia  16. Hyponatremia, mild    PLAN:  TPA administered through the chest tube on 3/10/2020 showed results with increase in the output with additional 500 cc drained, the chest x-ray showing good improvement on the right lung however I am worried about the worsening finding on the left side.  Discussed with the thoracic surgery team, the plan is to do a CAT scan tomorrow which will review and then will assess the finding on the left side and consider further recommendation accordingly.  Patient is already on furosemide trying to help prevent any fluid overload and his fluid balance had been maintained neutral.   No evidence of any significant volume overload on exam otherwise.    Disposition: Continue to monitor as an inpatient,      Rosa Garcia MD  03/11/20  11:59          Dictated utilizing Dragon dictation

## 2020-03-11 NOTE — PLAN OF CARE
Problem: Patient Care Overview  Goal: Plan of Care Review  Outcome: Ongoing (interventions implemented as appropriate)  Flowsheets (Taken 3/11/2020 1005)  Progress: improving  Plan of Care Reviewed With: patient  Outcome Summary: Pt tolerated treatment with c/o dizziness. Pt is [place] for supine to sit today. Pt performed 2 STS transfers with MinAX2. Pt leans posteriorly and cues required to engage glutes, lean forward, and get legs underneath him. With second stand, posture improved. Working with pt advancing LEs forward. Pt will  foot and place it about the same spot. Pt is having difficulty with advancing LEs with verbal and tactile cues. Pt able to stand pivot from bed to chair with ModAX2. Will continue to progress pt as able. Pt will continue to benefit from skilled PT to improve strength, balance and overall functional mobility.

## 2020-03-11 NOTE — PLAN OF CARE
Problem: Patient Care Overview  Goal: Plan of Care Review  Flowsheets (Taken 3/11/2020 0912)  Progress: improving  Plan of Care Reviewed With: patient; family  Outcome Summary: Impoving slightly, able to sit at EOB unsupported and perform grooming tasks with min A for completion. Will continue to benefit from OT to address ADLs.

## 2020-03-11 NOTE — THERAPY TREATMENT NOTE
Patient Name: Izaiah Garcia  : 1931    MRN: [id number]                              Today's Date: 3/11/2020       Admit Date: 2020    Visit Dx:     ICD-10-CM ICD-9-CM   1. Healthcare-associated pneumonia J18.9 486   2. Acute hypoxemic respiratory failure (CMS/HCC) J96.01 518.81   3. Bilateral pulmonary embolism (CMS/HCC) I26.99 415.19   4. Acute saddle pulmonary embolism with acute cor pulmonale (CMS/HCC) I26.02 415.13     415.0     Patient Active Problem List   Diagnosis   • Hyperlipidemia   • Benign essential hypertension   • History of gout   • History of esophageal stricture   • History of stroke, 2016--4.9 x 4 x 3 cm inferior left cerebellar infarct   • Rheumatoid factor positive   • Impaired fasting glucose   • At risk for falls   • Generalized weakness   • Bilateral high frequency sensorineural hearing loss, wears hearing aids   • Bilateral lower extremity edema   • Parkinson's disease (CMS/HCC)   • Therapeutic drug monitoring   • Vitamin D deficiency   • Macrocytosis   • Vitamin B12 deficiency   • Acute diarrhea   • Hospital-acquired pneumonia   • HAP (hospital-acquired pneumonia)   • Acute UTI (urinary tract infection)   • Colitis   • Parkinson disease (CMS/HCC)   • Weakness   • Aspiration pneumonia of right lower lobe due to vomit (CMS/HCC)   • Pulmonary emboli (CMS/HCC)   • Acute saddle pulmonary embolism with acute cor pulmonale (CMS/HCC)   • Empyema of pleura (CMS/HCC)     Past Medical History:   Diagnosis Date   • At risk for falls 2019   • Benign essential hypertension 2016   • Bilateral high frequency sensorineural hearing loss, wears hearing aids 2019   • Bilateral lower extremity edema 2019    May 2, 2019--patient who has hypertension and is on amlodipine 10 mg/day is complaining of progressively worsening swelling of the lower extremities that extends up to about mid calf.  Gets worse at the end of the day and tends to get better overnight when he props his  "feet up.  I think this is definitely related to the amlodipine although patient may have some venous insufficiency.  Medication ad   • Decreased peripheral vision of both eyes 5/2/2019    May 2, 2019--continues to have complaints of \"dizziness\" associated with weakness in his lower extremities.  He is not describing a spinning sensation or anything remotely close to vertigo.  Instead he is describing an off-balance sensation.  He tends to fall forward if not careful and he tends to list towards the right side.  He has marked difficulty going down an incline.  He has to ambulate wit   • History of aspiration pneumonia 5/4/2015   • History of esophageal stricture 5/4/2015    July 3, 2018--EGD revealed a distal esophageal stricture that was dilated to 18 mm.  There was a small hiatal hernia.  There was mild streaky erythema in the proximal stomach.  Duodenal bulb normal.  April 26, 2016--EGD performed for dysphagia reveals a distal esophageal stricture that was dilated 16.5 mm.   • History of gout 6/29/2016   • History of stroke, 6/29/2016--4.9 x 4 x 3 cm inferior left cerebellar infarct 5/4/2015 June 29, 2016--MRI of the brain performed for complaints of dizziness and weakness. IMPRESSION: 1. There is susceptibility artifact streaking through the anterior left frontal scalp through the anterior left frontal bone in the anterior tipof the left frontal lobe likely from a tiny piece of metal in the anterior left frontal scalp slightly limits evaluation of these structures. 2. There is mild s   • Hyperlipidemia 6/29/2016   • Impaired fasting glucose 5/2/2019 April 14, 2015--hemoglobin A1c 5.9.   • Macrocytosis 5/2/2019   • Parkinson's disease (CMS/[facility]) 5/2/2019    May 2, 2019--continues to have complaints of \"dizziness\" associated with weakness in his lower extremities.  He is not describing a spinning sensation or anything remotely close to vertigo.  Instead he is describing an off-balance sensation.  He tends to " fall forward if not careful and he tends to list towards the right side.  He has marked difficulty going down an incline.  He has to ambulate wit   • Rheumatoid factor positive 5/2/2019 March 25, 2014--rheumatoid factor returned positive at 95.  However, CCP antibodies normal at 8, SHIV negative, both C&P ANCA negative, C-reactive protein normal at 0.24, sed rate normal at 2.   • Vitamin B12 deficiency 6/6/2019 June 6, 2019--patient recently had mildly elevated methylmalonic acid of 380.  Repeat methylmalonic acid was upper limit of normal at 356.  Given patient's neurologic symptoms and suspected parkinsonism, I have a low threshold for treating vitamin B12 deficiency.  We will initiate vitamin B12 injections today.  2000 mcg subcutaneously given today.   • Vitamin D deficiency 5/2/2019     Past Surgical History:   Procedure Laterality Date   • CARDIAC CATHETERIZATION N/A 2/29/2020    Procedure: Thrombolytic Therapy- EKOS;  Surgeon: Jason Griffiths MD;  Location: [facility] CATH INVASIVE LOC[facility];  Service: Cardiovascular;  Laterality: N/A;   • CATARACT EXTRACTION EXTRACAPSULAR W/ INTRAOCULAR LENS IMPLANTATION Bilateral     Bilateral cataract extirpation with intraocular lens implantation   • CHOLECYSTECTOMY     • EKOS CATHETER PLACEMENT Bilateral 2/29/2020    Procedure: Ekos catheter placement;  Surgeon: Jason Griffiths MD;  Location:  [facility] CATH INVASIVE LOCATION;  Service: Cardiovascular;  Laterality: Bilateral;   • ESOPHAGEAL DILATATION  04/26/2016 April 26, 2016--EGD performed for dysphagia reveals a distal esophageal stricture that was dilated 16.5 mm.   • ESOPHAGEAL DILATATION  07/03/2018    July 3, 2018--EGD revealed a distal esophageal stricture that was dilated to 18 mm.  There was a small hiatal hernia.  There was mild streaky erythema in the proximal stomach.  Duodenal bulb normal.   • INTERVENTIONAL RADIOLOGY PROCEDURE Bilateral 2/29/2020    Procedure: Pulmonary Angiogram;  Surgeon: Tunde  MD Jason;  Location:  [facility] CATH INVASIVE LOCATION;  Service: Cardiovascular;  Laterality: Bilateral;     General Information     Row Name 03/11/20 1001          PT Evaluation Time/Intention    Document Type  therapy note (daily note)  -     Mode of Treatment  individual therapy;physical therapy  -     Row Name 03/11/20 1001          General Information    Existing Precautions/Restrictions  fall;oxygen therapy device and L/min  -[facility] Name 03/11/20 1001          Cognitive Assessment/Intervention- PT/OT    Orientation Status (Cognition)  oriented to;person;place  -     Row Name 03/11/20 1001          Safety Issues, Functional Mobility    Impairments Affecting Function (Mobility)  balance;coordination;endurance/activity tolerance;strength;shortness of breath  -       User Key  (r) = Recorded By, (t) = Taken By, (c) = Cosigned By    Initials Name Provider Type     Ivis Magallon PTA Physical Therapy Assistant        Mobility     Row Name 03/11/20 1001          Bed Mobility Assessment/Treatment    Supine-Sit [place] (Bed Mobility)  minimum assist (75% patient effort)  -     Sit-Supine [place] (Bed Mobility)  not tested  -     Assistive Device (Bed Mobility)  head of bed elevated  -     Row Name 03/11/20 1001          Transfer Assessment/Treatment    Comment (Transfers)  2 STS transfers. Cues for pt get legs under and tuck bottom as pt leans posteriorly.   -[facility] Name 03/11/20 1001          Bed-Chair Transfer    Bed-Chair [place] (Transfers)  moderate assist (50% patient effort);2 person assist stand/pivot transfer  -     Assistive Device (Bed-Chair Transfers)  -- HHAX2  -     Row Name 03/11/20 1001          Sit-Stand Transfer    Sit-Stand [place] (Transfers)  minimum assist (75% patient effort);2 person assist  -     Assistive Device (Sit-Stand Transfers)  walker, front-wheeled  -     Row Name 03/11/20 1001          Gait/Stairs Assessment/Training    Comment  (Gait/Stairs)  Working with pt to advance LEs. Pt will lift foot up and place it about the same spot. Verbal and tactile cues given. Pt still having difficulty advancing.   -       User Key  (r) = Recorded By, (t) = Taken By, (c) = Cosigned By    Initials Name Provider Type     Ivis Magallon PTA Physical Therapy Assistant        Obj/Interventions     Row Name 03/11/20 1004          Therapeutic Exercise    Lower Extremity (Therapeutic Exercise)  LAQ (long arc quad), bilateral;marching while seated  -     Exercise Type (Therapeutic Exercise)  AROM (active range of motion)  -     Position (Therapeutic Exercise)  seated  -     Sets/Reps (Therapeutic Exercise)  X10  -EH     Row Name 03/11/20 1004          Static Sitting Balance    Level of [place] (Unsupported Sitting, Static Balance)  contact guard assist  -     Sitting Position (Unsupported Sitting, Static Balance)  sitting on edge of bed  -     Time Able to Maintain Position (Unsupported Sitting, Static Balance)  2 to 3 minutes  -       User Key  (r) = Recorded By, (t) = Taken By, (c) = Cosigned By    Initials Name Provider Type     Ivis Magallno PTA Physical Therapy Assistant        Goals/Plan    No documentation.       Clinical Impression     Row Name 03/11/20 1005          Plan of Care Review    Plan of Care Reviewed With  patient  -     Progress  improving  -     Outcome Summary  Pt tolerated treatment with c/o dizziness. Pt is [place] for supine to sit today. Pt performed 2 STS transfers with MinAX2. Pt leans posteriorly and cues required to engage glutes, lean forward, and get legs underneath him. With second stand, posture improved. Working with pt advancing LEs forward. Pt will  foot and place it about the same spot. Pt is having difficulty with advancing LEs with verbal and tactile cues. Pt able to stand pivot from bed to chair with ModAX2. Will continue to progress pt as able. Pt will continue to benefit from skilled PT to  improve strength, balance and overall functional mobility.   -     Row Name 03/11/20 1005          Vital Signs    O2 Delivery Pre Treatment  supplemental O2  -EH     O2 Delivery Intra Treatment  supplemental O2  -EH     O2 Delivery Post Treatment  supplemental O2  -EH     Row Name 03/11/20 1005          Positioning and Restraints    Pre-Treatment Position  in bed  -EH     Post Treatment Position  chair  -EH     In Chair  reclined;call light within reach;encouraged to call for assist;exit alarm on;with nsg  -       User Key  (r) = Recorded By, (t) = Taken By, (c) = Cosigned By    Initials Name Provider Type    Ivis Longoria PTA Physical Therapy Assistant        Outcome Measures     Row Name 03/11/20 1010          How much help from another person do you currently need...    Turning from your back to your side while in flat bed without using bedrails?  2  -EH     Moving from lying on back to sitting on the side of a flat bed without bedrails?  3  -EH     Moving to and from a bed to a chair (including a wheelchair)?  2  -EH     Standing up from a chair using your arms (e.g., wheelchair, bedside chair)?  2  -EH     Climbing 3-5 steps with a railing?  1  -EH     To walk in hospital room?  1  -EH     AM-PAC 6 Clicks Score (PT)  11  -EH       User Key  (r) = Recorded By, (t) = Taken By, (c) = Cosigned By    Initials Name Provider Type    Ivis Longoria PTA Physical Therapy Assistant          PT Recommendation and Plan     Plan of Care Reviewed With: patient  Progress: improving  Outcome Summary: Pt tolerated treatment with c/o dizziness. Pt is [place] for supine to sit today. Pt performed 2 STS transfers with MinAX2. Pt leans posteriorly and cues required to engage glutes, lean forward, and get legs underneath him. With second stand, posture improved. Working with pt advancing LEs forward. Pt will  foot and place it about the same spot. Pt is having difficulty with advancing LEs with verbal and tactile cues.  Pt able to stand pivot from bed to chair with ModAX2. Will continue to progress pt as able. Pt will continue to benefit from skilled PT to improve strength, balance and overall functional mobility.      Time Calculation:   PT Charges     Row Name 03/11/20 1000             Time Calculation    Start Time  0918  -      Stop Time  0941  -      Time Calculation (min)  23 min  -      PT Received On  03/11/20  -      PT - Next Appointment  03/12/20  -         Time Calculation- PT    Total Timed Code Minutes- PT  23 minute(s)  -        User Key  (r) = Recorded By, (t) = Taken By, (c) = Cosigned By    Initials Name Provider Type     Ivis Magallon PTA Physical Therapy Assistant        Therapy Charges for Today     Code Description Service Date Service Provider Modifiers Qty    94312251828 HC PT THER PROC EA 15 MIN 3/10/2020 Ivis Magallon PTA GP 1    19469607689 HC PT THER SUPP EA 15 MIN 3/10/2020 Ivis Magallon PTA GP 1    44042811086 HC PT THER SUPP EA 15 MIN 3/11/2020 Ivis Magallon PTA GP 2    13813699965 HC PT THERAPEUTIC ACT EA 15 MIN 3/11/2020 Ivis Magallon PTA GP 1    44433472137 HC PT THER PROC EA 15 MIN 3/11/2020 Ivis Magallon PTA GP 1          PT G-Codes  Outcome Measure Options: AM-PAC 6 Clicks Daily Activity (OT)  AM-PAC 6 Clicks Score (PT): 11  AM-PAC 6 Clicks Score (OT): 12    Ivis Magallon PTA  3/11/2020

## 2020-03-11 NOTE — NURSING NOTE
CWOCN- consult to assess skin to buttocks. There is skin breakdown that is partial thickness on both buttocks. The tissue is friable with loose irregular edges. Appears to be related to moisture and or friction more than pressure related to the appearance. Recommend to continue barrier ointment. Keep HOB <30 if possible. Continue turning.     03/11/20 1340   Wound 03/11/20 1130 Right gluteal Skin Tear   Date first assessed/Time first assessed: 03/11/20 1130   Present on Hospital Admission: No  Side: Right  Location: gluteal  Primary Wound Type: Skin Tear  Additional Comments: moisture/friction   Dressing Appearance open to air   Base moist;pink   Periwound excoriated   Periwound Temperature warm   Periwound Skin Turgor soft   Edges irregular   Care, Wound barrier applied

## 2020-03-11 NOTE — THERAPY TREATMENT NOTE
Acute Care - Occupational Therapy Treatment Note  [facility]     Patient Name: Izaiah Garcia  : 1931  MRN: [id number]  Today's Date: 3/11/2020  Onset of Illness/Injury or Date of Surgery: 20          Admit Date: 2020       ICD-10-CM ICD-9-CM   1. Healthcare-associated pneumonia J18.9 486   2. Acute hypoxemic respiratory failure (CMS/[facility]) J96.01 518.81   3. Bilateral pulmonary embolism (CMS/[facility]) I26.99 415.19   4. Acute saddle pulmonary embolism with acute cor pulmonale (CMS/HCC) I26.02 415.13     415.0     Patient Active Problem List   Diagnosis   • Hyperlipidemia   • Benign essential hypertension   • History of gout   • History of esophageal stricture   • History of stroke, 2016--4.9 x 4 x 3 cm inferior left cerebellar infarct   • Rheumatoid factor positive   • Impaired fasting glucose   • At risk for falls   • Generalized weakness   • Bilateral high frequency sensorineural hearing loss, wears hearing aids   • Bilateral lower extremity edema   • Parkinson's disease (CMS/[facility])   • Therapeutic drug monitoring   • Vitamin D deficiency   • Macrocytosis   • Vitamin B12 deficiency   • Acute diarrhea   • Hospital-acquired pneumonia   • HAP (hospital-acquired pneumonia)   • Acute UTI (urinary tract infection)   • Colitis   • Parkinson disease (CMS/[facility])   • Weakness   • Aspiration pneumonia of right lower lobe due to vomit (CMS/HCC)   • Pulmonary emboli (CMS/[facility])   • Acute saddle pulmonary embolism with acute cor pulmonale (CMS/[facility])   • Empyema of pleura (CMS/[facility])     Past Medical History:   Diagnosis Date   • At risk for falls 2019   • Benign essential hypertension 2016   • Bilateral high frequency sensorineural hearing loss, wears hearing aids 2019   • Bilateral lower extremity edema 2019    May 2, 2019--patient who has hypertension and is on amlodipine 10 mg/day is complaining of progressively worsening swelling of the lower extremities that extends up to about mid calf.  Gets  "worse at the end of the day and tends to get better overnight when he props his feet up.  I think this is definitely related to the amlodipine although patient may have some venous insufficiency.  Medication ad   • Decreased peripheral vision of both eyes 5/2/2019    May 2, 2019--continues to have complaints of \"dizziness\" associated with weakness in his lower extremities.  He is not describing a spinning sensation or anything remotely close to vertigo.  Instead he is describing an off-balance sensation.  He tends to fall forward if not careful and he tends to list towards the right side.  He has marked difficulty going down an incline.  He has to ambulate wit   • History of aspiration pneumonia 5/4/2015   • History of esophageal stricture 5/4/2015    July 3, 2018--EGD revealed a distal esophageal stricture that was dilated to 18 mm.  There was a small hiatal hernia.  There was mild streaky erythema in the proximal stomach.  Duodenal bulb normal.  April 26, 2016--EGD performed for dysphagia reveals a distal esophageal stricture that was dilated 16.5 mm.   • History of gout 6/29/2016   • History of stroke, 6/29/2016--4.9 x 4 x 3 cm inferior left cerebellar infarct 5/4/2015 June 29, 2016--MRI of the brain performed for complaints of dizziness and weakness. IMPRESSION: 1. There is susceptibility artifact streaking through the anterior left frontal scalp through the anterior left frontal bone in the anterior tipof the left frontal lobe likely from a tiny piece of metal in the anterior left frontal scalp slightly limits evaluation of these structures. 2. There is mild s   • Hyperlipidemia 6/29/2016   • Impaired fasting glucose 5/2/2019 April 14, 2015--hemoglobin A1c 5.9.   • Macrocytosis 5/2/2019   • Parkinson's disease (CMS/[facility]) 5/2/2019    May 2, 2019--continues to have complaints of \"dizziness\" associated with weakness in his lower extremities.  He is not describing a spinning sensation or anything remotely " close to vertigo.  Instead he is describing an off-balance sensation.  He tends to fall forward if not careful and he tends to list towards the right side.  He has marked difficulty going down an incline.  He has to ambulate wit   • Rheumatoid factor positive 5/2/2019 March 25, 2014--rheumatoid factor returned positive at 95.  However, CCP antibodies normal at 8, SHIV negative, both C&P ANCA negative, C-reactive protein normal at 0.24, sed rate normal at 2.   • Vitamin B12 deficiency 6/6/2019 June 6, 2019--patient recently had mildly elevated methylmalonic acid of 380.  Repeat methylmalonic acid was upper limit of normal at 356.  Given patient's neurologic symptoms and suspected parkinsonism, I have a low threshold for treating vitamin B12 deficiency.  We will initiate vitamin B12 injections today.  2000 mcg subcutaneously given today.   • Vitamin D deficiency 5/2/2019     Past Surgical History:   Procedure Laterality Date   • CARDIAC CATHETERIZATION N/A 2/29/2020    Procedure: Thrombolytic Therapy- EKOS;  Surgeon: Jason Griffiths MD;  Location: [facility] INVASIVE LOCATION;  Service: Cardiovascular;  Laterality: N/A;   • CATARACT EXTRACTION EXTRACAPSULAR W/ INTRAOCULAR LENS IMPLANTATION Bilateral     Bilateral cataract extirpation with intraocular lens implantation   • CHOLECYSTECTOMY     • EKOS CATHETER PLACEMENT Bilateral 2/29/2020    Procedure: Ekos catheter placement;  Surgeon: Jason Griffiths MD;  Location: [facility] CATH INVASIVE LOCATION;  Service: Cardiovascular;  Laterality: Bilateral;   • ESOPHAGEAL DILATATION  04/26/2016 April 26, 2016--EGD performed for dysphagia reveals a distal esophageal stricture that was dilated 16.5 mm.   • ESOPHAGEAL DILATATION  07/03/2018    July 3, 2018--EGD revealed a distal esophageal stricture that was dilated to 18 mm.  There was a small hiatal hernia.  There was mild streaky erythema in the proximal stomach.  Duodenal bulb normal.   • INTERVENTIONAL RADIOLOGY  PROCEDURE Bilateral 2/29/2020    Procedure: Pulmonary Angiogram;  Surgeon: Jason Griffiths MD;  Location: [facility] INVASIVE LOCATION;  Service: Cardiovascular;  Laterality: Bilateral;       Therapy Treatment    Rehabilitation Treatment Summary     Row Name 03/11/20 0909             Treatment Time/Intention    Discipline  occupational therapist  -SG      Document Type  therapy note (daily note)  -SG      Subjective Information  no complaints  -SG      Mode of Treatment  individual therapy;occupational therapy  -SG      Patient/Family Observations  Pt supine in bed, family present  -SG      Patient Effort  adequate  -SG      Existing Precautions/Restrictions  fall;oxygen therapy device and L/min  -SG      Recorded by [SG] Dary Monzon OTR 03/11/20 0911      Row Name 03/11/20 0909             Cognitive Assessment/Intervention- PT/OT    Orientation Status (Cognition)  oriented to;place;person  -SG      Follows Commands (Cognition)  follows one step commands;75-90% accuracy  -SG      Safety Deficit (Cognitive)  mild deficit  -SG      Recorded by [SG] Dary Monzon OTR 03/11/20 0911      Row Name 03/11/20 0909             Bed Mobility Assessment/Treatment    Bed Mobility Assessment/Treatment  sit-supine;supine-sit  -SG      Supine-Sit [place] (Bed Mobility)  minimum assist (75% patient effort);verbal cues  -SG      Sit-Supine [place] (Bed Mobility)  moderate assist (50% patient effort);verbal cues;nonverbal cues (demo/gesture)  -SG      Bed Mobility, Safety Issues  decreased use of arms for pushing/pulling;decreased use of legs for bridging/pushing  -SG      Recorded by [SG] Dary Monzon OTR 03/11/20 0911      Row Name 03/11/20 0909             Transfer Assessment/Treatment    Transfer Assessment/Treatment  sit-stand transfer;stand-sit transfer  -SG      Recorded by [SG] Dary Monzon OTR 03/11/20 0911      Row Name 03/11/20 0909             Sit-Stand Transfer    Sit-Stand [place]  (Transfers)  minimum assist (75% patient effort);verbal cues;nonverbal cues (demo/gesture)  -SG      Assistive Device (Sit-Stand Transfers)  walker, front-wheeled  -SG      Recorded by [SG] Dary Monzon OTR 03/11/20 0911      Row Name 03/11/20 0909             Stand-Sit Transfer    Stand-Sit [place] (Transfers)  minimum assist (75% patient effort);verbal cues  -SG      Recorded by [] Dary Monzon OTR 03/11/20 0911      Row Name 03/11/20 0909             Grooming Assessment/Training    [place] Level (Grooming)  grooming skills;hair care, combing/brushing;oral care regimen;wash face, hands;minimum assist (75% patient effort);verbal cues  -SG      Grooming Position  edge of bed sitting;supported sitting  -SG      Recorded by [SG] Dary Monzon OTR 03/11/20 0911      Row Name 03/11/20 0909             Positioning and Restraints    Pre-Treatment Position  in bed  -SG      Post Treatment Position  bed  -SG      In Bed  supine;call light within reach;encouraged to call for assist;exit alarm on  -SG      Recorded by [] Dary Monzon OTR 03/11/20 0911      Row Name 03/11/20 0909             Pain Scale: Numbers Pre/Post-Treatment    Pain Scale: Numbers, Pretreatment  5/10  -SG      Recorded by [] Dary Monzon, OTR 03/11/20 0911      Row Name                Wound 03/01/20  Right anterior groin Puncture    Wound - Properties Group Date first assessed: 03/01/20 [KB] Time first assessed: -- [KB], when EKOS pulled, puncture wound left in place  Present on Hospital Admission: N [KB] Side: Right [KB] Orientation: anterior [KB] Location: groin [KB] Primary Wound Type: Puncture [KB] Recorded by:  [KB] Tootie Decker, RN 03/02/20 6653      User Key  (r) = Recorded By, (t) = Taken By, (c) = Cosigned By    Initials Name Effective Dates Discipline     aDry Monzon OTR 12/26/18 -  OT    Tootie Pan RN 06/20/16 -  Nurse        Wound 03/01/20  Right anterior groin Puncture (Active)    Dressing Appearance open to air 3/11/2020 12:29 AM   Closure None 3/10/2020  3:29 PM   Base closed/resurfaced 3/10/2020  3:29 PM   Periwound ecchymotic 3/11/2020 12:29 AM   Periwound Temperature warm 3/11/2020 12:29 AM   Periwound Skin Turgor soft 3/11/2020 12:29 AM   Dressing Care, Wound open to air 3/11/2020 12:29 AM           OT Recommendation and Plan     Plan of Care Review  Plan of Care Reviewed With: patient, family  Plan of Care Reviewed With: patient, family  Outcome Summary: Impoving slightly, able to sit at EOB unsupported and perform grooming tasks with min A for completion. Will continue to benefit from OT to address ADLs.  Outcome Measures     Row Name 03/11/20 0914             How much help from another is currently needed...    Putting on and taking off regular lower body clothing?  2  -SG      Bathing (including washing, rinsing, and drying)  2  -SG      Toileting (which includes using toilet bed pan or urinal)  2  -SG      Putting on and taking off regular upper body clothing  2  -SG      Taking care of personal grooming (such as brushing teeth)  2  -SG      Eating meals  2  -SG      AM-PAC 6 Clicks Score (OT)  12  -SG         Functional Assessment    Outcome Measure Options  AM-PAC 6 Clicks Daily Activity (OT)  -        User Key  (r) = Recorded By, (t) = Taken By, (c) = Cosigned By    Initials Name Provider Type    Dary Hernandez OTR Occupational Therapist           Time Calculation:   Time Calculation- OT     Row Name 03/11/20 0915             Time Calculation- OT    OT Start Time  0827  -SG      OT Stop Time  0845  -      OT Time Calculation (min)  18 min  -      Total Timed Code Minutes- OT  18 minute(s)  -SG      OT Received On  03/11/20  -        User Key  (r) = Recorded By, (t) = Taken By, (c) = Cosigned By    Initials Name Provider Type    Dary Hernandez OTR Occupational Therapist        Therapy Charges for Today     Code Description Service Date Service Provider  Modifiers Qty    [id number] HC OT SELF CARE/MGMT/TRAIN EA 15 MIN 3/11/2020 Dary Monzon, ABIOLA GO 1               ABIOLA Fink  3/11/2020

## 2020-03-12 ENCOUNTER — APPOINTMENT (OUTPATIENT)
Dept: GENERAL RADIOLOGY | Facility: HOSPITAL | Age: 85
End: 2020-03-12

## 2020-03-12 ENCOUNTER — APPOINTMENT (OUTPATIENT)
Dept: CARDIOLOGY | Facility: HOSPITAL | Age: 85
End: 2020-03-12

## 2020-03-12 ENCOUNTER — APPOINTMENT (OUTPATIENT)
Dept: CT IMAGING | Facility: HOSPITAL | Age: 85
End: 2020-03-12

## 2020-03-12 LAB
ALBUMIN SERPL-MCNC: 1.9 G/DL (ref 3.5–5.2)
ANION GAP SERPL CALCULATED.3IONS-SCNC: 10.9 MMOL/L (ref 5–15)
APPEARANCE FLD: ABNORMAL
APTT PPP: 41.1 SECONDS (ref 22.7–35.4)
APTT PPP: 57.4 SECONDS (ref 22.7–35.4)
APTT PPP: 57.8 SECONDS (ref 22.7–35.4)
BACTERIA UR QL AUTO: ABNORMAL /HPF
BASOPHILS # BLD AUTO: 0.07 10*3/MM3 (ref 0–0.2)
BASOPHILS NFR BLD AUTO: 0.6 % (ref 0–1.5)
BH CV XLRA MEAS LEFT CCA RATIO VEL: 105 CM/SEC
BH CV XLRA MEAS LEFT DIST CCA EDV: 12.5 CM/SEC
BH CV XLRA MEAS LEFT DIST CCA PSV: 105.1 CM/SEC
BH CV XLRA MEAS LEFT DIST ICA EDV: -26.1 CM/SEC
BH CV XLRA MEAS LEFT DIST ICA PSV: -103.7 CM/SEC
BH CV XLRA MEAS LEFT ICA RATIO VEL: -105 CM/SEC
BH CV XLRA MEAS LEFT ICA/CCA RATIO: -1
BH CV XLRA MEAS LEFT MID ICA EDV: -28 CM/SEC
BH CV XLRA MEAS LEFT MID ICA PSV: -105 CM/SEC
BH CV XLRA MEAS LEFT PROX CCA EDV: 8.6 CM/SEC
BH CV XLRA MEAS LEFT PROX CCA PSV: 103.5 CM/SEC
BH CV XLRA MEAS LEFT PROX ECA PSV: -126.2 CM/SEC
BH CV XLRA MEAS LEFT PROX ICA EDV: -23.8 CM/SEC
BH CV XLRA MEAS LEFT PROX ICA PSV: -100.2 CM/SEC
BH CV XLRA MEAS LEFT PROX SCLA PSV: 150.4 CM/SEC
BH CV XLRA MEAS LEFT VERTEBRAL A EDV: 8.3 CM/SEC
BH CV XLRA MEAS LEFT VERTEBRAL A PSV: 44.8 CM/SEC
BH CV XLRA MEAS RIGHT CCA RATIO VEL: 82.6 CM/SEC
BH CV XLRA MEAS RIGHT DIST CCA EDV: 15.5 CM/SEC
BH CV XLRA MEAS RIGHT DIST CCA PSV: 82.6 CM/SEC
BH CV XLRA MEAS RIGHT DIST ICA EDV: -20.3 CM/SEC
BH CV XLRA MEAS RIGHT DIST ICA PSV: -68.6 CM/SEC
BH CV XLRA MEAS RIGHT ICA RATIO VEL: -84.5 CM/SEC
BH CV XLRA MEAS RIGHT ICA/CCA RATIO: -1
BH CV XLRA MEAS RIGHT MID ICA EDV: -23.6 CM/SEC
BH CV XLRA MEAS RIGHT MID ICA PSV: -79 CM/SEC
BH CV XLRA MEAS RIGHT PROX CCA EDV: 8.7 CM/SEC
BH CV XLRA MEAS RIGHT PROX CCA PSV: 93.2 CM/SEC
BH CV XLRA MEAS RIGHT PROX ECA PSV: -167.8 CM/SEC
BH CV XLRA MEAS RIGHT PROX ICA EDV: -25.8 CM/SEC
BH CV XLRA MEAS RIGHT PROX ICA PSV: -84.5 CM/SEC
BH CV XLRA MEAS RIGHT PROX SCLA PSV: 131.7 CM/SEC
BH CV XLRA MEAS RIGHT VERTEBRAL A EDV: 11.9 CM/SEC
BH CV XLRA MEAS RIGHT VERTEBRAL A PSV: 49.6 CM/SEC
BUN BLD-MCNC: 15 MG/DL (ref 8–23)
BUN/CREAT SERPL: 22.1 (ref 7–25)
CALCIUM SPEC-SCNC: 7.9 MG/DL (ref 8.6–10.5)
CHLORIDE SERPL-SCNC: 99 MMOL/L (ref 98–107)
CO2 SERPL-SCNC: 24.1 MMOL/L (ref 22–29)
COLOR FLD: ABNORMAL
CREAT BLD-MCNC: 0.68 MG/DL (ref 0.76–1.27)
DEPRECATED RDW RBC AUTO: 37.2 FL (ref 37–54)
EOSINOPHIL # BLD AUTO: 0.26 10*3/MM3 (ref 0–0.4)
EOSINOPHIL NFR BLD AUTO: 2.1 % (ref 0.3–6.2)
EOSINOPHIL NFR FLD MANUAL: 2 %
ERYTHROCYTE [DISTWIDTH] IN BLOOD BY AUTOMATED COUNT: 12.4 % (ref 12.3–15.4)
GFR SERPL CREATININE-BSD FRML MDRD: 110 ML/MIN/1.73
GLUCOSE BLD-MCNC: 112 MG/DL (ref 65–99)
GLUCOSE FLD-MCNC: 92 MG/DL
HCT VFR BLD AUTO: 28.3 % (ref 37.5–51)
HGB BLD-MCNC: 9.1 G/DL (ref 13–17.7)
HYALINE CASTS UR QL AUTO: ABNORMAL /LPF
IMM GRANULOCYTES # BLD AUTO: 0.3 10*3/MM3 (ref 0–0.05)
IMM GRANULOCYTES NFR BLD AUTO: 2.5 % (ref 0–0.5)
INR PPP: 1.4 (ref 0.9–1.1)
LDH FLD-CCNC: 432 U/L
LDH SERPL-CCNC: 149 U/L (ref 135–225)
LEFT ARM BP: NORMAL MMHG
LYMPHOCYTES # BLD AUTO: 2.13 10*3/MM3 (ref 0.7–3.1)
LYMPHOCYTES NFR BLD AUTO: 17.6 % (ref 19.6–45.3)
LYMPHOCYTES NFR FLD MANUAL: 46 %
MCH RBC QN AUTO: 26.8 PG (ref 26.6–33)
MCHC RBC AUTO-ENTMCNC: 32.2 G/DL (ref 31.5–35.7)
MCV RBC AUTO: 83.5 FL (ref 79–97)
MONOCYTES # BLD AUTO: 0.99 10*3/MM3 (ref 0.1–0.9)
MONOCYTES NFR BLD AUTO: 8.2 % (ref 5–12)
MONOCYTES NFR FLD: 4 %
MONOS+MACROS NFR FLD: 8 %
NEUTROPHILS # BLD AUTO: 8.35 10*3/MM3 (ref 1.7–7)
NEUTROPHILS NFR BLD AUTO: 69 % (ref 42.7–76)
NEUTROPHILS NFR FLD MANUAL: 40 %
NRBC BLD AUTO-RTO: 0 /100 WBC (ref 0–0.2)
PH FLD: 8 [PH]
PHOSPHATE SERPL-MCNC: 3.8 MG/DL (ref 2.5–4.5)
PLATELET # BLD AUTO: 351 10*3/MM3 (ref 140–450)
PMV BLD AUTO: 10 FL (ref 6–12)
POTASSIUM BLD-SCNC: 3.6 MMOL/L (ref 3.5–5.2)
PROT FLD-MCNC: 2.5 G/DL
PROT SERPL-MCNC: 5.9 G/DL (ref 6–8.5)
PROTHROMBIN TIME: 16.8 SECONDS (ref 11.7–14.2)
RBC # BLD AUTO: 3.39 10*6/MM3 (ref 4.14–5.8)
RBC # FLD AUTO: ABNORMAL /MM3
RBC # UR: ABNORMAL /HPF
REF LAB TEST METHOD: ABNORMAL
RIGHT ARM BP: NORMAL MMHG
SODIUM BLD-SCNC: 134 MMOL/L (ref 136–145)
SQUAMOUS #/AREA URNS HPF: ABNORMAL /HPF
WBC # FLD AUTO: 1510 /MM3
WBC NRBC COR # BLD: 12.1 10*3/MM3 (ref 3.4–10.8)
WBC UR QL AUTO: ABNORMAL /HPF

## 2020-03-12 PROCEDURE — 83615 LACTATE (LD) (LDH) ENZYME: CPT | Performed by: NURSE PRACTITIONER

## 2020-03-12 PROCEDURE — 99221 1ST HOSP IP/OBS SF/LOW 40: CPT | Performed by: PSYCHIATRY & NEUROLOGY

## 2020-03-12 PROCEDURE — 25010000002 HEPARIN (PORCINE) PER 1000 UNITS: Performed by: NURSE PRACTITIONER

## 2020-03-12 PROCEDURE — 71045 X-RAY EXAM CHEST 1 VIEW: CPT

## 2020-03-12 PROCEDURE — 85025 COMPLETE CBC W/AUTO DIFF WBC: CPT | Performed by: INTERNAL MEDICINE

## 2020-03-12 PROCEDURE — 83986 ASSAY PH BODY FLUID NOS: CPT | Performed by: NURSE PRACTITIONER

## 2020-03-12 PROCEDURE — 87205 SMEAR GRAM STAIN: CPT | Performed by: NURSE PRACTITIONER

## 2020-03-12 PROCEDURE — 87040 BLOOD CULTURE FOR BACTERIA: CPT | Performed by: NURSE PRACTITIONER

## 2020-03-12 PROCEDURE — 93010 ELECTROCARDIOGRAM REPORT: CPT | Performed by: INTERNAL MEDICINE

## 2020-03-12 PROCEDURE — 84157 ASSAY OF PROTEIN OTHER: CPT | Performed by: NURSE PRACTITIONER

## 2020-03-12 PROCEDURE — 85730 THROMBOPLASTIN TIME PARTIAL: CPT | Performed by: INTERNAL MEDICINE

## 2020-03-12 PROCEDURE — C1729 CATH, DRAINAGE: HCPCS

## 2020-03-12 PROCEDURE — 80069 RENAL FUNCTION PANEL: CPT | Performed by: INTERNAL MEDICINE

## 2020-03-12 PROCEDURE — 84155 ASSAY OF PROTEIN SERUM: CPT | Performed by: NURSE PRACTITIONER

## 2020-03-12 PROCEDURE — 85610 PROTHROMBIN TIME: CPT | Performed by: INTERNAL MEDICINE

## 2020-03-12 PROCEDURE — 93005 ELECTROCARDIOGRAM TRACING: CPT | Performed by: NURSE PRACTITIONER

## 2020-03-12 PROCEDURE — 82945 GLUCOSE OTHER FLUID: CPT | Performed by: NURSE PRACTITIONER

## 2020-03-12 PROCEDURE — 75989 ABSCESS DRAINAGE UNDER X-RAY: CPT

## 2020-03-12 PROCEDURE — 71250 CT THORAX DX C-: CPT

## 2020-03-12 PROCEDURE — 99232 SBSQ HOSP IP/OBS MODERATE 35: CPT | Performed by: NURSE PRACTITIONER

## 2020-03-12 PROCEDURE — 0W9B30Z DRAINAGE OF LEFT PLEURAL CAVITY WITH DRAINAGE DEVICE, PERCUTANEOUS APPROACH: ICD-10-PCS | Performed by: RADIOLOGY

## 2020-03-12 PROCEDURE — 89051 BODY FLUID CELL COUNT: CPT | Performed by: NURSE PRACTITIONER

## 2020-03-12 PROCEDURE — 99233 SBSQ HOSP IP/OBS HIGH 50: CPT | Performed by: NURSE PRACTITIONER

## 2020-03-12 PROCEDURE — 93880 EXTRACRANIAL BILAT STUDY: CPT

## 2020-03-12 PROCEDURE — 94799 UNLISTED PULMONARY SVC/PX: CPT

## 2020-03-12 PROCEDURE — 25010000003 LIDOCAINE 1 % SOLUTION: Performed by: RADIOLOGY

## 2020-03-12 PROCEDURE — 87070 CULTURE OTHR SPECIMN AEROBIC: CPT | Performed by: NURSE PRACTITIONER

## 2020-03-12 RX ORDER — LIDOCAINE HYDROCHLORIDE 10 MG/ML
20 INJECTION, SOLUTION INFILTRATION; PERINEURAL ONCE
Status: COMPLETED | OUTPATIENT
Start: 2020-03-12 | End: 2020-03-12

## 2020-03-12 RX ORDER — AMIODARONE HYDROCHLORIDE 200 MG/1
400 TABLET ORAL EVERY 12 HOURS SCHEDULED
Status: DISCONTINUED | OUTPATIENT
Start: 2020-03-12 | End: 2020-03-14

## 2020-03-12 RX ADMIN — BUDESONIDE 0.5 MG: 0.5 INHALANT RESPIRATORY (INHALATION) at 07:59

## 2020-03-12 RX ADMIN — HEPARIN SODIUM 24 UNITS/KG/HR: 10000 INJECTION, SOLUTION INTRAVENOUS at 07:00

## 2020-03-12 RX ADMIN — BUDESONIDE 0.5 MG: 0.5 INHALANT RESPIRATORY (INHALATION) at 20:48

## 2020-03-12 RX ADMIN — ACETAMINOPHEN 650 MG: 325 TABLET, FILM COATED ORAL at 17:10

## 2020-03-12 RX ADMIN — CARBIDOPA AND LEVODOPA 1 TABLET: 25; 100 TABLET ORAL at 10:33

## 2020-03-12 RX ADMIN — Medication 250 MG: at 20:17

## 2020-03-12 RX ADMIN — METOPROLOL TARTRATE 25 MG: 25 TABLET ORAL at 10:33

## 2020-03-12 RX ADMIN — HEPARIN SODIUM 6800 UNITS: 5000 INJECTION INTRAVENOUS; SUBCUTANEOUS at 07:00

## 2020-03-12 RX ADMIN — Medication 250 MG: at 10:33

## 2020-03-12 RX ADMIN — METOPROLOL TARTRATE 5 MG: 5 INJECTION, SOLUTION INTRAVENOUS at 10:33

## 2020-03-12 RX ADMIN — FUROSEMIDE 40 MG: 40 TABLET ORAL at 10:33

## 2020-03-12 RX ADMIN — IPRATROPIUM BROMIDE AND ALBUTEROL SULFATE 3 ML: 2.5; .5 SOLUTION RESPIRATORY (INHALATION) at 07:59

## 2020-03-12 RX ADMIN — HEPARIN SODIUM 20 UNITS/KG/HR: 10000 INJECTION, SOLUTION INTRAVENOUS at 00:48

## 2020-03-12 RX ADMIN — HEPARIN SODIUM 6800 UNITS: 5000 INJECTION INTRAVENOUS; SUBCUTANEOUS at 22:52

## 2020-03-12 RX ADMIN — AMIODARONE HYDROCHLORIDE 400 MG: 200 TABLET ORAL at 12:44

## 2020-03-12 RX ADMIN — AMIODARONE HYDROCHLORIDE 400 MG: 200 TABLET ORAL at 20:17

## 2020-03-12 RX ADMIN — LIDOCAINE 1 PATCH: 50 PATCH CUTANEOUS at 10:33

## 2020-03-12 RX ADMIN — IPRATROPIUM BROMIDE AND ALBUTEROL SULFATE 3 ML: 2.5; .5 SOLUTION RESPIRATORY (INHALATION) at 20:48

## 2020-03-12 RX ADMIN — LIDOCAINE HYDROCHLORIDE 20 ML: 10 INJECTION, SOLUTION INFILTRATION; PERINEURAL at 15:15

## 2020-03-12 RX ADMIN — HEPARIN SODIUM 24 UNITS/KG/HR: 10000 INJECTION, SOLUTION INTRAVENOUS at 21:17

## 2020-03-12 RX ADMIN — SODIUM CHLORIDE, PRESERVATIVE FREE 10 ML: 5 INJECTION INTRAVENOUS at 10:33

## 2020-03-12 RX ADMIN — CARBIDOPA AND LEVODOPA 1 TABLET: 25; 100 TABLET ORAL at 20:17

## 2020-03-12 NOTE — PROGRESS NOTES
"    Chief Complaint: Bilateral pulmonary emboli, parapneumonic effusion  S/P: CT-guided chest tube insertion  POD # 3    Subjective:  Symptoms:  Stable.  He reports shortness of breath, cough, weakness and anorexia.    Diet:  Poor intake.  No nausea or vomiting.    Activity level: Impaired due to weakness.    Pain:  He complains of pain that is mild.  Pain is well controlled.        Vital Signs:  Temp:  [97.6 °F (36.4 °C)-98.5 °F (36.9 °C)] 98.3 °F (36.8 °C)  Heart Rate:  [] 99  Resp:  [15-20] 18  BP: (114-131)/(53-84) 116/61    Intake & Output (last day)       03/11 0701 - 03/12 0700 03/12 0701 - 03/13 0700    P.O. 260     I.V. (mL/kg) 200 (2.4)     Total Intake(mL/kg) 460 (5.5)     Urine (mL/kg/hr) 375 (0.2)     Stool      Chest Tube 170     Total Output 545     Net -85           Urine Unmeasured Occurrence 2 x           Objective:  General Appearance:  Comfortable, ill-appearing, in no acute distress and not in pain.    Vital signs: (most recent): Blood pressure 116/61, pulse 99, temperature 98.3 °F (36.8 °C), resp. rate 18, height 177.8 cm (70\"), weight 83.6 kg (184 lb 6.4 oz), SpO2 92 %.  Vital signs are normal.  No fever.    Output: Producing urine and producing stool.    HEENT: (Very hard of hearing.)    Lungs:  Normal effort and normal respiratory rate.  He is not in respiratory distress.  There are decreased breath sounds (bibasilar).    Heart: Normal rate.  Regular rhythm.    Chest: Chest wall tenderness (at chest tube site) present.    Abdomen: Abdomen is soft.  Bowel sounds are normal.   There is no abdominal tenderness.   There is no mass.   Extremities: Normal range of motion.  There is dependent edema.    Pulses: Distal pulses are intact.    Neurological: Patient is alert and oriented to person, place and time.    Skin:  Warm and dry.          Chest tube:   Site: Right, Clean, Dry, Intact and Securement device intact  Suction: -20 cm  Air Leak: negative  Level: 1000cc  24 Hour Total: " [facility]    Results Review:     I reviewed the patient's new clinical results.  I reviewed the patient's new imaging results and agree with the interpretation.  I reviewed the patient's other test results and agree with the interpretation  Discussed with patient, RN and Dr. Leon.    Imaging Results (Last 24 Hours)     Procedure Component Value Units Date/Time    CT Chest Without Contrast [[id number]] Resulted:  03/12/20 0918     Updated:  03/12/20 0919    XR Chest 1 View [[id number]] Collected:  03/12/20 0815     Updated:  03/12/20 0821    Narrative:       XR CHEST 1 VW-     Clinical: Chest tube management     COMPARISON 03/11/2020     FINDINGS: Smallbore chest tube superimposes the right lung base. No  pneumothorax is demonstrated. Right base airspace disease less  conspicuous compared to the previous examination. Persistent stable  unchanged left lung opacity. The cardiomediastinal silhouette is stable.  There are monitoring leads superimposing the chest, the remainder is  unremarkable.     This report was finalized on 3/12/2020 8:17 AM by Dr. Lobito Macario M.D.       CT Head Without Contrast [[id number]] Collected:  03/11/20 2223     Updated:  03/11/20 2236    Narrative:       CT HEAD WITHOUT CONTRAST     HISTORY: Confusion     COMPARISON: 07/12/2016     TECHNIQUE: Axial CT imaging was obtained from vertex of the skull  through the skull base. No IV contrast was administered.     FINDINGS:  No acute intracranial hemorrhage is seen. There is diffuse atrophy.  There is periventricular and deep white matter microangiopathic change.  There is no midline shift or mass effect. The patient has evidence of an  old left cerebellar infarction. There is a smaller area of decreased  attenuation identified within the right cerebellar hemisphere. This  measures 1.7 x 1.1 cm. This may represent an old infarct as well, but  was not present on prior MRI from July 2016. MRI would be more sensitive  for evaluation of chronicity.  Again, it is favored to be chronic. Old  lacunar infarct is seen within the right thalamus. The paranasal sinuses  and mastoid air cells appear clear.       Impression:          1. No definite acute intracranial findings. The patient has an old left  cerebellar infarct. There is an area of decreased attenuation identified  within the right cerebellar hemisphere, which is also favored to reflect  a chronic infarct, although it was not present in July 2016.     Radiation dose reduction techniques were utilized, including automated  exposure control and exposure modulation based on body size.     This report was finalized on 3/11/2020 10:33 PM by Dr. Dolores Rivera M.D.             Lab Results:     Lab Results (last 24 hours)     Procedure Component Value Units Date/Time    aPTT [[id number]]  (Abnormal) Collected:  03/12/20 0506    Specimen:  Blood from Hand, Left Updated:  03/12/20 0650     PTT 57.4 seconds     Renal Function Panel [[id number]]  (Abnormal) Collected:  03/12/20 0506    Specimen:  Blood Updated:  03/12/20 0559     Glucose 112 mg/dL      BUN 15 mg/dL      Creatinine 0.68 mg/dL      Sodium 134 mmol/L      Potassium 3.6 mmol/L      Chloride 99 mmol/L      CO2 24.1 mmol/L      Calcium 7.9 mg/dL      Albumin 1.90 g/dL      Phosphorus 3.8 mg/dL      Anion Gap 10.9 mmol/L      BUN/Creatinine Ratio 22.1     eGFR Non African Amer 110 mL/min/1.73     Narrative:       GFR Normal >60  Chronic Kidney Disease <60  Kidney Failure <15      CBC & Differential [[id number]] Collected:  03/12/20 0506    Specimen:  Blood Updated:  03/12/20 0532    Narrative:       The following orders were created for panel order CBC & Differential.  Procedure                               Abnormality         Status                     ---------                               -----------         ------                     CBC Auto Differential[[id number]]        Abnormal            Final result                 Please view results for these  tests on the individual orders.    CBC Auto Differential [[id number]]  (Abnormal) Collected:  03/12/20 0506    Specimen:  Blood Updated:  03/12/20 0532     WBC 12.10 10*3/mm3      RBC 3.39 10*6/mm3      Hemoglobin 9.1 g/dL      Hematocrit 28.3 %      MCV 83.5 fL      MCH 26.8 pg      MCHC 32.2 g/dL      RDW 12.4 %      RDW-SD 37.2 fl      MPV 10.0 fL      Platelets 351 10*3/mm3      Neutrophil % 69.0 %      Lymphocyte % 17.6 %      Monocyte % 8.2 %      Eosinophil % 2.1 %      Basophil % 0.6 %      Immature Grans % 2.5 %      Neutrophils, Absolute 8.35 10*3/mm3      Lymphocytes, Absolute 2.13 10*3/mm3      Monocytes, Absolute 0.99 10*3/mm3      Eosinophils, Absolute 0.26 10*3/mm3      Basophils, Absolute 0.07 10*3/mm3      Immature Grans, Absolute 0.30 10*3/mm3      nRBC 0.0 /100 WBC     Urinalysis, Microscopic Only - Urine, Clean Catch [[id number]]  (Abnormal) Collected:  03/11/20 2214    Specimen:  Urine, Clean Catch Updated:  03/12/20 0115     RBC, UA 3-5 /HPF      WBC, UA 6-12 /HPF      Bacteria, UA None Seen /HPF      Squamous Epithelial Cells, UA 0-2 /HPF      Hyaline Casts, UA 21-30 /LPF      Methodology Manual Light Microscopy    Urine Culture - Urine, Urine, Clean Catch [[id number]] Collected:  03/11/20 2214    Specimen:  Urine, Clean Catch Updated:  03/12/20 0115    Urinalysis With Culture If Indicated - Urine, Clean Catch [[id number]]  (Abnormal) Collected:  03/11/20 2214    Specimen:  Urine, Clean Catch Updated:  03/11/20 2322     Color, UA Dark Yellow     Appearance, UA Clear     pH, UA <=5.0     Specific Gravity, UA 1.024     Glucose, UA Negative     Ketones, UA Trace     Bilirubin, UA Negative     Blood, UA Negative     Protein, UA Trace     Leuk Esterase, UA Trace     Nitrite, UA Negative     Urobilinogen, UA 0.2 E.U./dL    Blood Gas, Arterial [[id number]]  (Abnormal) Collected:  03/11/20 2135    Specimen:  Arterial Blood Updated:  03/11/20 2139     Site Arterial: left brachial     Braeden's Test N/A      pH, Arterial 7.501 pH units      pCO2, Arterial 35.6 mm Hg      pO2, Arterial 53.0 mm Hg      Comment: Critical:Notify RN SLY MACIAS (11-Mar-20 21:38:09)Read back ok        HCO3, Arterial 27.8 mmol/L      Base Excess, Arterial 4.5 mmol/L      O2 Saturation Calculated 90.1 %      Barometric Pressure for Blood Gas 750.7 mmHg      Modality Cannula     Flow Rate 4 lpm      Set Mech Resp Rate 20    POC Glucose Once [[id number]]  (Normal) Collected:  03/11/20 2124    Specimen:  Blood Updated:  03/11/20 2125     Glucose 126 mg/dL     aPTT [[id number]]  (Abnormal) Collected:  03/11/20 2022    Specimen:  Blood Updated:  03/11/20 2050     PTT 74.2 seconds     aPTT [[id number]]  (Abnormal) Collected:  03/11/20 1220    Specimen:  Blood from Arm, Left Updated:  03/11/20 1250     .0 seconds            Assessment/Plan       Acute saddle pulmonary embolism with acute cor pulmonale (CMS/HCC)    Pulmonary emboli (CMS/HCC)    Empyema of pleura (CMS/HCC)       Assessment & Plan     I personally reviewed the imaging from the CT of the chest performed this morning.  Right-sided pleural effusion is improved.  Worsening effusion on the left.  I will request the RN hold the patient's heparin drip to prepare for chest tube placement on the left side.  We will attach the chest tube to an atrium Pleur-evac at -20 cm continuous suction.  While heparin drip is held, we will plan for removal of right chest tube.   Check follow-up chest x-ray in a.m.  Discussed with patient, his family, RN and Dr. Garcia at the bedside.  Plan of care discussed with Dr. Leon.    EBEN Rivers  [facility]  03/12/20  10:26

## 2020-03-12 NOTE — NURSING NOTE
Mastoid Interpolation Flap Text: A decision was made to reconstruct the defect utilizing an interpolation axial flap and a staged reconstruction.  A telfa template was made of the defect.  This telfa template was then used to outline the mastoid interpolation flap.  The donor area for the pedicle flap was then injected with anesthesia.  The flap was excised through the skin and subcutaneous tissue down to the layer of the underlying musculature.  The pedicle flap was carefully excised within this deep plane to maintain its blood supply.  The edges of the donor site were undermined.   The donor site was closed in a primary fashion.  The pedicle was then rotated into position and sutured.  Once the tube was sutured into place, adequate blood supply was confirmed with blanching and refill.  The pedicle was then wrapped with xeroform gauze and dressed appropriately with a telfa and gauze bandage to ensure continued blood supply and protect the attached pedicle. Team D called, head CT without contrast completed; results negative.   Results of CT and ABG relayed to Dr. Barrios; pt now to be strict NPO.   Heparin gtt resumed per Dr. Najera's orders.  Waiting for UA results. Neurology to see in the morning. Will continue to monitor closely.   Island Pedicle Flap-Requiring Vessel Identification Text: The defect edges were debeveled with a #15 scalpel blade.  Given the location of the defect, shape of the defect and the proximity to free margins an island pedicle advancement flap was deemed most appropriate.  Using a sterile surgical marker, an appropriate advancement flap was drawn, based on the axial vessel mentioned above, incorporating the defect, outlining the appropriate donor tissue and placing the expected incisions within the relaxed skin tension lines where possible.    The area thus outlined was incised deep to adipose tissue with a #15 scalpel blade.  The skin margins were undermined to an appropriate distance in all directions around the primary defect and laterally outward around the island pedicle utilizing iris scissors.  There was minimal undermining beneath the pedicle flap. Graft Donor Site Will Heal By Secondary Intention: No Complex Repair Preamble Text (Leave Blank If You Do Not Want): Extensive wide undermining was performed. Anesthesia Type: 1% lidocaine with epinephrine Render Post-Care Instructions In Note?: yes O-T Advancement Flap Text: The defect edges were debeveled with a #15 scalpel blade.  Given the location of the defect, shape of the defect and the proximity to free margins an O-T advancement flap was deemed most appropriate.  Using a sterile surgical marker, an appropriate advancement flap was drawn incorporating the defect and placing the expected incisions within the relaxed skin tension lines where possible.    The area thus outlined was incised deep to adipose tissue with a #15 scalpel blade.  The skin margins were undermined to an appropriate distance in all directions utilizing iris scissors. Complex Repair And Single Advancement Flap Text: The defect edges were debeveled with a #15 scalpel blade.  The primary defect was closed partially with a complex linear closure.  Given the location of the remaining defect, shape of the defect and the proximity to free margins a single advancement flap was deemed most appropriate for complete closure of the defect.  Using a sterile surgical marker, an appropriate advancement flap was drawn incorporating the defect and placing the expected incisions within the relaxed skin tension lines where possible.    The area thus outlined was incised deep to adipose tissue with a #15 scalpel blade.  The skin margins were undermined to an appropriate distance in all directions utilizing iris scissors. Spiral Flap Text: The defect edges were debeveled with a #15 scalpel blade.  Given the location of the defect, shape of the defect and the proximity to free margins a spiral flap was deemed most appropriate.  Using a sterile surgical marker, an appropriate rotation flap was drawn incorporating the defect and placing the expected incisions within the relaxed skin tension lines where possible. The area thus outlined was incised deep to adipose tissue with a #15 scalpel blade.  The skin margins were undermined to an appropriate distance in all directions utilizing iris scissors. Rhombic Flap Text: The defect edges were debeveled with a #15 scalpel blade.  Given the location of the defect and the proximity to free margins a rhombic flap was deemed most appropriate.  Using a sterile surgical marker, an appropriate rhombic flap was drawn incorporating the defect.    The area thus outlined was incised deep to adipose tissue with a #15 scalpel blade.  The skin margins were undermined to an appropriate distance in all directions utilizing iris scissors. Dorsal Nasal Flap Text: The defect edges were debeveled with a #15 scalpel blade.  Given the location of the defect and the proximity to free margins a dorsal nasal flap was deemed most appropriate.  Using a sterile surgical marker, an appropriate dorsal nasal flap was drawn around the defect.    The area thus outlined was incised deep to adipose tissue with a #15 scalpel blade.  The skin margins were undermined to an appropriate distance in all directions utilizing iris scissors. Repair Type: Intermediate Slit Excision Additional Text (Leave Blank If You Do Not Want): A linear line was drawn on the skin overlying the lesion. An incision was made slowly until the lesion was visualized.  Once visualized, the lesion was removed with blunt dissection. Repair Performed By Another Provider Text (Leave Blank If You Do Not Want): After the tissue was excised the defect was repaired by another provider. Hemostasis: Electrocautery Medical Necessity Information: It is in your best interest to select a reason for this procedure from the list below. All of these items fulfill various CMS LCD requirements except lesion extends to a margin. Z Plasty Text: The lesion was extirpated to the level of the fat with a #15 scalpel blade.  Given the location of the defect, shape of the defect and the proximity to free margins a Z-plasty was deemed most appropriate for repair.  Using a sterile surgical marker, the appropriate transposition arms of the Z-plasty were drawn incorporating the defect and placing the expected incisions within the relaxed skin tension lines where possible.    The area thus outlined was incised deep to adipose tissue with a #15 scalpel blade.  The skin margins were undermined to an appropriate distance in all directions utilizing iris scissors.  The opposing transposition arms were then transposed into place in opposite direction and anchored with interrupted buried subcutaneous sutures. O-L Flap Text: The defect edges were debeveled with a #15 scalpel blade.  Given the location of the defect, shape of the defect and the proximity to free margins an O-L flap was deemed most appropriate.  Using a sterile surgical marker, an appropriate advancement flap was drawn incorporating the defect and placing the expected incisions within the relaxed skin tension lines where possible.    The area thus outlined was incised deep to adipose tissue with a #15 scalpel blade.  The skin margins were undermined to an appropriate distance in all directions utilizing iris scissors. Anesthesia Volume In Cc: 0 No Repair - Repaired With Adjacent Surgical Defect Text (Leave Blank If You Do Not Want): After the excision the defect was repaired concurrently with another surgical defect which was in close approximation. Consent was obtained from the patient. The risks and benefits to therapy were discussed in detail. Specifically, the risks of infection, scarring, bleeding, prolonged wound healing, incomplete removal, allergy to anesthesia, nerve injury and recurrence were addressed. Prior to the procedure, the treatment site was clearly identified and confirmed by the patient. All components of Universal Protocol/PAUSE Rule completed. Trilobed Flap Text: The defect edges were debeveled with a #15 scalpel blade.  Given the location of the defect and the proximity to free margins a trilobed flap was deemed most appropriate.  Using a sterile surgical marker, an appropriate trilobed flap drawn around the defect.    The area thus outlined was incised deep to adipose tissue with a #15 scalpel blade.  The skin margins were undermined to an appropriate distance in all directions utilizing iris scissors. Complex Repair And Split-Thickness Skin Graft Text: The defect edges were debeveled with a #15 scalpel blade.  The primary defect was closed partially with a complex linear closure.  Given the location of the defect, shape of the defect and the proximity to free margins a split thickness skin graft was deemed most appropriate to repair the remaining defect.  The graft was trimmed to fit the size of the remaining defect.  The graft was then placed in the primary defect, oriented appropriately, and sutured into place. Epidermal Autograft Text: The defect edges were debeveled with a #15 scalpel blade.  Given the location of the defect, shape of the defect and the proximity to free margins an epidermal autograft was deemed most appropriate.  Using a sterile surgical marker, the primary defect shape was transferred to the donor site. The epidermal graft was then harvested.  The skin graft was then placed in the primary defect and oriented appropriately. Cheek-To-Nose Interpolation Flap Text: A decision was made to reconstruct the defect utilizing an interpolation axial flap and a staged reconstruction.  A telfa template was made of the defect.  This telfa template was then used to outline the Cheek-To-Nose Interpolation flap.  The donor area for the pedicle flap was then injected with anesthesia.  The flap was excised through the skin and subcutaneous tissue down to the layer of the underlying musculature.  The interpolation flap was carefully excised within this deep plane to maintain its blood supply.  The edges of the donor site were undermined.   The donor site was closed in a primary fashion.  The pedicle was then rotated into position and sutured.  Once the tube was sutured into place, adequate blood supply was confirmed with blanching and refill.  The pedicle was then wrapped with xeroform gauze and dressed appropriately with a telfa and gauze bandage to ensure continued blood supply and protect the attached pedicle. Modified Advancement Flap Text: The defect edges were debeveled with a #15 scalpel blade.  Given the location of the defect, shape of the defect and the proximity to free margins a modified advancement flap was deemed most appropriate.  Using a sterile surgical marker, an appropriate advancement flap was drawn incorporating the defect and placing the expected incisions within the relaxed skin tension lines where possible.    The area thus outlined was incised deep to adipose tissue with a #15 scalpel blade.  The skin margins were undermined to an appropriate distance in all directions utilizing iris scissors. Complex Repair And Epidermal Autograft Text: The defect edges were debeveled with a #15 scalpel blade.  The primary defect was closed partially with a complex linear closure.  Given the location of the defect, shape of the defect and the proximity to free margins an epidermal autograft was deemed most appropriate to repair the remaining defect.  The graft was trimmed to fit the size of the remaining defect.  The graft was then placed in the primary defect, oriented appropriately, and sutured into place. Anesthesia Volume In Cc: 3 Scalpel Size: 15 blade Intermediate Repair Preamble Text (Leave Blank If You Do Not Want): Undermining was performed with blunt dissection. Keystone Flap Text: The defect edges were debeveled with a #15 scalpel blade.  Given the location of the defect, shape of the defect a keystone flap was deemed most appropriate.  Using a sterile surgical marker, an appropriate keystone flap was drawn incorporating the defect, outlining the appropriate donor tissue and placing the expected incisions within the relaxed skin tension lines where possible. The area thus outlined was incised deep to adipose tissue with a #15 scalpel blade.  The skin margins were undermined to an appropriate distance in all directions around the primary defect and laterally outward around the flap utilizing iris scissors. Wound Care: Petrolatum Melolabial Interpolation Flap Text: A decision was made to reconstruct the defect utilizing an interpolation axial flap and a staged reconstruction.  A telfa template was made of the defect.  This telfa template was then used to outline the melolabial interpolation flap.  The donor area for the pedicle flap was then injected with anesthesia.  The flap was excised through the skin and subcutaneous tissue down to the layer of the underlying musculature.  The pedicle flap was carefully excised within this deep plane to maintain its blood supply.  The edges of the donor site were undermined.   The donor site was closed in a primary fashion.  The pedicle was then rotated into position and sutured.  Once the tube was sutured into place, adequate blood supply was confirmed with blanching and refill.  The pedicle was then wrapped with xeroform gauze and dressed appropriately with a telfa and gauze bandage to ensure continued blood supply and protect the attached pedicle. Advancement Flap (Single) Text: The defect edges were debeveled with a #15 scalpel blade.  Given the location of the defect and the proximity to free margins a single advancement flap was deemed most appropriate.  Using a sterile surgical marker, an appropriate advancement flap was drawn incorporating the defect and placing the expected incisions within the relaxed skin tension lines where possible.    The area thus outlined was incised deep to adipose tissue with a #15 scalpel blade.  The skin margins were undermined to an appropriate distance in all directions utilizing iris scissors. Home Suture Removal Text: Patient was provided a home suture removal kit and will remove their sutures at home.  If they have any questions or difficulties they will call the office. O-T Plasty Text: The defect edges were debeveled with a #15 scalpel blade.  Given the location of the defect, shape of the defect and the proximity to free margins an O-T plasty was deemed most appropriate.  Using a sterile surgical marker, an appropriate O-T plasty was drawn incorporating the defect and placing the expected incisions within the relaxed skin tension lines where possible.    The area thus outlined was incised deep to adipose tissue with a #15 scalpel blade.  The skin margins were undermined to an appropriate distance in all directions utilizing iris scissors. Deep Sutures: 5-0 Vicryl Epidermal Closure: running cuticular Posterior Auricular Interpolation Flap Text: A decision was made to reconstruct the defect utilizing an interpolation axial flap and a staged reconstruction.  A telfa template was made of the defect.  This telfa template was then used to outline the posterior auricular interpolation flap.  The donor area for the pedicle flap was then injected with anesthesia.  The flap was excised through the skin and subcutaneous tissue down to the layer of the underlying musculature.  The pedicle flap was carefully excised within this deep plane to maintain its blood supply.  The edges of the donor site were undermined.   The donor site was closed in a primary fashion.  The pedicle was then rotated into position and sutured.  Once the tube was sutured into place, adequate blood supply was confirmed with blanching and refill.  The pedicle was then wrapped with xeroform gauze and dressed appropriately with a telfa and gauze bandage to ensure continued blood supply and protect the attached pedicle. Post-Care Instructions: I reviewed with the patient in detail post-care instructions. Patient is not to engage in any heavy lifting, exercise, or swimming for the next 14 days. Should the patient develop any fevers, chills, bleeding, severe pain patient will contact the office immediately. Excisional Biopsy Additional Text (Leave Blank If You Do Not Want): The margin was drawn around the clinically apparent lesion. An elliptical shape was then drawn on the skin incorporating the lesion and margins.  Incisions were then made along these lines to the appropriate tissue plane and the lesion was extirpated. Complex Repair And V-Y Plasty Text: The defect edges were debeveled with a #15 scalpel blade.  The primary defect was closed partially with a complex linear closure.  Given the location of the remaining defect, shape of the defect and the proximity to free margins a V-Y plasty was deemed most appropriate for complete closure of the defect.  Using a sterile surgical marker, an appropriate advancement flap was drawn incorporating the defect and placing the expected incisions within the relaxed skin tension lines where possible.    The area thus outlined was incised deep to adipose tissue with a #15 scalpel blade.  The skin margins were undermined to an appropriate distance in all directions utilizing iris scissors. O-Z Plasty Text: The defect edges were debeveled with a #15 scalpel blade.  Given the location of the defect, shape of the defect and the proximity to free margins an O-Z plasty (double transposition flap) was deemed most appropriate.  Using a sterile surgical marker, the appropriate transposition flaps were drawn incorporating the defect and placing the expected incisions within the relaxed skin tension lines where possible.    The area thus outlined was incised deep to adipose tissue with a #15 scalpel blade.  The skin margins were undermined to an appropriate distance in all directions utilizing iris scissors.  Hemostasis was achieved with electrocautery.  The flaps were then transposed into place, one clockwise and the other counterclockwise, and anchored with interrupted buried subcutaneous sutures. Saucerization Excision Additional Text (Leave Blank If You Do Not Want): The margin was drawn around the clinically apparent lesion.  Incisions were then made along these lines, in a tangential fashion, to the appropriate tissue plane and the lesion was extirpated. Complex Repair And Modified Advancement Flap Text: The defect edges were debeveled with a #15 scalpel blade.  The primary defect was closed partially with a complex linear closure.  Given the location of the remaining defect, shape of the defect and the proximity to free margins a modified advancement flap was deemed most appropriate for complete closure of the defect.  Using a sterile surgical marker, an appropriate advancement flap was drawn incorporating the defect and placing the expected incisions within the relaxed skin tension lines where possible.    The area thus outlined was incised deep to adipose tissue with a #15 scalpel blade.  The skin margins were undermined to an appropriate distance in all directions utilizing iris scissors. Xenograft Text: The defect edges were debeveled with a #15 scalpel blade.  Given the location of the defect, shape of the defect and the proximity to free margins a xenograft was deemed most appropriate.  The graft was then trimmed to fit the size of the defect.  The graft was then placed in the primary defect and oriented appropriately. Helical Rim Advancement Flap Text: The defect edges were debeveled with a #15 blade scalpel.  Given the location of the defect and the proximity to free margins (helical rim) a double helical rim advancement flap was deemed most appropriate.  Using a sterile surgical marker, the appropriate advancement flaps were drawn incorporating the defect and placing the expected incisions between the helical rim and antihelix where possible.  The area thus outlined was incised through and through with a #15 scalpel blade.  With a skin hook and iris scissors, the flaps were gently and sharply undermined and freed up. Purse String (Intermediate) Text: Given the location of the defect and the characteristics of the surrounding skin a purse string intermediate closure was deemed most appropriate.  Undermining was performed circumferentially around the surgical defect.  A purse string suture was then placed and tightened. W Plasty Text: The lesion was extirpated to the level of the fat with a #15 scalpel blade.  Given the location of the defect, shape of the defect and the proximity to free margins a W-plasty was deemed most appropriate for repair.  Using a sterile surgical marker, the appropriate transposition arms of the W-plasty were drawn incorporating the defect and placing the expected incisions within the relaxed skin tension lines where possible.    The area thus outlined was incised deep to adipose tissue with a #15 scalpel blade.  The skin margins were undermined to an appropriate distance in all directions utilizing iris scissors.  The opposing transposition arms were then transposed into place in opposite direction and anchored with interrupted buried subcutaneous sutures. A-T Advancement Flap Text: The defect edges were debeveled with a #15 scalpel blade.  Given the location of the defect, shape of the defect and the proximity to free margins an A-T advancement flap was deemed most appropriate.  Using a sterile surgical marker, an appropriate advancement flap was drawn incorporating the defect and placing the expected incisions within the relaxed skin tension lines where possible.    The area thus outlined was incised deep to adipose tissue with a #15 scalpel blade.  The skin margins were undermined to an appropriate distance in all directions utilizing iris scissors. Bi-Rhombic Flap Text: The defect edges were debeveled with a #15 scalpel blade.  Given the location of the defect and the proximity to free margins a bi-rhombic flap was deemed most appropriate.  Using a sterile surgical marker, an appropriate rhombic flap was drawn incorporating the defect. The area thus outlined was incised deep to adipose tissue with a #15 scalpel blade.  The skin margins were undermined to an appropriate distance in all directions utilizing iris scissors. Complex Repair And Dorsal Nasal Flap Text: The defect edges were debeveled with a #15 scalpel blade.  The primary defect was closed partially with a complex linear closure.  Given the location of the remaining defect, shape of the defect and the proximity to free margins a dorsal nasal flap was deemed most appropriate for complete closure of the defect.  Using a sterile surgical marker, an appropriate flap was drawn incorporating the defect and placing the expected incisions within the relaxed skin tension lines where possible.    The area thus outlined was incised deep to adipose tissue with a #15 scalpel blade.  The skin margins were undermined to an appropriate distance in all directions utilizing iris scissors. Estimated Blood Loss (Cc): minimal V-Y Flap Text: The defect edges were debeveled with a #15 scalpel blade.  Given the location of the defect, shape of the defect and the proximity to free margins a V-Y flap was deemed most appropriate.  Using a sterile surgical marker, an appropriate advancement flap was drawn incorporating the defect and placing the expected incisions within the relaxed skin tension lines where possible.    The area thus outlined was incised deep to adipose tissue with a #15 scalpel blade.  The skin margins were undermined to an appropriate distance in all directions utilizing iris scissors. Perilesional Excision Additional Text (Leave Blank If You Do Not Want): The margin was drawn around the clinically apparent lesion. Incisions were then made along these lines to the appropriate tissue plane and the lesion was extirpated. Excision Method: Fusiform Star Wedge Flap Text: The defect edges were debeveled with a #15 scalpel blade.  Given the location of the defect, shape of the defect and the proximity to free margins a star wedge flap was deemed most appropriate.  Using a sterile surgical marker, an appropriate rotation flap was drawn incorporating the defect and placing the expected incisions within the relaxed skin tension lines where possible. The area thus outlined was incised deep to adipose tissue with a #15 scalpel blade.  The skin margins were undermined to an appropriate distance in all directions utilizing iris scissors. Complex Repair And Dermal Autograft Text: The defect edges were debeveled with a #15 scalpel blade.  The primary defect was closed partially with a complex linear closure.  Given the location of the defect, shape of the defect and the proximity to free margins an dermal autograft was deemed most appropriate to repair the remaining defect.  The graft was trimmed to fit the size of the remaining defect.  The graft was then placed in the primary defect, oriented appropriately, and sutured into place. Dermal Autograft Text: The defect edges were debeveled with a #15 scalpel blade.  Given the location of the defect, shape of the defect and the proximity to free margins a dermal autograft was deemed most appropriate.  Using a sterile surgical marker, the primary defect shape was transferred to the donor site. The area thus outlined was incised deep to adipose tissue with a #15 scalpel blade.  The harvested graft was then trimmed of adipose and epidermal tissue until only dermis was left.  The skin graft was then placed in the primary defect and oriented appropriately. Complex Repair And Transposition Flap Text: The defect edges were debeveled with a #15 scalpel blade.  The primary defect was closed partially with a complex linear closure.  Given the location of the remaining defect, shape of the defect and the proximity to free margins a transposition flap was deemed most appropriate for complete closure of the defect.  Using a sterile surgical marker, an appropriate advancement flap was drawn incorporating the defect and placing the expected incisions within the relaxed skin tension lines where possible.    The area thus outlined was incised deep to adipose tissue with a #15 scalpel blade.  The skin margins were undermined to an appropriate distance in all directions utilizing iris scissors. Composite Graft Text: The defect edges were debeveled with a #15 scalpel blade.  Given the location of the defect, shape of the defect, the proximity to free margins and the fact the defect was full thickness a composite graft was deemed most appropriate.  The defect was outline and then transferred to the donor site.  A full thickness graft was then excised from the donor site. The graft was then placed in the primary defect, oriented appropriately and then sutured into place.  The secondary defect was then repaired using a primary closure. Complex Repair And M Plasty Text: The defect edges were debeveled with a #15 scalpel blade.  The primary defect was closed partially with a complex linear closure.  Given the location of the remaining defect, shape of the defect and the proximity to free margins an M plasty was deemed most appropriate for complete closure of the defect.  Using a sterile surgical marker, an appropriate advancement flap was drawn incorporating the defect and placing the expected incisions within the relaxed skin tension lines where possible.    The area thus outlined was incised deep to adipose tissue with a #15 scalpel blade.  The skin margins were undermined to an appropriate distance in all directions utilizing iris scissors. Interpolation Flap Text: A decision was made to reconstruct the defect utilizing an interpolation axial flap and a staged reconstruction.  A telfa template was made of the defect.  This telfa template was then used to outline the interpolation flap.  The donor area for the pedicle flap was then injected with anesthesia.  The flap was excised through the skin and subcutaneous tissue down to the layer of the underlying musculature.  The interpolation flap was carefully excised within this deep plane to maintain its blood supply.  The edges of the donor site were undermined.   The donor site was closed in a primary fashion.  The pedicle was then rotated into position and sutured.  Once the tube was sutured into place, adequate blood supply was confirmed with blanching and refill.  The pedicle was then wrapped with xeroform gauze and dressed appropriately with a telfa and gauze bandage to ensure continued blood supply and protect the attached pedicle. Complex Repair And Double Advancement Flap Text: The defect edges were debeveled with a #15 scalpel blade.  The primary defect was closed partially with a complex linear closure.  Given the location of the remaining defect, shape of the defect and the proximity to free margins a double advancement flap was deemed most appropriate for complete closure of the defect.  Using a sterile surgical marker, an appropriate advancement flap was drawn incorporating the defect and placing the expected incisions within the relaxed skin tension lines where possible.    The area thus outlined was incised deep to adipose tissue with a #15 scalpel blade.  The skin margins were undermined to an appropriate distance in all directions utilizing iris scissors. Lab: [id number] Bilobed Flap Text: The defect edges were debeveled with a #15 scalpel blade.  Given the location of the defect and the proximity to free margins a bilobe flap was deemed most appropriate.  Using a sterile surgical marker, an appropriate bilobe flap drawn around the defect.    The area thus outlined was incised deep to adipose tissue with a #15 scalpel blade.  The skin margins were undermined to an appropriate distance in all directions utilizing iris scissors. Epidermal Closure Graft Donor Site (Optional): simple interrupted Billing Type: Third-Party Bill Bilobed Transposition Flap Text: The defect edges were debeveled with a #15 scalpel blade.  Given the location of the defect and the proximity to free margins a bilobed transposition flap was deemed most appropriate.  Using a sterile surgical marker, an appropriate bilobe flap drawn around the defect.    The area thus outlined was incised deep to adipose tissue with a #15 scalpel blade.  The skin margins were undermined to an appropriate distance in all directions utilizing iris scissors. Island Pedicle Flap Text: The defect edges were debeveled with a #15 scalpel blade.  Given the location of the defect, shape of the defect and the proximity to free margins an island pedicle advancement flap was deemed most appropriate.  Using a sterile surgical marker, an appropriate advancement flap was drawn incorporating the defect, outlining the appropriate donor tissue and placing the expected incisions within the relaxed skin tension lines where possible.    The area thus outlined was incised deep to adipose tissue with a #15 scalpel blade.  The skin margins were undermined to an appropriate distance in all directions around the primary defect and laterally outward around the island pedicle utilizing iris scissors.  There was minimal undermining beneath the pedicle flap. Complex Repair And Skin Substitute Graft Text: The defect edges were debeveled with a #15 scalpel blade.  The primary defect was closed partially with a complex linear closure.  Given the location of the remaining defect, shape of the defect and the proximity to free margins a skin substitute graft was deemed most appropriate to repair the remaining defect.  The graft was trimmed to fit the size of the remaining defect.  The graft was then placed in the primary defect, oriented appropriately, and sutured into place. Complex Repair And Xenograft Text: The defect edges were debeveled with a #15 scalpel blade.  The primary defect was closed partially with a complex linear closure.  Given the location of the defect, shape of the defect and the proximity to free margins a xenograft was deemed most appropriate to repair the remaining defect.  The graft was trimmed to fit the size of the remaining defect.  The graft was then placed in the primary defect, oriented appropriately, and sutured into place. Rotation Flap Text: The defect edges were debeveled with a #15 scalpel blade.  Given the location of the defect, shape of the defect and the proximity to free margins a rotation flap was deemed most appropriate.  Using a sterile surgical marker, an appropriate rotation flap was drawn incorporating the defect and placing the expected incisions within the relaxed skin tension lines where possible.    The area thus outlined was incised deep to adipose tissue with a #15 scalpel blade.  The skin margins were undermined to an appropriate distance in all directions utilizing iris scissors. Bilateral Helical Rim Advancement Flap Text: The defect edges were debeveled with a #15 blade scalpel.  Given the location of the defect and the proximity to free margins (helical rim) a bilateral helical rim advancement flap was deemed most appropriate.  Using a sterile surgical marker, the appropriate advancement flaps were drawn incorporating the defect and placing the expected incisions between the helical rim and antihelix where possible.  The area thus outlined was incised through and through with a #15 scalpel blade.  With a skin hook and iris scissors, the flaps were gently and sharply undermined and freed up. Complex Repair And Double M Plasty Text: The defect edges were debeveled with a #15 scalpel blade.  The primary defect was closed partially with a complex linear closure.  Given the location of the remaining defect, shape of the defect and the proximity to free margins a double M plasty was deemed most appropriate for complete closure of the defect.  Using a sterile surgical marker, an appropriate advancement flap was drawn incorporating the defect and placing the expected incisions within the relaxed skin tension lines where possible.    The area thus outlined was incised deep to adipose tissue with a #15 scalpel blade.  The skin margins were undermined to an appropriate distance in all directions utilizing iris scissors. Transposition Flap Text: The defect edges were debeveled with a #15 scalpel blade.  Given the location of the defect and the proximity to free margins a transposition flap was deemed most appropriate.  Using a sterile surgical marker, an appropriate transposition flap was drawn incorporating the defect.    The area thus outlined was incised deep to adipose tissue with a #15 scalpel blade.  The skin margins were undermined to an appropriate distance in all directions utilizing iris scissors. Complex Repair And O-T Advancement Flap Text: The defect edges were debeveled with a #15 scalpel blade.  The primary defect was closed partially with a complex linear closure.  Given the location of the remaining defect, shape of the defect and the proximity to free margins an O-T advancement flap was deemed most appropriate for complete closure of the defect.  Using a sterile surgical marker, an appropriate advancement flap was drawn incorporating the defect and placing the expected incisions within the relaxed skin tension lines where possible.    The area thus outlined was incised deep to adipose tissue with a #15 scalpel blade.  The skin margins were undermined to an appropriate distance in all directions utilizing iris scissors. Ear Star Wedge Flap Text: The defect edges were debeveled with a #15 blade scalpel.  Given the location of the defect and the proximity to free margins (helical rim) an ear star wedge flap was deemed most appropriate.  Using a sterile surgical marker, the appropriate flap was drawn incorporating the defect and placing the expected incisions between the helical rim and antihelix where possible.  The area thus outlined was incised through and through with a #15 scalpel blade. Eliptical Excision Additional Text (Leave Blank If You Do Not Want): The margin was drawn around the clinically apparent lesion.  An elliptical shape was then drawn on the skin incorporating the lesion and margins.  Incisions were then made along these lines to the appropriate tissue plane and the lesion was extirpated. Double Island Pedicle Flap Text: The defect edges were debeveled with a #15 scalpel blade.  Given the location of the defect, shape of the defect and the proximity to free margins a double island pedicle advancement flap was deemed most appropriate.  Using a sterile surgical marker, an appropriate advancement flap was drawn incorporating the defect, outlining the appropriate donor tissue and placing the expected incisions within the relaxed skin tension lines where possible.    The area thus outlined was incised deep to adipose tissue with a #15 scalpel blade.  The skin margins were undermined to an appropriate distance in all directions around the primary defect and laterally outward around the island pedicle utilizing iris scissors.  There was minimal undermining beneath the pedicle flap. Suture Removal: 7 days Intermediate / Complex Repair - Final Wound Length In Cm: 2.9 H Plasty Text: Given the location of the defect, shape of the defect and the proximity to free margins a H-plasty was deemed most appropriate for repair.  Using a sterile surgical marker, the appropriate advancement arms of the H-plasty were drawn incorporating the defect and placing the expected incisions within the relaxed skin tension lines where possible. The area thus outlined was incised deep to adipose tissue with a #15 scalpel blade. The skin margins were undermined to an appropriate distance in all directions utilizing iris scissors.  The opposing advancement arms were then advanced into place in opposite direction and anchored with interrupted buried subcutaneous sutures. Complex Repair And O-L Flap Text: The defect edges were debeveled with a #15 scalpel blade.  The primary defect was closed partially with a complex linear closure.  Given the location of the remaining defect, shape of the defect and the proximity to free margins an O-L flap was deemed most appropriate for complete closure of the defect.  Using a sterile surgical marker, an appropriate flap was drawn incorporating the defect and placing the expected incisions within the relaxed skin tension lines where possible.    The area thus outlined was incised deep to adipose tissue with a #15 scalpel blade.  The skin margins were undermined to an appropriate distance in all directions utilizing iris scissors. Crescentic Advancement Flap Text: The defect edges were debeveled with a #15 scalpel blade.  Given the location of the defect and the proximity to free margins a crescentic advancement flap was deemed most appropriate.  Using a sterile surgical marker, the appropriate advancement flap was drawn incorporating the defect and placing the expected incisions within the relaxed skin tension lines where possible.    The area thus outlined was incised deep to adipose tissue with a #15 scalpel blade.  The skin margins were undermined to an appropriate distance in all directions utilizing iris scissors. Advancement Flap (Double) Text: The defect edges were debeveled with a #15 scalpel blade.  Given the location of the defect and the proximity to free margins a double advancement flap was deemed most appropriate.  Using a sterile surgical marker, the appropriate advancement flaps were drawn incorporating the defect and placing the expected incisions within the relaxed skin tension lines where possible.    The area thus outlined was incised deep to adipose tissue with a #15 scalpel blade.  The skin margins were undermined to an appropriate distance in all directions utilizing iris scissors. Detail Level: Detailed Complex Repair And Rhombic Flap Text: The defect edges were debeveled with a #15 scalpel blade.  The primary defect was closed partially with a complex linear closure.  Given the location of the remaining defect, shape of the defect and the proximity to free margins a rhombic flap was deemed most appropriate for complete closure of the defect.  Using a sterile surgical marker, an appropriate advancement flap was drawn incorporating the defect and placing the expected incisions within the relaxed skin tension lines where possible.    The area thus outlined was incised deep to adipose tissue with a #15 scalpel blade.  The skin margins were undermined to an appropriate distance in all directions utilizing iris scissors. Complex Repair And Bilobe Flap Text: The defect edges were debeveled with a #15 scalpel blade.  The primary defect was closed partially with a complex linear closure.  Given the location of the remaining defect, shape of the defect and the proximity to free margins a bilobe flap was deemed most appropriate for complete closure of the defect.  Using a sterile surgical marker, an appropriate advancement flap was drawn incorporating the defect and placing the expected incisions within the relaxed skin tension lines where possible.    The area thus outlined was incised deep to adipose tissue with a #15 scalpel blade.  The skin margins were undermined to an appropriate distance in all directions utilizing iris scissors. Muscle Hinge Flap Text: The defect edges were debeveled with a #15 scalpel blade.  Given the size, depth and location of the defect and the proximity to free margins a muscle hinge flap was deemed most appropriate.  Using a sterile surgical marker, an appropriate hinge flap was drawn incorporating the defect. The area thus outlined was incised with a #15 scalpel blade.  The skin margins were undermined to an appropriate distance in all directions utilizing iris scissors. Cartilage Graft Text: The defect edges were debeveled with a #15 scalpel blade.  Given the location of the defect, shape of the defect, the fact the defect involved a full thickness cartilage defect a cartilage graft was deemed most appropriate.  An appropriate donor site was identified, cleansed, and anesthetized. The cartilage graft was then harvested and transferred to the recipient site, oriented appropriately and then sutured into place.  The secondary defect was then repaired using a primary closure. Hatchet Flap Text: The defect edges were debeveled with a #15 scalpel blade.  Given the location of the defect, shape of the defect and the proximity to free margins a hatchet flap was deemed most appropriate.  Using a sterile surgical marker, an appropriate hatchet flap was drawn incorporating the defect and placing the expected incisions within the relaxed skin tension lines where possible.    The area thus outlined was incised deep to adipose tissue with a #15 scalpel blade.  The skin margins were undermined to an appropriate distance in all directions utilizing iris scissors. Paramedian Forehead Flap Text: A decision was made to reconstruct the defect utilizing an interpolation axial flap and a staged reconstruction.  A telfa template was made of the defect.  This telfa template was then used to outline the paramedian forehead pedicle flap.  The donor area for the pedicle flap was then injected with anesthesia.  The flap was excised through the skin and subcutaneous tissue down to the layer of the underlying musculature.  The pedicle flap was carefully excised within this deep plane to maintain its blood supply.  The edges of the donor site were undermined.   The donor site was closed in a primary fashion.  The pedicle was then rotated into position and sutured.  Once the tube was sutured into place, adequate blood supply was confirmed with blanching and refill.  The pedicle was then wrapped with xeroform gauze and dressed appropriately with a telfa and gauze bandage to ensure continued blood supply and protect the attached pedicle. Lab Facility: [id number] Burow's Advancement Flap Text: The defect edges were debeveled with a #15 scalpel blade.  Given the location of the defect and the proximity to free margins a Burow's advancement flap was deemed most appropriate.  Using a sterile surgical marker, the appropriate advancement flap was drawn incorporating the defect and placing the expected incisions within the relaxed skin tension lines where possible.    The area thus outlined was incised deep to adipose tissue with a #15 scalpel blade.  The skin margins were undermined to an appropriate distance in all directions utilizing iris scissors. Tissue Cultured Epidermal Autograft Text: The defect edges were debeveled with a #15 scalpel blade.  Given the location of the defect, shape of the defect and the proximity to free margins a tissue cultured epidermal autograft was deemed most appropriate.  The graft was then trimmed to fit the size of the defect.  The graft was then placed in the primary defect and oriented appropriately. Fusiform Excision Additional Text (Leave Blank If You Do Not Want): The margin was drawn around the clinically apparent lesion.  A fusiform shape was then drawn on the skin incorporating the lesion and margins.  Incisions were then made along these lines to the appropriate tissue plane and the lesion was extirpated. Purse String (Simple) Text: Given the location of the defect and the characteristics of the surrounding skin a purse string simple closure was deemed most appropriate.  Undermining was performed circumferentially around the surgical defect.  A purse string suture was then placed and tightened. Size Of Margin In Cm: 0.2 Lip Wedge Excision Repair Text: Given the location of the defect and the proximity to free margins a full thickness wedge repair was deemed most appropriate.  Using a sterile surgical marker, the appropriate repair was drawn incorporating the defect and placing the expected incisions perpendicular to the vermilion border.  The vermilion border was also meticulously outlined to ensure appropriate reapproximation during the repair.  The area thus outlined was incised through and through with a #15 scalpel blade.  The muscularis and dermis were reaproximated with deep sutures following hemostasis. Care was taken to realign the vermilion border before proceeding with the superficial closure.  Once the vermilion was realigned the superfical and mucosal closure was finished. Mucosal Advancement Flap Text: Given the location of the defect, shape of the defect and the proximity to free margins a mucosal advancement flap was deemed most appropriate. Incisions were made with a 15 blade scalpel in the appropriate fashion along the cutaneous vermillion border and the mucosal lip. The remaining actinically damaged mucosal tissue was excised.  The mucosal advancement flap was then elevated to the gingival sulcus with care taken to preserve the neurovascular structures and advanced into the primary defect. Care was taken to ensure that precise realignment of the vermilion border was achieved. Medical Necessity Clause: This procedure was medically necessary because the lesion that was treated was: Complex Repair And Z Plasty Text: The defect edges were debeveled with a #15 scalpel blade.  The primary defect was closed partially with a complex linear closure.  Given the location of the remaining defect, shape of the defect and the proximity to free margins a Z plasty was deemed most appropriate for complete closure of the defect.  Using a sterile surgical marker, an appropriate advancement flap was drawn incorporating the defect and placing the expected incisions within the relaxed skin tension lines where possible.    The area thus outlined was incised deep to adipose tissue with a #15 scalpel blade.  The skin margins were undermined to an appropriate distance in all directions utilizing iris scissors. Complex Repair And Tissue Cultured Epidermal Autograft Text: The defect edges were debeveled with a #15 scalpel blade.  The primary defect was closed partially with a complex linear closure.  Given the location of the defect, shape of the defect and the proximity to free margins an tissue cultured epidermal autograft was deemed most appropriate to repair the remaining defect.  The graft was trimmed to fit the size of the remaining defect.  The graft was then placed in the primary defect, oriented appropriately, and sutured into place. Complex Repair And Rotation Flap Text: The defect edges were debeveled with a #15 scalpel blade.  The primary defect was closed partially with a complex linear closure.  Given the location of the remaining defect, shape of the defect and the proximity to free margins a rotation flap was deemed most appropriate for complete closure of the defect.  Using a sterile surgical marker, an appropriate advancement flap was drawn incorporating the defect and placing the expected incisions within the relaxed skin tension lines where possible.    The area thus outlined was incised deep to adipose tissue with a #15 scalpel blade.  The skin margins were undermined to an appropriate distance in all directions utilizing iris scissors. Path Notes (To The Dermatopathologist): Please check margins. Complex Repair And W Plasty Text: The defect edges were debeveled with a #15 scalpel blade.  The primary defect was closed partially with a complex linear closure.  Given the location of the remaining defect, shape of the defect and the proximity to free margins a W plasty was deemed most appropriate for complete closure of the defect.  Using a sterile surgical marker, an appropriate advancement flap was drawn incorporating the defect and placing the expected incisions within the relaxed skin tension lines where possible.    The area thus outlined was incised deep to adipose tissue with a #15 scalpel blade.  The skin margins were undermined to an appropriate distance in all directions utilizing iris scissors. Skin Substitute Text: The defect edges were debeveled with a #15 scalpel blade.  Given the location of the defect, shape of the defect and the proximity to free margins a skin substitute graft was deemed most appropriate.  The graft material was trimmed to fit the size of the defect. The graft was then placed in the primary defect and oriented appropriately. Size Of Lesion In Cm: 0.3 V-Y Plasty Text: The defect edges were debeveled with a #15 scalpel blade.  Given the location of the defect, shape of the defect and the proximity to free margins an V-Y advancement flap was deemed most appropriate.  Using a sterile surgical marker, an appropriate advancement flap was drawn incorporating the defect and placing the expected incisions within the relaxed skin tension lines where possible.    The area thus outlined was incised deep to adipose tissue with a #15 scalpel blade.  The skin margins were undermined to an appropriate distance in all directions utilizing iris scissors. Dressing: dry sterile dressing Alar Island Pedicle Flap Text: The defect edges were debeveled with a #15 scalpel blade.  Given the location of the defect, shape of the defect and the proximity to the alar rim an island pedicle advancement flap was deemed most appropriate.  Using a sterile surgical marker, an appropriate advancement flap was drawn incorporating the defect, outlining the appropriate donor tissue and placing the expected incisions within the nasal ala running parallel to the alar rim. The area thus outlined was incised with a #15 scalpel blade.  The skin margins were undermined minimally to an appropriate distance in all directions around the primary defect and laterally outward around the island pedicle utilizing iris scissors.  There was minimal undermining beneath the pedicle flap. Island Pedicle Flap With Canthal Suspension Text: The defect edges were debeveled with a #15 scalpel blade.  Given the location of the defect, shape of the defect and the proximity to free margins an island pedicle advancement flap was deemed most appropriate.  Using a sterile surgical marker, an appropriate advancement flap was drawn incorporating the defect, outlining the appropriate donor tissue and placing the expected incisions within the relaxed skin tension lines where possible. The area thus outlined was incised deep to adipose tissue with a #15 scalpel blade.  The skin margins were undermined to an appropriate distance in all directions around the primary defect and laterally outward around the island pedicle utilizing iris scissors.  There was minimal undermining beneath the pedicle flap. A suspension suture was placed in the canthal tendon to prevent tension and prevent ectropion. Excision Depth: adipose tissue Complex Repair And A-T Advancement Flap Text: The defect edges were debeveled with a #15 scalpel blade.  The primary defect was closed partially with a complex linear closure.  Given the location of the remaining defect, shape of the defect and the proximity to free margins an A-T advancement flap was deemed most appropriate for complete closure of the defect.  Using a sterile surgical marker, an appropriate advancement flap was drawn incorporating the defect and placing the expected incisions within the relaxed skin tension lines where possible.    The area thus outlined was incised deep to adipose tissue with a #15 scalpel blade.  The skin margins were undermined to an appropriate distance in all directions utilizing iris scissors. Split-Thickness Skin Graft Text: The defect edges were debeveled with a #15 scalpel blade.  Given the location of the defect, shape of the defect and the proximity to free margins a split thickness skin graft was deemed most appropriate.  Using a sterile surgical marker, the primary defect shape was transferred to the donor site. The split thickness graft was then harvested.  The skin graft was then placed in the primary defect and oriented appropriately. Cheek Interpolation Flap Text: A decision was made to reconstruct the defect utilizing an interpolation axial flap and a staged reconstruction.  A telfa template was made of the defect.  This telfa template was then used to outline the Cheek Interpolation flap.  The donor area for the pedicle flap was then injected with anesthesia.  The flap was excised through the skin and subcutaneous tissue down to the layer of the underlying musculature.  The interpolation flap was carefully excised within this deep plane to maintain its blood supply.  The edges of the donor site were undermined.   The donor site was closed in a primary fashion.  The pedicle was then rotated into position and sutured.  Once the tube was sutured into place, adequate blood supply was confirmed with blanching and refill.  The pedicle was then wrapped with xeroform gauze and dressed appropriately with a telfa and gauze bandage to ensure continued blood supply and protect the attached pedicle. Ftsg Text: The defect edges were debeveled with a #15 scalpel blade.  Given the location of the defect, shape of the defect and the proximity to free margins a full thickness skin graft was deemed most appropriate.  Using a sterile surgical marker, the primary defect shape was transferred to the donor site. The area thus outlined was incised deep to adipose tissue with a #15 scalpel blade.  The harvested graft was then trimmed of adipose tissue until only dermis and epidermis was left.  The skin margins of the secondary defect were undermined to an appropriate distance in all directions utilizing iris scissors.  The secondary defect was closed with interrupted buried subcutaneous sutures.  The skin edges were then re-apposed with running  sutures.  The skin graft was then placed in the primary defect and oriented appropriately. Complex Repair And Ftsg Text: The defect edges were debeveled with a #15 scalpel blade.  The primary defect was closed partially with a complex linear closure.  Given the location of the defect, shape of the defect and the proximity to free margins a full thickness skin graft was deemed most appropriate to repair the remaining defect.  The graft was trimmed to fit the size of the remaining defect.  The graft was then placed in the primary defect, oriented appropriately, and sutured into place. Graft Donor Site Bandage (Optional-Leave Blank If You Don't Want In Note): Steri-strips and a pressure bandage were applied to the donor site. Additional Anesthesia Volume In Cc: 6 Complex Repair And Melolabial Flap Text: The defect edges were debeveled with a #15 scalpel blade.  The primary defect was closed partially with a complex linear closure.  Given the location of the remaining defect, shape of the defect and the proximity to free margins a melolabial flap was deemed most appropriate for complete closure of the defect.  Using a sterile surgical marker, an appropriate advancement flap was drawn incorporating the defect and placing the expected incisions within the relaxed skin tension lines where possible.    The area thus outlined was incised deep to adipose tissue with a #15 scalpel blade.  The skin margins were undermined to an appropriate distance in all directions utilizing iris scissors. S Plasty Text: Given the location and shape of the defect, and the orientation of relaxed skin tension lines, an S-plasty was deemed most appropriate for repair.  Using a sterile surgical marker, the appropriate outline of the S-plasty was drawn, incorporating the defect and placing the expected incisions within the relaxed skin tension lines where possible.  The area thus outlined was incised deep to adipose tissue with a #15 scalpel blade.  The skin margins were undermined to an appropriate distance in all directions utilizing iris scissors. The skin flaps were advanced over the defect.  The opposing margins were then approximated with interrupted buried subcutaneous sutures. Epidermal Sutures: 5-0 Prolene Melolabial Transposition Flap Text: The defect edges were debeveled with a #15 scalpel blade.  Given the location of the defect and the proximity to free margins a melolabial flap was deemed most appropriate.  Using a sterile surgical marker, an appropriate melolabial transposition flap was drawn incorporating the defect.    The area thus outlined was incised deep to adipose tissue with a #15 scalpel blade.  The skin margins were undermined to an appropriate distance in all directions utilizing iris scissors.

## 2020-03-12 NOTE — CONSULTS
"Neurology Note    Patient:  Izaiah Garcia    YOB: 1931    REFERRING PHYSICIAN:  Suresh López MD    CHIEF COMPLAINT:    AMS    HISTORY OF PRESENT ILLNESS:   The patient is a 88 y.o. male with AF, prior known left cerebellar stroke in 2016, parkinsonism, admitted on 2/29 with hypoxia from NH, found to have bilateral LE DVTs and PEs. S/P chest tube for right pleural effusion, worsening left effusion, suspected empyema. He has been on heparin qtt. Last night noted to be more lethargic with question of facial asymmetry, rapid response called. Nonfocal and arousable per neuro ICU RN. PO2 53 on 4L NC, increased to 5L. CTH with chronic changes. Old left cerebellar and chronic right cerebellar and thalamic strokes (new since last scan on record in 2016) noted on CTH, no acute changes. Back on heparin qtt. No sedatives given last night that I can tell. Back to baseline this am per family. He is DNR.    Past Medical History:  Past Medical History:   Diagnosis Date   • At risk for falls 5/2/2019   • Benign essential hypertension 6/29/2016   • Bilateral high frequency sensorineural hearing loss, wears hearing aids 5/2/2019   • Bilateral lower extremity edema 5/2/2019    May 2, 2019--patient who has hypertension and is on amlodipine 10 mg/day is complaining of progressively worsening swelling of the lower extremities that extends up to about mid calf.  Gets worse at the end of the day and tends to get better overnight when he props his feet up.  I think this is definitely related to the amlodipine although patient may have some venous insufficiency.  Medication ad   • Decreased peripheral vision of both eyes 5/2/2019    May 2, 2019--continues to have complaints of \"dizziness\" associated with weakness in his lower extremities.  He is not describing a spinning sensation or anything remotely close to vertigo.  Instead he is describing an off-balance sensation.  He tends to fall forward if not careful and he " "tends to list towards the right side.  He has marked difficulty going down an incline.  He has to ambulate wit   • History of aspiration pneumonia 5/4/2015   • History of esophageal stricture 5/4/2015    July 3, 2018--EGD revealed a distal esophageal stricture that was dilated to 18 mm.  There was a small hiatal hernia.  There was mild streaky erythema in the proximal stomach.  Duodenal bulb normal.  April 26, 2016--EGD performed for dysphagia reveals a distal esophageal stricture that was dilated 16.5 mm.   • History of gout 6/29/2016   • History of stroke, 6/29/2016--4.9 x 4 x 3 cm inferior left cerebellar infarct 5/4/2015 June 29, 2016--MRI of the brain performed for complaints of dizziness and weakness. IMPRESSION: 1. There is susceptibility artifact streaking through the anterior left frontal scalp through the anterior left frontal bone in the anterior tipof the left frontal lobe likely from a tiny piece of metal in the anterior left frontal scalp slightly limits evaluation of these structures. 2. There is mild s   • Hyperlipidemia 6/29/2016   • Impaired fasting glucose 5/2/2019 April 14, 2015--hemoglobin A1c 5.9.   • Macrocytosis 5/2/2019   • Parkinson's disease (CMS/[facility]) 5/2/2019    May 2, 2019--continues to have complaints of \"dizziness\" associated with weakness in his lower extremities.  He is not describing a spinning sensation or anything remotely close to vertigo.  Instead he is describing an off-balance sensation.  He tends to fall forward if not careful and he tends to list towards the right side.  He has marked difficulty going down an incline.  He has to ambulate wit   • Rheumatoid factor positive 5/2/2019 March 25, 2014--rheumatoid factor returned positive at 95.  However, CCP antibodies normal at 8, SHIV negative, both C&P ANCA negative, C-reactive protein normal at 0.24, sed rate normal at 2.   • Vitamin B12 deficiency 6/6/2019 June 6, 2019--patient recently had mildly elevated " methylmalonic acid of 380.  Repeat methylmalonic acid was upper limit of normal at 356.  Given patient's neurologic symptoms and suspected parkinsonism, I have a low threshold for treating vitamin B12 deficiency.  We will initiate vitamin B12 injections today.  2000 mcg subcutaneously given today.   • Vitamin D deficiency 5/2/2019       Past Surgical History:  Past Surgical History:   Procedure Laterality Date   • CARDIAC CATHETERIZATION N/A 2/29/2020    Procedure: Thrombolytic Therapy- EKOS;  Surgeon: Jason Griffiths MD;  Location: [facility] INVASIVE LOCATION;  Service: Cardiovascular;  Laterality: N/A;   • CATARACT EXTRACTION EXTRACAPSULAR W/ INTRAOCULAR LENS IMPLANTATION Bilateral     Bilateral cataract extirpation with intraocular lens implantation   • CHOLECYSTECTOMY     • EKOS CATHETER PLACEMENT Bilateral 2/29/2020    Procedure: Ekos catheter placement;  Surgeon: Jason Griffiths MD;  Location: [facility] INVASIVE LOCATION;  Service: Cardiovascular;  Laterality: Bilateral;   • ESOPHAGEAL DILATATION  04/26/2016 April 26, 2016--EGD performed for dysphagia reveals a distal esophageal stricture that was dilated 16.5 mm.   • ESOPHAGEAL DILATATION  07/03/2018    July 3, 2018--EGD revealed a distal esophageal stricture that was dilated to 18 mm.  There was a small hiatal hernia.  There was mild streaky erythema in the proximal stomach.  Duodenal bulb normal.   • INTERVENTIONAL RADIOLOGY PROCEDURE Bilateral 2/29/2020    Procedure: Pulmonary Angiogram;  Surgeon: Jason Griffiths MD;  Location: [facility] INVASIVE LOCATION;  Service: Cardiovascular;  Laterality: Bilateral;       Social History:   Social History     Socioeconomic History   • Marital status:      Spouse name: Not on file   • Number of children: 2   • Years of education: Not on file   • Highest education level: 9th grade   Social Needs   • Financial resource strain: Not hard at all   • Food insecurity:     Worry: Never true     Inability:  Never true   • Transportation needs:     Medical: No     Non-medical: No   Tobacco Use   • Smoking status: Never Smoker   • Smokeless tobacco: Never Used   Substance and Sexual Activity   • Alcohol use: Yes     Frequency: 2-4 times a month     Drinks per session: 1 or 2     Binge frequency: Never     Comment: occ   • Drug use: No   • Sexual activity: Not Currently     Partners: Female   Lifestyle   • Physical activity:     Days per week: 0 days     Minutes per session: 0 min   • Stress: Not at all   Relationships   • Social connections:     Talks on phone: Once a week     Gets together: Twice a week     Attends [employer] service: Never     Active member of club or organization: Yes     Attends meetings of clubs or organizations: More than 4 times per year     Relationship status:         Family History:   Family History   Problem Relation Age of Onset   • No Known Problems Mother    • No Known Problems Father        Medications Prior to Admission:    Prior to Admission medications    Medication Sig Start Date End Date Taking? Authorizing Provider   allopurinol (ZYLOPRIM) 300 MG tablet Take 1 p.o. daily for gout 8/12/19  Yes Uriah Godinez MD   amLODIPine (NORVASC) 5 MG tablet Take 1 p.o. every morning for high blood pressure 8/12/19  Yes Uriah Godinez MD   amoxicillin-clavulanate (AUGMENTIN) 875-125 MG per tablet Take 1 tablet by mouth 2 (Two) Times a Day.   Yes Bri Teran MD   aspirin (ECOTRIN) 325 MG EC tablet Take 81 mg by mouth Daily.   Yes Bri Teran MD   budesonide (PULMICORT) 0.5 MG/2ML nebulizer solution Take 0.5 mg by nebulization 2 (Two) Times a Day.   Yes Bri Teran MD   carbidopa-levodopa (SINEMET)  MG per tablet Take 1 tablet by mouth 2 (Two) Times a Day.   Yes Bri Teran MD   cefTRIAXone Sodium (ROCEPHIN IV) Infuse 1 vial into a venous catheter. 1 vial is equivalent to 1 gram   Yes Bri Teran MD   doxycycline (VIBRAMYICN) 100  MG tablet Take 100 mg by mouth 2 (Two) Times a Day.   Yes ProviderBri MD   guaiFENesin (MUCINEX) 600 MG 12 hr tablet Take 1,200 mg by mouth 2 (Two) Times a Day.   Yes Bri Teran MD   ipratropium-albuterol (DUO-NEB) 0.5-2.5 mg/3 ml nebulizer Take 3 mL by nebulization 2 (Two) Times a Day.   Yes Bri Teran MD   lovastatin (MEVACOR) 20 MG tablet Take 1 p.o. daily for high cholesterol 1/15/20  Yes Uriah Godinez MD   ondansetron (ZOFRAN) 4 MG tablet 1 p.o. every 6 to 8 hours as needed nausea 2/7/20  Yes Uriah Godinez MD   saccharomyces boulardii (FLORASTOR) 250 MG capsule Take 1 capsule by mouth 2 (Two) Times a Day. 2/22/20  Yes Duncan Dubose MD   valsartan-hydrochlorothiazide (DIOVAN-HCT) 320-25 MG per tablet Take 1 tablet by mouth Daily. 8/12/19 8/11/20 Yes Uriah Godinez MD       Allergies:  Patient has no known allergies.      Review of system  Review of Systems   Unable to perform ROS: Other       Vitals:    03/12/20 1144   BP: (!) 149/104   Pulse: 90   Resp: 18   Temp: 98.8 °F (37.1 °C)   SpO2: 94%       Physical exam  Physical Exam   Constitutional: He is oriented to person, place, and time. He appears well-developed and well-nourished.   HENT:   Head: Normocephalic and atraumatic.   Eyes: Pupils are equal, round, and reactive to light. EOM are normal.   Cardiovascular: Normal rate and regular rhythm.   Pulmonary/Chest: Effort normal.   Neurological: He is alert and oriented to person, place, and time. He has normal reflexes. He displays normal reflexes. No cranial nerve deficit or sensory deficit. He exhibits normal muscle tone. Coordination normal.   Tone mildly increased, slight kinetic hand tremor, very [place], no facial droop, VFF, moves lims against gravity, arms better than legs, DTRs hypoactive, has decreased LE sensation to tuning fork to knee level.   Psychiatric: He has a normal mood and affect. His behavior is normal. Thought content normal.          Lab Results   Component Value Date    WBC 12.10 (H) 03/12/2020    HGB 9.1 (L) 03/12/2020    HCT 28.3 (L) 03/12/2020    MCV 83.5 03/12/2020     03/12/2020     Lab Results   Component Value Date    GLUCOSE 112 (H) 03/12/2020    BUN 15 03/12/2020    CREATININE 0.68 (L) 03/12/2020    EGFRIFNONA 110 03/12/2020    EGFRIFAFRI 72 01/03/2020    BCR 22.1 03/12/2020    CO2 24.1 03/12/2020    CALCIUM 7.9 (L) 03/12/2020    PROTENTOTREF 6.4 01/03/2020    ALBUMIN 1.90 (L) 03/12/2020    LABIL2 1.7 01/03/2020    AST 32 02/29/2020    ALT 25 02/29/2020   Urinalysis, Microscopic Only - Urine, Clean Catch   Order: [id number] - Reflex for Order [id number]   Status:  Final result   Visible to patient:  No (Not Released) Next appt:  04/14/2020 at 02:00 PM in Cardiology ([facility] ECHO/VAS FRONT )   Specimen Information: Urine, Clean Catch        Component  Ref Range & Units 1d ago 12d ago 3wk ago   RBC, UA  None Seen, 0-2 /HPF 3-5Abnormal   None Seen  None Seen    WBC, UA  None Seen, 0-2 /HPF 6-12Abnormal   0-2  3-5Abnormal     Bacteria, UA  None Seen /HPF None Seen  TraceAbnormal   2+Abnormal     Squamous Epithelial Cells, UA  None Seen, 0-2 /HPF 0-2  None Seen  0-2    Hyaline Casts, UA  None Seen /LPF 21-30  None Seen  None Seen    Methodology Manual Light Microscopy  Manual Light Microscopy  Manual Light Microscopy              ECG 12 Lead   Order: [id number]   Status:  Preliminary result   Visible to patient:  No (Not Released) Next appt:  04/14/2020 at 02:00 PM in Cardiology ([facility] ECHO/VAS FRONT )      Narrative & Impression     HEART RATE= 103  bpm  RR Interval= 583  ms  PA Interval=   ms  P Horizontal Axis=   deg  P Front Axis=   deg  QRSD Interval= 108  ms  QT Interval= 384  ms  QRS Axis= 18  deg  T Wave Axis= 8  deg  - ABNORMAL ECG -  Atrial fibrillation  Ventricular premature complex  Borderline T abnormalities, anterior leads  Prolonged QT interval  Electronically Signed By:   Date and Time of Study: 2020-03-12  10:24:49      Specimen Collected: 20 10:24           Echocardiogram   Order# [id number]   Reading physician: Patrick Tamez III, MD Ordering physician: Chetan Eugene MD Study date: 20   Patient Information     Patient Name  Izaiah Garcia MRN  [id number] Sex  Male  (Age)  1931 (88 y.o.)   Admission Information     Admission Date/Time Discharge Date/Time Room/Bed   20  0757  /   Sedation Narrator Report     Sedation Narrator Report   Interpretation Summary     · Estimated EF = 54%.  · Left ventricular systolic function is normal.  · Left ventricular wall thickness is consistent with mild concentric hypertrophy.  · Left ventricular diastolic dysfunction (grade I) consistent with impaired relaxation.  · Right ventricular cavity is moderate-to-severely dilated.  · Mildly reduced right ventricular systolic function noted.  · Right atrial cavity size is moderately dilated.         Radiological Studies:  CT HEAD WITHOUT CONTRAST     HISTORY: Confusion     COMPARISON: 2016     TECHNIQUE: Axial CT imaging was obtained from vertex of the skull  through the skull base. No IV contrast was administered.     FINDINGS:  No acute intracranial hemorrhage is seen. There is diffuse atrophy.  There is periventricular and deep white matter microangiopathic change.  There is no midline shift or mass effect. The patient has evidence of an  old left cerebellar infarction. There is a smaller area of decreased  attenuation identified within the right cerebellar hemisphere. This  measures 1.7 x 1.1 cm. This may represent an old infarct as well, but  was not present on prior MRI from 2016. MRI would be more sensitive  for evaluation of chronicity. Again, it is favored to be chronic. Old  lacunar infarct is seen within the right thalamus. The paranasal sinuses  and mastoid air cells appear clear.     IMPRESSION:     1. No definite acute intracranial findings. The patient has an old  left  cerebellar infarct. There is an area of decreased attenuation identified  within the right cerebellar hemisphere, which is also favored to reflect  a chronic infarct, although it was not present in July 2016.     Radiation dose reduction techniques were utilized, including automated  exposure control and exposure modulation based on body size.     This report was finalized on 3/11/2020 10:33 PM by Dr. Dolores Rivera,    During this visit the following were done:  Labs Reviewed [x]    Labs Ordered [x]    Radiology Reports Reviewed [x]    Radiology Ordered []    EKG, echo, and/or stress test reviewed [x]    EEG results reviewed  []    EEG reviewed and interpreted per myself   []    Discussed case with neurointerventionalist or neuroradiologist []    Referring Provider Records Reviewed []    ER Records Reviewed []    Hospital Records Reviewed []    History Obtained From Family []    Radiological images view and Interpreted per myself [x]    Case Discussed with referring provider []     Decision to obtain and request outside records  []        Assessment and Plan     AMS, favor encephalopathy over a TIA, symptoms resolved. Patient has evidence of bilateral old cerebellar and right thalamic strokes which suggest vertebrobasilar disease vs prior embolism from AF. 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 Currently on heparin qtt for PEs, DVTs.   - Observation on telemetry.   - Continue heparin qtt bridge to oral AC.   - Carotid duplex.   - Consider CTA head and neck for recurrent stroke symptoms.    Thanks,              Electronically signed by Tong Najera MD on 3/12/2020 at 13:27

## 2020-03-12 NOTE — PROGRESS NOTES
PROGRESS NOTE  Patient Name: Izaiah Garcia  Age/Sex: 88 y.o. male  : 1931  MRN: [id number]    Date of Admission: 2020  Date of Encounter Visit: 20   LOS: 12 days   Patient Care Team:  Uriah Godinez MD as PCP - General (Internal Medicine)    Chief Complaint: Patient had questionable facial asymmetry last night that was evaluated by the DVT    Hospital course: Patient was readmitted after an hospital admission for pneumonia with evidence of massive pulmonary embolism, and was managed with EKOS with directed thrombolytic therapy low-dose with good clinical response.  Patient had pleural effusion, and after evaluation by thoracic surgery decision was to have radiology replaced the chest tube with a CT-guidance.  This was done on 3/9/2020 but the patient had output less than 200 cc over the first 24 hours, TPA was administered on 3/10/2020 with nearly 500 cc additional output since.  He had repeat CAT scan this morning which was discussed below and he does have some worsening of the moderate pre-existing left sided effusion with good response to the right-sided effusion with the current chest tube.  The patient also had some facial asymmetry last night, DMT was consulted and the patient has CT of the head which was reassuring and he was cleared to continue with a heparin by the stroke MD.      REVIEW OF SYSTEMS:   CONSTITUTIONAL: no fever or chills  CARDIOVASCULAR: Chest pain after chest tube placement . No palpitations or edema.   RESPIRATORY: Oxygen supplementation required for the shortness of breath.   GASTROINTESTINAL: No anorexia, nausea, vomiting or diarrhea. No abdominal pain or blood.   HEMATOLOGIC: No bleeding or bruising.     Ventilator/Non-Invasive Ventilation Settings (From admission, onward)     Start     Ordered    20  NIPPV (CPAP or BIPAP)  Until Discontinued     Comments:  Pt founds on these settings:  IPAP min 12/ max 22  EPAP 8    Rate 18   Question Answer  "Comment   Indication: Acute Respiratory Failure    Type: BIPAP    NIPPV Mask Interface: Per Patient Preference    Tidal Volume 550    Breath Rate 18    Titrate for SPO2 90%        02/29/20 2019                  Vital Signs  Temp:  [97.6 °F (36.4 °C)-98.5 °F (36.9 °C)] 98.3 °F (36.8 °C)  Heart Rate:  [] 99  Resp:  [15-20] 18  BP: (114-131)/(53-84) 116/61  SpO2:  [87 %-93 %] 92 %  on  Flow (L/min):  [4-5] 4 Device (Oxygen Therapy): nasal cannula    Intake/Output Summary (Last 24 hours) at 3/12/2020 0915  Last data filed at 3/12/2020 0409  Gross per 24 hour   Intake 340 ml   Output 545 ml   Net -205 ml     Flowsheet Rows      First Filed Value   Admission Height  177.8 cm (70\") Documented at 02/29/2020 0800   Admission Weight  95 kg (209 lb 7 oz) Documented at 02/29/2020 0800        Body mass index is 26.46 kg/m².      03/10/20  0603 03/11/20  0703 03/12/20  0623   Weight: (unable to obtain weight due to bed extension) 83.8 kg (184 lb 12.8 oz) 83.6 kg (184 lb 6.4 oz)       Physical Exam:  GEN:  No acute distress, alert, cooperative, well developed, on nasal cannula oxygen  EYES:   Sclerae clear. No icterus. PERRL. Normal EOM  ENT:   External ears/nose normal, no oral lesions, no thrush, moist mucous membrane  NECK:  Supple, midline trachea, no JVD  LUNGS: Chest tube in position, no subcutaneous air, no air bubble, limited serosanguineous output of less than was up to more than 600 cc since initial placement.  Decreased breath sounds but no wheezes or crackles  CV:  Regular rhythm and rate. Normal S1/S2. No murmurs, gallops, or rubs noted.  ABD:  Soft, nontender and nondistended. Normal bowel sounds. No guarding  EXT:  Moves all extremities well. No cyanosis. No redness. No lower extremities edema on the right, minimal on the left  Skin: Dry, intact, no bleeding    Results Review:      Results from last 7 days   Lab Units 03/12/20  0506 03/11/20  0404 03/10/20  0320 03/09/20  0340 03/08/20  0352 03/07/20  0438 " 03/06/20  0729   SODIUM mmol/L 134* 136 134* 133* 130* 134* 133*   POTASSIUM mmol/L 3.6 3.8 3.9 4.2 3.4* 3.6 3.3*   CHLORIDE mmol/L 99 99 97* 98 95* 98 97*   CO2 mmol/L 24.1 24.5 24.0 23.0 26.2 26.0 26.3   BUN mg/dL 15 14 15 15 17 17 17   CREATININE mg/dL 0.68* 0.82 0.70* 0.78 0.74* 0.81 0.75*   CALCIUM mg/dL 7.9* 7.7* 8.2* 8.0* 7.7* 7.8* 8.0*   ANION GAP mmol/L 10.9 12.5 13.0 12.0 8.8 10.0 9.7   ALBUMIN g/dL 1.90* 2.20* 2.10* 1.90* 2.10* 2.00* 2.30*                 Results from last 7 days   Lab Units 03/12/20  0506 03/11/20  0404 03/10/20  0320 03/09/20  0340 03/08/20  0352 03/07/20  0438 03/06/20  1442   WBC 10*3/mm3 12.10* 12.16* 11.34* 11.16* 12.66* 15.07* 19.22*   HEMOGLOBIN g/dL 9.1* 9.2* 9.5* 9.6* 9.2* 9.8* 10.7*   HEMATOCRIT % 28.3* 27.8* 28.4* 28.6* 28.6* 29.8* 33.1*   PLATELETS 10*3/mm3 351 346 304 282 280 252 295   MCV fL 83.5 83.0 83.3 83.4 83.1 84.7 84.7   NEUTROPHIL % % 69.0 69.3 65.5 67.9 70.3 72.6 79.1*   LYMPHOCYTE % % 17.6* 16.8* 17.9* 15.6* 14.3* 10.7* 9.3*   MONOCYTES % % 8.2 9.0 11.6 11.8 11.8 10.6 7.5   EOSINOPHIL % % 2.1 1.7 2.0 1.8 0.9 1.5 0.5   BASOPHIL % % 0.6 0.7 0.4 0.5 0.3 0.4 0.3   IMM GRAN % % 2.5* 2.5* 2.6* 2.4* 2.4* 4.2* 3.3*     Results from last 7 days   Lab Units 03/12/20  0506 03/11/20 2022 03/11/20  1220 03/11/20  0556 03/11/20  0404 03/10/20  2024 03/10/20  1115  03/09/20  0757  03/06/20  1442   INR   --   --   --   --   --   --   --   --  1.40*  --  1.98*   APTT seconds 57.4* 74.2* 116.0* 110.3* 127.6* 66.7* 149.3*   < > 43.1*   < > 39.6*    < > = values in this interval not displayed.               Invalid input(s): LDLCALC  Results from last 7 days   Lab Units 03/11/20 2135   PH, ARTERIAL pH units 7.501*   PCO2, ARTERIAL mm Hg 35.6   PO2 ART mm Hg 53.0*   HCO3 ART mmol/L 27.8         Glucose   Date/Time Value Ref Range Status   03/11/2020 2124 126 70 - 130 mg/dL Final     Results from last 7 days   Lab Units 03/06/20  0729   PROCALCITONIN ng/mL 0.20       Results for  SARKIS LANDA (MRN [id number]) as of 3/7/2020 16:07   Ref. Range 3/2/2020 13:37   Color, Fluid Unknown Red   Appearance, Fluid Latest Ref Range: Clear  Turbid (A)   WBC, Fluid Latest Units: /mm3 589   RBC, Fluid Latest Units: /mm3 46,000   Neutrophils, Fluid Latest Units: % 58   Lymphocytes, Fluid Latest Units: % 27   Mononuclear, Fluid Latest Units: % 15   Other Cells/100 WBCs, Fluid Latest Units: /100 WBCs 2   pH, Fluid Unknown 7.84   Cholesterol, Fluid Latest Units: mg/dL 38   Glucose, Fluid Latest Units: mg/dL 123   LD, Fluid Latest Units: U/L 610   Protein, Total, Fluid Latest Units: g/dL 2.3     Results from last 7 days   Lab Units 03/11/20  2214   NITRITE UA  Negative   WBC UA /HPF 6-12*   BACTERIA UA /HPF None Seen   SQUAM EPITHEL UA /HPF 0-2               Imaging:   Imaging Results (All)     Procedure Component Value Units Date/Time    CT Chest Without Contrast [[id number]] Collected:  03/06/20 1549     Updated:  03/06/20 1711    Narrative:       CT CHEST WITHOUT CONTRAST     HISTORY: 88-year-old male with acute hypoxic respiratory failure.  Pneumonia and empyema. Pulmonary thromboemboli.     TECHNIQUE: Radiation dose reduction techniques were utilized, including  automated exposure control and exposure modulation based on body size.   3 mm images were obtained through the chest without the administration  of IV contrast. Compared with chest CTA from 02/29/2020.     FINDINGS: There is a much larger left lower lobe airspace consolidation  which consolidates nearly the entire left lower lobe. There is also a  larger right lower lobe airspace consolidation than previously. There is  a small-moderate left pleural effusion and there is a small loculated  appearing right pleural effusion (ultrasound-guided right thoracentesis  on 03/02/2020. There is some compressive atelectatic change at the upper  lobes, but the upper lobes are otherwise clear. There is no change in  the necrotic appearing right superior  paratracheal node measuring  approximately 3.7 x 2.6 cm. There are also stable shotty mediastinal  nodes which are stable. There is no acute abnormality within the  visualized upper abdomen. There is long-term stability of a lobular left  hepatic lobe cyst. 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       Impression:       1. Larger right lower lobe airspace consolidation and small loculated  right pleural effusion. Significantly larger left lower lobe  consolidation and significant increase in the small-moderate left  pleural effusion.  2. Stable necrotic appearing 3.7 x 2.6 cm right paratracheal node.     This report was finalized on 3/6/2020 5:08 PM by Dr. Melania De M.D.          I reviewed the patient's new clinical results.  I personally viewed and interpreted the patient's imaging results:the right effusion has been drained fairly well with the CT, the left effusion is slightly worse.          Medication Review:     amiodarone 400 mg Oral Q12H   budesonide 0.5 mg Nebulization BID - RT   carbidopa-levodopa 1 tablet Oral BID   cyanocobalamin 1,000 mcg Intramuscular Q28 Days   furosemide 40 mg Oral Daily   ipratropium-albuterol 3 mL Nebulization BID - RT   lidocaine 1 patch Transdermal Q24H   metoprolol tartrate 5 mg Intravenous Once   metoprolol tartrate 25 mg Oral Q12H   saccharomyces boulardii 250 mg Oral BID   sodium chloride 10 mL Intravenous Q12H         heparin (porcine) 18 Units/kg/hr Last Rate: 24 Units/kg/hr (03/12/20 0700)       ASSESSMENT:   1. Acute hypoxic respiratory failure  2. BPE s/p ekos catheter on 2/29/2020 with local TPA infusion with good clinical response  3. Troponemia suspect due to BPE  4. RV strain  5. Bilateral pleural effusion R greater than Left exudative with chest tube insertion on 3/9/2020  6. HLD  7. Esphageal stricture  8. Gen weakness  9. Parkinsons Disease  10. 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 HTN  13. Abnormal mass  around thyroid   14. hypokalemia, corrected  15. Hypoalbuminemia  16. Hyponatremia, mild    PLAN:  right effusion well drained, the patient had a follow up Ct chest that showed good response on the right with some worsening of there moderate left sided effusion., this Ct was already addressed by the thoracic team and the plan is to place a small Ct guided chest tube on the left as well.   He did have some neuro-changes that were evaluated by the D team and no acute stroke suspected and his neuro changes are back to baseline without any specific intervention.   Will continue to moniotor, and will follow  Discussed the CODE STATUS with the granddaughter and with the wife, the patient did not want to be kept on life support measures in case of any acute decompensation, we did review the different options of supportive measures that can be provided and at this point to clearly want him to be a conditional code with no intubation and no chest compressions or CPR.  Discussed with the nursing staff, CODE STATUS updated, will proceed with a CT guided Chest tube placement on the left side once he is off the heparin per the interventional radiology protocols and follow-up on the response.    disposition: Continue to monitor as an inpatient,      Rosa Garcia MD  03/12/20  09:15          Dictated utilizing Dragon dictation

## 2020-03-12 NOTE — PLAN OF CARE
Problem: Patient Care Overview  Goal: Plan of Care Review  Flowsheets (Taken 3/12/2020 0530)  Plan of Care Reviewed With: patient  Outcome Summary: Incontinent of stool x1 this shift. Pt increasingly alert and responsive overnight. No reports of pain. Resting well between care. Afib on the monitor, 4L NC. VSS will continue to monitor.

## 2020-03-12 NOTE — PROGRESS NOTES
Hospital Follow Up    LOS:  LOS: 12 days   Patient Name: Izaiah Garcia  Age/Sex: 88 y.o. male  : 1931  MRN: [id number]    Day of Service: 20   Length of Stay: 12  Encounter Provider: EBEN Jay  Place of Service: [facility] CARDIOLOGY  Patient Care Team:  Uriah Godinez MD as PCP - General (Internal Medicine)    Subjective:     Chief Complaint: SOA, Bilateral PE    Interval History: Overall breathing ok. Denies any heart racing or palpitations.     Objective:     Objective:  Temp:  [97.6 °F (36.4 °C)-98.5 °F (36.9 °C)] 98.3 °F (36.8 °C)  Heart Rate:  [] 99  Resp:  [15-20] 18  BP: (114-131)/(53-84) 116/61     Intake/Output Summary (Last 24 hours) at 3/12/2020 0923  Last data filed at 3/12/2020 0409  Gross per 24 hour   Intake 340 ml   Output 545 ml   Net -205 ml     Body mass index is 26.46 kg/m².      03/10/20  0603 20  0703 20  0623   Weight: (unable to obtain weight due to bed extension) 83.8 kg (184 lb 12.8 oz) 83.6 kg (184 lb 6.4 oz)     Weight change:     Physical Exam:   General Appearance:    Awake alert and oriented in no acute distress.   Color:  Skin:  Neuro:  HEENT:    Lungs:     Pink  Warm and dry  No focal, motor or sensory deficits  Neck supple, pupils equal, round and reactive. No JVD, No Bruit  Faint rales in the bases,respirations regular, even and                  unlabored    Heart:    Irregularly irregular rate and rhythm, S1 and S2, no murmur, no gallop, no rub. 2+ edema, DP/PT pulses are 2+   Chest Wall:    No abnormalities observed   Abdomen:     Normal bowel sounds, no masses, no organomegaly, soft        non-tender, non-distended, no guarding, no ascites noted   Extremities:   Moves all extremities well, no edema, no cyanosis, no redness       Lab Review:   Results from last 7 days   Lab Units 20  0506 20  0404   SODIUM mmol/L 134* 136   POTASSIUM mmol/L 3.6 3.8   CHLORIDE mmol/L 99 99   CO2 mmol/L  24.1 24.5   BUN mg/dL 15 14   CREATININE mg/dL 0.68* 0.82   GLUCOSE mg/dL 112* 124*   CALCIUM mg/dL 7.9* 7.7*         Results from last 7 days   Lab Units 03/12/20  0506 03/11/20  0404   WBC 10*3/mm3 12.10* 12.16*   HEMOGLOBIN g/dL 9.1* 9.2*   HEMATOCRIT % 28.3* 27.8*   PLATELETS 10*3/mm3 351 346     Results from last 7 days   Lab Units 03/12/20  0506 03/11/20 2022 03/09/20  0757  03/06/20  1442   INR   --   --   --  1.40*  --  1.98*   APTT seconds 57.4* 74.2*   < > 43.1*   < > 39.6*    < > = values in this interval not displayed.               Invalid input(s): LDLCALC          I reviewed the patient's new clinical results.  I personally viewed and interpreted the patient's EKG  Current Medications:   Scheduled Meds:  amiodarone 400 mg Oral Q12H   budesonide 0.5 mg Nebulization BID - RT   carbidopa-levodopa 1 tablet Oral BID   cyanocobalamin 1,000 mcg Intramuscular Q28 Days   furosemide 40 mg Oral Daily   ipratropium-albuterol 3 mL Nebulization BID - RT   lidocaine 1 patch Transdermal Q24H   metoprolol tartrate 5 mg Intravenous Once   metoprolol tartrate 25 mg Oral Q12H   saccharomyces boulardii 250 mg Oral BID   sodium chloride 10 mL Intravenous Q12H     Continuous Infusions:  heparin (porcine) 18 Units/kg/hr Last Rate: 24 Units/kg/hr (03/12/20 0700)       Allergies:  No Known Allergies    Assessment:       Acute saddle pulmonary embolism with acute cor pulmonale (CMS/HCC)    Pulmonary emboli (CMS/HCC)    Empyema of pleura (CMS/HCC)        Plan:      Patient went into A. fib yesterday.  But in the 100s to 130s.  He is currently on a heparin drip because he had to receive a chest tube for pleural effusion.  Going for CT today for possibly requiring a left-sided chest tube.  Going to start him on some oral amnio and beta-blockers to see if we can get him back in a normal rhythm.  His thyroid function has been normal.  Lucy EBEN Hackett  03/12/20  09:23  Electronically signed by EBEN Jay,  03/12/20, 9:23 AM.

## 2020-03-13 ENCOUNTER — APPOINTMENT (OUTPATIENT)
Dept: GENERAL RADIOLOGY | Facility: HOSPITAL | Age: 85
End: 2020-03-13

## 2020-03-13 LAB
ALBUMIN SERPL-MCNC: 2.7 G/DL (ref 3.5–5.2)
ANION GAP SERPL CALCULATED.3IONS-SCNC: 12.5 MMOL/L (ref 5–15)
APTT PPP: 117.4 SECONDS (ref 22.7–35.4)
APTT PPP: 180.4 SECONDS (ref 22.7–35.4)
APTT PPP: 62.9 SECONDS (ref 22.7–35.4)
APTT PPP: 95.6 SECONDS (ref 22.7–35.4)
BACTERIA SPEC AEROBE CULT: NO GROWTH
BASOPHILS # BLD AUTO: 0.08 10*3/MM3 (ref 0–0.2)
BASOPHILS NFR BLD AUTO: 0.6 % (ref 0–1.5)
BUN BLD-MCNC: 18 MG/DL (ref 8–23)
BUN/CREAT SERPL: 20.2 (ref 7–25)
CALCIUM SPEC-SCNC: 8.1 MG/DL (ref 8.6–10.5)
CHLORIDE SERPL-SCNC: 100 MMOL/L (ref 98–107)
CO2 SERPL-SCNC: 24.5 MMOL/L (ref 22–29)
CREAT BLD-MCNC: 0.89 MG/DL (ref 0.76–1.27)
DEPRECATED RDW RBC AUTO: 37.5 FL (ref 37–54)
EOSINOPHIL # BLD AUTO: 0.32 10*3/MM3 (ref 0–0.4)
EOSINOPHIL NFR BLD AUTO: 2.3 % (ref 0.3–6.2)
ERYTHROCYTE [DISTWIDTH] IN BLOOD BY AUTOMATED COUNT: 12.4 % (ref 12.3–15.4)
GFR SERPL CREATININE-BSD FRML MDRD: 81 ML/MIN/1.73
GLUCOSE BLD-MCNC: 137 MG/DL (ref 65–99)
HCT VFR BLD AUTO: 29.9 % (ref 37.5–51)
HGB BLD-MCNC: 9.6 G/DL (ref 13–17.7)
IMM GRANULOCYTES # BLD AUTO: 0.31 10*3/MM3 (ref 0–0.05)
IMM GRANULOCYTES NFR BLD AUTO: 2.3 % (ref 0–0.5)
LYMPHOCYTES # BLD AUTO: 2.07 10*3/MM3 (ref 0.7–3.1)
LYMPHOCYTES NFR BLD AUTO: 15.1 % (ref 19.6–45.3)
MCH RBC QN AUTO: 27.2 PG (ref 26.6–33)
MCHC RBC AUTO-ENTMCNC: 32.1 G/DL (ref 31.5–35.7)
MCV RBC AUTO: 84.7 FL (ref 79–97)
MONOCYTES # BLD AUTO: 1 10*3/MM3 (ref 0.1–0.9)
MONOCYTES NFR BLD AUTO: 7.3 % (ref 5–12)
NEUTROPHILS # BLD AUTO: 9.95 10*3/MM3 (ref 1.7–7)
NEUTROPHILS NFR BLD AUTO: 72.4 % (ref 42.7–76)
NRBC BLD AUTO-RTO: 0 /100 WBC (ref 0–0.2)
PHOSPHATE SERPL-MCNC: 3.6 MG/DL (ref 2.5–4.5)
PLATELET # BLD AUTO: 346 10*3/MM3 (ref 140–450)
PMV BLD AUTO: 9.5 FL (ref 6–12)
POTASSIUM BLD-SCNC: 3.6 MMOL/L (ref 3.5–5.2)
RBC # BLD AUTO: 3.53 10*6/MM3 (ref 4.14–5.8)
SODIUM BLD-SCNC: 137 MMOL/L (ref 136–145)
WBC NRBC COR # BLD: 13.73 10*3/MM3 (ref 3.4–10.8)

## 2020-03-13 PROCEDURE — 85730 THROMBOPLASTIN TIME PARTIAL: CPT | Performed by: INTERNAL MEDICINE

## 2020-03-13 PROCEDURE — 80069 RENAL FUNCTION PANEL: CPT | Performed by: INTERNAL MEDICINE

## 2020-03-13 PROCEDURE — 93010 ELECTROCARDIOGRAM REPORT: CPT | Performed by: INTERNAL MEDICINE

## 2020-03-13 PROCEDURE — 94799 UNLISTED PULMONARY SVC/PX: CPT

## 2020-03-13 PROCEDURE — 99232 SBSQ HOSP IP/OBS MODERATE 35: CPT | Performed by: NURSE PRACTITIONER

## 2020-03-13 PROCEDURE — 25010000002 AMIODARONE IN DEXTROSE 5% 150-4.21 MG/100ML-% SOLUTION: Performed by: NURSE PRACTITIONER

## 2020-03-13 PROCEDURE — 97110 THERAPEUTIC EXERCISES: CPT

## 2020-03-13 PROCEDURE — 71045 X-RAY EXAM CHEST 1 VIEW: CPT

## 2020-03-13 PROCEDURE — 85025 COMPLETE CBC W/AUTO DIFF WBC: CPT | Performed by: INTERNAL MEDICINE

## 2020-03-13 PROCEDURE — 97530 THERAPEUTIC ACTIVITIES: CPT

## 2020-03-13 PROCEDURE — 93005 ELECTROCARDIOGRAM TRACING: CPT | Performed by: NURSE PRACTITIONER

## 2020-03-13 PROCEDURE — 99233 SBSQ HOSP IP/OBS HIGH 50: CPT | Performed by: NURSE PRACTITIONER

## 2020-03-13 RX ADMIN — IPRATROPIUM BROMIDE AND ALBUTEROL SULFATE 3 ML: 2.5; .5 SOLUTION RESPIRATORY (INHALATION) at 08:11

## 2020-03-13 RX ADMIN — IPRATROPIUM BROMIDE AND ALBUTEROL SULFATE 3 ML: 2.5; .5 SOLUTION RESPIRATORY (INHALATION) at 20:20

## 2020-03-13 RX ADMIN — BUDESONIDE 0.5 MG: 0.5 INHALANT RESPIRATORY (INHALATION) at 20:20

## 2020-03-13 RX ADMIN — METOPROLOL TARTRATE 25 MG: 25 TABLET ORAL at 20:15

## 2020-03-13 RX ADMIN — AMIODARONE HYDROCHLORIDE 400 MG: 200 TABLET ORAL at 09:35

## 2020-03-13 RX ADMIN — BUDESONIDE 0.5 MG: 0.5 INHALANT RESPIRATORY (INHALATION) at 08:12

## 2020-03-13 RX ADMIN — POTASSIUM CHLORIDE 40 MEQ: 1.5 POWDER, FOR SOLUTION ORAL at 12:03

## 2020-03-13 RX ADMIN — METOPROLOL TARTRATE 25 MG: 25 TABLET ORAL at 09:35

## 2020-03-13 RX ADMIN — SODIUM CHLORIDE, PRESERVATIVE FREE 10 ML: 5 INJECTION INTRAVENOUS at 20:15

## 2020-03-13 RX ADMIN — Medication 250 MG: at 09:35

## 2020-03-13 RX ADMIN — POTASSIUM CHLORIDE 40 MEQ: 1.5 POWDER, FOR SOLUTION ORAL at 06:26

## 2020-03-13 RX ADMIN — ACETAMINOPHEN 650 MG: 325 TABLET, FILM COATED ORAL at 20:15

## 2020-03-13 RX ADMIN — SODIUM CHLORIDE, PRESERVATIVE FREE 10 ML: 5 INJECTION INTRAVENOUS at 09:36

## 2020-03-13 RX ADMIN — LIDOCAINE 1 PATCH: 50 PATCH CUTANEOUS at 09:20

## 2020-03-13 RX ADMIN — AMIODARONE HYDROCHLORIDE 400 MG: 200 TABLET ORAL at 20:15

## 2020-03-13 RX ADMIN — CARBIDOPA AND LEVODOPA 1 TABLET: 25; 100 TABLET ORAL at 20:15

## 2020-03-13 RX ADMIN — CARBIDOPA AND LEVODOPA 1 TABLET: 25; 100 TABLET ORAL at 09:35

## 2020-03-13 RX ADMIN — Medication 250 MG: at 20:15

## 2020-03-13 RX ADMIN — ACETAMINOPHEN 650 MG: 325 TABLET, FILM COATED ORAL at 09:20

## 2020-03-13 RX ADMIN — FUROSEMIDE 40 MG: 40 TABLET ORAL at 09:35

## 2020-03-13 RX ADMIN — AMIODARONE HYDROCHLORIDE 150 MG: 1.5 INJECTION, SOLUTION INTRAVENOUS at 09:35

## 2020-03-13 NOTE — PROGRESS NOTES
Hospital Follow Up    LOS:  LOS: 13 days   Patient Name: Izaiah Garcia  Age/Sex: 88 y.o. male  : 1931  MRN: [id number]    Day of Service: 20   Length of Stay: 13  Encounter Provider: EBEN Jay  Place of Service: [facility] CARDIOLOGY  Patient Care Team:  Uriah Godinez MD as PCP - General (Internal Medicine)    Subjective:     Chief Complaint: SOA, Bilateral PE    Interval History: No complaints today resting comfortably  Objective:     Objective:  Temp:  [97.6 °F (36.4 °C)-98.6 °F (37 °C)] 97.6 °F (36.4 °C)  Heart Rate:  [] 93  Resp:  [16-18] 16  BP: (100-121)/(46-85) 100/64     Intake/Output Summary (Last 24 hours) at 3/13/2020 1520  Last data filed at 3/13/2020 0750  Gross per 24 hour   Intake 240 ml   Output 1300 ml   Net -1060 ml     Body mass index is 26.46 kg/m².      03/10/20  0603 20  0703 20  0623   Weight: (unable to obtain weight due to bed extension) 83.8 kg (184 lb 12.8 oz) 83.6 kg (184 lb 6.4 oz)     Weight change:     Physical Exam:   General Appearance:    Awake alert and oriented in no acute distress.   Color:  Skin:  Neuro:  HEENT:    Lungs:     Pink  Warm and dry  No focal, motor or sensory deficits  Neck supple, pupils equal, round and reactive. No JVD, No Bruit  Faint rales in the bases,respirations regular, even and                  unlabored    Heart:    Irregularly irregular rate and rhythm, S1 and S2, no murmur, no gallop, no rub. 0 edema, DP/PT pulses are 2+   Chest Wall:    No abnormalities observed   Abdomen:     Normal bowel sounds, no masses, no organomegaly, soft        non-tender, non-distended, no guarding, no ascites noted   Extremities:   Moves all extremities well, no edema, no cyanosis, no redness       Lab Review:   Results from last 7 days   Lab Units 20  0454 20  0506   SODIUM mmol/L 137 134*   POTASSIUM mmol/L 3.6 3.6   CHLORIDE mmol/L 100 99   CO2 mmol/L 24.5 24.1   BUN mg/dL 18  15   CREATININE mg/dL 0.89 0.68*   GLUCOSE mg/dL 137* 112*   CALCIUM mg/dL 8.1* 7.9*         Results from last 7 days   Lab Units 03/13/20  0454 03/12/20  0506   WBC 10*3/mm3 13.73* 12.10*   HEMOGLOBIN g/dL 9.6* 9.1*   HEMATOCRIT % 29.9* 28.3*   PLATELETS 10*3/mm3 346 351     Results from last 7 days   Lab Units 03/13/20  1436 03/13/20  1152  03/12/20  1245  03/09/20  0757   INR   --   --   --  1.40*  --  1.40*   APTT seconds 117.4* 62.9*   < > 41.1*   < > 43.1*    < > = values in this interval not displayed.               Invalid input(s): LDLCALC          I reviewed the patient's new clinical results.  I personally viewed and interpreted the patient's EKG  Current Medications:   Scheduled Meds:    amiodarone 400 mg Oral Q12H   budesonide 0.5 mg Nebulization BID - RT   carbidopa-levodopa 1 tablet Oral BID   cyanocobalamin 1,000 mcg Intramuscular Q28 Days   furosemide 40 mg Oral Daily   ipratropium-albuterol 3 mL Nebulization BID - RT   lidocaine 1 patch Transdermal Q24H   metoprolol tartrate 25 mg Oral Q12H   saccharomyces boulardii 250 mg Oral BID   sodium chloride 10 mL Intravenous Q12H     Continuous Infusions:    heparin (porcine) 18 Units/kg/hr Last Rate: 25 Units/kg/hr (03/13/20 0609)       Allergies:  No Known Allergies    ECHO: 2/29/2020  · Estimated EF = 54%.  · Left ventricular systolic function is normal.  · Left ventricular wall thickness is consistent with mild concentric hypertrophy.  · Left ventricular diastolic dysfunction (grade I) consistent with impaired relaxation.  · Right ventricular cavity is moderate-to-severely dilated.  · Mildly reduced right ventricular systolic function noted.  · Right atrial cavity size is moderately dilated.  Assessment:     1.  Acute saddle pulmonary embolus status post EKOS  2.  Right-sided heart failure  3.  Pleural effusions status post bilateral chest tubes  4.  New onset A. Fib CHADSVASC 6  5.  Hypertension    Plan:     Remains in A. fib but heart rate is well  controlled in the 70s to 90s.  I gave him a bolus of IV amiodarone today and will continue on 400 twice a day.  He is tolerating low-dose beta-blocker metoprolol tartrate 25 mg twice a day.  His edema is doing much better he actually has no edema today, his fluid balance is negative liter.  His labs are stable.  He did end up getting a chest tube yesterday for a large left pleural effusion.  He remains on low-dose heparin drip.     EBEN Jay  03/13/20  15:20  Electronically signed by EBEN Jay, 03/12/20, 9:23 AM.

## 2020-03-13 NOTE — PLAN OF CARE
Problem: Patient Care Overview  Goal: Plan of Care Review  Outcome: Ongoing (interventions implemented as appropriate)  Flowsheets (Taken 3/13/2020 1008)  Progress: improving  Plan of Care Reviewed With: patient  Outcome Summary: Pt with much improved mobility today. Pt required [place]/MinAX2 for bed mobility.  Pt performed 4 STS transfers with rest breaks in between each one, pt required MinAX2 for each one and pt demo improved posture. Pt able to maintain standing balance for 1 to 2 minutes each time. While standing, pt performed marches to practice weight shifting. Pt still has difficulty advancing LEs forward but less difficulty side stepping.  Will continue to progress pt as able.

## 2020-03-13 NOTE — PROGRESS NOTES
"DOS: 3/13/2020  NAME: Izaiah Garcia   : 1931  PCP: Uriah Godinez MD  Chief Complaint   Patient presents with   • Respiratory Distress   • Shortness of Breath   Reason for consult: AMS    Stroke    Subjective: Patient is back to baseline per family at bedside. No further episodes of lethargy. Patient denies headache or SOA. Pt seen in follow up today, however the problem is new to the examiner.      Objective:  Vital signs: /64 (BP Location: Left arm, Patient Position: Lying)   Pulse 93   Temp 97.6 °F (36.4 °C) (Oral)   Resp 16   Ht 177.8 cm (70\")   Wt 83.6 kg (184 lb 6.4 oz)   SpO2 95%   BMI 26.46 kg/m²       General appearance: Well developed, well nourished, well groomed, alert and cooperative.   HEENT: Normocephalic.   Neck and spine: Normal range of motion. Normal alignment. No mass or tenderness.    Cardiac: Regular rate and rhythm.   Peripheral Vasculature: Radial pulses are equal and symmetric.  Chest Exam: Clear to auscultation bilaterally, no wheezes, no rhonchi.  Extremities: Normal, no edema.   Skin: No rashes or birthmarks.     Higher integrative function: Oriented to self, month, year, and location. Follows 2 step commands. Speech fluent.  CN II: Normal  visual fields.   CN III IV VI: Extraocular movements are full without nystagmus. Pupils are equal, round, and reactive to light.   CN V: Normal facial sensation.  CN VII: Facial movements are symmetric, no weakness.   CN VIII: Auditory acuity is normal.   CN IX & X: Symmetric palatal movement.   CN XI: Sternocleidomastoid and trapezius are normal. No weakness.   CN XII: The tongue is midline. No atrophy or fasciculations.   Motor: Normal muscle strength in upper extremities, 4/5 in lower extremities. Postural tremor in all extremities.   Sensation: LT intact x4.   Station and gait: N/A  Muscle stretch reflexes: Plantar reflexes are flexor bilaterally.   Coordination: Finger to nose test showed no dysmetria.     Scheduled " Meds:  amiodarone 400 mg Oral Q12H   budesonide 0.5 mg Nebulization BID - RT   carbidopa-levodopa 1 tablet Oral BID   cyanocobalamin 1,000 mcg Intramuscular Q28 Days   furosemide 40 mg Oral Daily   ipratropium-albuterol 3 mL Nebulization BID - RT   lidocaine 1 patch Transdermal Q24H   metoprolol tartrate 25 mg Oral Q12H   saccharomyces boulardii 250 mg Oral BID   sodium chloride 10 mL Intravenous Q12H     Continuous Infusions:  heparin (porcine) 18 Units/kg/hr Last Rate: 25 Units/kg/hr (03/13/20 0609)     PRN Meds:.•  acetaminophen  •  heparin (porcine)  •  hydrALAZINE  •  HYDROcodone-acetaminophen  •  potassium chloride **OR** potassium chloride **OR** potassium chloride  •  [COMPLETED] Insert peripheral IV **AND** sodium chloride  •  sodium chloride    Laboratory results:  Lab Results   Component Value Date    GLUCOSE 137 (H) 03/13/2020    CALCIUM 8.1 (L) 03/13/2020     03/13/2020    K 3.6 03/13/2020    CO2 24.5 03/13/2020     03/13/2020    BUN 18 03/13/2020    CREATININE 0.89 03/13/2020    EGFRIFAFRI 72 01/03/2020    EGFRIFNONA 81 03/13/2020    BCR 20.2 03/13/2020    ANIONGAP 12.5 03/13/2020     Lab Results   Component Value Date    WBC 13.73 (H) 03/13/2020    HGB 9.6 (L) 03/13/2020    HCT 29.9 (L) 03/13/2020    MCV 84.7 03/13/2020     03/13/2020     No results found for: CHOL  Lab Results   Component Value Date    HDL 30 (L) 08/12/2019    HDL 35 (L) 06/29/2016    HDL 35 (L) 04/14/2015     Lab Results   Component Value Date    LDL 80 08/12/2019    LDL 75 06/29/2016    LDL 66 04/14/2015     Lab Results   Component Value Date    TRIG 157 (H) 01/03/2020    TRIG 106 08/12/2019    TRIG 80 05/02/2019     ECG 3/12 tracings viewed by me, shows afib  Review and interpretation of imaging:CT head images viewed by me, no acute findings. Old small R cerebellar and old large L cerebellar strokes noted.     Interpretation Summary     · Proximal right internal carotid artery plaque without significant  stenosis.  · Proximal left internal carotid artery plaque without significant stenosis.        CT HEAD WITHOUT CONTRAST     HISTORY: Confusion     COMPARISON: 07/12/2016     TECHNIQUE: Axial CT imaging was obtained from vertex of the skull  through the skull base. No IV contrast was administered.     FINDINGS:  No acute intracranial hemorrhage is seen. There is diffuse atrophy.  There is periventricular and deep white matter microangiopathic change.  There is no midline shift or mass effect. The patient has evidence of an  old left cerebellar infarction. There is a smaller area of decreased  attenuation identified within the right cerebellar hemisphere. This  measures 1.7 x 1.1 cm. This may represent an old infarct as well, but  was not present on prior MRI from July 2016. MRI would be more sensitive  for evaluation of chronicity. Again, it is favored to be chronic. Old  lacunar infarct is seen within the right thalamus. The paranasal sinuses  and mastoid air cells appear clear.     IMPRESSION:     1. No definite acute intracranial findings. The patient has an old left  cerebellar infarct. There is an area of decreased attenuation identified  within the right cerebellar hemisphere, which is also favored to reflect  a chronic infarct, although it was not present in July 2016.     Radiation dose reduction techniques were utilized, including automated  exposure control and exposure modulation based on body size.     This report was finalized on 3/11/2020 10:33 PM by Dr. Dolores Rivera M.D.    Impression:  Patient is an 88-year-old male with hypertension, hyperlipidemia, impaired fasting glucose, Parkinsonism, and previous left cerebellar stroke in 2016 who was admitted from the nursing home 2/29 with hypoxia and found to have bilateral lower extremity DVTs and Pes, s/p EKOS with local tpa.   He is s/p chest tube for right pleural effusion, worsening left pleural effusion, suspected empyema.  He has been on a  heparin drip.  On the evening of 3/11 he was noted to be more lethargic with a question of facial asymmetry so a rapid response was called.  Exam was nonfocal and patient was arousable per ICU RN.  PO2 was 53 on 4 L and he was increased to 5 L.  CT head showed chronic changes with known old large left cerebellar stroke however there was also a chronic R cerebellar stroke which was new since 2016.  He was back to baseline by the time Dr. Najera saw him on 3/12.    Dx: AMS/lethargy from unknown etiology, possibly TME   Carotid u/s: bilateral ICA plaque but no significant stenosis    Plan:  Patient back to baseline. No further neuro work up. D/W Dr Najera today. Will sign off, please call if further questions/concerns.

## 2020-03-13 NOTE — PROGRESS NOTES
PROGRESS NOTE  Patient Name: Izaiah Garcia  Age/Sex: 88 y.o. male  : 1931  MRN: [id number]    Date of Admission: 2020  Date of Encounter Visit: 20   LOS: 13 days   Patient Care Team:  Uriah Godinez MD as PCP - General (Internal Medicine)    Chief Complaint: Patient had questionable facial asymmetry last night that was evaluated by the DVT    Hospital course: Patient was readmitted after an hospital admission for pneumonia with evidence of massive pulmonary embolism, and was managed with EKOS with directed thrombolytic therapy low-dose with good clinical response.  Patient had pleural effusion, and after evaluation by thoracic surgery decision was to have radiology replaced the chest tube with a CT-guidance.  This was done on 3/9/2020 but the patient had output less than 200 cc over the first 24 hours, TPA was administered on 3/10/2020 with nearly 600 cc total output afterwards.  The right-sided chest tube was removed on 3/12/2020 when the output was minimal and patient had a CT-guided placement of left-sided small chest tube with 1200 cc output so far.  Patient is on the heparin drip which was discontinued only for few hours before the procedure and was resumed afterwards with no problem with bleeding.  Still on oxygen, his appetite is better, he has no other complaints, pain from the chest tube is minimal    REVIEW OF SYSTEMS:   CONSTITUTIONAL: no fever or chills  CARDIOVASCULAR: minimal discomfort from the chest tube site. No palpitations or edema.   RESPIRATORY: Oxygen supplementation required for the shortness of breath.   GASTROINTESTINAL: No anorexia, nausea, vomiting or diarrhea. No abdominal pain or blood.   HEMATOLOGIC: No bleeding or bruising.     Ventilator/Non-Invasive Ventilation Settings (From admission, onward)     Start     Ordered    20  NIPPV (CPAP or BIPAP)  Until Discontinued     Comments:  Pt founds on these settings:  IPAP min 12/ max 22  EPAP 8  VT  "550  Rate 18   Question Answer Comment   Indication: Acute Respiratory Failure    Type: BIPAP    NIPPV Mask Interface: Per Patient Preference    Tidal Volume 550    Breath Rate 18    Titrate for SPO2 90%        02/29/20 2019                  Vital Signs  Temp:  [97.6 °F (36.4 °C)-98.8 °F (37.1 °C)] 98 °F (36.7 °C)  Heart Rate:  [] 93  Resp:  [16-18] 18  BP: (101-149)/() 110/85  SpO2:  [92 %-95 %] 95 %  on  Flow (L/min):  [2-4] 2 Device (Oxygen Therapy): nasal cannula    Intake/Output Summary (Last 24 hours) at 3/13/2020 1005  Last data filed at 3/13/2020 0750  Gross per 24 hour   Intake 240 ml   Output 1450 ml   Net -1210 ml     Flowsheet Rows      First Filed Value   Admission Height  177.8 cm (70\") Documented at 02/29/2020 0800   Admission Weight  95 kg (209 lb 7 oz) Documented at 02/29/2020 0800        Body mass index is 26.46 kg/m².      03/10/20  0603 03/11/20  0703 03/12/20  0623   Weight: (unable to obtain weight due to bed extension) 83.8 kg (184 lb 12.8 oz) 83.6 kg (184 lb 6.4 oz)       Physical Exam:  GEN:  No acute distress, alert, cooperative, well developed, on nasal cannula oxygen  EYES:   Sclerae clear. No icterus. PERRL. Normal EOM  ENT:   External ears/nose normal, no oral lesions, no thrush, slightly dry mucous membrane  NECK:  Supple, midline trachea, no JVD  LUNGS: Left-sided chest tube in position with 1200 cc output so far with serosanguineous material that seems to be less bloody over the last 12 hours, patient does have some minimal crackles posteriorly with improved breath sounds  CV:  Regular rhythm and rate. Normal S1/S2. No murmurs, gallops, or rubs noted.  ABD:  Soft, nontender and nondistended. Normal bowel sounds. No guarding  EXT:  Moves all extremities well. No cyanosis. No redness. No lower extremities edema on the right, minimal on the left  Skin: Dry, intact, no bleeding    Results Review:      Results from last 7 days   Lab Units 03/13/20  0454 03/12/20  0506 " 03/11/20  0404 03/10/20  0320 03/09/20  0340 03/08/20  0352 03/07/20  0438   SODIUM mmol/L 137 134* 136 134* 133* 130* 134*   POTASSIUM mmol/L 3.6 3.6 3.8 3.9 4.2 3.4* 3.6   CHLORIDE mmol/L 100 99 99 97* 98 95* 98   CO2 mmol/L 24.5 24.1 24.5 24.0 23.0 26.2 26.0   BUN mg/dL 18 15 14 15 15 17 17   CREATININE mg/dL 0.89 0.68* 0.82 0.70* 0.78 0.74* 0.81   CALCIUM mg/dL 8.1* 7.9* 7.7* 8.2* 8.0* 7.7* 7.8*   ANION GAP mmol/L 12.5 10.9 12.5 13.0 12.0 8.8 10.0   ALBUMIN g/dL 2.70* 1.90* 2.20* 2.10* 1.90* 2.10* 2.00*                 Results from last 7 days   Lab Units 03/13/20  0454 03/12/20  0506 03/11/20  0404 03/10/20  0320 03/09/20  0340 03/08/20  0352 03/07/20  0438   WBC 10*3/mm3 13.73* 12.10* 12.16* 11.34* 11.16* 12.66* 15.07*   HEMOGLOBIN g/dL 9.6* 9.1* 9.2* 9.5* 9.6* 9.2* 9.8*   HEMATOCRIT % 29.9* 28.3* 27.8* 28.4* 28.6* 28.6* 29.8*   PLATELETS 10*3/mm3 346 351 346 304 282 280 252   MCV fL 84.7 83.5 83.0 83.3 83.4 83.1 84.7   NEUTROPHIL % % 72.4 69.0 69.3 65.5 67.9 70.3 72.6   LYMPHOCYTE % % 15.1* 17.6* 16.8* 17.9* 15.6* 14.3* 10.7*   MONOCYTES % % 7.3 8.2 9.0 11.6 11.8 11.8 10.6   EOSINOPHIL % % 2.3 2.1 1.7 2.0 1.8 0.9 1.5   BASOPHIL % % 0.6 0.6 0.7 0.4 0.5 0.3 0.4   IMM GRAN % % 2.3* 2.5* 2.5* 2.6* 2.4* 2.4* 4.2*     Results from last 7 days   Lab Units 03/13/20  0454 03/12/20  2210 03/12/20  1245 03/12/20  0506 03/11/20 2022 03/11/20  1220 03/11/20  0556  03/09/20  0757  03/06/20  1442   INR   --   --  1.40*  --   --   --   --   --  1.40*  --  1.98*   APTT seconds 180.4* 57.8* 41.1* 57.4* 74.2* 116.0* 110.3*   < > 43.1*   < > 39.6*    < > = values in this interval not displayed.               Invalid input(s): LDLCALC  Results from last 7 days   Lab Units 03/11/20 2135   PH, ARTERIAL pH units 7.501*   PCO2, ARTERIAL mm Hg 35.6   PO2 ART mm Hg 53.0*   HCO3 ART mmol/L 27.8         Glucose   Date/Time Value Ref Range Status   03/11/2020 2124 126 70 - 130 mg/dL Final         Results from last 7 days   Lab Units  03/12/20  1435 03/11/20  2214   BODYFLDCX  No growth  --    URINECX   --  No growth   Results for SARKIS LANDA (MRN [id number]) as of 3/7/2020 16:07   Ref. Range 3/2/2020 13:37   Color, Fluid Unknown Red   Appearance, Fluid Latest Ref Range: Clear  Turbid (A)   WBC, Fluid Latest Units: /mm3 589   RBC, Fluid Latest Units: /mm3 46,000   Neutrophils, Fluid Latest Units: % 58   Lymphocytes, Fluid Latest Units: % 27   Mononuclear, Fluid Latest Units: % 15   Other Cells/100 WBCs, Fluid Latest Units: /100 WBCs 2   pH, Fluid Unknown 7.84   Cholesterol, Fluid Latest Units: mg/dL 38   Glucose, Fluid Latest Units: mg/dL 123   LD, Fluid Latest Units: U/L 610   Protein, Total, Fluid Latest Units: g/dL 2.3     Results from last 7 days   Lab Units 03/11/20  2214   NITRITE UA  Negative   WBC UA /HPF 6-12*   BACTERIA UA /HPF None Seen   SQUAM EPITHEL UA /HPF 0-2   URINECX  No growth               Imaging:   Imaging Results (All)     Procedure Component Value Units Date/Time    CT Chest Without Contrast [[id number]] Collected:  03/06/20 1549     Updated:  03/06/20 1711    Narrative:       CT CHEST WITHOUT CONTRAST     HISTORY: 88-year-old male with acute hypoxic respiratory failure.  Pneumonia and empyema. Pulmonary thromboemboli.     TECHNIQUE: Radiation dose reduction techniques were utilized, including  automated exposure control and exposure modulation based on body size.   3 mm images were obtained through the chest without the administration  of IV contrast. Compared with chest CTA from 02/29/2020.     FINDINGS: There is a much larger left lower lobe airspace consolidation  which consolidates nearly the entire left lower lobe. There is also a  larger right lower lobe airspace consolidation than previously. There is  a small-moderate left pleural effusion and there is a small loculated  appearing right pleural effusion (ultrasound-guided right thoracentesis  on 03/02/2020. There is some compressive atelectatic change at the  upper  lobes, but the upper lobes are otherwise clear. There is no change in  the necrotic appearing right superior paratracheal node measuring  approximately 3.7 x 2.6 cm. There are also stable shotty mediastinal  nodes which are stable. There is no acute abnormality within the  visualized upper abdomen. There is long-term stability of a lobular left  hepatic lobe cyst. 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       Impression:       1. Larger right lower lobe airspace consolidation and small loculated  right pleural effusion. Significantly larger left lower lobe  consolidation and significant increase in the small-moderate left  pleural effusion.  2. Stable necrotic appearing 3.7 x 2.6 cm right paratracheal node.     This report was finalized on 3/6/2020 5:08 PM by Dr. Melania De M.D.            I reviewed the patient's new clinical results.  I personally viewed and interpreted the patient's imaging results: Good response to the left-sided chest tube with reexpansion of the left lung, the right chest tube has been removed since.          Medication Review:     amiodarone 400 mg Oral Q12H   budesonide 0.5 mg Nebulization BID - RT   carbidopa-levodopa 1 tablet Oral BID   cyanocobalamin 1,000 mcg Intramuscular Q28 Days   furosemide 40 mg Oral Daily   ipratropium-albuterol 3 mL Nebulization BID - RT   lidocaine 1 patch Transdermal Q24H   metoprolol tartrate 25 mg Oral Q12H   saccharomyces boulardii 250 mg Oral BID   sodium chloride 10 mL Intravenous Q12H         heparin (porcine) 18 Units/kg/hr Last Rate: 25 Units/kg/hr (03/13/20 0609)       ASSESSMENT:   1. Acute hypoxic respiratory failure  2. BPE s/p ekos catheter on 2/29/2020 with local TPA infusion with good clinical response  3. Troponemia suspect due to BPE  4. RV strain  5. Bilateral pleural effusion R greater than Left exudative with chest tube insertion on 3/9/2020  6. HLD  7. Esphageal stricture  8. Gen  weakness  9. Parkinsons Disease  10. 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 HTN  13. Abnormal mass around thyroid   14. hypokalemia, corrected  15. Hypoalbuminemia  16. Hyponatremia, mild    PLAN:  the output from the chest tube is looking pretty good from the left side  To be removed once okay with the thoracic surgery team  Continue with a heparin drip, will transition to oral anticoagulation once the chest tube is out  Patient to continue to work with physical therapy  Appetite is better  CODE STATUS was changed to conditional code on yesterday's discussion.  Overall seems to be improving      disposition: Continue to monitor as an inpatient,      Rosa Garcia MD  03/13/20  10:05          Dictated utilizing Dragon dictation

## 2020-03-13 NOTE — THERAPY TREATMENT NOTE
Patient Name: Izaiah Garcia  : 1931    MRN: [id number]                              Today's Date: 3/13/2020       Admit Date: 2020    Visit Dx:     ICD-10-CM ICD-9-CM   1. Healthcare-associated pneumonia J18.9 486   2. Acute hypoxemic respiratory failure (CMS/HCC) J96.01 518.81   3. Bilateral pulmonary embolism (CMS/HCC) I26.99 415.19   4. Acute saddle pulmonary embolism with acute cor pulmonale (CMS/HCC) I26.02 415.13     415.0     Patient Active Problem List   Diagnosis   • Hyperlipidemia   • Benign essential hypertension   • History of gout   • History of esophageal stricture   • History of stroke, 2016--4.9 x 4 x 3 cm inferior left cerebellar infarct   • Rheumatoid factor positive   • Impaired fasting glucose   • At risk for falls   • Generalized weakness   • Bilateral high frequency sensorineural hearing loss, wears hearing aids   • Bilateral lower extremity edema   • Parkinson's disease (CMS/HCC)   • Therapeutic drug monitoring   • Vitamin D deficiency   • Macrocytosis   • Vitamin B12 deficiency   • Acute diarrhea   • Hospital-acquired pneumonia   • HAP (hospital-acquired pneumonia)   • Acute UTI (urinary tract infection)   • Colitis   • Parkinson disease (CMS/HCC)   • Weakness   • Aspiration pneumonia of right lower lobe due to vomit (CMS/HCC)   • Pulmonary emboli (CMS/HCC)   • Acute saddle pulmonary embolism with acute cor pulmonale (CMS/HCC)   • Empyema of pleura (CMS/HCC)     Past Medical History:   Diagnosis Date   • At risk for falls 2019   • Benign essential hypertension 2016   • Bilateral high frequency sensorineural hearing loss, wears hearing aids 2019   • Bilateral lower extremity edema 2019    May 2, 2019--patient who has hypertension and is on amlodipine 10 mg/day is complaining of progressively worsening swelling of the lower extremities that extends up to about mid calf.  Gets worse at the end of the day and tends to get better overnight when he props his  "feet up.  I think this is definitely related to the amlodipine although patient may have some venous insufficiency.  Medication ad   • Decreased peripheral vision of both eyes 5/2/2019    May 2, 2019--continues to have complaints of \"dizziness\" associated with weakness in his lower extremities.  He is not describing a spinning sensation or anything remotely close to vertigo.  Instead he is describing an off-balance sensation.  He tends to fall forward if not careful and he tends to list towards the right side.  He has marked difficulty going down an incline.  He has to ambulate wit   • History of aspiration pneumonia 5/4/2015   • History of esophageal stricture 5/4/2015    July 3, 2018--EGD revealed a distal esophageal stricture that was dilated to 18 mm.  There was a small hiatal hernia.  There was mild streaky erythema in the proximal stomach.  Duodenal bulb normal.  April 26, 2016--EGD performed for dysphagia reveals a distal esophageal stricture that was dilated 16.5 mm.   • History of gout 6/29/2016   • History of stroke, 6/29/2016--4.9 x 4 x 3 cm inferior left cerebellar infarct 5/4/2015 June 29, 2016--MRI of the brain performed for complaints of dizziness and weakness. IMPRESSION: 1. There is susceptibility artifact streaking through the anterior left frontal scalp through the anterior left frontal bone in the anterior tipof the left frontal lobe likely from a tiny piece of metal in the anterior left frontal scalp slightly limits evaluation of these structures. 2. There is mild s   • Hyperlipidemia 6/29/2016   • Impaired fasting glucose 5/2/2019 April 14, 2015--hemoglobin A1c 5.9.   • Macrocytosis 5/2/2019   • Parkinson's disease (CMS/[facility]) 5/2/2019    May 2, 2019--continues to have complaints of \"dizziness\" associated with weakness in his lower extremities.  He is not describing a spinning sensation or anything remotely close to vertigo.  Instead he is describing an off-balance sensation.  He tends to " fall forward if not careful and he tends to list towards the right side.  He has marked difficulty going down an incline.  He has to ambulate wit   • Rheumatoid factor positive 5/2/2019 March 25, 2014--rheumatoid factor returned positive at 95.  However, CCP antibodies normal at 8, SHIV negative, both C&P ANCA negative, C-reactive protein normal at 0.24, sed rate normal at 2.   • Vitamin B12 deficiency 6/6/2019 June 6, 2019--patient recently had mildly elevated methylmalonic acid of 380.  Repeat methylmalonic acid was upper limit of normal at 356.  Given patient's neurologic symptoms and suspected parkinsonism, I have a low threshold for treating vitamin B12 deficiency.  We will initiate vitamin B12 injections today.  2000 mcg subcutaneously given today.   • Vitamin D deficiency 5/2/2019     Past Surgical History:   Procedure Laterality Date   • CARDIAC CATHETERIZATION N/A 2/29/2020    Procedure: Thrombolytic Therapy- EKOS;  Surgeon: Jason Griffiths MD;  Location: [facility] CATH INVASIVE LOCATION;  Service: Cardiovascular;  Laterality: N/A;   • CATARACT EXTRACTION EXTRACAPSULAR W/ INTRAOCULAR LENS IMPLANTATION Bilateral     Bilateral cataract extirpation with intraocular lens implantation   • CHOLECYSTECTOMY     • EKOS CATHETER PLACEMENT Bilateral 2/29/2020    Procedure: Ekos catheter placement;  Surgeon: Jason Griffiths MD;  Location:  [facility] CATH INVASIVE LOCATION;  Service: Cardiovascular;  Laterality: Bilateral;   • ESOPHAGEAL DILATATION  04/26/2016 April 26, 2016--EGD performed for dysphagia reveals a distal esophageal stricture that was dilated 16.5 mm.   • ESOPHAGEAL DILATATION  07/03/2018    July 3, 2018--EGD revealed a distal esophageal stricture that was dilated to 18 mm.  There was a small hiatal hernia.  There was mild streaky erythema in the proximal stomach.  Duodenal bulb normal.   • INTERVENTIONAL RADIOLOGY PROCEDURE Bilateral 2/29/2020    Procedure: Pulmonary Angiogram;  Surgeon: Tunde  MD Jason;  Location: [facility] INVASIVE LOCATION;  Service: Cardiovascular;  Laterality: Bilateral;     General Information     Row Name 03/13/20 1003          PT Evaluation Time/Intention    Document Type  therapy note (daily note)  -     Mode of Treatment  individual therapy;physical therapy  -[facility] Name 03/13/20 1003          General Information    Existing Precautions/Restrictions  fall;oxygen therapy device and L/min  -     Barriers to Rehab  hearing deficit [place]  -     Row Name 03/13/20 1003          Cognitive Assessment/Intervention- PT/OT    Orientation Status (Cognition)  oriented to;person;place  -[facility] Name 03/13/20 1003          Safety Issues, Functional Mobility    Impairments Affecting Function (Mobility)  balance;endurance/activity tolerance;strength  -       User Key  (r) = Recorded By, (t) = Taken By, (c) = Cosigned By    Initials Name Provider Type     Ivis Magallon PTA Physical Therapy Assistant        Mobility     Row Name 03/13/20 1004          Bed Mobility Assessment/Treatment    Supine-Sit [place] (Bed Mobility)  minimum assist (75% patient effort);verbal cues  -     Sit-Supine [place] (Bed Mobility)  minimum assist (75% patient effort);2 person assist;verbal cues  -     Assistive Device (Bed Mobility)  bed rails;head of bed elevated  -[facility] Name 03/13/20 1004          Transfer Assessment/Treatment    Comment (Transfers)  4 STS transfers for strengthening   -[facility] Name 03/13/20 1004          Sit-Stand Transfer    Sit-Stand [place] (Transfers)  minimum assist (75% patient effort);2 person assist  -     Assistive Device (Sit-Stand Transfers)  walker, front-wheeled  -[facility] Name 03/13/20 1004          Gait/Stairs Assessment/Training    [place] Level (Gait)  minimum assist (75% patient effort);2 person assist  -     Assistive Device (Gait)  walker, front-wheeled  -     Distance in Feet (Gait)  3 marching in place, 3 steps  forward/back along with side steps   -     Pattern (Gait)  step-to  -     Deviations/Abnormal Patterns (Gait)  festinating/shuffling;stride length decreased;gait speed decreased  -     Bilateral Gait Deviations  forward flexed posture;weight shift ability decreased;heel strike decreased  -     Comment (Gait/Stairs)  Much improved stands. Pt still having difficulty advancing LEs forward but does well side stepping.   -       User Key  (r) = Recorded By, (t) = Taken By, (c) = Cosigned By    Initials Name Provider Type     Ivis Magallon PTA Physical Therapy Assistant        Obj/Interventions     Row Name 03/13/20 1007          Therapeutic Exercise    Lower Extremity (Therapeutic Exercise)  marching while standing;LAQ (long arc quad), bilateral  -     Exercise Type (Therapeutic Exercise)  AROM (active range of motion)  -     Position (Therapeutic Exercise)  seated  -     Sets/Reps (Therapeutic Exercise)  X10  -     Row Name 03/13/20 1007          Static Sitting Balance    Level of Lovely (Unsupported Sitting, Static Balance)  supervision  -     Sitting Position (Unsupported Sitting, Static Balance)  sitting on edge of bed  -     Time Able to Maintain Position (Unsupported Sitting, Static Balance)  2 to 3 minutes  -     Row Name 03/13/20 1007          Static Standing Balance    Level of Lovely (Supported Standing, Static Balance)  minimal assist, 75% patient effort;2 person assist  -     Time Able to Maintain Position (Supported Standing, Static Balance)  2 to 3 minutes  -     Assistive Device Utilized (Supported Standing, Static Balance)  walker, rolling  -     Comment (Supported Standing, Static Balance)  Pt with improved posture.   -       User Key  (r) = Recorded By, (t) = Taken By, (c) = Cosigned By    Initials Name Provider Type     Ivis Magallon PTA Physical Therapy Assistant        Goals/Plan    No documentation.       Clinical Impression     Row Name 03/13/20  1008          Pain Scale: Numbers Pre/Post-Treatment    Pain Location - Side  Left  -EH     Pain Location - Orientation  lower  -EH     Pain Location  back  -EH     Row Name 03/13/20 1008          Plan of Care Review    Plan of Care Reviewed With  patient  -     Progress  improving  -     Outcome Summary  Pt with much improved mobility today. Pt required [place]/MinAX2 for bed mobility.  Pt performed 4 STS transfers with rest breaks in between each one, pt required MinAX2 for each one and pt demo improved posture. Pt able to maintain standing balance for 1 to 2 minutes each time. While standing, pt performed marches to practice weight shifting. Pt still has difficulty advancing LEs forward but less difficulty side stepping.  Will continue to progress pt as able.   -     Row Name 03/13/20 1008          Vital Signs    O2 Delivery Pre Treatment  supplemental O2  -     O2 Delivery Intra Treatment  supplemental O2  -     O2 Delivery Post Treatment  supplemental O2  -     Row Name 03/13/20 1008          Positioning and Restraints    Pre-Treatment Position  in bed  -     Post Treatment Position  bed  -     In Bed  supine;call light within reach;encouraged to call for assist;exit alarm on;with nsg;with family/caregiver  -     Row Name 03/13/20 1008          Pain Scale: Word Pre/Post-Treatment    Pain: Word Scale, Pretreatment  4 - moderate pain  -       User Key  (r) = Recorded By, (t) = Taken By, (c) = Cosigned By    Initials Name Provider Type     Ivis Magallon PTA Physical Therapy Assistant        Outcome Measures     Row Name 03/13/20 1012          How much help from another person do you currently need...    Turning from your back to your side while in flat bed without using bedrails?  3  -EH     Moving from lying on back to sitting on the side of a flat bed without bedrails?  3  -EH     Moving to and from a bed to a chair (including a wheelchair)?  2  -EH     Standing up from a chair using your  arms (e.g., wheelchair, bedside chair)?  3  -EH     Climbing 3-5 steps with a railing?  1  -EH     To walk in hospital room?  2  -EH     AM-PAC 6 Clicks Score (PT)  14  -       User Key  (r) = Recorded By, (t) = Taken By, (c) = Cosigned By    Initials Name Provider Type    Ivis Longoria PTA Physical Therapy Assistant          PT Recommendation and Plan     Plan of Care Reviewed With: patient  Progress: improving  Outcome Summary: Pt with much improved mobility today. Pt required [place]/MinAX2 for bed mobility.  Pt performed 4 STS transfers with rest breaks in between each one, pt required MinAX2 for each one and pt demo improved posture. Pt able to maintain standing balance for 1 to 2 minutes each time. While standing, pt performed marches to practice weight shifting. Pt still has difficulty advancing LEs forward but less difficulty side stepping.  Will continue to progress pt as able.      Time Calculation:   PT Charges     Row Name 03/13/20 1002             Time Calculation    Start Time  0917  -      Stop Time  0940  -      Time Calculation (min)  23 min  -      PT Received On  03/13/20  -      PT - Next Appointment  03/14/20  -         Time Calculation- PT    Total Timed Code Minutes- PT  23 minute(s)  -        User Key  (r) = Recorded By, (t) = Taken By, (c) = Cosigned By    Initials Name Provider Type    Ivis Longoria PTA Physical Therapy Assistant        Therapy Charges for Today     Code Description Service Date Service Provider Modifiers Qty    31771095583 HC PT THERAPEUTIC ACT EA 15 MIN 3/13/2020 Ivis Magallon PTA GP 1    81454694500 HC PT THER PROC EA 15 MIN 3/13/2020 Ivis Magallon PTA GP 1    18535234487 HC PT THER SUPP EA 15 MIN 3/13/2020 Ivis Magallon PTA GP 2          PT G-Codes  Outcome Measure Options: AM-PAC 6 Clicks Daily Activity (OT)  AM-PAC 6 Clicks Score (PT): 14  AM-PAC 6 Clicks Score (OT): 12    Ivis Magallon PTA  3/13/2020

## 2020-03-13 NOTE — PROGRESS NOTES
"    Chief Complaint: Bilateral pulmonary emboli, parapneumonic effusion  S/P: CT-guided chest tube insertion    Subjective:  Symptoms:  Improved.  He reports shortness of breath, cough, weakness and anorexia.    Diet:  Poor intake.  No nausea or vomiting.    Activity level: Impaired due to weakness.    Pain:  He complains of pain that is mild.  Pain is well controlled.        Vital Signs:  Temp:  [97.6 °F (36.4 °C)-98.6 °F (37 °C)] 97.6 °F (36.4 °C)  Heart Rate:  [] 93  Resp:  [16-18] 16  BP: (100-121)/(46-85) 100/64    Intake & Output (last day)       03/12 0701 - 03/13 0700 03/13 0701 - 03/14 0700    P.O. 120 120    I.V. (mL/kg)      Total Intake(mL/kg) 120 (1.4) 120 (1.4)    Urine (mL/kg/hr) 250 (0.1) 100 (0.2)    Stool 0     Chest Tube 1100     Total Output 1350 100    Net -1230 +20          Stool Unmeasured Occurrence 2 x           Objective:  General Appearance:  Comfortable, ill-appearing, in no acute distress and not in pain.    Vital signs: (most recent): Blood pressure 100/64, pulse 93, temperature 97.6 °F (36.4 °C), temperature source Oral, resp. rate 16, height 177.8 cm (70\"), weight 83.6 kg (184 lb 6.4 oz), SpO2 95 %.  Vital signs are normal.  No fever.    Output: Producing urine and producing stool.    HEENT: (Very hard of hearing.)    Lungs:  Normal effort and normal respiratory rate.  He is not in respiratory distress.  There are decreased breath sounds (bibasilar).    Heart: Normal rate.  Regular rhythm.    Chest: Chest wall tenderness (at chest tube site) present.    Abdomen: Abdomen is soft.  Bowel sounds are normal.   There is no abdominal tenderness.   There is no mass.   Extremities: Normal range of motion.  There is dependent edema.    Pulses: Distal pulses are intact.    Neurological: Patient is alert and oriented to person, place and time.    Skin:  Warm and dry.          Chest tube:   Site: Left, Clean, Dry, Intact and Securement device intact  Suction: -20 cm  Air Leak: " negative  Level: 1100cc  24 Hour Total: 1100cc    Results Review:     I reviewed the patient's new clinical results.  I reviewed the patient's new imaging results and agree with the interpretation.  I reviewed the patient's other test results and agree with the interpretation  Discussed with patient, RN and Dr. Leon.    Imaging Results (Last 24 Hours)     Procedure Component Value Units Date/Time    XR Chest 1 View [[id number]] Collected:  03/13/20 1202     Updated:  03/13/20 1211    Narrative:       PORTABLE CHEST 03/13/2020 AT 0947 HOURS     CLINICAL HISTORY: 88-year-old male with history of pulmonary embolism  and pleural effusion. Chest tube placement.     Compared to the previous chest x-ray dated 03/12/2020.     In the interval, a small caliber chest tube has been placed in the left  hemithorax. The moderate-sized left pleural effusion has been nearly  completely evacuated. There is no pneumothorax. There is mild bibasilar  atelectasis. There is an oblong area of ill-defined increased density in  the right midlung zone that is due to pleural fluid trapped within a  lung fissure. The heart is mildly enlarged..     This report was finalized on 3/13/2020 12:08 PM by Dr. Ashutosh Jacob M.D.       CT Guided Chest Tube [[id number]] Collected:  03/12/20 1509     Updated:  03/13/20 0914    Narrative:       CT-GUIDED DRAINAGE AND CATHETER PLACEMENT INTO LEFT PLEURAL FLUID  COLLECTION 03/12/2020     HISTORY: Left pleural fluid collection.     After signed informed consent was obtained the patient was prepped and  draped in the right lateral decubitus position. Lidocaine was used for  local anesthesia.     CT guidance was used to place an 8-Cub[place] pigtail catheter into the left  pleural fluid collection. Approximately 20 mL of serosanguineous  nonpurulent fluid was removed. The catheter was connected to [facility]rium  suction. Confirmatory images were obtained.     Patient tolerated the procedure well with no complications. A  fluid  sample was sent to the laboratory for evaluation.     Radiation dose reduction techniques were utilized, including automated  exposure control and exposure modulation based on body size.     This report was finalized on 3/13/2020 9:11 AM by Dr. Humphrey Anderson M.D.       CT Chest Without Contrast [[id number]] Collected:  03/12/20 1133     Updated:  03/12/20 1459    Narrative:       CT SCAN OF THE CHEST WITHOUT CONTRAST     HISTORY: Follow up of pleural effusions. Small gauge chest tube and  right pleural fluid collection.     The CT scan was performed through the chest without contrast and  compared to the previous chest CT scan performed 6 days ago on  03/06/2020. The following findings are present:  1. There are bilateral pleural effusions and the right pleural effusion  is somewhat loculated in appearance with a small gauge pigtail drain in  place posteriorly. It is smaller in size when compared to the study of 6  days ago. There remains some dense atelectasis and probable  consolidation in the right lower lobe. The left pleural effusion appears  slightly larger with further dense atelectasis and consolidation in the  left lower lobe. There is some minimal consolidation in the posterior  aspect of the left upper lobe which is unchanged. The lungs are  otherwise clear.  2. There is a prominent necrotic appearing mediastinal mass in the right  paratracheal region measuring up to 3 x 4 cm which is essentially  unchanged. This most likely represents a necrotic lymph node. The  remainder of the mediastinum is unremarkable. There is no hilar or  axillary adenopathy.  3. There is a small pericardial effusion measuring up to 8 mm in  thickness which is unchanged. Again noted is a large hepatic cyst  measuring up to 10.3 cm. The spleen and both adrenal glands are  unremarkable. 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  4. At bone windows, there is a probable benign vertebral hemangioma  in  the lower thoracic region. This is unchanged from an abdominal CT scan  dated 06/08/2018.                 Radiation dose reduction techniques were utilized, including automated  exposure control and exposure modulation based on body size.     This report was finalized on 3/12/2020 2:56 PM by Dr. Rufino Rodriguez M.D.             Lab Results:     Lab Results (last 24 hours)     Procedure Component Value Units Date/Time    aPTT [[id number]]  (Abnormal) Collected:  03/13/20 1152    Specimen:  Blood from Hand, Left Updated:  03/13/20 1250     PTT 62.9 seconds     Blood Culture - Blood, Arm, Left [[id number]] Collected:  03/12/20 1039    Specimen:  Blood from Arm, Left Updated:  03/13/20 1100     Blood Culture No growth at 24 hours    Blood Culture - Blood, Hand, Left [[id number]] Collected:  03/12/20 1039    Specimen:  Blood from Hand, Left Updated:  03/13/20 1100     Blood Culture No growth at 24 hours    Urine Culture - Urine, Urine, Clean Catch [[id number]]  (Normal) Collected:  03/11/20 2214    Specimen:  Urine, Clean Catch Updated:  03/13/20 0928     Urine Culture No growth    Body Fluid Culture - Body Fluid, Pleural Cavity [[id number]] Collected:  03/12/20 1435    Specimen:  Body Fluid from Pleural Cavity Updated:  03/13/20 0810     Body Fluid Culture No growth     Gram Stain Few (2+) WBCs seen      No organisms seen    aPTT [[id number]]  (Abnormal) Collected:  03/13/20 0454    Specimen:  Blood from Hand, Left Updated:  03/13/20 0606     .4 seconds     Renal Function Panel [[id number]]  (Abnormal) Collected:  03/13/20 0454    Specimen:  Blood Updated:  03/13/20 0535     Glucose 137 mg/dL      BUN 18 mg/dL      Creatinine 0.89 mg/dL      Sodium 137 mmol/L      Potassium 3.6 mmol/L      Chloride 100 mmol/L      CO2 24.5 mmol/L      Calcium 8.1 mg/dL      Albumin 2.70 g/dL      Phosphorus 3.6 mg/dL      Anion Gap 12.5 mmol/L      BUN/Creatinine Ratio 20.2     eGFR Non African Amer 81 mL/min/1.73     Narrative:        GFR Normal >60  Chronic Kidney Disease <60  Kidney Failure <15      CBC & Differential [[id number]] Collected:  03/13/20 0454    Specimen:  Blood Updated:  03/13/20 0517    Narrative:       The following orders were created for panel order CBC & Differential.  Procedure                               Abnormality         Status                     ---------                               -----------         ------                     CBC Auto Differential[[id number]]        Abnormal            Final result                 Please view results for these tests on the individual orders.    CBC Auto Differential [[id number]]  (Abnormal) Collected:  03/13/20 0454    Specimen:  Blood Updated:  03/13/20 0517     WBC 13.73 10*3/mm3      RBC 3.53 10*6/mm3      Hemoglobin 9.6 g/dL      Hematocrit 29.9 %      MCV 84.7 fL      MCH 27.2 pg      MCHC 32.1 g/dL      RDW 12.4 %      RDW-SD 37.5 fl      MPV 9.5 fL      Platelets 346 10*3/mm3      Neutrophil % 72.4 %      Lymphocyte % 15.1 %      Monocyte % 7.3 %      Eosinophil % 2.3 %      Basophil % 0.6 %      Immature Grans % 2.3 %      Neutrophils, Absolute 9.95 10*3/mm3      Lymphocytes, Absolute 2.07 10*3/mm3      Monocytes, Absolute 1.00 10*3/mm3      Eosinophils, Absolute 0.32 10*3/mm3      Basophils, Absolute 0.08 10*3/mm3      Immature Grans, Absolute 0.31 10*3/mm3      nRBC 0.0 /100 WBC     aPTT [[id number]]  (Abnormal) Collected:  03/12/20 2210    Specimen:  Blood Updated:  03/12/20 2238     PTT 57.8 seconds     Body Fluid Cell Count With Differential - Pleural Fluid, Pleural Cavity [[id number]] Collected:  03/12/20 1436    Specimen:  Pleural Fluid from Pleural Cavity Updated:  03/12/20 1812    Narrative:       The following orders were created for panel order Body Fluid Cell Count With Differential - Pleural Fluid, Pleural Cavity.  Procedure                               Abnormality         Status                     ---------                               -----------          ------                     Body fluid cell count - ...[[id number]]  Abnormal            Final result               Body fluid differential ...[[id number]]                      Final result                 Please view results for these tests on the individual orders.    Body fluid cell count - Pleural Fluid, Pleural Cavity [[id number]]  (Abnormal) Collected:  03/12/20 1436    Specimen:  Pleural Fluid from Pleural Cavity Updated:  03/12/20 1812     Color, Fluid Red     Appearance, Fluid Bloody     WBC, Fluid 1,510 /mm3      RBC, Fluid 241,000 /mm3     Body fluid differential - Pleural Fluid, Pleural Cavity [[id number]] Collected:  03/12/20 1436    Specimen:  Pleural Fluid from Pleural Cavity Updated:  03/12/20 1812     Neutrophils, Fluid 40 %      Lymphocytes, Fluid 46 %      Monocytes, Fluid 4 %      Eosinophils, Fluid 2 %      Mononuclear, Fluid 8 %     pH, Body Fluid - Pleural Fluid, Pleural Cavity [[id number]] Collected:  03/12/20 1436    Specimen:  Pleural Fluid from Pleural Cavity Updated:  03/12/20 1804     pH, Fluid 8.00    Narrative:       Reference Range:  Serous fluids 6.8-7.6     Pleural transudates: 7.4-7.5     Pleural exudates: 7.35-7.45     All other fluids: No defined reference ranges    This test was developed, its performance characteristics determined and judged suitable for clinical purposes by [facility]. It has not been cleared or approved by the FDA. The laboratory is regulated under CLIA as qualified to perform high-complexity testing.    Protein, Body Fluid - Pleural Fluid, Pleural Cavity [[id number]] Collected:  03/12/20 1436    Specimen:  Pleural Fluid from Pleural Cavity Updated:  03/12/20 1737     Protein, Total, Fluid 2.5 g/dL     Narrative:       No Reference Ranges Established.    A serous fluid total fluid (TP) greater than 50 percent of the serum TP suggests the fluid is an exudate.      1. Pleural TP/Serum TP >0.5  2. Pleural LD/Serum LD >0.6  3. Pleural LD >2/3  of the upper limit of normal for serum LDH    This test was developed, it performance characteristics determined and judged suitable for clinical purposes by [facility].  It has not been cleared or approved by the FDA.  The laboratory is regulated under CLIA as qualified to perform high-complexity testing.     Lactate Dehydrogenase, Body Fluid - Pleural Fluid, Pleural Cavity [[id number]] Collected:  03/12/20 1436    Specimen:  Pleural Fluid from Pleural Cavity Updated:  03/12/20 1737     Lactate Dehydrogenase (LD), Fluid 432 U/L     Narrative:       No Reference Ranges Established.    Serous fluid LDH greater than 60 percent of the serum LDH or serous fluid LDH two-thirds of the upper limit of normal for serum LDH suggests the fluid is an exudate.     1. Pleural TP/Serum TP >0.5  2. Pleural LD/Serum LD >0.6  3. Pleural LD >2/3 of the upper limit of normal for serum LDH    This test was developed, it performance characteristics determined and judged suitable for clinical purposes by [facility].  It has not been cleared or approved by the FDA.  The laboratory is regulated under CLIA as qualified to perform high-complexity testing.     Glucose, Body Fluid - Pleural Fluid, Pleural Cavity [[id number]] Collected:  03/12/20 1436    Specimen:  Pleural Fluid from Pleural Cavity Updated:  03/12/20 1737     Glucose, Fluid 92 mg/dL     Narrative:       No Reference Ranges Established.    Serous fluid glucose less than 60 mg/dL or less than 30 mg/dL below serum glucose suggests an infectious or malignant exudate.     This test was developed, it performance characteristics determined and judged suitable for clinical purposes by [facility].  It has not been cleared or approved by the FDA.  The laboratory is regulated under CLIA as qualified to perform high-complexity testing.     aPTT [[id number]]  (Abnormal) Collected:  03/12/20 1245    Specimen:  Blood  from Hand, Left Updated:  03/12/20 1356     PTT 41.1 seconds     Protime-INR [[id number]]  (Abnormal) Collected:  03/12/20 1245    Specimen:  Blood from Hand, Left Updated:  03/12/20 1356     Protime 16.8 Seconds      INR 1.40           Assessment/Plan       Acute saddle pulmonary embolism with acute cor pulmonale (CMS/HCC)    Pulmonary emboli (CMS/HCC)    Empyema of pleura (CMS/HCC)       Assessment & Plan     This morning's chest x-ray shows near complete evacuation of the left pleural effusion with chest tube insertion.  There is no pneumothorax.  Bibasilar atelectasis is present.  Persistent pleural fluid trapped in the lung fissure in the right midlung.    Leave chest tube to -20 cm suction.  Check follow-up chest x-ray in a.m.  Discussed with patient, his family, RN and Dr. Leon.      EBEN Rivers  [facility]  03/13/20  13:07

## 2020-03-14 ENCOUNTER — APPOINTMENT (OUTPATIENT)
Dept: GENERAL RADIOLOGY | Facility: HOSPITAL | Age: 85
End: 2020-03-14

## 2020-03-14 LAB
ALBUMIN SERPL-MCNC: 1.9 G/DL (ref 3.5–5.2)
ANION GAP SERPL CALCULATED.3IONS-SCNC: 9.7 MMOL/L (ref 5–15)
APTT PPP: 71.6 SECONDS (ref 22.7–35.4)
BASOPHILS # BLD AUTO: 0.07 10*3/MM3 (ref 0–0.2)
BASOPHILS NFR BLD AUTO: 0.6 % (ref 0–1.5)
BUN BLD-MCNC: 19 MG/DL (ref 8–23)
BUN/CREAT SERPL: 20 (ref 7–25)
CALCIUM SPEC-SCNC: 8.4 MG/DL (ref 8.6–10.5)
CHLORIDE SERPL-SCNC: 104 MMOL/L (ref 98–107)
CO2 SERPL-SCNC: 22.3 MMOL/L (ref 22–29)
CREAT BLD-MCNC: 0.95 MG/DL (ref 0.76–1.27)
DEPRECATED RDW RBC AUTO: 38.2 FL (ref 37–54)
EOSINOPHIL # BLD AUTO: 0.35 10*3/MM3 (ref 0–0.4)
EOSINOPHIL NFR BLD AUTO: 2.9 % (ref 0.3–6.2)
ERYTHROCYTE [DISTWIDTH] IN BLOOD BY AUTOMATED COUNT: 12.2 % (ref 12.3–15.4)
GFR SERPL CREATININE-BSD FRML MDRD: 75 ML/MIN/1.73
GLUCOSE BLD-MCNC: 119 MG/DL (ref 65–99)
HCT VFR BLD AUTO: 29.6 % (ref 37.5–51)
HGB BLD-MCNC: 9.3 G/DL (ref 13–17.7)
IMM GRANULOCYTES # BLD AUTO: 0.35 10*3/MM3 (ref 0–0.05)
IMM GRANULOCYTES NFR BLD AUTO: 2.9 % (ref 0–0.5)
LYMPHOCYTES # BLD AUTO: 2.47 10*3/MM3 (ref 0.7–3.1)
LYMPHOCYTES NFR BLD AUTO: 20.5 % (ref 19.6–45.3)
MCH RBC QN AUTO: 27 PG (ref 26.6–33)
MCHC RBC AUTO-ENTMCNC: 31.4 G/DL (ref 31.5–35.7)
MCV RBC AUTO: 85.8 FL (ref 79–97)
MONOCYTES # BLD AUTO: 0.85 10*3/MM3 (ref 0.1–0.9)
MONOCYTES NFR BLD AUTO: 7.1 % (ref 5–12)
NEUTROPHILS # BLD AUTO: 7.93 10*3/MM3 (ref 1.7–7)
NEUTROPHILS NFR BLD AUTO: 66 % (ref 42.7–76)
NRBC BLD AUTO-RTO: 0 /100 WBC (ref 0–0.2)
PHOSPHATE SERPL-MCNC: 8 MG/DL (ref 2.5–4.5)
PLATELET # BLD AUTO: 295 10*3/MM3 (ref 140–450)
PMV BLD AUTO: 9.8 FL (ref 6–12)
POTASSIUM BLD-SCNC: 4.9 MMOL/L (ref 3.5–5.2)
RBC # BLD AUTO: 3.45 10*6/MM3 (ref 4.14–5.8)
SODIUM BLD-SCNC: 136 MMOL/L (ref 136–145)
WBC NRBC COR # BLD: 12.02 10*3/MM3 (ref 3.4–10.8)

## 2020-03-14 PROCEDURE — 97530 THERAPEUTIC ACTIVITIES: CPT

## 2020-03-14 PROCEDURE — 94799 UNLISTED PULMONARY SVC/PX: CPT

## 2020-03-14 PROCEDURE — 71045 X-RAY EXAM CHEST 1 VIEW: CPT

## 2020-03-14 PROCEDURE — 85025 COMPLETE CBC W/AUTO DIFF WBC: CPT | Performed by: INTERNAL MEDICINE

## 2020-03-14 PROCEDURE — 85730 THROMBOPLASTIN TIME PARTIAL: CPT | Performed by: NURSE PRACTITIONER

## 2020-03-14 PROCEDURE — 80069 RENAL FUNCTION PANEL: CPT | Performed by: INTERNAL MEDICINE

## 2020-03-14 PROCEDURE — 99232 SBSQ HOSP IP/OBS MODERATE 35: CPT | Performed by: INTERNAL MEDICINE

## 2020-03-14 PROCEDURE — 99232 SBSQ HOSP IP/OBS MODERATE 35: CPT | Performed by: THORACIC SURGERY (CARDIOTHORACIC VASCULAR SURGERY)

## 2020-03-14 PROCEDURE — 25010000002 HEPARIN (PORCINE) PER 1000 UNITS: Performed by: NURSE PRACTITIONER

## 2020-03-14 PROCEDURE — 97116 GAIT TRAINING THERAPY: CPT

## 2020-03-14 RX ADMIN — METOPROLOL TARTRATE 25 MG: 25 TABLET ORAL at 08:07

## 2020-03-14 RX ADMIN — Medication 250 MG: at 08:07

## 2020-03-14 RX ADMIN — IPRATROPIUM BROMIDE AND ALBUTEROL SULFATE 3 ML: 2.5; .5 SOLUTION RESPIRATORY (INHALATION) at 19:37

## 2020-03-14 RX ADMIN — CARBIDOPA AND LEVODOPA 1 TABLET: 25; 100 TABLET ORAL at 08:07

## 2020-03-14 RX ADMIN — Medication 250 MG: at 20:52

## 2020-03-14 RX ADMIN — BUDESONIDE 0.5 MG: 0.5 INHALANT RESPIRATORY (INHALATION) at 19:38

## 2020-03-14 RX ADMIN — SODIUM CHLORIDE, PRESERVATIVE FREE 10 ML: 5 INJECTION INTRAVENOUS at 08:07

## 2020-03-14 RX ADMIN — HEPARIN SODIUM 22 UNITS/KG/HR: 10000 INJECTION, SOLUTION INTRAVENOUS at 03:31

## 2020-03-14 RX ADMIN — IPRATROPIUM BROMIDE AND ALBUTEROL SULFATE 3 ML: 2.5; .5 SOLUTION RESPIRATORY (INHALATION) at 06:55

## 2020-03-14 RX ADMIN — CARBIDOPA AND LEVODOPA 1 TABLET: 25; 100 TABLET ORAL at 20:53

## 2020-03-14 RX ADMIN — METOPROLOL TARTRATE 25 MG: 25 TABLET ORAL at 20:52

## 2020-03-14 RX ADMIN — HEPARIN SODIUM 22 UNITS/KG/HR: 10000 INJECTION, SOLUTION INTRAVENOUS at 20:14

## 2020-03-14 RX ADMIN — FUROSEMIDE 40 MG: 40 TABLET ORAL at 08:07

## 2020-03-14 RX ADMIN — BUDESONIDE 0.5 MG: 0.5 INHALANT RESPIRATORY (INHALATION) at 06:56

## 2020-03-14 RX ADMIN — LIDOCAINE 1 PATCH: 50 PATCH CUTANEOUS at 08:07

## 2020-03-14 NOTE — PROGRESS NOTES
"Izaiah Garcia  8/31/1931 88 y.o.  [id number]      Patient Care Team:  Uriah Godinez MD as PCP - General (Internal Medicine)    CC: Pulmonary emboli, atrial fibrillation    Interval History: He is resting comfortably and asleep      Objective   Vital Signs  Temp:  [97.4 °F (36.3 °C)-98.2 °F (36.8 °C)] 98.2 °F (36.8 °C)  Heart Rate:  [75-99] 99  Resp:  [16-18] 16  BP: (100-120)/(62-85) 112/62    Intake/Output Summary (Last 24 hours) at 3/14/2020 0556  Last data filed at 3/13/2020 2207  Gross per 24 hour   Intake 680 ml   Output 650 ml   Net 30 ml     Flowsheet Rows      First Filed Value   Admission Height  177.8 cm (70\") Documented at 02/29/2020 0800   Admission Weight  95 kg (209 lb 7 oz) Documented at 02/29/2020 0800          Physical Exam:   General Appearance:   Sleeping, in no acute distress   Lungs:     Clear to auscultation,BS are equal    Heart:    Normal S1 and S2, iRRR without murmur, gallop or rub   HEENT:    Sclerae are clear, no JVD or adenopathy   Abdomen:     Normal bowel sounds, soft non-tender, non-distended, no HSM   Extremities:   Moves all extremities well, no edema, no cyanosis, no             Redness, no rash     Medication Review:        amiodarone 400 mg Oral Q12H   budesonide 0.5 mg Nebulization BID - RT   carbidopa-levodopa 1 tablet Oral BID   cyanocobalamin 1,000 mcg Intramuscular Q28 Days   furosemide 40 mg Oral Daily   ipratropium-albuterol 3 mL Nebulization BID - RT   lidocaine 1 patch Transdermal Q24H   metoprolol tartrate 25 mg Oral Q12H   saccharomyces boulardii 250 mg Oral BID   sodium chloride 10 mL Intravenous Q12H       heparin (porcine) 18 Units/kg/hr Last Rate: 22 Units/kg/hr (03/14/20 0331)         I reviewed the patient's new clinical results.  I personally viewed and interpreted the patient's EKG/Telemetry data    Assessment/Plan  Active Hospital Problems    Diagnosis  POA   • **Acute saddle pulmonary embolism with acute cor pulmonale (CMS/HCC) [I26.02]  Unknown     " Priority: High   • Empyema of pleura (CMS/HCC) [J86.9]  Unknown   • Pulmonary emboli (CMS/HCC) [I26.99]  Yes      Resolved Hospital Problems   No resolved problems to display.       Pulmonary emboli prior [place] therapy then developed parapneumonic effusions has had a chest tube and is on heparin currently.  The treatment of that is been good and just needs to be anticoagulation at this point.  He is developed A. fib and we have given him amiodarone for a couple of days he is not converted at this point we are going to stop it just rate control him and anticoagulate him let his chest heal up and calm down see where he is in 6 weeks.  Have him follow-up with me in 6 weeks resume his Eliquis when felt to be safe from a bleeding standpoint we will see him as needed    Jason Griffiths MD  03/14/20  05:56

## 2020-03-14 NOTE — PLAN OF CARE
Problem: Patient Care Overview  Goal: Plan of Care Review  Flowsheets (Taken 3/14/2020 0958)  Progress: improving  Plan of Care Reviewed With: patient; grandchild(jess)  Note:   Pt AMBULATED TO CHAIR TODAY.  FATIGUES QUICKLY AND IS UNSTABLE WHEN ATTEMPTING TO AMBULATE AWAY FROM THE BED.  HE IS MOTIVATED TO WORK WITH PT.

## 2020-03-14 NOTE — PROGRESS NOTES
"  Daily Progress Note.   [facility]IOV[facility]  3/14/2020    Patient:  Name:  Izaiah Garcia  MRN:  [id number]  8/31/1931  88 y.o.  male       Chief Complaint: Patient had questionable facial asymmetry last night that was evaluated by the DVT     Hospital course: Patient was readmitted after an hospital admission for pneumonia with evidence of massive pulmonary embolism, and was managed with EKOS with directed thrombolytic therapy low-dose with good clinical response.  Patient had pleural effusion, and after evaluation by thoracic surgery decision was to have radiology replaced the chest tube with a CT-guidance.  This was done on 3/9/2020 but the patient had output less than 200 cc over the first 24 hours, TPA was administered on 3/10/2020 with nearly 600 cc total output afterwards.  The right-sided chest tube was removed on 3/12/2020 when the output was minimal and patient had a CT-guided placement of left-sided small chest tube with 1200 cc output so far.  Patient is on the heparin drip which was discontinued only for few hours before the procedure and was resumed afterwards with no problem with bleeding.    INTERVAL:  Asleep but awakens speech intact no facial droop.  Denies any worsening chest pain or soa.  No hemoptysis no worsening cough.  He can carry on conversation.      ROS: No fever, no diarrhea, no chest pain  PMFSSH: no change    Physical Exam:  /60 (BP Location: Left arm, Patient Position: Lying)   Pulse 98   Temp 97.4 °F (36.3 °C) (Oral)   Resp 18   Ht 177.8 cm (70\")   Wt 84.2 kg (185 lb 9.6 oz)   SpO2 98%   BMI 26.63 kg/m²   Body mass index is 26.63 kg/m².    Intake/Output Summary (Last 24 hours) at 3/14/2020 1001  Last data filed at 3/14/2020 0740  Gross per 24 hour   Intake 220 ml   Output 510 ml   Net -290 ml     General appearance: chronically ill but non toxic, conversant   Eyes: anicteric sclerae, moist conjunctivae; no lid-lag; PERRLA  HENT: Atraumatic; " oropharynx clear with moist mucous membranes and no mucosal ulcerations; normal hard and soft palate  Neck: Trachea midline; FROM, supple, no thyromegaly or lymphadenopathy  Lungs: +chest tube no wheee no rhonchi, with normal respiratory effort and no intercostal retractions  CV: irreg irreg , no rub  Abdomen: Soft, non-tender; no masses or HSM  Extremities:trace peripheral edema or extremity lymphadenopathy  Skin: Normal temperature, turgor and texture; no rash, ulcers or subcutaneous nodules  Psych: Appropriate affect, alert and oriented to person, place and time  Neuro cns II-XII intact move all ext, speech intact    Data Review:  Notable Labs:  Results from last 7 days   Lab Units 03/14/20  0423 03/13/20  0454 03/12/20  0506 03/11/20  0404 03/10/20  0320 03/09/20  0340 03/08/20  0352   WBC 10*3/mm3 12.02* 13.73* 12.10* 12.16* 11.34* 11.16* 12.66*   HEMOGLOBIN g/dL 9.3* 9.6* 9.1* 9.2* 9.5* 9.6* 9.2*   PLATELETS 10*3/mm3 295 346 351 346 304 282 280     Results from last 7 days   Lab Units 03/14/20  0423 03/13/20  0454 03/12/20  0506 03/11/20  0404 03/10/20  0320 03/09/20  0340 03/08/20  0352   SODIUM mmol/L 136 137 134* 136 134* 133* 130*   POTASSIUM mmol/L 4.9 3.6 3.6 3.8 3.9 4.2 3.4*   CHLORIDE mmol/L 104 100 99 99 97* 98 95*   CO2 mmol/L 22.3 24.5 24.1 24.5 24.0 23.0 26.2   BUN mg/dL 19 18 15 14 15 15 17   CREATININE mg/dL 0.95 0.89 0.68* 0.82 0.70* 0.78 0.74*   GLUCOSE mg/dL 119* 137* 112* 124* 129* 122* 121*   CALCIUM mg/dL 8.4* 8.1* 7.9* 7.7* 8.2* 8.0* 7.7*   PHOSPHORUS mg/dL 8.0* 3.6 3.8 3.6 3.6 2.6 3.2   Estimated Creatinine Clearance: 64 mL/min (by C-G formula based on SCr of 0.95 mg/dL).    Imaging:  Reviewed chest images personally from past 3 days    Scheduled meds:      budesonide 0.5 mg Nebulization BID - RT   carbidopa-levodopa 1 tablet Oral BID   cyanocobalamin 1,000 mcg Intramuscular Q28 Days   furosemide 40 mg Oral Daily   ipratropium-albuterol 3 mL Nebulization BID - RT   lidocaine 1 patch  Transdermal Q24H   metoprolol tartrate 25 mg Oral Q12H   saccharomyces boulardii 250 mg Oral BID   sodium chloride 10 mL Intravenous Q12H     Heparin gtt    ASSESSMENT  /  PLAN:  1. Acute hypoxic respiratory failure  2. BPE s/p ekos catheter on 2/29/2020 with local TPA infusion with good clinical response  3. Troponemia suspect due to BPE  4. RV strain  5. Bilateral pleural effusion R greater than Left exudative with chest tube insertion on 3/9/2020  6. HLD  7. Esphageal stricture  8. Gen weakness  9. Parkinsons Disease  10. 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 HTN  13. Abnormal mass around thyroid   14. hypokalemia, corrected  15. Hypoalbuminemia  16. Hyponatremia, mild     PLAN:  Chest tube mgmt per the thoracic surgery team  Continue with a heparin drip, will transition to oral anticoagulation once the chest tube is out  Patient to continue to work with physical therapy  Appetite is better  CODE STATUS noted  D/w family  Reviewed neurology notes.  Overall seems to be improving  Chetan Eugene MD  [facility]  03/14/20  10:01 AM

## 2020-03-14 NOTE — THERAPY TREATMENT NOTE
Acute Care - Physical Therapy Treatment Note  [facility]     Patient Name: Izaiah Garcia  : 1931  MRN: [id number]  Today's Date: 3/14/2020  Onset of Illness/Injury or Date of Surgery: 20          Admit Date: 2020    Visit Dx:    ICD-10-CM ICD-9-CM   1. Healthcare-associated pneumonia J18.9 486   2. Acute hypoxemic respiratory failure (CMS/HCC) J96.01 518.81   3. Bilateral pulmonary embolism (CMS/HCC) I26.99 415.19   4. Acute saddle pulmonary embolism with acute cor pulmonale (CMS/HCC) I26.02 415.13     415.0     Patient Active Problem List   Diagnosis   • Hyperlipidemia   • Benign essential hypertension   • History of gout   • History of esophageal stricture   • History of stroke, 2016--4.9 x 4 x 3 cm inferior left cerebellar infarct   • Rheumatoid factor positive   • Impaired fasting glucose   • At risk for falls   • Generalized weakness   • Bilateral high frequency sensorineural hearing loss, wears hearing aids   • Bilateral lower extremity edema   • Parkinson's disease (CMS/HCC)   • Therapeutic drug monitoring   • Vitamin D deficiency   • Macrocytosis   • Vitamin B12 deficiency   • Acute diarrhea   • Hospital-acquired pneumonia   • HAP (hospital-acquired pneumonia)   • Acute UTI (urinary tract infection)   • Colitis   • Parkinson disease (CMS/HCC)   • Weakness   • Aspiration pneumonia of right lower lobe due to vomit (CMS/HCC)   • Pulmonary emboli (CMS/HCC)   • Acute saddle pulmonary embolism with acute cor pulmonale (CMS/HCC)   • Empyema of pleura (CMS/HCC)       Therapy Treatment    Rehabilitation Treatment Summary     Row Name 20 0952             Treatment Time/Intention    Discipline  physical therapist  -JR      Document Type  therapy note (daily note)  -JR      Subjective Information  complains of;weakness;fatigue  -JR      Mode of Treatment  physical therapy  -JR      Patient/Family Observations  -- GRANDAUGHTER IS PRESENT.  Pt IS RESTING IN BED.   -JR      Care Plan  Review  evaluation/treatment results reviewed;care plan/treatment goals reviewed;risks/benefits reviewed;current/potential barriers reviewed;patient/other agree to care plan  -JR      Care Plan Review, Other Participant(s)  daughter  -JR      Total Minutes, Physical Therapy Treatment  24  -JR      Patient Effort  good  -JR      Comment  -- TOOK SOME STEPS TO THE CHAIR.    -JR      Recorded by [JR] Gonzales Bhandari, PT 03/14/20 0957      Row Name 03/14/20 0952             Vital Signs    Post Systolic BP Rehab  114  -JR      Post Treatment Diastolic BP  60  -JR      Posttreatment Heart Rate (beats/min)  98  -JR      Pre SpO2 (%)  -- 98  -JR      O2 Delivery Pre Treatment  supplemental O2  -JR      Recorded by [JR] Gonzales Bhandari, PT 03/14/20 0957      Row Name 03/14/20 0952             Cognitive Assessment/Intervention- PT/OT    Orientation Status (Cognition)  oriented x 4;other (see comments)  -JR      Follows Commands (Cognition)  WFL;other (see comments) Pt IS HARD OF HEARING.    -JR      Recorded by [JR] Gonzales Bhandari, PT 03/14/20 0957      Row Name 03/14/20 0952             Bed Mobility Assessment/Treatment    Supine-Sit [place] (Bed Mobility)  minimum assist (75% patient effort);1 person assist  -JR      Bed Mobility, Safety Issues  decreased use of arms for pushing/pulling;decreased use of legs for bridging/pushing  -JR      Assistive Device (Bed Mobility)  draw sheet  -JR      Recorded by [JR] Gonzales Bhandari, PT 03/14/20 0957      Row Name 03/14/20 0952             Functional Mobility    Functional Mobility- Ind. Level  minimum assist (75% patient effort);1 person + 1 person to manage equipment  -JR      Functional Mobility- Device  rolling walker  -JR      Functional Mobility-Distance (Feet)  -- 2  -JR      Functional Mobility- Comment  TOOK SIDE STEPS TO CHAIR.    -JR      Recorded by [JR] Gonzales Bhandari, PT 03/14/20 0957      Row Name 03/14/20 0952             Sit-Stand Transfer    Sit-Stand  Todd (Transfers)  verbal cues;nonverbal cues (demo/gesture);minimum assist (75% patient effort);1 person assist;1 person to manage equipment  -JR      Assistive Device (Sit-Stand Transfers)  walker, front-wheeled  -JR      Recorded by [JR] Gonzales Bhandari, PT 03/14/20 0957      Row Name 03/14/20 0952             Gait/Stairs Assessment/Training    Gait/Stairs Assessment/Training  gait/ambulation independence;gait/ambulation assistive device;distance ambulated;gait pattern;gait deviations  -JR      Todd Level (Gait)  minimum assist (75% patient effort);1 person assist;1 person to manage equipment  -JR      Assistive Device (Gait)  walker, front-wheeled  -JR      Distance in Feet (Gait)  2  -JR      Deviations/Abnormal Patterns (Gait)  erika decreased;gait speed decreased;stride length decreased  -JR      Recorded by [JR] Gonzales Bhandari, PT 03/14/20 0957      Row Name 03/14/20 0952             Static Standing Balance    Level of Todd (Supported Standing, Static Balance)  minimal assist, 75% patient effort;1 person assist;1 person to manage equipment  -JR      Time Able to Maintain Position (Supported Standing, Static Balance)  1 to 2 minutes  -JR      Assistive Device Utilized (Supported Standing, Static Balance)  walker, rolling  -JR      Comment (Supported Standing, Static Balance)  STOOD X 2 REPS.  FATIGUES QUICKLY.    -JR      Recorded by [JR] Gonzales Bhandari, PT 03/14/20 0957      Row Name 03/14/20 0952             Positioning and Restraints    Pre-Treatment Position  in bed  -JR      Post Treatment Position  chair  -JR      In Chair  notified nsg;reclined;call light within reach;encouraged to call for assist;exit alarm on;with family/caregiver;RUE elevated;LUE elevated;legs elevated  -JR      Recorded by [JR] Gonzales Bhandari, PT 03/14/20 0957      Row Name 03/14/20 0952             Pain Scale: Numbers Pre/Post-Treatment    Pain Scale: Numbers, Pretreatment  3/10  -JR      Pain  Scale: Numbers, Post-Treatment  3/10  -JR      Pain Location  back  -JR      Pain Intervention(s)  Repositioned  -JR      Recorded by [] Gonzales Bhandari, PT 03/14/20 0957      Row Name                Wound 03/01/20  Right anterior groin Puncture    Wound - Properties Group Date first assessed: 03/01/20 [KB] Time first assessed: -- [KB], when EKOS pulled, puncture wound left in place  Present on Hospital Admission: N [KB] Side: Right [KB] Orientation: anterior [KB] Location: groin [KB] Primary Wound Type: Puncture [KB] Recorded by:  [KB] Tootie Decker RN 03/02/20 2241    Row Name                Wound 03/11/20 1130 Right gluteal Skin Tear    Wound - Properties Group Date first assessed: 03/11/20 [WH] Time first assessed: 1130 [WH] Present on Hospital Admission: N [WH] Side: Right [WH] Location: gluteal [WH] Primary Wound Type: Skin tear [WH] Additional Comments: moisture/friction [DA] Recorded by:  [DA] Tabitha Lawler, RN, CWARNOL 03/11/20 1625 [WH] Alyssa Mcclain RN 03/11/20 1154    Row Name 03/14/20 0952             Plan of Care Review    Plan of Care Reviewed With  patient;daughter  -JR      Recorded by [] Gonzales Bhandari, PT 03/14/20 0957        User Key  (r) = Recorded By, (t) = Taken By, (c) = Cosigned By    Initials Name Effective Dates Discipline    DA Tabitha Lawler RN, CWOCN 06/16/16 -  Nurse    Gonzales Tong, PT 04/03/18 -  PT    Tootie Pan RN 06/20/16 -  Nurse    WH Alyssa Mcclain RN 11/15/19 -  Nurse          Wound 03/01/20  Right anterior groin Puncture (Active)   Dressing Appearance open to air 3/14/2020  7:40 AM   Closure None 3/14/2020  7:40 AM   Base closed/resurfaced 3/14/2020  7:40 AM   Periwound ecchymotic 3/14/2020  4:00 AM   Periwound Temperature warm 3/14/2020  4:00 AM   Periwound Skin Turgor soft 3/14/2020  4:00 AM       Wound 03/11/20 1130 Right gluteal Skin Tear (Active)   Dressing Appearance open to air 3/14/2020  7:40 AM   Base pink 3/14/2020  7:40 AM    Periwound excoriated 3/14/2020  7:40 AM   Periwound Temperature warm 3/14/2020  4:00 AM   Periwound Skin Turgor soft 3/14/2020  4:00 AM   Drainage Amount none 3/14/2020  4:00 AM               PT Recommendation and Plan     Plan of Care Reviewed With: patient, grandchild(jess)  Progress: improving  Outcome Measures     Row Name 03/14/20 0958             How much help from another person do you currently need...    Turning from your back to your side while in flat bed without using bedrails?  3  -JR      Moving from lying on back to sitting on the side of a flat bed without bedrails?  3  -JR      Moving to and from a bed to a chair (including a wheelchair)?  2  -JR      Standing up from a chair using your arms (e.g., wheelchair, bedside chair)?  3  -JR      Climbing 3-5 steps with a railing?  1  -JR      To walk in hospital room?  2  -JR      AM-PAC 6 Clicks Score (PT)  14  -JR        User Key  (r) = Recorded By, (t) = Taken By, (c) = Cosigned By    Initials Name Provider Type    Gonzales Tong, PT Physical Therapist         Time Calculation:   PT Charges     Row Name 03/14/20 0959             Time Calculation    Start Time  0928  -JR      Stop Time  0959  -JR      Time Calculation (min)  31 min  -JR      PT Received On  03/14/20  -JR      PT - Next Appointment  03/15/20  -        User Key  (r) = Recorded By, (t) = Taken By, (c) = Cosigned By    Initials Name Provider Type    Gonzales Tong, ALEX Physical Therapist        Therapy Charges for Today     Code Description Service Date Service Provider Modifiers Qty    95644714311  GAIT TRAINING EA 15 MIN 3/14/2020 Gonzales Bhandari, PT GP 1    16806414131  PT THERAPEUTIC ACT EA 15 MIN 3/14/2020 Gonzales Bhandari, PT GP 1    71558041613  PT THER SUPP EA 15 MIN 3/14/2020 Gonzales Bhandari, PT GP 1          PT G-Codes  Outcome Measure Options: AM-PAC 6 Clicks Daily Activity (OT)  AM-PAC 6 Clicks Score (PT): 14  AM-PAC 6 Clicks Score (OT): 12    Gonzales CAZARES  Elisabet, PT  3/14/2020

## 2020-03-14 NOTE — PROGRESS NOTES
"    Chief Complaint: Bilateral pulmonary emboli, parapneumonic effusion  S/P: CT-guided chest tube insertion    Subjective:  Symptoms:  Improved.  He reports shortness of breath, cough, weakness and anorexia.    Diet:  Poor intake.  No nausea or vomiting.    Activity level: Impaired due to weakness.    Pain:  He complains of pain that is mild.  Pain is well controlled.        Vital Signs:  Temp:  [97.1 °F (36.2 °C)-98.2 °F (36.8 °C)] 97.1 °F (36.2 °C)  Heart Rate:  [75-99] 97  Resp:  [16-18] 18  BP: (105-120)/(54-69) 107/54    Intake & Output (last day)       03/13 0701 - 03/14 0700 03/14 0701 - 03/15 0700    P.O. 480 120    I.V. (mL/kg) 200 (2.4)     Total Intake(mL/kg) 680 (8.1) 120 (1.4)    Urine (mL/kg/hr) 500 (0.2)     Stool      Chest Tube 110     Total Output 610     Net +70 +120                Objective:  General Appearance:  Comfortable, ill-appearing, in no acute distress and not in pain.    Vital signs: (most recent): Blood pressure 107/54, pulse 97, temperature 97.1 °F (36.2 °C), temperature source Oral, resp. rate 18, height 177.8 cm (70\"), weight 84.2 kg (185 lb 9.6 oz), SpO2 96 %.  Vital signs are normal.  No fever.    Output: Producing urine and producing stool.    HEENT: (Very hard of hearing.)    Lungs:  Normal effort and normal respiratory rate.  He is not in respiratory distress.  There are decreased breath sounds (bibasilar).    Heart: Normal rate.  Regular rhythm.    Chest: Chest wall tenderness (at chest tube site) present.    Abdomen: Abdomen is soft.  Bowel sounds are normal.   There is no abdominal tenderness.   There is no mass.   Extremities: Normal range of motion.  There is dependent edema.    Pulses: Distal pulses are intact.    Neurological: Patient is alert and oriented to person, place and time.    Skin:  Warm and dry.          Chest tube:   Site: Left, Clean, Dry, Intact and Securement device intact  Suction: -20 cm  Air Leak: negative  Level: 1100cc  24 Hour Total: " 110    Results Review:     I reviewed the patient's new clinical results.  I reviewed the patient's new imaging results and agree with the interpretation.  I reviewed the patient's other test results and agree with the interpretation    Imaging Results (Last 24 Hours)     Procedure Component Value Units Date/Time    XR Chest 1 View [[id number]] Collected:  03/14/20 0709     Updated:  03/14/20 0709    Narrative:       PORTABLE CHEST X-RAY     CLINICAL HISTORY: chest tube management; J18.9-Pneumonia, unspecified  organism; J96.01-Acute respiratory failure with hypoxia; I26.99-Other  pulmonary embolism without acute cor pulmonale; I26.02-Saddle embolus of  pulmonary artery with acute cor pulmonale     COMPARISON: 03/13/2020     FINDINGS: Portable AP view of the chest was obtained with overlying  monitor leads in place. Life support lines are unchanged and there is no  pneumothorax. Basilar opacities felt to reflect atelectasis and small  effusions are again noted and have increased slightly on the right side.  Heart is enlarged. Normal vascularity. There is stable widening of the  upper mediastinum without significant tracheal deviation. Previous CT  showed a right paratracheal mass which likely in part accounts for the  mediastinal widening. Please see the previous CT report.       Impression:       Slight increase in right lung base atelectasis and effusion,  stable findings otherwise.                      Lab Results:     Lab Results (last 24 hours)     Procedure Component Value Units Date/Time    Blood Culture - Blood, Arm, Left [[id number]] Collected:  03/12/20 1039    Specimen:  Blood from Arm, Left Updated:  03/14/20 1100     Blood Culture No growth at 2 days    Blood Culture - Blood, Hand, Left [[id number]] Collected:  03/12/20 1039    Specimen:  Blood from Hand, Left Updated:  03/14/20 1100     Blood Culture No growth at 2 days    Body Fluid Culture - Body Fluid, Pleural Cavity [[id number]] Collected:  03/12/20  1435    Specimen:  Body Fluid from Pleural Cavity Updated:  03/14/20 0735     Body Fluid Culture No growth at 2 days     Gram Stain Few (2+) WBCs seen      No organisms seen    Renal Function Panel [[id number]]  (Abnormal) Collected:  03/14/20 0423    Specimen:  Blood Updated:  03/14/20 0545     Glucose 119 mg/dL      BUN 19 mg/dL      Creatinine 0.95 mg/dL      Sodium 136 mmol/L      Potassium 4.9 mmol/L      Chloride 104 mmol/L      CO2 22.3 mmol/L      Calcium 8.4 mg/dL      Albumin 1.90 g/dL      Phosphorus 8.0 mg/dL      Anion Gap 9.7 mmol/L      BUN/Creatinine Ratio 20.0     eGFR Non African Amer 75 mL/min/1.73     Narrative:       GFR Normal >60  Chronic Kidney Disease <60  Kidney Failure <15      aPTT [[id number]]  (Abnormal) Collected:  03/14/20 0423    Specimen:  Blood Updated:  03/14/20 0518     PTT 71.6 seconds     CBC & Differential [[id number]] Collected:  03/14/20 0423    Specimen:  Blood Updated:  03/14/20 0436    Narrative:       The following orders were created for panel order CBC & Differential.  Procedure                               Abnormality         Status                     ---------                               -----------         ------                     CBC Auto Differential[[id number]]        Abnormal            Final result                 Please view results for these tests on the individual orders.    CBC Auto Differential [[id number]]  (Abnormal) Collected:  03/14/20 0423    Specimen:  Blood Updated:  03/14/20 0436     WBC 12.02 10*3/mm3      RBC 3.45 10*6/mm3      Hemoglobin 9.3 g/dL      Hematocrit 29.6 %      MCV 85.8 fL      MCH 27.0 pg      MCHC 31.4 g/dL      RDW 12.2 %      RDW-SD 38.2 fl      MPV 9.8 fL      Platelets 295 10*3/mm3      Neutrophil % 66.0 %      Lymphocyte % 20.5 %      Monocyte % 7.1 %      Eosinophil % 2.9 %      Basophil % 0.6 %      Immature Grans % 2.9 %      Neutrophils, Absolute 7.93 10*3/mm3      Lymphocytes, Absolute 2.47 10*3/mm3      Monocytes,  Absolute 0.85 10*3/mm3      Eosinophils, Absolute 0.35 10*3/mm3      Basophils, Absolute 0.07 10*3/mm3      Immature Grans, Absolute 0.35 10*3/mm3      nRBC 0.0 /100 WBC     aPTT [[id number]]  (Abnormal) Collected:  03/13/20 2236    Specimen:  Blood from Arm, Left Updated:  03/13/20 2308     PTT 95.6 seconds     aPTT [[id number]]  (Abnormal) Collected:  03/13/20 1436    Specimen:  Blood from Arm, Left Updated:  03/13/20 1514     .4 seconds     aPTT [[id number]]  (Abnormal) Collected:  03/13/20 1152    Specimen:  Blood from Hand, Left Updated:  03/13/20 1250     PTT 62.9 seconds            Assessment/Plan       Acute saddle pulmonary embolism with acute cor pulmonale (CMS/HCC)    Pulmonary emboli (CMS/HCC)    Empyema of pleura (CMS/HCC)       Assessment & Plan     Mr. Garcia is a pleasant 88-year-old gentleman status post CT-guided drainage of the right chest and subsequent removal  , And CT-guided drainage of the left chest.  He has the left chest tube remaining which is put out approximately 110 cc.  His chest x-ray is much improved and he states he is feeling better.  We will monitor his chest tube output and hopefully will remove his chest tube tomorrow.    Marya Leon MD  [facility]  03/14/20  12:46

## 2020-03-15 ENCOUNTER — APPOINTMENT (OUTPATIENT)
Dept: GENERAL RADIOLOGY | Facility: HOSPITAL | Age: 85
End: 2020-03-15

## 2020-03-15 LAB
ALBUMIN SERPL-MCNC: 2.4 G/DL (ref 3.5–5.2)
ANION GAP SERPL CALCULATED.3IONS-SCNC: 12.2 MMOL/L (ref 5–15)
APTT PPP: 126.6 SECONDS (ref 22.7–35.4)
APTT PPP: 91.4 SECONDS (ref 22.7–35.4)
BACTERIA FLD CULT: NORMAL
BASOPHILS # BLD AUTO: 0.04 10*3/MM3 (ref 0–0.2)
BASOPHILS NFR BLD AUTO: 0.3 % (ref 0–1.5)
BUN BLD-MCNC: 19 MG/DL (ref 8–23)
BUN/CREAT SERPL: 19.8 (ref 7–25)
CALCIUM SPEC-SCNC: 7.9 MG/DL (ref 8.6–10.5)
CHLORIDE SERPL-SCNC: 101 MMOL/L (ref 98–107)
CO2 SERPL-SCNC: 25.8 MMOL/L (ref 22–29)
CREAT BLD-MCNC: 0.96 MG/DL (ref 0.76–1.27)
DEPRECATED RDW RBC AUTO: 38 FL (ref 37–54)
EOSINOPHIL # BLD AUTO: 0.25 10*3/MM3 (ref 0–0.4)
EOSINOPHIL NFR BLD AUTO: 2.2 % (ref 0.3–6.2)
ERYTHROCYTE [DISTWIDTH] IN BLOOD BY AUTOMATED COUNT: 12.3 % (ref 12.3–15.4)
GFR SERPL CREATININE-BSD FRML MDRD: 74 ML/MIN/1.73
GLUCOSE BLD-MCNC: 111 MG/DL (ref 65–99)
GRAM STN SPEC: NORMAL
GRAM STN SPEC: NORMAL
HCT VFR BLD AUTO: 27.9 % (ref 37.5–51)
HGB BLD-MCNC: 8.9 G/DL (ref 13–17.7)
IMM GRANULOCYTES # BLD AUTO: 0.46 10*3/MM3 (ref 0–0.05)
IMM GRANULOCYTES NFR BLD AUTO: 4 % (ref 0–0.5)
LYMPHOCYTES # BLD AUTO: 2.21 10*3/MM3 (ref 0.7–3.1)
LYMPHOCYTES NFR BLD AUTO: 19.3 % (ref 19.6–45.3)
MCH RBC QN AUTO: 27.1 PG (ref 26.6–33)
MCHC RBC AUTO-ENTMCNC: 31.9 G/DL (ref 31.5–35.7)
MCV RBC AUTO: 85.1 FL (ref 79–97)
MONOCYTES # BLD AUTO: 0.95 10*3/MM3 (ref 0.1–0.9)
MONOCYTES NFR BLD AUTO: 8.3 % (ref 5–12)
NEUTROPHILS # BLD AUTO: 7.52 10*3/MM3 (ref 1.7–7)
NEUTROPHILS NFR BLD AUTO: 65.9 % (ref 42.7–76)
NRBC BLD AUTO-RTO: 0 /100 WBC (ref 0–0.2)
PHOSPHATE SERPL-MCNC: 4.2 MG/DL (ref 2.5–4.5)
PLATELET # BLD AUTO: 301 10*3/MM3 (ref 140–450)
PMV BLD AUTO: 9.6 FL (ref 6–12)
POTASSIUM BLD-SCNC: 4.3 MMOL/L (ref 3.5–5.2)
RBC # BLD AUTO: 3.28 10*6/MM3 (ref 4.14–5.8)
SODIUM BLD-SCNC: 139 MMOL/L (ref 136–145)
WBC NRBC COR # BLD: 11.43 10*3/MM3 (ref 3.4–10.8)

## 2020-03-15 PROCEDURE — 80069 RENAL FUNCTION PANEL: CPT | Performed by: INTERNAL MEDICINE

## 2020-03-15 PROCEDURE — 25010000002 HEPARIN (PORCINE) PER 1000 UNITS: Performed by: NURSE PRACTITIONER

## 2020-03-15 PROCEDURE — 97530 THERAPEUTIC ACTIVITIES: CPT

## 2020-03-15 PROCEDURE — 85025 COMPLETE CBC W/AUTO DIFF WBC: CPT | Performed by: INTERNAL MEDICINE

## 2020-03-15 PROCEDURE — 94799 UNLISTED PULMONARY SVC/PX: CPT

## 2020-03-15 PROCEDURE — 85730 THROMBOPLASTIN TIME PARTIAL: CPT | Performed by: THORACIC SURGERY (CARDIOTHORACIC VASCULAR SURGERY)

## 2020-03-15 PROCEDURE — 97116 GAIT TRAINING THERAPY: CPT

## 2020-03-15 PROCEDURE — 85730 THROMBOPLASTIN TIME PARTIAL: CPT | Performed by: NURSE PRACTITIONER

## 2020-03-15 PROCEDURE — 71045 X-RAY EXAM CHEST 1 VIEW: CPT

## 2020-03-15 RX ADMIN — METOPROLOL TARTRATE 25 MG: 25 TABLET ORAL at 10:01

## 2020-03-15 RX ADMIN — SODIUM CHLORIDE, PRESERVATIVE FREE 10 ML: 5 INJECTION INTRAVENOUS at 20:13

## 2020-03-15 RX ADMIN — Medication 250 MG: at 20:13

## 2020-03-15 RX ADMIN — LIDOCAINE 1 PATCH: 50 PATCH CUTANEOUS at 10:04

## 2020-03-15 RX ADMIN — Medication 250 MG: at 10:01

## 2020-03-15 RX ADMIN — IPRATROPIUM BROMIDE AND ALBUTEROL SULFATE 3 ML: 2.5; .5 SOLUTION RESPIRATORY (INHALATION) at 08:03

## 2020-03-15 RX ADMIN — FUROSEMIDE 40 MG: 40 TABLET ORAL at 10:01

## 2020-03-15 RX ADMIN — SODIUM CHLORIDE, PRESERVATIVE FREE 10 ML: 5 INJECTION INTRAVENOUS at 10:05

## 2020-03-15 RX ADMIN — CARBIDOPA AND LEVODOPA 1 TABLET: 25; 100 TABLET ORAL at 10:01

## 2020-03-15 RX ADMIN — CARBIDOPA AND LEVODOPA 1 TABLET: 25; 100 TABLET ORAL at 20:13

## 2020-03-15 RX ADMIN — BUDESONIDE 0.5 MG: 0.5 INHALANT RESPIRATORY (INHALATION) at 21:47

## 2020-03-15 RX ADMIN — BUDESONIDE 0.5 MG: 0.5 INHALANT RESPIRATORY (INHALATION) at 08:04

## 2020-03-15 RX ADMIN — METOPROLOL TARTRATE 25 MG: 25 TABLET ORAL at 20:13

## 2020-03-15 RX ADMIN — HEPARIN SODIUM 22 UNITS/KG/HR: 10000 INJECTION, SOLUTION INTRAVENOUS at 10:14

## 2020-03-15 RX ADMIN — IPRATROPIUM BROMIDE AND ALBUTEROL SULFATE 3 ML: 2.5; .5 SOLUTION RESPIRATORY (INHALATION) at 21:47

## 2020-03-15 NOTE — PROGRESS NOTES
"    Chief Complaint: Bilateral pulmonary emboli, parapneumonic effusion  S/P: CT-guided chest tube insertion    Subjective:  Symptoms:  Improved.  He reports shortness of breath, cough, weakness and anorexia.    Diet:  Poor intake.  No nausea or vomiting.    Activity level: Impaired due to weakness.    Pain:  He complains of pain that is mild.  Pain is well controlled.        Vital Signs:  Temp:  [97.2 °F (36.2 °C)-99 °F (37.2 °C)] 97.2 °F (36.2 °C)  Heart Rate:  [63-78] 65  Resp:  [16-20] 18  BP: (123-146)/(56-91) 146/74    Intake & Output (last day)       03/14 0701 - 03/15 0700 03/15 0701 - 03/16 0700    P.O. 540 240    I.V. (mL/kg)      Total Intake(mL/kg) 540 (6.6) 240 (2.9)    Urine (mL/kg/hr) 695 (0.4) 150 (0.3)    Chest Tube 0 40    Total Output 695 190    Net -155 +50                Objective:  General Appearance:  Comfortable, ill-appearing, in no acute distress and not in pain.    Vital signs: (most recent): Blood pressure 146/74, pulse 65, temperature 97.2 °F (36.2 °C), temperature source Oral, resp. rate 18, height 177.8 cm (70\"), weight 82.1 kg (181 lb 1.6 oz), SpO2 94 %.  Vital signs are normal.  No fever.    Output: Producing urine and producing stool.    HEENT: (Very hard of hearing.)    Lungs:  Normal effort and normal respiratory rate.  He is not in respiratory distress.  There are decreased breath sounds (bibasilar).    Heart: Normal rate.  Regular rhythm.    Chest: Chest wall tenderness (at chest tube site) present.    Abdomen: Abdomen is soft.  Bowel sounds are normal.   There is no abdominal tenderness.   There is no mass.   Extremities: Normal range of motion.  There is dependent edema.    Pulses: Distal pulses are intact.    Neurological: Patient is alert and oriented to person, place and time.    Skin:  Warm and dry.          Chest tube:   Site: Left, Clean, Dry, Intact and Securement device intact  Suction: -20 cm  Air Leak: negative  24 Hour Total: 40cc    Results Review:     I reviewed " the patient's new clinical results.  I reviewed the patient's new imaging results and agree with the interpretation.  I reviewed the patient's other test results and agree with the interpretation    Imaging Results (Last 24 Hours)     Procedure Component Value Units Date/Time    XR Chest 1 View [[id number]] Collected:  03/15/20 0714     Updated:  03/15/20 0715    Narrative:       PORTABLE CHEST X-RAY     CLINICAL HISTORY: chest tube management; J18.9-Pneumonia, unspecified  organism; J96.01-Acute respiratory failure with hypoxia; I26.99-Other  pulmonary embolism without acute cor pulmonale; I26.02-Saddle embolus of  pulmonary artery with acute cor pulmonale     COMPARISON: 03/14/2020.     FINDINGS: Portable AP view of the chest was obtained with overlying  monitor leads in place. Life support lines are unchanged. No  pneumothorax. Lungs are under aerated. Bibasilar and left perihilar  airspace opacities are unchanged as are bilateral effusions. 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             Impression:          Stable appearance of the chest by portable radiography.                XR Chest 1 View [[id number]] Collected:  03/14/20 0709     Updated:  03/14/20 1905    Narrative:       PORTABLE CHEST X-RAY     CLINICAL HISTORY: chest tube management; J18.9-Pneumonia, unspecified  organism; J96.01-Acute respiratory failure with hypoxia; I26.99-Other  pulmonary embolism without acute cor pulmonale; I26.02-Saddle embolus of  pulmonary artery with acute cor pulmonale     COMPARISON: 03/13/2020     FINDINGS: Portable AP view of the chest was obtained with overlying  monitor leads in place. Life support lines are unchanged and there is no  pneumothorax. Basilar opacities felt to reflect atelectasis and small  effusions are again noted and have increased slightly on the right side.  Heart is enlarged. Normal vascularity. There is stable widening of the  upper mediastinum without significant tracheal deviation. Previous  CT  showed a right paratracheal mass which likely in part accounts for the  mediastinal widening. Please see the previous CT report.       Impression:       Slight increase in right lung base atelectasis and effusion,  stable findings otherwise.        This report was finalized on 3/14/2020 7:02 PM by Isrrael Wang M.D.             Lab Results:     Lab Results (last 24 hours)     Procedure Component Value Units Date/Time    Blood Culture - Blood, Arm, Left [[id number]] Collected:  03/12/20 1039    Specimen:  Blood from Arm, Left Updated:  03/15/20 1100     Blood Culture No growth at 3 days    Blood Culture - Blood, Hand, Left [[id number]] Collected:  03/12/20 1039    Specimen:  Blood from Hand, Left Updated:  03/15/20 1100     Blood Culture No growth at 3 days    Body Fluid Culture - Body Fluid, Pleural Cavity [[id number]] Collected:  03/12/20 1435    Specimen:  Body Fluid from Pleural Cavity Updated:  03/15/20 0915     Body Fluid Culture No growth at 3 days     Gram Stain Few (2+) WBCs seen      No organisms seen    aPTT [[id number]]  (Abnormal) Collected:  03/15/20 0327    Specimen:  Blood Updated:  03/15/20 0429     PTT 91.4 seconds     Renal Function Panel [[id number]]  (Abnormal) Collected:  03/15/20 0327    Specimen:  Blood Updated:  03/15/20 0415     Glucose 111 mg/dL      BUN 19 mg/dL      Creatinine 0.96 mg/dL      Sodium 139 mmol/L      Potassium 4.3 mmol/L      Chloride 101 mmol/L      CO2 25.8 mmol/L      Calcium 7.9 mg/dL      Albumin 2.40 g/dL      Phosphorus 4.2 mg/dL      Anion Gap 12.2 mmol/L      BUN/Creatinine Ratio 19.8     eGFR Non African Amer 74 mL/min/1.73     Narrative:       GFR Normal >60  Chronic Kidney Disease <60  Kidney Failure <15      CBC & Differential [[id number]] Collected:  03/15/20 0327    Specimen:  Blood Updated:  03/15/20 0345    Narrative:       The following orders were created for panel order CBC & Differential.  Procedure                               Abnormality          Status                     ---------                               -----------         ------                     CBC Auto Differential[[id number]]        Abnormal            Final result                 Please view results for these tests on the individual orders.    CBC Auto Differential [[id number]]  (Abnormal) Collected:  03/15/20 0327    Specimen:  Blood Updated:  03/15/20 0345     WBC 11.43 10*3/mm3      RBC 3.28 10*6/mm3      Hemoglobin 8.9 g/dL      Hematocrit 27.9 %      MCV 85.1 fL      MCH 27.1 pg      MCHC 31.9 g/dL      RDW 12.3 %      RDW-SD 38.0 fl      MPV 9.6 fL      Platelets 301 10*3/mm3      Neutrophil % 65.9 %      Lymphocyte % 19.3 %      Monocyte % 8.3 %      Eosinophil % 2.2 %      Basophil % 0.3 %      Immature Grans % 4.0 %      Neutrophils, Absolute 7.52 10*3/mm3      Lymphocytes, Absolute 2.21 10*3/mm3      Monocytes, Absolute 0.95 10*3/mm3      Eosinophils, Absolute 0.25 10*3/mm3      Basophils, Absolute 0.04 10*3/mm3      Immature Grans, Absolute 0.46 10*3/mm3      nRBC 0.0 /100 WBC            Assessment/Plan       Acute saddle pulmonary embolism with acute cor pulmonale (CMS/HCC)    Pulmonary emboli (CMS/HCC)    Empyema of pleura (CMS/HCC)       Assessment & Plan     Mr. Garcia is a pleasant 88-year-old gentleman status post CT-guided drainage of the right chest and subsequent removal  , and CT-guided drainage of the left chest.  He has the left chest tube remaining which is put out approximately 40cc.  He continues to have fluid in the left chest and we will plan administration of TPA in that chest tomorrow.  On Tuesday, he will need a CT of the chest for reevaluation.    Marya Leon MD  [facility]  03/15/20  13:39

## 2020-03-15 NOTE — THERAPY TREATMENT NOTE
Acute Care - Physical Therapy Treatment Note  [facility]     Patient Name: Izaiah Garcia  : 1931  MRN: [id number]  Today's Date: 3/15/2020  Onset of Illness/Injury or Date of Surgery: 20          Admit Date: 2020    Visit Dx:    ICD-10-CM ICD-9-CM   1. Healthcare-associated pneumonia J18.9 486   2. Acute hypoxemic respiratory failure (CMS/HCC) J96.01 518.81   3. Bilateral pulmonary embolism (CMS/HCC) I26.99 415.19   4. Acute saddle pulmonary embolism with acute cor pulmonale (CMS/HCC) I26.02 415.13     415.0     Patient Active Problem List   Diagnosis   • Hyperlipidemia   • Benign essential hypertension   • History of gout   • History of esophageal stricture   • History of stroke, 2016--4.9 x 4 x 3 cm inferior left cerebellar infarct   • Rheumatoid factor positive   • Impaired fasting glucose   • At risk for falls   • Generalized weakness   • Bilateral high frequency sensorineural hearing loss, wears hearing aids   • Bilateral lower extremity edema   • Parkinson's disease (CMS/HCC)   • Therapeutic drug monitoring   • Vitamin D deficiency   • Macrocytosis   • Vitamin B12 deficiency   • Acute diarrhea   • Hospital-acquired pneumonia   • HAP (hospital-acquired pneumonia)   • Acute UTI (urinary tract infection)   • Colitis   • Parkinson disease (CMS/HCC)   • Weakness   • Aspiration pneumonia of right lower lobe due to vomit (CMS/HCC)   • Pulmonary emboli (CMS/HCC)   • Acute saddle pulmonary embolism with acute cor pulmonale (CMS/HCC)   • Empyema of pleura (CMS/HCC)       Therapy Treatment    Rehabilitation Treatment Summary     Row Name 03/15/20 0944             Treatment Time/Intention    Discipline  physical therapist  -JR      Document Type  therapy note (daily note)  -JR      Subjective Information  complains of;weakness;fatigue  -JR      Mode of Treatment  individual therapy;physical therapy  -JR      Patient/Family Observations  -- DAUGHTER PRESENT.  Pt IS SUPINE IN BED, RESTING.     -JR      Care Plan Review  evaluation/treatment results reviewed;care plan/treatment goals reviewed;risks/benefits reviewed;current/potential barriers reviewed;patient/other agree to care plan  -JR      Care Plan Review, Other Participant(s)  daughter  -JR      Total Minutes, Physical Therapy Treatment  25  -JR      Patient Effort  adequate  -JR      Comment  -- STILL VERY WEAK AND ONLY ABLE TO AMBULATE TO CHAIR.    -JR      Recorded by [JR] Gonzales Bhandari, PT 03/15/20 0945      Row Name 03/15/20 0944             Vital Signs    Pre Systolic BP Rehab  127  -JR      Pre Treatment Diastolic BP  60  -JR      Pretreatment Heart Rate (beats/min)  68  -JR      Intratreatment Heart Rate (beats/min)  88  -JR      Posttreatment Heart Rate (beats/min)  80  -JR      Pre SpO2 (%)  93  -JR      O2 Delivery Pre Treatment  supplemental O2  -JR      Post SpO2 (%)  92  -JR      O2 Delivery Post Treatment  supplemental O2  -JR      Recorded by [JR] Gonzales Bhandari, PT 03/15/20 0951      Row Name 03/15/20 0944             Cognitive Assessment/Intervention- PT/OT    Orientation Status (Cognition)  oriented x 3  -JR      Recorded by [JR] Gonzales Bhandari, PT 03/15/20 0951      Row Name 03/15/20 0944             Bed Mobility Assessment/Treatment    Supine-Sit [place] (Bed Mobility)  verbal cues;nonverbal cues (demo/gesture);moderate assist (50% patient effort);1 person assist;1 person to manage equipment  -JR      Bed Mobility, Safety Issues  decreased use of arms for pushing/pulling;decreased use of legs for bridging/pushing  -JR      Assistive Device (Bed Mobility)  draw sheet  -JR      Recorded by [JR] Gonzales Bhandari, PT 03/15/20 0951      Row Name 03/15/20 0944             Sit-Stand Transfer    Sit-Stand [place] (Transfers)  verbal cues;nonverbal cues (demo/gesture);minimum assist (75% patient effort);1 person assist;1 person to manage equipment  -JR      Assistive Device (Sit-Stand Transfers)  walker, front-wheeled   -JR      Recorded by [JR] Gonzales Bhandari, PT 03/15/20 0951      Row Name 03/15/20 0944             Stand-Sit Transfer    Stand-Sit [place] (Transfers)  verbal cues;nonverbal cues (demo/gesture);minimum assist (75% patient effort);1 person assist;1 person to manage equipment  -JR      Assistive Device (Stand-Sit Transfers)  walker, front-wheeled  -JR      Recorded by [JR] Gonzales Bhandari, PT 03/15/20 0951      Row Name 03/15/20 0944             Gait/Stairs Assessment/Training    Gait/Stairs Assessment/Training  gait/ambulation independence;gait/ambulation assistive device;distance ambulated;gait pattern;gait deviations  -JR      [place] Level (Gait)  moderate assist (50% patient effort);2 person assist  -JR      Assistive Device (Gait)  walker, front-wheeled  -JR      Distance in Feet (Gait)  -- 2  -JR      Deviations/Abnormal Patterns (Gait)  erika decreased;gait speed decreased;stride length decreased;other (see comments) HAS DIFFICULTY ADVANCING EXTREMITIES, ESPECIALLY L LE.    -JR      Recorded by [JR] Gonzales Bhandari, PT 03/15/20 0951      Row Name 03/15/20 0944             Therapeutic Exercise    Therapeutic Exercise  seated, lower extremities;seated, upper extremities  -JR      Additional Documentation  Therapeutic Exercise (Row) PERFORMED X 10 FOR SAQs AND SHOULDER ELEVATION.    -JR      Recorded by [JR] Gonzales Bhandari, PT 03/15/20 0951      Row Name 03/15/20 0944             Static Standing Balance    Level of [place] (Supported Standing, Static Balance)  minimal assist, 75% patient effort;1 person assist  -JR      Time Able to Maintain Position (Supported Standing, Static Balance)  45 to 60 seconds  -JR      Comment (Supported Standing, Static Balance)  STOOD AND PERFORMED WEIGHTSHIFTING.    -JR      Recorded by [JR] Gonzales Bhandari, PT 03/15/20 0951      Row Name 03/15/20 0944             Positioning and Restraints    Pre-Treatment Position  in bed  -JR      Post Treatment Position   chair  -JR      In Chair  notified nsg;reclined;call light within reach;encouraged to call for assist;exit alarm on;with family/caregiver;with nsg  -JR      Recorded by [] Gonzales Bhandari, PT 03/15/20 0951      Row Name 03/15/20 0944             Pain Scale: Numbers Pre/Post-Treatment    Pain Scale: Numbers, Pretreatment  3/10  -JR      Pain Scale: Numbers, Post-Treatment  3/10  -JR      Pain Location - Side  Left  -JR      Pain Location - Orientation  lower  -JR      Pain Location  back  -JR      Pain Intervention(s)  Repositioned;Ambulation/increased activity  -JR      Recorded by [] Gonzales Bhandari, PT 03/15/20 0951      Row Name                Wound 03/01/20  Right anterior groin Puncture    Wound - Properties Group Date first assessed: 03/01/20 [KB] Time first assessed: -- [KB], when EKOS pulled, puncture wound left in place  Present on Hospital Admission: N [KB] Side: Right [KB] Orientation: anterior [KB] Location: groin [KB] Primary Wound Type: Puncture [KB] Recorded by:  [KB] Tootie Decker RN 03/02/20 2241    Row Name                Wound 03/11/20 1130 Right gluteal Skin Tear    Wound - Properties Group Date first assessed: 03/11/20 [WH] Time first assessed: 1130 [WH] Present on Hospital Admission: N [WH] Side: Right [WH] Location: gluteal [WH] Primary Wound Type: Skin tear [WH] Additional Comments: moisture/friction [DA] Recorded by:  [DA] Tabitha Lawler RN, NICKI 03/11/20 1625 [WH] Alyssa Mcclain RN 03/11/20 1154    Row Name 03/15/20 0944             Plan of Care Review    Plan of Care Reviewed With  patient;daughter  -JR      Recorded by [] Gonzales Bhandari, PT 03/15/20 0951        User Key  (r) = Recorded By, (t) = Taken By, (c) = Cosigned By    Initials Name Effective Dates Discipline    DA Tabitha Lawler, RN, CWAUDIEN 06/16/16 -  Nurse    Gonzales Tong, PT 04/03/18 -  PT    Tootie Pan RN 06/20/16 -  Nurse    WH Alyssa Mcclain RN 11/15/19 -  Nurse          Wound 03/01/20   Right anterior groin Puncture (Active)   Dressing Appearance open to air 3/14/2020  8:38 PM   Closure None 3/14/2020  8:38 PM   Base closed/resurfaced 3/14/2020  8:38 PM       Wound 03/11/20 1130 Right gluteal Skin Tear (Active)   Dressing Appearance open to air 3/14/2020  8:38 PM   Base pink 3/14/2020  8:38 PM   Periwound excoriated 3/14/2020  8:38 PM               PT Recommendation and Plan     Plan of Care Reviewed With: patient, daughter  Progress: no change  Outcome Measures     Row Name 03/15/20 0952 03/14/20 0958          How much help from another person do you currently need...    Turning from your back to your side while in flat bed without using bedrails?  3  -JR  3  -JR     Moving from lying on back to sitting on the side of a flat bed without bedrails?  3  -JR  3  -JR     Moving to and from a bed to a chair (including a wheelchair)?  2  -JR  2  -JR     Standing up from a chair using your arms (e.g., wheelchair, bedside chair)?  3  -JR  3  -JR     Climbing 3-5 steps with a railing?  1  -JR  1  -JR     To walk in hospital room?  2  -JR  2  -JR     AM-PAC 6 Clicks Score (PT)  14  -JR  14  -JR       User Key  (r) = Recorded By, (t) = Taken By, (c) = Cosigned By    Initials Name Provider Type    Gonzales Tong, PT Physical Therapist         Time Calculation:   PT Charges     Row Name 03/15/20 0952             Time Calculation    Start Time  0913  -JR      Stop Time  0933  -JR      Time Calculation (min)  20 min  -JR      PT Received On  03/15/20  -JR      PT - Next Appointment  03/16/20  -JR        User Key  (r) = Recorded By, (t) = Taken By, (c) = Cosigned By    Initials Name Provider Type    Gonzales Tong, PT Physical Therapist        Therapy Charges for Today     Code Description Service Date Service Provider Modifiers Qty    87493407685 HC GAIT TRAINING EA 15 MIN 3/14/2020 Gonzales Bhandari, PT GP 1    17915399792  PT THERAPEUTIC ACT EA 15 MIN 3/14/2020 Gonzales Bhandari, PT GP 1     88513146163  PT THER SUPP EA 15 MIN 3/14/2020 Gonzales Bhandari, PT GP 1    02730185906 HC PT THER SUPP EA 15 MIN 3/15/2020 oGnzales Bhandari, PT GP 1    46096111807 HC PT THERAPEUTIC ACT EA 15 MIN 3/15/2020 Gonzales Bhandari, PT GP 1    02088742956 HC GAIT TRAINING EA 15 MIN 3/15/2020 Gonzales Bhandari, PT GP 1          PT G-Codes  Outcome Measure Options: AM-PAC 6 Clicks Daily Activity (OT)  AM-PAC 6 Clicks Score (PT): 14  AM-PAC 6 Clicks Score (OT): 12    Gonzales Bhandari, PT  3/15/2020

## 2020-03-15 NOTE — PLAN OF CARE
Problem: Patient Care Overview  Goal: Plan of Care Review  Outcome: Ongoing (interventions implemented as appropriate)  Flowsheets  Taken 3/15/2020 0951 by Gonzales Bhandari PT  Progress: no change  Taken 3/15/2020 1620 by Alysia Bates, RN  Plan of Care Reviewed With: patient  Taken 3/15/2020 1912 by Alysia Bates, RN  Outcome Summary: Pt has not changed much today. Eating very little. Is urinating in the urinal at bedside. Still on heparin drip and little drainage from chest tube. Vitals stable, NS.

## 2020-03-15 NOTE — PLAN OF CARE
Problem: Patient Care Overview  Goal: Plan of Care Review  Flowsheets (Taken 3/15/2020 8087)  Progress: no change  Plan of Care Reviewed With: patient; daughter  Note:   Pt IS STILL VERY WEAK AND NOT QUITE ABLE TO AMBULATE MORE THAN A FEW FEET AWAY FROM THE BED.  NEED TO CONTINUE TO PROGRESS STRENGTHENING AS HE CAN TOLERATE.

## 2020-03-15 NOTE — PROGRESS NOTES
"  Daily Progress Note.   [facility] CARDIOVASC UNIT  3/15/2020    Patient:  Name:  Izaiah Garcia  MRN:  [id number]  8/31/1931  88 y.o.  male       Chief Complaint: Patient had questionable facial asymmetry last night that was evaluated by the DVT     Hospital course: Patient was readmitted after an hospital admission for pneumonia with evidence of massive pulmonary embolism, and was managed with EKOS with directed thrombolytic therapy low-dose with good clinical response.  Patient had pleural effusion, and after evaluation by thoracic surgery decision was to have radiology replaced the chest tube with a CT-guidance.  This was done on 3/9/2020 but the patient had output less than 200 cc over the first 24 hours, TPA was administered on 3/10/2020 with nearly 600 cc total output afterwards.  The right-sided chest tube was removed on 3/12/2020 when the output was minimal and patient had a CT-guided placement of left-sided small chest tube with 1200 cc output so far.  Patient is on the heparin drip which was discontinued only for few hours before the procedure and was resumed afterwards with no problem with bleeding.    INTERVAL:  Awake alert no new neurological concern from patient or family.  No soa chest pain  No emesis  Some gen weakness  Speech intact, moves all ext, recognizes and names family member.    ROS: No fever, no diarrhea, no chest pain  PMFSSH: no change    Physical Exam:  /60 (BP Location: Left arm, Patient Position: Lying)   Pulse 68   Temp 98 °F (36.7 °C) (Oral)   Resp 18   Ht 177.8 cm (70\")   Wt 82.1 kg (181 lb 1.6 oz)   SpO2 96%   BMI 25.99 kg/m²   Body mass index is 25.99 kg/m².    Intake/Output Summary (Last 24 hours) at 3/15/2020 0909  Last data filed at 3/15/2020 0804  Gross per 24 hour   Intake 660 ml   Output 695 ml   Net -35 ml     General appearance: chronically ill but non toxic, conversant   Eyes: anicteric sclerae, moist conjunctivae; no lid-lag; PERRLA  HENT: " Atraumatic; oropharynx clear with moist mucous membranes and no mucosal ulcerations; normal hard and soft palate  Neck: Trachea midline; FROM, supple, no thyromegaly or lymphadenopathy  Lungs: +chest tube no wheee no rhonchi, with normal respiratory effort and no intercostal retractions  CV: irreg irreg , no rub  Abdomen: Soft, non-tender; no masses or HSM  Extremities:trace peripheral edema or extremity lymphadenopathy  Skin: Normal temperature, turgor and texture; no rash, ulcers or subcutaneous nodules  Psych: Appropriate affect, alert and oriented to person, place and time  Neuro cns II-XII intact move all ext, speech intact    Data Review:  Notable Labs:  Results from last 7 days   Lab Units 03/15/20  0327 03/14/20  0423 03/13/20  0454 03/12/20  0506 03/11/20  0404 03/10/20  0320 03/09/20  0340   WBC 10*3/mm3 11.43* 12.02* 13.73* 12.10* 12.16* 11.34* 11.16*   HEMOGLOBIN g/dL 8.9* 9.3* 9.6* 9.1* 9.2* 9.5* 9.6*   PLATELETS 10*3/mm3 301 295 346 351 346 304 282     Results from last 7 days   Lab Units 03/15/20  0327 03/14/20  0423 03/13/20  0454 03/12/20  0506 03/11/20  0404 03/10/20  0320 03/09/20  0340   SODIUM mmol/L 139 136 137 134* 136 134* 133*   POTASSIUM mmol/L 4.3 4.9 3.6 3.6 3.8 3.9 4.2   CHLORIDE mmol/L 101 104 100 99 99 97* 98   CO2 mmol/L 25.8 22.3 24.5 24.1 24.5 24.0 23.0   BUN mg/dL 19 19 18 15 14 15 15   CREATININE mg/dL 0.96 0.95 0.89 0.68* 0.82 0.70* 0.78   GLUCOSE mg/dL 111* 119* 137* 112* 124* 129* 122*   CALCIUM mg/dL 7.9* 8.4* 8.1* 7.9* 7.7* 8.2* 8.0*   PHOSPHORUS mg/dL 4.2 8.0* 3.6 3.8 3.6 3.6 2.6   Estimated Creatinine Clearance: 61.8 mL/min (by C-G formula based on SCr of 0.96 mg/dL).    Imaging:  Reviewed chest images personally from past 3 days    Scheduled meds:      budesonide 0.5 mg Nebulization BID - RT   carbidopa-levodopa 1 tablet Oral BID   cyanocobalamin 1,000 mcg Intramuscular Q28 Days   furosemide 40 mg Oral Daily   ipratropium-albuterol 3 mL Nebulization BID - RT   lidocaine 1  patch Transdermal Q24H   metoprolol tartrate 25 mg Oral Q12H   saccharomyces boulardii 250 mg Oral BID   sodium chloride 10 mL Intravenous Q12H     Heparin gtt    ASSESSMENT  /  PLAN:  1. Acute hypoxic respiratory failure  2. BPE s/p ekos catheter on 2/29/2020 with local TPA infusion with good clinical response  3. Troponemia suspect due to BPE  4. RV strain  5. Bilateral pleural effusion R greater than Left exudative with chest tube insertion on 3/9/2020  6. HLD  7. Esphageal stricture  8. Gen weakness  9. Parkinsons Disease  10. 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 HTN  13. Abnormal mass around thyroid   14. hypokalemia, corrected  15. Hypoalbuminemia  16. Hyponatremia, mild     PLAN:  Chest tube mgmt per the thoracic surgery team  Continue with a heparin drip, will transition to oral anticoagulation once the chest tube is out  Patient to continue to work with physical therapy  Appetite is better  CODE STATUS noted  D/w family      Chetan Eugene MD  [facility]  03/15/20  9327

## 2020-03-16 ENCOUNTER — APPOINTMENT (OUTPATIENT)
Dept: GENERAL RADIOLOGY | Facility: HOSPITAL | Age: 85
End: 2020-03-16

## 2020-03-16 LAB
ALBUMIN SERPL-MCNC: 2.3 G/DL (ref 3.5–5.2)
ANION GAP SERPL CALCULATED.3IONS-SCNC: 9.7 MMOL/L (ref 5–15)
APTT PPP: 189.8 SECONDS (ref 22.7–35.4)
APTT PPP: 29.7 SECONDS (ref 22.7–35.4)
APTT PPP: 90.5 SECONDS (ref 22.7–35.4)
BASOPHILS # BLD AUTO: 0.06 10*3/MM3 (ref 0–0.2)
BASOPHILS NFR BLD AUTO: 0.6 % (ref 0–1.5)
BUN BLD-MCNC: 16 MG/DL (ref 8–23)
BUN/CREAT SERPL: 16.8 (ref 7–25)
CALCIUM SPEC-SCNC: 8.2 MG/DL (ref 8.6–10.5)
CHLORIDE SERPL-SCNC: 101 MMOL/L (ref 98–107)
CO2 SERPL-SCNC: 25.3 MMOL/L (ref 22–29)
CREAT BLD-MCNC: 0.95 MG/DL (ref 0.76–1.27)
DEPRECATED RDW RBC AUTO: 38 FL (ref 37–54)
EOSINOPHIL # BLD AUTO: 0.33 10*3/MM3 (ref 0–0.4)
EOSINOPHIL NFR BLD AUTO: 3.3 % (ref 0.3–6.2)
ERYTHROCYTE [DISTWIDTH] IN BLOOD BY AUTOMATED COUNT: 12.3 % (ref 12.3–15.4)
GFR SERPL CREATININE-BSD FRML MDRD: 75 ML/MIN/1.73
GLUCOSE BLD-MCNC: 108 MG/DL (ref 65–99)
HCT VFR BLD AUTO: 27.3 % (ref 37.5–51)
HGB BLD-MCNC: 9.1 G/DL (ref 13–17.7)
IMM GRANULOCYTES # BLD AUTO: 0.45 10*3/MM3 (ref 0–0.05)
IMM GRANULOCYTES NFR BLD AUTO: 4.5 % (ref 0–0.5)
LYMPHOCYTES # BLD AUTO: 2.28 10*3/MM3 (ref 0.7–3.1)
LYMPHOCYTES NFR BLD AUTO: 22.6 % (ref 19.6–45.3)
MCH RBC QN AUTO: 28.2 PG (ref 26.6–33)
MCHC RBC AUTO-ENTMCNC: 33.3 G/DL (ref 31.5–35.7)
MCV RBC AUTO: 84.5 FL (ref 79–97)
MONOCYTES # BLD AUTO: 0.79 10*3/MM3 (ref 0.1–0.9)
MONOCYTES NFR BLD AUTO: 7.8 % (ref 5–12)
NEUTROPHILS # BLD AUTO: 6.17 10*3/MM3 (ref 1.7–7)
NEUTROPHILS NFR BLD AUTO: 61.2 % (ref 42.7–76)
NRBC BLD AUTO-RTO: 0 /100 WBC (ref 0–0.2)
PHOSPHATE SERPL-MCNC: 3.9 MG/DL (ref 2.5–4.5)
PLATELET # BLD AUTO: 272 10*3/MM3 (ref 140–450)
PMV BLD AUTO: 10.1 FL (ref 6–12)
POTASSIUM BLD-SCNC: 4 MMOL/L (ref 3.5–5.2)
RBC # BLD AUTO: 3.23 10*6/MM3 (ref 4.14–5.8)
SODIUM BLD-SCNC: 136 MMOL/L (ref 136–145)
WBC NRBC COR # BLD: 10.08 10*3/MM3 (ref 3.4–10.8)

## 2020-03-16 PROCEDURE — 80069 RENAL FUNCTION PANEL: CPT | Performed by: INTERNAL MEDICINE

## 2020-03-16 PROCEDURE — 94799 UNLISTED PULMONARY SVC/PX: CPT

## 2020-03-16 PROCEDURE — 97530 THERAPEUTIC ACTIVITIES: CPT

## 2020-03-16 PROCEDURE — 25010000002 HEPARIN (PORCINE) PER 1000 UNITS: Performed by: NURSE PRACTITIONER

## 2020-03-16 PROCEDURE — 97110 THERAPEUTIC EXERCISES: CPT

## 2020-03-16 PROCEDURE — 94660 CPAP INITIATION&MGMT: CPT

## 2020-03-16 PROCEDURE — 25010000002 ALTEPLASE PER 1 MG: Performed by: NURSE PRACTITIONER

## 2020-03-16 PROCEDURE — 85025 COMPLETE CBC W/AUTO DIFF WBC: CPT | Performed by: INTERNAL MEDICINE

## 2020-03-16 PROCEDURE — 85730 THROMBOPLASTIN TIME PARTIAL: CPT | Performed by: THORACIC SURGERY (CARDIOTHORACIC VASCULAR SURGERY)

## 2020-03-16 PROCEDURE — 71045 X-RAY EXAM CHEST 1 VIEW: CPT

## 2020-03-16 PROCEDURE — 99232 SBSQ HOSP IP/OBS MODERATE 35: CPT | Performed by: NURSE PRACTITIONER

## 2020-03-16 PROCEDURE — 36593 DECLOT VASCULAR DEVICE: CPT | Performed by: NURSE PRACTITIONER

## 2020-03-16 PROCEDURE — 85730 THROMBOPLASTIN TIME PARTIAL: CPT | Performed by: INTERNAL MEDICINE

## 2020-03-16 RX ADMIN — HEPARIN SODIUM 23 UNITS/KG/HR: 10000 INJECTION, SOLUTION INTRAVENOUS at 21:10

## 2020-03-16 RX ADMIN — ALTEPLASE 10 MG: KIT at 10:01

## 2020-03-16 RX ADMIN — IPRATROPIUM BROMIDE AND ALBUTEROL SULFATE 3 ML: 2.5; .5 SOLUTION RESPIRATORY (INHALATION) at 20:17

## 2020-03-16 RX ADMIN — IPRATROPIUM BROMIDE AND ALBUTEROL SULFATE 3 ML: 2.5; .5 SOLUTION RESPIRATORY (INHALATION) at 06:35

## 2020-03-16 RX ADMIN — HEPARIN SODIUM 19 UNITS/KG/HR: 10000 INJECTION, SOLUTION INTRAVENOUS at 03:08

## 2020-03-16 RX ADMIN — Medication 250 MG: at 21:08

## 2020-03-16 RX ADMIN — HEPARIN SODIUM 6250 UNITS: 5000 INJECTION INTRAVENOUS; SUBCUTANEOUS at 14:24

## 2020-03-16 RX ADMIN — Medication 250 MG: at 09:47

## 2020-03-16 RX ADMIN — SODIUM CHLORIDE, PRESERVATIVE FREE 10 ML: 5 INJECTION INTRAVENOUS at 21:09

## 2020-03-16 RX ADMIN — LIDOCAINE 1 PATCH: 50 PATCH CUTANEOUS at 09:48

## 2020-03-16 RX ADMIN — CARBIDOPA AND LEVODOPA 1 TABLET: 25; 100 TABLET ORAL at 09:47

## 2020-03-16 RX ADMIN — METOPROLOL TARTRATE 25 MG: 25 TABLET ORAL at 21:08

## 2020-03-16 RX ADMIN — BUDESONIDE 0.5 MG: 0.5 INHALANT RESPIRATORY (INHALATION) at 06:37

## 2020-03-16 RX ADMIN — CARBIDOPA AND LEVODOPA 1 TABLET: 25; 100 TABLET ORAL at 21:08

## 2020-03-16 RX ADMIN — BUDESONIDE 0.5 MG: 0.5 INHALANT RESPIRATORY (INHALATION) at 20:20

## 2020-03-16 RX ADMIN — FUROSEMIDE 40 MG: 40 TABLET ORAL at 09:47

## 2020-03-16 RX ADMIN — METOPROLOL TARTRATE 25 MG: 25 TABLET ORAL at 09:47

## 2020-03-16 NOTE — PROGRESS NOTES
"  Daily Progress Note.   [facility] CARDIOVASC UNIT  3/16/2020    Patient:  Name:  Izaiah Garcia  MRN:  [id number]  8/31/1931  88 y.o.  male       Chief Complaint: Patient had questionable facial asymmetry last night that was evaluated by the DVT     Hospital course: Patient was readmitted after an hospital admission for pneumonia with evidence of massive pulmonary embolism, and was managed with EKOS with directed thrombolytic therapy low-dose with good clinical response.  Patient had pleural effusion, and after evaluation by thoracic surgery decision was to have radiology replaced the chest tube with a CT-guidance.  This was done on 3/9/2020 but the patient had output less than 200 cc over the first 24 hours, TPA was administered on 3/10/2020 with nearly 600 cc total output afterwards.  The right-sided chest tube was removed on 3/12/2020 when the output was minimal and patient had a CT-guided placement of left-sided small chest tube with 1200 cc output so far.  Patient is on the heparin drip which was discontinued only for few hours before the procedure and was resumed afterwards with no problem with bleeding.    INTERVAL:  Follow up for above  Doing better  No acute issues overnight  Continues to feel imrpvoement each day  Anxious to get home.  No chest pain no worsening soa.  No confusion  Family at bedside states he has been talking about events in past and seems very sharp and coherent with good memory and insight    ROS: No fever, no diarrhea, no chest pain  PMFSSH: no change    Physical Exam:  /56 (BP Location: Left arm, Patient Position: Lying)   Pulse 68   Temp 98.8 °F (37.1 °C) (Oral)   Resp 16   Ht 177.8 cm (70\")   Wt 82.1 kg (181 lb 1.6 oz)   SpO2 97%   BMI 25.99 kg/m²   Body mass index is 25.99 kg/m².    Intake/Output Summary (Last 24 hours) at 3/16/2020 1503  Last data filed at 3/16/2020 0800  Gross per 24 hour   Intake 360 ml   Output 300 ml   Net 60 ml     General " appearance: chronically ill but non toxic, conversant   Eyes: anicteric sclerae, moist conjunctivae; no lid-lag; PERRLA  HENT: Atraumatic; oropharynx clear with moist mucous membranes and no mucosal ulcerations; normal hard and soft palate  Neck: Trachea midline; FROM, supple, no thyromegaly or lymphadenopathy  Lungs: +chest tube no wheee no rhonchi, with normal respiratory effort and no intercostal retractions  CV: irreg irreg , no rub  Abdomen: Soft, non-tender; no masses or HSM  Extremities:trace peripheral edema or extremity lymphadenopathy  Skin: Normal temperature, turgor and texture; no rash, ulcers or subcutaneous nodules  Psych: Appropriate affect, alert and oriented to person, place and time  Neuro cns II-XII intact move all ext, speech intact    Data Review:  Notable Labs:  Results from last 7 days   Lab Units 03/16/20  0302 03/15/20  0327 03/14/20  0423 03/13/20  0454 03/12/20  0506 03/11/20  0404 03/10/20  0320   WBC 10*3/mm3 10.08 11.43* 12.02* 13.73* 12.10* 12.16* 11.34*   HEMOGLOBIN g/dL 9.1* 8.9* 9.3* 9.6* 9.1* 9.2* 9.5*   PLATELETS 10*3/mm3 272 301 295 346 351 346 304     Results from last 7 days   Lab Units 03/16/20  0302 03/15/20  0327 03/14/20  0423 03/13/20  0454 03/12/20  0506 03/11/20  0404 03/10/20  0320   SODIUM mmol/L 136 139 136 137 134* 136 134*   POTASSIUM mmol/L 4.0 4.3 4.9 3.6 3.6 3.8 3.9   CHLORIDE mmol/L 101 101 104 100 99 99 97*   CO2 mmol/L 25.3 25.8 22.3 24.5 24.1 24.5 24.0   BUN mg/dL 16 19 19 18 15 14 15   CREATININE mg/dL 0.95 0.96 0.95 0.89 0.68* 0.82 0.70*   GLUCOSE mg/dL 108* 111* 119* 137* 112* 124* 129*   CALCIUM mg/dL 8.2* 7.9* 8.4* 8.1* 7.9* 7.7* 8.2*   PHOSPHORUS mg/dL 3.9 4.2 8.0* 3.6 3.8 3.6 3.6   Estimated Creatinine Clearance: 62.4 mL/min (by C-G formula based on SCr of 0.95 mg/dL).    Imaging:  Reviewed chest images personally from past 3 days    Scheduled meds:      budesonide 0.5 mg Nebulization BID - RT   carbidopa-levodopa 1 tablet Oral BID   cyanocobalamin  1,000 mcg Intramuscular Q28 Days   furosemide 40 mg Oral Daily   ipratropium-albuterol 3 mL Nebulization BID - RT   lidocaine 1 patch Transdermal Q24H   metoprolol tartrate 25 mg Oral Q12H   saccharomyces boulardii 250 mg Oral BID   sodium chloride 10 mL Intravenous Q12H     Heparin gtt    ASSESSMENT  /  PLAN:  1. Acute hypoxic respiratory failure  2. BPE s/p ekos catheter on 2/29/2020 with local TPA infusion with good clinical response  3. Troponemia suspect due to BPE  4. RV strain  5. Bilateral pleural effusion R greater than Left exudative with chest tube insertion on 3/9/2020  6. HLD  7. Esphageal stricture  8. Gen weakness  9. Parkinsons Disease  10. 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 HTN  13. Abnormal mass around thyroid   14. hypokalemia, corrected  15. Hypoalbuminemia  16. Hyponatremia, mild     PLAN:  Chest tube mgmt per the thoracic surgery team - d/w team today plan for tpa and repeat imaging tomorrow  Continue with a heparin drip, will transition to oral anticoagulation once the chest tube is out  Patient to continue to work with physical therapy    CODE STATUS noted  D/w family      Chetan Eugene MD  [facility]  03/16/20  [id number]

## 2020-03-16 NOTE — PLAN OF CARE
Problem: Patient Care Overview  Goal: Plan of Care Review  Outcome: Ongoing (interventions implemented as appropriate)  Flowsheets (Taken 3/16/2020 1807)  Progress: improving  Plan of Care Reviewed With: patient  Outcome Summary: Pt tolerated treatment with no complaints. Pt is MinAX2 for bed mobility and STS transfers. Pt is slowly improving on advancing LEs but is still weak and having difficulty. Pt required sequencing cues during ambulation. Pt ambulated 1 foot to the chair with ModAX2 with rwx. Pt fatigues quickly and unable to ambulate further. Pt performed several bilateral LE exercises and informed to perform these exercises throughout the day to improve strength. Will continue to progress pt as able.

## 2020-03-16 NOTE — PROGRESS NOTES
"    Chief Complaint: Bilateral pulmonary emboli, parapneumonic effusion  S/P: CT-guided chest tube insertion    Subjective:  Symptoms:  Improved.  He reports shortness of breath, cough, weakness and anorexia.    Diet:  Poor intake.  No nausea or vomiting.    Activity level: Impaired due to weakness.    Pain:  He complains of pain that is mild.  Pain is well controlled.        Vital Signs:  Temp:  [97.2 °F (36.2 °C)-98.8 °F (37.1 °C)] 98.2 °F (36.8 °C)  Heart Rate:  [61-68] 68  Resp:  [16-18] 16  BP: (130-146)/(56-74) 130/56    Intake & Output (last day)       03/15 0701 - 03/16 0700 03/16 0701 - 03/17 0700    P.O. 360     Total Intake(mL/kg) 360 (4.4)     Urine (mL/kg/hr) 450 (0.2)     Stool 0     Chest Tube 40     Total Output 490     Net -130           Stool Unmeasured Occurrence 1 x           Objective:  General Appearance:  Comfortable, ill-appearing, in no acute distress and not in pain.    Vital signs: (most recent): Blood pressure 130/56, pulse 68, temperature 98.2 °F (36.8 °C), temperature source Oral, resp. rate 16, height 177.8 cm (70\"), weight 82.1 kg (181 lb 1.6 oz), SpO2 97 %.  Vital signs are normal.  No fever.    Output: Producing urine and producing stool.    HEENT: (Very hard of hearing.)    Lungs:  Normal effort and normal respiratory rate.  He is not in respiratory distress.  There are decreased breath sounds (bibasilar).    Heart: Normal rate.  Regular rhythm.    Chest: Chest wall tenderness (at chest tube site) present.    Abdomen: Abdomen is soft.  Bowel sounds are normal.   There is no abdominal tenderness.   There is no mass.   Extremities: Normal range of motion.  There is dependent edema.    Pulses: Distal pulses are intact.    Neurological: Patient is alert and oriented to person, place and time.    Skin:  Warm and dry.          Chest tube:   Site: Left, Clean, Dry, Intact and Securement device intact  Suction: -20 cm  Air Leak: negative  24 Hour Total: 40cc    Results Review:     I " reviewed the patient's new clinical results.  I reviewed the patient's new imaging results and agree with the interpretation.  I reviewed the patient's other test results and agree with the interpretation. I have discussed these results with the patient, family at bedside and Dr. Leon.     Imaging Results (Last 24 Hours)     Procedure Component Value Units Date/Time    XR Chest 1 View [[id number]] Collected:  03/16/20 0806     Updated:  03/16/20 0810    Narrative:       AP PORTABLE SEMIERECT CHEST     HISTORY: Chest tube management. Follow-up exam.     COMPARISON: AP chest 03/15/2020, 03/14/2020.     FINDINGS: Bilateral pleural effusions are present. Right effusion is  partly loculated within the major fissure similar to the previous exam.  Heart size is enlarged and there is pulmonary vascular engorgement.  Small caliber left chest drain extends to the left lung base and there  is adjacent dense consolidation similar to the previous exam. A  right-sided PICC tip extends into the region of the right axillary vein  without change. There is no pneumothorax. Increased density is present  in the right superior mediastinum which corresponds with the  paratracheal node demonstrated with recent CT.      Overall, there has been no significant change compared to exam 1 day  prior.     This report was finalized on 3/16/2020 8:07 AM by Dr. Duke Asher M.D.       XR Chest 1 View [[id number]] Collected:  03/15/20 0714     Updated:  03/15/20 2219    Narrative:       PORTABLE CHEST X-RAY     CLINICAL HISTORY: chest tube management; J18.9-Pneumonia, unspecified  organism; J96.01-Acute respiratory failure with hypoxia; I26.99-Other  pulmonary embolism without acute cor pulmonale; I26.02-Saddle embolus of  pulmonary artery with acute cor pulmonale     COMPARISON: 03/14/2020.     FINDINGS: Portable AP view of the chest was obtained with overlying  monitor leads in place. Life support lines are unchanged. No  pneumothorax. Lungs  are under aerated. Bibasilar and left perihilar  airspace opacities are unchanged as are bilateral effusions. 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             Impression:          Stable appearance of the chest by portable radiography.        This report was finalized on 3/15/2020 10:16 PM by Isrrael Wang M.D.             Lab Results:     Lab Results (last 24 hours)     Procedure Component Value Units Date/Time    aPTT [[id number]]  (Abnormal) Collected:  03/16/20 0613    Specimen:  Blood Updated:  03/16/20 0647     PTT 90.5 seconds     Renal Function Panel [[id number]]  (Abnormal) Collected:  03/16/20 0302    Specimen:  Blood Updated:  03/16/20 0353     Glucose 108 mg/dL      BUN 16 mg/dL      Creatinine 0.95 mg/dL      Sodium 136 mmol/L      Potassium 4.0 mmol/L      Chloride 101 mmol/L      CO2 25.3 mmol/L      Calcium 8.2 mg/dL      Albumin 2.30 g/dL      Phosphorus 3.9 mg/dL      Anion Gap 9.7 mmol/L      BUN/Creatinine Ratio 16.8     eGFR Non African Amer 75 mL/min/1.73     Narrative:       GFR Normal >60  Chronic Kidney Disease <60  Kidney Failure <15      CBC & Differential [[id number]] Collected:  03/16/20 0302    Specimen:  Blood Updated:  03/16/20 0334    Narrative:       The following orders were created for panel order CBC & Differential.  Procedure                               Abnormality         Status                     ---------                               -----------         ------                     CBC Auto Differential[[id number]]        Abnormal            Final result                 Please view results for these tests on the individual orders.    CBC Auto Differential [[id number]]  (Abnormal) Collected:  03/16/20 0302    Specimen:  Blood Updated:  03/16/20 0334     WBC 10.08 10*3/mm3      RBC 3.23 10*6/mm3      Hemoglobin 9.1 g/dL      Hematocrit 27.3 %      MCV 84.5 fL      MCH 28.2 pg      MCHC 33.3 g/dL      RDW 12.3 %      RDW-SD 38.0 fl      MPV 10.1 fL      Platelets 272 10*3/mm3         Neutrophil % 61.2 %      Lymphocyte % 22.6 %      Monocyte % 7.8 %      Eosinophil % 3.3 %      Basophil % 0.6 %      Immature Grans % 4.5 %      Neutrophils, Absolute 6.17 10*3/mm3      Lymphocytes, Absolute 2.28 10*3/mm3      Monocytes, Absolute 0.79 10*3/mm3      Eosinophils, Absolute 0.33 10*3/mm3      Basophils, Absolute 0.06 10*3/mm3      Immature Grans, Absolute 0.45 10*3/mm3      nRBC 0.0 /100 WBC     aPTT [[id number]]  (Abnormal) Collected:  03/15/20 1842    Specimen:  Blood Updated:  03/15/20 1932     .6 seconds     Blood Culture - Blood, Arm, Left [[id number]] Collected:  03/12/20 1039    Specimen:  Blood from Arm, Left Updated:  03/15/20 1100     Blood Culture No growth at 3 days    Blood Culture - Blood, Hand, Left [[id number]] Collected:  03/12/20 1039    Specimen:  Blood from Hand, Left Updated:  03/15/20 1100     Blood Culture No growth at 3 days           Assessment/Plan       Acute saddle pulmonary embolism with acute cor pulmonale (CMS/HCC)    Pulmonary emboli (CMS/HCC)    Empyema of pleura (CMS/HCC)       Assessment & Plan     Mr. Garcia is a pleasant 88-year-old gentleman status post CT-guided drainage of the right chest and subsequent removal who now has a CT guided chest tube on the left. Fluid in the chest persists with minimal drainage from the tube.    Utilizing sterile technique I infused 10 mg of Activase (and 20 cc normal saline) intrapleurally.  The tube has been clamped.  I have requested the nurse change the patient's position every 15 minutes and then unclamp the tube after 90 minutes.  We will plan a CT of the chest tomorrow for reevaluation.    EBEN Rivers  [facility]  03/16/20  10:23

## 2020-03-17 ENCOUNTER — APPOINTMENT (OUTPATIENT)
Dept: CT IMAGING | Facility: HOSPITAL | Age: 85
End: 2020-03-17

## 2020-03-17 ENCOUNTER — APPOINTMENT (OUTPATIENT)
Dept: GENERAL RADIOLOGY | Facility: HOSPITAL | Age: 85
End: 2020-03-17

## 2020-03-17 LAB
ALBUMIN SERPL-MCNC: 1.9 G/DL (ref 3.5–5.2)
ANION GAP SERPL CALCULATED.3IONS-SCNC: 10.4 MMOL/L (ref 5–15)
APTT PPP: 125.3 SECONDS (ref 22.7–35.4)
APTT PPP: 75.5 SECONDS (ref 22.7–35.4)
BACTERIA SPEC AEROBE CULT: NORMAL
BACTERIA SPEC AEROBE CULT: NORMAL
BASOPHILS # BLD AUTO: 0.06 10*3/MM3 (ref 0–0.2)
BASOPHILS NFR BLD AUTO: 0.6 % (ref 0–1.5)
BUN BLD-MCNC: 15 MG/DL (ref 8–23)
BUN/CREAT SERPL: 17.2 (ref 7–25)
CALCIUM SPEC-SCNC: 7.9 MG/DL (ref 8.6–10.5)
CHLORIDE SERPL-SCNC: 101 MMOL/L (ref 98–107)
CO2 SERPL-SCNC: 25.6 MMOL/L (ref 22–29)
CREAT BLD-MCNC: 0.87 MG/DL (ref 0.76–1.27)
DEPRECATED RDW RBC AUTO: 37 FL (ref 37–54)
EOSINOPHIL # BLD AUTO: 0.36 10*3/MM3 (ref 0–0.4)
EOSINOPHIL NFR BLD AUTO: 3.8 % (ref 0.3–6.2)
ERYTHROCYTE [DISTWIDTH] IN BLOOD BY AUTOMATED COUNT: 12.3 % (ref 12.3–15.4)
GFR SERPL CREATININE-BSD FRML MDRD: 83 ML/MIN/1.73
GLUCOSE BLD-MCNC: 104 MG/DL (ref 65–99)
HCT VFR BLD AUTO: 26.6 % (ref 37.5–51)
HGB BLD-MCNC: 8.8 G/DL (ref 13–17.7)
IMM GRANULOCYTES # BLD AUTO: 0.44 10*3/MM3 (ref 0–0.05)
IMM GRANULOCYTES NFR BLD AUTO: 4.6 % (ref 0–0.5)
LYMPHOCYTES # BLD AUTO: 2.51 10*3/MM3 (ref 0.7–3.1)
LYMPHOCYTES NFR BLD AUTO: 26.3 % (ref 19.6–45.3)
MCH RBC QN AUTO: 27.3 PG (ref 26.6–33)
MCHC RBC AUTO-ENTMCNC: 33.1 G/DL (ref 31.5–35.7)
MCV RBC AUTO: 82.6 FL (ref 79–97)
MONOCYTES # BLD AUTO: 0.81 10*3/MM3 (ref 0.1–0.9)
MONOCYTES NFR BLD AUTO: 8.5 % (ref 5–12)
NEUTROPHILS # BLD AUTO: 5.38 10*3/MM3 (ref 1.7–7)
NEUTROPHILS NFR BLD AUTO: 56.2 % (ref 42.7–76)
NRBC BLD AUTO-RTO: 0 /100 WBC (ref 0–0.2)
PHOSPHATE SERPL-MCNC: 3.8 MG/DL (ref 2.5–4.5)
PLATELET # BLD AUTO: 257 10*3/MM3 (ref 140–450)
PMV BLD AUTO: 9.8 FL (ref 6–12)
POTASSIUM BLD-SCNC: 3.7 MMOL/L (ref 3.5–5.2)
RBC # BLD AUTO: 3.22 10*6/MM3 (ref 4.14–5.8)
SODIUM BLD-SCNC: 137 MMOL/L (ref 136–145)
WBC NRBC COR # BLD: 9.56 10*3/MM3 (ref 3.4–10.8)

## 2020-03-17 PROCEDURE — 25010000002 HEPARIN (PORCINE) PER 1000 UNITS: Performed by: THORACIC SURGERY (CARDIOTHORACIC VASCULAR SURGERY)

## 2020-03-17 PROCEDURE — 71045 X-RAY EXAM CHEST 1 VIEW: CPT

## 2020-03-17 PROCEDURE — 71250 CT THORAX DX C-: CPT

## 2020-03-17 PROCEDURE — 97110 THERAPEUTIC EXERCISES: CPT

## 2020-03-17 PROCEDURE — 97110 THERAPEUTIC EXERCISES: CPT | Performed by: OCCUPATIONAL THERAPIST

## 2020-03-17 PROCEDURE — 97112 NEUROMUSCULAR REEDUCATION: CPT | Performed by: OCCUPATIONAL THERAPIST

## 2020-03-17 PROCEDURE — 94799 UNLISTED PULMONARY SVC/PX: CPT

## 2020-03-17 PROCEDURE — 25010000002 HEPARIN (PORCINE) PER 1000 UNITS: Performed by: NURSE PRACTITIONER

## 2020-03-17 PROCEDURE — 80069 RENAL FUNCTION PANEL: CPT | Performed by: INTERNAL MEDICINE

## 2020-03-17 PROCEDURE — 85025 COMPLETE CBC W/AUTO DIFF WBC: CPT | Performed by: INTERNAL MEDICINE

## 2020-03-17 PROCEDURE — 85730 THROMBOPLASTIN TIME PARTIAL: CPT | Performed by: INTERNAL MEDICINE

## 2020-03-17 PROCEDURE — 85730 THROMBOPLASTIN TIME PARTIAL: CPT | Performed by: THORACIC SURGERY (CARDIOTHORACIC VASCULAR SURGERY)

## 2020-03-17 PROCEDURE — 99232 SBSQ HOSP IP/OBS MODERATE 35: CPT | Performed by: NURSE PRACTITIONER

## 2020-03-17 RX ORDER — HEPARIN SODIUM 10000 [USP'U]/100ML
18 INJECTION, SOLUTION INTRAVENOUS
Status: DISCONTINUED | OUTPATIENT
Start: 2020-03-17 | End: 2020-03-19

## 2020-03-17 RX ADMIN — IPRATROPIUM BROMIDE AND ALBUTEROL SULFATE 3 ML: 2.5; .5 SOLUTION RESPIRATORY (INHALATION) at 20:54

## 2020-03-17 RX ADMIN — HEPARIN SODIUM 17.37 UNITS/KG/HR: 10000 INJECTION, SOLUTION INTRAVENOUS at 07:20

## 2020-03-17 RX ADMIN — Medication 250 MG: at 21:22

## 2020-03-17 RX ADMIN — METOPROLOL TARTRATE 25 MG: 25 TABLET ORAL at 21:22

## 2020-03-17 RX ADMIN — CARBIDOPA AND LEVODOPA 1 TABLET: 25; 100 TABLET ORAL at 21:22

## 2020-03-17 RX ADMIN — HEPARIN SODIUM 17.37 UNITS/KG/HR: 10000 INJECTION, SOLUTION INTRAVENOUS at 16:37

## 2020-03-17 RX ADMIN — BUDESONIDE 0.5 MG: 0.5 INHALANT RESPIRATORY (INHALATION) at 08:13

## 2020-03-17 RX ADMIN — SODIUM CHLORIDE, PRESERVATIVE FREE 10 ML: 5 INJECTION INTRAVENOUS at 21:22

## 2020-03-17 RX ADMIN — IPRATROPIUM BROMIDE AND ALBUTEROL SULFATE 3 ML: 2.5; .5 SOLUTION RESPIRATORY (INHALATION) at 08:13

## 2020-03-17 RX ADMIN — CARBIDOPA AND LEVODOPA 1 TABLET: 25; 100 TABLET ORAL at 08:21

## 2020-03-17 RX ADMIN — Medication 250 MG: at 08:21

## 2020-03-17 RX ADMIN — BUDESONIDE 0.5 MG: 0.5 INHALANT RESPIRATORY (INHALATION) at 20:54

## 2020-03-17 RX ADMIN — FUROSEMIDE 40 MG: 40 TABLET ORAL at 08:21

## 2020-03-17 RX ADMIN — METOPROLOL TARTRATE 25 MG: 25 TABLET ORAL at 08:21

## 2020-03-17 RX ADMIN — SODIUM CHLORIDE, PRESERVATIVE FREE 10 ML: 5 INJECTION INTRAVENOUS at 08:22

## 2020-03-17 NOTE — THERAPY TREATMENT NOTE
Patient Name: Izaiah Garcia  : 1931    MRN: [id number]                              Today's Date: 3/17/2020       Admit Date: 2020    Visit Dx:     ICD-10-CM ICD-9-CM   1. Healthcare-associated pneumonia J18.9 486   2. Acute hypoxemic respiratory failure (CMS/HCC) J96.01 518.81   3. Bilateral pulmonary embolism (CMS/HCC) I26.99 415.19   4. Acute saddle pulmonary embolism with acute cor pulmonale (CMS/HCC) I26.02 415.13     415.0     Patient Active Problem List   Diagnosis   • Hyperlipidemia   • Benign essential hypertension   • History of gout   • History of esophageal stricture   • History of stroke, 2016--4.9 x 4 x 3 cm inferior left cerebellar infarct   • Rheumatoid factor positive   • Impaired fasting glucose   • At risk for falls   • Generalized weakness   • Bilateral high frequency sensorineural hearing loss, wears hearing aids   • Bilateral lower extremity edema   • Parkinson's disease (CMS/HCC)   • Therapeutic drug monitoring   • Vitamin D deficiency   • Macrocytosis   • Vitamin B12 deficiency   • Acute diarrhea   • Hospital-acquired pneumonia   • HAP (hospital-acquired pneumonia)   • Acute UTI (urinary tract infection)   • Colitis   • Parkinson disease (CMS/HCC)   • Weakness   • Aspiration pneumonia of right lower lobe due to vomit (CMS/HCC)   • Pulmonary emboli (CMS/HCC)   • Acute saddle pulmonary embolism with acute cor pulmonale (CMS/HCC)   • Empyema of pleura (CMS/HCC)     Past Medical History:   Diagnosis Date   • At risk for falls 2019   • Benign essential hypertension 2016   • Bilateral high frequency sensorineural hearing loss, wears hearing aids 2019   • Bilateral lower extremity edema 2019    May 2, 2019--patient who has hypertension and is on amlodipine 10 mg/day is complaining of progressively worsening swelling of the lower extremities that extends up to about mid calf.  Gets worse at the end of the day and tends to get better overnight when he props his  "feet up.  I think this is definitely related to the amlodipine although patient may have some venous insufficiency.  Medication ad   • Decreased peripheral vision of both eyes 5/2/2019    May 2, 2019--continues to have complaints of \"dizziness\" associated with weakness in his lower extremities.  He is not describing a spinning sensation or anything remotely close to vertigo.  Instead he is describing an off-balance sensation.  He tends to fall forward if not careful and he tends to list towards the right side.  He has marked difficulty going down an incline.  He has to ambulate wit   • History of aspiration pneumonia 5/4/2015   • History of esophageal stricture 5/4/2015    July 3, 2018--EGD revealed a distal esophageal stricture that was dilated to 18 mm.  There was a small hiatal hernia.  There was mild streaky erythema in the proximal stomach.  Duodenal bulb normal.  April 26, 2016--EGD performed for dysphagia reveals a distal esophageal stricture that was dilated 16.5 mm.   • History of gout 6/29/2016   • History of stroke, 6/29/2016--4.9 x 4 x 3 cm inferior left cerebellar infarct 5/4/2015 June 29, 2016--MRI of the brain performed for complaints of dizziness and weakness. IMPRESSION: 1. There is susceptibility artifact streaking through the anterior left frontal scalp through the anterior left frontal bone in the anterior tipof the left frontal lobe likely from a tiny piece of metal in the anterior left frontal scalp slightly limits evaluation of these structures. 2. There is mild s   • Hyperlipidemia 6/29/2016   • Impaired fasting glucose 5/2/2019 April 14, 2015--hemoglobin A1c 5.9.   • Macrocytosis 5/2/2019   • Parkinson's disease (CMS/[facility]) 5/2/2019    May 2, 2019--continues to have complaints of \"dizziness\" associated with weakness in his lower extremities.  He is not describing a spinning sensation or anything remotely close to vertigo.  Instead he is describing an off-balance sensation.  He tends to " fall forward if not careful and he tends to list towards the right side.  He has marked difficulty going down an incline.  He has to ambulate wit   • Rheumatoid factor positive 5/2/2019 March 25, 2014--rheumatoid factor returned positive at 95.  However, CCP antibodies normal at 8, SHIV negative, both C&P ANCA negative, C-reactive protein normal at 0.24, sed rate normal at 2.   • Vitamin B12 deficiency 6/6/2019 June 6, 2019--patient recently had mildly elevated methylmalonic acid of 380.  Repeat methylmalonic acid was upper limit of normal at 356.  Given patient's neurologic symptoms and suspected parkinsonism, I have a low threshold for treating vitamin B12 deficiency.  We will initiate vitamin B12 injections today.  2000 mcg subcutaneously given today.   • Vitamin D deficiency 5/2/2019     Past Surgical History:   Procedure Laterality Date   • CARDIAC CATHETERIZATION N/A 2/29/2020    Procedure: Thrombolytic Therapy- EKOS;  Surgeon: Jason Griffiths MD;  Location: [facility] CATH INVASIVE LOCATION;  Service: Cardiovascular;  Laterality: N/A;   • CATARACT EXTRACTION EXTRACAPSULAR W/ INTRAOCULAR LENS IMPLANTATION Bilateral     Bilateral cataract extirpation with intraocular lens implantation   • CHOLECYSTECTOMY     • EKOS CATHETER PLACEMENT Bilateral 2/29/2020    Procedure: Ekos catheter placement;  Surgeon: Jason Griffiths MD;  Location:  [facility] CATH INVASIVE LOCATION;  Service: Cardiovascular;  Laterality: Bilateral;   • ESOPHAGEAL DILATATION  04/26/2016 April 26, 2016--EGD performed for dysphagia reveals a distal esophageal stricture that was dilated 16.5 mm.   • ESOPHAGEAL DILATATION  07/03/2018    July 3, 2018--EGD revealed a distal esophageal stricture that was dilated to 18 mm.  There was a small hiatal hernia.  There was mild streaky erythema in the proximal stomach.  Duodenal bulb normal.   • INTERVENTIONAL RADIOLOGY PROCEDURE Bilateral 2/29/2020    Procedure: Pulmonary Angiogram;  Surgeon: Tunde  MD Jason;  Location:  [facility] CATH INVASIVE LOCATION;  Service: Cardiovascular;  Laterality: Bilateral;     General Information     Row Name 03/17/20 1040          PT Evaluation Time/Intention    Document Type  therapy note (daily note)  (Pended)   -     Mode of Treatment  individual therapy;physical therapy  (Pended)   -     Row Name 03/17/20 1040          General Information    Existing Precautions/Restrictions  fall;oxygen therapy device and L/min  (Pended)   -     Row Name 03/17/20 1040          Cognitive Assessment/Intervention- PT/OT    Orientation Status (Cognition)  oriented x 3  (Pended)   -     Row Name 03/17/20 1040          Safety Issues, Functional Mobility    Impairments Affecting Function (Mobility)  balance;endurance/activity tolerance;strength;coordination  (Pended)   -       User Key  (r) = Recorded By, (t) = Taken By, (c) = Cosigned By    Initials Name Provider Type    TW Rufino Pradhan, PT Student PT Student        Mobility     Row Name 03/17/20 1041          Bed Mobility Assessment/Treatment    Bed Mobility Assessment/Treatment  supine-sit;sit-supine  (Pended)   -TW     Supine-Sit [place] (Bed Mobility)  2 person assist;verbal cues;moderate assist (50% patient effort)  (Pended)   -TW     Sit-Supine [place] (Bed Mobility)  moderate assist (50% patient effort);2 person assist;verbal cues  (Pended)   -TW     Comment (Bed Mobility)  Pt required help getting LE into and out of bed, along with trunk control for coming to seated position and returning supine.   (Pended)   -     Row Name 03/17/20 1041          Transfer Assessment/Treatment    Comment (Transfers)  STS x2 reps, pt required verbal cues to come to standing position. Cued to tighten bottom to bring hips forward and stand tall. pt still unsteady on feet and required assistance to remain standing.  (Pended)   -     Row Name 03/17/20 1041          Sit-Stand Transfer    Sit-Stand [place] (Transfers)  moderate assist  (50% patient effort);2 person assist;verbal cues  (Pended)   -TW     Row Name 03/17/20 1041          Gait/Stairs Assessment/Training    Gait/Stairs Assessment/Training  gait/ambulation independence;gait/ambulation assistive device  (Pended)   -TW     [place] Level (Gait)  moderate assist (50% patient effort);2 person assist;1 person to manage equipment;verbal cues  (Pended)   -TW     Assistive Device (Gait)  --  (Pended)  HHA  -TW     Distance in Feet (Gait)  3  (Pended)   -TW     Pattern (Gait)  step-to  (Pended)   -TW     Deviations/Abnormal Patterns (Gait)  erika decreased;gait speed decreased;stride length decreased;festinating/shuffling  (Pended)   -TW     Bilateral Gait Deviations  forward flexed posture;weight shift ability decreased;heel strike decreased  (Pended)   -TW     Comment (Gait/Stairs)  Pt stepped from bed to transfer bed, but was very unsteady. Pt required cues for stepping and how to sequence movements. Pt required mod A x2 to remain standing and provide support for weight shift.  (Pended)   -TW       User Key  (r) = Recorded By, (t) = Taken By, (c) = Cosigned By    Initials Name Provider Type    TW Rufino Pradhan, PT Student PT Student        Obj/Interventions     Row Name 03/17/20 1045          Therapeutic Exercise    Comment (Therapeutic Exercise)  B AP and LAQ x10 reps  (Pended)   -TW     Row Name 03/17/20 1045          Static Sitting Balance    Level of [place] (Unsupported Sitting, Static Balance)  contact guard assist;1 person assist  (Pended)   -TW     Sitting Position (Unsupported Sitting, Static Balance)  sitting on edge of bed  (Pended)   -TW     Time Able to Maintain Position (Unsupported Sitting, Static Balance)  1 to 2 minutes  (Pended)   -TW     Row Name 03/17/20 1045          Dynamic Sitting Balance    Level of [place], Reaches Outside Midline (Sitting, Dynamic Balance)  contact guard assist;minimal assist, 75% patient effort;1 person assist  (Pended)   -TW      Sitting Position, Reaches Outside Midline (Sitting, Dynamic Balance)  sitting on edge of bed  (Pended)   -TW     Row Name 03/17/20 1045          Static Standing Balance    Level of [place] (Supported Standing, Static Balance)  minimal assist, 75% patient effort;1 person assist;1 person to manage equipment  (Pended)   -TW     Assistive Device Utilized (Supported Standing, Static Balance)  --  (Pended)  HHA  -     Row Name 03/17/20 1045          Dynamic Standing Balance    Level of [place], Reaches Outside Midline (Standing, Dynamic Balance)  moderate assist, 50 to 74% patient effort;2 person assist;1 person to manage equipment  (Pended)   -TW     Assistive Device Utilized (Supported Standing, Dynamic Balance)  --  (Pended)  HHA  -       User Key  (r) = Recorded By, (t) = Taken By, (c) = Cosigned By    Initials Name Provider Type    TW Rufino Pradhan, PT Student PT Student        Goals/Plan     Row Name 03/17/20 1050          Bed Mobility Goal 1 (PT)    Activity/Assistive Device (Bed Mobility Goal 1, PT)  bed mobility activities, all  (Pended)   -TW     Assumption Level/Cues Needed (Bed Mobility Goal 1, PT)  supervision required  (Pended)   -TW     Time Frame (Bed Mobility Goal 1, PT)  1 week  (Pended)   -TW     Progress/Outcomes (Bed Mobility Goal 1, PT)  progress slower than expected;continuing progress toward goal;goal ongoing  (Pended)  Goal is still appropriate for pt, but date revised to remain appropriate for pt progress  -     Row Name 03/17/20 1050          Transfer Goal 1 (PT)    Activity/Assistive Device (Transfer Goal 1, PT)  transfers, all  (Pended)   -TW     Assumption Level/Cues Needed (Transfer Goal 1, PT)  contact guard assist;verbal cues required;set-up required  (Pended)   -TW     Time Frame (Transfer Goal 1, PT)  1 week  (Pended)   -TW     Progress/Outcome (Transfer Goal 1, PT)  goal revised this date;progress slower than expected  (Pended)  Goal revised this date to remain  appropriate for pt progress  -TW     Row Name 03/17/20 1050          Gait Training Goal 1 (PT)    Activity/Assistive Device (Gait Training Goal 1, PT)  gait (walking locomotion);assistive device use;walker, rolling  (Pended)   -TW     [place] Level (Gait Training Goal 1, PT)  contact guard assist  (Pended)   -TW     Distance (Gait Goal 1, PT)  100  (Pended)   -TW     Time Frame (Gait Training Goal 1, PT)  1 week  (Pended)   -TW     Progress/Outcome (Gait Training Goal 1, PT)  continuing progress toward goal;progress slower than expected;goal ongoing  (Pended)  Goal is still appropriate for pt, but date revised to remain appropriate for pt progress  -TW       User Key  (r) = Recorded By, (t) = Taken By, (c) = Cosigned By    Initials Name Provider Type    TW Rufino Pradhan, PT Student PT Student        Clinical Impression     Row Name 03/17/20 1046          Pain Scale: Numbers Pre/Post-Treatment    Pain Scale: Numbers, Pretreatment  4/10  (Pended)   -TW     Pain Scale: Numbers, Post-Treatment  4/10  (Pended)   -TW     Pain Location - Side  Right  (Pended)   -TW     Pain Location - Orientation  lower  (Pended)   -TW     Pain Location  back  (Pended)   -TW     Pre/Post Treatment Pain Comment  Pt states that he has pain in his right lower back this morning  (Pended)   -TW     Pain Intervention(s)  Repositioned;Ambulation/increased activity  (Pended)   -TW     Row Name 03/17/20 1046          Plan of Care Review    Plan of Care Reviewed With  patient  (Pended)   -TW     Progress  no change  (Pended)   -TW     Outcome Summary  Pt states that he is in pain and tired this morning. Pt was able to participate in therapy, but still requires mod A x2 for bed mob, transfers, and gait. Pt exhibits deficits in strength, endurance, balance, and coordination that impact gait and functional mobility. PT will continue to follow pt to address these deficits, but anticipate pt would benefit from attending a SNF upon discharge.    (Pended)   -TW     Row Name 03/17/20 1046          Physical Therapy Clinical Impression    Patient/Family Goals Statement (PT Clinical Impression)  Return to PLOF   (Pended)   -TW     Criteria for Skilled Interventions Met (PT Clinical Impression)  yes;treatment indicated  (Pended)   -TW     Rehab Potential (PT Clinical Summary)  good, to achieve stated therapy goals  (Pended)   -TW     Row Name 03/17/20 1046          Vital Signs    O2 Delivery Pre Treatment  supplemental O2  (Pended)   -TW     O2 Delivery Intra Treatment  supplemental O2  (Pended)   -TW     O2 Delivery Post Treatment  supplemental O2  (Pended)   -TW     Row Name 03/17/20 1046          Positioning and Restraints    Pre-Treatment Position  in bed  (Pended)   -TW     Post Treatment Position  other  (Pended)  Pt in bed to be taken for CT imaging   -TW     In Bed  --  (Pended)   -TW     Other Position  with other staff  (Pended)   -TW       User Key  (r) = Recorded By, (t) = Taken By, (c) = Cosigned By    Initials Name Provider Type    TW Rufino Pradhan, PT Student PT Student        Outcome Measures     Row Name 03/17/20 1054          How much help from another person do you currently need...    Turning from your back to your side while in flat bed without using bedrails?  2  (Pended)   -TW     Moving from lying on back to sitting on the side of a flat bed without bedrails?  2  (Pended)   -TW     Moving to and from a bed to a chair (including a wheelchair)?  2  (Pended)   -TW     Standing up from a chair using your arms (e.g., wheelchair, bedside chair)?  2  (Pended)   -TW     Climbing 3-5 steps with a railing?  1  (Pended)   -TW     To walk in hospital room?  2  (Pended)   -TW     AM-PAC 6 Clicks Score (PT)  11  (Pended)   -TW     Row Name 03/17/20 1054          Functional Assessment    Outcome Measure Options  AM-PAC 6 Clicks Basic Mobility (PT)  (Pended)   -TW       User Key  (r) = Recorded By, (t) = Taken By, (c) = Cosigned By    Initials Name  Provider Type    TW Rufino Pradhan, PT Student PT Student          PT Recommendation and Plan  Planned Therapy Interventions (PT Eval): (P) balance training, bed mobility training, gait training, home exercise program, patient/family education, stair training, strengthening, transfer training  Outcome Summary/Treatment Plan (PT)  Anticipated Discharge Disposition (PT): (P) skilled nursing facility  Plan of Care Reviewed With: (P) patient  Progress: (P) no change  Outcome Summary: (P) Pt states that he is in pain and tired this morning. Pt was able to participate in therapy, but still requires mod A x2 for bed mob, transfers, and gait. Pt exhibits deficits in strength, endurance, balance, and coordination that impact gait and functional mobility. PT will continue to follow pt to address these deficits, but anticipate pt would benefit from attending a SNF upon discharge.      Time Calculation:   PT Charges     Row Name 03/17/20 1057             Time Calculation    Start Time  1028  (Pended)   -TW      Stop Time  1040  (Pended)   -TW      Time Calculation (min)  12 min  (Pended)   -TW      PT Received On  03/17/20  (Pended)   -TW      PT - Next Appointment  03/18/20  (Pended)   -TW      PT Goal Re-Cert Due Date  03/24/20  (Pended)   -TW         Time Calculation- PT    Total Timed Code Minutes- PT  11 minute(s)  (Pended)   -TW        User Key  (r) = Recorded By, (t) = Taken By, (c) = Cosigned By    Initials Name Provider Type    TW Rufino Pradhan, PT Student PT Student        Therapy Charges for Today     Code Description Service Date Service Provider Modifiers Qty    62617663907 HC PT THER PROC EA 15 MIN 3/17/2020 Rufino Pradhan, PT Student GP 1    32135715568 HC PT THER SUPP EA 15 MIN 3/17/2020 Rufino Pradhan, PT Student GP 1          PT G-Codes  Outcome Measure Options: (P) AM-PAC 6 Clicks Basic Mobility (PT)  AM-PAC 6 Clicks Score (PT): (P) 11  AM-PAC 6 Clicks Score (OT): 12    Rufino Pradhan PT Student  3/17/2020

## 2020-03-17 NOTE — PROGRESS NOTES
"  Daily Progress Note.   [facility] CARDIOVASC UNIT  3/17/2020    Patient:  Name:  Izaiah Garcia  MRN:  [id number]  8/31/1931  88 y.o.  male       Chief Complaint: Patient had questionable facial asymmetry last night that was evaluated by the DVT     Hospital course: Patient was readmitted after an hospital admission for pneumonia with evidence of massive pulmonary embolism, and was managed with EKOS with directed thrombolytic therapy low-dose with good clinical response.  Patient had pleural effusion, and after evaluation by thoracic surgery decision was to have radiology replaced the chest tube with a CT-guidance.  This was done on 3/9/2020 but the patient had output less than 200 cc over the first 24 hours, TPA was administered on 3/10/2020 with nearly 600 cc total output afterwards.  The right-sided chest tube was removed on 3/12/2020 when the output was minimal and patient had a CT-guided placement of left-sided small chest tube with 1200 cc output so far.  Patient is on the heparin drip which was discontinued only for few hours before the procedure and was resumed afterwards with no problem with bleeding.    INTERVAL:  Follow up for above  Eating breakfast.  Reports no difficulties  No sig chest pain  No worsening soa  No hemoptysis melena or hematochezia    ROS: No fever, no diarrhea, no chest pain  PMFSSH: no change    Physical Exam:  /90 (BP Location: Right arm, Patient Position: Lying)   Pulse 62   Temp 98.4 °F (36.9 °C) (Oral)   Resp 18   Ht 177.8 cm (70\")   Wt 80.6 kg (177 lb 11.2 oz)   SpO2 97%   BMI 25.50 kg/m²   Body mass index is 25.5 kg/m².    Intake/Output Summary (Last 24 hours) at 3/17/2020 1117  Last data filed at 3/17/2020 1000  Gross per 24 hour   Intake 320 ml   Output 1089 ml   Net -769 ml     General appearance: chronically ill but non toxic, conversant   Eyes: anicteric sclerae, moist conjunctivae; no lid-lag; PERRLA  HENT: Atraumatic; oropharynx clear with " moist mucous membranes and no mucosal ulcerations; normal hard and soft palate  Neck: Trachea midline; FROM, supple, no thyromegaly or lymphadenopathy  Lungs: +chest tube no wheeze no rhonchi, with normal respiratory effort and no intercostal retractions  CV: irreg irreg , no rub  Abdomen: Soft, non-tender; no masses or HSM  Extremities:trace peripheral edema or extremity lymphadenopathy  Skin: Normal temperature, turgor and texture; no rash, ulcers or subcutaneous nodules  Psych: Appropriate affect, alert and oriented to person, place and time  Neuro cns II-XII intact move all ext, speech intact    Data Review:  Notable Labs:  Results from last 7 days   Lab Units 03/17/20  0547 03/16/20  0302 03/15/20  0327 03/14/20  0423 03/13/20  0454 03/12/20  0506 03/11/20  0404   WBC 10*3/mm3 9.56 10.08 11.43* 12.02* 13.73* 12.10* 12.16*   HEMOGLOBIN g/dL 8.8* 9.1* 8.9* 9.3* 9.6* 9.1* 9.2*   PLATELETS 10*3/mm3 257 272 301 295 346 351 346     Results from last 7 days   Lab Units 03/17/20  0547 03/16/20  0302 03/15/20  0327 03/14/20  0423 03/13/20  0454 03/12/20  0506 03/11/20  0404   SODIUM mmol/L 137 136 139 136 137 134* 136   POTASSIUM mmol/L 3.7 4.0 4.3 4.9 3.6 3.6 3.8   CHLORIDE mmol/L 101 101 101 104 100 99 99   CO2 mmol/L 25.6 25.3 25.8 22.3 24.5 24.1 24.5   BUN mg/dL 15 16 19 19 18 15 14   CREATININE mg/dL 0.87 0.95 0.96 0.95 0.89 0.68* 0.82   GLUCOSE mg/dL 104* 108* 111* 119* 137* 112* 124*   CALCIUM mg/dL 7.9* 8.2* 7.9* 8.4* 8.1* 7.9* 7.7*   PHOSPHORUS mg/dL 3.8 3.9 4.2 8.0* 3.6 3.8 3.6   Estimated Creatinine Clearance: 66.9 mL/min (by C-G formula based on SCr of 0.87 mg/dL).    Imaging:  Reviewed chest images personally from past 3 days    Scheduled meds:      budesonide 0.5 mg Nebulization BID - RT   carbidopa-levodopa 1 tablet Oral BID   cyanocobalamin 1,000 mcg Intramuscular Q28 Days   furosemide 40 mg Oral Daily   ipratropium-albuterol 3 mL Nebulization BID - RT   lidocaine 1 patch Transdermal Q24H   metoprolol  tartrate 25 mg Oral Q12H   saccharomyces boulardii 250 mg Oral BID   sodium chloride 10 mL Intravenous Q12H     Heparin gtt    ASSESSMENT  /  PLAN:  1. Acute hypoxic respiratory failure  2. BPE s/p ekos catheter on 2/29/2020 with local TPA infusion with good clinical response  3. Troponemia suspect due to BPE  4. RV strain  5. Bilateral pleural effusion R greater than Left exudative with chest tube insertion on 3/9/2020  6. HLD  7. Esphageal stricture  8. Gen weakness  9. Parkinsons Disease  10. 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 HTN  13. Abnormal mass around thyroid   14. hypokalemia, corrected  15. Hypoalbuminemia  16. Hyponatremia, mild     PLAN:  Chest tube mgmt per the thoracic surgery team  Plan for ct chest non contrast today  Hopefully effusions look better and we can remove chest tubes  Continue with a heparin drip, will transition to oral anticoagulation once the chest tube is out    dispo next few days?  Out by end of week?    CODE STATUS noted  D/w family      Chetan Eugene MD  [facility]  03/17/20  [id number]

## 2020-03-17 NOTE — PLAN OF CARE
Problem: Patient Care Overview  Goal: Plan of Care Review  Flowsheets  Taken 3/17/2020 1056  Plan of Care Reviewed With: patient (Pended)  Taken 3/17/2020 1046  Outcome Summary: Pt states that he is in pain and tired this morning. Pt was able to participate in therapy, but still requires mod A x2 for bed mob, transfers, and gait. Pt exhibits deficits in strength, endurance, balance, and coordination that impact gait and functional mobility. PT will continue to follow pt to address these deficits, but anticipate pt would benefit from attending a SNF upon discharge.  (Pended)

## 2020-03-17 NOTE — THERAPY TREATMENT NOTE
Acute Care - Occupational Therapy Treatment Note  [facility]     Patient Name: Izaiah Garcia  : 1931  MRN: [id number]  Today's Date: 3/17/2020  Onset of Illness/Injury or Date of Surgery: 20          Admit Date: 2020       ICD-10-CM ICD-9-CM   1. Healthcare-associated pneumonia J18.9 486   2. Acute hypoxemic respiratory failure (CMS/[facility]) J96.01 518.81   3. Bilateral pulmonary embolism (CMS/[facility]) I26.99 415.19   4. Acute saddle pulmonary embolism with acute cor pulmonale (CMS/HCC) I26.02 415.13     415.0     Patient Active Problem List   Diagnosis   • Hyperlipidemia   • Benign essential hypertension   • History of gout   • History of esophageal stricture   • History of stroke, 2016--4.9 x 4 x 3 cm inferior left cerebellar infarct   • Rheumatoid factor positive   • Impaired fasting glucose   • At risk for falls   • Generalized weakness   • Bilateral high frequency sensorineural hearing loss, wears hearing aids   • Bilateral lower extremity edema   • Parkinson's disease (CMS/[facility])   • Therapeutic drug monitoring   • Vitamin D deficiency   • Macrocytosis   • Vitamin B12 deficiency   • Acute diarrhea   • Hospital-acquired pneumonia   • HAP (hospital-acquired pneumonia)   • Acute UTI (urinary tract infection)   • Colitis   • Parkinson disease (CMS/[facility])   • Weakness   • Aspiration pneumonia of right lower lobe due to vomit (CMS/HCC)   • Pulmonary emboli (CMS/[facility])   • Acute saddle pulmonary embolism with acute cor pulmonale (CMS/[facility])   • Empyema of pleura (CMS/[facility])     Past Medical History:   Diagnosis Date   • At risk for falls 2019   • Benign essential hypertension 2016   • Bilateral high frequency sensorineural hearing loss, wears hearing aids 2019   • Bilateral lower extremity edema 2019    May 2, 2019--patient who has hypertension and is on amlodipine 10 mg/day is complaining of progressively worsening swelling of the lower extremities that extends up to about mid calf.  Gets  "worse at the end of the day and tends to get better overnight when he props his feet up.  I think this is definitely related to the amlodipine although patient may have some venous insufficiency.  Medication ad   • Decreased peripheral vision of both eyes 5/2/2019    May 2, 2019--continues to have complaints of \"dizziness\" associated with weakness in his lower extremities.  He is not describing a spinning sensation or anything remotely close to vertigo.  Instead he is describing an off-balance sensation.  He tends to fall forward if not careful and he tends to list towards the right side.  He has marked difficulty going down an incline.  He has to ambulate wit   • History of aspiration pneumonia 5/4/2015   • History of esophageal stricture 5/4/2015    July 3, 2018--EGD revealed a distal esophageal stricture that was dilated to 18 mm.  There was a small hiatal hernia.  There was mild streaky erythema in the proximal stomach.  Duodenal bulb normal.  April 26, 2016--EGD performed for dysphagia reveals a distal esophageal stricture that was dilated 16.5 mm.   • History of gout 6/29/2016   • History of stroke, 6/29/2016--4.9 x 4 x 3 cm inferior left cerebellar infarct 5/4/2015 June 29, 2016--MRI of the brain performed for complaints of dizziness and weakness. IMPRESSION: 1. There is susceptibility artifact streaking through the anterior left frontal scalp through the anterior left frontal bone in the anterior tipof the left frontal lobe likely from a tiny piece of metal in the anterior left frontal scalp slightly limits evaluation of these structures. 2. There is mild s   • Hyperlipidemia 6/29/2016   • Impaired fasting glucose 5/2/2019 April 14, 2015--hemoglobin A1c 5.9.   • Macrocytosis 5/2/2019   • Parkinson's disease (CMS/[facility]) 5/2/2019    May 2, 2019--continues to have complaints of \"dizziness\" associated with weakness in his lower extremities.  He is not describing a spinning sensation or anything remotely " close to vertigo.  Instead he is describing an off-balance sensation.  He tends to fall forward if not careful and he tends to list towards the right side.  He has marked difficulty going down an incline.  He has to ambulate wit   • Rheumatoid factor positive 5/2/2019 March 25, 2014--rheumatoid factor returned positive at 95.  However, CCP antibodies normal at 8, SHIV negative, both C&P ANCA negative, C-reactive protein normal at 0.24, sed rate normal at 2.   • Vitamin B12 deficiency 6/6/2019 June 6, 2019--patient recently had mildly elevated methylmalonic acid of 380.  Repeat methylmalonic acid was upper limit of normal at 356.  Given patient's neurologic symptoms and suspected parkinsonism, I have a low threshold for treating vitamin B12 deficiency.  We will initiate vitamin B12 injections today.  2000 mcg subcutaneously given today.   • Vitamin D deficiency 5/2/2019     Past Surgical History:   Procedure Laterality Date   • CARDIAC CATHETERIZATION N/A 2/29/2020    Procedure: Thrombolytic Therapy- EKOS;  Surgeon: Jason Griffiths MD;  Location: [facility] INVASIVE LOCATION;  Service: Cardiovascular;  Laterality: N/A;   • CATARACT EXTRACTION EXTRACAPSULAR W/ INTRAOCULAR LENS IMPLANTATION Bilateral     Bilateral cataract extirpation with intraocular lens implantation   • CHOLECYSTECTOMY     • EKOS CATHETER PLACEMENT Bilateral 2/29/2020    Procedure: Ekos catheter placement;  Surgeon: Jason Griffiths MD;  Location: [facility] CATH INVASIVE LOCATION;  Service: Cardiovascular;  Laterality: Bilateral;   • ESOPHAGEAL DILATATION  04/26/2016 April 26, 2016--EGD performed for dysphagia reveals a distal esophageal stricture that was dilated 16.5 mm.   • ESOPHAGEAL DILATATION  07/03/2018    July 3, 2018--EGD revealed a distal esophageal stricture that was dilated to 18 mm.  There was a small hiatal hernia.  There was mild streaky erythema in the proximal stomach.  Duodenal bulb normal.   • INTERVENTIONAL RADIOLOGY  PROCEDURE Bilateral 2/29/2020    Procedure: Pulmonary Angiogram;  Surgeon: Jason Griffiths MD;  Location: [facility] INVASIVE LOCATION;  Service: Cardiovascular;  Laterality: Bilateral;       Therapy Treatment    Rehabilitation Treatment Summary     Row Name 03/17/20 1518             Treatment Time/Intention    Discipline  occupational therapist  -SG      Document Type  therapy note (daily note)  -SG      Subjective Information  no complaints  -SG      Mode of Treatment  individual therapy;occupational therapy  -SG      Patient/Family Observations  Pt sitting up in chair, spouse present  -SG      Patient Effort  adequate  -SG      Existing Precautions/Restrictions  fall;oxygen therapy device and L/min  -SG      Recorded by [SG] Dary Monzon, OTR 03/17/20 1521      Row Name 03/17/20 1518             Vital Signs    O2 Delivery Pre Treatment  supplemental O2  -SG      Recorded by [SG] Dary Monzon OT 03/17/20 1521      Row Name 03/17/20 1518             Cognitive Assessment/Intervention- PT/OT    Orientation Status (Cognition)  oriented x 3  -SG      Follows Commands (Cognition)  WFL;other (see comments) Very [place] donned hearing aids  -SG      Safety Deficit (Cognitive)  mild deficit  -SG      Recorded by [SG] Dary Monzon, OTR 03/17/20 1521      Row Name 03/17/20 1518             Transfer Assessment/Treatment    Transfer Assessment/Treatment  sit-stand transfer;stand-sit transfer  -SG      Recorded by [SG] Dary Monzon OTR 03/17/20 1521      Row Name 03/17/20 1518             Sit-Stand Transfer    Sit-Stand [place] (Transfers)  moderate assist (50% patient effort);2 person assist;verbal cues  -SG      Assistive Device (Sit-Stand Transfers)  walker, front-wheeled  -SG      Recorded by [SG] Dary Monzon OTR 03/17/20 1521      Row Name 03/17/20 1518             Stand-Sit Transfer    Stand-Sit [place] (Transfers)  moderate assist (50% patient effort);2 person assist;verbal cues;nonverbal  cues (demo/gesture)  -SG      Assistive Device (Stand-Sit Transfers)  walker, front-wheeled  -SG      Recorded by [SG] Dary Monzon OTR 03/17/20 1521      Row Name 03/17/20 1518             Motor Skills Assessment/Interventions    Additional Documentation  Balance (Group)  -SG      Recorded by [SG] Dary Monzon OTR 03/17/20 1521      Row Name 03/17/20 1518             Balance    Balance  dynamic sitting balance;static standing balance  -SG      Recorded by [SG] Dary Monzon OTR 03/17/20 1521      Row Name 03/17/20 1518             Dynamic Sitting Balance    Level of Ong, Reaches Outside Midline (Sitting, Dynamic Balance)  minimal assist, 75% patient effort  -SG      Sitting Position, Reaches Outside Midline (Sitting, Dynamic Balance)  sitting in chair  -SG      Comment, Reaches Outside Midline (Sitting, Dynamic Balance)  10 reps with bilateral reaching forward, significant diffiuclty with anterior lean  -SG      Recorded by [SG] Dary Monzon OTR 03/17/20 1521      Row Name 03/17/20 1518             Static Standing Balance    Level of Ong (Supported Standing, Static Balance)  moderate assist, 50 to 74% patient effort;2 person assist  -SG      Time Able to Maintain Position (Supported Standing, Static Balance)  2 to 3 minutes  -SG      Assistive Device Utilized (Supported Standing, Static Balance)  walker, rolling  -SG      Comment (Supported Standing, Static Balance)  2x; leans heavily posteriorly on heels, unable to lean forward to improve balance  -SG      Recorded by [SG] Dary Monzon OTR 03/17/20 1521      Row Name 03/17/20 1518             Positioning and Restraints    Pre-Treatment Position  sitting in chair/recliner  -SG      Post Treatment Position  chair  -SG      In Chair  sitting;call light within reach;encouraged to call for assist;exit alarm on;with family/caregiver  -SG      Recorded by [SG] Dary Monzon OTR 03/17/20 1521      Row Name 03/17/20 1518              Pain Scale: Numbers Pre/Post-Treatment    Pain Scale: Numbers, Pretreatment  0/10 - no pain  -SG      Recorded by [SG] Dary Monzon OTR 03/17/20 1521      Row Name                Wound 03/11/20 1130 Right gluteal Skin Tear    Wound - Properties Group Date first assessed: 03/11/20 [WH] Time first assessed: 1130 [WH] Present on Hospital Admission: N [WH] Side: Right [WH] Location: gluteal [WH] Primary Wound Type: Skin tear [WH] Additional Comments: moisture/friction [DA] Recorded by:  [DA] Tabitha Lawler, PRIYA, CWARNOL 03/11/20 1625 [WH] Alyssa Mcclain RN 03/11/20 1154      User Key  (r) = Recorded By, (t) = Taken By, (c) = Cosigned By    Initials Name Effective Dates Discipline    SG Dary Monzon, OTR 12/26/18 -  OT    DA Tabitha Lawler RN, CWAUDIEN 06/16/16 -  Nurse    WH Alyssa Mcclain RN 11/15/19 -  Nurse        Wound 03/11/20 1130 Right gluteal Skin Tear (Active)   Dressing Appearance open to air 3/17/2020  8:00 AM   Base pink 3/17/2020  8:00 AM   Periwound excoriated 3/17/2020  8:00 AM   Drainage Amount none 3/17/2020  8:00 AM   Care, Wound barrier applied 3/17/2020  8:00 AM     Rehab Goal Summary     Row Name 03/17/20 1050             Bed Mobility Goal 1 (PT)    Activity/Assistive Device (Bed Mobility Goal 1, PT)  bed mobility activities, all  (Pended)   -TW      [place] Level/Cues Needed (Bed Mobility Goal 1, PT)  supervision required  (Pended)   -TW      Time Frame (Bed Mobility Goal 1, PT)  1 week  (Pended)   -TW      Progress/Outcomes (Bed Mobility Goal 1, PT)  progress slower than expected;continuing progress toward goal;goal ongoing  (Pended)  Goal is still appropriate for pt, but date revised to remain appropriate for pt progress  -TW         Transfer Goal 1 (PT)    Activity/Assistive Device (Transfer Goal 1, PT)  transfers, all  (Pended)   -TW      [place] Level/Cues Needed (Transfer Goal 1, PT)  contact guard assist;verbal cues required;set-up required  (Pended)   -TW      Time  Frame (Transfer Goal 1, PT)  1 week  (Pended)   -TW      Progress/Outcome (Transfer Goal 1, PT)  goal revised this date;progress slower than expected  (Pended)  Goal revised this date to remain appropriate for pt progress  -TW         Gait Training Goal 1 (PT)    Activity/Assistive Device (Gait Training Goal 1, PT)  gait (walking locomotion);assistive device use;walker, rolling  (Pended)   -TW      [place] Level (Gait Training Goal 1, PT)  contact guard assist  (Pended)   -TW      Distance (Gait Goal 1, PT)  100  (Pended)   -TW      Time Frame (Gait Training Goal 1, PT)  1 week  (Pended)   -TW      Progress/Outcome (Gait Training Goal 1, PT)  continuing progress toward goal;progress slower than expected;goal ongoing  (Pended)  Goal is still appropriate for pt, but date revised to remain appropriate for pt progress  -TW        User Key  (r) = Recorded By, (t) = Taken By, (c) = Cosigned By    Initials Name Provider Type Discipline    TW Rufino Pradhan, PT Student PT Student PT            OT Recommendation and Plan     Plan of Care Review  Plan of Care Reviewed With: spouse, patient  Plan of Care Reviewed With: spouse, patient  Outcome Summary: Pt worked with OT on sitting/standing balance; pt has difficulty reaching forward in sitting position toward target which is evident as well in standing position .Would cont to benefit from OT to address deficits, will cont to rec SNF at d/c.  Outcome Measures     Row Name 03/17/20 1500 03/15/20 0952          How much help from another person do you currently need...    Turning from your back to your side while in flat bed without using bedrails?  --  3  -JR     Moving from lying on back to sitting on the side of a flat bed without bedrails?  --  3  -JR     Moving to and from a bed to a chair (including a wheelchair)?  --  2  -JR     Standing up from a chair using your arms (e.g., wheelchair, bedside chair)?  --  3  -JR     Climbing 3-5 steps with a railing?  --  1  -JR      To walk in hospital room?  --  2  -JR     AM-PAC 6 Clicks Score (PT)  --  14  -JR        How much help from another is currently needed...    Putting on and taking off regular lower body clothing?  2  -SG  --     Bathing (including washing, rinsing, and drying)  2  -SG  --     Toileting (which includes using toilet bed pan or urinal)  2  -SG  --     Putting on and taking off regular upper body clothing  2  -SG  --     Taking care of personal grooming (such as brushing teeth)  2  -SG  --     Eating meals  2  -SG  --     AM-PAC 6 Clicks Score (OT)  12  -SG  --        Functional Assessment    Outcome Measure Options  AM-PAC 6 Clicks Daily Activity (OT)  -SG  --       User Key  (r) = Recorded By, (t) = Taken By, (c) = Cosigned By    Initials Name Provider Type    Dary Hernandez OTR Occupational Therapist    Gonzales Tong, PT Physical Therapist           Time Calculation:   Time Calculation- OT     Row Name 03/17/20 1522             Time Calculation- OT    OT Start Time  1424  -SG      OT Stop Time  1448  -SG      OT Time Calculation (min)  24 min  -SG      Total Timed Code Minutes- OT  24 minute(s)  -SG      OT Non-Billable Time (min)  24 min  -SG      OT Received On  03/17/20  -        User Key  (r) = Recorded By, (t) = Taken By, (c) = Cosigned By    Initials Name Provider Type    Dary Hernandez OTR Occupational Therapist        Therapy Charges for Today     Code Description Service Date Service Provider Modifiers Qty    68930167956 HC OT NEUROMUSC RE EDUCATION EA 15 MIN 3/17/2020 Dary Monzon OTR GO 1    74825215380 HC OT THER PROC EA 15 MIN 3/17/2020 Dary Monzon OTR GO 1    28963494053 HC OT THER SUPP EA 15 MIN 3/17/2020 Dary Monzon OTR GO 2               ABIOLA Fink  3/17/2020

## 2020-03-17 NOTE — PLAN OF CARE
Problem: Patient Care Overview  Goal: Plan of Care Review  Flowsheets (Taken 3/17/2020 1521)  Progress: no change  Plan of Care Reviewed With: spouse; patient  Outcome Summary: Pt worked with OT on sitting/standing balance; pt has difficulty reaching forward in sitting position toward target which is evident as well in standing position .Would cont to benefit from OT to address deficits, will cont to rec SNF at d/c.

## 2020-03-18 ENCOUNTER — APPOINTMENT (OUTPATIENT)
Dept: GENERAL RADIOLOGY | Facility: HOSPITAL | Age: 85
End: 2020-03-18

## 2020-03-18 LAB
ALBUMIN SERPL-MCNC: 2.1 G/DL (ref 3.5–5.2)
ANION GAP SERPL CALCULATED.3IONS-SCNC: 9.5 MMOL/L (ref 5–15)
APTT PPP: 74.7 SECONDS (ref 22.7–35.4)
BASOPHILS # BLD AUTO: 0.04 10*3/MM3 (ref 0–0.2)
BASOPHILS NFR BLD AUTO: 0.5 % (ref 0–1.5)
BUN BLD-MCNC: 15 MG/DL (ref 8–23)
BUN/CREAT SERPL: 17.6 (ref 7–25)
CALCIUM SPEC-SCNC: 8 MG/DL (ref 8.6–10.5)
CHLORIDE SERPL-SCNC: 100 MMOL/L (ref 98–107)
CO2 SERPL-SCNC: 26.5 MMOL/L (ref 22–29)
CREAT BLD-MCNC: 0.85 MG/DL (ref 0.76–1.27)
DEPRECATED RDW RBC AUTO: 36.8 FL (ref 37–54)
EOSINOPHIL # BLD AUTO: 0.38 10*3/MM3 (ref 0–0.4)
EOSINOPHIL NFR BLD AUTO: 4.4 % (ref 0.3–6.2)
ERYTHROCYTE [DISTWIDTH] IN BLOOD BY AUTOMATED COUNT: 12.4 % (ref 12.3–15.4)
GFR SERPL CREATININE-BSD FRML MDRD: 85 ML/MIN/1.73
GLUCOSE BLD-MCNC: 104 MG/DL (ref 65–99)
HCT VFR BLD AUTO: 26.9 % (ref 37.5–51)
HGB BLD-MCNC: 8.6 G/DL (ref 13–17.7)
IMM GRANULOCYTES # BLD AUTO: 0.34 10*3/MM3 (ref 0–0.05)
IMM GRANULOCYTES NFR BLD AUTO: 3.9 % (ref 0–0.5)
LYMPHOCYTES # BLD AUTO: 2.42 10*3/MM3 (ref 0.7–3.1)
LYMPHOCYTES NFR BLD AUTO: 27.8 % (ref 19.6–45.3)
MCH RBC QN AUTO: 26.4 PG (ref 26.6–33)
MCHC RBC AUTO-ENTMCNC: 32 G/DL (ref 31.5–35.7)
MCV RBC AUTO: 82.5 FL (ref 79–97)
MONOCYTES # BLD AUTO: 0.67 10*3/MM3 (ref 0.1–0.9)
MONOCYTES NFR BLD AUTO: 7.7 % (ref 5–12)
NEUTROPHILS # BLD AUTO: 4.86 10*3/MM3 (ref 1.7–7)
NEUTROPHILS NFR BLD AUTO: 55.7 % (ref 42.7–76)
NRBC BLD AUTO-RTO: 0 /100 WBC (ref 0–0.2)
PHOSPHATE SERPL-MCNC: 3.7 MG/DL (ref 2.5–4.5)
PLATELET # BLD AUTO: 259 10*3/MM3 (ref 140–450)
PMV BLD AUTO: 10.2 FL (ref 6–12)
POTASSIUM BLD-SCNC: 3.6 MMOL/L (ref 3.5–5.2)
POTASSIUM BLD-SCNC: 4.5 MMOL/L (ref 3.5–5.2)
RBC # BLD AUTO: 3.26 10*6/MM3 (ref 4.14–5.8)
SODIUM BLD-SCNC: 136 MMOL/L (ref 136–145)
WBC NRBC COR # BLD: 8.71 10*3/MM3 (ref 3.4–10.8)

## 2020-03-18 PROCEDURE — 25010000002 HEPARIN (PORCINE) PER 1000 UNITS: Performed by: THORACIC SURGERY (CARDIOTHORACIC VASCULAR SURGERY)

## 2020-03-18 PROCEDURE — 85025 COMPLETE CBC W/AUTO DIFF WBC: CPT | Performed by: INTERNAL MEDICINE

## 2020-03-18 PROCEDURE — 97110 THERAPEUTIC EXERCISES: CPT

## 2020-03-18 PROCEDURE — 85730 THROMBOPLASTIN TIME PARTIAL: CPT | Performed by: NURSE PRACTITIONER

## 2020-03-18 PROCEDURE — 99232 SBSQ HOSP IP/OBS MODERATE 35: CPT | Performed by: NURSE PRACTITIONER

## 2020-03-18 PROCEDURE — 80069 RENAL FUNCTION PANEL: CPT | Performed by: INTERNAL MEDICINE

## 2020-03-18 PROCEDURE — 94799 UNLISTED PULMONARY SVC/PX: CPT

## 2020-03-18 PROCEDURE — 84132 ASSAY OF SERUM POTASSIUM: CPT | Performed by: INTERNAL MEDICINE

## 2020-03-18 PROCEDURE — 71045 X-RAY EXAM CHEST 1 VIEW: CPT

## 2020-03-18 RX ADMIN — Medication 250 MG: at 09:15

## 2020-03-18 RX ADMIN — CARBIDOPA AND LEVODOPA 1 TABLET: 25; 100 TABLET ORAL at 09:15

## 2020-03-18 RX ADMIN — HEPARIN SODIUM 17.37 UNITS/KG/HR: 10000 INJECTION, SOLUTION INTRAVENOUS at 11:26

## 2020-03-18 RX ADMIN — LIDOCAINE 1 PATCH: 50 PATCH CUTANEOUS at 09:15

## 2020-03-18 RX ADMIN — POTASSIUM CHLORIDE 40 MEQ: 750 CAPSULE, EXTENDED RELEASE ORAL at 17:49

## 2020-03-18 RX ADMIN — BUDESONIDE 0.5 MG: 0.5 INHALANT RESPIRATORY (INHALATION) at 05:40

## 2020-03-18 RX ADMIN — POTASSIUM CHLORIDE 40 MEQ: 750 CAPSULE, EXTENDED RELEASE ORAL at 09:15

## 2020-03-18 RX ADMIN — BUDESONIDE 0.5 MG: 0.5 INHALANT RESPIRATORY (INHALATION) at 20:04

## 2020-03-18 RX ADMIN — SODIUM CHLORIDE, PRESERVATIVE FREE 10 ML: 5 INJECTION INTRAVENOUS at 09:15

## 2020-03-18 RX ADMIN — IPRATROPIUM BROMIDE AND ALBUTEROL SULFATE 3 ML: 2.5; .5 SOLUTION RESPIRATORY (INHALATION) at 05:38

## 2020-03-18 RX ADMIN — FUROSEMIDE 40 MG: 40 TABLET ORAL at 09:15

## 2020-03-18 RX ADMIN — METOPROLOL TARTRATE 25 MG: 25 TABLET ORAL at 21:19

## 2020-03-18 RX ADMIN — METOPROLOL TARTRATE 25 MG: 25 TABLET ORAL at 09:14

## 2020-03-18 RX ADMIN — IPRATROPIUM BROMIDE AND ALBUTEROL SULFATE 3 ML: 2.5; .5 SOLUTION RESPIRATORY (INHALATION) at 20:04

## 2020-03-18 RX ADMIN — CARBIDOPA AND LEVODOPA 1 TABLET: 25; 100 TABLET ORAL at 21:19

## 2020-03-18 RX ADMIN — Medication 250 MG: at 21:19

## 2020-03-18 NOTE — THERAPY TREATMENT NOTE
Patient Name: Izaiah Garcia  : 1931    MRN: [id number]                              Today's Date: 3/18/2020       Admit Date: 2020    Visit Dx:     ICD-10-CM ICD-9-CM   1. Healthcare-associated pneumonia J18.9 486   2. Acute hypoxemic respiratory failure (CMS/HCC) J96.01 518.81   3. Bilateral pulmonary embolism (CMS/HCC) I26.99 415.19   4. Acute saddle pulmonary embolism with acute cor pulmonale (CMS/HCC) I26.02 415.13     415.0     Patient Active Problem List   Diagnosis   • Hyperlipidemia   • Benign essential hypertension   • History of gout   • History of esophageal stricture   • History of stroke, 2016--4.9 x 4 x 3 cm inferior left cerebellar infarct   • Rheumatoid factor positive   • Impaired fasting glucose   • At risk for falls   • Generalized weakness   • Bilateral high frequency sensorineural hearing loss, wears hearing aids   • Bilateral lower extremity edema   • Parkinson's disease (CMS/HCC)   • Therapeutic drug monitoring   • Vitamin D deficiency   • Macrocytosis   • Vitamin B12 deficiency   • Acute diarrhea   • Hospital-acquired pneumonia   • HAP (hospital-acquired pneumonia)   • Acute UTI (urinary tract infection)   • Colitis   • Parkinson disease (CMS/HCC)   • Weakness   • Aspiration pneumonia of right lower lobe due to vomit (CMS/HCC)   • Pulmonary emboli (CMS/HCC)   • Acute saddle pulmonary embolism with acute cor pulmonale (CMS/HCC)   • Empyema of pleura (CMS/HCC)     Past Medical History:   Diagnosis Date   • At risk for falls 2019   • Benign essential hypertension 2016   • Bilateral high frequency sensorineural hearing loss, wears hearing aids 2019   • Bilateral lower extremity edema 2019    May 2, 2019--patient who has hypertension and is on amlodipine 10 mg/day is complaining of progressively worsening swelling of the lower extremities that extends up to about mid calf.  Gets worse at the end of the day and tends to get better overnight when he props his  "feet up.  I think this is definitely related to the amlodipine although patient may have some venous insufficiency.  Medication ad   • Decreased peripheral vision of both eyes 5/2/2019    May 2, 2019--continues to have complaints of \"dizziness\" associated with weakness in his lower extremities.  He is not describing a spinning sensation or anything remotely close to vertigo.  Instead he is describing an off-balance sensation.  He tends to fall forward if not careful and he tends to list towards the right side.  He has marked difficulty going down an incline.  He has to ambulate wit   • History of aspiration pneumonia 5/4/2015   • History of esophageal stricture 5/4/2015    July 3, 2018--EGD revealed a distal esophageal stricture that was dilated to 18 mm.  There was a small hiatal hernia.  There was mild streaky erythema in the proximal stomach.  Duodenal bulb normal.  April 26, 2016--EGD performed for dysphagia reveals a distal esophageal stricture that was dilated 16.5 mm.   • History of gout 6/29/2016   • History of stroke, 6/29/2016--4.9 x 4 x 3 cm inferior left cerebellar infarct 5/4/2015 June 29, 2016--MRI of the brain performed for complaints of dizziness and weakness. IMPRESSION: 1. There is susceptibility artifact streaking through the anterior left frontal scalp through the anterior left frontal bone in the anterior tipof the left frontal lobe likely from a tiny piece of metal in the anterior left frontal scalp slightly limits evaluation of these structures. 2. There is mild s   • Hyperlipidemia 6/29/2016   • Impaired fasting glucose 5/2/2019 April 14, 2015--hemoglobin A1c 5.9.   • Macrocytosis 5/2/2019   • Parkinson's disease (CMS/[facility]) 5/2/2019    May 2, 2019--continues to have complaints of \"dizziness\" associated with weakness in his lower extremities.  He is not describing a spinning sensation or anything remotely close to vertigo.  Instead he is describing an off-balance sensation.  He tends to " fall forward if not careful and he tends to list towards the right side.  He has marked difficulty going down an incline.  He has to ambulate wit   • Rheumatoid factor positive 5/2/2019 March 25, 2014--rheumatoid factor returned positive at 95.  However, CCP antibodies normal at 8, SHIV negative, both C&P ANCA negative, C-reactive protein normal at 0.24, sed rate normal at 2.   • Vitamin B12 deficiency 6/6/2019 June 6, 2019--patient recently had mildly elevated methylmalonic acid of 380.  Repeat methylmalonic acid was upper limit of normal at 356.  Given patient's neurologic symptoms and suspected parkinsonism, I have a low threshold for treating vitamin B12 deficiency.  We will initiate vitamin B12 injections today.  2000 mcg subcutaneously given today.   • Vitamin D deficiency 5/2/2019     Past Surgical History:   Procedure Laterality Date   • CARDIAC CATHETERIZATION N/A 2/29/2020    Procedure: Thrombolytic Therapy- EKOS;  Surgeon: Jason Griffiths MD;  Location: [facility] CATH INVASIVE LOCATION;  Service: Cardiovascular;  Laterality: N/A;   • CATARACT EXTRACTION EXTRACAPSULAR W/ INTRAOCULAR LENS IMPLANTATION Bilateral     Bilateral cataract extirpation with intraocular lens implantation   • CHOLECYSTECTOMY     • EKOS CATHETER PLACEMENT Bilateral 2/29/2020    Procedure: Ekos catheter placement;  Surgeon: Jason Griffiths MD;  Location:  [facility] CATH INVASIVE LOCATION;  Service: Cardiovascular;  Laterality: Bilateral;   • ESOPHAGEAL DILATATION  04/26/2016 April 26, 2016--EGD performed for dysphagia reveals a distal esophageal stricture that was dilated 16.5 mm.   • ESOPHAGEAL DILATATION  07/03/2018    July 3, 2018--EGD revealed a distal esophageal stricture that was dilated to 18 mm.  There was a small hiatal hernia.  There was mild streaky erythema in the proximal stomach.  Duodenal bulb normal.   • INTERVENTIONAL RADIOLOGY PROCEDURE Bilateral 2/29/2020    Procedure: Pulmonary Angiogram;  Surgeon: Tunde  MD Jason;  Location:  [facility] CATH INVASIVE LOCATION;  Service: Cardiovascular;  Laterality: Bilateral;     General Information     Row Name 03/18/20 0952          PT Evaluation Time/Intention    Document Type  therapy note (daily note)  (Pended)   -     Mode of Treatment  individual therapy;physical therapy  (Pended)   -     Row Name 03/18/20 0952          General Information    Existing Precautions/Restrictions  fall;oxygen therapy device and L/min  (Pended)   -     Row Name 03/18/20 0952          Cognitive Assessment/Intervention- PT/OT    Orientation Status (Cognition)  oriented x 3  (Pended)   -     Row Name 03/18/20 0952          Safety Issues, Functional Mobility    Impairments Affecting Function (Mobility)  balance;endurance/activity tolerance;strength;coordination;pain  (Pended)   -       User Key  (r) = Recorded By, (t) = Taken By, (c) = Cosigned By    Initials Name Provider Type    TW Rufino Pradhan, PT Student PT Student        Mobility     Row Name 03/18/20 0953          Bed Mobility Assessment/Treatment    Bed Mobility Assessment/Treatment  supine-sit;sit-supine  (Pended)   -TW     Supine-Sit [place] (Bed Mobility)  2 person assist;verbal cues;moderate assist (50% patient effort)  (Pended)   -     Comment (Bed Mobility)  Pt still requires assistance getting in and out of bed, such as getting LE out of bed and maintaining trunk control. Pt transfered to chair.  (Pended)   -     Row Name 03/18/20 0953          Transfer Assessment/Treatment    Comment (Transfers)  STS x3 reps. Pt very unsteady when standing with shaking in B LE and required cues for propper  with UE on AD. Pt required cues to lean forward to help drive through the LE.   (Pended)   -     Row Name 03/18/20 0953          Sit-Stand Transfer    Sit-Stand [place] (Transfers)  moderate assist (50% patient effort);2 person assist;verbal cues  (Pended)   -TW     Assistive Device (Sit-Stand Transfers)  walker,  front-wheeled  (Pended)   -TW     Row Name 03/18/20 0953          Gait/Stairs Assessment/Training    Gait/Stairs Assessment/Training  gait/ambulation assistive device  (Pended)   -TW     [place] Level (Gait)  moderate assist (50% patient effort);2 person assist;1 person to manage equipment;verbal cues  (Pended)   -TW     Distance in Feet (Gait)  3  (Pended)   -TW     Pattern (Gait)  step-to  (Pended)   -TW     Deviations/Abnormal Patterns (Gait)  erika decreased;gait speed decreased;stride length decreased;festinating/shuffling  (Pended)   -TW     Bilateral Gait Deviations  forward flexed posture;weight shift ability decreased;heel strike decreased  (Pended)   -TW     Comment (Gait/Stairs)  Pt stepped from bed to chair. Pt was very unsteady and required assistance to help remain standing and cues for weight shift to advance LE  (Pended)   -TW       User Key  (r) = Recorded By, (t) = Taken By, (c) = Cosigned By    Initials Name Provider Type    TW Rufino Pradhan, PT Student PT Student        Obj/Interventions     Row Name 03/18/20 0956          Therapeutic Exercise    Comment (Therapeutic Exercise)  B AP and LAQ x10 reps  (Pended)   -TW     Row Name 03/18/20 0956          Static Sitting Balance    Level of [place] (Unsupported Sitting, Static Balance)  contact guard assist;1 person assist  (Pended)   -TW     Sitting Position (Unsupported Sitting, Static Balance)  sitting on edge of bed  (Pended)   -TW     Time Able to Maintain Position (Unsupported Sitting, Static Balance)  2 to 3 minutes  (Pended)   -     Row Name 03/18/20 0956          Dynamic Sitting Balance    Level of [place], Reaches Outside Midline (Sitting, Dynamic Balance)  minimal assist, 75% patient effort;1 person assist  (Pended)   -TW     Sitting Position, Reaches Outside Midline (Sitting, Dynamic Balance)  sitting on edge of bed  (Pended)   -     Row Name 03/18/20 0956          Static Standing Balance    Level of [place]  (Supported Standing, Static Balance)  moderate assist, 50 to 74% patient effort;2 person assist  (Pended)   -TW     Time Able to Maintain Position (Supported Standing, Static Balance)  2 to 3 minutes  (Pended)   -TW     Assistive Device Utilized (Supported Standing, Static Balance)  walker, rolling  (Pended)   -TW     Row Name 03/18/20 0956          Dynamic Standing Balance    Level of [place], Reaches Outside Midline (Standing, Dynamic Balance)  moderate assist, 50 to 74% patient effort;2 person assist;1 person to manage equipment  (Pended)   -TW     Assistive Device Utilized (Supported Standing, Dynamic Balance)  walker, rolling  (Pended)   -TW       User Key  (r) = Recorded By, (t) = Taken By, (c) = Cosigned By    Initials Name Provider Type    TW Rufino Pradhan, PT Student PT Student        Goals/Plan    No documentation.       Clinical Impression     [facility] Name 03/18/20 1017          Pain Scale: Numbers Pre/Post-Treatment    Pain Location - Side  Left  (Pended)   -TW     Pain Location - Orientation  incisional  (Pended)   -TW     Pain Location  chest  (Pended)   -TW     Pre/Post Treatment Pain Comment  Pt states tht he has pain at his chest tube incision  (Pended)   -     Row Name 03/18/20 1017          Pain Scale: FACES Pre/Post-Treatment    Pain: FACES Scale, Pretreatment  4-->hurts little more  (Pended)   -TW     Pain: FACES Scale, Post-Treatment  4-->hurts little more  (Pended)   -TW     Pre/Post Treatment Pain Comment  Pt states tht he has pain at his chest tube incision  (Pended)   -     Row Name 03/18/20 1017          Plan of Care Review    Plan of Care Reviewed With  patient  (Pended)   -TW     Progress  no change  (Pended)   -TW     Outcome Summary  Pt was agreeable to therapy this morning. Pt still requires Mod A x2 with bed mobility and transfers, but performed STS x3 reps and stood for a minute each time. Pt was able to take steps to make it from the bed to a chair so that pt could sit. Pt  required Mod A x2 for gait. Pt stil lexhibits deficits in strength, endurance, balance, and coordination that impact safety, gait, and functional mobility. PT will continue to follow pt to address these deficits, but anticipate pt will benefit from attending a SNF upon discharge.   (Pended)   -TW     Row Name 03/18/20 1017          Physical Therapy Clinical Impression    Patient/Family Goals Statement (PT Clinical Impression)  Return to PLOF  (Pended)   -TW     Criteria for Skilled Interventions Met (PT Clinical Impression)  yes;treatment indicated  (Pended)   -TW     Rehab Potential (PT Clinical Summary)  fair, will monitor progress closely  (Pended)   -TW     Row Name 03/18/20 1017          Vital Signs    O2 Delivery Pre Treatment  supplemental O2  (Pended)   -TW     O2 Delivery Intra Treatment  supplemental O2  (Pended)   -TW     O2 Delivery Post Treatment  supplemental O2  (Pended)   -TW     Row Name 03/18/20 1017          Positioning and Restraints    Pre-Treatment Position  in bed  (Pended)   -TW     Post Treatment Position  chair  (Pended)   -TW     In Chair  reclined;sitting;call light within reach;encouraged to call for assist  (Pended)   -TW       User Key  (r) = Recorded By, (t) = Taken By, (c) = Cosigned By    Initials Name Provider Type    TW Rufino Pradhan, PT Student PT Student        Outcome Measures     Row Name 03/18/20 1021          How much help from another person do you currently need...    Turning from your back to your side while in flat bed without using bedrails?  2  (Pended)   -TW     Moving from lying on back to sitting on the side of a flat bed without bedrails?  2  (Pended)   -TW     Moving to and from a bed to a chair (including a wheelchair)?  2  (Pended)   -TW     Standing up from a chair using your arms (e.g., wheelchair, bedside chair)?  2  (Pended)   -TW     Climbing 3-5 steps with a railing?  1  (Pended)   -TW     To walk in hospital room?  2  (Pended)   -TW     AM-PAC 6 Clicks  Score (PT)  11  (Pended)   -TW     Row Name 03/18/20 1021          Functional Assessment    Outcome Measure Options  AM-PAC 6 Clicks Basic Mobility (PT)  (Pended)   -TW       User Key  (r) = Recorded By, (t) = Taken By, (c) = Cosigned By    Initials Name Provider Type    Rufino Velez, PT Student PT Student          PT Recommendation and Plan  Planned Therapy Interventions (PT Eval): balance training, bed mobility training, gait training, home exercise program, patient/family education, stair training, strengthening, transfer training  Outcome Summary/Treatment Plan (PT)  Anticipated Discharge Disposition (PT): (P) skilled nursing facility  Plan of Care Reviewed With: (P) patient  Progress: (P) no change  Outcome Summary: (P) Pt was agreeable to therapy this morning. Pt still requires Mod A x2 with bed mobility and transfers, but performed STS x3 reps and stood for a minute each time. Pt was able to take steps to make it from the bed to a chair so that pt could sit. Pt required Mod A x2 for gait. Pt stil lexhibits deficits in strength, endurance, balance, and coordination that impact safety, gait, and functional mobility. PT will continue to follow pt to address these deficits, but anticipate pt will benefit from attending a SNF upon discharge.      Time Calculation:   PT Charges     Row Name 03/18/20 1022             Time Calculation    Start Time  0834  (Pended)   -TW      Stop Time  0859  (Pended)   -TW      Time Calculation (min)  25 min  (Pended)   -TW      PT Received On  03/18/20  (Pended)   -TW      PT - Next Appointment  03/19/20  (Pended)   -TW      PT Goal Re-Cert Due Date  03/24/20  (Pended)   -TW         Time Calculation- PT    Total Timed Code Minutes- PT  24 minute(s)  (Pended)   -TW        User Key  (r) = Recorded By, (t) = Taken By, (c) = Cosigned By    Initials Name Provider Type    Rufino Velez, PT Student PT Student        Therapy Charges for Today     Code Description Service Date Service  Provider Modifiers Qty    76062531996 HC PT THER PROC EA 15 MIN 3/17/2020 Rufino Pradhan, PT Student GP 1    96645985447 HC PT THER SUPP EA 15 MIN 3/17/2020 Rufino Pradhan, PT Student GP 1    80185234861 HC PT THER PROC EA 15 MIN 3/18/2020 Rufino Pradhan, PT Student GP 2    87742546970 HC PT THER SUPP EA 15 MIN 3/18/2020 Rufino Pradhan, PT Student GP 1          PT G-Codes  Outcome Measure Options: (P) AM-PAC 6 Clicks Basic Mobility (PT)  AM-PAC 6 Clicks Score (PT): (P) 11  AM-PAC 6 Clicks Score (OT): 12    Rufino Pradhan PT Student  3/18/2020

## 2020-03-18 NOTE — PROGRESS NOTES
"    Chief Complaint: Bilateral pulmonary emboli, parapneumonic effusion  S/P: CT-guided chest tube insertion    Subjective:  Symptoms:  Improved.  He reports shortness of breath, cough, weakness and anorexia.    Diet:  Poor intake.  No nausea or vomiting.    Activity level: Impaired due to weakness.    Pain:  He complains of pain that is mild.  Pain is well controlled.        Vital Signs:  Temp:  [98 °F (36.7 °C)-98.5 °F (36.9 °C)] 98.5 °F (36.9 °C)  Heart Rate:  [62-78] 78  Resp:  [18] 18  BP: (131-151)/(63-89) 131/89    Intake & Output (last day)       03/17 0701 - 03/18 0700 03/18 0701 - 03/19 0700    P.O. 280 330    I.V. (mL/kg) 1846.6 (23.2)     Total Intake(mL/kg) 2126.6 (26.7) 330 (4.1)    Urine (mL/kg/hr) 150 (0.1) 100 (0.2)    Chest Tube 0 0    Total Output 150 100    Net +1976.6 +230                Objective:  General Appearance:  Comfortable, ill-appearing, in no acute distress and not in pain.    Vital signs: (most recent): Blood pressure 131/89, pulse 78, temperature 98.5 °F (36.9 °C), temperature source Oral, resp. rate 18, height 177.8 cm (70\"), weight 79.6 kg (175 lb 7 oz), SpO2 93 %.  Vital signs are normal.  No fever.    Output: Producing urine and producing stool.    HEENT: (Very hard of hearing.)    Lungs:  Normal effort and normal respiratory rate.  He is not in respiratory distress.  There are decreased breath sounds (bibasilar).    Heart: Normal rate.  Regular rhythm.    Chest: Chest wall tenderness (at chest tube site) present.    Abdomen: Abdomen is soft.  Bowel sounds are normal.   There is no abdominal tenderness.   There is no mass.   Extremities: Normal range of motion.  There is no dependent edema.    Pulses: Distal pulses are intact.    Neurological: Patient is alert and oriented to person, place and time.    Skin:  Warm and dry.          Chest tube:   Site: Left, Clean, Dry, Intact and Securement device intact  Suction: waterseal  Air Leak: negative  24 Hour Total: 0cc    Results " Review:     I reviewed the patient's new clinical results.  I reviewed the patient's new imaging results and agree with the interpretation.  I reviewed the patient's other test results and agree with the interpretation. I have discussed these results with the patient, family at bedside and Dr. Leon.     Imaging Results (Last 24 Hours)     Procedure Component Value Units Date/Time    XR Chest 1 View [[id number]] Collected:  03/18/20 0646     Updated:  03/18/20 0652    Narrative:       XR CHEST 1 VW-     Clinical: Chest tube management     COMPARISON 3/17/2020 CT chest and chest radiograph 3/17/2020     FINDINGS: Right base chest tube position remains stable. No pneumothorax  seen. There is blunting of the left costophrenic angle consistent with  small pleural effusion, unchanged. Right-sided pleural effusion not  significantly changed within the interim. There is patchy bibasilar  airspace disease similar to the previous examination. The  cardiomediastinal silhouette remains stable. No edema or new airspace  disease has developed.     CONCLUSION: Stable chest     This report was finalized on 3/18/2020 6:49 AM by Dr. Lobito Macario M.D.       CT Chest Without Contrast [[id number]] Collected:  03/17/20 1652     Updated:  03/17/20 1700    Narrative:       CT CHEST WITHOUT CONTRAST     HISTORY: 88-year-old with pleural effusion.  Patient is post CT-guided  catheter placement in the left pleural effusion 5 days ago.  Follow up  exam.     TECHNIQUE: CT chest includes axial imaging from the thoracic inlet to  the upper abdomen without IV contrast.      COMPARISON: CT chest without contrast 03/12/2020, 03/06/2020.     FINDINGS: Left posterior chest pigtail drainage catheter extends into  the pleural space posterior to the left lower lobe and there has been  decrease in size of the left pleural effusion compared to the previous  exam. There is a persistent left pleural effusion and effusion is partly  loculated posterior to  the left lower lobe at the horizontal level of  the mickie. There are bubbles of air within the left pleural space.  There is dense left lower lobe consolidation with patchy areas of  infiltrates surrounding bronchial wall thickening.     A moderate right pleural effusion is also present and there is loculated  right pleural fluid extending into the right major fissure along its  posterior-superior margin where pleural fluid measures 2.1 cm in  thickness. As on the left, there is dense right lower lobe consolidation  with bronchial wall thickening and patchy infiltrates. There is no  evidence for pulmonary edema. Upper lobes are comparatively clear.     The heart size is enlarged. A right superior mediastinal low-density  mass or node measures 3.7 x 3 cm axial dimension.  There is a T11  vertebral body hemangioma. There is also a small sclerotic focus within  the anterior superior aspect of the T6 vertebral body measuring 7 mm  which may represent a bone island.     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 Within the superior aspect of the lateral  segment of the left lobe of the liver there is a 10.2 x 8.4 cm cyst.        Impression:       1.  Left posterior pigtail drainage catheter extends into the left  pleural space. There is a residual small left pleural effusion that has  decreased in size. Effusion is partly loculated along the posterior  margin of the left lower lobe at the horizontal level of the mickie.  There is also a moderate right pleural effusion that is partly loculated  within the posterior aspect of the right major fissure. Bilateral lower  lobe dense consolidation and infiltrates associated with bronchial wall  thickening.  2.  Cardiomegaly.  3.  3.7 x 3.0 cm right superior mediastinal mass or necrotic node or  complex cyst.  4.  10.2 cm cm hepatic cyst. 1.3 cm splenic  artery aneurysm.     Radiation dose reduction techniques were utilized, including automated  exposure control and exposure modulation based on body size.     This report was finalized on 3/17/2020 4:57 PM by Dr. Duke Asher M.D.             Lab Results:     Lab Results (last 24 hours)     Procedure Component Value Units Date/Time    Renal Function Panel [[id number]]  (Abnormal) Collected:  03/18/20 0332    Specimen:  Blood Updated:  03/18/20 0443     Glucose 104 mg/dL      BUN 15 mg/dL      Creatinine 0.85 mg/dL      Sodium 136 mmol/L      Potassium 3.6 mmol/L      Chloride 100 mmol/L      CO2 26.5 mmol/L      Calcium 8.0 mg/dL      Albumin 2.10 g/dL      Phosphorus 3.7 mg/dL      Anion Gap 9.5 mmol/L      BUN/Creatinine Ratio 17.6     eGFR Non African Amer 85 mL/min/1.73     Narrative:       GFR Normal >60  Chronic Kidney Disease <60  Kidney Failure <15      aPTT [[id number]]  (Abnormal) Collected:  03/18/20 0332    Specimen:  Blood from Hand, Left Updated:  03/18/20 0429     PTT 74.7 seconds     CBC & Differential [[id number]] Collected:  03/18/20 0332    Specimen:  Blood Updated:  03/18/20 0418    Narrative:       The following orders were created for panel order CBC & Differential.  Procedure                               Abnormality         Status                     ---------                               -----------         ------                     CBC Auto Differential[[id number]]        Abnormal            Final result                 Please view results for these tests on the individual orders.    CBC Auto Differential [[id number]]  (Abnormal) Collected:  03/18/20 0332    Specimen:  Blood Updated:  03/18/20 0418     WBC 8.71 10*3/mm3      RBC 3.26 10*6/mm3      Hemoglobin 8.6 g/dL      Hematocrit 26.9 %      MCV 82.5 fL      MCH 26.4 pg      MCHC 32.0 g/dL      RDW 12.4 %      RDW-SD 36.8 fl      MPV 10.2 fL      Platelets 259 10*3/mm3      Neutrophil % 55.7 %      Lymphocyte % 27.8 %      Monocyte %  7.7 %      Eosinophil % 4.4 %      Basophil % 0.5 %      Immature Grans % 3.9 %      Neutrophils, Absolute 4.86 10*3/mm3      Lymphocytes, Absolute 2.42 10*3/mm3      Monocytes, Absolute 0.67 10*3/mm3      Eosinophils, Absolute 0.38 10*3/mm3      Basophils, Absolute 0.04 10*3/mm3      Immature Grans, Absolute 0.34 10*3/mm3      nRBC 0.0 /100 WBC     aPTT [[id number]]  (Abnormal) Collected:  03/17/20 1355    Specimen:  Blood from Hand, Left Updated:  03/17/20 1442     PTT 75.5 seconds            Assessment/Plan       Acute saddle pulmonary embolism with acute cor pulmonale (CMS/HCC)    Pulmonary emboli (CMS/HCC)    Empyema of pleura (CMS/HCC)       Assessment & Plan     Mr. Garcia is a pleasant 88-year-old gentleman status post CT-guided drainage of the right chest and subsequent removal who now has a CT guided chest tube on the left.  Activase administered 2 days ago with a good response.  Follow-up CT scan yesterday demonstrates diminished pleural effusion on the left.  I removed the chest tube at the bedside today.  Patient tolerated without difficulty.  Tube was intact upon removal.  We will plan to get another chest x-ray in the morning.  Okay to re-initiate oral anticoagulation from our standpoint.      EBEN Rivers  [facility]  03/18/20  12:24

## 2020-03-18 NOTE — PROGRESS NOTES
"  Daily Progress Note.   [address]  3/18/2020    Patient:  Name:  Izaiah Garcia  MRN:  [id number]  8/31/1931  88 y.o.  male       Chief Complaint: Patient had questionable facial asymmetry last night that was evaluated by the DVT     Hospital course: Patient was readmitted after an hospital admission for pneumonia with evidence of massive pulmonary embolism, and was managed with EKOS with directed thrombolytic therapy low-dose with good clinical response.  Patient had pleural effusion, and after evaluation by thoracic surgery decision was to have radiology replaced the chest tube with a CT-guidance.  This was done on 3/9/2020 but the patient had output less than 200 cc over the first 24 hours, TPA was administered on 3/10/2020 with nearly 600 cc total output afterwards.  The right-sided chest tube was removed on 3/12/2020 when the output was minimal and patient had a CT-guided placement of left-sided small chest tube with 1200 cc output so far.  Patient is on the heparin drip which was discontinued only for few hours before the procedure and was resumed afterwards with no problem with bleeding.    INTERVAL:  Follow up for above  doing well no acute events  No hemoptysis no chest pain no confusion    ROS: No fever, no diarrhea, no chest pain  PMFSSH: no change    Physical Exam:  /61 (BP Location: Right arm, Patient Position: Lying)   Pulse 63   Temp 98.1 °F (36.7 °C) (Oral)   Resp 16   Ht 180.3 cm (71\")   Wt 79.6 kg (175 lb 8 oz)   SpO2 98%   BMI 24.48 kg/m²   Body mass index is 24.48 kg/m².    Intake/Output Summary (Last 24 hours) at 3/18/2020 1347  Last data filed at 3/18/2020 1320  Gross per 24 hour   Intake 2336.64 ml   Output 450 ml   Net 1886.64 ml     General appearance: chronically ill but non toxic, conversant   Eyes: anicteric sclerae, moist conjunctivae; no lid-lag; PERRLA  HENT: Atraumatic; oropharynx clear with moist mucous membranes and no mucosal ulcerations; " normal hard and soft palate  Neck: Trachea midline; FROM, supple, no thyromegaly or lymphadenopathy  Lungs: +chest tube no wheeze no rhonchi, with normal respiratory effort and no intercostal retractions  CV: irreg irreg , no rub  Abdomen: Soft, non-tender; no masses or HSM  Extremities:trace peripheral edema or extremity lymphadenopathy  Skin: Normal temperature, turgor and texture; no rash, ulcers or subcutaneous nodules  Psych: Appropriate affect, alert and oriented to person, place and time  Neuro cns II-XII intact move all ext, speech intact    Data Review:  Notable Labs:  Results from last 7 days   Lab Units 03/18/20  0332 03/17/20  0547 03/16/20  0302 03/15/20  0327 03/14/20  0423 03/13/20  0454 03/12/20  0506   WBC 10*3/mm3 8.71 9.56 10.08 11.43* 12.02* 13.73* 12.10*   HEMOGLOBIN g/dL 8.6* 8.8* 9.1* 8.9* 9.3* 9.6* 9.1*   PLATELETS 10*3/mm3 259 257 272 301 295 346 351     Results from last 7 days   Lab Units 03/18/20  0332 03/17/20  0547 03/16/20  0302 03/15/20  0327 03/14/20  0423 03/13/20  0454 03/12/20  0506   SODIUM mmol/L 136 137 136 139 136 137 134*   POTASSIUM mmol/L 3.6 3.7 4.0 4.3 4.9 3.6 3.6   CHLORIDE mmol/L 100 101 101 101 104 100 99   CO2 mmol/L 26.5 25.6 25.3 25.8 22.3 24.5 24.1   BUN mg/dL 15 15 16 19 19 18 15   CREATININE mg/dL 0.85 0.87 0.95 0.96 0.95 0.89 0.68*   GLUCOSE mg/dL 104* 104* 108* 111* 119* 137* 112*   CALCIUM mg/dL 8.0* 7.9* 8.2* 7.9* 8.4* 8.1* 7.9*   PHOSPHORUS mg/dL 3.7 3.8 3.9 4.2 8.0* 3.6 3.8   Estimated Creatinine Clearance: 67.6 mL/min (by C-G formula based on SCr of 0.85 mg/dL).    Imaging:  Reviewed chest images personally from past 3 days    Scheduled meds:      budesonide 0.5 mg Nebulization BID - RT   carbidopa-levodopa 1 tablet Oral BID   cyanocobalamin 1,000 mcg Intramuscular Q28 Days   furosemide 40 mg Oral Daily   ipratropium-albuterol 3 mL Nebulization BID - RT   lidocaine 1 patch Transdermal Q24H   metoprolol tartrate 25 mg Oral Q12H   saccharomyces boulardii 250  mg Oral BID   sodium chloride 10 mL Intravenous Q12H     Heparin gtt    ASSESSMENT  /  PLAN:  1. Acute hypoxic respiratory failure  2. BPE s/p ekos catheter on 2/29/2020 with local TPA infusion with good clinical response  3. Troponemia suspect due to BPE  4. RV strain  5. Bilateral pleural effusion R greater than Left exudative with chest tube insertion on 3/9/2020  6. HLD  7. Esphageal stricture  8. Gen weakness  9. Parkinsons Disease  10. 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 HTN  13. Abnormal mass around thyroid   14. hypokalemia, corrected  15. Hypoalbuminemia  16. Hyponatremia, mild     PLAN:  Chest tube out today  Home tomorrow  Monitor on heparin today  If doing well after tube removal start NOAC tomorrow and would be ready for dc    D/w Ms. Roldan [facility] Thor[facility]    Chetan Eugene MD  [facility]  03/18/20  0061

## 2020-03-18 NOTE — PLAN OF CARE
Problem: Patient Care Overview  Goal: Plan of Care Review  Flowsheets  Taken 3/18/2020 1022  Plan of Care Reviewed With: patient (Pended)  Taken 3/18/2020 1017  Outcome Summary: Pt was agreeable to therapy this morning. Pt still requires Mod A x2 with bed mobility and transfers, but performed STS x3 reps and stood for a minute each time. Pt was able to take steps to make it from the bed to a chair so that pt could sit. Pt required Mod A x2 for gait. Pt stil lexhibits deficits in strength, endurance, balance, and coordination that impact safety, gait, and functional mobility. PT will continue to follow pt to address these deficits, but anticipate pt will benefit from attending a SNF upon discharge.  (Pended)

## 2020-03-19 ENCOUNTER — APPOINTMENT (OUTPATIENT)
Dept: GENERAL RADIOLOGY | Facility: HOSPITAL | Age: 85
End: 2020-03-19

## 2020-03-19 LAB
ALBUMIN SERPL-MCNC: 2.1 G/DL (ref 3.5–5.2)
ANION GAP SERPL CALCULATED.3IONS-SCNC: 10.8 MMOL/L (ref 5–15)
APTT PPP: 70.1 SECONDS (ref 22.7–35.4)
BASOPHILS # BLD AUTO: 0.03 10*3/MM3 (ref 0–0.2)
BASOPHILS NFR BLD AUTO: 0.4 % (ref 0–1.5)
BUN BLD-MCNC: 13 MG/DL (ref 8–23)
BUN/CREAT SERPL: 14.8 (ref 7–25)
CALCIUM SPEC-SCNC: 8.1 MG/DL (ref 8.6–10.5)
CHLORIDE SERPL-SCNC: 102 MMOL/L (ref 98–107)
CO2 SERPL-SCNC: 25.2 MMOL/L (ref 22–29)
CREAT BLD-MCNC: 0.88 MG/DL (ref 0.76–1.27)
DEPRECATED RDW RBC AUTO: 36.5 FL (ref 37–54)
EOSINOPHIL # BLD AUTO: 0.34 10*3/MM3 (ref 0–0.4)
EOSINOPHIL NFR BLD AUTO: 4.2 % (ref 0.3–6.2)
ERYTHROCYTE [DISTWIDTH] IN BLOOD BY AUTOMATED COUNT: 12.1 % (ref 12.3–15.4)
GFR SERPL CREATININE-BSD FRML MDRD: 82 ML/MIN/1.73
GLUCOSE BLD-MCNC: 102 MG/DL (ref 65–99)
HCT VFR BLD AUTO: 27.7 % (ref 37.5–51)
HGB BLD-MCNC: 8.9 G/DL (ref 13–17.7)
IMM GRANULOCYTES # BLD AUTO: 0.39 10*3/MM3 (ref 0–0.05)
IMM GRANULOCYTES NFR BLD AUTO: 4.8 % (ref 0–0.5)
LYMPHOCYTES # BLD AUTO: 2.17 10*3/MM3 (ref 0.7–3.1)
LYMPHOCYTES NFR BLD AUTO: 26.9 % (ref 19.6–45.3)
MCH RBC QN AUTO: 26.6 PG (ref 26.6–33)
MCHC RBC AUTO-ENTMCNC: 32.1 G/DL (ref 31.5–35.7)
MCV RBC AUTO: 82.7 FL (ref 79–97)
MONOCYTES # BLD AUTO: 0.69 10*3/MM3 (ref 0.1–0.9)
MONOCYTES NFR BLD AUTO: 8.6 % (ref 5–12)
NEUTROPHILS # BLD AUTO: 4.45 10*3/MM3 (ref 1.7–7)
NEUTROPHILS NFR BLD AUTO: 55.1 % (ref 42.7–76)
NRBC BLD AUTO-RTO: 0 /100 WBC (ref 0–0.2)
PHOSPHATE SERPL-MCNC: 3.3 MG/DL (ref 2.5–4.5)
PLATELET # BLD AUTO: 246 10*3/MM3 (ref 140–450)
PMV BLD AUTO: 10.6 FL (ref 6–12)
POTASSIUM BLD-SCNC: 4.6 MMOL/L (ref 3.5–5.2)
RBC # BLD AUTO: 3.35 10*6/MM3 (ref 4.14–5.8)
SODIUM BLD-SCNC: 138 MMOL/L (ref 136–145)
WBC NRBC COR # BLD: 8.07 10*3/MM3 (ref 3.4–10.8)

## 2020-03-19 PROCEDURE — 71045 X-RAY EXAM CHEST 1 VIEW: CPT

## 2020-03-19 PROCEDURE — 85025 COMPLETE CBC W/AUTO DIFF WBC: CPT | Performed by: INTERNAL MEDICINE

## 2020-03-19 PROCEDURE — 99231 SBSQ HOSP IP/OBS SF/LOW 25: CPT | Performed by: NURSE PRACTITIONER

## 2020-03-19 PROCEDURE — 25010000002 HEPARIN (PORCINE) PER 1000 UNITS: Performed by: NURSE PRACTITIONER

## 2020-03-19 PROCEDURE — 80069 RENAL FUNCTION PANEL: CPT | Performed by: INTERNAL MEDICINE

## 2020-03-19 PROCEDURE — 85730 THROMBOPLASTIN TIME PARTIAL: CPT | Performed by: NURSE PRACTITIONER

## 2020-03-19 PROCEDURE — 97530 THERAPEUTIC ACTIVITIES: CPT

## 2020-03-19 PROCEDURE — 94799 UNLISTED PULMONARY SVC/PX: CPT

## 2020-03-19 RX ADMIN — METOPROLOL TARTRATE 25 MG: 25 TABLET ORAL at 09:27

## 2020-03-19 RX ADMIN — Medication 250 MG: at 20:28

## 2020-03-19 RX ADMIN — SODIUM CHLORIDE, PRESERVATIVE FREE 10 ML: 5 INJECTION INTRAVENOUS at 20:28

## 2020-03-19 RX ADMIN — CARBIDOPA AND LEVODOPA 1 TABLET: 25; 100 TABLET ORAL at 20:28

## 2020-03-19 RX ADMIN — BUDESONIDE 0.5 MG: 0.5 INHALANT RESPIRATORY (INHALATION) at 19:13

## 2020-03-19 RX ADMIN — METOPROLOL TARTRATE 25 MG: 25 TABLET ORAL at 20:28

## 2020-03-19 RX ADMIN — Medication 250 MG: at 09:27

## 2020-03-19 RX ADMIN — IPRATROPIUM BROMIDE AND ALBUTEROL SULFATE 3 ML: 2.5; .5 SOLUTION RESPIRATORY (INHALATION) at 09:24

## 2020-03-19 RX ADMIN — SODIUM CHLORIDE, PRESERVATIVE FREE 10 ML: 5 INJECTION INTRAVENOUS at 09:28

## 2020-03-19 RX ADMIN — IPRATROPIUM BROMIDE AND ALBUTEROL SULFATE 3 ML: 2.5; .5 SOLUTION RESPIRATORY (INHALATION) at 19:13

## 2020-03-19 RX ADMIN — BUDESONIDE 0.5 MG: 0.5 INHALANT RESPIRATORY (INHALATION) at 09:24

## 2020-03-19 RX ADMIN — APIXABAN 10 MG: 5 TABLET, FILM COATED ORAL at 14:20

## 2020-03-19 RX ADMIN — CARBIDOPA AND LEVODOPA 1 TABLET: 25; 100 TABLET ORAL at 09:27

## 2020-03-19 RX ADMIN — FUROSEMIDE 40 MG: 40 TABLET ORAL at 09:27

## 2020-03-19 RX ADMIN — HEPARIN SODIUM 3400 UNITS: 5000 INJECTION INTRAVENOUS; SUBCUTANEOUS at 06:35

## 2020-03-19 RX ADMIN — LIDOCAINE 1 PATCH: 50 PATCH CUTANEOUS at 09:27

## 2020-03-19 NOTE — PROGRESS NOTES
Continued Stay Note  [facility]     Patient Name: Izaiah Garcia  MRN: [id number]  Today's Date: 3/19/2020    Admit Date: 2/29/2020    Discharge Plan     Row Name 03/19/20 1421       Plan    Plan  Home with [facility] and hospital bed through [facility]     Provided Post Acute Provider Quality & Resource List?  Yes    Post Acute Provider Quality and Resource List  Home Health    Delivered To  Support Person    Method of Delivery  Telephone    Patient/Family in Agreement with Plan  yes    Plan Comments  CCP made outbound call to patient's spouse, Chago  [phone]; discussed discharge planning. Patient's spouse due to COVID 19; she does not want patient to go to SNF at [facility] and feels like returning home would be best. CCP discussed at length with patient's spouse safety concerns of patient returning home. CCP reviewed PT notes with patient's spouse over the phone and discussed patient is not ambulating and requiring assistance x2 with standing. CCP discussed PT/CCP recommendations of SNF at discharge. Patient's spouse verbalized understanding of recommendations and safety concerns but still declined SNF at discharge. Patient's spouse states they have 4 grandsons that live within a mile of them and are willing to assist with bathing/showering. Patient's spouse states she does have a granddaughter in nursing school that will be able to assist as well. CCP discussed in home caregivers (private pay); patient's spouse consented to CCP providing list in case they need more assistance at home. CCP discussed home health for PT/OT and reviewed Medicare ratings with patient's spouse on the phone; patient's spouse selected [facility]. Referral sent in [facility]. Patient's spouse inquired about hospital bed and would like to use [facility]. If home 02 is needed, she would use [facility] as well. CCP notified [facility]. CCP will follow for discharge home with [facility], hospital bed and possible 02. [facility]          Discharge Codes    No documentation.             CALVIN Ng

## 2020-03-19 NOTE — PLAN OF CARE
Problem: Patient Care Overview  Goal: Plan of Care Review  Outcome: Ongoing (interventions implemented as appropriate)  Flowsheets  Taken 3/19/2020 0948  Progress: improving  Taken 3/19/2020 0939  Plan of Care Reviewed With: patient  Outcome Summary: Pt tolerated treatment with c/o fatigue. Pt is ModAX2 for bed mobility with verbal cueing. Upon sitting, pt intially MinAX2 then CGA after a few minutes.  Pt performed bilateral LE exercises. Pt is MinAX2 for STS transfers.  Will continue to progress pt as able.

## 2020-03-19 NOTE — PLAN OF CARE
Problem: Patient Care Overview  Goal: Plan of Care Review  Outcome: Ongoing (interventions implemented as appropriate)  Flowsheets (Taken 3/19/2020 0438)  Progress: improving  Plan of Care Reviewed With: patient  Outcome Summary: A&O, no complaints of pain, turn q2hrs, heparin drip infusing, monitoring labs, possible dc to snf today, VSS, will continue to monitor.

## 2020-03-19 NOTE — DISCHARGE PLACEMENT REQUEST
"Sarkis Garcia (88 y.o. Male)     Date of Birth Social Security Number Address Home Phone MRN    08/31/1931  [address] [id number] [phone] [id number]    [employer] Marital Status          Christian        Admission Date Admission Type Admitting Provider Attending Provider Department, Room/Bed    2/29/20 Emergency Chetan Eugene MD Kellie, Brandon John, MD [address], [address]    Discharge Date Discharge Disposition Discharge Destination                       Attending Provider:  Chetan Eugene MD    Allergies:  No Known Allergies    Isolation:  None   Infection:  None   Code Status:  No CPR    Ht:  180.3 cm (71\")   Wt:  79 kg (174 lb 2.6 oz)    Admission Cmt:  None   Principal Problem:  Acute saddle pulmonary embolism with acute cor pulmonale (CMS/HCC) [I26.02] More...                 Active Insurance as of 2/29/2020     Primary Coverage     Payor Plan Insurance Group Employer/Plan Group    HUMANA MEDICARE REPLACEMENT HU[employer]NA MEDICARE REPLACEMENT [id number]     Payor Plan Address Payor Plan Phone Number Payor Plan Fax Number Effective Dates    [address] [address] [phone]  1/1/2014 - None Entered    [facility] [id number]       Subscriber Name Subscriber Birth Date Member ID       SARKIS GARCIA 8/31/1931 [id number]                 Emergency Contacts      (Rel.) Home Phone Work Phone Mobile Phone    RadhaRosy whelancarisa (Spouse) [phone] -- --    Jared Ward (Grandchild) [phone] -- [phone]            "

## 2020-03-19 NOTE — PROGRESS NOTES
"    Chief Complaint: Bilateral pulmonary emboli, parapneumonic effusion  S/P: CT-guided chest tube insertion    Subjective:  Symptoms:  Improved.  He reports weakness and anorexia.    Diet:  Poor intake.  No nausea or vomiting.    Activity level: Impaired due to weakness.    Pain:  He complains of pain that is mild.  Pain is well controlled.        Vital Signs:  Temp:  [98.1 °F (36.7 °C)-98.4 °F (36.9 °C)] 98.4 °F (36.9 °C)  Heart Rate:  [63-66] 64  Resp:  [16-18] 18  BP: (125-144)/(60-70) 125/60    Intake & Output (last day)       03/18 0701 - 03/19 0700 03/19 0701 - 03/20 0700    P.O. 330     I.V. (mL/kg)      Total Intake(mL/kg) 330 (4.2)     Urine (mL/kg/hr) 650 (0.3) 100 (0.2)    Chest Tube 0     Total Output 650 100    Net -320 -100                Objective:  General Appearance:  Comfortable, ill-appearing, in no acute distress and not in pain.    Vital signs: (most recent): Blood pressure 125/60, pulse 64, temperature 98.4 °F (36.9 °C), temperature source Oral, resp. rate 18, height 180.3 cm (71\"), weight 79 kg (174 lb 2.6 oz), SpO2 91 %.  Vital signs are normal.  No fever.    Output: Producing urine and producing stool.    HEENT: (Very hard of hearing.)    Lungs:  Normal effort and normal respiratory rate.  He is not in respiratory distress.    Heart: Normal rate.  Regular rhythm.    Chest: No chest wall tenderness.    Abdomen: Abdomen is soft.  Bowel sounds are normal.   There is no abdominal tenderness.   There is no mass.   Extremities: Normal range of motion.  There is no dependent edema.    Pulses: Distal pulses are intact.    Neurological: Patient is alert and oriented to person, place and time.    Skin:  Warm and dry.          Results Review:     I reviewed the patient's new clinical results.  I reviewed the patient's new imaging results and agree with the interpretation.  I reviewed the patient's other test results and agree with the interpretation. I have discussed these results with the patient, " family at bedside and Dr. Leon.     Imaging Results (Last 24 Hours)     Procedure Component Value Units Date/Time    XR Chest 1 View [[id number]] Collected:  03/19/20 0659     Updated:  03/19/20 0705    Narrative:       PORTABLE CHEST X-RAY     HISTORY: Chest tube removal.     TECHNIQUE: Portable chest x-ray is correlated with chest x-ray  03/18/2020.     FINDINGS: The small caliber left chest tube has been removed. There is  no pneumothorax. A right PICC line terminating at the level of the  axilla is again observed. The cardiac silhouette is mildly prominent.  There is no change in the appearance of the lungs, with some opacity in  the lower lung zones bilaterally. There is no new abnormality.       Impression:       There is no pneumothorax following chest tube removal.     This report was finalized on 3/19/2020 7:02 AM by Dr. Gonzales Zarate M.D.             Lab Results:     Lab Results (last 24 hours)     Procedure Component Value Units Date/Time    Renal Function Panel [[id number]]  (Abnormal) Collected:  03/19/20 0358    Specimen:  Blood Updated:  03/19/20 0606     Glucose 102 mg/dL      BUN 13 mg/dL      Creatinine 0.88 mg/dL      Sodium 138 mmol/L      Potassium 4.6 mmol/L      Chloride 102 mmol/L      CO2 25.2 mmol/L      Calcium 8.1 mg/dL      Albumin 2.10 g/dL      Phosphorus 3.3 mg/dL      Anion Gap 10.8 mmol/L      BUN/Creatinine Ratio 14.8     eGFR Non African Amer 82 mL/min/1.73     Narrative:       GFR Normal >60  Chronic Kidney Disease <60  Kidney Failure <15      aPTT [[id number]]  (Abnormal) Collected:  03/19/20 0358    Specimen:  Blood from Hand, Left Updated:  03/19/20 0547     PTT 70.1 seconds     CBC & Differential [[id number]] Collected:  03/19/20 0358    Specimen:  Blood Updated:  03/19/20 0529    Narrative:       The following orders were created for panel order CBC & Differential.  Procedure                               Abnormality         Status                     ---------                                -----------         ------                     CBC Auto Differential[[id number]]        Abnormal            Final result                 Please view results for these tests on the individual orders.    CBC Auto Differential [[id number]]  (Abnormal) Collected:  03/19/20 0358    Specimen:  Blood Updated:  03/19/20 0529     WBC 8.07 10*3/mm3      RBC 3.35 10*6/mm3      Hemoglobin 8.9 g/dL      Hematocrit 27.7 %      MCV 82.7 fL      MCH 26.6 pg      MCHC 32.1 g/dL      RDW 12.1 %      RDW-SD 36.5 fl      MPV 10.6 fL      Platelets 246 10*3/mm3      Neutrophil % 55.1 %      Lymphocyte % 26.9 %      Monocyte % 8.6 %      Eosinophil % 4.2 %      Basophil % 0.4 %      Immature Grans % 4.8 %      Neutrophils, Absolute 4.45 10*3/mm3      Lymphocytes, Absolute 2.17 10*3/mm3      Monocytes, Absolute 0.69 10*3/mm3      Eosinophils, Absolute 0.34 10*3/mm3      Basophils, Absolute 0.03 10*3/mm3      Immature Grans, Absolute 0.39 10*3/mm3      nRBC 0.0 /100 WBC     Potassium [[id number]]  (Normal) Collected:  03/18/20 2129    Specimen:  Blood Updated:  03/18/20 2201     Potassium 4.5 mmol/L            Assessment/Plan       Acute saddle pulmonary embolism with acute cor pulmonale (CMS/HCC)    Pulmonary emboli (CMS/HCC)    Empyema of pleura (CMS/HCC)       Assessment & Plan     Mr. Garcia is a pleasant 88-year-old gentleman status post CT-guided drainage of the right chest and subsequent removal who then underwent CT-guided chest tube placement on the left which was removed yesterday.  Follow-up chest x-ray is stable with no pneumothorax.  Some mild opacity is noted in the lower lung zones bilaterally.    He is okay to discharge home at any time from a thoracic surgery standpoint.  Okay to re-initiate oral anticoagulation from our standpoint.      EBEN Rivers  [facility]  03/19/20  14:15

## 2020-03-19 NOTE — THERAPY TREATMENT NOTE
Patient Name: Izaiah Garcia  : 1931    MRN: [id number]                              Today's Date: 3/19/2020       Admit Date: 2020    Visit Dx:     ICD-10-CM ICD-9-CM   1. Healthcare-associated pneumonia J18.9 486   2. Acute hypoxemic respiratory failure (CMS/HCC) J96.01 518.81   3. Bilateral pulmonary embolism (CMS/HCC) I26.99 415.19   4. Acute saddle pulmonary embolism with acute cor pulmonale (CMS/HCC) I26.02 415.13     415.0     Patient Active Problem List   Diagnosis   • Hyperlipidemia   • Benign essential hypertension   • History of gout   • History of esophageal stricture   • History of stroke, 2016--4.9 x 4 x 3 cm inferior left cerebellar infarct   • Rheumatoid factor positive   • Impaired fasting glucose   • At risk for falls   • Generalized weakness   • Bilateral high frequency sensorineural hearing loss, wears hearing aids   • Bilateral lower extremity edema   • Parkinson's disease (CMS/HCC)   • Therapeutic drug monitoring   • Vitamin D deficiency   • Macrocytosis   • Vitamin B12 deficiency   • Acute diarrhea   • Hospital-acquired pneumonia   • HAP (hospital-acquired pneumonia)   • Acute UTI (urinary tract infection)   • Colitis   • Parkinson disease (CMS/HCC)   • Weakness   • Aspiration pneumonia of right lower lobe due to vomit (CMS/HCC)   • Pulmonary emboli (CMS/HCC)   • Acute saddle pulmonary embolism with acute cor pulmonale (CMS/HCC)   • Empyema of pleura (CMS/HCC)     Past Medical History:   Diagnosis Date   • At risk for falls 2019   • Benign essential hypertension 2016   • Bilateral high frequency sensorineural hearing loss, wears hearing aids 2019   • Bilateral lower extremity edema 2019    May 2, 2019--patient who has hypertension and is on amlodipine 10 mg/day is complaining of progressively worsening swelling of the lower extremities that extends up to about mid calf.  Gets worse at the end of the day and tends to get better overnight when he props his  "feet up.  I think this is definitely related to the amlodipine although patient may have some venous insufficiency.  Medication ad   • Decreased peripheral vision of both eyes 5/2/2019    May 2, 2019--continues to have complaints of \"dizziness\" associated with weakness in his lower extremities.  He is not describing a spinning sensation or anything remotely close to vertigo.  Instead he is describing an off-balance sensation.  He tends to fall forward if not careful and he tends to list towards the right side.  He has marked difficulty going down an incline.  He has to ambulate wit   • History of aspiration pneumonia 5/4/2015   • History of esophageal stricture 5/4/2015    July 3, 2018--EGD revealed a distal esophageal stricture that was dilated to 18 mm.  There was a small hiatal hernia.  There was mild streaky erythema in the proximal stomach.  Duodenal bulb normal.  April 26, 2016--EGD performed for dysphagia reveals a distal esophageal stricture that was dilated 16.5 mm.   • History of gout 6/29/2016   • History of stroke, 6/29/2016--4.9 x 4 x 3 cm inferior left cerebellar infarct 5/4/2015 June 29, 2016--MRI of the brain performed for complaints of dizziness and weakness. IMPRESSION: 1. There is susceptibility artifact streaking through the anterior left frontal scalp through the anterior left frontal bone in the anterior tipof the left frontal lobe likely from a tiny piece of metal in the anterior left frontal scalp slightly limits evaluation of these structures. 2. There is mild s   • Hyperlipidemia 6/29/2016   • Impaired fasting glucose 5/2/2019 April 14, 2015--hemoglobin A1c 5.9.   • Macrocytosis 5/2/2019   • Parkinson's disease (CMS/[facility]) 5/2/2019    May 2, 2019--continues to have complaints of \"dizziness\" associated with weakness in his lower extremities.  He is not describing a spinning sensation or anything remotely close to vertigo.  Instead he is describing an off-balance sensation.  He tends to " fall forward if not careful and he tends to list towards the right side.  He has marked difficulty going down an incline.  He has to ambulate wit   • Rheumatoid factor positive 5/2/2019 March 25, 2014--rheumatoid factor returned positive at 95.  However, CCP antibodies normal at 8, SHIV negative, both C&P ANCA negative, C-reactive protein normal at 0.24, sed rate normal at 2.   • Vitamin B12 deficiency 6/6/2019 June 6, 2019--patient recently had mildly elevated methylmalonic acid of 380.  Repeat methylmalonic acid was upper limit of normal at 356.  Given patient's neurologic symptoms and suspected parkinsonism, I have a low threshold for treating vitamin B12 deficiency.  We will initiate vitamin B12 injections today.  2000 mcg subcutaneously given today.   • Vitamin D deficiency 5/2/2019     Past Surgical History:   Procedure Laterality Date   • CARDIAC CATHETERIZATION N/A 2/29/2020    Procedure: Thrombolytic Therapy- EKOS;  Surgeon: Jason Griffiths MD;  Location: [facility] CATH INVASIVE LOCATION;  Service: Cardiovascular;  Laterality: N/A;   • CATARACT EXTRACTION EXTRACAPSULAR W/ INTRAOCULAR LENS IMPLANTATION Bilateral     Bilateral cataract extirpation with intraocular lens implantation   • CHOLECYSTECTOMY     • EKOS CATHETER PLACEMENT Bilateral 2/29/2020    Procedure: Ekos catheter placement;  Surgeon: Jason Griffiths MD;  Location:  [facility] CATH INVASIVE LOCATION;  Service: Cardiovascular;  Laterality: Bilateral;   • ESOPHAGEAL DILATATION  04/26/2016 April 26, 2016--EGD performed for dysphagia reveals a distal esophageal stricture that was dilated 16.5 mm.   • ESOPHAGEAL DILATATION  07/03/2018    July 3, 2018--EGD revealed a distal esophageal stricture that was dilated to 18 mm.  There was a small hiatal hernia.  There was mild streaky erythema in the proximal stomach.  Duodenal bulb normal.   • INTERVENTIONAL RADIOLOGY PROCEDURE Bilateral 2/29/2020    Procedure: Pulmonary Angiogram;  Surgeon: Tunde  MD Jason;  Location: [facility] CATH INVASIVE LOCATION;  Service: Cardiovascular;  Laterality: Bilateral;     General Information     Row Name 03/19/20 0927          PT Evaluation Time/Intention    Document Type  therapy note (daily note)  -     Mode of Treatment  individual therapy;physical therapy  -     Row Name 03/19/20 0927          General Information    Existing Precautions/Restrictions  fall;oxygen therapy device and L/min  -     Row Name 03/19/20 0927          Cognitive Assessment/Intervention- PT/OT    Orientation Status (Cognition)  oriented x 3  -     Row Name 03/19/20 0927          Safety Issues, Functional Mobility    Impairments Affecting Function (Mobility)  balance;coordination;endurance/activity tolerance;strength  -       User Key  (r) = Recorded By, (t) = Taken By, (c) = Cosigned By    Initials Name Provider Type     Ivis Magallon PTA Physical Therapy Assistant        Mobility     Row Name 03/19/20 0928          Bed Mobility Assessment/Treatment    Supine-Sit [place] (Bed Mobility)  2 person assist;verbal cues;moderate assist (50% patient effort)  -     Sit-Supine [place] (Bed Mobility)  moderate assist (50% patient effort);2 person assist;verbal cues  -     Assistive Device (Bed Mobility)  bed rails;head of bed elevated  -     Row Name 03/19/20 0928          Sit-Stand Transfer    Sit-Stand [place] (Transfers)  minimum assist (75% patient effort);2 person assist;verbal cues  -     Assistive Device (Sit-Stand Transfers)  -- HHAX2  -     Row Name 03/19/20 0928          Gait/Stairs Assessment/Training    Comment (Gait/Stairs)  pt declined ambulation today.   -       User Key  (r) = Recorded By, (t) = Taken By, (c) = Cosigned By    Initials Name Provider Type     Ivis Magallon PTA Physical Therapy Assistant        Obj/Interventions     Row Name 03/19/20 0936          Therapeutic Exercise    Lower Extremity (Therapeutic Exercise)  marching while seated;LAQ  (long arc quad), bilateral  -     Lower Extremity Range of Motion (Therapeutic Exercise)  ankle dorsiflexion/plantar flexion, bilateral  -     Exercise Type (Therapeutic Exercise)  AROM (active range of motion)  -     Position (Therapeutic Exercise)  seated  -     Sets/Reps (Therapeutic Exercise)  X10  -EH     Row Name 03/19/20 0936          Static Sitting Balance    Level of [place] (Unsupported Sitting, Static Balance)  minimal assist, 75% patient effort;2 person assist;contact guard assist  -     Sitting Position (Unsupported Sitting, Static Balance)  long sitting on bed  -     Time Able to Maintain Position (Unsupported Sitting, Static Balance)  4 to 5 minutes  -     Comment (Unsupported Sitting, Static Balance)  pt intially MinAX2, then CGA. Pt will tend to lean posteriorly but able to correct with vcs.   -     Row Name 03/19/20 0936          Static Standing Balance    Level of [place] (Supported Standing, Static Balance)  moderate assist, 50 to 74% patient effort;2 person assist  -     Time Able to Maintain Position (Supported Standing, Static Balance)  30 to 45 seconds  -     Assistive Device Utilized (Supported Standing, Static Balance)  -- HHAX2  -     Comment (Supported Standing, Static Balance)  stood in order to get sheets straightened in bed.   -       User Key  (r) = Recorded By, (t) = Taken By, (c) = Cosigned By    Initials Name Provider Type     Ivis Magallon PTA Physical Therapy Assistant        Goals/Plan    No documentation.       Clinical Impression     Row Name 03/19/20 0939          Plan of Care Review    Plan of Care Reviewed With  patient  -     Progress  improving  -     Outcome Summary  Pt tolerated treatment with c/o fatigue. Pt is ModAX2 for bed mobility with verbal cueing. Upon sitting, pt intially MinAX2 then CGA after a few minutes.  Pt performed bilateral LE exercises. Pt is MinAX2 for STS transfers.  Will continue to progress pt as able.   -      Row Name 03/19/20 0939          Vital Signs    O2 Delivery Pre Treatment  supplemental O2  -EH     O2 Delivery Intra Treatment  supplemental O2  -EH     O2 Delivery Post Treatment  supplemental O2  -EH     Row Name 03/19/20 0939          Positioning and Restraints    Pre-Treatment Position  in bed  -EH     Post Treatment Position  bed  -EH     In Bed  supine;call light within reach;encouraged to call for assist;exit alarm on  -       User Key  (r) = Recorded By, (t) = Taken By, (c) = Cosigned By    Initials Name Provider Type     Ivis Magallon PTA Physical Therapy Assistant        Outcome Measures     Row Name 03/19/20 0947          How much help from another person do you currently need...    Turning from your back to your side while in flat bed without using bedrails?  2  -EH     Moving from lying on back to sitting on the side of a flat bed without bedrails?  2  -EH     Moving to and from a bed to a chair (including a wheelchair)?  2  -EH     Standing up from a chair using your arms (e.g., wheelchair, bedside chair)?  2  -EH     Climbing 3-5 steps with a railing?  1  -EH     To walk in hospital room?  2  -EH     AM-PAC 6 Clicks Score (PT)  11  -EH       User Key  (r) = Recorded By, (t) = Taken By, (c) = Cosigned By    Initials Name Provider Type     Ivis Magallon PTA Physical Therapy Assistant          PT Recommendation and Plan     Plan of Care Reviewed With: patient  Progress: improving  Outcome Summary: Pt tolerated treatment with c/o fatigue. Pt is ModAX2 for bed mobility with verbal cueing. Upon sitting, pt intially MinAX2 then CGA after a few minutes.  Pt performed bilateral LE exercises. Pt is MinAX2 for STS transfers.  Will continue to progress pt as able.      Time Calculation:   PT Charges     Row Name 03/19/20 0926             Time Calculation    Start Time  0859  -      Stop Time  0915  -      Time Calculation (min)  16 min  -      PT Received On  03/19/20  -      PT - Next  Appointment  03/20/20  -         Time Calculation- PT    Total Timed Code Minutes- PT  16 minute(s)  -        User Key  (r) = Recorded By, (t) = Taken By, (c) = Cosigned By    Initials Name Provider Type     Ivis Magallon PTA Physical Therapy Assistant        Therapy Charges for Today     Code Description Service Date Service Provider Modifiers Qty    66756379946  PT THERAPEUTIC ACT EA 15 MIN 3/19/2020 Ivis Magallon PTA GP 1    64730064024 HC PT THER SUPP EA 15 MIN 3/19/2020 Ivis Magallon PTA GP 1          PT G-Codes  Outcome Measure Options: AM-PAC 6 Clicks Basic Mobility (PT)  AM-PAC 6 Clicks Score (PT): 11  AM-PAC 6 Clicks Score (OT): 12    Ivis Magallon PTA  3/19/2020

## 2020-03-19 NOTE — PROGRESS NOTES
"  Daily Progress Note.   [address]  3/19/2020    Patient:  Name:  Izaiah Garcia  MRN:  [id number]  8/31/1931  88 y.o.  male       Chief Complaint: Patient had questionable facial asymmetry last night that was evaluated by the DVT     Hospital course: Patient was readmitted after an hospital admission for pneumonia with evidence of massive pulmonary embolism, and was managed with EKOS with directed thrombolytic therapy low-dose with good clinical response.  Patient had pleural effusion, and after evaluation by thoracic surgery decision was to have radiology replaced the chest tube with a CT-guidance.  This was done on 3/9/2020 but the patient had output less than 200 cc over the first 24 hours, TPA was administered on 3/10/2020 with nearly 600 cc total output afterwards.  The right-sided chest tube was removed on 3/12/2020 when the output was minimal and patient had a CT-guided placement of left-sided small chest tube with 1200 cc output so far.  Patient is on the heparin drip which was discontinued only for few hours before the procedure and was resumed afterwards with no problem with bleeding.    INTERVAL:  Follow up for above  Doing well.  Chest tube removed.  No worsening shortness of breath.  No hemoptysis.  Energy good.  No acute complaints overnight.  He is well-appearing.  Speaks in full sentences answers questions appropriately.  No nausea vomiting tolerating diet no fever overnight    ROS: No fever, no diarrhea, no chest pain  PMFSSH: no change    Physical Exam:  /63 (BP Location: Left arm, Patient Position: Lying)   Pulse 64   Temp 98.3 °F (36.8 °C) (Oral)   Resp 16   Ht 180.3 cm (71\")   Wt 79 kg (174 lb 2.6 oz)   SpO2 95%   BMI 24.29 kg/m²   Body mass index is 24.29 kg/m².    Intake/Output Summary (Last 24 hours) at 3/19/2020 1053  Last data filed at 3/19/2020 0922  Gross per 24 hour   Intake --   Output 650 ml   Net -650 ml     General appearance: chronically ill " but non toxic, conversant   Eyes: anicteric sclerae, moist conjunctivae; no lid-lag; PERRLA  HENT: Atraumatic; oropharynx clear with moist mucous membranes and no mucosal ulcerations; normal hard and soft palate  Neck: Trachea midline; FROM, supple, no thyromegaly or lymphadenopathy  Lungs:no wheeze no rhonchi, with normal respiratory effort and no intercostal retractions  CV: irreg irreg , no rub  Abdomen: Soft, non-tender; no masses or HSM  Extremities:trace peripheral edema or extremity lymphadenopathy  Skin: Normal temperature, turgor and texture; no rash, ulcers or subcutaneous nodules  Psych: Appropriate affect, alert and oriented to person, place and time  Neuro cns II-XII intact move all ext, speech intact    Data Review:  Notable Labs:  Results from last 7 days   Lab Units 03/19/20  0358 03/18/20  0332 03/17/20  0547 03/16/20  0302 03/15/20  0327 03/14/20  0423 03/13/20  0454   WBC 10*3/mm3 8.07 8.71 9.56 10.08 11.43* 12.02* 13.73*   HEMOGLOBIN g/dL 8.9* 8.6* 8.8* 9.1* 8.9* 9.3* 9.6*   PLATELETS 10*3/mm3 246 259 257 272 301 295 346     Results from last 7 days   Lab Units 03/19/20  0358 03/18/20 2129 03/18/20  0332 03/17/20  0547 03/16/20  0302 03/15/20  0327 03/14/20  0423 03/13/20  0454   SODIUM mmol/L 138  --  136 137 136 139 136 137   POTASSIUM mmol/L 4.6 4.5 3.6 3.7 4.0 4.3 4.9 3.6   CHLORIDE mmol/L 102  --  100 101 101 101 104 100   CO2 mmol/L 25.2  --  26.5 25.6 25.3 25.8 22.3 24.5   BUN mg/dL 13  --  15 15 16 19 19 18   CREATININE mg/dL 0.88  --  0.85 0.87 0.95 0.96 0.95 0.89   GLUCOSE mg/dL 102*  --  104* 104* 108* 111* 119* 137*   CALCIUM mg/dL 8.1*  --  8.0* 7.9* 8.2* 7.9* 8.4* 8.1*   PHOSPHORUS mg/dL 3.3  --  3.7 3.8 3.9 4.2 8.0* 3.6   Estimated Creatinine Clearance: 64.8 mL/min (by C-G formula based on SCr of 0.88 mg/dL).    Imaging:  Reviewed chest images personally from past 3 days    Scheduled meds:      apixaban 10 mg Oral Q12H   Followed by      [START ON 3/26/2020] apixaban 5 mg Oral  Q12H   budesonide 0.5 mg Nebulization BID - RT   carbidopa-levodopa 1 tablet Oral BID   cyanocobalamin 1,000 mcg Intramuscular Q28 Days   furosemide 40 mg Oral Daily   ipratropium-albuterol 3 mL Nebulization BID - RT   lidocaine 1 patch Transdermal Q24H   metoprolol tartrate 25 mg Oral Q12H   saccharomyces boulardii 250 mg Oral BID   sodium chloride 10 mL Intravenous Q12H     Heparin gtt    ASSESSMENT  /  PLAN:  1. Acute hypoxic respiratory failure  2. BPE s/p ekos catheter on 2/29/2020 with local TPA infusion with good clinical response  3. Troponemia suspect due to BPE  4. RV strain  5. Bilateral pleural effusion R greater than Left exudative with chest tube insertion on 3/9/2020  6. HLD  7. Esphageal stricture  8. Gen weakness  9. Parkinsons Disease  10. 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 HTN  13. Abnormal mass around thyroid   14. hypokalemia, corrected  15. Hypoalbuminemia  16. Hyponatremia, mild     PLAN:  Chest tube out yesterday  Stop heparin  Start eliquis  Wean oxygen down to room air he is 100% on 2 L start only if hypoxic    Dispo:  Plan for discharge hospital tomorrow as mentioned for dispo since early this week.  Discussed this with our discharge planning today      Chetan Eugene MD  [facility]  03/19/20  1388

## 2020-03-19 NOTE — PLAN OF CARE
Problem: Patient Care Overview  Goal: Plan of Care Review  Outcome: Ongoing (interventions implemented as appropriate)  Flowsheets (Taken 3/19/2020 1806)  Progress: improving  Note:   Patient alert and oriented.  Heparin IV discontinued and Eliquis stated.  Home with [facility] upon discharge.  VSS.  Will continue to monitor.     Problem: Skin Injury Risk (Adult)  Goal: Skin Health and Integrity  Outcome: Ongoing (interventions implemented as appropriate)  Flowsheets (Taken 3/19/2020 1806)  Skin Health and Integrity: making progress toward outcome     Problem: Fall Risk (Adult)  Goal: Absence of Fall  Outcome: Ongoing (interventions implemented as appropriate)  Flowsheets (Taken 3/19/2020 1806)  Absence of Fall: making progress toward outcome     Problem: VTE, DVT and PE (Adult)  Goal: Signs and Symptoms of Listed Potential Problems Will be Absent, Minimized or Managed (VTE, DVT and PE)  Outcome: Ongoing (interventions implemented as appropriate)     Problem: Pain, Acute (Adult)  Goal: Acceptable Pain Control/Comfort Level  Outcome: Ongoing (interventions implemented as appropriate)  Flowsheets (Taken 3/19/2020 1806)  Acceptable Pain Control/Comfort Level: making progress toward outcome

## 2020-03-19 NOTE — CONSULTS
Adult Nutrition  Assessment/PES    Patient Name:  Izaiah Garcia  YOB: 1931  MRN: [id number]  Admit Date:  2/29/2020    Assessment Date:  3/19/2020    Comments:  Follow up note. Pt on Regular diet with Mighty shakes TID. PO intake fair. Chest tube removed. Note possible d/c today.    Reason for Assessment     Row Name 03/19/20 0823          Reason for Assessment    Reason For Assessment  follow-up protocol         Nutrition/Diet History     Row Name 03/19/20 0823          Nutrition/Diet History    Typical Food/Fluid Intake  fair po continues         Anthropometrics     Row Name 03/19/20 0520          Anthropometrics    Weight  79 kg (174 lb 2.6 oz)         Labs/Tests/Procedures/Meds     Row Name 03/19/20 0823          Labs/Procedures/Meds    Lab Results Reviewed  reviewed     Lab Results Comments  alb, h/h        Diagnostic Tests/Procedures    Diagnostic Test/Procedure Reviewed  reviewed        Medications    Pertinent Medications Reviewed  reviewed         Physical Findings     Row Name 03/19/20 0824          Physical Findings    Tubes  -- chest tube removed     Skin  other (see comments);surgical incision ramya 13; R groin puncture           Nutrition Prescription Ordered     Row Name 03/19/20 0824          Nutrition Prescription PO    Current PO Diet  Regular     Supplement  Mighty Shake     Supplement Frequency  3 times a day         Evaluation of Received Nutrient/Fluid Intake     Row Name 03/19/20 0825          PO Evaluation    % PO Intake  50%               Problem/Interventions:          Intervention Goal     Row Name 03/19/20 0826          Intervention Goal    General  Maintain nutrition     PO  Tolerate PO;Increase intake;PO intake (%)     PO Intake %  75 %         Nutrition Intervention     Row Name 03/19/20 0827          Nutrition Intervention    RD/Tech Action  Supplement provided;Encourage intake;Follow Tx progress;Care plan reviewd           Education/Evaluation     Row Name 03/19/20  0827          Education    Education  Will Instruct as appropriate        Monitor/Evaluation    Monitor  Per protocol;PO intake           Electronically signed by:  Malka Johnston RD  03/19/20 08:28

## 2020-03-20 ENCOUNTER — NURSE TRIAGE (OUTPATIENT)
Dept: CALL CENTER | Facility: HOSPITAL | Age: 85
End: 2020-03-20

## 2020-03-20 VITALS
DIASTOLIC BLOOD PRESSURE: 73 MMHG | SYSTOLIC BLOOD PRESSURE: 147 MMHG | BODY MASS INDEX: 24.35 KG/M2 | OXYGEN SATURATION: 100 % | WEIGHT: 173.93 LBS | HEIGHT: 71 IN | HEART RATE: 80 BPM | TEMPERATURE: 97.9 F | RESPIRATION RATE: 18 BRPM

## 2020-03-20 LAB
ALBUMIN SERPL-MCNC: 2.1 G/DL (ref 3.5–5.2)
ANION GAP SERPL CALCULATED.3IONS-SCNC: 10.9 MMOL/L (ref 5–15)
BASOPHILS # BLD AUTO: 0.06 10*3/MM3 (ref 0–0.2)
BASOPHILS NFR BLD AUTO: 0.7 % (ref 0–1.5)
BUN BLD-MCNC: 12 MG/DL (ref 8–23)
BUN/CREAT SERPL: 12.5 (ref 7–25)
CALCIUM SPEC-SCNC: 8.6 MG/DL (ref 8.6–10.5)
CHLORIDE SERPL-SCNC: 100 MMOL/L (ref 98–107)
CO2 SERPL-SCNC: 24.1 MMOL/L (ref 22–29)
CREAT BLD-MCNC: 0.96 MG/DL (ref 0.76–1.27)
DEPRECATED RDW RBC AUTO: 36.7 FL (ref 37–54)
EOSINOPHIL # BLD AUTO: 0.35 10*3/MM3 (ref 0–0.4)
EOSINOPHIL NFR BLD AUTO: 4.1 % (ref 0.3–6.2)
ERYTHROCYTE [DISTWIDTH] IN BLOOD BY AUTOMATED COUNT: 12.3 % (ref 12.3–15.4)
GFR SERPL CREATININE-BSD FRML MDRD: 74 ML/MIN/1.73
GLUCOSE BLD-MCNC: 87 MG/DL (ref 65–99)
HCT VFR BLD AUTO: 29.8 % (ref 37.5–51)
HGB BLD-MCNC: 9.9 G/DL (ref 13–17.7)
IMM GRANULOCYTES # BLD AUTO: 0.36 10*3/MM3 (ref 0–0.05)
IMM GRANULOCYTES NFR BLD AUTO: 4.2 % (ref 0–0.5)
LYMPHOCYTES # BLD AUTO: 2.32 10*3/MM3 (ref 0.7–3.1)
LYMPHOCYTES NFR BLD AUTO: 27.1 % (ref 19.6–45.3)
MCH RBC QN AUTO: 27.4 PG (ref 26.6–33)
MCHC RBC AUTO-ENTMCNC: 33.2 G/DL (ref 31.5–35.7)
MCV RBC AUTO: 82.5 FL (ref 79–97)
MONOCYTES # BLD AUTO: 0.64 10*3/MM3 (ref 0.1–0.9)
MONOCYTES NFR BLD AUTO: 7.5 % (ref 5–12)
NEUTROPHILS # BLD AUTO: 4.82 10*3/MM3 (ref 1.7–7)
NEUTROPHILS NFR BLD AUTO: 56.4 % (ref 42.7–76)
NRBC BLD AUTO-RTO: 0 /100 WBC (ref 0–0.2)
PHOSPHATE SERPL-MCNC: 3.8 MG/DL (ref 2.5–4.5)
PLATELET # BLD AUTO: 263 10*3/MM3 (ref 140–450)
PMV BLD AUTO: 10 FL (ref 6–12)
POTASSIUM BLD-SCNC: 3.9 MMOL/L (ref 3.5–5.2)
RBC # BLD AUTO: 3.61 10*6/MM3 (ref 4.14–5.8)
SODIUM BLD-SCNC: 135 MMOL/L (ref 136–145)
WBC NRBC COR # BLD: 8.55 10*3/MM3 (ref 3.4–10.8)

## 2020-03-20 PROCEDURE — 85025 COMPLETE CBC W/AUTO DIFF WBC: CPT | Performed by: INTERNAL MEDICINE

## 2020-03-20 PROCEDURE — 80069 RENAL FUNCTION PANEL: CPT | Performed by: INTERNAL MEDICINE

## 2020-03-20 PROCEDURE — 94799 UNLISTED PULMONARY SVC/PX: CPT

## 2020-03-20 PROCEDURE — 97110 THERAPEUTIC EXERCISES: CPT

## 2020-03-20 RX ORDER — FUROSEMIDE 40 MG/1
40 TABLET ORAL DAILY
Qty: 30 TABLET | Refills: 0 | Status: SHIPPED | OUTPATIENT
Start: 2020-03-20 | End: 2020-04-10 | Stop reason: SDUPTHER

## 2020-03-20 RX ADMIN — IPRATROPIUM BROMIDE AND ALBUTEROL SULFATE 3 ML: 2.5; .5 SOLUTION RESPIRATORY (INHALATION) at 08:44

## 2020-03-20 RX ADMIN — BUDESONIDE 0.5 MG: 0.5 INHALANT RESPIRATORY (INHALATION) at 08:44

## 2020-03-20 RX ADMIN — Medication 250 MG: at 08:42

## 2020-03-20 RX ADMIN — FUROSEMIDE 40 MG: 40 TABLET ORAL at 09:00

## 2020-03-20 RX ADMIN — CARBIDOPA AND LEVODOPA 1 TABLET: 25; 100 TABLET ORAL at 08:42

## 2020-03-20 RX ADMIN — METOPROLOL TARTRATE 25 MG: 25 TABLET ORAL at 08:42

## 2020-03-20 RX ADMIN — LIDOCAINE 1 PATCH: 50 PATCH CUTANEOUS at 08:42

## 2020-03-20 RX ADMIN — SODIUM CHLORIDE, PRESERVATIVE FREE 10 ML: 5 INJECTION INTRAVENOUS at 12:41

## 2020-03-20 RX ADMIN — APIXABAN 10 MG: 5 TABLET, FILM COATED ORAL at 08:42

## 2020-03-20 NOTE — PLAN OF CARE
Problem: Patient Care Overview  Goal: Plan of Care Review  Outcome: Ongoing (interventions implemented as appropriate)  Flowsheets (Taken 3/20/2020 1142)  Outcome Summary: pt cont to require mod assist for mobility, worked with pt and spouse on how to roll and sit up and how to assist pt to standing and transferring to a chiar, pt and wife will have assist of 2 grandsons, they have a wheelchair

## 2020-03-20 NOTE — PROGRESS NOTES
Continued Stay Note  [facility]     Patient Name: Izaiah Garcia  MRN: [id number]  Today's Date: 3/20/2020    Admit Date: 2/29/2020    Discharge Plan     Row Name 03/20/20 1148       Plan    Plan  Home with [facility] and Hospital Bed from [place]- [facility] transporting    Patient/Family in Agreement with Plan  yes    Plan Comments  DC orders noted. Spoke with [facility] for Hospital bed orders. Spoke with pts wife at bedside and she states they will transport pt home. CCP provided her in home caregiver resources, explained [facility] would contact her and [facility] here for hospital bed. Notified [facility] for dc today. Pt on room air. Leigh Ann POWERS/CCP        Discharge Codes    No documentation.       Expected Discharge Date and Time     Expected Discharge Date Expected Discharge Time    Mar 20, 2020             Mona Medrano, RN

## 2020-03-20 NOTE — TELEPHONE ENCOUNTER
"AVS reviewed with caller, and questions answered. He has albuterol and pulmicort. These are listed as historical medications. He is doing well, advised.     Reason for Disposition  • Caller has medication question only, adult not sick, and triager answers question    Additional Information  • Negative: Drug overdose and nurse unable to answer question  • Negative: Caller requesting information not related to medicine  • Negative: Caller requesting a prescription for Strep throat and has a positive culture result  • Negative: Rash while taking a medication or within 3 days of stopping it  • Negative: Immunization reaction suspected  • Negative: [1] Asthma and [2] having symptoms of asthma (cough, wheezing, etc)  • Negative: MORE THAN A DOUBLE DOSE of a prescription or over-the-counter (OTC) drug  • Negative: [1] DOUBLE DOSE (an extra dose or lesser amount) of over-the-counter (OTC) drug AND [2] any symptoms (e.g., dizziness, nausea, pain, sleepiness)  • Negative: [1] DOUBLE DOSE (an extra dose or lesser amount) of prescription drug AND [2] any symptoms (e.g., dizziness, nausea, pain, sleepiness)  • Negative: Took another person's prescription drug  • Negative: [1] DOUBLE DOSE (an extra dose or lesser amount) of prescription drug AND [2] NO symptoms (Exception: a double dose of antibiotics)  • Negative: Diabetes drug error or overdose (e.g., insulin or extra dose)  • Negative: [1] Request for URGENT new prescription or refill of \"essential\" medication (i.e., likelihood of harm to patient if not taken) AND [2] triager unable to fill per unit policy  • Negative: [1] Prescription not at pharmacy AND [2] was prescribed today by PCP  • Negative: Pharmacy calling with prescription questions and triager unable to answer question  • Negative: Caller has URGENT medication question about med that PCP prescribed and triager unable to answer question  • Negative: Caller has NON-URGENT medication question about med that PCP " "prescribed and triager unable to answer question  • Negative: Caller requesting a NON-URGENT new prescription or refill and triager unable to refill per unit policy  • Negative: Caller has medication question about med not prescribed by PCP and triager unable to answer question (e.g., compatibility with other med, storage)  • Negative: [1] DOUBLE DOSE (an extra dose or lesser amount) of over-the-counter (OTC) drug AND [2] NO symptoms  • Negative: [1] DOUBLE DOSE (an extra dose or lesser amount) of antibiotic drug AND [2] NO symptoms    Answer Assessment - Initial Assessment Questions  1. SYMPTOMS: \"Do you have any symptoms?\"      See note   2. SEVERITY: If symptoms are present, ask \"Are they mild, moderate or severe?\"       seenote    Protocols used: MEDICATION QUESTION CALL-ADULT-      "

## 2020-03-20 NOTE — PLAN OF CARE
Problem: Patient Care Overview  Goal: Plan of Care Review  Flowsheets (Taken 3/20/2020 0516)  Progress: improving  Plan of Care Reviewed With: patient  Outcome Summary: Heparin drip off yesterday, started on PO AC, will dc home today with HH, VSS, will continue to monitor.

## 2020-03-20 NOTE — THERAPY TREATMENT NOTE
Patient Name: Izaiah Garcia  : 1931    MRN: [id number]                              Today's Date: 3/20/2020       Admit Date: 2020    Visit Dx:     ICD-10-CM ICD-9-CM   1. Healthcare-associated pneumonia J18.9 486   2. Acute hypoxemic respiratory failure (CMS/HCC) J96.01 518.81   3. Bilateral pulmonary embolism (CMS/HCC) I26.99 415.19   4. Acute saddle pulmonary embolism with acute cor pulmonale (CMS/HCC) I26.02 415.13     415.0     Patient Active Problem List   Diagnosis   • Hyperlipidemia   • Benign essential hypertension   • History of gout   • History of esophageal stricture   • History of stroke, 2016--4.9 x 4 x 3 cm inferior left cerebellar infarct   • Rheumatoid factor positive   • Impaired fasting glucose   • At risk for falls   • Generalized weakness   • Bilateral high frequency sensorineural hearing loss, wears hearing aids   • Bilateral lower extremity edema   • Parkinson's disease (CMS/HCC)   • Therapeutic drug monitoring   • Vitamin D deficiency   • Macrocytosis   • Vitamin B12 deficiency   • Acute diarrhea   • Hospital-acquired pneumonia   • HAP (hospital-acquired pneumonia)   • Acute UTI (urinary tract infection)   • Colitis   • Parkinson disease (CMS/HCC)   • Weakness   • Aspiration pneumonia of right lower lobe due to vomit (CMS/HCC)   • Pulmonary emboli (CMS/HCC)   • Acute saddle pulmonary embolism with acute cor pulmonale (CMS/HCC)   • Empyema of pleura (CMS/HCC)     Past Medical History:   Diagnosis Date   • At risk for falls 2019   • Benign essential hypertension 2016   • Bilateral high frequency sensorineural hearing loss, wears hearing aids 2019   • Bilateral lower extremity edema 2019    May 2, 2019--patient who has hypertension and is on amlodipine 10 mg/day is complaining of progressively worsening swelling of the lower extremities that extends up to about mid calf.  Gets worse at the end of the day and tends to get better overnight when he props his  "feet up.  I think this is definitely related to the amlodipine although patient may have some venous insufficiency.  Medication ad   • Decreased peripheral vision of both eyes 5/2/2019    May 2, 2019--continues to have complaints of \"dizziness\" associated with weakness in his lower extremities.  He is not describing a spinning sensation or anything remotely close to vertigo.  Instead he is describing an off-balance sensation.  He tends to fall forward if not careful and he tends to list towards the right side.  He has marked difficulty going down an incline.  He has to ambulate wit   • History of aspiration pneumonia 5/4/2015   • History of esophageal stricture 5/4/2015    July 3, 2018--EGD revealed a distal esophageal stricture that was dilated to 18 mm.  There was a small hiatal hernia.  There was mild streaky erythema in the proximal stomach.  Duodenal bulb normal.  April 26, 2016--EGD performed for dysphagia reveals a distal esophageal stricture that was dilated 16.5 mm.   • History of gout 6/29/2016   • History of stroke, 6/29/2016--4.9 x 4 x 3 cm inferior left cerebellar infarct 5/4/2015 June 29, 2016--MRI of the brain performed for complaints of dizziness and weakness. IMPRESSION: 1. There is susceptibility artifact streaking through the anterior left frontal scalp through the anterior left frontal bone in the anterior tipof the left frontal lobe likely from a tiny piece of metal in the anterior left frontal scalp slightly limits evaluation of these structures. 2. There is mild s   • Hyperlipidemia 6/29/2016   • Impaired fasting glucose 5/2/2019 April 14, 2015--hemoglobin A1c 5.9.   • Macrocytosis 5/2/2019   • Parkinson's disease (CMS/[facility]) 5/2/2019    May 2, 2019--continues to have complaints of \"dizziness\" associated with weakness in his lower extremities.  He is not describing a spinning sensation or anything remotely close to vertigo.  Instead he is describing an off-balance sensation.  He tends to " fall forward if not careful and he tends to list towards the right side.  He has marked difficulty going down an incline.  He has to ambulate wit   • Rheumatoid factor positive 5/2/2019 March 25, 2014--rheumatoid factor returned positive at 95.  However, CCP antibodies normal at 8, SHIV negative, both C&P ANCA negative, C-reactive protein normal at 0.24, sed rate normal at 2.   • Vitamin B12 deficiency 6/6/2019 June 6, 2019--patient recently had mildly elevated methylmalonic acid of 380.  Repeat methylmalonic acid was upper limit of normal at 356.  Given patient's neurologic symptoms and suspected parkinsonism, I have a low threshold for treating vitamin B12 deficiency.  We will initiate vitamin B12 injections today.  2000 mcg subcutaneously given today.   • Vitamin D deficiency 5/2/2019     Past Surgical History:   Procedure Laterality Date   • CARDIAC CATHETERIZATION N/A 2/29/2020    Procedure: Thrombolytic Therapy- EKOS;  Surgeon: Jason Griffiths MD;  Location: [facility] CATH INVASIVE LOCATION;  Service: Cardiovascular;  Laterality: N/A;   • CATARACT EXTRACTION EXTRACAPSULAR W/ INTRAOCULAR LENS IMPLANTATION Bilateral     Bilateral cataract extirpation with intraocular lens implantation   • CHOLECYSTECTOMY     • EKOS CATHETER PLACEMENT Bilateral 2/29/2020    Procedure: Ekos catheter placement;  Surgeon: Jason Griffiths MD;  Location:  [facility] CATH INVASIVE LOCATION;  Service: Cardiovascular;  Laterality: Bilateral;   • ESOPHAGEAL DILATATION  04/26/2016 April 26, 2016--EGD performed for dysphagia reveals a distal esophageal stricture that was dilated 16.5 mm.   • ESOPHAGEAL DILATATION  07/03/2018    July 3, 2018--EGD revealed a distal esophageal stricture that was dilated to 18 mm.  There was a small hiatal hernia.  There was mild streaky erythema in the proximal stomach.  Duodenal bulb normal.   • INTERVENTIONAL RADIOLOGY PROCEDURE Bilateral 2/29/2020    Procedure: Pulmonary Angiogram;  Surgeon: Tunde  MD Jason;  Location:  [facility] CATH INVASIVE LOCATION;  Service: Cardiovascular;  Laterality: Bilateral;     General Information     Row Name 03/20/20 1137          PT Evaluation Time/Intention    Document Type  therapy note (daily note)  -PC     Mode of Treatment  physical therapy  -PC     Row Name 03/20/20 1137          General Information    Existing Precautions/Restrictions  fall;oxygen therapy device and L/min  -PC     Barriers to Rehab  hearing deficit  -PC     Row Name 03/20/20 1137          Cognitive Assessment/Intervention- PT/OT    Orientation Status (Cognition)  oriented x 3  -PC     Row Name 03/20/20 1137          Safety Issues, Functional Mobility    Impairments Affecting Function (Mobility)  endurance/activity tolerance;strength;balance;coordination;motor control;postural/trunk control  -PC       User Key  (r) = Recorded By, (t) = Taken By, (c) = Cosigned By    Initials Name Provider Type    PC Cynthia Rivero, PT Physical Therapist        Mobility     Row Name 03/20/20 1138          Bed Mobility Assessment/Treatment    Bed Mobility Assessment/Treatment  rolling left  -PC     Rolling Left [place] (Bed Mobility)  minimum assist (75% patient effort)  -PC     Supine-Sit [place] (Bed Mobility)  moderate assist (50% patient effort)  -PC     Assistive Device (Bed Mobility)  bed rails  -PC     Comment (Bed Mobility)  pt very shaky with tremors, worked with pt and wife how to log roll, and sit up  -PC     Row Name 03/20/20 1132          Transfer Assessment/Treatment    Comment (Transfers)  sit to stand x 3 working on forward lean to initiate stand. educated spouse on correct way to perform sit to stand  -PC     Row Name 03/20/20 1136          Bed-Chair Transfer    Bed-Chair [place] (Transfers)  moderate assist (50% patient effort);2 person assist  -PC     Assistive Device (Bed-Chair Transfers)  -- tried with walker, pt unable to weight shift , unable to coordinate moving his feet, pt pulling on  walker, walker did not help so we transferred to pt without walker to have more control  -PC     Row Name 03/20/20 1138          Sit-Stand Transfer    Sit-Stand [place] (Transfers)  moderate assist (50% patient effort);minimum assist (75% patient effort);2 person assist  -PC     Assistive Device (Sit-Stand Transfers)  walker, front-wheeled  -PC     Row Name 03/20/20 1138          Gait/Stairs Assessment/Training    Distance in Feet (Gait)  pt was unable to take purposeful steps  -PC       User Key  (r) = Recorded By, (t) = Taken By, (c) = Cosigned By    Initials Name Provider Type    PC Cynthia Rivero, PT Physical Therapist        Obj/Interventions     Row Name 03/20/20 1144          Static Sitting Balance    Level of [place] (Unsupported Sitting, Static Balance)  minimal assist, 75% patient effort;contact guard assist  -PC     Sitting Position (Unsupported Sitting, Static Balance)  sitting on edge of bed  -PC     Time Able to Maintain Position (Unsupported Sitting, Static Balance)  3 to 4 minutes  -PC     Row Name 03/20/20 1144          Static Standing Balance    Level of [place] (Supported Standing, Static Balance)  moderate assist, 50 to 74% patient effort  -PC     Comment (Supported Standing, Static Balance)  pt leans posteriorly, pulls on walker  -PC       User Key  (r) = Recorded By, (t) = Taken By, (c) = Cosigned By    Initials Name Provider Type    PC Cynthia Rivero, PT Physical Therapist        Goals/Plan    No documentation.       Clinical Impression     Row Name 03/20/20 1145          Pain Scale: Numbers Pre/Post-Treatment    Pain Scale: Numbers, Pretreatment  0/10 - no pain  -PC     Row Name 03/20/20 1146          Plan of Care Review    Plan of Care Reviewed With  patient;spouse  -PC     Outcome Summary  pt cont to require mod assist for mobility, worked with pt and spouse on how to roll and sit up and how to assist pt to standing and transferring to a chiar, pt and wife will have assist  of 2 grandsons, they have a wheelchair  -PC     Row Name 03/20/20 1145          Positioning and Restraints    Pre-Treatment Position  in bed  -PC     Post Treatment Position  chair  -PC     In Chair  sitting;call light within reach;encouraged to call for assist;with family/caregiver  -PC       User Key  (r) = Recorded By, (t) = Taken By, (c) = Cosigned By    Initials Name Provider Type    PC Cynthia Rivero, PT Physical Therapist        Outcome Measures     Row Name 03/20/20 1147          How much help from another person do you currently need...    Turning from your back to your side while in flat bed without using bedrails?  2  -PC     Moving from lying on back to sitting on the side of a flat bed without bedrails?  2  -PC     Moving to and from a bed to a chair (including a wheelchair)?  2  -PC     Standing up from a chair using your arms (e.g., wheelchair, bedside chair)?  2  -PC     Climbing 3-5 steps with a railing?  1  -PC     To walk in hospital room?  2  -PC     AM-PAC 6 Clicks Score (PT)  11  -PC       User Key  (r) = Recorded By, (t) = Taken By, (c) = Cosigned By    Initials Name Provider Type    PC Cynthia Rivero, PT Physical Therapist          PT Recommendation and Plan     Plan of Care Reviewed With: patient, spouse  Outcome Summary: pt cont to require mod assist for mobility, worked with pt and spouse on how to roll and sit up and how to assist pt to standing and transferring to a chiar, pt and wife will have assist of 2 grandsons, they have a wheelchair     Time Calculation:   PT Charges     Row Name 03/20/20 1148             Time Calculation    Start Time  1115  -PC      Stop Time  1135  -PC      Time Calculation (min)  20 min  -PC      PT Received On  03/20/20  -PC      PT - Next Appointment  03/21/20  -PC        User Key  (r) = Recorded By, (t) = Taken By, (c) = Cosigned By    Initials Name Provider Type    Cynthia Aldana, PT Physical Therapist        Therapy Charges for Today     Code  Description Service Date Service Provider Modifiers Qty    80406882945 HC PT THER PROC EA 15 MIN 3/20/2020 Cynthia Rivero, PT GP 1    76198333135 HC PT THER SUPP EA 15 MIN 3/20/2020 Cynthia Rivero, PT GP 1          PT G-Codes  Outcome Measure Options: AM-PAC 6 Clicks Basic Mobility (PT)  AM-PAC 6 Clicks Score (PT): 11  AM-PAC 6 Clicks Score (OT): 12    Cynthia Rivero, PT  3/20/2020

## 2020-03-20 NOTE — DISCHARGE SUMMARY
PHYSICIAN DISCHARGE SUMMARY                                                                          [facility]    Patient Identification:  Name: Izaiah Garcia  Age: 88 y.o.  Sex: male  :  1931  MRN: [id number]  Primary Care Physician: Uriah Godinez MD    Admit date: 2020  Discharge date:3/20/2020  Discharged Condition: good    Discharge Diagnoses:    Acute saddle pulmonary embolism with acute cor pulmonale (CMS/HCC)    Pulmonary emboli (CMS/HCC)    Empyema of pleura (CMS/HCC)  1.  Acute hypoxic respiratory failure  2. BPE s/p ekos catheter on 2020 with local TPA infusion with good clinical response  3. Troponemia suspect due to BPE  4. RV strain  5. Bilateral pleural effusion R greater than Left exudative with chest tube insertion on 3/9/2020  6. HLD  7. Esphageal stricture  8. Gen weakness  9. Parkinsons Disease  10. 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 HTN  13. Abnormal mass around thyroid   14. hypokalemia, corrected  15. Hypoalbuminemia  Hyponatremia, mild    Hospital Course:   Patient was readmitted after an hospital admission for pneumonia with evidence of massive pulmonary embolism, and was managed with EKOS with directed thrombolytic therapy low-dose with good clinical response.  Patient had pleural effusion, and after evaluation by thoracic surgery decision was to have radiology replaced the chest tube with a CT-guidance.  This was done on 3/9/2020 but the patient had output less than 200 cc over the first 24 hours, TPA was administered on 3/10/2020 with nearly 600 cc total output afterwards.  The right-sided chest tube was removed on 3/12/2020 when the output was minimal and patient had a CT-guided placement of left-sided small chest tube with 1200 cc output so far.  Patient is on the heparin drip which was discontinued only for few hours before the procedure and was resumed afterwards with no problem with bleeding.   He had his chest tube managed by thoracic surgery Dr Leon and did require instillation of TPA to help with loculations.  He had chest tube removed after improvement noted on his ct chest and was started on eliquis.  He was evaluated by cardiology neurology and thoracic surgery during his stay.    I had a strong discussion with the patients wife regarding dispo.  She is very worried that given his dementia that if he were not allowed visitors that he would become completely confused agitated and dangerous for him.  She has a set up worked out between her large cohort of grandchildren to provide around the clock assistance and that they all live within amile of each other.  I did let her know my concerns that one fall could be a catostrophic event for him given the risk of falls while on blood thinners.  She understands but thinks that the risks of him being socially isolated with advanced dementia is worse.   Will order home health pt and hospital bed etc.      Regarding disposition:  Patient's spouse due to COVID 19; she does not want patient to go to SNF at [facility] and feels like returning home would be best. CCP discussed at length with patient's spouse safety concerns of patient returning home. CCP reviewed PT notes with patient's spouse over the phone and discussed patient is not ambulating and requiring assistance x2 with standing. CCP discussed PT/CCP recommendations of SNF at discharge. Patient's spouse verbalized understanding of recommendations and safety concerns but still declined SNF at discharge. Patient's spouse states they have 4 grandsons that live within a mile of them and are willing to assist with bathing/showering. Patient's spouse states she does have a granddaughter in nursing school that will be able to assist as well.    Consults:   IP CONSULT TO PULMONOLOGY  IP CONSULT TO CARDIOLOGY  IP CONSULT TO HEMATOLOGY AND ONCOLOGY  IP CONSULT TO UROLOGY  IP CONSULT TO CASE MANAGEMENT SOCIAL  SERVICES  IP CONSULT TO THORACIC SURGERY  IP CONSULT TO INFECTIOUS DISEASES  IP CONSULT TO CARDIOLOGY  IP CONSULT TO NEUROLOGY    Consults:   IP CONSULT TO PULMONOLOGY  IP CONSULT TO CARDIOLOGY  IP CONSULT TO HEMATOLOGY AND ONCOLOGY  IP CONSULT TO UROLOGY  IP CONSULT TO CASE MANAGEMENT   IP CONSULT TO THORACIC SURGERY  IP CONSULT TO INFECTIOUS DISEASES  IP CONSULT TO CARDIOLOGY  IP CONSULT TO NEUROLOGY    Significant Discharge Diagnostics   Procedures Performed:  Procedure(s):  Ekos catheter placement  Pulmonary Angiogram  Thrombolytic Therapy- EKOS       Pertinent Lab Results:  Results from last 7 days   Lab Units 03/20/20 0449 03/19/20 0358 03/18/20 2129 03/18/20 0332 03/17/20  0547 03/16/20  0302 03/15/20  0327 03/14/20  0423   SODIUM mmol/L 135* 138  --  136 137 136 139 136   POTASSIUM mmol/L 3.9 4.6 4.5 3.6 3.7 4.0 4.3 4.9   CHLORIDE mmol/L 100 102  --  100 101 101 101 104   CO2 mmol/L 24.1 25.2  --  26.5 25.6 25.3 25.8 22.3   BUN mg/dL 12 13  --  15 15 16 19 19   CREATININE mg/dL 0.96 0.88  --  0.85 0.87 0.95 0.96 0.95   GLUCOSE mg/dL 87 102*  --  104* 104* 108* 111* 119*   CALCIUM mg/dL 8.6 8.1*  --  8.0* 7.9* 8.2* 7.9* 8.4*         Results from last 7 days   Lab Units 03/20/20 0449 03/19/20 0358 03/18/20 0332 03/17/20  0547 03/16/20  0302 03/15/20  0327 03/14/20  0423   WBC 10*3/mm3 8.55 8.07 8.71 9.56 10.08 11.43* 12.02*   HEMOGLOBIN g/dL 9.9* 8.9* 8.6* 8.8* 9.1* 8.9* 9.3*   HEMATOCRIT % 29.8* 27.7* 26.9* 26.6* 27.3* 27.9* 29.6*   PLATELETS 10*3/mm3 263 246 259 257 272 301 295   MCV fL 82.5 82.7 82.5 82.6 84.5 85.1 85.8   MCH pg 27.4 26.6 26.4* 27.3 28.2 27.1 27.0   MCHC g/dL 33.2 32.1 32.0 33.1 33.3 31.9 31.4*   RDW % 12.3 12.1* 12.4 12.3 12.3 12.3 12.2*   RDW-SD fl 36.7* 36.5* 36.8* 37.0 38.0 38.0 38.2   MPV fL 10.0 10.6 10.2 9.8 10.1 9.6 9.8   NEUTROPHIL % % 56.4 55.1 55.7 56.2 61.2 65.9 66.0   LYMPHOCYTE % % 27.1 26.9 27.8 26.3 22.6 19.3* 20.5   MONOCYTES % % 7.5 8.6 7.7 8.5 7.8  8.3 7.1   EOSINOPHIL % % 4.1 4.2 4.4 3.8 3.3 2.2 2.9   BASOPHIL % % 0.7 0.4 0.5 0.6 0.6 0.3 0.6   IMM GRAN % % 4.2* 4.8* 3.9* 4.6* 4.5* 4.0* 2.9*   NEUTROS ABS 10*3/mm3 4.82 4.45 4.86 5.38 6.17 7.52* 7.93*   LYMPHS ABS 10*3/mm3 2.32 2.17 2.42 2.51 2.28 2.21 2.47   MONOS ABS 10*3/mm3 0.64 0.69 0.67 0.81 0.79 0.95* 0.85   EOS ABS 10*3/mm3 0.35 0.34 0.38 0.36 0.33 0.25 0.35   BASOS ABS 10*3/mm3 0.06 0.03 0.04 0.06 0.06 0.04 0.07   IMMATURE GRANS (ABS) 10*3/mm3 0.36* 0.39* 0.34* 0.44* 0.45* 0.46* 0.35*   NRBC /100 WBC 0.0 0.0 0.0 0.0 0.0 0.0 0.0     Results from last 7 days   Lab Units 03/19/20  0358 03/18/20  0332 03/17/20  1355 03/17/20  0547 03/16/20  2131 03/16/20  1156 03/16/20  0613   APTT seconds 70.1* 74.7* 75.5* 125.3* 189.8* 29.7 90.5*               Invalid input(s): LDLCALC                                            Imaging Results:  Imaging Results (All)     Procedure Component Value Units Date/Time    XR Chest 1 View [[id number]] Collected:  03/19/20 0659     Updated:  03/19/20 0705    Narrative:       PORTABLE CHEST X-RAY     HISTORY: Chest tube removal.     TECHNIQUE: Portable chest x-ray is correlated with chest x-ray  03/18/2020.     FINDINGS: The small caliber left chest tube has been removed. There is  no pneumothorax. A right PICC line terminating at the level of the  axilla is again observed. The cardiac silhouette is mildly prominent.  There is no change in the appearance of the lungs, with some opacity in  the lower lung zones bilaterally. There is no new abnormality.       Impression:       There is no pneumothorax following chest tube removal.     This report was finalized on 3/19/2020 7:02 AM by Dr. Gonzales Zarate M.D.       XR Chest 1 View [[id number]] Collected:  03/18/20 0646     Updated:  03/18/20 0652    Narrative:       XR CHEST 1 VW-     Clinical: Chest tube management     COMPARISON 3/17/2020 CT chest and chest radiograph 3/17/2020     FINDINGS: Right base chest tube position remains  stable. No pneumothorax  seen. There is blunting of the left costophrenic angle consistent with  small pleural effusion, unchanged. Right-sided pleural effusion not  significantly changed within the interim. There is patchy bibasilar  airspace disease similar to the previous examination. The  cardiomediastinal silhouette remains stable. No edema or new airspace  disease has developed.     CONCLUSION: Stable chest     This report was finalized on 3/18/2020 6:49 AM by Dr. Lobito Macario M.D.       CT Chest Without Contrast [[id number]] Collected:  03/17/20 1652     Updated:  03/17/20 1700    Narrative:       CT CHEST WITHOUT CONTRAST     HISTORY: 88-year-old with pleural effusion.  Patient is post CT-guided  catheter placement in the left pleural effusion 5 days ago.  Follow up  exam.     TECHNIQUE: CT chest includes axial imaging from the thoracic inlet to  the upper abdomen without IV contrast.      COMPARISON: CT chest without contrast 03/12/2020, 03/06/2020.     FINDINGS: Left posterior chest pigtail drainage catheter extends into  the pleural space posterior to the left lower lobe and there has been  decrease in size of the left pleural effusion compared to the previous  exam. There is a persistent left pleural effusion and effusion is partly  loculated posterior to the left lower lobe at the horizontal level of  the mickie. There are bubbles of air within the left pleural space.  There is dense left lower lobe consolidation with patchy areas of  infiltrates surrounding bronchial wall thickening.     A moderate right pleural effusion is also present and there is loculated  right pleural fluid extending into the right major fissure along its  posterior-superior margin where pleural fluid measures 2.1 cm in  thickness. As on the left, there is dense right lower lobe consolidation  with bronchial wall thickening and patchy infiltrates. There is no  evidence for pulmonary edema. Upper lobes are comparatively clear.      The heart size is enlarged. A right superior mediastinal low-density  mass or node measures 3.7 x 3 cm axial dimension.  There is a T11  vertebral body hemangioma. There is also a small sclerotic focus within  the anterior superior aspect of the T6 vertebral body measuring 7 mm  which may represent a bone island.     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 Within the superior aspect of the lateral  segment of the left lobe of the liver there is a 10.2 x 8.4 cm cyst.        Impression:       1.  Left posterior pigtail drainage catheter extends into the left  pleural space. There is a residual small left pleural effusion that has  decreased in size. Effusion is partly loculated along the posterior  margin of the left lower lobe at the horizontal level of the mickie.  There is also a moderate right pleural effusion that is partly loculated  within the posterior aspect of the right major fissure. Bilateral lower  lobe dense consolidation and infiltrates associated with bronchial wall  thickening.  2.  Cardiomegaly.  3.  3.7 x 3.0 cm right superior mediastinal mass or necrotic node or  complex cyst.  4.  10.2 cm cm hepatic cyst. 1.3 cm splenic artery aneurysm.     Radiation dose reduction techniques were utilized, including automated  exposure control and exposure modulation based on body size.     This report was finalized on 3/17/2020 4:57 PM by Dr. Duke Asher M.D.       XR Chest 1 View [[id number]] Collected:  03/17/20 0641     Updated:  03/17/20 0646    Narrative:       XR CHEST 1 VW-     Clinical: Chest tube management     COMPARISON examination 3/16/2020     FINDINGS: Improved aeration of the lungs compared to the previous  examination. No pneumothorax, left base chest tube position is stable.  Right-sided pleural effusion is less conspicuous, persistent stable  blunting of the left  costophrenic angle. No pulmonary edema or new  airspace disease has developed within the interim. The cardiomediastinal  silhouette is stable. The remainder is unremarkable.     This report was finalized on 3/17/2020 6:43 AM by Dr. Lobito Macario M.D.       XR Chest 1 View [[id number]] Collected:  03/16/20 0806     Updated:  03/16/20 0810    Narrative:       AP PORTABLE SEMIERECT CHEST     HISTORY: Chest tube management. Follow-up exam.     COMPARISON: AP chest 03/15/2020, 03/14/2020.     FINDINGS: Bilateral pleural effusions are present. Right effusion is  partly loculated within the major fissure similar to the previous exam.  Heart size is enlarged and there is pulmonary vascular engorgement.  Small caliber left chest drain extends to the left lung base and there  is adjacent dense consolidation similar to the previous exam. A  right-sided PICC tip extends into the region of the right axillary vein  without change. There is no pneumothorax. Increased density is present  in the right superior mediastinum which corresponds with the  paratracheal node demonstrated with recent CT.      Overall, there has been no significant change compared to exam 1 day  prior.     This report was finalized on 3/16/2020 8:07 AM by Dr. Duke Asher M.D.       XR Chest 1 View [[id number]] Collected:  03/15/20 0714     Updated:  03/15/20 2219    Narrative:       PORTABLE CHEST X-RAY     CLINICAL HISTORY: chest tube management; J18.9-Pneumonia, unspecified  organism; J96.01-Acute respiratory failure with hypoxia; I26.99-Other  pulmonary embolism without acute cor pulmonale; I26.02-Saddle embolus of  pulmonary artery with acute cor pulmonale     COMPARISON: 03/14/2020.     FINDINGS: Portable AP view of the chest was obtained with overlying  monitor leads in place. Life support lines are unchanged. No  pneumothorax. Lungs are under aerated. Bibasilar and left perihilar  airspace opacities are unchanged as are bilateral effusions.  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             Impression:          Stable appearance of the chest by portable radiography.        This report was finalized on 3/15/2020 10:16 PM by Isrrael Wang M.D.       XR Chest 1 View [[id number]] Collected:  03/14/20 0709     Updated:  03/14/20 1905    Narrative:       PORTABLE CHEST X-RAY     CLINICAL HISTORY: chest tube management; J18.9-Pneumonia, unspecified  organism; J96.01-Acute respiratory failure with hypoxia; I26.99-Other  pulmonary embolism without acute cor pulmonale; I26.02-Saddle embolus of  pulmonary artery with acute cor pulmonale     COMPARISON: 03/13/2020     FINDINGS: Portable AP view of the chest was obtained with overlying  monitor leads in place. Life support lines are unchanged and there is no  pneumothorax. Basilar opacities felt to reflect atelectasis and small  effusions are again noted and have increased slightly on the right side.  Heart is enlarged. Normal vascularity. There is stable widening of the  upper mediastinum without significant tracheal deviation. Previous CT  showed a right paratracheal mass which likely in part accounts for the  mediastinal widening. Please see the previous CT report.       Impression:       Slight increase in right lung base atelectasis and effusion,  stable findings otherwise.        This report was finalized on 3/14/2020 7:02 PM by Isrrael Wang M.D.       XR Chest 1 View [[id number]] Collected:  03/13/20 1202     Updated:  03/13/20 1211    Narrative:       PORTABLE CHEST 03/13/2020 AT 0947 HOURS     CLINICAL HISTORY: 88-year-old male with history of pulmonary embolism  and pleural effusion. Chest tube placement.     Compared to the previous chest x-ray dated 03/12/2020.     In the interval, a small caliber chest tube has been placed in the left  hemithorax. The moderate-sized left pleural effusion has been nearly  completely evacuated. There is no pneumothorax. There is mild bibasilar  atelectasis. There is an  oblong area of ill-defined increased density in  the right midlung zone that is due to pleural fluid trapped within a  lung fissure. The heart is mildly enlarged..     This report was finalized on 3/13/2020 12:08 PM by Dr. Ashutosh Jacob M.D.       CT Guided Chest Tube [[id number]] Collected:  03/12/20 1509     Updated:  03/13/20 0914    Narrative:       CT-GUIDED DRAINAGE AND CATHETER PLACEMENT INTO LEFT PLEURAL FLUID  COLLECTION 03/12/2020     HISTORY: Left pleural fluid collection.     After signed informed consent was obtained the patient was prepped and  draped in the right lateral decubitus position. Lidocaine was used for  local anesthesia.     CT guidance was used to place an 8-Croatian pigtail catheter into the left  pleural fluid collection. Approximately 20 mL of serosanguineous  nonpurulent fluid was removed. The catheter was connected to atrium  suction. Confirmatory images were obtained.     Patient tolerated the procedure well with no complications. A fluid  sample was sent to the laboratory for evaluation.     Radiation dose reduction techniques were utilized, including automated  exposure control and exposure modulation based on body size.     This report was finalized on 3/13/2020 9:11 AM by Dr. Humphrey Anderson M.D.       CT Chest Without Contrast [[id number]] Collected:  03/12/20 1133     Updated:  03/12/20 1459    Narrative:       CT SCAN OF THE CHEST WITHOUT CONTRAST     HISTORY: Follow up of pleural effusions. Small gauge chest tube and  right pleural fluid collection.     The CT scan was performed through the chest without contrast and  compared to the previous chest CT scan performed 6 days ago on  03/06/2020. The following findings are present:  1. There are bilateral pleural effusions and the right pleural effusion  is somewhat loculated in appearance with a small gauge pigtail drain in  place posteriorly. It is smaller in size when compared to the study of 6  days ago. There remains some  dense atelectasis and probable  consolidation in the right lower lobe. The left pleural effusion appears  slightly larger with further dense atelectasis and consolidation in the  left lower lobe. There is some minimal consolidation in the posterior  aspect of the left upper lobe which is unchanged. The lungs are  otherwise clear.  2. There is a prominent necrotic appearing mediastinal mass in the right  paratracheal region measuring up to 3 x 4 cm which is essentially  unchanged. This most likely represents a necrotic lymph node. The  remainder of the mediastinum is unremarkable. There is no hilar or  axillary adenopathy.  3. There is a small pericardial effusion measuring up to 8 mm in  thickness which is unchanged. Again noted is a large hepatic cyst  measuring up to 10.3 cm. The spleen and both adrenal glands are  unremarkable. 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  4. At bone windows, there is a probable benign vertebral hemangioma in  the lower thoracic region. This is unchanged from an abdominal CT scan  dated 06/08/2018.                 Radiation dose reduction techniques were utilized, including automated  exposure control and exposure modulation based on body size.     This report was finalized on 3/12/2020 2:56 PM by Dr. Rufino Rodriguez M.D.       XR Chest 1 View [[id number]] Collected:  03/12/20 0815     Updated:  03/12/20 0821    Narrative:       XR CHEST 1 VW-     Clinical: Chest tube management     COMPARISON 03/11/2020     FINDINGS: Smallbore chest tube superimposes the right lung base. No  pneumothorax is demonstrated. Right base airspace disease less  conspicuous compared to the previous examination. Persistent stable  unchanged left lung opacity. The cardiomediastinal silhouette is stable.  There are monitoring leads superimposing the chest, the remainder is  unremarkable.     This report was finalized on 3/12/2020 8:17 AM by Dr. Lobito Macario M.D.       CT Head Without  Contrast [[id number]] Collected:  03/11/20 2223     Updated:  03/11/20 2236    Narrative:       CT HEAD WITHOUT CONTRAST     HISTORY: Confusion     COMPARISON: 07/12/2016     TECHNIQUE: Axial CT imaging was obtained from vertex of the skull  through the skull base. No IV contrast was administered.     FINDINGS:  No acute intracranial hemorrhage is seen. There is diffuse atrophy.  There is periventricular and deep white matter microangiopathic change.  There is no midline shift or mass effect. The patient has evidence of an  old left cerebellar infarction. There is a smaller area of decreased  attenuation identified within the right cerebellar hemisphere. This  measures 1.7 x 1.1 cm. This may represent an old infarct as well, but  was not present on prior MRI from July 2016. MRI would be more sensitive  for evaluation of chronicity. Again, it is favored to be chronic. Old  lacunar infarct is seen within the right thalamus. The paranasal sinuses  and mastoid air cells appear clear.       Impression:          1. No definite acute intracranial findings. The patient has an old left  cerebellar infarct. There is an area of decreased attenuation identified  within the right cerebellar hemisphere, which is also favored to reflect  a chronic infarct, although it was not present in July 2016.     Radiation dose reduction techniques were utilized, including automated  exposure control and exposure modulation based on body size.     This report was finalized on 3/11/2020 10:33 PM by Dr. Dolores Rivera M.D.       XR Chest 1 View [[id number]] Collected:  03/11/20 0541     Updated:  03/11/20 0546    Narrative:       PORTABLE CHEST RADIOGRAPH     HISTORY: Chest tube     COMPARISON: 03/10/2020     FINDINGS:  Cardiomegaly and vascular congestion are noted. Right-sided pigtail  drainage catheter is present. No pneumothorax is identified. There is a  persistent right pleural effusion. It has increased slightly when  compared to prior  study. Left pleural effusion has also increased. There  is bibasilar atelectasis. Right-sided PICC line appears to terminate  within the right axillary vein.       Impression:       Increasing bilateral pleural effusions.     This report was finalized on 3/11/2020 5:43 AM by Dr. Dolores Rivera M.D.       XR Chest 1 View [[id number]] Collected:  03/10/20 1145     Updated:  03/10/20 1304    Narrative:       X-RAY CHEST ONE VIEW     HISTORY: 88-year-old male status post CT-guided right chest tube  placement.     FINDINGS: There has been significant decrease in the right-sided pleural  effusion and improved aeration at the right lower thorax. There is  increased density at the left mid and lower thorax since previous  radiograph from 03/04/2020 suggesting increase in the layering left  pleural effusion. There is also increase in the central pulmonary  vascular prominence. There is no pneumothorax.     This report was finalized on 3/10/2020 1:01 PM by Dr. Melania De M.D.       CT Guided Chest Tube [[id number]] Collected:  03/09/20 1432     Updated:  03/10/20 0824    Narrative:       CT-GUIDED RIGHT CHEST TUBE PLACEMENT 03/09/2020     HISTORY: Right pleural effusion.     FINDINGS:   After signed informed consent was obtained patient was prepped and  draped in the left lateral decubitus position. Lidocaine was used for  local anesthesia.     Using CT guidance an 8-Romanian pigtail catheter was introduced into the  right pleural fluid collection. Approximately 25 mL of  nonpurulent-appearing serous fluid was removed. The catheter was  connected to atrium suction device. Confirmatory images were obtained.  There were no complications.     Radiation dose reduction techniques were utilized, including automated  exposure control and exposure modulation based on body size.     This report was finalized on 3/10/2020 8:21 AM by Dr. Humphrey Anderson M.D.       CT Chest Without Contrast [[id number]] Collected:  03/06/20 1548      Updated:  03/06/20 1711    Narrative:       CT CHEST WITHOUT CONTRAST     HISTORY: 88-year-old male with acute hypoxic respiratory failure.  Pneumonia and empyema. Pulmonary thromboemboli.     TECHNIQUE: Radiation dose reduction techniques were utilized, including  automated exposure control and exposure modulation based on body size.   3 mm images were obtained through the chest without the administration  of IV contrast. Compared with chest CTA from 02/29/2020.     FINDINGS: There is a much larger left lower lobe airspace consolidation  which consolidates nearly the entire left lower lobe. There is also a  larger right lower lobe airspace consolidation than previously. There is  a small-moderate left pleural effusion and there is a small loculated  appearing right pleural effusion (ultrasound-guided right thoracentesis  on 03/02/2020. There is some compressive atelectatic change at the upper  lobes, but the upper lobes are otherwise clear. There is no change in  the necrotic appearing right superior paratracheal node measuring  approximately 3.7 x 2.6 cm. There are also stable shotty mediastinal  nodes which are stable. There is no acute abnormality within the  visualized upper abdomen. There is long-term stability of a lobular left  hepatic lobe cyst. 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       Impression:       1. Larger right lower lobe airspace consolidation and small loculated  right pleural effusion. Significantly larger left lower lobe  consolidation and significant increase in the small-moderate left  pleural effusion.  2. Stable necrotic appearing 3.7 x 2.6 cm right paratracheal node.     This report was finalized on 3/6/2020 5:08 PM by Dr. Melania De M.D.       XR Chest 1 View [[id number]] Collected:  03/05/20 0021     Updated:  03/05/20 0027    Narrative:       PORTABLE CHEST RADIOGRAPH     HISTORY: Pleural effusions     COMPARISON: 03/02/2020       FINDINGS:  Cardiomegaly is present. Fullness of the right paratracheal stripe is  secondary to a mass which was seen on prior CT angiogram, and which may  be arising from the thyroid gland. Calcification of the thoracic aorta  is seen. There is bibasilar consolidation. There are bilateral pleural  effusions. These do not appear significantly changed when compared to  prior study.       Impression:       No significant interval change in bilateral pleural effusions.     This report was finalized on 3/5/2020 12:23 AM by Dr. Dolores Rivera M.D.       XR Chest 1 View [[id number]] Collected:  03/02/20 0748     Updated:  03/02/20 2141    Narrative:       PORTABLE CHEST X-RAY     CLINICAL HISTORY: eval effusion infiltrates; J18.9-Pneumonia,  unspecified organism; J96.01-Acute respiratory failure with hypoxia;  I26.99-Other pulmonary embolism without acute cor pulmonale;  I26.02-Saddle embolus of pulmonary artery with acute cor pulmonale     COMPARISON: 02/29/2020.     FINDINGS: Portable AP view of the chest was obtained with overlying  monitor leads in place. Right greater than left basilar opacities likely  airspace disease and effusions are similar to previous. Stable  cardiomegaly. Normal vascularity. Right PICC line unchanged.             Impression:          Stable appearance of the chest by portable radiography.        This report was finalized on 3/2/2020 9:38 PM by Isrrael Wang M.D.       US Thoracentesis [[id number]] Collected:  03/02/20 1451    Specimen:  Body Fluid Updated:  03/02/20 1457    Narrative:       ULTRASOUND-GUIDED THORACENTESIS performed on 03/02/2020.     HISTORY: 88-year-old male with shortness of breath.     FINDINGS: The patient was brought to the ultrasound suite and placed in  the seated upright position. A loculated pocket of pleural fluid was  identified in the right pleural space. The area was then prepped and  draped in the usual sterile fashion. The skin was anesthetized with  1%  Lidocaine without epinephrine.  Thoracentesis was then achieved using a  5F Yueh needle system. Approximately 100 mL of blood colored fluid was  obtained. A portion was sent to the lab for pre-ordered studies. The  procedure was then ended with no immediate complications or patient  distress.       Impression:       Successful ultrasound-guided thoracentesis of the right  pleural space.     100 mL of blood colored fluid obtained.              This report was finalized on 3/2/2020 2:54 PM by Dr. Matt Lakhani MD.       US Thyroid [[id number]] Collected:  03/01/20 0725     Updated:  03/01/20 1928    Narrative:       THYROID ULTRASOUND     HISTORY: Abnormal CT; J18.9-Pneumonia, unspecified organism;  J96.01-Acute respiratory failure with hypoxia; I26.99-Other pulmonary  embolism without acute cor pulmonale; I26.02-Saddle embolus of pulmonary  artery with acute cor pulmonale.      COMPARISON: None.      FINDINGS: Longitudinal and transverse images of the thyroid gland were  obtained. The right thyroid lobe measures 5.1 x 2 x 1.8 cm.  The left  thyroid lobe measures 3.1 x 2 x 1.4 cm.  Isthmus measures 3 mm. There  are solitary bilateral thyroid lobe cysts. The slightly larger is on the  left side measuring 11 mm. The right-sided cyst measures 8 mm. No solid  or suspicious mass.       Impression:       Solitary small cysts bilaterally, no solid or suspicious  abnormality.      This report was finalized on 3/1/2020 7:25 PM by Isrrael Wang M.D.       CT Angiogram Chest [[id number]] Collected:  02/29/20 1209     Updated:  02/29/20 1223    Narrative:       CT ANGIOGRAM CHEST-     INDICATIONS: Short of breath  Radiation dose reduction techniques were  utilized, including automated exposure control and exposure modulation  based on body size.     TECHNIQUE: CT angiography of the chest with three-dimensional  reconstructions     COMPARISON: 02/17/2020     FINDINGS:     Bilateral pulmonary embolic disease is present,  beginning in distal main  pulmonary arteries and extending into multiple branches. RV LV ratio is  increased, about 2, compatible with right heart strain.     The heart size is borderline with small pericardial effusion. A right  paratracheal circumscribed density measuring 3.1 x 3.8 cm on image 26 of  series 1 abuts the right margin of the esophagus, and the inferior  margin of the right thyroid lobe, is indeterminate, could be large  exophytic nodule of the thyroid, pathologic necrotic lymph node (show  slightly greater density than simple fluid) or possibly esophageal  duplication cyst, further evaluation is suggested such as thyroid  ultrasound, PET/CT, interval follow-up can characterize change. Aortic  paratracheal lymph node on image 37 measures 1 cm, borderline prominent.  Subcarinal lymph node measures 1.0 cm short axis on image 75. Right PICC  extends to the right axillary vein.     The airways appear clear.     Mild to moderate right pleural effusion, and minimal to mild left  pleural effusion.     The lungs show atelectasis in the lower lungs.     Upper abdominal structures show large chronic hepatic cyst. A marginally  calcified splenic arterial aneurysm measures 1.6 cm on image 148 of  series 1..     Degenerative changes are seen in the spine. Vertebral hemangioma noted.  Sternal foramen is present. No acute fracture is identified.       Impression:             1.Critical test result. Fairly extensive bilateral pulmonary embolic  disease with increased RV LV ratio compatible with right heart strain.  Right more than left pleural effusions and lower lung atelectasis.  2. Borderline prominent mediastinal lymph nodes. Indeterminate right  paratracheal mass, as described.     Discussed by telephone with Dr. Durbin at time of interpretation,  12:15, 02/29/2020.     This report was finalized on 2/29/2020 12:20 PM by Dr. Jamar Bishop M.D.       XR Chest 1 View [[id number]] Collected:  02/29/20 0845      "Updated:  02/29/20 0828    Narrative:       XR CHEST 1 VW-     HISTORY: Male who is 88 years-old,  short of breath     TECHNIQUE: Frontal views of the chest     COMPARISON: 02/21/2020     FINDINGS: Right PICC appears to extend to the right axillary region. The  heart size is borderline. Aorta is calcified. Pulmonary vasculature is  unremarkable. Mild right basilar atelectasis/infiltrate/effusion,  follow-up suggested. No pneumothorax. No acute osseous process.       Impression:       Mild right basilar atelectasis/infiltrate/effusion,  follow-up suggested. Borderline heart size.     This report was finalized on 2/29/2020 8:25 AM by Dr. Jamar Bishop M.D.             Discharge Exam:  /73 (BP Location: Left arm, Patient Position: Lying)   Pulse 80   Temp 97.9 °F (36.6 °C) (Oral)   Resp 18   Ht 180.3 cm (71\")   Wt 78.9 kg (173 lb 14.8 oz)   SpO2 100%   BMI 24.26 kg/m²   General appearance: chronically ill but non toxic, conversant   Eyes: anicteric sclerae, moist conjunctivae; no lid-lag; PERRLA  HENT: Atraumatic; oropharynx clear with moist mucous membranes and no mucosal ulcerations; normal hard and soft palate  Neck: Trachea midline; FROM, supple, no thyromegaly or lymphadenopathy  Lungs:no wheeze no rhonchi, with normal respiratory effort and no intercostal retractions  CV: irreg irreg , no rub  Abdomen: Soft, non-tender; no masses or HSM  Extremities:trace peripheral edema or extremity lymphadenopathy  Skin: Normal temperature, turgor and texture; no rash, ulcers or subcutaneous nodules  Psych: Appropriate affect, alert and oriented to person, place and time  Neuro cns II-XII intact move all ext, speech intact     Discharge Disposition   Patient's spouse due to COVID 19; she does not want patient to go to SNF at [facility] and feels like returning home would be best. CCP discussed at length with patient's spouse safety concerns of patient returning home. CCP reviewed PT notes with patient's spouse " over the phone and discussed patient is not ambulating and requiring assistance x2 with standing. CCP discussed PT/CCP recommendations of SNF at discharge. Patient's spouse verbalized understanding of recommendations and safety concerns but still declined SNF at discharge. Patient's spouse states they have 4 grandsons that live within a mile of them and are willing to assist with bathing/showering. Patient's spouse states she does have a granddaughter in nursing school that will be able to assist as well.      Discharge Medications     Discharge Medications      New Medications      Instructions Start Date   apixaban 5 MG tablet tablet  Commonly known as:  ELIQUIS   10 mg, Oral, Every 12 Hours Scheduled      apixaban 5 MG tablet tablet  Commonly known as:  ELIQUIS   5 mg, Oral, Every 12 Hours Scheduled   Start Date:  March 26, 2020     furosemide 40 MG tablet  Commonly known as:  LASIX   40 mg, Oral, Daily      metoprolol tartrate 25 MG tablet  Commonly known as:  LOPRESSOR   25 mg, Oral, Every 12 Hours Scheduled         Continue These Medications      Instructions Start Date   allopurinol 300 MG tablet  Commonly known as:  ZYLOPRIM   Take 1 p.o. daily for gout      budesonide 0.5 MG/2ML nebulizer solution  Commonly known as:  PULMICORT   0.5 mg, Nebulization, 2 Times Daily - RT      carbidopa-levodopa  MG per tablet  Commonly known as:  SINEMET   1 tablet, Oral, 2 Times Daily      ipratropium-albuterol 0.5-2.5 mg/3 ml nebulizer  Commonly known as:  DUO-NEB   3 mL, Nebulization, 2 Times Daily - RT      lovastatin 20 MG tablet  Commonly known as:  MEVACOR   Take 1 p.o. daily for high cholesterol      ondansetron 4 MG tablet  Commonly known as:  Zofran   1 p.o. every 6 to 8 hours as needed nausea      saccharomyces boulardii 250 MG capsule  Commonly known as:  FLORASTOR   250 mg, Oral, 2 Times Daily         Stop These Medications    amLODIPine 5 MG tablet  Commonly known as:  NORVASC     amoxicillin-clavulanate  875-125 MG per tablet  Commonly known as:  AUGMENTIN     doxycycline 100 MG tablet  Commonly known as:  VIBRAMYICN     Ecotrin 325 MG EC tablet  Generic drug:  aspirin     guaiFENesin 600 MG 12 hr tablet  Commonly known as:  MUCINEX     ROCEPHIN IV     valsartan-hydrochlorothiazide 320-25 MG per tablet  Commonly known as:  DIOVAN-HCT            Discharge Diet:   SLP Recommendation and Plan  SLP Swallowing Diagnosis: mild, oral dysfunction, pharyngeal dysfunction  SLP Diet Recommendation: regular textures, thin liquids  Recommended Precautions and Strategies: upright posture during/after eating, small bites of food and sips of liquid, no straw, alternate between small bites of food and sips of liquid, other (see comments)(small single sips)  SLP Rec. for Method of Medication Administration: meds whole, with thin liquids, with pudding or applesauce, as tolerated  Monitor for Signs of Aspiration: yes  Recommended Diagnostics: reassess via FEES, other (see comments)(with further concern for aspiration)  Swallow Criteria for Skilled Therapeutic Interventions Met: demonstrates skilled criteria  Anticipated Dischage Disposition: home  Rehab Potential/Prognosis, Swallowing: good, to achieve stated therapy goals  Therapy Frequency (Swallow): PRN  Predicted Duration Therapy Intervention (Days): until discharge  Demonstrates Need for Referral to Another Service: other (see comments), speech therapy(Home health/OP therapy at [facility])     VFSS completed. Patient presents with mild oropharyngeal dysphagia characterized by mistiming and decreased pharyngeal constriction. Patient demonstrates inconsistent mistiming with thin liquids, trace penetration with large bolus. Trace epiglottic undercoating nectar thick liquids X1. No penetration or aspiration with thin liquids small single sips, puree, mechanical soft, and regular textures. Patient demonstrates mild oral and pyriform residue with puree, able to clear with double swallow.   Functional mastication and bolus clearance with mechanical soft and regular textures.  Esophageal scan appears within functional limits.  -OC    VFSS completed. Patient presents with mild oropharyngeal dysphagia characterized by mistiming and decreased pharyngeal constriction. Patient demonstrates inconsistent mistiming with thin liquids, trace penetration with large bolus. Trace epiglottic undercoating nectar thick liquids X1. No penetration or aspiration with thin liquids small single sips, puree, mechanical soft, and regular textures. Patient demonstrates mild oral and pyriform residue with puree, able to clear with double swallow.  Functional mastication and bolus clearance with mechanical soft and regular textures.  Esophageal scan appears within functional limits.      Cardiac heart healthy    Activity at Discharge:  up with two person assist only.    Contact information for after-discharge care     Destination     [facility] .    Service:  Skilled Nursing  Contact information:  [address] [facility] [facility] [address] [phone]                 Durable Medical Equipment     [facility] .    Service:  Durable Medical Equipment  Contact information:  [address]  [facility] [address]  [phone]                 Home Medical Care     [facility] .    Service:  Home Health Services  Contact information:  [address] [place] [address] [phone]                       Total time spent discharging patient including evaluation, medication reconciliation, arranging follow up, and post hospitalization instructions and education total time exceeds 30 minutes.    Signed:  Chetan Eugene MD  3/20/2020  09:42

## 2020-03-20 NOTE — PLAN OF CARE
Problem: Patient Care Overview  Goal: Plan of Care Review  Outcome: Ongoing (interventions implemented as appropriate)  Flowsheets (Taken 3/20/2020 1154)  Progress: improving  Note:   Patient alert and oriented.  Discharged home with home health.  Family to transport.  VSS.  Will continue to monitor.     Problem: Skin Injury Risk (Adult)  Goal: Skin Health and Integrity  Outcome: Ongoing (interventions implemented as appropriate)  Flowsheets (Taken 3/20/2020 1154)  Skin Health and Integrity: making progress toward outcome     Problem: Fall Risk (Adult)  Goal: Absence of Fall  Outcome: Ongoing (interventions implemented as appropriate)  Flowsheets (Taken 3/20/2020 1154)  Absence of Fall: making progress toward outcome     Problem: Pain, Acute (Adult)  Goal: Acceptable Pain Control/Comfort Level  Outcome: Ongoing (interventions implemented as appropriate)  Flowsheets (Taken 3/20/2020 1154)  Acceptable Pain Control/Comfort Level: making progress toward outcome     Problem: Patient Care Overview  Goal: Plan of Care Review  Outcome: Ongoing (interventions implemented as appropriate)  Flowsheets (Taken 3/20/2020 1154)  Progress: improving  Note:   Patient alert and oriented.  Discharged home with home health.  Family to transport.  VSS.  Will continue to monitor.     Problem: Skin Injury Risk (Adult)  Goal: Skin Health and Integrity  Outcome: Ongoing (interventions implemented as appropriate)  Flowsheets (Taken 3/20/2020 1154)  Skin Health and Integrity: making progress toward outcome     Problem: Fall Risk (Adult)  Goal: Absence of Fall  Outcome: Ongoing (interventions implemented as appropriate)  Flowsheets (Taken 3/20/2020 1154)  Absence of Fall: making progress toward outcome     Problem: Pain, Acute (Adult)  Goal: Acceptable Pain Control/Comfort Level  Outcome: Ongoing (interventions implemented as appropriate)  Flowsheets (Taken 3/20/2020 1154)  Acceptable Pain Control/Comfort Level: making progress toward  outcome

## 2020-03-21 ENCOUNTER — READMISSION MANAGEMENT (OUTPATIENT)
Dept: CALL CENTER | Facility: HOSPITAL | Age: 85
End: 2020-03-21

## 2020-03-21 NOTE — OUTREACH NOTE
Prep Survey      Responses   [facility] patient discharged from?  [facility]   Is LACE score < 7 ?  No   Eligibility  Readm Mgmt   Discharge diagnosis  Acute saddle pulmonary embolism with acute cor pulmonale Acute hypoxic respiratory failure   Does the patient have one of the following disease processes/diagnoses(primary or secondary)?  Cardiothoracic surgery   Does the patient have Home health ordered?  Yes   [facility] agency?   [facility]     Is there a DME ordered?  Yes   What DME was ordered?  [facility] bed   Prep survey completed?  Yes          Lani Cardenas RN

## 2020-03-23 ENCOUNTER — NURSE TRIAGE (OUTPATIENT)
Dept: CALL CENTER | Facility: HOSPITAL | Age: 85
End: 2020-03-23

## 2020-03-23 ENCOUNTER — TELEPHONE (OUTPATIENT)
Dept: INTERNAL MEDICINE | Facility: CLINIC | Age: 85
End: 2020-03-23

## 2020-03-23 RX ORDER — SACCHAROMYCES BOULARDII 250 MG
250 CAPSULE ORAL 2 TIMES DAILY
Qty: 180 CAPSULE | Refills: 0 | Status: SHIPPED | OUTPATIENT
Start: 2020-03-23 | End: 2023-03-27

## 2020-03-23 NOTE — TELEPHONE ENCOUNTER
"    Reason for Disposition  • [1] Caller requesting NON-URGENT health information AND [2] PCP's office is the best resource    Additional Information  • Negative: [1] Caller is not with the adult (patient) AND [2] reporting urgent symptoms  • Negative: Lab result questions  • Negative: Medication questions  • Negative: Caller can't be reached by phone  • Negative: Caller has already spoken to PCP or another triager  • Negative: RN needs further essential information from caller in order to complete triage  • Negative: Requesting regular office appointment    Answer Assessment - Initial Assessment Questions  1. REASON FOR CALL or QUESTION: \"What is your reason for calling today?\" or \"How can I best help you?\" or \"What question do you have that I can help answer?\"      He was recently discharged from the hospital and they have not been giving probiotic. She was instructed to get this OTC. They also want to know if he can take something for constipation. They were instructed to call home health to get this ordered from [facility].    Protocols used: INFORMATION ONLY CALL-ADULT-      "

## 2020-03-23 NOTE — PROGRESS NOTES
Case Management Discharge Note      Final Note: Home with [facility] and 24/7 family support. Leigh Ann suarez/ccp    Provided Post Acute Provider List?: N/A  Provided Post Acute Provider Quality & Resource List?: Yes  Post Acute Provider Quality and Resource List: Home Health  Delivered To: Support Person  Method of Delivery: Telephone    Destination - Selection Complete      Service Provider Request Status Selected Services Address Phone Number Fax Number    [facility] Selected Skilled Nursing [address] [facility] [address] [phone]      Durable Medical Equipment - Selection Complete      Service Provider Request Status Selected Services Address Phone Number Fax Number    [facility] Selected Durable Medical Equipment [address] [facility] [facility] [address], [facility] [id number] [phone]      Dialysis/Infusion      No service has been selected for the patient.      Home Medical Care - Selection Complete      Service Provider Request Status Selected Services Address Phone Number Fax Number    [facility] Selected Home Health Services [address] [facility] [address] [phone]      Therapy      No service has been selected for the patient.      Community Resources      No service has been selected for the patient.        Transportation Services  Private: Car    Final Discharge Disposition Code: 06 - home with home health care

## 2020-03-23 NOTE — TELEPHONE ENCOUNTER
Yolanda from [facility] called for orders for the pt to have nursing and physical therapy once a week for three weeks

## 2020-03-23 NOTE — TELEPHONE ENCOUNTER
Pt was discharged from the hospital on Friday were he was given a dose of Florastor.  Pt's wife says he hasn't had a bowel movement since Friday when he left.  She asked for a rx for it.  I see it in the pt's chart so I'll send it over to his pharmacy

## 2020-03-25 ENCOUNTER — TELEPHONE (OUTPATIENT)
Dept: INTERNAL MEDICINE | Facility: CLINIC | Age: 85
End: 2020-03-25

## 2020-03-25 NOTE — TELEPHONE ENCOUNTER
Rozina from [facility] called for orders for the patient to have physical therapy once a week for one week, twice a week for five weeks, and then once a week for two weeks

## 2020-03-27 ENCOUNTER — READMISSION MANAGEMENT (OUTPATIENT)
Dept: CALL CENTER | Facility: HOSPITAL | Age: 85
End: 2020-03-27

## 2020-03-27 NOTE — OUTREACH NOTE
CT Surgery Week 1 Survey      Responses   [facility] patient discharged from?  [facility]   Does the patient have one of the following disease processes/diagnoses(primary or secondary)?  Cardiothoracic surgery   Is there a successful TCM telephone encounter documented?  No   Week 1 attempt successful?  Yes   Call start time  1237   Call end time  1239   Discharge diagnosis  Acute saddle pulmonary embolism with acute cor pulmonale Acute hypoxic respiratory failure   Is patient permission given to speak with other caregiver?  Yes   List who call center can speak with  Marolyn- Wife   Person spoke with today (if not patient) and relationship  Marolyn- Wife    Meds reviewed with patient/caregiver?  Yes   Is the patient having any side effects they believe may be caused by any medication additions or changes?  No   Does the patient have all medications related to this admission filled (includes all antibiotics, pain medications, cardiac medications, etc.)  Yes   Is the patient taking all medications as directed (includes completed medication regime)?  Yes   Does the patient have a primary care provider?   Yes   Does the patient have an appointment scheduled with their C/T surgeon?  Yes   Has the patient kept scheduled appointments due by today?  N/A   Has home health visited the patient within 72 hours of discharge?  Yes   Has all DME been delivered?  Yes   Psychosocial issues?  No   Did the patient receive a copy of their discharge instructions?  Yes   Nursing interventions  Reviewed instructions with patient   What is the patient's perception of their health status since discharge?  Improving   Nursing interventions  Nurse provided patient education   Is the patient/caregiver able to teach back normal signs of recovery?  Constipation, Pain or discomfort at incisional site   Nursing interventions  Reassured on normal signs of recovery   Is the patient /caregiver able to teach back basic post-op care?  Continue use  of incentive spirometry at least 1 week post discharge, Practice 'cough and deep breath'   Is the patient/caregiver able to teach back signs and symptoms of incisional infection?  Increased redness, swelling or pain at the incisonal site, Increased drainage or bleeding, Incisional warmth, Pus or odor from incision, Fever   Is the patient/caregiver able to teach back steps to recovery at home?  Set small, achievable goals for return to baseline health, Rest and rebuild strength, gradually increase activity   Is the patient/caregiver able to teach back the hierarchy of who to call/visit for symptoms/problems? PCP, Specialist, Home health nurse, Urgent Care, ED, 911  Yes   Week 1 call completed?  Yes            Jolynn Casey RN

## 2020-03-30 LAB — FUNGUS WND CULT: NORMAL

## 2020-04-01 ENCOUNTER — READMISSION MANAGEMENT (OUTPATIENT)
Dept: CALL CENTER | Facility: HOSPITAL | Age: 85
End: 2020-04-01

## 2020-04-01 ENCOUNTER — OFFICE VISIT (OUTPATIENT)
Dept: CARDIOLOGY | Facility: CLINIC | Age: 85
End: 2020-04-01

## 2020-04-01 VITALS
HEART RATE: 67 BPM | BODY MASS INDEX: 23.8 KG/M2 | HEIGHT: 71 IN | DIASTOLIC BLOOD PRESSURE: 60 MMHG | SYSTOLIC BLOOD PRESSURE: 101 MMHG | WEIGHT: 170 LBS
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
## 2020-04-01 DIAGNOSIS — I48.0 PAROXYSMAL ATRIAL FIBRILLATION (HCC): ICD-10-CM

## 2020-04-01 DIAGNOSIS — J18.9 HOSPITAL-ACQUIRED PNEUMONIA: ICD-10-CM

## 2020-04-01 DIAGNOSIS — I26.02 ACUTE SADDLE PULMONARY EMBOLISM WITH ACUTE COR PULMONALE (HCC): Primary | ICD-10-CM

## 2020-04-01 PROCEDURE — 99442 PR PHYS/QHP TELEPHONE EVALUATION 11-20 MIN: CPT | Performed by: INTERNAL MEDICINE

## 2020-04-01 NOTE — OUTREACH NOTE
CT Surgery Week 2 Survey      Responses   [facility] patient discharged from?  [place]   Does the patient have one of the following disease processes/diagnoses(primary or secondary)?  Cardiothoracic surgery   Week 2 attempt successful?  Yes   Call start time  1438   Call end time  1444   Discharge diagnosis  Acute saddle pulmonary embolism with acute cor pulmonale Acute hypoxic respiratory failure   Person spoke with today (if not patient) and relationship  Marolyn- Wife    Meds reviewed with patient/caregiver?  Yes   Does the patient have all medications related to this admission filled (includes all antibiotics, pain medications, cardiac medications, etc.)  Yes   Is the patient taking all medications as directed (includes completed medication regime)?  Yes   Does the patient have a primary care provider?   Yes   Has the patient kept scheduled appointments due by today?  Yes   Comments  telehealth appt today.    What is the [facility] health agency?   [facility]    Has home health visited the patient within 72 hours of discharge?  Yes   What DME was ordered?  [facility] bed   Psychosocial issues?  No   Comments  Pt walking throughout house several times daily, he is doing well. Requires assistance of walker and family. appetite is improving. No issue urinating and now taking med for constipation. Pt not having SOA per wife.Incision from chest tube is completely healed.    What is the patient's perception of their health status since discharge?  Improving   Is the patient /caregiver able to teach back basic post-op care?  Practice 'cough and deep breath', Continue use of incentive spirometry at least 1 week post discharge, No tub bath, swimming, or hot tub until instructed by MD, Do not remove steri-strips, Take showers only when approved by MD-sponge bathe until then   Is the patient/caregiver able to teach back steps to recovery at home?  Make a list of questions for surgeon's appointment, Eat a  well-balance diet, Practice good oral hygiene   Is the patient/caregiver able to teach back the hierarchy of who to call/visit for symptoms/problems? PCP, Specialist, Home health nurse, Urgent Care, ED, 911  Yes   Week 2 call completed?  Yes          Leila Riggins, RN

## 2020-04-01 NOTE — PROGRESS NOTES
He is 88 pretty elderly had a submassive pulmonary embolus we ended up taking him to the lab and treated him with [place] and it worked great he really got better he was really on the precipice of death from his pulmonary embolus now he had kind of a long hospitalization after it because he developed pulmonary infarct and he bled and possible pleural effusions I have to be tapped then he went into A. fib which is rate controlled and anticoagulated him his wife called today I did not talk with him but she says that she he has been at home they are taking care of him he is getting around okay his breathing seems all right he is not having any bleeding problems is not complaining of palpitations or tachycardia and in general I think she is pretty happy with how he is doing.    I am not can make any changes today I told him we will have him come back in a couple months we did see him and we will check an echo on him in a couple months and make sure his RV is recovered see what is now if he still is having A. fib problem if he is asymptomatic in a couple months with A. fib that we would just been a rate control and anticoagulate him he is I told him that he did to be very careful about being around other people about the virus and they understood so I will have him come back in June    This is a telehealth medicine visit I spoke with her for 10 minutes at the 5 minutes of charting

## 2020-04-02 ENCOUNTER — TELEPHONE (OUTPATIENT)
Dept: INTERNAL MEDICINE | Facility: CLINIC | Age: 85
End: 2020-04-02

## 2020-04-02 NOTE — TELEPHONE ENCOUNTER
"Patient's wife called and LVM stating that patient is confused and was told by the hospital that the a \"blood thinner\" he was taking was causing this issue, but she feels that his confusion is worse. Please advise.   "

## 2020-04-03 ENCOUNTER — LAB (OUTPATIENT)
Dept: LAB | Facility: HOSPITAL | Age: 85
End: 2020-04-03

## 2020-04-03 DIAGNOSIS — N39.0 URINARY TRACT INFECTION WITHOUT HEMATURIA, SITE UNSPECIFIED: ICD-10-CM

## 2020-04-03 DIAGNOSIS — N39.0 URINARY TRACT INFECTION WITHOUT HEMATURIA, SITE UNSPECIFIED: Primary | ICD-10-CM

## 2020-04-06 ENCOUNTER — READMISSION MANAGEMENT (OUTPATIENT)
Dept: CALL CENTER | Facility: HOSPITAL | Age: 85
End: 2020-04-06

## 2020-04-06 NOTE — OUTREACH NOTE
CT Surgery Week 3 Survey      Responses   [facility] patient discharged from?  [place]   Does the patient have one of the following disease processes/diagnoses(primary or secondary)?  Cardiothoracic surgery   Week 3 attempt successful?  Yes   Call start time  1225   Call end time  1233   Meds reviewed with patient/caregiver?  Yes   Is the patient taking all medications as directed (includes completed medication regime)?  Yes   Comments regarding appointments  Pt's wife reports multiple phone visits with MD's.   Has the patient kept scheduled appointments due by today?  Yes   What is the patient's perception of their health status since discharge?  Improving   Additional teach back comments  Pt's wife reports he is doing as well as can be expected. His appetite is wonderful and she is happy.   Week 3 call completed?  Yes          Dennise Victor RN

## 2020-04-10 DIAGNOSIS — I10 BENIGN ESSENTIAL HYPERTENSION: Primary | ICD-10-CM

## 2020-04-10 RX ORDER — FUROSEMIDE 40 MG/1
40 TABLET ORAL DAILY
Qty: 30 TABLET | Refills: 0 | Status: SHIPPED | OUTPATIENT
Start: 2020-04-10 | End: 2020-05-11

## 2020-04-13 LAB
MYCOBACTERIUM SPEC CULT: NORMAL
NIGHT BLUE STAIN TISS: NORMAL

## 2020-04-13 NOTE — TELEPHONE ENCOUNTER
PATIENT'S WIFE (MIKAL ON TEO) CALLED AND STATED THAT THEY HAD CALLED TO GET A REFILL ON THREE OF THE PATIENT'S MEDICATIONS, BUT THE ELIQUIS WAS NOT SENT IN (REFERENCE 04/10/2020 ENCOUNTERS).    PLEASE REFILL AT [facility] [address]    PLEASE ADVISE.

## 2020-04-14 ENCOUNTER — READMISSION MANAGEMENT (OUTPATIENT)
Dept: CALL CENTER | Facility: HOSPITAL | Age: 85
End: 2020-04-14

## 2020-04-14 NOTE — OUTREACH NOTE
CT Surgery Week 4 Survey      Responses   [facility] patient discharged from?  [place]   Does the patient have one of the following disease processes/diagnoses(primary or secondary)?  Cardiothoracic surgery   Week 4 attempt successful?  Yes   Call start time  0818   Call end time  0824   Person spoke with today (if not patient) and relationship  Marolyn- Wife    Meds reviewed with patient/caregiver?  Yes   Is the patient taking all medications as directed (includes completed medication regime)?  Yes   Has the patient kept scheduled appointments due by today?  Yes   Is the patient still receiving Home Health Services?  Yes   What is the patient's perception of their health status since discharge?  Improving   Week 4 Call Completed?  Yes   Would the patient like one additional call?  No   Graduated  Yes   Did the patient feel the follow up calls were helpful during their recovery period?  Yes   Was the number of calls appropriate?  Yes   Wrap up additional comments  Patient doing great.  He is walking with his walker and shaved himself this am.          Joceline Jameson, RN

## 2020-04-22 ENCOUNTER — OFFICE VISIT (OUTPATIENT)
Dept: INTERNAL MEDICINE | Facility: CLINIC | Age: 85
End: 2020-04-22

## 2020-04-22 DIAGNOSIS — I10 BENIGN ESSENTIAL HYPERTENSION: Chronic | ICD-10-CM

## 2020-04-22 DIAGNOSIS — E55.9 VITAMIN D DEFICIENCY: Chronic | ICD-10-CM

## 2020-04-22 DIAGNOSIS — D75.89 MACROCYTOSIS: Chronic | ICD-10-CM

## 2020-04-22 DIAGNOSIS — R60.0 BILATERAL LOWER EXTREMITY EDEMA: Chronic | ICD-10-CM

## 2020-04-22 DIAGNOSIS — E53.8 VITAMIN B12 DEFICIENCY: Chronic | ICD-10-CM

## 2020-04-22 DIAGNOSIS — E78.2 MIXED HYPERLIPIDEMIA: Chronic | ICD-10-CM

## 2020-04-22 DIAGNOSIS — Z86.73 HISTORY OF STROKE: ICD-10-CM

## 2020-04-22 DIAGNOSIS — G20 PARKINSON'S DISEASE (HCC): ICD-10-CM

## 2020-04-22 DIAGNOSIS — I26.02 ACUTE SADDLE PULMONARY EMBOLISM WITH ACUTE COR PULMONALE (HCC): ICD-10-CM

## 2020-04-22 DIAGNOSIS — Z09 HOSPITAL DISCHARGE FOLLOW-UP: Primary | ICD-10-CM

## 2020-04-22 DIAGNOSIS — R73.01 IMPAIRED FASTING GLUCOSE: Chronic | ICD-10-CM

## 2020-04-22 DIAGNOSIS — Z86.711 HISTORY OF PULMONARY EMBOLUS (PE): ICD-10-CM

## 2020-04-22 DIAGNOSIS — Z91.81 AT RISK FOR FALLS: ICD-10-CM

## 2020-04-22 DIAGNOSIS — Z87.39 HISTORY OF GOUT: Chronic | ICD-10-CM

## 2020-04-22 PROBLEM — R53.1 WEAKNESS: Status: RESOLVED | Noted: 2020-02-17 | Resolved: 2020-04-22

## 2020-04-22 PROBLEM — R53.1 GENERALIZED WEAKNESS: Status: RESOLVED | Noted: 2019-05-02 | Resolved: 2020-04-22

## 2020-04-22 PROBLEM — J86.9 EMPYEMA OF PLEURA: Status: RESOLVED | Noted: 2020-03-06 | Resolved: 2020-04-22

## 2020-04-22 PROBLEM — J69.0 ASPIRATION PNEUMONIA OF RIGHT LOWER LOBE DUE TO VOMIT: Status: RESOLVED | Noted: 2020-02-18 | Resolved: 2020-04-22
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
## 2020-04-22 PROBLEM — K52.9 COLITIS: Status: RESOLVED | Noted: 2020-02-10 | Resolved: 2020-04-22

## 2020-04-22 PROBLEM — R19.7 ACUTE DIARRHEA: Status: RESOLVED | Noted: 2020-02-03 | Resolved: 2020-04-22

## 2020-04-22 PROBLEM — J18.9 HOSPITAL-ACQUIRED PNEUMONIA: Status: RESOLVED | Noted: 2020-02-17 | Resolved: 2020-04-22

## 2020-04-22 PROBLEM — N39.0 ACUTE UTI (URINARY TRACT INFECTION): Status: RESOLVED | Noted: 2020-02-17 | Resolved: 2020-04-22

## 2020-04-22 PROCEDURE — 99214 OFFICE O/P EST MOD 30 MIN: CPT | Performed by: INTERNAL MEDICINE

## 2020-04-22 NOTE — PROGRESS NOTES
04/22/2020    Patient Information  Izaiah Garcia                                                                                          [address] [place] [id number]     Current outpatient and discharge medications have been reconciled for the patient.  Reviewed by: Uriah Godinez MD    8/31/1931  [unfilled]  There is no work phone number on file.    Chief Complaint:     Telephone visit for hospital follow-up due to recent COVID-19 pandemic and since patient cannot do a video follow-up due to technical reasons at home, we cannot formally bill for hospital follow-up.  Instead, this will be a general follow-up appointment to assess multiple medical problems.  No new complaints after discharge.    History of Present Illness:    Patient with a history of multiple medical problems including hyperlipidemia, Parkinson's disease, hypertension, gout, impaired fasting glucose, lower extremity edema, macrocytosis and vitamin B12 deficiency, recent hospitalization for acute saddle pulmonary embolism with cor pulmonale.  He presents today via telephone visit for a hospital discharge follow-up.  Patient has actually been out of the hospital for more than a month now and the hospital chart follow-up was delayed due to the recent COVID-19 pandemic.  Patient is unable to operate the computer so that he can do a video follow-up.  His wife reports that he is doing much better and his appetite is better.  He has physical therapy at home and is progressing well with a walker.  Also home health is coming to home periodically.  Patient's wife reports he has not had his B12 injection for at least 2 months.  Medications reviewed at length.    The history regarding recent hospitalization for acute saddle pulmonary embolism with cor pulmonale:    Admit date: 2/29/2020  Discharge date:3/20/2020  Discharged Condition: good     Discharge Diagnoses:    Acute saddle pulmonary embolism with acute cor pulmonale  (CMS/HCC)    Pulmonary emboli (CMS/HCC)    Empyema of pleura (CMS/HCC)  1.  Acute hypoxic respiratory failure  2. BPE s/p ekos catheter on 2/29/2020 with local TPA infusion with good clinical response  3. Troponemia suspect due to BPE  4. RV strain  5. Bilateral pleural effusion R greater than Left exudative with chest tube insertion on 3/9/2020  6. HLD  7. Esphageal stricture  8. Gen weakness  9. Parkinsons Disease  10. 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 HTN  13. Abnormal mass around thyroid   14. hypokalemia, corrected  15. Hypoalbuminemia  Hyponatremia, mild     Hospital Course:   Patient was readmitted after an hospital admission for pneumonia with evidence of massive pulmonary embolism, and was managed with EKOS with directed thrombolytic therapy low-dose with good clinical response.  Patient had pleural effusion, and after evaluation by thoracic surgery decision was to have radiology replaced the chest tube with a CT-guidance.  This was done on 3/9/2020 but the patient had output less than 200 cc over the first 24 hours, TPA was administered on 3/10/2020 with nearly 600 cc total output afterwards.  The right-sided chest tube was removed on 3/12/2020 when the output was minimal and patient had a CT-guided placement of left-sided small chest tube with 1200 cc output so far.  Patient is on the heparin drip which was discontinued only for few hours before the procedure and was resumed afterwards with no problem with bleeding.  He had his chest tube managed by thoracic surgery Dr Leon and did require instillation of TPA to help with loculations.  He had chest tube removed after improvement noted on his ct chest and was started on eliquis.  He was evaluated by cardiology neurology and thoracic surgery during his stay.     I had a strong discussion with the patients wife regarding dispo.  She is very worried that given his dementia that if he were not allowed visitors that he would become completely confused  agitated and dangerous for him.  She has a set up worked out between her large cohort of grandchildren to provide around the clock assistance and that they all live within a mile of each other.  I did let her know my concerns that one fall could be a catostrophic event for him given the risk of falls while on blood thinners.  She understands but thinks that the risks of him being socially isolated with advanced dementia is worse.   Will order home health pt and hospital bed etc.        Regarding disposition:  Patient's spouse due to COVID 19; she does not want patient to go to SNF at [facility] and feels like returning home would be best. CCP discussed at length with patient's spouse safety concerns of patient returning home. CCP reviewed PT notes with patient's spouse over the phone and discussed patient is not ambulating and requiring assistance x2 with standing. CCP discussed PT/CCP recommendations of SNF at discharge. Patient's spouse verbalized understanding of recommendations and safety concerns but still declined SNF at discharge. Patient's spouse states they have 4 grandsons that live within a mile of them and are willing to assist with bathing/showering. Patient's spouse states she does have a granddaughter in nursing school that will be able to assist as well.    ROS    Active Problems:    Patient Active Problem List   Diagnosis   • Hyperlipidemia   • Benign essential hypertension   • History of gout   • History of esophageal stricture   • History of stroke, 6/29/2016--4.9 x 4 x 3 cm inferior left cerebellar infarct   • Rheumatoid factor positive   • Impaired fasting glucose   • At risk for falls   • Bilateral high frequency sensorineural hearing loss, wears hearing aids   • Bilateral lower extremity edema   • Parkinson's disease (CMS/HCC)   • Therapeutic drug monitoring   • Vitamin D deficiency   • Macrocytosis   • Vitamin B12 deficiency   • History of pulmonary embolus (PE)   • Acute saddle pulmonary  "embolism with acute cor pulmonale (CMS/HCC)   • Hospital discharge follow-up         Past Medical History:   Diagnosis Date   • At risk for falls 5/2/2019   • Benign essential hypertension 6/29/2016   • Bilateral high frequency sensorineural hearing loss, wears hearing aids 5/2/2019   • Bilateral lower extremity edema 5/2/2019    May 2, 2019--patient who has hypertension and is on amlodipine 10 mg/day is complaining of progressively worsening swelling of the lower extremities that extends up to about mid calf.  Gets worse at the end of the day and tends to get better overnight when he props his feet up.  I think this is definitely related to the amlodipine although patient may have some venous insufficiency.  Medication ad   • Decreased peripheral vision of both eyes 5/2/2019    May 2, 2019--continues to have complaints of \"dizziness\" associated with weakness in his lower extremities.  He is not describing a spinning sensation or anything remotely close to vertigo.  Instead he is describing an off-balance sensation.  He tends to fall forward if not careful and he tends to list towards the right side.  He has marked difficulty going down an incline.  He has to ambulate wit   • History of aspiration pneumonia 5/4/2015   • History of esophageal stricture 5/4/2015    July 3, 2018--EGD revealed a distal esophageal stricture that was dilated to 18 mm.  There was a small hiatal hernia.  There was mild streaky erythema in the proximal stomach.  Duodenal bulb normal.  April 26, 2016--EGD performed for dysphagia reveals a distal esophageal stricture that was dilated 16.5 mm.   • History of gout 6/29/2016   • History of stroke, 6/29/2016--4.9 x 4 x 3 cm inferior left cerebellar infarct 5/4/2015 June 29, 2016--MRI of the brain performed for complaints of dizziness and weakness. IMPRESSION: 1. There is susceptibility artifact streaking through the anterior left frontal scalp through the anterior left frontal bone in the " "anterior tipof the left frontal lobe likely from a tiny piece of metal in the anterior left frontal scalp slightly limits evaluation of these structures. 2. There is mild s   • Hyperlipidemia 6/29/2016   • Impaired fasting glucose 5/2/2019 April 14, 2015--hemoglobin A1c 5.9.   • Macrocytosis 5/2/2019   • Parkinson's disease (CMS/HCC) 5/2/2019    May 2, 2019--continues to have complaints of \"dizziness\" associated with weakness in his lower extremities.  He is not describing a spinning sensation or anything remotely close to vertigo.  Instead he is describing an off-balance sensation.  He tends to fall forward if not careful and he tends to list towards the right side.  He has marked difficulty going down an incline.  He has to ambulate wit   • Rheumatoid factor positive 5/2/2019 March 25, 2014--rheumatoid factor returned positive at 95.  However, CCP antibodies normal at 8, SHIV negative, both C&P ANCA negative, C-reactive protein normal at 0.24, sed rate normal at 2.   • Vitamin B12 deficiency 6/6/2019 June 6, 2019--patient recently had mildly elevated methylmalonic acid of 380.  Repeat methylmalonic acid was upper limit of normal at 356.  Given patient's neurologic symptoms and suspected parkinsonism, I have a low threshold for treating vitamin B12 deficiency.  We will initiate vitamin B12 injections today.  2000 mcg subcutaneously given today.   • Vitamin D deficiency 5/2/2019         Past Surgical History:   Procedure Laterality Date   • CARDIAC CATHETERIZATION N/A 2/29/2020    Procedure: Thrombolytic Therapy- EKOS;  Surgeon: Jason Griffiths MD;  Location: [facility] INVASIVE LOCATION;  Service: Cardiovascular;  Laterality: N/A;   • CATARACT EXTRACTION EXTRACAPSULAR W/ INTRAOCULAR LENS IMPLANTATION Bilateral     Bilateral cataract extirpation with intraocular lens implantation   • CHOLECYSTECTOMY     • EKOS CATHETER PLACEMENT Bilateral 2/29/2020    Procedure: Ekos catheter placement;  Surgeon: Tunde" MD Jason;  Location: [facility] INVASIVE LOCATION;  Service: Cardiovascular;  Laterality: Bilateral;   • ESOPHAGEAL DILATATION  04/26/2016 April 26, 2016--EGD performed for dysphagia reveals a distal esophageal stricture that was dilated 16.5 mm.   • ESOPHAGEAL DILATATION  07/03/2018    July 3, 2018--EGD revealed a distal esophageal stricture that was dilated to 18 mm.  There was a small hiatal hernia.  There was mild streaky erythema in the proximal stomach.  Duodenal bulb normal.   • INTERVENTIONAL RADIOLOGY PROCEDURE Bilateral 2/29/2020    Procedure: Pulmonary Angiogram;  Surgeon: Jason Griffiths MD;  Location: [facility] INVASIVE LOCATION;  Service: Cardiovascular;  Laterality: Bilateral;         No Known Allergies        Current Outpatient Medications:   •  allopurinol (ZYLOPRIM) 300 MG tablet, Take 1 p.o. daily for gout, Disp: 90 tablet, Rfl: 3  •  apixaban (ELIQUIS) 5 MG tablet tablet, Take 1 tablet by mouth Every 12 (Twelve) Hours. Indications: DVT/PE (active thrombosis), Disp: 60 tablet, Rfl: 0  •  budesonide (PULMICORT) 0.5 MG/2ML nebulizer solution, Take 0.5 mg by nebulization 2 (Two) Times a Day., Disp: , Rfl:   •  carbidopa-levodopa (SINEMET)  MG per tablet, Take 1 tablet by mouth 2 (Two) Times a Day., Disp: , Rfl:   •  furosemide (LASIX) 40 MG tablet, Take 1 tablet by mouth Daily., Disp: 30 tablet, Rfl: 0  •  ipratropium-albuterol (DUO-NEB) 0.5-2.5 mg/3 ml nebulizer, Take 3 mL by nebulization 2 (Two) Times a Day., Disp: , Rfl:   •  lovastatin (MEVACOR) 20 MG tablet, Take 1 p.o. daily for high cholesterol, Disp: 90 tablet, Rfl: 3  •  metoprolol tartrate (LOPRESSOR) 25 MG tablet, Take 1 tablet by mouth Every 12 (Twelve) Hours., Disp: 60 tablet, Rfl: 0  •  ondansetron (ZOFRAN) 4 MG tablet, 1 p.o. every 6 to 8 hours as needed nausea, Disp: 20 tablet, Rfl: 0  •  saccharomyces boulardii (FLORASTOR) 250 MG capsule, Take 1 capsule by mouth 2 (Two) Times a Day., Disp: 180 capsule, Rfl:  0    Current Facility-Administered Medications:   •  cyanocobalamin injection 1,000 mcg, 1,000 mcg, Subcutaneous, Q30 Days, Uriah Godinez MD, 1,000 mcg at 02/07/20 1016      Family History   Problem Relation Age of Onset   • No Known Problems Mother    • No Known Problems Father          Social History     Socioeconomic History   • Marital status:      Spouse name: Not on file   • Number of children: 2   • Years of education: Not on file   • Highest education level: 9th grade   Social Needs   • Financial resource strain: Not hard at all   • Food insecurity:     Worry: Never true     Inability: Never true   • Transportation needs:     Medical: No     Non-medical: No   Tobacco Use   • Smoking status: Never Smoker   • Smokeless tobacco: Never Used   Substance and Sexual Activity   • Alcohol use: Never     Frequency: 2-4 times a month     Drinks per session: 1 or 2     Binge frequency: Never     Comment: occ   • Drug use: No   • Sexual activity: Not Currently     Partners: Female   Lifestyle   • Physical activity:     Days per week: 0 days     Minutes per session: 0 min   • Stress: Not at all   Relationships   • Social connections:     Talks on phone: Once a week     Gets together: Twice a week     Attends Christian service: Never     Active member of club or organization: Yes     Attends meetings of clubs or organizations: More than 4 times per year     Relationship status:          There were no vitals filed for this visit.     There is no height or weight on file to calculate BMI.      Physical Exam:    Telephone visit.  Obviously physical exam and vital signs not performed.    Lab/other results:    I reviewed documentation from the hospital including emergency room visit, history and physical, hospitalist, discharge summary, discharge medications, laboratory studies and radiographic studies while in the hospital, procedure note from the cardiologist.    Assessment/Plan:     Diagnosis Plan   1.  Hospital discharge follow-up     2. History of pulmonary embolus (PE)     3. Acute saddle pulmonary embolism with acute cor pulmonale (CMS/HCC)     4. Parkinson's disease (CMS/HCC)     5. At risk for falls     6. History of stroke, 6/29/2016--4.9 x 4 x 3 cm inferior left cerebellar infarct     7. Bilateral lower extremity edema     8. Impaired fasting glucose     9. Benign essential hypertension     10. Hyperlipidemia     11. History of gout     12. Vitamin B12 deficiency     13. Macrocytosis     14. Vitamin D deficiency       Patient with a history of multiple medical problems was evaluated today via telephone visit due to the recent COVID-19 pandemic.  He is progressing well and his appetite is good.  He is gaining strength and ambulating with physical therapy.  I reviewed his medications at length and we will make no changes.  I will make arrangements for patient to receive his vitamin B12 injections at home at least temporarily.  Patient has a follow-up appointment next month which we may need to move depending upon the status of the COVID-19 outbreak.    30 minutes was spent evaluating this patient.        Procedures

## 2020-05-08 DIAGNOSIS — I10 BENIGN ESSENTIAL HYPERTENSION: ICD-10-CM

## 2020-05-11 RX ORDER — APIXABAN 5 MG/1
TABLET, FILM COATED ORAL
Qty: 60 TABLET | Refills: 0 | Status: SHIPPED | OUTPATIENT
Start: 2020-05-11 | End: 2020-06-05

## 2020-05-11 RX ORDER — FUROSEMIDE 40 MG/1
TABLET ORAL
Qty: 30 TABLET | Refills: 0 | Status: SHIPPED | OUTPATIENT
Start: 2020-05-11 | End: 2020-06-05

## 2020-05-13 DIAGNOSIS — R73.01 IMPAIRED FASTING GLUCOSE: Chronic | ICD-10-CM

## 2020-05-13 DIAGNOSIS — E78.2 MIXED HYPERLIPIDEMIA: Chronic | ICD-10-CM

## 2020-05-13 DIAGNOSIS — E53.8 VITAMIN B12 DEFICIENCY: Chronic | ICD-10-CM

## 2020-05-13 DIAGNOSIS — D75.89 MACROCYTOSIS: Chronic | ICD-10-CM

## 2020-05-13 DIAGNOSIS — Z51.81 THERAPEUTIC DRUG MONITORING: ICD-10-CM

## 2020-05-18 ENCOUNTER — APPOINTMENT (OUTPATIENT)
Dept: LAB | Facility: HOSPITAL | Age: 85
End: 2020-05-18

## 2020-05-18 LAB
ALBUMIN SERPL-MCNC: 3.3 G/DL (ref 3.5–5.2)
ALBUMIN/GLOB SERPL: 0.9 G/DL
ALP SERPL-CCNC: 68 U/L (ref 39–117)
ALT SERPL W P-5'-P-CCNC: 6 U/L (ref 1–41)
ANION GAP SERPL CALCULATED.3IONS-SCNC: 9.3 MMOL/L (ref 5–15)
AST SERPL-CCNC: 17 U/L (ref 1–40)
BILIRUB SERPL-MCNC: 0.7 MG/DL (ref 0.2–1.2)
BUN BLD-MCNC: 18 MG/DL (ref 8–23)
BUN/CREAT SERPL: 20.5 (ref 7–25)
CALCIUM SPEC-SCNC: 9.2 MG/DL (ref 8.6–10.5)
CHLORIDE SERPL-SCNC: 101 MMOL/L (ref 98–107)
CO2 SERPL-SCNC: 26.7 MMOL/L (ref 22–29)
CREAT BLD-MCNC: 0.88 MG/DL (ref 0.76–1.27)
DEPRECATED RDW RBC AUTO: 44.5 FL (ref 37–54)
ERYTHROCYTE [DISTWIDTH] IN BLOOD BY AUTOMATED COUNT: 14.8 % (ref 12.3–15.4)
GFR SERPL CREATININE-BSD FRML MDRD: 82 ML/MIN/1.73
GLOBULIN UR ELPH-MCNC: 3.5 GM/DL
GLUCOSE BLD-MCNC: 113 MG/DL (ref 65–99)
HBA1C MFR BLD: 5.23 % (ref 4.8–5.6)
HCT VFR BLD AUTO: 40.6 % (ref 37.5–51)
HGB BLD-MCNC: 13.3 G/DL (ref 13–17.7)
MCH RBC QN AUTO: 27.7 PG (ref 26.6–33)
MCHC RBC AUTO-ENTMCNC: 32.8 G/DL (ref 31.5–35.7)
MCV RBC AUTO: 84.4 FL (ref 79–97)
PLATELET # BLD AUTO: 275 10*3/MM3 (ref 140–450)
PMV BLD AUTO: 11.2 FL (ref 6–12)
POTASSIUM BLD-SCNC: 4.3 MMOL/L (ref 3.5–5.2)
PROT SERPL-MCNC: 6.8 G/DL (ref 6–8.5)
RBC # BLD AUTO: 4.81 10*6/MM3 (ref 4.14–5.8)
SODIUM BLD-SCNC: 137 MMOL/L (ref 136–145)
T3FREE SERPL-MCNC: 2.8 PG/ML (ref 2–4.4)
T4 FREE SERPL-MCNC: 1.57 NG/DL (ref 0.93–1.7)
TSH SERPL DL<=0.05 MIU/L-ACNC: 0.57 UIU/ML (ref 0.27–4.2)
WBC NRBC COR # BLD: 10.42 10*3/MM3 (ref 3.4–10.8)

## 2020-05-18 PROCEDURE — 83704 LIPOPROTEIN BLD QUAN PART: CPT | Performed by: INTERNAL MEDICINE

## 2020-05-18 PROCEDURE — 84481 FREE ASSAY (FT-3): CPT | Performed by: INTERNAL MEDICINE

## 2020-05-18 PROCEDURE — 84443 ASSAY THYROID STIM HORMONE: CPT | Performed by: INTERNAL MEDICINE

## 2020-05-18 PROCEDURE — 83036 HEMOGLOBIN GLYCOSYLATED A1C: CPT | Performed by: INTERNAL MEDICINE

## 2020-05-18 PROCEDURE — 80061 LIPID PANEL: CPT | Performed by: INTERNAL MEDICINE

## 2020-05-18 PROCEDURE — 85027 COMPLETE CBC AUTOMATED: CPT | Performed by: INTERNAL MEDICINE

## 2020-05-18 PROCEDURE — 80053 COMPREHEN METABOLIC PANEL: CPT | Performed by: INTERNAL MEDICINE

## 2020-05-18 PROCEDURE — 36415 COLL VENOUS BLD VENIPUNCTURE: CPT

## 2020-05-18 PROCEDURE — 84439 ASSAY OF FREE THYROXINE: CPT | Performed by: INTERNAL MEDICINE

## 2020-05-19 LAB
CHOLEST SERPL-MCNC: 144 MG/DL (ref 100–199)
HDL SERPL-SCNC: 24.3 UMOL/L
HDLC SERPL-MCNC: 33 MG/DL
LDL-P: 901 NMOL/L
LDLC REAL SIZE PAT SERPL: 20.7 NM
LDLC SERPL CALC-MCNC: 84 MG/DL (ref 0–99)
SMALL LDL-P: 414 NMOL/L
TRIGL SERPL-MCNC: 134 MG/DL (ref 0–149)

## 2020-05-27 ENCOUNTER — OFFICE VISIT (OUTPATIENT)
Dept: INTERNAL MEDICINE | Facility: CLINIC | Age: 85
End: 2020-05-27

## 2020-05-27 VITALS
SYSTOLIC BLOOD PRESSURE: 114 MMHG | BODY MASS INDEX: 25.9 KG/M2 | WEIGHT: 185 LBS | DIASTOLIC BLOOD PRESSURE: 50 MMHG | HEIGHT: 71 IN | OXYGEN SATURATION: 98 % | HEART RATE: 56 BPM

## 2020-05-27 DIAGNOSIS — E55.9 VITAMIN D DEFICIENCY: Chronic | ICD-10-CM

## 2020-05-27 DIAGNOSIS — R60.0 BILATERAL LOWER EXTREMITY EDEMA: Chronic | ICD-10-CM

## 2020-05-27 DIAGNOSIS — E78.2 MIXED HYPERLIPIDEMIA: Chronic | ICD-10-CM

## 2020-05-27 DIAGNOSIS — E53.8 VITAMIN B12 DEFICIENCY: Chronic | ICD-10-CM

## 2020-05-27 DIAGNOSIS — R73.01 IMPAIRED FASTING GLUCOSE: Chronic | ICD-10-CM

## 2020-05-27 DIAGNOSIS — I26.02 ACUTE SADDLE PULMONARY EMBOLISM WITH ACUTE COR PULMONALE (HCC): ICD-10-CM

## 2020-05-27 DIAGNOSIS — D75.89 MACROCYTOSIS: Chronic | ICD-10-CM

## 2020-05-27 DIAGNOSIS — Z86.73 HISTORY OF STROKE: ICD-10-CM

## 2020-05-27 DIAGNOSIS — Z91.81 AT RISK FOR FALLS: ICD-10-CM

## 2020-05-27 DIAGNOSIS — Z00.00 MEDICARE ANNUAL WELLNESS VISIT, SUBSEQUENT: Primary | ICD-10-CM

## 2020-05-27 DIAGNOSIS — G20 PARKINSON'S DISEASE (HCC): ICD-10-CM

## 2020-05-27 DIAGNOSIS — Z87.39 HISTORY OF GOUT: Chronic | ICD-10-CM

## 2020-05-27 DIAGNOSIS — Z79.01 CHRONIC ANTICOAGULATION: ICD-10-CM

## 2020-05-27 DIAGNOSIS — Z51.81 THERAPEUTIC DRUG MONITORING: ICD-10-CM

## 2020-05-27 DIAGNOSIS — I10 BENIGN ESSENTIAL HYPERTENSION: Chronic | ICD-10-CM

## 2020-05-27 DIAGNOSIS — Z86.711 HISTORY OF PULMONARY EMBOLUS (PE): ICD-10-CM

## 2020-05-27 PROBLEM — Z09 HOSPITAL DISCHARGE FOLLOW-UP: Status: RESOLVED | Noted: 2020-04-22 | Resolved: 2020-05-27

## 2020-05-27 PROCEDURE — 99214 OFFICE O/P EST MOD 30 MIN: CPT | Performed by: INTERNAL MEDICINE

## 2020-05-27 PROCEDURE — 96160 PT-FOCUSED HLTH RISK ASSMT: CPT | Performed by: INTERNAL MEDICINE

## 2020-05-27 PROCEDURE — G0439 PPPS, SUBSEQ VISIT: HCPCS | Performed by: INTERNAL MEDICINE

## 2020-05-27 NOTE — PROGRESS NOTES
05/27/2020    Patient Information  Izaiah Garcia                                                                                          [address] [facility] [address]      8/31/1931  [unfilled]  There is no work phone number on file.    Chief Complaint:     Subsequent Medicare wellness visit.  Follow-up lab work in order to monitor several chronic medical issues listed in history of present illness.    History of Present Illness:    Patient with a fairly recent history of acute saddle pulmonary embolism with cor pulmonale that was treated with intervention/thrombolytics successfully.  This was performed by the cardiology service.  He has a history of impaired fasting glucose, hyperlipidemia, hypertension, history of gout, macrocytosis, vitamin B12 deficiency, history of stroke, Parkinson's disease and is at risk for falls, bilateral lower extremity edema and vitamin D deficiency.  He presents today with lab prior in order to monitor his chronic medical issues but he also needs his subsequent Medicare wellness visit.  He is feeling fairly well and beginning to progress well after his extensive hospitalization for an acute pulmonary embolism.  I performed his hospital follow-up visit via telephone not long ago.  Past medical history reviewed and updated were necessary including health maintenance parameters.  This reveals he will be up-to-date or else accounted for after today's visit.    Review of Systems   Constitution: Negative.   HENT: Negative.    Eyes: Negative.    Cardiovascular: Negative.    Respiratory: Negative.    Endocrine: Negative.    Hematologic/Lymphatic: Negative.    Skin: Negative.    Musculoskeletal: Negative.    Gastrointestinal: Negative.    Genitourinary: Negative.    Neurological: Positive for disturbances in coordination and loss of balance.   Psychiatric/Behavioral: Negative.    Allergic/Immunologic: Negative.        Active Problems:    Patient Active Problem List  "  Diagnosis   • Hyperlipidemia   • Benign essential hypertension   • History of gout   • History of esophageal stricture   • History of stroke, 6/29/2016--4.9 x 4 x 3 cm inferior left cerebellar infarct   • Rheumatoid factor positive   • Impaired fasting glucose   • At risk for falls   • Bilateral high frequency sensorineural hearing loss, wears hearing aids   • Bilateral lower extremity edema   • Parkinson's disease (CMS/HCC)   • Therapeutic drug monitoring   • Vitamin D deficiency   • Macrocytosis   • Vitamin B12 deficiency   • History of pulmonary embolus (PE)   • Acute saddle pulmonary embolism with acute cor pulmonale (CMS/HCC)         Past Medical History:   Diagnosis Date   • At risk for falls 5/2/2019   • Benign essential hypertension 6/29/2016   • Bilateral high frequency sensorineural hearing loss, wears hearing aids 5/2/2019   • Bilateral lower extremity edema 5/2/2019    May 2, 2019--patient who has hypertension and is on amlodipine 10 mg/day is complaining of progressively worsening swelling of the lower extremities that extends up to about mid calf.  Gets worse at the end of the day and tends to get better overnight when he props his feet up.  I think this is definitely related to the amlodipine although patient may have some venous insufficiency.  Medication ad   • Decreased peripheral vision of both eyes 5/2/2019    May 2, 2019--continues to have complaints of \"dizziness\" associated with weakness in his lower extremities.  He is not describing a spinning sensation or anything remotely close to vertigo.  Instead he is describing an off-balance sensation.  He tends to fall forward if not careful and he tends to list towards the right side.  He has marked difficulty going down an incline.  He has to ambulate wit   • History of aspiration pneumonia 5/4/2015   • History of esophageal stricture 5/4/2015    July 3, 2018--EGD revealed a distal esophageal stricture that was dilated to 18 mm.  There was a " "small hiatal hernia.  There was mild streaky erythema in the proximal stomach.  Duodenal bulb normal.  April 26, 2016--EGD performed for dysphagia reveals a distal esophageal stricture that was dilated 16.5 mm.   • History of gout 6/29/2016   • History of stroke, 6/29/2016--4.9 x 4 x 3 cm inferior left cerebellar infarct 5/4/2015 June 29, 2016--MRI of the brain performed for complaints of dizziness and weakness. IMPRESSION: 1. There is susceptibility artifact streaking through the anterior left frontal scalp through the anterior left frontal bone in the anterior tipof the left frontal lobe likely from a tiny piece of metal in the anterior left frontal scalp slightly limits evaluation of these structures. 2. There is mild s   • Hyperlipidemia 6/29/2016   • Impaired fasting glucose 5/2/2019 April 14, 2015--hemoglobin A1c 5.9.   • Macrocytosis 5/2/2019   • Parkinson's disease (CMS/HCC) 5/2/2019    May 2, 2019--continues to have complaints of \"dizziness\" associated with weakness in his lower extremities.  He is not describing a spinning sensation or anything remotely close to vertigo.  Instead he is describing an off-balance sensation.  He tends to fall forward if not careful and he tends to list towards the right side.  He has marked difficulty going down an incline.  He has to ambulate wit   • Rheumatoid factor positive 5/2/2019 March 25, 2014--rheumatoid factor returned positive at 95.  However, CCP antibodies normal at 8, SHIV negative, both C&P ANCA negative, C-reactive protein normal at 0.24, sed rate normal at 2.   • Vitamin B12 deficiency 6/6/2019 June 6, 2019--patient recently had mildly elevated methylmalonic acid of 380.  Repeat methylmalonic acid was upper limit of normal at 356.  Given patient's neurologic symptoms and suspected parkinsonism, I have a low threshold for treating vitamin B12 deficiency.  We will initiate vitamin B12 injections today.  2000 mcg subcutaneously given today.   • " Vitamin D deficiency 5/2/2019         Past Surgical History:   Procedure Laterality Date   • CARDIAC CATHETERIZATION N/A 2/29/2020    Procedure: Thrombolytic Therapy- EKOS;  Surgeon: Jason Griffiths MD;  Location: [facility] INVASIVE LOCATION;  Service: Cardiovascular;  Laterality: N/A;   • CATARACT EXTRACTION EXTRACAPSULAR W/ INTRAOCULAR LENS IMPLANTATION Bilateral     Bilateral cataract extirpation with intraocular lens implantation   • CHOLECYSTECTOMY     • EKOS CATHETER PLACEMENT Bilateral 2/29/2020    Procedure: Ekos catheter placement;  Surgeon: Jason Griffiths MD;  Location: [facility] INVASIVE LOCATION;  Service: Cardiovascular;  Laterality: Bilateral;   • ESOPHAGEAL DILATATION  04/26/2016 April 26, 2016--EGD performed for dysphagia reveals a distal esophageal stricture that was dilated 16.5 mm.   • ESOPHAGEAL DILATATION  07/03/2018    July 3, 2018--EGD revealed a distal esophageal stricture that was dilated to 18 mm.  There was a small hiatal hernia.  There was mild streaky erythema in the proximal stomach.  Duodenal bulb normal.   • INTERVENTIONAL RADIOLOGY PROCEDURE Bilateral 2/29/2020    Procedure: Pulmonary Angiogram;  Surgeon: Jason Griffiths MD;  Location: [facility] INVASIVE LOCATION;  Service: Cardiovascular;  Laterality: Bilateral;         No Known Allergies        Current Outpatient Medications:   •  allopurinol (ZYLOPRIM) 300 MG tablet, Take 1 p.o. daily for gout, Disp: 90 tablet, Rfl: 3  •  budesonide (PULMICORT) 0.5 MG/2ML nebulizer solution, Take 0.5 mg by nebulization 2 (Two) Times a Day., Disp: , Rfl:   •  carbidopa-levodopa (SINEMET)  MG per tablet, Take 1 tablet by mouth 2 (Two) Times a Day., Disp: , Rfl:   •  ELIQUIS 5 MG tablet tablet, TAKE ONE TABLET BY MOUTH EVERY 12 HOURS, Disp: 60 tablet, Rfl: 0  •  furosemide (LASIX) 40 MG tablet, TAKE ONE TABLET BY MOUTH DAILY, Disp: 30 tablet, Rfl: 0  •  ipratropium-albuterol (DUO-NEB) 0.5-2.5 mg/3 ml nebulizer, Take 3 mL by  nebulization 2 (Two) Times a Day., Disp: , Rfl:   •  lovastatin (MEVACOR) 20 MG tablet, Take 1 p.o. daily for high cholesterol, Disp: 90 tablet, Rfl: 3  •  metoprolol tartrate (LOPRESSOR) 25 MG tablet, TAKE ONE TABLET BY MOUTH EVERY 12 HOURS, Disp: 60 tablet, Rfl: 0  •  ondansetron (ZOFRAN) 4 MG tablet, 1 p.o. every 6 to 8 hours as needed nausea, Disp: 20 tablet, Rfl: 0  •  saccharomyces boulardii (FLORASTOR) 250 MG capsule, Take 1 capsule by mouth 2 (Two) Times a Day., Disp: 180 capsule, Rfl: 0    Current Facility-Administered Medications:   •  cyanocobalamin injection 1,000 mcg, 1,000 mcg, Subcutaneous, Q30 Days, Uriah Godinez MD, 1,000 mcg at 02/07/20 1016      Family History   Problem Relation Age of Onset   • No Known Problems Mother    • No Known Problems Father          Social History     Socioeconomic History   • Marital status:      Spouse name: Not on file   • Number of children: 2   • Years of education: Not on file   • Highest education level: 9th grade   Social Needs   • Financial resource strain: Not hard at all   • Food insecurity:     Worry: Never true     Inability: Never true   • Transportation needs:     Medical: No     Non-medical: No   Tobacco Use   • Smoking status: Never Smoker   • Smokeless tobacco: Never Used   Substance and Sexual Activity   • Alcohol use: Never     Frequency: 2-4 times a month     Drinks per session: 1 or 2     Binge frequency: Never     Comment: occ   • Drug use: No   • Sexual activity: Not Currently     Partners: Female   Lifestyle   • Physical activity:     Days per week: 0 days     Minutes per session: 0 min   • Stress: Not at all   Relationships   • Social connections:     Talks on phone: Once a week     Gets together: Twice a week     Attends [employer] service: Never     Active member of club or organization: Yes     Attends meetings of clubs or organizations: More than 4 times per year     Relationship status:          Vitals:    05/27/20 0833  "  BP: 114/50   Pulse: 56   SpO2: 98%   Weight: 83.9 kg (185 lb)   Height: 180.3 cm (70.98\")        Body mass index is 25.81 kg/m².      Physical Exam:    General: Alert and oriented x 3.  No acute distress.  Normal affect.  HEENT: Pupils equal, round, reactive to light; extraocular movements intact; sclerae nonicteric; pharynx, ear canals and TMs normal.  Neck: Without JVD, thyromegaly, bruit, or adenopathy.  Lungs: Clear to auscultation in all fields.  Heart: Regular rate and rhythm without murmur, rub, gallop, or click.  Abdomen: Soft, nontender, without hepatosplenomegaly or hernia.  Bowel sounds normal.  : Deferred.  Rectal: Deferred.  Extremities: Without clubbing, cyanosis, edema, or pulse deficit.  Neurologic: Intact without focal deficit.  Patient ambulates with a wheeled walker assistance and uses a broad-based somewhat ataxic gait.  Skin: Without significant lesion.  Musculoskeletal: Unremarkable.    Lab/other results:    NMR is perfect other than HDL particle number low at 24.3.  Total cholesterol is 144.  CMP normal except glucose 113.  Hemoglobin A1c excellent at 5.23.  CBC normal.  Thyroid function test normal.    Assessment/Plan:     Diagnosis Plan   1. Medicare annual wellness visit, subsequent     2. History of pulmonary embolus (PE)     3. Acute saddle pulmonary embolism with acute cor pulmonale (CMS/HCC)     4. Impaired fasting glucose     5. Hyperlipidemia     6. Benign essential hypertension     7. History of gout     8. Macrocytosis     9. Vitamin B12 deficiency     10. History of stroke, 6/29/2016--4.9 x 4 x 3 cm inferior left cerebellar infarct     11. Parkinson's disease (CMS/HCC)     12. At risk for falls     13. Bilateral lower extremity edema     14. Vitamin D deficiency     15. Therapeutic drug monitoring       The subsequent Medicare wellness visit is documented on separate note.    Patient was recently admitted to the hospital with a severe life-threatening acute saddle pulmonary " embolism there was successfully treated by the cardiology service with intervention/thrombolytics.  Patient is progressing well since his discharge.  His impaired fasting glucose seems to have definitely improved and this is a result of weight loss there was likely due to his acute illness.  Hyperlipidemia is under excellent control.  His blood pressure is well controlled.  He has a history of gout with no recent episodes.  He has macrocytosis which has improved with vitamin B12 supplementation.  He has a history of stroke back in 2016 and also has Parkinson's disease which is managed by the neurologist.  He is at risk for falls and I strongly recommended use of his walker.  Lower extremity edema currently not an issue.  Vitamin D in the normal range.    Plan is as follows: No change in current medical regimen.  Patient will follow-up in 6 months with lab prior or follow-up as needed.    Procedures

## 2020-06-02 ENCOUNTER — TELEPHONE (OUTPATIENT)
Dept: CARDIOLOGY | Facility: CLINIC | Age: 85
End: 2020-06-02

## 2020-06-03 ENCOUNTER — OFFICE VISIT (OUTPATIENT)
Dept: CARDIOLOGY | Facility: CLINIC | Age: 85
End: 2020-06-03

## 2020-06-03 ENCOUNTER — HOSPITAL ENCOUNTER (OUTPATIENT)
Dept: CARDIOLOGY | Facility: HOSPITAL | Age: 85
Discharge: HOME | End: 2020-06-03
Admitting: INTERNAL MEDICINE

## 2020-06-03 VITALS
DIASTOLIC BLOOD PRESSURE: 60 MMHG | SYSTOLIC BLOOD PRESSURE: 122 MMHG | RESPIRATION RATE: 18 BRPM | BODY MASS INDEX: 25.8 KG/M2 | OXYGEN SATURATION: 96 % | HEART RATE: 56 BPM | HEIGHT: 71 IN

## 2020-06-03 VITALS
DIASTOLIC BLOOD PRESSURE: 62 MMHG | HEART RATE: 70 BPM | WEIGHT: 188 LBS | HEIGHT: 71 IN | SYSTOLIC BLOOD PRESSURE: 156 MMHG | BODY MASS INDEX: 26.32 KG/M2

## 2020-06-03 DIAGNOSIS — G20 PARKINSON'S DISEASE (HCC): Chronic | ICD-10-CM

## 2020-06-03 DIAGNOSIS — I10 BENIGN ESSENTIAL HYPERTENSION: Primary | Chronic | ICD-10-CM

## 2020-06-03 DIAGNOSIS — Z91.81 AT RISK FOR FALLS: Chronic | ICD-10-CM

## 2020-06-03 DIAGNOSIS — Z51.81 THERAPEUTIC DRUG MONITORING: ICD-10-CM

## 2020-06-03 DIAGNOSIS — Z79.01 CHRONIC ANTICOAGULATION: Chronic | ICD-10-CM

## 2020-06-03 DIAGNOSIS — H90.3 BILATERAL HIGH FREQUENCY SENSORINEURAL HEARING LOSS: Chronic | ICD-10-CM

## 2020-06-03 DIAGNOSIS — I26.02 ACUTE SADDLE PULMONARY EMBOLISM WITH ACUTE COR PULMONALE (HCC): ICD-10-CM

## 2020-06-03 DIAGNOSIS — Z86.73 HISTORY OF STROKE: ICD-10-CM

## 2020-06-03 DIAGNOSIS — I26.99 ACUTE MASSIVE PULMONARY EMBOLISM (HCC): ICD-10-CM

## 2020-06-03 DIAGNOSIS — I48.0 PAROXYSMAL A-FIB (HCC): ICD-10-CM

## 2020-06-03 DIAGNOSIS — Z86.711 HISTORY OF PULMONARY EMBOLUS (PE): ICD-10-CM

## 2020-06-03 LAB
ASCENDING AORTA: 3.3 CM
BH CV ECHO MEAS - ACS: 1.8 CM
BH CV ECHO MEAS - AO MAX PG (FULL): 1.5 MMHG
BH CV ECHO MEAS - AO MAX PG: 4.8 MMHG
BH CV ECHO MEAS - AO MEAN PG (FULL): 1 MMHG
BH CV ECHO MEAS - AO MEAN PG: 3 MMHG
BH CV ECHO MEAS - AO ROOT AREA (BSA CORRECTED): 1.4
BH CV ECHO MEAS - AO ROOT AREA: 7.1 CM^2
BH CV ECHO MEAS - AO ROOT DIAM: 3 CM
BH CV ECHO MEAS - AO V2 MAX: 110 CM/SEC
BH CV ECHO MEAS - AO V2 MEAN: 75.6 CM/SEC
BH CV ECHO MEAS - AO V2 VTI: 25.2 CM
BH CV ECHO MEAS - ASC AORTA: 3.3 CM
BH CV ECHO MEAS - AVA(I,A): 2.7 CM^2
BH CV ECHO MEAS - AVA(I,D): 2.7 CM^2
BH CV ECHO MEAS - AVA(V,A): 2.6 CM^2
BH CV ECHO MEAS - AVA(V,D): 2.6 CM^2
BH CV ECHO MEAS - BSA(HAYCOCK): 2.1 M^2
BH CV ECHO MEAS - BSA: 2.1 M^2
BH CV ECHO MEAS - BZI_BMI: 27.6 KILOGRAMS/M^2
BH CV ECHO MEAS - BZI_METRIC_HEIGHT: 180.3 CM
BH CV ECHO MEAS - BZI_METRIC_WEIGHT: 89.8 KG
BH CV ECHO MEAS - EDV(MOD-SP2): 57 ML
BH CV ECHO MEAS - EDV(MOD-SP4): 82 ML
BH CV ECHO MEAS - EDV(TEICH): 92.4 ML
BH CV ECHO MEAS - EF(CUBED): 75.9 %
BH CV ECHO MEAS - EF(MOD-BP): 65 %
BH CV ECHO MEAS - EF(MOD-SP2): 64.9 %
BH CV ECHO MEAS - EF(MOD-SP4): 64.6 %
BH CV ECHO MEAS - EF(TEICH): 68 %
BH CV ECHO MEAS - ESV(MOD-SP2): 20 ML
BH CV ECHO MEAS - ESV(MOD-SP4): 29 ML
BH CV ECHO MEAS - ESV(TEICH): 29.6 ML
BH CV ECHO MEAS - FS: 37.8 %
BH CV ECHO MEAS - IVS/LVPW: 1
BH CV ECHO MEAS - IVSD: 1.2 CM
BH CV ECHO MEAS - LAT PEAK E' VEL: 9 CM/SEC
BH CV ECHO MEAS - LV DIASTOLIC VOL/BSA (35-75): 39 ML/M^2
BH CV ECHO MEAS - LV MASS(C)D: 198.1 GRAMS
BH CV ECHO MEAS - LV MASS(C)DI: 94.3 GRAMS/M^2
BH CV ECHO MEAS - LV MAX PG: 3.3 MMHG
BH CV ECHO MEAS - LV MEAN PG: 2 MMHG
BH CV ECHO MEAS - LV SYSTOLIC VOL/BSA (12-30): 13.8 ML/M^2
BH CV ECHO MEAS - LV V1 MAX: 91.4 CM/SEC
BH CV ECHO MEAS - LV V1 MEAN: 68.7 CM/SEC
BH CV ECHO MEAS - LV V1 VTI: 21.8 CM
BH CV ECHO MEAS - LVIDD: 4.5 CM
BH CV ECHO MEAS - LVIDS: 2.8 CM
BH CV ECHO MEAS - LVLD AP2: 7.2 CM
BH CV ECHO MEAS - LVLD AP4: 7.3 CM
BH CV ECHO MEAS - LVLS AP2: 5.8 CM
BH CV ECHO MEAS - LVLS AP4: 6 CM
BH CV ECHO MEAS - LVOT AREA (M): 3.1 CM^2
BH CV ECHO MEAS - LVOT AREA: 3.1 CM^2
BH CV ECHO MEAS - LVOT DIAM: 2 CM
BH CV ECHO MEAS - LVPWD: 1.2 CM
BH CV ECHO MEAS - MED PEAK E' VEL: 7 CM/SEC
BH CV ECHO MEAS - MV A DUR: 0.11 SEC
BH CV ECHO MEAS - MV A MAX VEL: 84.4 CM/SEC
BH CV ECHO MEAS - MV DEC SLOPE: 259 CM/SEC^2
BH CV ECHO MEAS - MV DEC TIME: 0.3 SEC
BH CV ECHO MEAS - MV E MAX VEL: 52.3 CM/SEC
BH CV ECHO MEAS - MV E/A: 0.62
BH CV ECHO MEAS - MV MAX PG: 3.5 MMHG
BH CV ECHO MEAS - MV MEAN PG: 1 MMHG
BH CV ECHO MEAS - MV P1/2T MAX VEL: 70.4 CM/SEC
BH CV ECHO MEAS - MV P1/2T: 79.6 MSEC
BH CV ECHO MEAS - MV V2 MAX: 93.1 CM/SEC
BH CV ECHO MEAS - MV V2 MEAN: 55.1 CM/SEC
BH CV ECHO MEAS - MV V2 VTI: 29.4 CM
BH CV ECHO MEAS - MVA P1/2T LCG: 3.1 CM^2
BH CV ECHO MEAS - MVA(P1/2T): 2.8 CM^2
BH CV ECHO MEAS - MVA(VTI): 2.3 CM^2
BH CV ECHO MEAS - PA MAX PG (FULL): 0.71 MMHG
BH CV ECHO MEAS - PA MAX PG: 2.6 MMHG
BH CV ECHO MEAS - PA V2 MAX: 79.9 CM/SEC
BH CV ECHO MEAS - PULM A REVS DUR: 0.12 SEC
BH CV ECHO MEAS - PULM A REVS VEL: 32.7 CM/SEC
BH CV ECHO MEAS - PULM DIAS VEL: 38.8 CM/SEC
BH CV ECHO MEAS - PULM S/D: 1.6
BH CV ECHO MEAS - PULM SYS VEL: 60.3 CM/SEC
BH CV ECHO MEAS - PVA(V,A): 5.2 CM^2
BH CV ECHO MEAS - PVA(V,D): 5.2 CM^2
BH CV ECHO MEAS - QP/QS: 1.4
BH CV ECHO MEAS - RV BASE (<4.1) - OBSOLETE: 3.3 CM
BH CV ECHO MEAS - RV LENGTH (<8.5) - OBSOLETE: 6.7 CM
BH CV ECHO MEAS - RV MAX PG: 1.8 MMHG
BH CV ECHO MEAS - RV MEAN PG: 1 MMHG
BH CV ECHO MEAS - RV V1 MAX: 67.9 CM/SEC
BH CV ECHO MEAS - RV V1 MEAN: 46.2 CM/SEC
BH CV ECHO MEAS - RV V1 VTI: 16 CM
BH CV ECHO MEAS - RVOT AREA: 6.2 CM^2
BH CV ECHO MEAS - RVOT DIAM: 2.8 CM
BH CV ECHO MEAS - SI(AO): 84.8 ML/M^2
BH CV ECHO MEAS - SI(CUBED): 32.9 ML/M^2
BH CV ECHO MEAS - SI(LVOT): 32.6 ML/M^2
BH CV ECHO MEAS - SI(MOD-SP2): 17.6 ML/M^2
BH CV ECHO MEAS - SI(MOD-SP4): 25.2 ML/M^2
BH CV ECHO MEAS - SI(TEICH): 30 ML/M^2
BH CV ECHO MEAS - SUP REN AO DIAM: 1.7 CM
BH CV ECHO MEAS - SV(AO): 178.1 ML
BH CV ECHO MEAS - SV(CUBED): 69.2 ML
BH CV ECHO MEAS - SV(LVOT): 68.5 ML
BH CV ECHO MEAS - SV(MOD-SP2): 37 ML
BH CV ECHO MEAS - SV(MOD-SP4): 53 ML
BH CV ECHO MEAS - SV(RVOT): 98.5 ML
BH CV ECHO MEAS - SV(TEICH): 62.9 ML
BH CV ECHO MEAS - TAPSE (>1.6): 2 CM2
BH CV ECHO MEASUREMENTS AVERAGE E/E' RATIO: 6.54
BH CV XLRA - RV BASE: 3.3 CM
BH CV XLRA - RV LENGTH: 6.7 CM
BH CV XLRA - RV MID: 2.5 CM
BH CV XLRA - TDI S': 15 CM/SEC
LEFT ATRIUM VOLUME INDEX: 27 ML/M2
LEFT ATRIUM VOLUME: 55 CM3
LV EF 2D ECHO EST: 65 %
MAXIMAL PREDICTED HEART RATE: 132 BPM
SINUS: 3.1 CM
STJ: 3.5 CM
STRESS TARGET HR: 112 BPM

## 2020-06-03 PROCEDURE — 93306 TTE W/DOPPLER COMPLETE: CPT

## 2020-06-03 PROCEDURE — 93306 TTE W/DOPPLER COMPLETE: CPT | Performed by: INTERNAL MEDICINE

## 2020-06-03 PROCEDURE — 93000 ELECTROCARDIOGRAM COMPLETE: CPT | Performed by: NURSE PRACTITIONER

## 2020-06-03 PROCEDURE — 99442 PR PHYS/QHP TELEPHONE EVALUATION 11-20 MIN: CPT | Performed by: NURSE PRACTITIONER

## 2020-06-03 NOTE — PROGRESS NOTES
"  Date of Office Visit: 2020  Encounter Provider: EBEN Jay  Place of Service: [facility] CARDIOLOGY  Patient Name: Izaiah Garcia  :1931    Chief Complaint   Patient presents with   • Acute saddle pulmonary embolism with acute cor pulmonale   :     HPI:Izaiah Garcia is an 88-year-old male who had a submassive pulmonary embolism and underwent Ekos treatment in February of this year.  He had a long hospital stay after that because he developed pulmonary infarct and he had some bleeding with pleural effusions.  The pleural effusions had to be tapped and he also had some A. fib which is rate controlled and he is anticoagulated with Eliquis 5 mg twice a day.  He is coming back today to reevaluate how his RV is recovering and had an echocardiogram this morning.  He had an appointment with Dr. Griffiths on  his heart rate was \"trolled and he was feeling a little bit better.  Since that time he is using a walker he is continuing to work with therapy and his wife says that he is significantly improving.  He has had no chest pain, pressure or tightness.  He has no shortness of breath or swelling in his legs.  He has not felt like his heart is been racing.      Past Medical History:   Diagnosis Date   • Acute saddle pulmonary embolism with acute cor pulmonale (CMS/HCC) 2020    Admit date: 2020 Discharge date:3/20/2020 Discharged Condition: good   Discharge Diagnoses:   Acute saddle pulmonary embolism with acute cor pulmonale (CMS/HCC)   Pulmonary emboli (CMS/HCC)   Empyema of pleura (CMS/HCC)  Acute hypoxic respiratory failure BPE s/p ekos catheter on 2020 with local TPA infusion with good clinical response Troponemia suspect due to BPE RV strain Bilateral ple   • At risk for falls 2019   • Benign essential hypertension 2016   • Bilateral high frequency sensorineural hearing loss, wears hearing aids 2019   • Bilateral lower extremity " "edema 5/2/2019    May 2, 2019--patient who has hypertension and is on amlodipine 10 mg/day is complaining of progressively worsening swelling of the lower extremities that extends up to about mid calf.  Gets worse at the end of the day and tends to get better overnight when he props his feet up.  I think this is definitely related to the amlodipine although patient may have some venous insufficiency.  Medication ad   • Chronic anticoagulation, Eliquis.  Saddle pulmonary embolism 5/27/2020   • Decreased peripheral vision of both eyes 5/2/2019    May 2, 2019--continues to have complaints of \"dizziness\" associated with weakness in his lower extremities.  He is not describing a spinning sensation or anything remotely close to vertigo.  Instead he is describing an off-balance sensation.  He tends to fall forward if not careful and he tends to list towards the right side.  He has marked difficulty going down an incline.  He has to ambulate wit   • History of aspiration pneumonia 5/4/2015   • History of esophageal stricture 5/4/2015    July 3, 2018--EGD revealed a distal esophageal stricture that was dilated to 18 mm.  There was a small hiatal hernia.  There was mild streaky erythema in the proximal stomach.  Duodenal bulb normal.  April 26, 2016--EGD performed for dysphagia reveals a distal esophageal stricture that was dilated 16.5 mm.   • History of gout 6/29/2016   • History of pulmonary embolus (PE) 2/29/2020    Admit date: 2/29/2020 Discharge date:3/20/2020 Discharged Condition: good   Discharge Diagnoses:   Acute saddle pulmonary embolism with acute cor pulmonale (CMS/HCC)   Pulmonary emboli (CMS/HCC)   Empyema of pleura (CMS/HCC)  Acute hypoxic respiratory failure BPE s/p ekos catheter on 2/29/2020 with local TPA infusion with good clinical response Troponemia suspect due to BPE RV strain Bilateral ple   • History of stroke, 6/29/2016--4.9 x 4 x 3 cm inferior left cerebellar infarct 5/4/2015 June 29, 2016--MRI " "of the brain performed for complaints of dizziness and weakness. IMPRESSION: 1. There is susceptibility artifact streaking through the anterior left frontal scalp through the anterior left frontal bone in the anterior tipof the left frontal lobe likely from a tiny piece of metal in the anterior left frontal scalp slightly limits evaluation of these structures. 2. There is mild s   • Hyperlipidemia 6/29/2016   • Impaired fasting glucose 5/2/2019 April 14, 2015--hemoglobin A1c 5.9.   • Macrocytosis 5/2/2019   • Parkinson's disease (CMS/HCC) 5/2/2019    May 2, 2019--continues to have complaints of \"dizziness\" associated with weakness in his lower extremities.  He is not describing a spinning sensation or anything remotely close to vertigo.  Instead he is describing an off-balance sensation.  He tends to fall forward if not careful and he tends to list towards the right side.  He has marked difficulty going down an incline.  He has to ambulate wit   • Rheumatoid factor positive 5/2/2019 March 25, 2014--rheumatoid factor returned positive at 95.  However, CCP antibodies normal at 8, SHIV negative, both C&P ANCA negative, C-reactive protein normal at 0.24, sed rate normal at 2.   • Vitamin B12 deficiency 6/6/2019 June 6, 2019--patient recently had mildly elevated methylmalonic acid of 380.  Repeat methylmalonic acid was upper limit of normal at 356.  Given patient's neurologic symptoms and suspected parkinsonism, I have a low threshold for treating vitamin B12 deficiency.  We will initiate vitamin B12 injections today.  2000 mcg subcutaneously given today.   • Vitamin D deficiency 5/2/2019       Past Surgical History:   Procedure Laterality Date   • CARDIAC CATHETERIZATION N/A 2/29/2020    Procedure: Thrombolytic Therapy- EKOS;  Surgeon: Jason Griffiths MD;  Location: [facility] INVASIVE LOCATION;  Service: Cardiovascular;  Laterality: N/A;   • CATARACT EXTRACTION EXTRACAPSULAR W/ INTRAOCULAR LENS IMPLANTATION " Bilateral     Bilateral cataract extirpation with intraocular lens implantation   • CHOLECYSTECTOMY     • EKOS CATHETER PLACEMENT Bilateral 2/29/2020    Procedure: Ekos catheter placement;  Surgeon: Jsaon Griffiths MD;  Location:  [facility] INV[facility] LOCATION;  Service: Cardiovascular;  Laterality: Bilateral;   • ESOPHAGEAL DILATATION  04/26/2016 April 26, 2016--EGD performed for dysphagia reveals a distal esophageal stricture that was dilated 16.5 mm.   • ESOPHAGEAL DILATATION  07/03/2018    July 3, 2018--EGD revealed a distal esophageal stricture that was dilated to 18 mm.  There was a small hiatal hernia.  There was mild streaky erythema in the proximal stomach.  Duodenal bulb normal.   • INTERVENTIONAL RADIOLOGY PROCEDURE Bilateral 2/29/2020    Procedure: Pulmonary Angiogram;  Surgeon: Jason Griffiths MD;  Location:  [facility] IN[facility];  Service: Cardiovascular;  Laterality: Bilateral;       Social History     Socioeconomic History   • Marital status:      Spouse name: Not on file   • Number of children: 2   • Years of education: Not on file   • Highest education level: 9th grade   Social Needs   • Financial resource strain: Not hard at all   • Food insecurity:     Worry: Never true     Inability: Never true   • Transportation needs:     Medical: No     Non-medical: No   Tobacco Use   • Smoking status: Never Smoker   • Smokeless tobacco: Never Used   Substance and Sexual Activity   • Alcohol use: Never     Frequency: 2-4 times a month     Drinks per session: 1 or 2     Binge frequency: Never     Comment: occ   • Drug use: No   • Sexual activity: Not Currently     Partners: Female   Lifestyle   • Physical activity:     Days per week: 0 days     Minutes per session: 0 min   • Stress: Not at all   Relationships   • Social connections:     Talks on phone: Once a week     Gets together: Twice a week     Attends [employer] service: Never     Active member of club or organization: Yes     Attends  meetings of clubs or organizations: More than 4 times per year     Relationship status:        Family History   Problem Relation Age of Onset   • No Known Problems Mother    • No Known Problems Father        Review of Systems   Constitution: Negative for diaphoresis and malaise/fatigue.   Cardiovascular: Negative for chest pain, claudication, dyspnea on exertion, irregular heartbeat, leg swelling, near-syncope, orthopnea, palpitations, paroxysmal nocturnal dyspnea and syncope.   Respiratory: Negative for cough, shortness of breath and sleep disturbances due to breathing.    Musculoskeletal: Negative for falls.   Neurological: Negative for dizziness and weakness.   Psychiatric/Behavioral: Negative for altered mental status and substance abuse.       No Known Allergies      Current Outpatient Medications:   •  allopurinol (ZYLOPRIM) 300 MG tablet, Take 1 p.o. daily for gout, Disp: 90 tablet, Rfl: 3  •  budesonide (PULMICORT) 0.5 MG/2ML nebulizer solution, Take 0.5 mg by nebulization 2 (Two) Times a Day., Disp: , Rfl:   •  carbidopa-levodopa (SINEMET)  MG per tablet, Take 1 tablet by mouth 2 (Two) Times a Day., Disp: , Rfl:   •  ELIQUIS 5 MG tablet tablet, TAKE ONE TABLET BY MOUTH EVERY 12 HOURS, Disp: 60 tablet, Rfl: 0  •  furosemide (LASIX) 40 MG tablet, TAKE ONE TABLET BY MOUTH DAILY, Disp: 30 tablet, Rfl: 0  •  ipratropium-albuterol (DUO-NEB) 0.5-2.5 mg/3 ml nebulizer, Take 3 mL by nebulization 2 (Two) Times a Day., Disp: , Rfl:   •  lovastatin (MEVACOR) 20 MG tablet, Take 1 p.o. daily for high cholesterol, Disp: 90 tablet, Rfl: 3  •  metoprolol tartrate (LOPRESSOR) 25 MG tablet, TAKE ONE TABLET BY MOUTH EVERY 12 HOURS, Disp: 60 tablet, Rfl: 0  •  ondansetron (ZOFRAN) 4 MG tablet, 1 p.o. every 6 to 8 hours as needed nausea, Disp: 20 tablet, Rfl: 0  •  saccharomyces boulardii (FLORASTOR) 250 MG capsule, Take 1 capsule by mouth 2 (Two) Times a Day., Disp: 180 capsule, Rfl: 0    Current  "Facility-Administered Medications:   •  cyanocobalamin injection 1,000 mcg, 1,000 mcg, Subcutaneous, Q30 Days, Uriah Godinez MD, 1,000 mcg at 02/07/20 1016      Objective:     Vitals:    06/03/20 1325   BP: 122/60   Pulse: 56   Resp: 18   SpO2: 96%   Height: 180.3 cm (71\")     Body mass index is 25.8 kg/m².    PHYSICAL EXAM:    Physical Exam      ECG 12 Lead  Date/Time: 6/3/2020 12:26 PM  Performed by: Lucy Castaneda APRN  Authorized by: Lucy Castaneda APRN   Comparison: compared with previous ECG from 3/13/2020  Rhythm: sinus rhythm  Rate: normal  QRS axis: normal  Other findings: non-specific ST-T wave changes    Clinical impression: non-specific ECG              Assessment:       Diagnosis Plan   1. Benign essential hypertension     2. History of pulmonary embolus (PE)     3. Therapeutic drug monitoring     4. History of stroke, 6/29/2016--4.9 x 4 x 3 cm inferior left cerebellar infarct     5. Chronic anticoagulation, Eliquis.  Saddle pulmonary embolism     6. At risk for falls     7. Bilateral high frequency sensorineural hearing loss, wears hearing aids     8. Parkinson's disease (CMS/HCC)     9. Acute saddle pulmonary embolism with acute cor pulmonale (CMS/HCC)     10. Paroxysmal A-fib (CMS/HCC)       No orders of the defined types were placed in this encounter.         Plan:       I am not can make any changes today he had an echocardiogram in our office and we will review that to reassess his RV size and function.  I suspect things are probably improved since he is feeling very well.  Not can make any changes to his medications he should stay on the anticoagulation for now.  His EKG done in the office today shows that sinus rhythm has now replaced atrial fibrillation.  We should see him in the office in 3 months and that can be with Dr. Griffiths.  Should he have any trouble in the meantime I have told his wife to give us a call.    This patient has consented to a telehealth visit via telephone. " The visit was scheduled as a telephone visit to comply with patient safety concerns in accordance with CDC recommendations.  All vitals recorded within this visit are reported by the patient.  I spent 20 minutes in total including but not limited to the 12 minutes spent in direct conversation with this patient.          Your medication list           Accurate as of Adilene 3, 2020  2:40 PM. If you have any questions, ask your nurse or doctor.               CONTINUE taking these medications      Instructions Last Dose Given Next Dose Due   allopurinol 300 MG tablet  Commonly known as:  ZYLOPRIM      Take 1 p.o. daily for gout       budesonide 0.5 MG/2ML nebulizer solution  Commonly known as:  PULMICORT      Take 0.5 mg by nebulization 2 (Two) Times a Day.       carbidopa-levodopa  MG per tablet  Commonly known as:  SINEMET      Take 1 tablet by mouth 2 (Two) Times a Day.       Eliquis 5 MG tablet tablet  Generic drug:  apixaban      TAKE ONE TABLET BY MOUTH EVERY 12 HOURS       furosemide 40 MG tablet  Commonly known as:  LASIX      TAKE ONE TABLET BY MOUTH DAILY       ipratropium-albuterol 0.5-2.5 mg/3 ml nebulizer  Commonly known as:  DUO-NEB      Take 3 mL by nebulization 2 (Two) Times a Day.       lovastatin 20 MG tablet  Commonly known as:  MEVACOR      Take 1 p.o. daily for high cholesterol       metoprolol tartrate 25 MG tablet  Commonly known as:  LOPRESSOR      TAKE ONE TABLET BY MOUTH EVERY 12 HOURS       ondansetron 4 MG tablet  Commonly known as:  Zofran      1 p.o. every 6 to 8 hours as needed nausea       saccharomyces boulardii 250 MG capsule  Commonly known as:  FLORASTOR      Take 1 capsule by mouth 2 (Two) Times a Day.                As always, it has been a pleasure to participate in your patient's care.      Sincerely,     Lucy TREADWELL

## 2020-06-05 ENCOUNTER — TELEPHONE (OUTPATIENT)
Dept: CARDIOLOGY | Facility: CLINIC | Age: 85
End: 2020-06-05

## 2020-06-05 DIAGNOSIS — I10 BENIGN ESSENTIAL HYPERTENSION: ICD-10-CM

## 2020-06-05 RX ORDER — FUROSEMIDE 40 MG/1
TABLET ORAL
Qty: 30 TABLET | Refills: 5 | Status: SHIPPED | OUTPATIENT
Start: 2020-06-05 | End: 2020-09-04 | Stop reason: SDUPTHER

## 2020-06-05 RX ORDER — APIXABAN 5 MG/1
TABLET, FILM COATED ORAL
Qty: 60 TABLET | Refills: 5 | Status: SHIPPED | OUTPATIENT
Start: 2020-06-05 | End: 2020-09-04 | Stop reason: SDUPTHER

## 2020-06-05 NOTE — TELEPHONE ENCOUNTER
----- Message from Jason Griffiths MD sent at 6/4/2020  5:43 PM EDT -----  Well I called him and his echo is made a complete recovery and he is actually really doing great he was near death from this pulmonary embolus and while he had a long recovery has really done great I need to have him come see me in 3 months if you could help me get him an appointment would be great

## 2020-06-09 NOTE — TELEPHONE ENCOUNTER
Spoke with patients spouse, pt is scheduled for appointment and is aware.    Thank you  Brianna SANDHU

## 2020-06-17 ENCOUNTER — TELEPHONE (OUTPATIENT)
Dept: INTERNAL MEDICINE | Facility: CLINIC | Age: 85
End: 2020-06-17

## 2020-06-17 ENCOUNTER — HOSPITAL ENCOUNTER (EMERGENCY)
Facility: HOSPITAL | Age: 85
Discharge: HOME | End: 2020-06-17
Attending: EMERGENCY MEDICINE | Admitting: EMERGENCY MEDICINE

## 2020-06-17 ENCOUNTER — APPOINTMENT (OUTPATIENT)
Dept: CARDIOLOGY | Facility: HOSPITAL | Age: 85
End: 2020-06-17

## 2020-06-17 ENCOUNTER — TELEPHONE (OUTPATIENT)
Dept: CARDIOLOGY | Facility: CLINIC | Age: 85
End: 2020-06-17

## 2020-06-17 ENCOUNTER — APPOINTMENT (OUTPATIENT)
Dept: GENERAL RADIOLOGY | Facility: HOSPITAL | Age: 85
End: 2020-06-17

## 2020-06-17 VITALS
BODY MASS INDEX: 25.97 KG/M2 | WEIGHT: 185.5 LBS | HEIGHT: 71 IN | HEART RATE: 53 BPM | SYSTOLIC BLOOD PRESSURE: 120 MMHG | DIASTOLIC BLOOD PRESSURE: 82 MMHG | TEMPERATURE: 98.7 F | OXYGEN SATURATION: 96 % | RESPIRATION RATE: 18 BRPM

## 2020-06-17 DIAGNOSIS — T14.8XXA MUSCULOSKELETAL STRAIN: ICD-10-CM

## 2020-06-17 DIAGNOSIS — M79.652 LEFT THIGH PAIN: Primary | ICD-10-CM

## 2020-06-17 DIAGNOSIS — R93.6 ABNORMAL X-RAY OF FEMUR: ICD-10-CM

## 2020-06-17 LAB
ALBUMIN SERPL-MCNC: 3.5 G/DL (ref 3.5–5.2)
ALBUMIN/GLOB SERPL: 1.1 G/DL
ALP SERPL-CCNC: 68 U/L (ref 39–117)
ALT SERPL W P-5'-P-CCNC: 5 U/L (ref 1–41)
ANION GAP SERPL CALCULATED.3IONS-SCNC: 10.2 MMOL/L (ref 5–15)
AST SERPL-CCNC: 13 U/L (ref 1–40)
BASOPHILS # BLD AUTO: 0.06 10*3/MM3 (ref 0–0.2)
BASOPHILS NFR BLD AUTO: 0.5 % (ref 0–1.5)
BH CV LOWER VASCULAR LEFT COMMON FEMORAL AUGMENT: NORMAL
BH CV LOWER VASCULAR LEFT COMMON FEMORAL COMPETENT: NORMAL
BH CV LOWER VASCULAR LEFT COMMON FEMORAL COMPRESS: NORMAL
BH CV LOWER VASCULAR LEFT COMMON FEMORAL PHASIC: NORMAL
BH CV LOWER VASCULAR LEFT COMMON FEMORAL SPONT: NORMAL
BH CV LOWER VASCULAR LEFT DISTAL FEMORAL COMPRESS: NORMAL
BH CV LOWER VASCULAR LEFT GASTRONEMIUS COMPRESS: NORMAL
BH CV LOWER VASCULAR LEFT GREATER SAPH AK COMPRESS: NORMAL
BH CV LOWER VASCULAR LEFT GREATER SAPH BK COMPRESS: NORMAL
BH CV LOWER VASCULAR LEFT MID FEMORAL AUGMENT: NORMAL
BH CV LOWER VASCULAR LEFT MID FEMORAL COMPETENT: NORMAL
BH CV LOWER VASCULAR LEFT MID FEMORAL COMPRESS: NORMAL
BH CV LOWER VASCULAR LEFT MID FEMORAL PHASIC: NORMAL
BH CV LOWER VASCULAR LEFT MID FEMORAL SPONT: NORMAL
BH CV LOWER VASCULAR LEFT PERONEAL COMPRESS: NORMAL
BH CV LOWER VASCULAR LEFT POPLITEAL AUGMENT: NORMAL
BH CV LOWER VASCULAR LEFT POPLITEAL COMPETENT: NORMAL
BH CV LOWER VASCULAR LEFT POPLITEAL COMPRESS: NORMAL
BH CV LOWER VASCULAR LEFT POPLITEAL PHASIC: NORMAL
BH CV LOWER VASCULAR LEFT POPLITEAL SPONT: NORMAL
BH CV LOWER VASCULAR LEFT POSTERIOR TIBIAL COMPRESS: NORMAL
BH CV LOWER VASCULAR LEFT PROFUNDA FEMORAL COMPRESS: NORMAL
BH CV LOWER VASCULAR LEFT PROXIMAL FEMORAL COMPRESS: NORMAL
BH CV LOWER VASCULAR LEFT SAPHENOFEMORAL JUNCTION AUGMENT: NORMAL
BH CV LOWER VASCULAR LEFT SAPHENOFEMORAL JUNCTION COMPETENT: NORMAL
BH CV LOWER VASCULAR LEFT SAPHENOFEMORAL JUNCTION COMPRESS: NORMAL
BH CV LOWER VASCULAR LEFT SAPHENOFEMORAL JUNCTION PHASIC: NORMAL
BH CV LOWER VASCULAR LEFT SAPHENOFEMORAL JUNCTION SPONT: NORMAL
BH CV LOWER VASCULAR RIGHT COMMON FEMORAL AUGMENT: NORMAL
BH CV LOWER VASCULAR RIGHT COMMON FEMORAL COMPETENT: NORMAL
BH CV LOWER VASCULAR RIGHT COMMON FEMORAL COMPRESS: NORMAL
BH CV LOWER VASCULAR RIGHT COMMON FEMORAL PHASIC: NORMAL
BH CV LOWER VASCULAR RIGHT COMMON FEMORAL SPONT: NORMAL
BILIRUB SERPL-MCNC: 0.6 MG/DL (ref 0.2–1.2)
BUN BLD-MCNC: 18 MG/DL (ref 8–23)
BUN/CREAT SERPL: 16.1 (ref 7–25)
CALCIUM SPEC-SCNC: 9.1 MG/DL (ref 8.6–10.5)
CHLORIDE SERPL-SCNC: 104 MMOL/L (ref 98–107)
CO2 SERPL-SCNC: 23.8 MMOL/L (ref 22–29)
CREAT BLD-MCNC: 1.12 MG/DL (ref 0.76–1.27)
DEPRECATED RDW RBC AUTO: 43.7 FL (ref 37–54)
EOSINOPHIL # BLD AUTO: 0.36 10*3/MM3 (ref 0–0.4)
EOSINOPHIL NFR BLD AUTO: 3.2 % (ref 0.3–6.2)
ERYTHROCYTE [DISTWIDTH] IN BLOOD BY AUTOMATED COUNT: 14.2 % (ref 12.3–15.4)
GFR SERPL CREATININE-BSD FRML MDRD: 62 ML/MIN/1.73
GLOBULIN UR ELPH-MCNC: 3.2 GM/DL
GLUCOSE BLD-MCNC: 86 MG/DL (ref 65–99)
HCT VFR BLD AUTO: 44.4 % (ref 37.5–51)
HGB BLD-MCNC: 14.6 G/DL (ref 13–17.7)
HOLD SPECIMEN: NORMAL
HOLD SPECIMEN: NORMAL
IMM GRANULOCYTES # BLD AUTO: 0.07 10*3/MM3 (ref 0–0.05)
IMM GRANULOCYTES NFR BLD AUTO: 0.6 % (ref 0–0.5)
LYMPHOCYTES # BLD AUTO: 4.48 10*3/MM3 (ref 0.7–3.1)
LYMPHOCYTES NFR BLD AUTO: 39.9 % (ref 19.6–45.3)
MCH RBC QN AUTO: 27.8 PG (ref 26.6–33)
MCHC RBC AUTO-ENTMCNC: 32.9 G/DL (ref 31.5–35.7)
MCV RBC AUTO: 84.6 FL (ref 79–97)
MONOCYTES # BLD AUTO: 0.89 10*3/MM3 (ref 0.1–0.9)
MONOCYTES NFR BLD AUTO: 7.9 % (ref 5–12)
NEUTROPHILS # BLD AUTO: 5.36 10*3/MM3 (ref 1.7–7)
NEUTROPHILS NFR BLD AUTO: 47.9 % (ref 42.7–76)
NRBC BLD AUTO-RTO: 0 /100 WBC (ref 0–0.2)
PLATELET # BLD AUTO: 229 10*3/MM3 (ref 140–450)
PMV BLD AUTO: 10.6 FL (ref 6–12)
POTASSIUM BLD-SCNC: 4.3 MMOL/L (ref 3.5–5.2)
PROT SERPL-MCNC: 6.7 G/DL (ref 6–8.5)
RBC # BLD AUTO: 5.25 10*6/MM3 (ref 4.14–5.8)
SODIUM BLD-SCNC: 138 MMOL/L (ref 136–145)
WBC NRBC COR # BLD: 11.22 10*3/MM3 (ref 3.4–10.8)
WHOLE BLOOD HOLD SPECIMEN: NORMAL
WHOLE BLOOD HOLD SPECIMEN: NORMAL

## 2020-06-17 PROCEDURE — 93971 EXTREMITY STUDY: CPT

## 2020-06-17 PROCEDURE — 73552 X-RAY EXAM OF FEMUR 2/>: CPT

## 2020-06-17 PROCEDURE — 85025 COMPLETE CBC W/AUTO DIFF WBC: CPT

## 2020-06-17 PROCEDURE — 80053 COMPREHEN METABOLIC PANEL: CPT

## 2020-06-17 PROCEDURE — 36415 COLL VENOUS BLD VENIPUNCTURE: CPT

## 2020-06-17 PROCEDURE — 99283 EMERGENCY DEPT VISIT LOW MDM: CPT

## 2020-06-17 NOTE — ED PROVIDER NOTES
18:32  Patient seen and examined with physician assistant.  Briefly patient presents for left anterior thigh pain.  Says it has been getting progressively worse over the last 2 weeks.  States it feels like there is a light weight on top of his thigh.  Dates he is able to ambulate without difficulty.  Has had no trauma or no new exercise.  Has had no fevers.  Has had no rash.        On exam patient's hip is full range of motion.  Patient has no rash patient has normal pulse.        Doppler negative.      Plan is to discharge home to follow-up with primary doctor    MD ATTESTATION NOTE    The GIULIANA and I have discussed this patient's history, physical exam, and treatment plan.  I have reviewed the documentation and personally had a face to face interaction with the patient. I affirm the documentation and agree with the treatment and plan.  The attached note describes my personal findings.       Wenceslao Shearer MD  06/17/20 2526

## 2020-06-17 NOTE — TELEPHONE ENCOUNTER
Patient needs to go to the emergency room and be checked for a blood clot.  I cannot get that done here in the office expeditiously.  I realized the nocturnal wanted to do this but I think that is very important.

## 2020-06-17 NOTE — TELEPHONE ENCOUNTER
Patient  Calling stating he is having pain, burning feeling in his left Leg from knee up. No redness,or swelling   Not warm to touch.  [phone]

## 2020-06-17 NOTE — TELEPHONE ENCOUNTER
PATIENTS WIFE, MIKAL, CALLED AND STATED THAT THE SHE IS CONCERNED ABOUT THE PATIENT. SHE STATES ON Sunday 06/14/2020 NIGHT THE PATIENT COMPLAINED ABOUT HIS LEG STATING IT FELT NUMB. SINCE THAT NIGHT HE STATES THAT HIS LEG FEELS LIKE SOMETHING HEAVY IS SITTING ON HIS LEG, IT FEELS HOT, AND IT IS PAINFUL. MIKAL STATES THAT THE PATIENT FEELS PAIN ON AND OFF THROUGH THE DAY BUT IT SEEMS TO GET WORSE DURING THE NIGHT. MIKAL STATES THAT IT IS THE PATIENT LEFT LEG ABOVE THE KNEE. MIKAL WOULD LIKE A CALL BACK AS SOON AS POSSIBLE. PLEASE ADVISE.     MIKAL CALL BACK [phone]

## 2020-06-17 NOTE — TELEPHONE ENCOUNTER
I called and stressed to the pat wife that he needed to go to the erand be checked for blood clot.  Multiple times,  Not sure they will go

## 2020-06-17 NOTE — ED NOTES
All triage staff wearing appropriate ppe, pt masked in triage. Pt is on Eliana Case, RN  06/17/20 [id number]       Eliana Bo, RN  06/17/20 2839

## 2020-06-17 NOTE — ED PROVIDER NOTES
" EMERGENCY DEPARTMENT ENCOUNTER    Room Number:  03/03  Date of encounter:  6/17/2020  PCP: Uriah Godinez MD  Historian: Patient      HPI:  Chief Complaint: Left thigh pain  A complete HPI/ROS/PMH/PSH/SH/FH are unobtainable due to: Nothing    Context: Izaiah Garcia is a 88 y.o. male who presents to the ED c/o gradual onset, intermittent left anterior thigh pain and numbness.  Patient states this pain is mild, starting gradually 2 weeks ago and has been intermittent, however it has worsened over the past 2 nights.  Patient states the pain is worse at night.  He denies any difficulty walking or numbness of his lower leg.  He denies any weakness of his leg.  He denies any urinary incontinence or back pain.  He is anticoagulated on Eliquis for a pulmonary embolism.  He denies any precipitating or alleviating factors.  The patient states it simply feels like there \"is a pad\" on his left anterior thigh.    Review of Medical Records  I reviewed patient's admission from 2/29/2020 through 3/20/2020.  Patient was admitted for saddle pulmonary emboli.    PAST MEDICAL HISTORY  Active Ambulatory Problems     Diagnosis Date Noted   • Hyperlipidemia 06/29/2016   • Benign essential hypertension 06/29/2016   • History of gout 06/29/2016   • History of esophageal stricture 05/04/2015   • History of stroke, 6/29/2016--4.9 x 4 x 3 cm inferior left cerebellar infarct 05/04/2015   • Rheumatoid factor positive 05/02/2019   • Impaired fasting glucose 05/02/2019   • At risk for falls 05/02/2019   • Bilateral high frequency sensorineural hearing loss, wears hearing aids 05/02/2019   • Bilateral lower extremity edema 05/02/2019   • Parkinson's disease (CMS/HCC) 05/02/2019   • Therapeutic drug monitoring 05/02/2019   • Vitamin D deficiency 05/02/2019   • Macrocytosis 05/02/2019   • Vitamin B12 deficiency 06/06/2019   • History of pulmonary embolus (PE) 02/29/2020   • Acute saddle pulmonary embolism with acute cor pulmonale (CMS/HCC) " 02/29/2020   • Chronic anticoagulation, Eliquis.  Saddle pulmonary embolism 05/27/2020   • History of pulmonary embolus (PE) 06/03/2020   • Paroxysmal A-fib (CMS/[facility]) 06/03/2020     Resolved Ambulatory Problems     Diagnosis Date Noted   • Dizzy 06/29/2016   • Weakness of both lower extremities 06/29/2016   • Nonintractable headache 06/29/2016   • Polyuria 06/08/2018   • Acute right flank pain 06/08/2018   • History of aspiration pneumonia 05/04/2015   • Generalized weakness 05/02/2019   • Decreased peripheral vision of both eyes 05/02/2019   • Loss of equilibrium 05/02/2019   • Acute diarrhea 02/03/2020   • Hospital-acquired pneumonia 02/17/2020   • Acute UTI (urinary tract infection) 02/17/2020   • Colitis 02/10/2020   • Weakness 02/17/2020   • Aspiration pneumonia of right lower lobe due to vomit (CMS/[facility]) 02/18/2020   • Empyema of pleura (CMS/HCC) 03/06/2020   • Hospital discharge follow-up 04/22/2020     Past Medical History:   Diagnosis Date   • Hyperlipidemia 6/29/2016         PAST SURGICAL HISTORY  Past Surgical History:   Procedure Laterality Date   • CARDIAC CATHETERIZATION N/A 2/29/2020    Procedure: Thrombolytic Therapy- EKOS;  Surgeon: Jason Griffiths MD;  Location: [facility] INVASIVE LOCATION;  Service: Cardiovascular;  Laterality: N/A;   • CATARACT EXTRACTION EXTRACAPSULAR W/ INTRAOCULAR LENS IMPLANTATION Bilateral     Bilateral cataract extirpation with intraocular lens implantation   • CHOLECYSTECTOMY     • EKOS CATHETER PLACEMENT Bilateral 2/29/2020    Procedure: Ekos catheter placement;  Surgeon: Jason Griffiths MD;  Location: [facility] CATH INVASIVE LOCATION;  Service: Cardiovascular;  Laterality: Bilateral;   • ESOPHAGEAL DILATATION  04/26/2016 April 26, 2016--EGD performed for dysphagia reveals a distal esophageal stricture that was dilated 16.5 mm.   • ESOPHAGEAL DILATATION  07/03/2018    July 3, 2018--EGD revealed a distal esophageal stricture that was dilated to 18 mm.  There was a small  hiatal hernia.  There was mild streaky erythema in the proximal stomach.  Duodenal bulb normal.   • INTERVENTIONAL RADIOLOGY PROCEDURE Bilateral 2/29/2020    Procedure: Pulmonary Angiogram;  Surgeon: Jason Griffiths MD;  Location: [facility] INVASIVE LOCATION;  Service: Cardiovascular;  Laterality: Bilateral;         FAMILY HISTORY  Family History   Problem Relation Age of Onset   • No Known Problems Mother    • No Known Problems Father          SOCIAL HISTORY  Social History     Socioeconomic History   • Marital status:      Spouse name: Not on file   • Number of children: 2   • Years of education: Not on file   • Highest education level: 9th grade   Social Needs   • Financial resource strain: Not hard at all   • Food insecurity:     Worry: Never true     Inability: Never true   • Transportation needs:     Medical: No     Non-medical: No   Tobacco Use   • Smoking status: Never Smoker   • Smokeless tobacco: Never Used   Substance and Sexual Activity   • Alcohol use: Never     Frequency: 2-4 times a month     Drinks per session: 1 or 2     Binge frequency: Never     Comment: occ   • Drug use: No   • Sexual activity: Not Currently     Partners: Female   Lifestyle   • Physical activity:     Days per week: 0 days     Minutes per session: 0 min   • Stress: Not at all   Relationships   • Social connections:     Talks on phone: Once a week     Gets together: Twice a week     Attends Religion service: Never     Active member of club or organization: Yes     Attends meetings of clubs or organizations: More than 4 times per year     Relationship status:          ALLERGIES  Patient has no known allergies.        REVIEW OF SYSTEMS  All systems reviewed and negative except for those discussed in HPI.       PHYSICAL EXAM    I have reviewed the triage vital signs and nursing notes.    ED Triage Vitals [06/17/20 1329]   Temp Heart Rate Resp BP SpO2   98.7 °F (37.1 °C) 53 18 -- 96 %      Temp src Heart Rate Source  Patient Position BP Location FiO2 (%)   Tympanic -- -- -- --       Physical Exam  GENERAL: Alert, oriented, not distressed  HENT: nares patent, head atraumatic  EYES: no scleral icterus, EOMI  CV: regular rhythm, regular rate, no murmur  RESPIRATORY: normal effort, CTA  ABDOMEN: soft, nontender  MUSCULOSKELETAL: Nontender left thigh.  Full range of motion of left hip and left knee.  No left calf swelling or tenderness.  2+ distal pulses in the left foot.  NEURO: alert, moves all extremities, follows commands  SKIN: warm, dry        LAB RESULTS  Recent Results (from the past 24 hour(s))   Comprehensive Metabolic Panel    Collection Time: 06/17/20  2:15 PM   Result Value Ref Range    Glucose 86 65 - 99 mg/dL    BUN 18 8 - 23 mg/dL    Creatinine 1.12 0.76 - 1.27 mg/dL    Sodium 138 136 - 145 mmol/L    Potassium 4.3 3.5 - 5.2 mmol/L    Chloride 104 98 - 107 mmol/L    CO2 23.8 22.0 - 29.0 mmol/L    Calcium 9.1 8.6 - 10.5 mg/dL    Total Protein 6.7 6.0 - 8.5 g/dL    Albumin 3.50 3.50 - 5.20 g/dL    ALT (SGPT) 5 1 - 41 U/L    AST (SGOT) 13 1 - 40 U/L    Alkaline Phosphatase 68 39 - 117 U/L    Total Bilirubin 0.6 0.2 - 1.2 mg/dL    eGFR Non African Amer 62 >60 mL/min/1.73    Globulin 3.2 gm/dL    A/G Ratio 1.1 g/dL    BUN/Creatinine Ratio 16.1 7.0 - 25.0    Anion Gap 10.2 5.0 - 15.0 mmol/L   Light Blue Top    Collection Time: 06/17/20  2:15 PM   Result Value Ref Range    Extra Tube hold for add-on    Green Top (Gel)    Collection Time: 06/17/20  2:15 PM   Result Value Ref Range    Extra Tube Hold for add-ons.    Lavender Top    Collection Time: 06/17/20  2:15 PM   Result Value Ref Range    Extra Tube hold for add-on    Gold Top - SST    Collection Time: 06/17/20  2:15 PM   Result Value Ref Range    Extra Tube Hold for add-ons.    CBC Auto Differential    Collection Time: 06/17/20  2:15 PM   Result Value Ref Range    WBC 11.22 (H) 3.40 - 10.80 10*3/mm3    RBC 5.25 4.14 - 5.80 10*6/mm3    Hemoglobin 14.6 13.0 - 17.7 g/dL     Hematocrit 44.4 37.5 - 51.0 %    MCV 84.6 79.0 - 97.0 fL    MCH 27.8 26.6 - 33.0 pg    MCHC 32.9 31.5 - 35.7 g/dL    RDW 14.2 12.3 - 15.4 %    RDW-SD 43.7 37.0 - 54.0 fl    MPV 10.6 6.0 - 12.0 fL    Platelets 229 140 - 450 10*3/mm3    Neutrophil % 47.9 42.7 - 76.0 %    Lymphocyte % 39.9 19.6 - 45.3 %    Monocyte % 7.9 5.0 - 12.0 %    Eosinophil % 3.2 0.3 - 6.2 %    Basophil % 0.5 0.0 - 1.5 %    Immature Grans % 0.6 (H) 0.0 - 0.5 %    Neutrophils, Absolute 5.36 1.70 - 7.00 10*3/mm3    Lymphocytes, Absolute 4.48 (H) 0.70 - 3.10 10*3/mm3    Monocytes, Absolute 0.89 0.10 - 0.90 10*3/mm3    Eosinophils, Absolute 0.36 0.00 - 0.40 10*3/mm3    Basophils, Absolute 0.06 0.00 - 0.20 10*3/mm3    Immature Grans, Absolute 0.07 (H) 0.00 - 0.05 10*3/mm3    nRBC 0.0 0.0 - 0.2 /100 WBC   Duplex Venous Lower Extremity LEFT    Collection Time: 06/17/20  4:31 PM   Result Value Ref Range    Right Common Femoral Spont Y     Right Common Femoral Phasic Y     Right Common Femoral Augment Y     Right Common Femoral Competent Y     Right Common Femoral Compress C     Left Common Femoral Spont Y     Left Common Femoral Phasic Y     Left Common Femoral Augment Y     Left Common Femoral Competent Y     Left Common Femoral Compress C     Left Saphenofemoral Junction Spont Y     Left Saphenofemoral Junction Phasic Y     Left Saphenofemoral Junction Augment Y     Left Saphenofemoral Junction Competent Y     Left Saphenofemoral Junction Compress C     Left Profunda Femoral Compress C     Left Proximal Femoral Compress C     Left Mid Femoral Spont Y     Left Mid Femoral Phasic Y     Left Mid Femoral Augment Y     Left Mid Femoral Competent Y     Left Mid Femoral Compress C     Left Distal Femoral Compress C     Left Popliteal Spont Y     Left Popliteal Phasic Y     Left Popliteal Augment Y     Left Popliteal Competent Y     Left Popliteal Compress C     Left Posterior Tibial Compress C     Left Peroneal Compress C     Left GastronemiusSoleal Compress  C     Left Greater Saph AK Compress C     Left Greater Saph BK Compress C        Ordered the above labs and independently reviewed the results.          MEDICATIONS GIVEN IN ER    Medications - No data to display      PROGRESS, DATA ANALYSIS, CONSULTS, AND MEDICAL DECISION MAKING    All labs have been independently reviewed by me.  All radiology studies have been reviewed by me and discussed with radiologist dictating the report.   EKG's independently viewed and interpreted by me.  Discussion below represents my analysis of pertinent findings related to patient's condition, differential diagnosis, treatment plan and final disposition.    I have discussed case with Dr. Shearer, emergency room physician.  He has performed his own bedside examination and agrees with treatment plan.    ED Course as of Jun 17 2346 Wed Jun 17, 2020   1756 Presents with 2-week history of left anterior thigh pain and numbness.  Patient states this is been worse the past couple days.  He is able ambulate.  He has no weakness.  He has good pulses distally.  He is anticoagulated.  I will obtain x-rays and then reevaluate.    [EE]   1826 Left femur x-ray interpreted by myself shows no acute fracture.  I will await final radiologist interpretation.    [EE]   1852 I discussed work-up with patient, as well as called and spoke with his daughter and wife.  No clear etiology to patient's symptoms.  Patient does have a benign appearing distal femur cystic lesion, this is far away from his proximal symptoms.  Patient has been in physical therapy.  This very well may be a muscular issue.  No evidence of DVT, arterial occlusion, fracture, or malignancy.  They understand to have a repeat x-ray in 6 months.    [EE]      ED Course User Index  [EE] Ryan Birch PA       AS OF 23:46 VITALS:    BP - 120/82  HR - 53  TEMP - 98.7 °F (37.1 °C) (Tympanic)  O2 SATS - 96%        DIAGNOSIS  Final diagnoses:   Left thigh pain   Musculoskeletal strain   Abnormal  x-ray of femur         DISPOSITION  Discharged             yRan Birch, PA  06/17/20 [id number]

## 2020-06-19 ENCOUNTER — TELEPHONE (OUTPATIENT)
Dept: INTERNAL MEDICINE | Facility: CLINIC | Age: 85
End: 2020-06-19

## 2020-06-19 RX ORDER — CYCLOBENZAPRINE HCL 10 MG
TABLET ORAL
Qty: 30 TABLET | Refills: 0 | Status: SHIPPED | OUTPATIENT
Start: 2020-06-19 | End: 2020-06-23 | Stop reason: SDUPTHER

## 2020-06-19 NOTE — TELEPHONE ENCOUNTER
I called and spoke with pt wife and told her to take as directed and to maybe start with just half BID, and if that didn't work then she could increase the dose

## 2020-06-19 NOTE — TELEPHONE ENCOUNTER
PTS WIFE CALLED STATING PT IS IN A LOT OF PAIN IN HIS UPPER LEG AND WENT TO ER AND IS REQUESTING A MEDICATION BE CALLED IN.     MELONY CRESPO RD CONFIRMED     CALL BACK  [phone]

## 2020-06-23 RX ORDER — CYCLOBENZAPRINE HCL 10 MG
TABLET ORAL
Qty: 30 TABLET | Refills: 0 | Status: SHIPPED | OUTPATIENT
Start: 2020-06-23 | End: 2020-06-25

## 2020-06-25 ENCOUNTER — OFFICE VISIT (OUTPATIENT)
Dept: INTERNAL MEDICINE | Facility: CLINIC | Age: 85
End: 2020-06-25

## 2020-06-25 VITALS
SYSTOLIC BLOOD PRESSURE: 116 MMHG | HEART RATE: 54 BPM | WEIGHT: 189 LBS | DIASTOLIC BLOOD PRESSURE: 56 MMHG | HEIGHT: 71 IN | BODY MASS INDEX: 26.46 KG/M2 | OXYGEN SATURATION: 98 %

## 2020-06-25 DIAGNOSIS — M79.652 ACUTE PAIN OF LEFT THIGH: Primary | ICD-10-CM

## 2020-06-25 DIAGNOSIS — M89.9 BONE LESION: ICD-10-CM

## 2020-06-25 DIAGNOSIS — G57.12 MERALGIA PARESTHETICA OF LEFT SIDE: ICD-10-CM

## 2020-06-25 PROCEDURE — 99214 OFFICE O/P EST MOD 30 MIN: CPT | Performed by: INTERNAL MEDICINE

## 2020-06-25 NOTE — PROGRESS NOTES
06/25/2020    Patient Information  Izaiah Garcia                                                                                          [address] [facility] [address]      8/31/1931  [unfilled]  There is no work phone number on file.    Chief Complaint:     Follow-up recent emergency room visit for complaints of a problem involving his left thigh.  Complaining of possible side effects from medication.    History of Present Illness:    Patient with a history of Parkinson's disease, bilateral lower extremity edema, vitamin B12 deficiency and macrocytosis, recent saddle pulmonary embolism requiring emergent intervention as well as chronic anticoagulation.  He also has impaired fasting glucose and hypertension as well as hyperlipidemia.  He presents today to follow-up on a recent emergency room visit and the history regarding this will be described below:    June 25, 2020--patient seen in follow-up by Dr. Godinez and he continues to have symptoms involving his left anterior lateral thigh.  He has intermittent numbness, pain, and paresthesias and occasionally there is a burning sensation.  He notices the symptoms are worse if he sits for a prolonged period.  He felt the Flexeril helped but his wife is concerned because he was having some visual hallucinations and acting somewhat confused.  That is a known side effect of the cyclobenzaprine and I have recommended that be discontinued immediately.  Patient has a physical therapist who comes to the house and she did a deep massage of the lateral thigh and this seemed to be helpful.  I printed off some information regarding meralgia paresthetica so that the patient could notify the physical therapist of the diagnosis and hopefully help him with some exercises or other forms of physical therapy.  I explained the patient this is a benign condition and rarely does it gets so severe that strong pain medications are required or surgical intervention  "required.  Patient does have weakness in his legs and I think that this is primarily related to his parkinsonism.  It does involve both legs.  In regards to the bone lesion, I think the best course of action would to repeat an x-ray in about 6 months.  The radiologist feels this is most likely benign.  I do not think an MRI is indicated as patient's symptoms are not over the area of the bony lesion.    June 17, 2020--patient presented to the emergency room with complaints of gradual onset intermittent left anterior thigh pain and numbness that had progressively worsening over the past 2 nights.  He reports the pain is worse at night.  He denies any difficulty walking or numbness of his lower leg.  He also denied any weakness of the leg, urinary incontinence, back pain.  Noted to be anticoagulated on Eliquis for pulmonary embolism.  There were no precipitating or alleviating factors.  Patient states that it \"feels like there is a pad\" on his left anterior thigh.  Patient underwent an evaluation including an x-ray of the left femur which revealed no acute fracture, erosion, or dislocation.  There was a cortical-based 1.2 cm lucent focus with sclerotic margins located in the distal femoral shaft that could possibly be old nonossifying fibroma.  It was noted the lesion does not appear to be aggressive.  Interval follow-up and x-ray in 6 months advised.  If there is pain at this location then MRI correlation could be obtained.  Patient was discharged from the emergency room without any specific medication.  He subsequently called his primary care doctor, Dr. Uriah Godinez MD, requesting a muscle relaxer and a prescription for Flexeril was placed.    Review of Systems   Constitution: Negative.   HENT: Negative.    Eyes: Negative.    Cardiovascular: Negative.    Respiratory: Negative.    Endocrine: Negative.    Hematologic/Lymphatic: Negative.    Skin: Negative.    Musculoskeletal: Positive for arthritis.        " Bilateral leg weakness   Gastrointestinal: Negative.    Genitourinary: Negative.    Neurological: Negative.         Left anterior lateral thigh pain, burning sensation and paresthesias   Psychiatric/Behavioral: Negative.    Allergic/Immunologic: Negative.        Active Problems:    Patient Active Problem List   Diagnosis   • Hyperlipidemia   • Benign essential hypertension   • History of gout   • History of esophageal stricture   • History of stroke, 6/29/2016--4.9 x 4 x 3 cm inferior left cerebellar infarct   • Rheumatoid factor positive   • Impaired fasting glucose   • At risk for falls   • Bilateral high frequency sensorineural hearing loss, wears hearing aids   • Bilateral lower extremity edema   • Parkinson's disease (CMS/HCC)   • Therapeutic drug monitoring   • Vitamin D deficiency   • Macrocytosis   • Vitamin B12 deficiency   • History of pulmonary embolus (PE)   • Acute saddle pulmonary embolism with acute cor pulmonale (CMS/HCC)   • Chronic anticoagulation, Eliquis.  Saddle pulmonary embolism   • History of pulmonary embolus (PE)   • Paroxysmal A-fib (CMS/HCC)   • Acute pain of left thigh   • Bone lesion, 6/17/2020--1.2 cm lucent focus with sclerotic margins distal left femur.  Repeat x-ray 6 months.   • Meralgia paresthetica of left side         Past Medical History:   Diagnosis Date   • Acute saddle pulmonary embolism with acute cor pulmonale (CMS/HCC) 2/29/2020    Admit date: 2/29/2020 Discharge date:3/20/2020 Discharged Condition: good   Discharge Diagnoses:   Acute saddle pulmonary embolism with acute cor pulmonale (CMS/HCC)   Pulmonary emboli (CMS/HCC)   Empyema of pleura (CMS/HCC)  Acute hypoxic respiratory failure BPE s/p ekos catheter on 2/29/2020 with local TPA infusion with good clinical response Troponemia suspect due to BPE RV strain Bilateral ple   • At risk for falls 5/2/2019   • Benign essential hypertension 6/29/2016   • Bilateral high frequency sensorineural hearing loss, wears hearing  "aids 5/2/2019   • Bilateral lower extremity edema 5/2/2019    May 2, 2019--patient who has hypertension and is on amlodipine 10 mg/day is complaining of progressively worsening swelling of the lower extremities that extends up to about mid calf.  Gets worse at the end of the day and tends to get better overnight when he props his feet up.  I think this is definitely related to the amlodipine although patient may have some venous insufficiency.  Medication ad   • Chronic anticoagulation, Eliquis.  Saddle pulmonary embolism 5/27/2020   • Decreased peripheral vision of both eyes 5/2/2019    May 2, 2019--continues to have complaints of \"dizziness\" associated with weakness in his lower extremities.  He is not describing a spinning sensation or anything remotely close to vertigo.  Instead he is describing an off-balance sensation.  He tends to fall forward if not careful and he tends to list towards the right side.  He has marked difficulty going down an incline.  He has to ambulate wit   • History of aspiration pneumonia 5/4/2015   • History of esophageal stricture 5/4/2015    July 3, 2018--EGD revealed a distal esophageal stricture that was dilated to 18 mm.  There was a small hiatal hernia.  There was mild streaky erythema in the proximal stomach.  Duodenal bulb normal.  April 26, 2016--EGD performed for dysphagia reveals a distal esophageal stricture that was dilated 16.5 mm.   • History of gout 6/29/2016   • History of pulmonary embolus (PE) 2/29/2020    Admit date: 2/29/2020 Discharge date:3/20/2020 Discharged Condition: good   Discharge Diagnoses:   Acute saddle pulmonary embolism with acute cor pulmonale (CMS/HCC)   Pulmonary emboli (CMS/HCC)   Empyema of pleura (CMS/HCC)  Acute hypoxic respiratory failure BPE s/p ekos catheter on 2/29/2020 with local TPA infusion with good clinical response Troponemia suspect due to BPE RV strain Bilateral ple   • History of stroke, 6/29/2016--4.9 x 4 x 3 cm inferior left " "cerebellar infarct 5/4/2015 June 29, 2016--MRI of the brain performed for complaints of dizziness and weakness. IMPRESSION: 1. There is susceptibility artifact streaking through the anterior left frontal scalp through the anterior left frontal bone in the anterior tipof the left frontal lobe likely from a tiny piece of metal in the anterior left frontal scalp slightly limits evaluation of these structures. 2. There is mild s   • Hyperlipidemia 6/29/2016   • Impaired fasting glucose 5/2/2019 April 14, 2015--hemoglobin A1c 5.9.   • Macrocytosis 5/2/2019   • Parkinson's disease (CMS/HCC) 5/2/2019    May 2, 2019--continues to have complaints of \"dizziness\" associated with weakness in his lower extremities.  He is not describing a spinning sensation or anything remotely close to vertigo.  Instead he is describing an off-balance sensation.  He tends to fall forward if not careful and he tends to list towards the right side.  He has marked difficulty going down an incline.  He has to ambulate wit   • Rheumatoid factor positive 5/2/2019 March 25, 2014--rheumatoid factor returned positive at 95.  However, CCP antibodies normal at 8, SHIV negative, both C&P ANCA negative, C-reactive protein normal at 0.24, sed rate normal at 2.   • Vitamin B12 deficiency 6/6/2019 June 6, 2019--patient recently had mildly elevated methylmalonic acid of 380.  Repeat methylmalonic acid was upper limit of normal at 356.  Given patient's neurologic symptoms and suspected parkinsonism, I have a low threshold for treating vitamin B12 deficiency.  We will initiate vitamin B12 injections today.  2000 mcg subcutaneously given today.   • Vitamin D deficiency 5/2/2019         Past Surgical History:   Procedure Laterality Date   • CARDIAC CATHETERIZATION N/A 2/29/2020    Procedure: Thrombolytic Therapy- EKOS;  Surgeon: Jason Griffiths MD;  Location: [facility] INVASIVE LOCATION;  Service: Cardiovascular;  Laterality: N/A;   • CATARACT " EXTRACTION EXTRACAPSULAR W/ INTRAOCULAR LENS IMPLANTATION Bilateral     Bilateral cataract extirpation with intraocular lens implantation   • CHOLECYSTECTOMY     • EKOS CATHETER PLACEMENT Bilateral 2/29/2020    Procedure: Ekos catheter placement;  Surgeon: Jason Griffiths MD;  Location:  [facility] INVAS[facility] LOCATION;  Service: Cardiovascular;  Laterality: Bilateral;   • ESOPHAGEAL DILATATION  04/26/2016 April 26, 2016--EGD performed for dysphagia reveals a distal esophageal stricture that was dilated 16.5 mm.   • ESOPHAGEAL DILATATION  07/03/2018    July 3, 2018--EGD revealed a distal esophageal stricture that was dilated to 18 mm.  There was a small hiatal hernia.  There was mild streaky erythema in the proximal stomach.  Duodenal bulb normal.   • INTERVENTIONAL RADIOLOGY PROCEDURE Bilateral 2/29/2020    Procedure: Pulmonary Angiogram;  Surgeon: Jason Griffiths MD;  Location:  [facility];  Service: Cardiovascular;  Laterality: Bilateral;         No Known Allergies        Current Outpatient Medications:   •  allopurinol (ZYLOPRIM) 300 MG tablet, Take 1 p.o. daily for gout, Disp: 90 tablet, Rfl: 3  •  budesonide (PULMICORT) 0.5 MG/2ML nebulizer solution, Take 0.5 mg by nebulization 2 (Two) Times a Day., Disp: , Rfl:   •  carbidopa-levodopa (SINEMET)  MG per tablet, Take 1 tablet by mouth 2 (Two) Times a Day., Disp: , Rfl:   •  ELIQUIS 5 MG tablet tablet, TAKE ONE TABLET BY MOUTH EVERY 12 HOURS, Disp: 60 tablet, Rfl: 5  •  furosemide (LASIX) 40 MG tablet, TAKE ONE TABLET BY MOUTH DAILY, Disp: 30 tablet, Rfl: 5  •  ipratropium-albuterol (DUO-NEB) 0.5-2.5 mg/3 ml nebulizer, Take 3 mL by nebulization 2 (Two) Times a Day., Disp: , Rfl:   •  lovastatin (MEVACOR) 20 MG tablet, Take 1 p.o. daily for high cholesterol, Disp: 90 tablet, Rfl: 3  •  metoprolol tartrate (LOPRESSOR) 25 MG tablet, TAKE ONE TABLET BY MOUTH EVERY 12 HOURS, Disp: 60 tablet, Rfl: 5  •  ondansetron (ZOFRAN) 4 MG tablet, 1  "p.o. every 6 to 8 hours as needed nausea, Disp: 20 tablet, Rfl: 0  •  saccharomyces boulardii (FLORASTOR) 250 MG capsule, Take 1 capsule by mouth 2 (Two) Times a Day., Disp: 180 capsule, Rfl: 0    Current Facility-Administered Medications:   •  cyanocobalamin injection 1,000 mcg, 1,000 mcg, Subcutaneous, Q30 Days, Uriah Godinez MD, 1,000 mcg at 02/07/20 1016      Family History   Problem Relation Age of Onset   • No Known Problems Mother    • No Known Problems Father          Social History     Socioeconomic History   • Marital status:      Spouse name: Not on file   • Number of children: 2   • Years of education: Not on file   • Highest education level: 9th grade   Social Needs   • Financial resource strain: Not hard at all   • Food insecurity:     Worry: Never true     Inability: Never true   • Transportation needs:     Medical: No     Non-medical: No   Tobacco Use   • Smoking status: Never Smoker   • Smokeless tobacco: Never Used   Substance and Sexual Activity   • Alcohol use: Never     Frequency: 2-4 times a month     Drinks per session: 1 or 2     Binge frequency: Never     Comment: occ   • Drug use: No   • Sexual activity: Not Currently     Partners: Female   Lifestyle   • Physical activity:     Days per week: 0 days     Minutes per session: 0 min   • Stress: Not at all   Relationships   • Social connections:     Talks on phone: Once a week     Gets together: Twice a week     Attends Hindu service: Never     Active member of club or organization: Yes     Attends meetings of clubs or organizations: More than 4 times per year     Relationship status:          Vitals:    06/25/20 1015   BP: 116/56   BP Location: Left arm   Pulse: 54   SpO2: 98%   Weight: 85.7 kg (189 lb)   Height: 180.3 cm (70.98\")        Body mass index is 26.37 kg/m².      Physical Exam:    General: Alert and oriented x 3.  No acute distress.  Normal affect.  HEENT: Pupils equal, round, reactive to light; extraocular " movements intact; sclerae nonicteric; pharynx, ear canals and TMs normal.  Neck: Without JVD, thyromegaly, bruit, or adenopathy.  Lungs: Clear to auscultation in all fields.  Heart: Regular rate and rhythm without murmur, rub, gallop, or click.  Abdomen: Soft, nontender, without hepatosplenomegaly or hernia.  Bowel sounds normal.  : Deferred.  Rectal: Deferred.  Extremities: Without clubbing, cyanosis, edema, or pulse deficit.  Neurologic: Intact without focal deficit.  Patient ambulates with the assistance of wheeled walker and has a somewhat ataxic parkinsonian gait.  Skin: Without significant lesion.  Musculoskeletal: Unremarkable.    Lab/other results:    I reviewed the documentation from the emergency room including the history and physical, laboratory studies, radiographic studies and discharge recommendations/medications.   Assessment/Plan:     Diagnosis Plan   1. Acute pain of left thigh     2. Bone lesion, 6/17/2020--1.2 cm lucent focus with sclerotic margins distal left femur.  Repeat x-ray 6 months.     3. Meralgia paresthetica of left side       Patient presents in follow-up from a recent emergency room visit for left anterior lateral thigh pain and paresthesias as described that is entirely consistent with meralgia paresthetica.  I discussed the pathology/physiology of this and gave the patient and his wife a handout.  Patient is seeing physical therapy and perhaps exercises could help.  Weight loss and avoidance of tight fitting clothing also can help.  Patient was having some recent confusion and visual hallucinations which I think is related to the Flexeril and I have recommended he discontinue that immediately.  He has some weakness but this involves both of his lower extremities and I think it is more related to his parkinsonian is him.  Patient is followed by the neurologist and if his symptoms seem to worsen then he should contact the neurologist for possible further medication adjustments.   Otherwise, patient will keep his previously scheduled lab and follow-up with me later this year.  If any his symptoms change then he needs to contact me.      Procedures

## 2020-07-09 ENCOUNTER — TELEPHONE (OUTPATIENT)
Dept: INTERNAL MEDICINE | Facility: CLINIC | Age: 85
End: 2020-07-09

## 2020-07-09 RX ORDER — NYSTATIN 100000 [USP'U]/G
POWDER TOPICAL 2 TIMES DAILY
Qty: 1 EACH | Refills: 0 | Status: SHIPPED | OUTPATIENT
Start: 2020-07-09 | End: 2020-07-27 | Stop reason: SDUPTHER

## 2020-07-09 NOTE — TELEPHONE ENCOUNTER
Who is marolyn? Sounds like it may be fungal. We can call in the nystatin powder that he may use 2x a day for one week. Should follow up with dr spaulding within a week.

## 2020-07-09 NOTE — TELEPHONE ENCOUNTER
MIKAL CALLED IN AND STATED SINCE HE CAME HOME FROM THE HOSPITAL IN MARCH  AND HAD SOME IRRITATION  AROUND HIS PRIVATE PARTS AND MIKAL GOT IT CLEARED UP .  NOW PATIENT HAS A RED RASH AND MIKAL IS USING GOLDBOND POWDER SPRAY FOR A WEEK   TO TREAT IT . THE RASH IS  ALSO IN THE SAME SPOT .  PLEASE CALL MIKAL AND ADVISE WHAT ELSE SHE CAN USE TO TREAT IT .  MIKAL WANTS TO KNOW IF PATIENT NEEDS TO BE SEEN .     PHARMACY VERIFIED  [facility] [address] - [phone]  - [phone] FX  [phone]       PATIENT CALL BACK  [phone]

## 2020-07-09 NOTE — TELEPHONE ENCOUNTER
Called and spoke with Chago, his wife and informed her that we could send in nystatin powder. She said yes she would appreciate it. She will f/u with Dr. Godinez.

## 2020-07-10 DIAGNOSIS — Z87.39 HISTORY OF GOUT: Chronic | ICD-10-CM

## 2020-07-13 RX ORDER — ALLOPURINOL 300 MG/1
TABLET ORAL
Qty: 90 TABLET | Refills: 3 | Status: SHIPPED | OUTPATIENT
Start: 2020-07-13 | End: 2021-09-08

## 2020-07-14 ENCOUNTER — TELEPHONE (OUTPATIENT)
Dept: INTERNAL MEDICINE | Facility: CLINIC | Age: 85
End: 2020-07-14

## 2020-07-14 NOTE — TELEPHONE ENCOUNTER
[facility] called and requested an extension for PT orders. PT is working on transitioning patient to a cane and would like orders to come 2X for 3 weeks and 1X for 5 weeks     Please advise    [phone]

## 2020-07-27 DIAGNOSIS — B37.2 CANDIDAL INTERTRIGO: Primary | ICD-10-CM

## 2020-07-27 RX ORDER — FLUCONAZOLE 200 MG/1
TABLET ORAL
Qty: 12 TABLET | Refills: 0 | Status: SHIPPED | OUTPATIENT
Start: 2020-07-27 | End: 2020-08-18

## 2020-07-27 RX ORDER — NYSTATIN 100000 [USP'U]/G
POWDER TOPICAL 2 TIMES DAILY
Qty: 1 EACH | Refills: 0 | Status: SHIPPED | OUTPATIENT
Start: 2020-07-27 | End: 2020-08-18

## 2020-08-13 ENCOUNTER — TELEPHONE (OUTPATIENT)
Dept: INTERNAL MEDICINE | Facility: CLINIC | Age: 85
End: 2020-08-13

## 2020-08-13 NOTE — TELEPHONE ENCOUNTER
PTS WIFE CALLED REQUESTING A CALL BACK FROM TOMMIE. WIFE STATES SHE HAS LEFT SEVERAL MESSAGES AND HAS NO HEARD BACK.     WIFE WOULD NOT GIVE ANY OTHER INFORMATION     PLEASE ADVISE     CALL BACK   [phone]

## 2020-08-14 ENCOUNTER — TELEPHONE (OUTPATIENT)
Dept: INTERNAL MEDICINE | Facility: CLINIC | Age: 85
End: 2020-08-14

## 2020-08-14 NOTE — TELEPHONE ENCOUNTER
Pt wife called and said that the redness is gone after the powder/ and tablets but the itching is still real bad.

## 2020-08-14 NOTE — TELEPHONE ENCOUNTER
He can take Zyrtec which is over-the-counter twice a day.  I cannot continue to treat this over the telephone.  Patient must be seen.  Appointment next week.

## 2020-08-18 ENCOUNTER — OFFICE VISIT (OUTPATIENT)
Dept: INTERNAL MEDICINE | Facility: CLINIC | Age: 85
End: 2020-08-18

## 2020-08-18 VITALS
HEART RATE: 55 BPM | OXYGEN SATURATION: 95 % | SYSTOLIC BLOOD PRESSURE: 160 MMHG | HEIGHT: 71 IN | WEIGHT: 190 LBS | DIASTOLIC BLOOD PRESSURE: 62 MMHG | BODY MASS INDEX: 26.6 KG/M2

## 2020-08-18 DIAGNOSIS — B35.6 TINEA CRURIS: Primary | ICD-10-CM

## 2020-08-18 PROBLEM — I26.02 ACUTE SADDLE PULMONARY EMBOLISM WITH ACUTE COR PULMONALE: Status: RESOLVED | Noted: 2020-02-29 | Resolved: 2020-08-18

## 2020-08-18 PROBLEM — Z86.711 HISTORY OF PULMONARY EMBOLUS (PE): Chronic | Status: RESOLVED | Noted: 2020-02-29 | Resolved: 2020-08-18

## 2020-08-18 PROBLEM — Z86.711 HISTORY OF PULMONARY EMBOLUS (PE): Chronic | Status: ACTIVE | Noted: 2020-02-29

## 2020-08-18 PROCEDURE — 99213 OFFICE O/P EST LOW 20 MIN: CPT | Performed by: INTERNAL MEDICINE

## 2020-08-18 RX ORDER — TERBINAFINE HYDROCHLORIDE 250 MG/1
TABLET ORAL
Qty: 10 TABLET | Refills: 0 | Status: SHIPPED | OUTPATIENT
Start: 2020-08-18 | End: 2021-06-02

## 2020-08-18 NOTE — PROGRESS NOTES
08/18/2020    Patient Information  Izaiah Garcia                                                                                          [address] [facility] [address]      8/31/1931  [unfilled]  There is no work phone number on file.    Chief Complaint:     Complaining of rash and itching groin.    History of Present Illness:    Patient with a history of multiple medical problems listed in the problem list presents today with persistent rash and severe pruritus of his groin as described below:    August 18, 2020--patient presents with a several week history of severe pruritic rash in his groin.  This has been treated with nystatin powder as well as 12 days of fluconazole and the rash got better but the itching is still present.  The rash is very localized to the groin and patient has no systemic signs or symptoms such as fever, or chills.  On exam he has obvious tinea cruris.  Given the fact that he has failed fluconazole as well as topical nystatin we will treat him more aggressively with Mycolog cream and a short course of terbinafine.    Review of Systems   Constitution: Negative.   HENT: Negative.    Eyes: Negative.    Cardiovascular: Negative.    Respiratory: Negative.    Endocrine: Negative.    Hematologic/Lymphatic: Negative.    Skin: Positive for itching and rash.   Musculoskeletal: Negative.    Gastrointestinal: Negative.    Genitourinary: Negative.    Neurological: Negative.    Psychiatric/Behavioral: Negative.    Allergic/Immunologic: Negative.        Active Problems:    Patient Active Problem List   Diagnosis   • Hyperlipidemia   • Benign essential hypertension   • History of gout   • History of esophageal stricture   • History of stroke, 6/29/2016--4.9 x 4 x 3 cm inferior left cerebellar infarct   • Rheumatoid factor positive   • Impaired fasting glucose   • At risk for falls   • Bilateral high frequency sensorineural hearing loss, wears hearing aids   • Bilateral lower  "extremity edema   • Parkinson's disease (CMS/HCC)   • Therapeutic drug monitoring   • Vitamin D deficiency   • Macrocytosis   • Vitamin B12 deficiency   • Chronic anticoagulation, Eliquis.  Saddle pulmonary embolism   • Paroxysmal atrial fibrillation (CMS/HCC)   • Acute pain of left thigh   • Bone lesion, 6/17/2020--1.2 cm lucent focus with sclerotic margins distal left femur.  Repeat x-ray 6 months.   • Meralgia paresthetica of left side   • Tinea cruris         Past Medical History:   Diagnosis Date   • Acute saddle pulmonary embolism with acute cor pulmonale (CMS/HCC) 2/29/2020    Admit date: 2/29/2020 Discharge date:3/20/2020 Discharged Condition: good   Discharge Diagnoses:   Acute saddle pulmonary embolism with acute cor pulmonale (CMS/HCC)   Pulmonary emboli (CMS/HCC)   Empyema of pleura (CMS/HCC)  Acute hypoxic respiratory failure BPE s/p ekos catheter on 2/29/2020 with local TPA infusion with good clinical response Troponemia suspect due to BPE RV strain Bilateral ple   • At risk for falls 5/2/2019   • Benign essential hypertension 6/29/2016   • Bilateral high frequency sensorineural hearing loss, wears hearing aids 5/2/2019   • Bilateral lower extremity edema 5/2/2019    May 2, 2019--patient who has hypertension and is on amlodipine 10 mg/day is complaining of progressively worsening swelling of the lower extremities that extends up to about mid calf.  Gets worse at the end of the day and tends to get better overnight when he props his feet up.  I think this is definitely related to the amlodipine although patient may have some venous insufficiency.  Medication ad   • Chronic anticoagulation, Eliquis.  Saddle pulmonary embolism 5/27/2020   • Decreased peripheral vision of both eyes 5/2/2019    May 2, 2019--continues to have complaints of \"dizziness\" associated with weakness in his lower extremities.  He is not describing a spinning sensation or anything remotely close to vertigo.  Instead he is describing " an off-balance sensation.  He tends to fall forward if not careful and he tends to list towards the right side.  He has marked difficulty going down an incline.  He has to ambulate wit   • History of aspiration pneumonia 5/4/2015   • History of esophageal stricture 5/4/2015    July 3, 2018--EGD revealed a distal esophageal stricture that was dilated to 18 mm.  There was a small hiatal hernia.  There was mild streaky erythema in the proximal stomach.  Duodenal bulb normal.  April 26, 2016--EGD performed for dysphagia reveals a distal esophageal stricture that was dilated 16.5 mm.   • History of gout 6/29/2016   • History of pulmonary embolus (PE) 2/29/2020    Admit date: 2/29/2020 Discharge date:3/20/2020 Discharged Condition: good   Discharge Diagnoses:   Acute saddle pulmonary embolism with acute cor pulmonale (CMS/HCC)   Pulmonary emboli (CMS/HCC)   Empyema of pleura (CMS/HCC)  Acute hypoxic respiratory failure BPE s/p ekos catheter on 2/29/2020 with local TPA infusion with good clinical response Troponemia suspect due to BPE RV strain Bilateral ple   • History of pulmonary embolus (PE) 2/29/2020    Admit date: 2/29/2020 Discharge date:3/20/2020 Discharged Condition: good   Discharge Diagnoses:   Acute saddle pulmonary embolism with acute cor pulmonale (CMS/HCC)   Pulmonary emboli (CMS/HCC)   Empyema of pleura (CMS/HCC)  Acute hypoxic respiratory failure BPE s/p ekos catheter on 2/29/2020 with local TPA infusion with good clinical response Troponemia suspect due to BPE RV strain Bilateral ple   • History of stroke, 6/29/2016--4.9 x 4 x 3 cm inferior left cerebellar infarct 5/4/2015 June 29, 2016--MRI of the brain performed for complaints of dizziness and weakness. IMPRESSION: 1. There is susceptibility artifact streaking through the anterior left frontal scalp through the anterior left frontal bone in the anterior tipof the left frontal lobe likely from a tiny piece of metal in the anterior left frontal scalp  "slightly limits evaluation of these structures. 2. There is mild s   • Hyperlipidemia 6/29/2016   • Impaired fasting glucose 5/2/2019 April 14, 2015--hemoglobin A1c 5.9.   • Macrocytosis 5/2/2019   • Parkinson's disease (CMS/[facility]) 5/2/2019    May 2, 2019--continues to have complaints of \"dizziness\" associated with weakness in his lower extremities.  He is not describing a spinning sensation or anything remotely close to vertigo.  Instead he is describing an off-balance sensation.  He tends to fall forward if not careful and he tends to list towards the right side.  He has marked difficulty going down an incline.  He has to ambulate wit   • Rheumatoid factor positive 5/2/2019 March 25, 2014--rheumatoid factor returned positive at 95.  However, CCP antibodies normal at 8, SHIV negative, both C&P ANCA negative, C-reactive protein normal at 0.24, sed rate normal at 2.   • Vitamin B12 deficiency 6/6/2019 June 6, 2019--patient recently had mildly elevated methylmalonic acid of 380.  Repeat methylmalonic acid was upper limit of normal at 356.  Given patient's neurologic symptoms and suspected parkinsonism, I have a low threshold for treating vitamin B12 deficiency.  We will initiate vitamin B12 injections today.  2000 mcg subcutaneously given today.   • Vitamin D deficiency 5/2/2019         Past Surgical History:   Procedure Laterality Date   • CARDIAC CATHETERIZATION N/A 2/29/2020    Procedure: Thrombolytic Therapy- EKOS;  Surgeon: Jason Griffiths MD;  Location: [facility] CATH INVASIVE LOCATION;  Service: Cardiovascular;  Laterality: N/A;   • CATARACT EXTRACTION EXTRACAPSULAR W/ INTRAOCULAR LENS IMPLANTATION Bilateral     Bilateral cataract extirpation with intraocular lens implantation   • CHOLECYSTECTOMY     • EKOS CATHETER PLACEMENT Bilateral 2/29/2020    Procedure: Ekos catheter placement;  Surgeon: Jason Griffiths MD;  Location:  [facility] CATH INVASIVE LOCATION;  Service: Cardiovascular;  Laterality: Bilateral; "   • ESOPHAGEAL DILATATION  04/26/2016 April 26, 2016--EGD performed for dysphagia reveals a distal esophageal stricture that was dilated 16.5 mm.   • ESOPHAGEAL DILATATION  07/03/2018    July 3, 2018--EGD revealed a distal esophageal stricture that was dilated to 18 mm.  There was a small hiatal hernia.  There was mild streaky erythema in the proximal stomach.  Duodenal bulb normal.   • INTERVENTIONAL RADIOLOGY PROCEDURE Bilateral 2/29/2020    Procedure: Pulmonary Angiogram;  Surgeon: Jason Griffiths MD;  Location: [facility] INVASIVE LOCATION;  Service: Cardiovascular;  Laterality: Bilateral;         No Known Allergies        Current Outpatient Medications:   •  allopurinol (ZYLOPRIM) 300 MG tablet, TAKE 1 TABLET EVERY DAY  FOR  GOUT, Disp: 90 tablet, Rfl: 3  •  budesonide (PULMICORT) 0.5 MG/2ML nebulizer solution, Take 0.5 mg by nebulization 2 (Two) Times a Day., Disp: , Rfl:   •  carbidopa-levodopa (SINEMET)  MG per tablet, Take 1 tablet by mouth 2 (Two) Times a Day., Disp: , Rfl:   •  ELIQUIS 5 MG tablet tablet, TAKE ONE TABLET BY MOUTH EVERY 12 HOURS, Disp: 60 tablet, Rfl: 5  •  fluconazole (DIFLUCAN) 200 MG tablet, Take 1 p.o. daily until gone, Disp: 12 tablet, Rfl: 0  •  furosemide (LASIX) 40 MG tablet, TAKE ONE TABLET BY MOUTH DAILY, Disp: 30 tablet, Rfl: 5  •  ipratropium-albuterol (DUO-NEB) 0.5-2.5 mg/3 ml nebulizer, Take 3 mL by nebulization 2 (Two) Times a Day., Disp: , Rfl:   •  lovastatin (MEVACOR) 20 MG tablet, Take 1 p.o. daily for high cholesterol, Disp: 90 tablet, Rfl: 3  •  metoprolol tartrate (LOPRESSOR) 25 MG tablet, TAKE ONE TABLET BY MOUTH EVERY 12 HOURS, Disp: 60 tablet, Rfl: 5  •  nystatin (MYCOSTATIN) 740302 UNIT/GM powder, Apply  topically to the appropriate area as directed 2 (Two) Times a Day., Disp: 1 each, Rfl: 0  •  ondansetron (ZOFRAN) 4 MG tablet, 1 p.o. every 6 to 8 hours as needed nausea, Disp: 20 tablet, Rfl: 0  •  saccharomyces boulardii (FLORASTOR) 250 MG capsule,  "Take 1 capsule by mouth 2 (Two) Times a Day., Disp: 180 capsule, Rfl: 0    Current Facility-Administered Medications:   •  cyanocobalamin injection 1,000 mcg, 1,000 mcg, Subcutaneous, Q30 Days, Uriah Godinez MD, 1,000 mcg at 02/07/20 1016      Family History   Problem Relation Age of Onset   • No Known Problems Mother    • No Known Problems Father          Social History     Socioeconomic History   • Marital status:      Spouse name: Not on file   • Number of children: 2   • Years of education: Not on file   • Highest education level: 9th grade   Social Needs   • Financial resource strain: Not hard at all   • Food insecurity:     Worry: Never true     Inability: Never true   • Transportation needs:     Medical: No     Non-medical: No   Tobacco Use   • Smoking status: Never Smoker   • Smokeless tobacco: Never Used   Substance and Sexual Activity   • Alcohol use: Never     Frequency: 2-4 times a month     Drinks per session: 1 or 2     Binge frequency: Never     Comment: occ   • Drug use: No   • Sexual activity: Not Currently     Partners: Female   Lifestyle   • Physical activity:     Days per week: 0 days     Minutes per session: 0 min   • Stress: Not at all   Relationships   • Social connections:     Talks on phone: Once a week     Gets together: Twice a week     Attends [employer] service: Never     Active member of club or organization: Yes     Attends meetings of clubs or organizations: More than 4 times per year     Relationship status:          Vitals:    08/18/20 1240   BP: 160/62   BP Location: Left arm   Patient Position: Sitting   Cuff Size: Adult   Pulse: 55   SpO2: 95%   Weight: 86.2 kg (190 lb)   Height: 180.3 cm (70.98\")        Body mass index is 26.51 kg/m².      Physical Exam:    General: Alert and oriented x 3.  No acute distress.  Normal affect.  HEENT: Pupils equal, round, reactive to light; extraocular movements intact; sclerae nonicteric; pharynx, ear canals and TMs normal.  " Neck: Without JVD, thyromegaly, bruit, or adenopathy.  Lungs: Clear to auscultation in all fields.  Heart: Regular rate and rhythm without murmur, rub, gallop, or click.  Abdomen: Soft, nontender, without hepatosplenomegaly or hernia.  Bowel sounds normal.  : Deferred.  Rectal: Deferred.  Extremities: Without clubbing, cyanosis, edema, or pulse deficit.  Neurologic: Intact without focal deficit.  Normal station and gait observed during ingress and egress from the examination room.  Skin: Erythematous rash in the groin and intertriginous areas between the legs and perineum consistent with tenia.  Musculoskeletal: Unremarkable.    Lab/other results:      Assessment/Plan:     Diagnosis Plan   1. Tinea cruris       August 18, 2020--patient presents with a several week history of severe pruritic rash in his groin.  This has been treated with nystatin powder as well as 12 days of fluconazole and the rash got better but the itching is still present.  The rash is very localized to the groin and patient has no systemic signs or symptoms such as fever, or chills.  On exam he has obvious tinea cruris.  Given the fact that he has failed fluconazole as well as topical nystatin we will treat him more aggressively with Mycolog cream and a short course of terbinafine.            Procedures

## 2020-08-26 ENCOUNTER — TELEPHONE (OUTPATIENT)
Dept: INTERNAL MEDICINE | Facility: CLINIC | Age: 85
End: 2020-08-26

## 2020-08-26 NOTE — TELEPHONE ENCOUNTER
HUGH FROM [facility] STATED PATIENT HAS CANCELLED ALL PHYSICAL THERAPY FOR THIS WEEK.    HUGH CALL BACK #: [phone]

## 2020-09-04 ENCOUNTER — TELEPHONE (OUTPATIENT)
Dept: INTERNAL MEDICINE | Facility: CLINIC | Age: 85
End: 2020-09-04

## 2020-09-04 DIAGNOSIS — Z79.01 CHRONIC ANTICOAGULATION: Primary | ICD-10-CM

## 2020-09-04 DIAGNOSIS — I10 BENIGN ESSENTIAL HYPERTENSION: ICD-10-CM

## 2020-09-04 RX ORDER — FUROSEMIDE 40 MG/1
40 TABLET ORAL DAILY
Qty: 90 TABLET | Refills: 2 | Status: SHIPPED | OUTPATIENT
Start: 2020-09-04 | End: 2020-12-14

## 2020-09-04 NOTE — TELEPHONE ENCOUNTER
FUROSEMIDE 40MG TABLETS  METOPROLOL 25MG TABLETS  ELIQUIS 5MG TABLETS    PATIENT WANTS ALL THESE TO GO THROUGH [facility] 90 DAYS SUPPLY FROM NOW ON IF POSSIBLE PLEASE    KAPIL NEEDS FILLED ASAP 90 DAYS PLEASE     [facility] - [facility] [address] [facility] [address] - [phone]  - [phone] [[id number]] (Mobile)

## 2020-09-08 ENCOUNTER — TELEPHONE (OUTPATIENT)
Dept: INTERNAL MEDICINE | Facility: CLINIC | Age: 85
End: 2020-09-08

## 2020-09-08 RX ORDER — VALSARTAN AND HYDROCHLOROTHIAZIDE 320; 25 MG/1; MG/1
TABLET, FILM COATED ORAL
Qty: 90 TABLET | OUTPATIENT
Start: 2020-09-08

## 2020-09-08 NOTE — TELEPHONE ENCOUNTER
PATIENT STATES: that his INS have been trying to call us they need a call back the two numbers he has is [phone] or [phone]      PATIENT CAN BE REACHED ON:[phone]

## 2020-09-09 NOTE — TELEPHONE ENCOUNTER
Spoke to patient's wife, and she said that [facility] was calling for a refill for Eliquis, Lasix, and lopressor.  She was told that they were all sent to [facility] on 9/4/20.  She expressed understanding.

## 2020-09-14 ENCOUNTER — OFFICE VISIT (OUTPATIENT)
Dept: CARDIOLOGY | Facility: CLINIC | Age: 85
End: 2020-09-14

## 2020-09-14 DIAGNOSIS — Z79.01 CHRONIC ANTICOAGULATION: Chronic | ICD-10-CM

## 2020-09-14 DIAGNOSIS — I48.0 PAROXYSMAL ATRIAL FIBRILLATION (HCC): Primary | ICD-10-CM

## 2020-09-14 DIAGNOSIS — G20 PARKINSON'S DISEASE (HCC): Chronic | ICD-10-CM

## 2020-09-14 DIAGNOSIS — I10 BENIGN ESSENTIAL HYPERTENSION: Chronic | ICD-10-CM

## 2020-09-14 PROCEDURE — 99442 PR PHYS/QHP TELEPHONE EVALUATION 11-20 MIN: CPT | Performed by: INTERNAL MEDICINE

## 2020-09-14 NOTE — PROGRESS NOTES
Mr. Garcia is an 89-year-old gentleman who presented in pulmonary distress in March 2020 and had a submassive pulmonary emboli.  We took him to the Cath Lab and treated him with EKOS therapy.  He really got a great result from that.  It was a long hospitalization for him and he subsequently also developed persistent atrial fibrillation and so he has been rate controlled and anticoagulated.  I last talked to him at the beginning of the pandemic and he was doing well.  In June 2020 we echoed him and his LV function was normal and his RV function had improved to normal.  He also had a lower extremity Dopplers of his venous system that did not show any residual DVT.    He is now back again for a telemedicine visit for which she has consented to.    I spoke with his wife today.  She did all of the talking.  She says that he is doing well but he is not short of breath some days he is a little bit more tired than others.  But he is getting around pretty well he does well with a walker he is not having PND orthopnea edema.  He is not having any bleeding difficulty or palpitations.  And his blood pressure has been pretty stable.  In general both her and the family are extremely happy with how he is doing.    I am not can make any changes today I am pleased with his response to treatment for his pulmonary emboli I think he is a person we will likely keep on long-term anticoagulation because he is at risk to do this again and I think also he needs to be anticoagulation because he has persistent A. fib we are rate controlling him and anticoagulate him and that has been good so at this point I am not can make any changes and then when I have him come back and see us in 6 months and we need to really see him in the office at this point and then he will see me in a year    I spent 20 minutes on the phone with her and another 10 minutes charting

## 2020-11-10 DIAGNOSIS — E78.2 MIXED HYPERLIPIDEMIA: Chronic | ICD-10-CM

## 2020-11-10 DIAGNOSIS — E55.9 VITAMIN D DEFICIENCY: Chronic | ICD-10-CM

## 2020-11-10 DIAGNOSIS — E53.8 VITAMIN B12 DEFICIENCY: Chronic | ICD-10-CM

## 2020-11-10 DIAGNOSIS — Z51.81 THERAPEUTIC DRUG MONITORING: ICD-10-CM

## 2020-11-10 DIAGNOSIS — R73.01 IMPAIRED FASTING GLUCOSE: Chronic | ICD-10-CM

## 2020-11-10 DIAGNOSIS — I10 BENIGN ESSENTIAL HYPERTENSION: Chronic | ICD-10-CM

## 2020-11-16 ENCOUNTER — LAB (OUTPATIENT)
Dept: LAB | Facility: HOSPITAL | Age: 85
End: 2020-11-16

## 2020-11-16 LAB
25(OH)D3 SERPL-MCNC: 26 NG/ML (ref 30–100)
ALBUMIN SERPL-MCNC: 3.7 G/DL (ref 3.5–5.2)
ALBUMIN/GLOB SERPL: 1.5 G/DL
ALP SERPL-CCNC: 80 U/L (ref 39–117)
ALT SERPL W P-5'-P-CCNC: 6 U/L (ref 1–41)
ANION GAP SERPL CALCULATED.3IONS-SCNC: 7.5 MMOL/L (ref 5–15)
AST SERPL-CCNC: 12 U/L (ref 1–40)
BILIRUB SERPL-MCNC: 1 MG/DL (ref 0–1.2)
BILIRUB UR QL STRIP: NEGATIVE
BUN SERPL-MCNC: 18 MG/DL (ref 8–23)
BUN/CREAT SERPL: 16.2 (ref 7–25)
CALCIUM SPEC-SCNC: 9.1 MG/DL (ref 8.6–10.5)
CHLORIDE SERPL-SCNC: 102 MMOL/L (ref 98–107)
CLARITY UR: CLEAR
CO2 SERPL-SCNC: 28.5 MMOL/L (ref 22–29)
COLOR UR: YELLOW
CREAT SERPL-MCNC: 1.11 MG/DL (ref 0.76–1.27)
DEPRECATED RDW RBC AUTO: 40.9 FL (ref 37–54)
ERYTHROCYTE [DISTWIDTH] IN BLOOD BY AUTOMATED COUNT: 13.3 % (ref 12.3–15.4)
GFR SERPL CREATININE-BSD FRML MDRD: 62 ML/MIN/1.73
GLOBULIN UR ELPH-MCNC: 2.4 GM/DL
GLUCOSE SERPL-MCNC: 117 MG/DL (ref 65–99)
GLUCOSE UR STRIP-MCNC: NEGATIVE MG/DL
HBA1C MFR BLD: 5.8 % (ref 4.8–5.6)
HCT VFR BLD AUTO: 45.4 % (ref 37.5–51)
HGB BLD-MCNC: 15 G/DL (ref 13–17.7)
HGB UR QL STRIP.AUTO: NEGATIVE
KETONES UR QL STRIP: NEGATIVE
LEUKOCYTE ESTERASE UR QL STRIP.AUTO: NEGATIVE
MCH RBC QN AUTO: 28 PG (ref 26.6–33)
MCHC RBC AUTO-ENTMCNC: 33 G/DL (ref 31.5–35.7)
MCV RBC AUTO: 84.7 FL (ref 79–97)
NITRITE UR QL STRIP: NEGATIVE
PH UR STRIP.AUTO: 6 [PH] (ref 5–8)
PLATELET # BLD AUTO: 231 10*3/MM3 (ref 140–450)
PMV BLD AUTO: 11.6 FL (ref 6–12)
POTASSIUM SERPL-SCNC: 4.1 MMOL/L (ref 3.5–5.2)
PROT SERPL-MCNC: 6.1 G/DL (ref 6–8.5)
PROT UR QL STRIP: NEGATIVE
RBC # BLD AUTO: 5.36 10*6/MM3 (ref 4.14–5.8)
SODIUM SERPL-SCNC: 138 MMOL/L (ref 136–145)
SP GR UR STRIP: 1.01 (ref 1–1.03)
T3FREE SERPL-MCNC: 2.79 PG/ML (ref 2–4.4)
T4 FREE SERPL-MCNC: 1.47 NG/DL (ref 0.93–1.7)
TSH SERPL DL<=0.05 MIU/L-ACNC: 0.8 UIU/ML (ref 0.27–4.2)
UROBILINOGEN UR QL STRIP: NORMAL
WBC # BLD AUTO: 8.69 10*3/MM3 (ref 3.4–10.8)

## 2020-11-16 PROCEDURE — 85027 COMPLETE CBC AUTOMATED: CPT | Performed by: INTERNAL MEDICINE

## 2020-11-16 PROCEDURE — 84443 ASSAY THYROID STIM HORMONE: CPT | Performed by: INTERNAL MEDICINE

## 2020-11-16 PROCEDURE — 80053 COMPREHEN METABOLIC PANEL: CPT | Performed by: INTERNAL MEDICINE

## 2020-11-16 PROCEDURE — 83704 LIPOPROTEIN BLD QUAN PART: CPT | Performed by: INTERNAL MEDICINE

## 2020-11-16 PROCEDURE — 36415 COLL VENOUS BLD VENIPUNCTURE: CPT

## 2020-11-16 PROCEDURE — 80061 LIPID PANEL: CPT | Performed by: INTERNAL MEDICINE

## 2020-11-16 PROCEDURE — 82306 VITAMIN D 25 HYDROXY: CPT | Performed by: INTERNAL MEDICINE

## 2020-11-16 PROCEDURE — 81003 URINALYSIS AUTO W/O SCOPE: CPT | Performed by: INTERNAL MEDICINE

## 2020-11-16 PROCEDURE — 84439 ASSAY OF FREE THYROXINE: CPT | Performed by: INTERNAL MEDICINE

## 2020-11-16 PROCEDURE — 83036 HEMOGLOBIN GLYCOSYLATED A1C: CPT | Performed by: INTERNAL MEDICINE

## 2020-11-16 PROCEDURE — 84481 FREE ASSAY (FT-3): CPT | Performed by: INTERNAL MEDICINE

## 2020-11-18 LAB
CHOLEST SERPL-MCNC: 123 MG/DL (ref 100–199)
HDL SERPL-SCNC: 23 UMOL/L
HDLC SERPL-MCNC: 32 MG/DL
LDL SERPL QN: 20.5 NM
LDL SERPL-SCNC: 618 NMOL/L
LDL SMALL SERPL-SCNC: 283 NMOL/L
LDLC SERPL CALC-MCNC: 69 MG/DL (ref 0–99)
TRIGL SERPL-MCNC: 121 MG/DL (ref 0–149)

## 2020-12-03 ENCOUNTER — OFFICE VISIT (OUTPATIENT)
Dept: INTERNAL MEDICINE | Facility: CLINIC | Age: 85
End: 2020-12-03

## 2020-12-03 ENCOUNTER — HOSPITAL ENCOUNTER (OUTPATIENT)
Dept: GENERAL RADIOLOGY | Facility: HOSPITAL | Age: 85
Discharge: HOME | End: 2020-12-03
Admitting: INTERNAL MEDICINE

## 2020-12-03 VITALS
BODY MASS INDEX: 26.85 KG/M2 | WEIGHT: 191.8 LBS | DIASTOLIC BLOOD PRESSURE: 70 MMHG | SYSTOLIC BLOOD PRESSURE: 136 MMHG | OXYGEN SATURATION: 96 % | HEART RATE: 55 BPM | HEIGHT: 71 IN

## 2020-12-03 DIAGNOSIS — Z86.73 HISTORY OF STROKE: Chronic | ICD-10-CM

## 2020-12-03 DIAGNOSIS — R60.0 BILATERAL LOWER EXTREMITY EDEMA: Chronic | ICD-10-CM

## 2020-12-03 DIAGNOSIS — Z79.01 CHRONIC ANTICOAGULATION: Chronic | ICD-10-CM

## 2020-12-03 DIAGNOSIS — Z91.81 AT RISK FOR FALLS: Chronic | ICD-10-CM

## 2020-12-03 DIAGNOSIS — R73.01 IMPAIRED FASTING GLUCOSE: Primary | Chronic | ICD-10-CM

## 2020-12-03 DIAGNOSIS — G20 PARKINSON'S DISEASE (HCC): Chronic | ICD-10-CM

## 2020-12-03 DIAGNOSIS — E53.8 VITAMIN B12 DEFICIENCY: Chronic | ICD-10-CM

## 2020-12-03 DIAGNOSIS — E55.9 VITAMIN D DEFICIENCY: Chronic | ICD-10-CM

## 2020-12-03 DIAGNOSIS — E78.2 MIXED HYPERLIPIDEMIA: Chronic | ICD-10-CM

## 2020-12-03 DIAGNOSIS — Z23 NEED FOR INFLUENZA VACCINATION: ICD-10-CM

## 2020-12-03 DIAGNOSIS — D75.89 MACROCYTOSIS: Chronic | ICD-10-CM

## 2020-12-03 DIAGNOSIS — I10 BENIGN ESSENTIAL HYPERTENSION: Chronic | ICD-10-CM

## 2020-12-03 DIAGNOSIS — I48.0 PAROXYSMAL ATRIAL FIBRILLATION (HCC): Chronic | ICD-10-CM

## 2020-12-03 DIAGNOSIS — N40.0 BENIGN PROSTATIC HYPERPLASIA WITHOUT LOWER URINARY TRACT SYMPTOMS: ICD-10-CM

## 2020-12-03 DIAGNOSIS — Z86.711 HISTORY OF PULMONARY EMBOLUS (PE): Chronic | ICD-10-CM

## 2020-12-03 DIAGNOSIS — Z87.39 HISTORY OF GOUT: Chronic | ICD-10-CM

## 2020-12-03 DIAGNOSIS — M89.9 BONE LESION: ICD-10-CM

## 2020-12-03 PROBLEM — M79.652 ACUTE PAIN OF LEFT THIGH: Status: RESOLVED | Noted: 2020-06-25 | Resolved: 2020-12-03

## 2020-12-03 PROBLEM — B35.6 TINEA CRURIS: Status: RESOLVED | Noted: 2020-08-18 | Resolved: 2020-12-03

## 2020-12-03 PROBLEM — G57.12 MERALGIA PARESTHETICA OF LEFT SIDE: Status: RESOLVED | Noted: 2020-06-25 | Resolved: 2020-12-03

## 2020-12-03 PROCEDURE — G0008 ADMIN INFLUENZA VIRUS VAC: HCPCS | Performed by: INTERNAL MEDICINE

## 2020-12-03 PROCEDURE — 73552 X-RAY EXAM OF FEMUR 2/>: CPT

## 2020-12-03 PROCEDURE — 99214 OFFICE O/P EST MOD 30 MIN: CPT | Performed by: INTERNAL MEDICINE

## 2020-12-03 PROCEDURE — 90694 VACC AIIV4 NO PRSRV 0.5ML IM: CPT | Performed by: INTERNAL MEDICINE

## 2020-12-03 NOTE — PROGRESS NOTES
12/03/2020    Patient Information  Izaiah Garcia                                                                                          [address] [place] [facility] [place] [address]      8/31/1931  [unfilled]  There is no work phone number on file.    Chief Complaint:     Follow-up lab work in order to monitor chronic medical issues listed in history of present illness.  No new acute complaints.    History of Present Illness:    Patient with a history of impaired fasting glucose, hyperlipidemia, hypertension, gout, history of stroke, Parkinson's disease, at risk for falls, lower extremity edema, vitamin B12 deficiency, vitamin D deficiency, chronic anticoagulation with Eliquis for paroxysmal atrial fibrillation.  He presents today for a follow-up with lab prior in order to monitor his chronic medical issues.  His past medical history reviewed and updated were necessary including health maintenance parameters.  This reveals he will be up-to-date or else accounted for after today's visit.    Review of Systems   Constitution: Negative.   HENT: Negative.    Eyes: Negative.    Cardiovascular: Positive for leg swelling.   Respiratory: Negative.    Endocrine: Negative.    Hematologic/Lymphatic: Negative.    Skin: Negative.    Musculoskeletal: Positive for arthritis and joint pain.   Gastrointestinal: Negative.    Genitourinary: Negative.    Neurological: Positive for disturbances in coordination and loss of balance.   Psychiatric/Behavioral: Negative.    Allergic/Immunologic: Negative.        Active Problems:    Patient Active Problem List   Diagnosis   • Hyperlipidemia   • Benign essential hypertension   • History of gout   • History of esophageal stricture   • History of stroke, 6/29/2016--4.9 x 4 x 3 cm inferior left cerebellar infarct   • Rheumatoid factor positive   • Impaired fasting glucose   • At risk for falls   • Bilateral high frequency sensorineural hearing loss, wears hearing aids   • Bilateral  "lower extremity edema   • Parkinson's disease (CMS/HCC)   • Therapeutic drug monitoring   • Vitamin D deficiency   • Macrocytosis   • Vitamin B12 deficiency   • History of pulmonary embolus (PE)   • Chronic anticoagulation, Eliquis.  Saddle pulmonary embolism   • Paroxysmal atrial fibrillation (CMS/HCC)   • Bone lesion, 6/17/2020--1.2 cm lucent focus with sclerotic margins distal left femur.  Repeat x-ray 6 months.   • Benign prostatic hyperplasia without lower urinary tract symptoms         Past Medical History:   Diagnosis Date   • Acute saddle pulmonary embolism with acute cor pulmonale (CMS/HCC) 2/29/2020    Admit date: 2/29/2020 Discharge date:3/20/2020 Discharged Condition: good   Discharge Diagnoses:   Acute saddle pulmonary embolism with acute cor pulmonale (CMS/HCC)   Pulmonary emboli (CMS/HCC)   Empyema of pleura (CMS/HCC)  Acute hypoxic respiratory failure BPE s/p ekos catheter on 2/29/2020 with local TPA infusion with good clinical response Troponemia suspect due to BPE RV strain Bilateral ple   • At risk for falls 5/2/2019   • Benign essential hypertension 6/29/2016   • Benign prostatic hyperplasia without lower urinary tract symptoms 12/3/2020   • Bilateral high frequency sensorineural hearing loss, wears hearing aids 5/2/2019   • Bilateral lower extremity edema 5/2/2019    May 2, 2019--patient who has hypertension and is on amlodipine 10 mg/day is complaining of progressively worsening swelling of the lower extremities that extends up to about mid calf.  Gets worse at the end of the day and tends to get better overnight when he props his feet up.  I think this is definitely related to the amlodipine although patient may have some venous insufficiency.  Medication ad   • Chronic anticoagulation, Eliquis.  Saddle pulmonary embolism 5/27/2020   • Decreased peripheral vision of both eyes 5/2/2019    May 2, 2019--continues to have complaints of \"dizziness\" associated with weakness in his lower extremities. " " He is not describing a spinning sensation or anything remotely close to vertigo.  Instead he is describing an off-balance sensation.  He tends to fall forward if not careful and he tends to list towards the right side.  He has marked difficulty going down an incline.  He has to ambulate wit   • History of aspiration pneumonia 5/4/2015   • History of esophageal stricture 5/4/2015    July 3, 2018--EGD revealed a distal esophageal stricture that was dilated to 18 mm.  There was a small hiatal hernia.  There was mild streaky erythema in the proximal stomach.  Duodenal bulb normal.  April 26, 2016--EGD performed for dysphagia reveals a distal esophageal stricture that was dilated 16.5 mm.   • History of gout 6/29/2016   • History of pulmonary embolus (PE) 2/29/2020    Admit date: 2/29/2020 Discharge date:3/20/2020 Discharged Condition: good   Discharge Diagnoses:   Acute saddle pulmonary embolism with acute cor pulmonale (CMS/HCC)   Pulmonary emboli (CMS/HCC)   Empyema of pleura (CMS/HCC)  Acute hypoxic respiratory failure BPE s/p ekos catheter on 2/29/2020 with local TPA infusion with good clinical response Troponemia suspect due to BPE RV strain Bilateral ple   • History of stroke, 6/29/2016--4.9 x 4 x 3 cm inferior left cerebellar infarct 5/4/2015 June 29, 2016--MRI of the brain performed for complaints of dizziness and weakness. IMPRESSION: 1. There is susceptibility artifact streaking through the anterior left frontal scalp through the anterior left frontal bone in the anterior tipof the left frontal lobe likely from a tiny piece of metal in the anterior left frontal scalp slightly limits evaluation of these structures. 2. There is mild s   • Hyperlipidemia 6/29/2016   • Impaired fasting glucose 5/2/2019 April 14, 2015--hemoglobin A1c 5.9.   • Macrocytosis 5/2/2019   • Parkinson's disease (CMS/HCC) 5/2/2019    May 2, 2019--continues to have complaints of \"dizziness\" associated with weakness in his lower " extremities.  He is not describing a spinning sensation or anything remotely close to vertigo.  Instead he is describing an off-balance sensation.  He tends to fall forward if not careful and he tends to list towards the right side.  He has marked difficulty going down an incline.  He has to ambulate wit   • Rheumatoid factor positive 5/2/2019 March 25, 2014--rheumatoid factor returned positive at 95.  However, CCP antibodies normal at 8, SHIV negative, both C&P ANCA negative, C-reactive protein normal at 0.24, sed rate normal at 2.   • Vitamin B12 deficiency 6/6/2019 June 6, 2019--patient recently had mildly elevated methylmalonic acid of 380.  Repeat methylmalonic acid was upper limit of normal at 356.  Given patient's neurologic symptoms and suspected parkinsonism, I have a low threshold for treating vitamin B12 deficiency.  We will initiate vitamin B12 injections today.  2000 mcg subcutaneously given today.   • Vitamin D deficiency 5/2/2019         Past Surgical History:   Procedure Laterality Date   • CARDIAC CATHETERIZATION N/A 2/29/2020    Procedure: Thrombolytic Therapy- EKOS;  Surgeon: Jason Griffiths MD;  Location: [facility] INVASIVE LOC[facility];  Service: Cardiovascular;  Laterality: N/A;   • CATARACT EXTRACTION EXTRACAPSULAR W/ INTRAOCULAR LENS IMPLANTATION Bilateral     Bilateral cataract extirpation with intraocular lens implantation   • CHOLECYSTECTOMY     • EKOS CATHETER PLACEMENT Bilateral 2/29/2020    Procedure: Ekos catheter placement;  Surgeon: Jason Griffiths MD;  Location:  [facility] INVASIVE LOCATION;  Service: Cardiovascular;  Laterality: Bilateral;   • ESOPHAGEAL DILATATION  04/26/2016 April 26, 2016--EGD performed for dysphagia reveals a distal esophageal stricture that was dilated 16.5 mm.   • ESOPHAGEAL DILATATION  07/03/2018    July 3, 2018--EGD revealed a distal esophageal stricture that was dilated to 18 mm.  There was a small hiatal hernia.  There was mild streaky erythema  in the proximal stomach.  Duodenal bulb normal.   • INTERVENTIONAL RADIOLOGY PROCEDURE Bilateral 2/29/2020    Procedure: Pulmonary Angiogram;  Surgeon: Jason Griffiths MD;  Location: [facility] INVASIVE LOCATION;  Service: Cardiovascular;  Laterality: Bilateral;         No Known Allergies        Current Outpatient Medications:   •  allopurinol (ZYLOPRIM) 300 MG tablet, TAKE 1 TABLET EVERY DAY  FOR  GOUT, Disp: 90 tablet, Rfl: 3  •  budesonide (PULMICORT) 0.5 MG/2ML nebulizer solution, Take 0.5 mg by nebulization 2 (Two) Times a Day., Disp: , Rfl:   •  carbidopa-levodopa (SINEMET)  MG per tablet, Take 1 tablet by mouth 2 (Two) Times a Day., Disp: , Rfl:   •  furosemide (LASIX) 40 MG tablet, Take 1 tablet by mouth Daily., Disp: 90 tablet, Rfl: 2  •  ipratropium-albuterol (DUO-NEB) 0.5-2.5 mg/3 ml nebulizer, Take 3 mL by nebulization 2 (Two) Times a Day., Disp: , Rfl:   •  lovastatin (MEVACOR) 20 MG tablet, Take 1 p.o. daily for high cholesterol, Disp: 90 tablet, Rfl: 3  •  metoprolol tartrate (LOPRESSOR) 25 MG tablet, Take 1 tablet by mouth Every 12 (Twelve) Hours., Disp: 180 tablet, Rfl: 2  •  ondansetron (ZOFRAN) 4 MG tablet, 1 p.o. every 6 to 8 hours as needed nausea, Disp: 20 tablet, Rfl: 0  •  saccharomyces boulardii (FLORASTOR) 250 MG capsule, Take 1 capsule by mouth 2 (Two) Times a Day., Disp: 180 capsule, Rfl: 0  •  terbinafine (lamiSIL) 250 MG tablet, Take 1 p.o. daily until gone, Disp: 10 tablet, Rfl: 0  •  Cholecalciferol (vitamin D3) 125 MCG (5000 UT) capsule capsule, 1 by mouth daily as directed, Disp: 30 capsule, Rfl:   •  rivaroxaban (XARELTO) 15 MG tablet, Take 1 p.o. daily for blood thinner.  Take at same time every day., Disp: 90 tablet, Rfl: 3    Current Facility-Administered Medications:   •  cyanocobalamin injection 1,000 mcg, 1,000 mcg, Subcutaneous, Q30 Days, Uriah Godinez MD, 1,000 mcg at 02/07/20 1016      Family History   Problem Relation Age of Onset   • No Known Problems Mother  "   • No Known Problems Father          Social History     Socioeconomic History   • Marital status:      Spouse name: Not on file   • Number of children: 2   • Years of education: Not on file   • Highest education level: 9th grade   Social Needs   • Financial resource strain: Not hard at all   • Food insecurity     Worry: Never true     Inability: Never true   • Transportation needs     Medical: No     Non-medical: No   Tobacco Use   • Smoking status: Never Smoker   • Smokeless tobacco: Never Used   Substance and Sexual Activity   • Alcohol use: Never     Frequency: 2-4 times a month     Drinks per session: 1 or 2     Binge frequency: Never     Comment: occ   • Drug use: No   • Sexual activity: Not Currently     Partners: Female   Lifestyle   • Physical activity     Days per week: 0 days     Minutes per session: 0 min   • Stress: Not at all   Relationships   • Social connections     Talks on phone: Once a week     Gets together: Twice a week     Attends Rastafarian service: Never     Active member of club or organization: Yes     Attends meetings of clubs or organizations: More than 4 times per year     Relationship status:          Vitals:    12/03/20 0906   BP: 136/70   BP Location: Left arm   Pulse: 55   SpO2: 96%   Weight: 87 kg (191 lb 12.8 oz)   Height: 180.3 cm (70.98\")        Body mass index is 26.76 kg/m².      Physical Exam:      General: Alert and oriented x 3.  No acute distress.  Normal affect.  HEENT: Pupils equal, round, reactive to light; extraocular movements intact; sclerae nonicteric; pharynx, ear canals and TMs normal.  Neck: Without JVD, thyromegaly, bruit, or adenopathy.  Lungs: Clear to auscultation in all fields.  Heart: Regular rate and rhythm without murmur, rub, gallop, or click.  Abdomen: Soft, nontender, without hepatosplenomegaly or hernia.  Bowel sounds normal.  : Deferred.  Rectal: Deferred.  Extremities: Without clubbing, cyanosis, or pulse deficit.  1-2+ bilateral " lower extremity edema below the knees.  Neurologic: Intact without focal deficit.  Patient is ambulating with the assistance of a cane today.  Somewhat ataxic gait.  Skin: Without significant lesion.  Musculoskeletal: Unremarkable.    Lab/other results:    NMR reveals a total cholesterol of 123.  Triglycerides 221.  LDL particle number excellent 618.  Small LDL particle number excellent at 283.  HDL particle number low at 23.0.  CMP normal except glucose elevated 117.  Hemoglobin A1c 5.8.  CBC is normal.  Vitamin D is low at 26.0.  Thyroid function test normal.  Urinalysis normal.    Assessment/Plan:     Diagnosis Plan   1. Impaired fasting glucose  Comprehensive Metabolic Panel    Hemoglobin A1c   2. Hyperlipidemia  CK    Comprehensive Metabolic Panel    NMR LipoProfile    TSH    T4, Free    T3, Free   3. Benign essential hypertension     4. History of gout  Uric Acid   5. History of stroke, 6/29/2016--4.9 x 4 x 3 cm inferior left cerebellar infarct     6. Parkinson's disease (CMS/HCC)     7. At risk for falls     8. Bilateral lower extremity edema     9. Vitamin D deficiency  Vitamin D 25 Hydroxy    Cholecalciferol (vitamin D3) 125 MCG (5000 UT) capsule capsule   10. Vitamin B12 deficiency  CBC (No Diff)    Methylmalonic Acid, Serum   11. Macrocytosis  CBC (No Diff)   12. Chronic anticoagulation, Eliquis.  Saddle pulmonary embolism     13. Paroxysmal atrial fibrillation (CMS/HCC)     14. Bone lesion, 6/17/2020--1.2 cm lucent focus with sclerotic margins distal left femur.  Repeat x-ray 6 months.  XR femur 2 vw left   15. Need for influenza vaccination  Fluad Quad 65+ yrs ([id number])   16. Benign prostatic hyperplasia without lower urinary tract symptoms  PSA DIAGNOSTIC   17. History of pulmonary embolus (PE)  rivaroxaban (XARELTO) 15 MG tablet     Patient has mild impaired fasting glucose that does not require medication.  Hyperlipidemia is under the best control that we can get it.  His blood pressure appears to  be adequately controlled.  He has a history of gout and takes allopurinol without problem.  No recent episodes.  He has a history of stroke back in 2016 which was an inferior left cerebellar infarct.  He has parkinsonism and is at risk for falls.  He ambulates with a wheeled walker.  Lower extremity edema seems to be well controlled.  His vitamin D is low and I encouraged him to take vitamin D over-the-counter 5000 units/day.  He has vitamin B12 deficiency and receives B12 injections at home.  Macrocytosis has definitely improved.  He is on chronic anticoagulation with Eliquis for history of pulmonary embolism and paroxysmal atrial fibrillation.  He is tolerating this well without signs or symptoms of bleeding.  Patient has a bone lesion in his distal left femur and he needs a repeat x-ray which we will order today.    Plan is as follows: High-dose influenza vaccine given.  X-ray of the left femur ordered.  Discontinue Eliquis and start Xarelto 15 mg/day.  Patient has been on Eliquis now for 10 months and I think a lower dose of Xarelto would be fine.  1 thing driving this is prohibitive cost of Eliquis.  Samples given.  Patient will follow-up on the phone for the results and possible further instructions.  Otherwise, he will follow-up after May 27, 2021 with lab prior and this will also be subsequent Medicare wellness visit.      Procedures

## 2020-12-04 DIAGNOSIS — E78.2 MIXED HYPERLIPIDEMIA: Chronic | ICD-10-CM

## 2020-12-09 ENCOUNTER — TELEPHONE (OUTPATIENT)
Dept: INTERNAL MEDICINE | Facility: CLINIC | Age: 85
End: 2020-12-09

## 2020-12-10 RX ORDER — LOVASTATIN 20 MG/1
TABLET ORAL
Qty: 90 TABLET | Refills: 3 | Status: SHIPPED | OUTPATIENT
Start: 2020-12-10 | End: 2021-12-02

## 2020-12-10 NOTE — TELEPHONE ENCOUNTER
Tell the patient the x-ray shows no significant change.  They do want to repeat an x-ray in about 6 months.  Right now there is nothing to worry about.

## 2020-12-13 DIAGNOSIS — I10 BENIGN ESSENTIAL HYPERTENSION: ICD-10-CM

## 2020-12-14 RX ORDER — FUROSEMIDE 40 MG/1
TABLET ORAL
Qty: 30 TABLET | Refills: 5 | Status: SHIPPED | OUTPATIENT
Start: 2020-12-14 | End: 2021-08-27

## 2021-02-02 ENCOUNTER — TELEPHONE (OUTPATIENT)
Dept: INTERNAL MEDICINE | Facility: CLINIC | Age: 86
End: 2021-02-02

## 2021-02-02 NOTE — TELEPHONE ENCOUNTER
PATIENTS WIFE IS CALLING TO MAKE SURE IT IS OKAY FOR HIM TO GET THE COVID VACCINE.    PLEASE ADVISE  [phone]

## 2021-02-18 ENCOUNTER — HOSPITAL ENCOUNTER (OUTPATIENT)
Dept: GENERAL RADIOLOGY | Facility: HOSPITAL | Age: 86
Discharge: HOME | End: 2021-02-18
Admitting: INTERNAL MEDICINE

## 2021-02-18 DIAGNOSIS — M25.562 ACUTE PAIN OF LEFT KNEE: Primary | ICD-10-CM

## 2021-02-18 PROCEDURE — 73562 X-RAY EXAM OF KNEE 3: CPT

## 2021-02-19 ENCOUNTER — TELEPHONE (OUTPATIENT)
Dept: INTERNAL MEDICINE | Facility: CLINIC | Age: 86
End: 2021-02-19

## 2021-02-22 ENCOUNTER — TELEPHONE (OUTPATIENT)
Dept: INTERNAL MEDICINE | Facility: CLINIC | Age: 86
End: 2021-02-22

## 2021-02-22 NOTE — TELEPHONE ENCOUNTER
Caller: RadhaMarjosé    Relationship: Emergency Contact    Best call back number: [phone]    Caller requesting test results: XRAY    What test was performed: XRAY    When was the test performed: 02/18/2021    Where was the test performed: IN OFFICE    Additional notes: PATIENT WIFE CALLED TO DISCUSS XRAY RESULTS. PLEASE CALL WIFE BACK. THANK YOU!

## 2021-02-22 NOTE — TELEPHONE ENCOUNTER
No fracture noted.  Patient has some arthritis.  I suspect he just bruised or sprained the knee.  Give it time.

## 2021-02-22 NOTE — TELEPHONE ENCOUNTER
THREE VIEW LEFT KNEE  Wife is wanting the results     HISTORY: Fell 2 weeks ago. Left knee pain.    FINDINGS: There is mild-to-moderate joint space narrowing involving the  medial compartment with some minimal marginal spurring. There is no  evidence of acute fracture or joint effusion.    This report was finalized on 2/18/2021 3:27 PM by Dr. Rufino Rodriguez M.D.

## 2021-04-30 ENCOUNTER — TELEPHONE (OUTPATIENT)
Dept: INTERNAL MEDICINE | Facility: CLINIC | Age: 86
End: 2021-04-30

## 2021-04-30 RX ORDER — AZITHROMYCIN 250 MG/1
TABLET, FILM COATED ORAL
Qty: 6 TABLET | Refills: 0 | Status: SHIPPED | OUTPATIENT
Start: 2021-04-30 | End: 2021-06-02

## 2021-04-30 NOTE — TELEPHONE ENCOUNTER
PATIENTS WIFE, MIKAL, CALLING BACK IN TO CHECK THE STATUS OF THIS REQUEST. SHE WOULD LIKE SOMETHING CALLED IN BEFORE THE DAY IS OVER.    PLEASE ADVISE: [phone]

## 2021-04-30 NOTE — TELEPHONE ENCOUNTER
Caller: Chago Garcia    Relationship: Emergency Contact    Best call back number: [phone]    What medication are you requesting: PREDNISONE    What are your current symptoms: SORE THROAT AND RUNNY NOSE    How long have you been experiencing symptoms: A WEEK OR SO    Have you had these symptoms before:    [x] Yes  [] No    Have you been treated for these symptoms before:   [x] Yes  [] No    If a prescription is needed, what is your preferred pharmacy and phone number: [facility] [address] - [phone]  - [phone] FX     Additional notes: PATIENTS WIFE WOULD LIKE SOMETHING CALLED IN TODAY IF POSSIBLE SO HE DOESN'T GET MORE ILL.

## 2021-05-21 ENCOUNTER — LAB (OUTPATIENT)
Dept: LAB | Facility: HOSPITAL | Age: 86
End: 2021-05-21

## 2021-05-21 DIAGNOSIS — D75.89 MACROCYTOSIS: Chronic | ICD-10-CM

## 2021-05-21 DIAGNOSIS — E78.2 MIXED HYPERLIPIDEMIA: Chronic | ICD-10-CM

## 2021-05-21 DIAGNOSIS — N40.0 BENIGN PROSTATIC HYPERPLASIA WITHOUT LOWER URINARY TRACT SYMPTOMS: ICD-10-CM

## 2021-05-21 DIAGNOSIS — R73.01 IMPAIRED FASTING GLUCOSE: Chronic | ICD-10-CM

## 2021-05-21 DIAGNOSIS — E55.9 VITAMIN D DEFICIENCY: Chronic | ICD-10-CM

## 2021-05-21 DIAGNOSIS — Z87.39 HISTORY OF GOUT: Chronic | ICD-10-CM

## 2021-05-21 DIAGNOSIS — E53.8 VITAMIN B12 DEFICIENCY: Chronic | ICD-10-CM

## 2021-05-21 LAB
25(OH)D3 SERPL-MCNC: 70 NG/ML (ref 30–100)
ALBUMIN SERPL-MCNC: 3.7 G/DL (ref 3.5–5.2)
ALBUMIN/GLOB SERPL: 1.4 G/DL
ALP SERPL-CCNC: 70 U/L (ref 39–117)
ALT SERPL W P-5'-P-CCNC: 11 U/L (ref 1–41)
ANION GAP SERPL CALCULATED.3IONS-SCNC: 7.5 MMOL/L (ref 5–15)
AST SERPL-CCNC: 19 U/L (ref 1–40)
BILIRUB SERPL-MCNC: 1.3 MG/DL (ref 0–1.2)
BUN SERPL-MCNC: 18 MG/DL (ref 8–23)
BUN/CREAT SERPL: 22.5 (ref 7–25)
CALCIUM SPEC-SCNC: 9.4 MG/DL (ref 8.6–10.5)
CHLORIDE SERPL-SCNC: 107 MMOL/L (ref 98–107)
CK SERPL-CCNC: 23 U/L (ref 20–200)
CO2 SERPL-SCNC: 27.5 MMOL/L (ref 22–29)
CREAT SERPL-MCNC: 0.8 MG/DL (ref 0.76–1.27)
DEPRECATED RDW RBC AUTO: 39.8 FL (ref 37–54)
ERYTHROCYTE [DISTWIDTH] IN BLOOD BY AUTOMATED COUNT: 13.1 % (ref 12.3–15.4)
GFR SERPL CREATININE-BSD FRML MDRD: 91 ML/MIN/1.73
GLOBULIN UR ELPH-MCNC: 2.7 GM/DL
GLUCOSE SERPL-MCNC: 94 MG/DL (ref 65–99)
HBA1C MFR BLD: 5.9 % (ref 4.8–5.6)
HCT VFR BLD AUTO: 45.6 % (ref 37.5–51)
HGB BLD-MCNC: 15.3 G/DL (ref 13–17.7)
MCH RBC QN AUTO: 28.3 PG (ref 26.6–33)
MCHC RBC AUTO-ENTMCNC: 33.6 G/DL (ref 31.5–35.7)
MCV RBC AUTO: 84.3 FL (ref 79–97)
PLATELET # BLD AUTO: 261 10*3/MM3 (ref 140–450)
PMV BLD AUTO: 11.6 FL (ref 6–12)
POTASSIUM SERPL-SCNC: 3.9 MMOL/L (ref 3.5–5.2)
PROT SERPL-MCNC: 6.4 G/DL (ref 6–8.5)
PSA SERPL-MCNC: 1.49 NG/ML (ref 0–4)
RBC # BLD AUTO: 5.41 10*6/MM3 (ref 4.14–5.8)
SODIUM SERPL-SCNC: 142 MMOL/L (ref 136–145)
T3FREE SERPL-MCNC: 2.55 PG/ML (ref 2–4.4)
T4 FREE SERPL-MCNC: 1.45 NG/DL (ref 0.93–1.7)
TSH SERPL DL<=0.05 MIU/L-ACNC: 1.05 UIU/ML (ref 0.27–4.2)
URATE SERPL-MCNC: 4.4 MG/DL (ref 3.4–7)
WBC # BLD AUTO: 12 10*3/MM3 (ref 3.4–10.8)

## 2021-05-21 PROCEDURE — 85027 COMPLETE CBC AUTOMATED: CPT

## 2021-05-21 PROCEDURE — 80053 COMPREHEN METABOLIC PANEL: CPT

## 2021-05-21 PROCEDURE — 82550 ASSAY OF CK (CPK): CPT

## 2021-05-21 PROCEDURE — 80061 LIPID PANEL: CPT

## 2021-05-21 PROCEDURE — 84439 ASSAY OF FREE THYROXINE: CPT

## 2021-05-21 PROCEDURE — 84153 ASSAY OF PSA TOTAL: CPT

## 2021-05-21 PROCEDURE — 83704 LIPOPROTEIN BLD QUAN PART: CPT

## 2021-05-21 PROCEDURE — 83036 HEMOGLOBIN GLYCOSYLATED A1C: CPT

## 2021-05-21 PROCEDURE — 84443 ASSAY THYROID STIM HORMONE: CPT

## 2021-05-21 PROCEDURE — 83921 ORGANIC ACID SINGLE QUANT: CPT

## 2021-05-21 PROCEDURE — 36415 COLL VENOUS BLD VENIPUNCTURE: CPT

## 2021-05-21 PROCEDURE — 82306 VITAMIN D 25 HYDROXY: CPT

## 2021-05-21 PROCEDURE — 84550 ASSAY OF BLOOD/URIC ACID: CPT

## 2021-05-21 PROCEDURE — 84481 FREE ASSAY (FT-3): CPT

## 2021-05-24 LAB
CHOLEST SERPL-MCNC: 138 MG/DL (ref 100–199)
HDL SERPL-SCNC: 22.2 UMOL/L
HDLC SERPL-MCNC: 35 MG/DL
LDL SERPL QN: 20.7 NM
LDL SERPL-SCNC: 849 NMOL/L
LDL SMALL SERPL-SCNC: 370 NMOL/L
LDLC SERPL CALC-MCNC: 79 MG/DL (ref 0–99)
TRIGL SERPL-MCNC: 137 MG/DL (ref 0–149)

## 2021-06-02 ENCOUNTER — OFFICE VISIT (OUTPATIENT)
Dept: INTERNAL MEDICINE | Facility: CLINIC | Age: 86
End: 2021-06-02

## 2021-06-02 VITALS
WEIGHT: 198.2 LBS | RESPIRATION RATE: 16 BRPM | DIASTOLIC BLOOD PRESSURE: 80 MMHG | SYSTOLIC BLOOD PRESSURE: 130 MMHG | BODY MASS INDEX: 27.75 KG/M2 | OXYGEN SATURATION: 97 % | HEIGHT: 71 IN | TEMPERATURE: 98 F | HEART RATE: 55 BPM

## 2021-06-02 DIAGNOSIS — E53.8 VITAMIN B12 DEFICIENCY: Chronic | ICD-10-CM

## 2021-06-02 DIAGNOSIS — M89.9 BONE LESION: ICD-10-CM

## 2021-06-02 DIAGNOSIS — Z00.00 ENCOUNTER FOR SUBSEQUENT ANNUAL WELLNESS VISIT (AWV) IN MEDICARE PATIENT: Primary | ICD-10-CM

## 2021-06-02 DIAGNOSIS — Z51.81 THERAPEUTIC DRUG MONITORING: ICD-10-CM

## 2021-06-02 DIAGNOSIS — R73.01 IMPAIRED FASTING GLUCOSE: Chronic | ICD-10-CM

## 2021-06-02 DIAGNOSIS — Z79.01 CHRONIC ANTICOAGULATION: Chronic | ICD-10-CM

## 2021-06-02 DIAGNOSIS — Z86.73 HISTORY OF STROKE: Chronic | ICD-10-CM

## 2021-06-02 DIAGNOSIS — N40.0 BENIGN PROSTATIC HYPERPLASIA WITHOUT LOWER URINARY TRACT SYMPTOMS: Chronic | ICD-10-CM

## 2021-06-02 DIAGNOSIS — B35.6 TINEA CRURIS: ICD-10-CM

## 2021-06-02 DIAGNOSIS — I10 BENIGN ESSENTIAL HYPERTENSION: Chronic | ICD-10-CM

## 2021-06-02 DIAGNOSIS — Z87.39 HISTORY OF GOUT: Chronic | ICD-10-CM

## 2021-06-02 DIAGNOSIS — Z87.19 HISTORY OF ESOPHAGEAL STRICTURE: Chronic | ICD-10-CM

## 2021-06-02 DIAGNOSIS — G20 PARKINSON'S DISEASE (HCC): Chronic | ICD-10-CM

## 2021-06-02 DIAGNOSIS — Z91.81 AT RISK FOR FALLS: Chronic | ICD-10-CM

## 2021-06-02 DIAGNOSIS — I48.0 PAROXYSMAL ATRIAL FIBRILLATION (HCC): Chronic | ICD-10-CM

## 2021-06-02 DIAGNOSIS — D75.89 MACROCYTOSIS: Chronic | ICD-10-CM

## 2021-06-02 DIAGNOSIS — Z86.711 HISTORY OF PULMONARY EMBOLUS (PE): Chronic | ICD-10-CM

## 2021-06-02 DIAGNOSIS — E55.9 VITAMIN D DEFICIENCY: Chronic | ICD-10-CM

## 2021-06-02 DIAGNOSIS — R60.0 BILATERAL LOWER EXTREMITY EDEMA: Chronic | ICD-10-CM

## 2021-06-02 DIAGNOSIS — E78.2 MIXED HYPERLIPIDEMIA: Chronic | ICD-10-CM

## 2021-06-02 PROBLEM — M25.562 ACUTE PAIN OF LEFT KNEE: Status: RESOLVED | Noted: 2021-02-18 | Resolved: 2021-06-02

## 2021-06-02 PROCEDURE — 99214 OFFICE O/P EST MOD 30 MIN: CPT | Performed by: INTERNAL MEDICINE

## 2021-06-02 PROCEDURE — 1170F FXNL STATUS ASSESSED: CPT | Performed by: INTERNAL MEDICINE

## 2021-06-02 PROCEDURE — 1160F RVW MEDS BY RX/DR IN RCRD: CPT | Performed by: INTERNAL MEDICINE

## 2021-06-02 PROCEDURE — 96372 THER/PROPH/DIAG INJ SC/IM: CPT | Performed by: INTERNAL MEDICINE

## 2021-06-02 PROCEDURE — 96160 PT-FOCUSED HLTH RISK ASSMT: CPT | Performed by: INTERNAL MEDICINE

## 2021-06-02 PROCEDURE — G0439 PPPS, SUBSEQ VISIT: HCPCS | Performed by: INTERNAL MEDICINE

## 2021-06-02 RX ORDER — ALLOPURINOL 300 MG/1
TABLET ORAL
COMMUNITY
Start: 2021-03-17 | End: 2021-06-02

## 2021-06-02 RX ORDER — FLUOROMETHOLONE 0.1 %
1 SUSPENSION, DROPS(FINAL DOSAGE FORM)(ML) OPHTHALMIC (EYE) 2 TIMES DAILY
COMMUNITY
Start: 2021-03-30

## 2021-06-02 RX ORDER — LOVASTATIN 20 MG/1
TABLET ORAL
COMMUNITY
Start: 2021-03-17 | End: 2021-06-02

## 2021-06-02 RX ORDER — CYANOCOBALAMIN 1000 UG/ML
1000 INJECTION, SOLUTION INTRAMUSCULAR; SUBCUTANEOUS ONCE
Status: COMPLETED | OUTPATIENT
Start: 2021-06-02 | End: 2021-06-02

## 2021-06-02 RX ORDER — FUROSEMIDE 40 MG/1
TABLET ORAL
COMMUNITY
Start: 2021-03-30 | End: 2021-06-02

## 2021-06-02 RX ADMIN — CYANOCOBALAMIN 1000 MCG: 1000 INJECTION, SOLUTION INTRAMUSCULAR; SUBCUTANEOUS at 10:46

## 2021-06-02 NOTE — PROGRESS NOTES
The ABCs of the Annual Wellness Visit  Subsequent Medicare Wellness Visit    No chief complaint on file.      Subjective   History of Present Illness:  Izaiah Garcia is a 89 y.o. male who presents for a Subsequent Medicare Wellness Visit.    HEALTH RISK ASSESSMENT    Recent Hospitalizations:  No hospitalization(s) within the last year.    Current Medical Providers:  Patient Care Team:  Uriah Godinez MD as PCP - General (Internal Medicine)    Smoking Status:  Social History     Tobacco Use   Smoking Status Never Smoker   Smokeless Tobacco Never Used       Alcohol Consumption:  Social History     Substance and Sexual Activity   Alcohol Use Never    Comment: occ       Depression Screen:   PHQ-2/PHQ-9 Depression Screening 6/2/2021   Little interest or pleasure in doing things 0   Feeling down, depressed, or hopeless 0   Trouble falling or staying asleep, or sleeping too much -   Feeling tired or having little energy -   Poor appetite or overeating -   Feeling bad about yourself - or that you are a failure or have let yourself or your family down -   Trouble concentrating on things, such as reading the newspaper or watching television -   Moving or speaking so slowly that other people could have noticed. Or the opposite - being so fidgety or restless that you have been moving around a lot more than usual -   Thoughts that you would be better off dead, or of hurting yourself in some way -   Total Score 0       Fall Risk Screen:  STEADI Fall Risk Assessment was completed, and patient is at HIGH risk for falls. Assessment completed on:6/2/2021    Health Habits and Functional and Cognitive Screening:  Functional & Cognitive Status 6/2/2021   Do you have difficulty preparing food and eating? No   Do you have difficulty bathing yourself, getting dressed or grooming yourself? No   Do you have difficulty using the toilet? No   Do you have difficulty moving around from place to place? Yes   Do you have trouble with steps  or getting out of a bed or a chair? Yes   Current Diet Well Balanced Diet   Dental Exam Not up to date   Eye Exam Up to date   Exercise (times per week) 1 times per week   Current Exercise Activities Include Walking   Do you need help using the phone?  No   Are you deaf or do you have serious difficulty hearing?  Yes   Do you need help with transportation? Yes   Do you need help shopping? No   Do you need help preparing meals?  No   Do you need help with housework?  No   Do you need help with laundry? No   Do you need help taking your medications? No   Do you need help managing money? No   Do you ever drive or ride in a car without wearing a seat belt? Yes   Have you felt unusual stress, anger or loneliness in the last month? Yes   Who do you live with? Spouse   If you need help, do you have trouble finding someone available to you? No   Have you been bothered in the last four weeks by sexual problems? No   Do you have difficulty concentrating, remembering or making decisions? No         Does the patient have evidence of cognitive impairment? No    Asprin use counseling:Contraindicated from taking ASA    Age-appropriate Screening Schedule:  Refer to the list below for future screening recommendations based on patient's age, sex and/or medical conditions. Orders for these recommended tests are listed in the plan section. The patient has been provided with a written plan.    Health Maintenance   Topic Date Due   • INFLUENZA VACCINE  08/01/2021   • LIPID PANEL  05/21/2022   • TDAP/TD VACCINES  Discontinued   • ZOSTER VACCINE  Discontinued          The following portions of the patient's history were reviewed and updated as appropriate: allergies, current medications, past family history, past medical history, past social history, past surgical history and problem list.    Outpatient Medications Prior to Visit   Medication Sig Dispense Refill   • allopurinol (ZYLOPRIM) 300 MG tablet TAKE 1 TABLET EVERY DAY  FOR  GOUT 90  tablet 3   • carbidopa-levodopa (SINEMET)  MG per tablet Take 1 tablet by mouth 2 (Two) Times a Day.     • Cholecalciferol (vitamin D3) 125 MCG (5000 UT) capsule capsule 1 by mouth daily as directed 30 capsule    • fluorometholone (FML) 0.1 % ophthalmic suspension      • furosemide (LASIX) 40 MG tablet TAKE ONE TABLET BY MOUTH DAILY 30 tablet 5   • lovastatin (MEVACOR) 20 MG tablet TAKE 1 TABLET EVERY DAY FOR HIGH CHOLESTEROL 90 tablet 3   • metoprolol tartrate (LOPRESSOR) 25 MG tablet TAKE ONE TABLET BY MOUTH EVERY 12 HOURS 60 tablet 5   • rivaroxaban (XARELTO) 15 MG tablet Take 1 p.o. daily for blood thinner.  Take at same time every day. 90 tablet 3   • saccharomyces boulardii (FLORASTOR) 250 MG capsule Take 1 capsule by mouth 2 (Two) Times a Day. 180 capsule 0   • azithromycin (Zithromax) 250 MG tablet Take 2 p.o. today at once and then 1 daily for 4 more days 6 tablet 0   • budesonide (PULMICORT) 0.5 MG/2ML nebulizer solution Take 0.5 mg by nebulization 2 (Two) Times a Day.     • furosemide (LASIX) 40 MG tablet      • terbinafine (lamiSIL) 250 MG tablet Take 1 p.o. daily until gone 10 tablet 0   • allopurinol (ZYLOPRIM) 300 MG tablet      • carbidopa-levodopa (SINEMET)  MG per tablet      • ipratropium-albuterol (DUO-NEB) 0.5-2.5 mg/3 ml nebulizer Take 3 mL by nebulization 2 (Two) Times a Day.     • lovastatin (MEVACOR) 20 MG tablet      • metoprolol tartrate (LOPRESSOR) 25 MG tablet      • ondansetron (ZOFRAN) 4 MG tablet 1 p.o. every 6 to 8 hours as needed nausea 20 tablet 0     Facility-Administered Medications Prior to Visit   Medication Dose Route Frequency Provider Last Rate Last Admin   • cyanocobalamin injection 1,000 mcg  1,000 mcg Subcutaneous Q30 Days Uriah Godinez MD   1,000 mcg at 02/07/20 1016       Patient Active Problem List   Diagnosis   • Hyperlipidemia   • Benign essential hypertension   • History of gout   • History of esophageal stricture   • History of stroke,  "6/29/2016--4.9 x 4 x 3 cm inferior left cerebellar infarct   • Rheumatoid factor positive   • Impaired fasting glucose   • At risk for falls   • Bilateral high frequency sensorineural hearing loss, wears hearing aids   • Bilateral lower extremity edema   • Parkinson's disease (CMS/HCC)   • Therapeutic drug monitoring   • Vitamin D deficiency   • Macrocytosis   • Vitamin B12 deficiency   • History of pulmonary embolus (PE)   • Chronic anticoagulation, Eliquis.  Saddle pulmonary embolism   • Paroxysmal atrial fibrillation (CMS/HCC)   • Bone lesion, 6/17/2020--1.2 cm lucent focus with sclerotic margins distal left femur.  Repeat x-ray 6 months.   • Benign prostatic hyperplasia without lower urinary tract symptoms       Advanced Care Planning:  ACP discussion was held with the patient during this visit. Patient has an advance directive (not in EMR), copy requested.    Review of Systems   Constitutional: Negative.    HENT: Negative.    Eyes: Negative.    Respiratory: Negative.    Cardiovascular: Negative.    Gastrointestinal: Negative.    Endocrine: Negative.    Genitourinary: Negative.    Musculoskeletal: Positive for arthralgias and gait problem.   Skin: Negative.    Allergic/Immunologic: Negative.    Neurological: Positive for tremors.   Hematological: Negative.    Psychiatric/Behavioral: Negative.        Compared to one year ago, the patient feels his physical health is the same.  Compared to one year ago, the patient feels his mental health is the same.    Reviewed chart for potential of high risk medication in the elderly: yes  Reviewed chart for potential of harmful drug interactions in the elderly:yes    Objective         Vitals:    06/02/21 1000   BP: 130/80   Pulse: 55   Resp: 16   Temp: 98 °F (36.7 °C)   SpO2: 97%   Weight: 89.9 kg (198 lb 3.2 oz)   Height: 180.3 cm (70.98\")       Body mass index is 27.66 kg/m².  Discussed the patient's BMI with him. The BMI is in the acceptable range.    Physical " Exam    General: Alert and oriented x 3.  No acute distress.  Normal affect.  HEENT: Pupils equal, round, reactive to light; extraocular movements intact; sclerae nonicteric; pharynx, ear canals and TMs normal.  Neck: Without JVD, thyromegaly, bruit, or adenopathy.  Lungs: Clear to auscultation in all fields.  Heart: Regular rate and rhythm without murmur, rub, gallop, or click.  Abdomen: Soft, nontender, without hepatosplenomegaly or hernia.  Bowel sounds normal.  : Deferred.  Rectal: Deferred.  Extremities: Without clubbing, cyanosis, edema, or pulse deficit.  Neurologic: Intact without focal deficit.  Patient ambulates with a somewhat broad-based ataxic gait with the assistance of a cane.  Mild hand tremor noted.  Skin: Without significant lesion.  Musculoskeletal: Unremarkable.    Lab Results   Component Value Date    CHLPL 138 05/21/2021    TRIG 137 05/21/2021    HGBA1C 5.90 (H) 05/21/2021        Assessment/Plan   Medicare Risks and Personalized Health Plan  CMS Preventative Services Quick Reference  Advance Directive Discussion  Cardiovascular risk  Diabetic Lab Screening   Fall Risk  Prostate Cancer Screening     The above risks/problems have been discussed with the patient.  Pertinent information has been shared with the patient in the After Visit Summary.  Follow up plans and orders are seen below in the Assessment/Plan Section.    Diagnoses and all orders for this visit:    1. Encounter for subsequent annual wellness visit (AWV) in Medicare patient (Primary)    2. Hyperlipidemia  -     CK; Future  -     Comprehensive Metabolic Panel; Future  -     NMR LipoProfile; Future  -     TSH; Future  -     T4, Free; Future  -     T3, Free; Future    3. Benign essential hypertension    4. History of gout  -     Uric Acid; Future    5. History of stroke, 6/29/2016--4.9 x 4 x 3 cm inferior left cerebellar infarct    6. Impaired fasting glucose  -     Comprehensive Metabolic Panel; Future  -     Hemoglobin A1c;  Future    7. Benign prostatic hyperplasia without lower urinary tract symptoms    8. Bilateral lower extremity edema    9. Parkinson's disease (CMS/HCC)    10. Vitamin D deficiency  -     Vitamin D 25 Hydroxy; Future    11. Macrocytosis  -     CBC (No Diff); Future    12. Vitamin B12 deficiency  -     CBC (No Diff); Future  -     cyanocobalamin injection 1,000 mcg    13. History of pulmonary embolus (PE)    14. Chronic anticoagulation, Eliquis.  Saddle pulmonary embolism    15. Paroxysmal atrial fibrillation (CMS/HCC)    16. Bone lesion, 6/17/2020--1.2 cm lucent focus with sclerotic margins distal left femur.  Repeat x-ray 6 months.  -     XR femur 2 vw left; Future    17. At risk for falls    18. History of esophageal stricture    19. Therapeutic drug monitoring      Follow Up:  Return in about 6 months (around 12/2/2021) for Next scheduled follow up with lab prior.     An After Visit Summary and PPPS were given to the patient.

## 2021-06-02 NOTE — PROGRESS NOTES
06/02/2021    Patient Information  Izaiah Garcia                                                                                          [id number]  [facility] [address]      8/31/1931  [unfilled]  There is no work phone number on file.    Chief Complaint:     Medicare wellness visit.  Follow-up lab work in order to monitor chronic medical issues listed in history of present illness.    History of Present Illness:    Patient with a history of hyperlipidemia, hypertension, gout, history of stroke, impaired fasting glucose, BPH, lower extremity edema, Parkinson's disease, vitamin D deficiency, macrocytosis, vitamin B-12 deficiency, macrocytosis, history of pulmonary embolism, chronic anticoagulation for pulmonary embolism and paroxysmal atrial fibrillation, bone lesion distal left femur, at risk for falls, history of esophageal stricture.  He presents today for a follow-up with lab prior in order to monitor his chronic medical issues.  He is also due his Medicare wellness visit.  Past medical history reviewed and updated were necessary including health maintenance parameters.  This reveals he will be up-to-date or else accounted for after today's visit.    Review of Systems   Constitutional: Negative.   HENT: Negative.    Eyes: Negative.    Cardiovascular: Negative.    Respiratory: Negative.    Endocrine: Negative.    Hematologic/Lymphatic: Negative.    Skin: Negative.    Musculoskeletal: Positive for arthritis and joint pain.   Gastrointestinal: Negative.    Genitourinary: Negative.    Neurological: Positive for disturbances in coordination and loss of balance.   Psychiatric/Behavioral: Negative.    Allergic/Immunologic: Negative.        Active Problems:    Patient Active Problem List   Diagnosis   • Hyperlipidemia   • Benign essential hypertension   • History of gout   • History of esophageal stricture   • History of stroke, 6/29/2016--4.9 x 4 x 3 cm inferior left cerebellar infarct   •  Rheumatoid factor positive   • Impaired fasting glucose   • At risk for falls   • Bilateral high frequency sensorineural hearing loss, wears hearing aids   • Bilateral lower extremity edema   • Parkinson's disease (CMS/HCC)   • Therapeutic drug monitoring   • Vitamin D deficiency   • Macrocytosis   • Vitamin B12 deficiency   • History of pulmonary embolus (PE)   • Chronic anticoagulation, Eliquis.  Saddle pulmonary embolism   • Paroxysmal atrial fibrillation (CMS/HCC)   • Bone lesion, 6/17/2020--1.2 cm lucent focus with sclerotic margins distal left femur.  Repeat x-ray 6 months.   • Benign prostatic hyperplasia without lower urinary tract symptoms         Past Medical History:   Diagnosis Date   • Acute saddle pulmonary embolism with acute cor pulmonale (CMS/HCC) 2/29/2020    Admit date: 2/29/2020 Discharge date:3/20/2020 Discharged Condition: good   Discharge Diagnoses:   Acute saddle pulmonary embolism with acute cor pulmonale (CMS/HCC)   Pulmonary emboli (CMS/HCC)   Empyema of pleura (CMS/HCC)  Acute hypoxic respiratory failure BPE s/p ekos catheter on 2/29/2020 with local TPA infusion with good clinical response Troponemia suspect due to BPE RV strain Bilateral ple   • At risk for falls 5/2/2019   • Benign essential hypertension 6/29/2016   • Benign prostatic hyperplasia without lower urinary tract symptoms 12/3/2020   • Bilateral high frequency sensorineural hearing loss, wears hearing aids 5/2/2019   • Bilateral lower extremity edema 5/2/2019    May 2, 2019--patient who has hypertension and is on amlodipine 10 mg/day is complaining of progressively worsening swelling of the lower extremities that extends up to about mid calf.  Gets worse at the end of the day and tends to get better overnight when he props his feet up.  I think this is definitely related to the amlodipine although patient may have some venous insufficiency.  Medication ad   • Chronic anticoagulation, Eliquis.  Saddle pulmonary embolism  "5/27/2020   • Decreased peripheral vision of both eyes 5/2/2019    May 2, 2019--continues to have complaints of \"dizziness\" associated with weakness in his lower extremities.  He is not describing a spinning sensation or anything remotely close to vertigo.  Instead he is describing an off-balance sensation.  He tends to fall forward if not careful and he tends to list towards the right side.  He has marked difficulty going down an incline.  He has to ambulate wit   • History of aspiration pneumonia 5/4/2015   • History of esophageal stricture 5/4/2015    July 3, 2018--EGD revealed a distal esophageal stricture that was dilated to 18 mm.  There was a small hiatal hernia.  There was mild streaky erythema in the proximal stomach.  Duodenal bulb normal.  April 26, 2016--EGD performed for dysphagia reveals a distal esophageal stricture that was dilated 16.5 mm.   • History of gout 6/29/2016   • History of pulmonary embolus (PE) 2/29/2020    Admit date: 2/29/2020 Discharge date:3/20/2020 Discharged Condition: good   Discharge Diagnoses:   Acute saddle pulmonary embolism with acute cor pulmonale (CMS/HCC)   Pulmonary emboli (CMS/HCC)   Empyema of pleura (CMS/HCC)  Acute hypoxic respiratory failure BPE s/p ekos catheter on 2/29/2020 with local TPA infusion with good clinical response Troponemia suspect due to BPE RV strain Bilateral ple   • History of stroke, 6/29/2016--4.9 x 4 x 3 cm inferior left cerebellar infarct 5/4/2015 June 29, 2016--MRI of the brain performed for complaints of dizziness and weakness. IMPRESSION: 1. There is susceptibility artifact streaking through the anterior left frontal scalp through the anterior left frontal bone in the anterior tipof the left frontal lobe likely from a tiny piece of metal in the anterior left frontal scalp slightly limits evaluation of these structures. 2. There is mild s   • Hyperlipidemia 6/29/2016   • Impaired fasting glucose 5/2/2019 April 14, 2015--hemoglobin A1c " "5.9.   • Macrocytosis 5/2/2019   • Parkinson's disease (CMS/[facility]) 5/2/2019    May 2, 2019--continues to have complaints of \"dizziness\" associated with weakness in his lower extremities.  He is not describing a spinning sensation or anything remotely close to vertigo.  Instead he is describing an off-balance sensation.  He tends to fall forward if not careful and he tends to list towards the right side.  He has marked difficulty going down an incline.  He has to ambulate wit   • Rheumatoid factor positive 5/2/2019 March 25, 2014--rheumatoid factor returned positive at 95.  However, CCP antibodies normal at 8, SHIV negative, both C&P ANCA negative, C-reactive protein normal at 0.24, sed rate normal at 2.   • Vitamin B12 deficiency 6/6/2019 June 6, 2019--patient recently had mildly elevated methylmalonic acid of 380.  Repeat methylmalonic acid was upper limit of normal at 356.  Given patient's neurologic symptoms and suspected parkinsonism, I have a low threshold for treating vitamin B12 deficiency.  We will initiate vitamin B12 injections today.  2000 mcg subcutaneously given today.   • Vitamin D deficiency 5/2/2019         Past Surgical History:   Procedure Laterality Date   • CARDIAC CATHETERIZATION N/A 2/29/2020    Procedure: Thrombolytic Therapy- EKOS;  Surgeon: Jason Griffiths MD;  Location: [facility] INVASIVE LOCATION;  Service: Cardiovascular;  Laterality: N/A;   • CATARACT EXTRACTION EXTRACAPSULAR W/ INTRAOCULAR LENS IMPLANTATION Bilateral     Bilateral cataract extirpation with intraocular lens implantation   • CHOLECYSTECTOMY     • EKOS CATHETER PLACEMENT Bilateral 2/29/2020    Procedure: Ekos catheter placement;  Surgeon: Jason Griffiths MD;  Location: [facility] INVASIVE LOCATION;  Service: Cardiovascular;  Laterality: Bilateral;   • ESOPHAGEAL DILATATION  04/26/2016 April 26, 2016--EGD performed for dysphagia reveals a distal esophageal stricture that was dilated 16.5 mm.   • ESOPHAGEAL " DILATATION  07/03/2018    July 3, 2018--EGD revealed a distal esophageal stricture that was dilated to 18 mm.  There was a small hiatal hernia.  There was mild streaky erythema in the proximal stomach.  Duodenal bulb normal.   • INTERVENTIONAL RADIOLOGY PROCEDURE Bilateral 2/29/2020    Procedure: Pulmonary Angiogram;  Surgeon: Jason Griffiths MD;  Location: [facility] INVAS[facility] LOC[facility];  Service: Cardiovascular;  Laterality: Bilateral;         No Known Allergies        Current Outpatient Medications:   •  allopurinol (ZYLOPRIM) 300 MG tablet, TAKE 1 TABLET EVERY DAY  FOR  GOUT, Disp: 90 tablet, Rfl: 3  •  carbidopa-levodopa (SINEMET)  MG per tablet, Take 1 tablet by mouth 2 (Two) Times a Day., Disp: , Rfl:   •  Cholecalciferol (vitamin D3) 125 MCG (5000 UT) capsule capsule, 1 by mouth daily as directed, Disp: 30 capsule, Rfl:   •  fluorometholone (FML) 0.1 % ophthalmic suspension, , Disp: , Rfl:   •  furosemide (LASIX) 40 MG tablet, TAKE ONE TABLET BY MOUTH DAILY, Disp: 30 tablet, Rfl: 5  •  lovastatin (MEVACOR) 20 MG tablet, TAKE 1 TABLET EVERY DAY FOR HIGH CHOLESTEROL, Disp: 90 tablet, Rfl: 3  •  metoprolol tartrate (LOPRESSOR) 25 MG tablet, TAKE ONE TABLET BY MOUTH EVERY 12 HOURS, Disp: 60 tablet, Rfl: 5  •  rivaroxaban (XARELTO) 15 MG tablet, Take 1 p.o. daily for blood thinner.  Take at same time every day., Disp: 90 tablet, Rfl: 3  •  saccharomyces boulardii (FLORASTOR) 250 MG capsule, Take 1 capsule by mouth 2 (Two) Times a Day., Disp: 180 capsule, Rfl: 0    Current Facility-Administered Medications:   •  cyanocobalamin injection 1,000 mcg, 1,000 mcg, Subcutaneous, Q30 Days, Uriah Godinez MD, 1,000 mcg at 02/07/20 1016      Family History   Problem Relation Age of Onset   • No Known Problems Mother    • No Known Problems Father          Social History     Socioeconomic History   • Marital status:      Spouse name: Not on file   • Number of children: 2   • Years of education: Not on file  "  • Highest education level: 9th grade   Tobacco Use   • Smoking status: Never Smoker   • Smokeless tobacco: Never Used   Vaping Use   • Vaping Use: Never used   Substance and Sexual Activity   • Alcohol use: Never     Comment: occ   • Drug use: No   • Sexual activity: Not Currently     Partners: Female         Vitals:    06/02/21 1000   BP: 130/80   Pulse: 55   Resp: 16   Temp: 98 °F (36.7 °C)   SpO2: 97%   Weight: 89.9 kg (198 lb 3.2 oz)   Height: 180.3 cm (70.98\")        Body mass index is 27.66 kg/m².      Physical Exam:    General: Alert and oriented x 3.  No acute distress.  Normal affect.  HEENT: Pupils equal, round, reactive to light; extraocular movements intact; sclerae nonicteric; pharynx, ear canals and TMs normal.  Neck: Without JVD, thyromegaly, bruit, or adenopathy.  Lungs: Clear to auscultation in all fields.  Heart: Regular rate and rhythm without murmur, rub, gallop, or click.  Abdomen: Soft, nontender, without hepatosplenomegaly or hernia.  Bowel sounds normal.  : Deferred.  Rectal: Deferred.  Extremities: Without clubbing, cyanosis, edema, or pulse deficit.  Neurologic: Intact without focal deficit.  Patient ambulates with a somewhat broad-based ataxic gait with the assistance of a cane.  Mild hand tremor noted.  Skin: Without significant lesion.  Musculoskeletal: Unremarkable.    Lab/other results:    NMR reveals a total cholesterol of 138.  Triglycerides 137.  LDL particle number excellent at 849.  Small LDL particle number excellent 370.  HDL particle number low at 22.2.  CMP is normal except bilirubin slightly elevated at 1.3.  Hemoglobin A1c mildly elevated at 5.9.  Vitamin D normal at 70.  Thyroid function tests are normal.  Uric acid normal at 4.4.  Methylmalonic acid is pending.  CBC normal except white count mildly elevated at 12.0.  PSA normal at 1.49.  CPK normal.    Assessment/Plan:     Diagnosis Plan   1. Encounter for subsequent annual wellness visit (AWV) in Medicare patient   "   2. Hyperlipidemia  CK    Comprehensive Metabolic Panel    NMR LipoProfile    TSH    T4, Free    T3, Free   3. Benign essential hypertension     4. History of gout  Uric Acid   5. History of stroke, 6/29/2016--4.9 x 4 x 3 cm inferior left cerebellar infarct     6. Impaired fasting glucose  Comprehensive Metabolic Panel    Hemoglobin A1c   7. Benign prostatic hyperplasia without lower urinary tract symptoms     8. Bilateral lower extremity edema     9. Parkinson's disease (CMS/HCC)     10. Vitamin D deficiency  Vitamin D 25 Hydroxy   11. Macrocytosis  CBC (No Diff)   12. Vitamin B12 deficiency  CBC (No Diff)   13. History of pulmonary embolus (PE)     14. Chronic anticoagulation, Eliquis.  Saddle pulmonary embolism     15. Paroxysmal atrial fibrillation (CMS/HCC)     16. Bone lesion, 6/17/2020--1.2 cm lucent focus with sclerotic margins distal left femur.  Repeat x-ray 6 months.  XR femur 2 vw left   17. At risk for falls     18. History of esophageal stricture     19. Therapeutic drug monitoring       The subsequent Medicare wellness visit is documented on a separate note.    Patient has hyperlipidemia which is under reasonable control.  His blood pressure is controlled.  Patient has a history of gout and his uric acid levels are normal with allopurinol.  He has a remote history of stroke which was a cerebellar infarct and may be playing a role in his balance problems.  These have definitely improved and he also has a diagnosis of Parkinson's and has been evaluated by the neurologist.  He is under treatment by the neurologist for this problem.  Seems to be stable.  He has mild impaired fasting glucose that does not require medication.  Vitamin D in the normal range.  Macrocytosis has resolved after treatment for vitamin B12 deficiency.  He has a history of pulmonary embolism which was a saddle embolus and also has paroxysmal atrial fibrillation and is on chronic anticoagulation with low-dose Xarelto and tolerating  it well.  The bone lesion of his distal left femur needs a repeat evaluation with an x-ray.  Patient is at risk for falls and the family is aware of this.  He has remote history of esophageal stricture but no recurrence of symptoms.    Plan is as follows: No change in current medical regimen.  Patient will follow-up in 6 months with lab prior or follow-up as needed.  Cyanocobalamin injection 1000 mcg x 1 today.  I will switch him over to sublingual cyanocobalamin once daily.    Procedures

## 2021-06-03 LAB
Lab: NORMAL
METHYLMALONATE SERPL-SCNC: 236 NMOL/L (ref 0–378)

## 2021-07-01 DIAGNOSIS — Z86.711 HISTORY OF PULMONARY EMBOLUS (PE): Chronic | ICD-10-CM

## 2021-07-01 NOTE — TELEPHONE ENCOUNTER
Caller: Chago Garcia    Relationship: Emergency Contact    Best call back number: [phone]    Medication needed:   Requested Prescriptions     Pending Prescriptions Disp Refills   • rivaroxaban (XARELTO) 15 MG tablet 90 tablet 3     Sig: Take 1 p.o. daily for blood thinner.  Take at same time every day.       When do you need the refill by: ASAP    What additional details did the patient provide when requesting the medication:     Does the patient have less than a 3 day supply:  [] Yes  [x] No    What is the patient's preferred pharmacy: [facility] - [place] [address] - [phone]  - [phone] FX

## 2021-08-24 ENCOUNTER — APPOINTMENT (OUTPATIENT)
Dept: CT IMAGING | Facility: HOSPITAL | Age: 86
End: 2021-08-24

## 2021-08-24 ENCOUNTER — APPOINTMENT (OUTPATIENT)
Dept: GENERAL RADIOLOGY | Facility: HOSPITAL | Age: 86
End: 2021-08-24

## 2021-08-24 ENCOUNTER — HOSPITAL ENCOUNTER (EMERGENCY)
Facility: HOSPITAL | Age: 86
Discharge: HOME | End: 2021-08-24
Attending: EMERGENCY MEDICINE | Admitting: EMERGENCY MEDICINE

## 2021-08-24 VITALS
HEART RATE: 66 BPM | TEMPERATURE: 99.3 F | BODY MASS INDEX: 31.83 KG/M2 | SYSTOLIC BLOOD PRESSURE: 168 MMHG | OXYGEN SATURATION: 94 % | HEIGHT: 68 IN | DIASTOLIC BLOOD PRESSURE: 75 MMHG | WEIGHT: 210 LBS | RESPIRATION RATE: 16 BRPM

## 2021-08-24 DIAGNOSIS — S61.412A LACERATION OF LEFT HAND WITHOUT FOREIGN BODY, INITIAL ENCOUNTER: ICD-10-CM

## 2021-08-24 DIAGNOSIS — S62.305A: ICD-10-CM

## 2021-08-24 DIAGNOSIS — S63.287A DISLOCATION OF PROXIMAL INTERPHALANGEAL JOINT OF LEFT LITTLE FINGER, INITIAL ENCOUNTER: ICD-10-CM

## 2021-08-24 DIAGNOSIS — S09.90XA INJURY OF HEAD, INITIAL ENCOUNTER: Primary | ICD-10-CM

## 2021-08-24 DIAGNOSIS — S00.81XA FOREHEAD ABRASION, INITIAL ENCOUNTER: ICD-10-CM

## 2021-08-24 PROCEDURE — 70450 CT HEAD/BRAIN W/O DYE: CPT

## 2021-08-24 PROCEDURE — 73130 X-RAY EXAM OF HAND: CPT

## 2021-08-24 PROCEDURE — 90471 IMMUNIZATION ADMIN: CPT | Performed by: PHYSICIAN ASSISTANT

## 2021-08-24 PROCEDURE — 72125 CT NECK SPINE W/O DYE: CPT

## 2021-08-24 PROCEDURE — 99283 EMERGENCY DEPT VISIT LOW MDM: CPT

## 2021-08-24 PROCEDURE — 90715 TDAP VACCINE 7 YRS/> IM: CPT | Performed by: PHYSICIAN ASSISTANT

## 2021-08-24 PROCEDURE — 25010000002 TDAP 5-2.5-18.5 LF-MCG/0.5 SUSPENSION: Performed by: PHYSICIAN ASSISTANT

## 2021-08-24 PROCEDURE — 25010000003 LIDOCAINE 1 % SOLUTION: Performed by: PHYSICIAN ASSISTANT

## 2021-08-24 PROCEDURE — 73560 X-RAY EXAM OF KNEE 1 OR 2: CPT

## 2021-08-24 PROCEDURE — 73140 X-RAY EXAM OF FINGER(S): CPT

## 2021-08-24 RX ORDER — LIDOCAINE HYDROCHLORIDE 10 MG/ML
10 INJECTION, SOLUTION INFILTRATION; PERINEURAL ONCE
Status: COMPLETED | OUTPATIENT
Start: 2021-08-24 | End: 2021-08-24

## 2021-08-24 RX ADMIN — LIDOCAINE HYDROCHLORIDE 10 ML: 10 INJECTION, SOLUTION INFILTRATION; PERINEURAL at 18:00

## 2021-08-24 RX ADMIN — TETANUS TOXOID, REDUCED DIPHTHERIA TOXOID AND ACELLULAR PERTUSSIS VACCINE, ADSORBED 0.5 ML: 5; 2.5; 8; 8; 2.5 SUSPENSION INTRAMUSCULAR at 16:10

## 2021-08-24 NOTE — ED TRIAGE NOTES
Pt was picking things up in the alley and fell - hit head on concrete.  No loc.  Is on xarelto.  C/o right pinky pain, head pain, abrasion to right knee    Patient was placed in face mask during first look triage.  Patient was wearing a face mask throughout encounter.  I wore personal protective equipment throughout the encounter.  Hand hygiene was performed before and after patient encounter.
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
## 2021-08-24 NOTE — DISCHARGE INSTRUCTIONS
Leave the splint on, keep it clean and dry  Ice and elevate for pain and swelling  Cleanse your wounds daily and apply antibiotic ointment.  Recheck with your primary care doctor in 2-3 days.  Recheck with the orthopaedist this week.  Sutures to be removed from left hand in 10-14 days.  Return to the ER as needed.

## 2021-08-27 ENCOUNTER — OFFICE VISIT (OUTPATIENT)
Dept: INTERNAL MEDICINE | Facility: CLINIC | Age: 86
End: 2021-08-27

## 2021-08-27 VITALS
RESPIRATION RATE: 16 BRPM | WEIGHT: 215 LBS | HEIGHT: 68 IN | DIASTOLIC BLOOD PRESSURE: 76 MMHG | HEART RATE: 53 BPM | BODY MASS INDEX: 32.58 KG/M2 | SYSTOLIC BLOOD PRESSURE: 124 MMHG | OXYGEN SATURATION: 98 %

## 2021-08-27 DIAGNOSIS — Z91.81 HISTORY OF RECENT FALL: Primary | ICD-10-CM

## 2021-08-27 DIAGNOSIS — S61.412D LACERATION OF LEFT HAND WITHOUT FOREIGN BODY, SUBSEQUENT ENCOUNTER: ICD-10-CM

## 2021-08-27 DIAGNOSIS — Z91.81 AT RISK FOR FALLS: Chronic | ICD-10-CM

## 2021-08-27 DIAGNOSIS — S62.347D CLOSED NONDISPLACED FRACTURE OF BASE OF FIFTH METACARPAL BONE OF LEFT HAND WITH ROUTINE HEALING: ICD-10-CM

## 2021-08-27 DIAGNOSIS — H05.232 PERIORBITAL HEMATOMA OF LEFT EYE: ICD-10-CM

## 2021-08-27 DIAGNOSIS — S63.289D DISLOCATION OF PROXIMAL INTERPHALANGEAL JOINT OF FINGER, SUBSEQUENT ENCOUNTER: ICD-10-CM

## 2021-08-27 DIAGNOSIS — S09.90XD CLOSED HEAD INJURY, SUBSEQUENT ENCOUNTER: ICD-10-CM

## 2021-08-27 DIAGNOSIS — S62.345D: ICD-10-CM

## 2021-08-27 DIAGNOSIS — G20 PARKINSON'S DISEASE (HCC): Chronic | ICD-10-CM

## 2021-08-27 PROBLEM — S63.289A DISLOCATION OF PIP JOINT OF FINGER: Status: ACTIVE | Noted: 2021-08-27

## 2021-08-27 PROBLEM — S09.90XA CLOSED HEAD INJURY: Status: ACTIVE | Noted: 2021-08-27

## 2021-08-27 PROBLEM — S61.412A LACERATION OF LEFT HAND WITHOUT FOREIGN BODY: Status: ACTIVE | Noted: 2021-08-27

## 2021-08-27 PROBLEM — M89.9 BONE LESION: Chronic | Status: ACTIVE | Noted: 2020-06-25

## 2021-08-27 PROBLEM — S62.345A CLOSED NONDISPLACED FRACTURE OF BASE OF FOURTH METACARPAL BONE OF LEFT HAND: Status: ACTIVE | Noted: 2021-08-27

## 2021-08-27 PROBLEM — B35.6 TINEA CRURIS: Status: RESOLVED | Noted: 2020-08-18 | Resolved: 2021-08-27

## 2021-08-27 PROCEDURE — 99214 OFFICE O/P EST MOD 30 MIN: CPT | Performed by: INTERNAL MEDICINE

## 2021-08-27 RX ORDER — FUROSEMIDE 40 MG/1
TABLET ORAL
COMMUNITY
Start: 2021-08-13 | End: 2021-11-11
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
## 2021-09-07 DIAGNOSIS — Z87.39 HISTORY OF GOUT: Chronic | ICD-10-CM

## 2021-09-07 DIAGNOSIS — I10 BENIGN ESSENTIAL HYPERTENSION: ICD-10-CM

## 2021-09-08 RX ORDER — ALLOPURINOL 300 MG/1
TABLET ORAL
Qty: 90 TABLET | Refills: 3 | Status: SHIPPED | OUTPATIENT
Start: 2021-09-08 | End: 2022-08-26

## 2021-10-05 ENCOUNTER — TELEPHONE (OUTPATIENT)
Dept: INTERNAL MEDICINE | Facility: CLINIC | Age: 86
End: 2021-10-05

## 2021-10-05 NOTE — TELEPHONE ENCOUNTER
Caller: Chago Garcia    Relationship to patient: Emergency Contact    Best call back number:[phone]    Patient is needing: PATIENT'S WIFE CALLED IN AND ASKED IF THERE IS SOMETHING THEY CAN DO ABOUT THE BLOOD THINNERS? SHE SAID THE PHARMACY WANTS TO CHARGE $400 FOR A 90 DAY SUPPLY. SHE ASKED IF THEY CAN DO SAMPLES OR SOMETHING LESS EXPENSIVE. PLEASE CALL AND ADVISE.

## 2021-11-09 ENCOUNTER — APPOINTMENT (OUTPATIENT)
Dept: CARDIOLOGY | Facility: HOSPITAL | Age: 86
End: 2021-11-09

## 2021-11-09 ENCOUNTER — APPOINTMENT (OUTPATIENT)
Dept: GENERAL RADIOLOGY | Facility: HOSPITAL | Age: 86
End: 2021-11-09

## 2021-11-09 ENCOUNTER — HOSPITAL ENCOUNTER (EMERGENCY)
Facility: HOSPITAL | Age: 86
Discharge: HOME | End: 2021-11-09
Attending: EMERGENCY MEDICINE | Admitting: EMERGENCY MEDICINE

## 2021-11-09 VITALS
RESPIRATION RATE: 16 BRPM | BODY MASS INDEX: 27.09 KG/M2 | HEIGHT: 72 IN | HEART RATE: 72 BPM | DIASTOLIC BLOOD PRESSURE: 82 MMHG | SYSTOLIC BLOOD PRESSURE: 168 MMHG | TEMPERATURE: 98 F | OXYGEN SATURATION: 96 % | WEIGHT: 200 LBS

## 2021-11-09 DIAGNOSIS — M79.604 RIGHT LEG PAIN: Primary | ICD-10-CM

## 2021-11-09 LAB
ALBUMIN SERPL-MCNC: 4.1 G/DL (ref 3.5–5.2)
ALBUMIN/GLOB SERPL: 1.4 G/DL
ALP SERPL-CCNC: 78 U/L (ref 39–117)
ALT SERPL W P-5'-P-CCNC: <5 U/L (ref 1–41)
ANION GAP SERPL CALCULATED.3IONS-SCNC: 9.7 MMOL/L (ref 5–15)
AST SERPL-CCNC: 16 U/L (ref 1–40)
BASOPHILS # BLD AUTO: 0.07 10*3/MM3 (ref 0–0.2)
BASOPHILS NFR BLD AUTO: 0.6 % (ref 0–1.5)
BH CV LOWER VASCULAR LEFT COMMON FEMORAL AUGMENT: NORMAL
BH CV LOWER VASCULAR LEFT COMMON FEMORAL COMPETENT: NORMAL
BH CV LOWER VASCULAR LEFT COMMON FEMORAL COMPRESS: NORMAL
BH CV LOWER VASCULAR LEFT COMMON FEMORAL PHASIC: NORMAL
BH CV LOWER VASCULAR LEFT COMMON FEMORAL SPONT: NORMAL
BH CV LOWER VASCULAR RIGHT COMMON FEMORAL AUGMENT: NORMAL
BH CV LOWER VASCULAR RIGHT COMMON FEMORAL COMPETENT: NORMAL
BH CV LOWER VASCULAR RIGHT COMMON FEMORAL COMPRESS: NORMAL
BH CV LOWER VASCULAR RIGHT COMMON FEMORAL PHASIC: NORMAL
BH CV LOWER VASCULAR RIGHT COMMON FEMORAL SPONT: NORMAL
BH CV LOWER VASCULAR RIGHT DISTAL FEMORAL COMPRESS: NORMAL
BH CV LOWER VASCULAR RIGHT GASTRONEMIUS COMPRESS: NORMAL
BH CV LOWER VASCULAR RIGHT GREATER SAPH AK COMPRESS: NORMAL
BH CV LOWER VASCULAR RIGHT GREATER SAPH BK COMPRESS: NORMAL
BH CV LOWER VASCULAR RIGHT LESSER SAPH COMPRESS: NORMAL
BH CV LOWER VASCULAR RIGHT MID FEMORAL AUGMENT: NORMAL
BH CV LOWER VASCULAR RIGHT MID FEMORAL COMPETENT: NORMAL
BH CV LOWER VASCULAR RIGHT MID FEMORAL COMPRESS: NORMAL
BH CV LOWER VASCULAR RIGHT MID FEMORAL PHASIC: NORMAL
BH CV LOWER VASCULAR RIGHT MID FEMORAL SPONT: NORMAL
BH CV LOWER VASCULAR RIGHT PERONEAL COMPRESS: NORMAL
BH CV LOWER VASCULAR RIGHT POPLITEAL AUGMENT: NORMAL
BH CV LOWER VASCULAR RIGHT POPLITEAL COMPETENT: NORMAL
BH CV LOWER VASCULAR RIGHT POPLITEAL COMPRESS: NORMAL
BH CV LOWER VASCULAR RIGHT POPLITEAL PHASIC: NORMAL
BH CV LOWER VASCULAR RIGHT POPLITEAL SPONT: NORMAL
BH CV LOWER VASCULAR RIGHT POSTERIOR TIBIAL COMPRESS: NORMAL
BH CV LOWER VASCULAR RIGHT PROFUNDA FEMORAL COMPRESS: NORMAL
BH CV LOWER VASCULAR RIGHT PROXIMAL FEMORAL COMPRESS: NORMAL
BH CV LOWER VASCULAR RIGHT SAPHENOFEMORAL JUNCTION COMPRESS: NORMAL
BILIRUB SERPL-MCNC: 1.9 MG/DL (ref 0–1.2)
BUN SERPL-MCNC: 15 MG/DL (ref 8–23)
BUN/CREAT SERPL: 16 (ref 7–25)
CALCIUM SPEC-SCNC: 9.6 MG/DL (ref 8.2–9.6)
CHLORIDE SERPL-SCNC: 107 MMOL/L (ref 98–107)
CO2 SERPL-SCNC: 24.3 MMOL/L (ref 22–29)
CREAT SERPL-MCNC: 0.94 MG/DL (ref 0.76–1.27)
DEPRECATED RDW RBC AUTO: 41 FL (ref 37–54)
EOSINOPHIL # BLD AUTO: 0.16 10*3/MM3 (ref 0–0.4)
EOSINOPHIL NFR BLD AUTO: 1.4 % (ref 0.3–6.2)
ERYTHROCYTE [DISTWIDTH] IN BLOOD BY AUTOMATED COUNT: 13.3 % (ref 12.3–15.4)
GFR SERPL CREATININE-BSD FRML MDRD: 75 ML/MIN/1.73
GLOBULIN UR ELPH-MCNC: 2.9 GM/DL
GLUCOSE SERPL-MCNC: 111 MG/DL (ref 65–99)
HCT VFR BLD AUTO: 48.4 % (ref 37.5–51)
HGB BLD-MCNC: 16.2 G/DL (ref 13–17.7)
HOLD SPECIMEN: NORMAL
HOLD SPECIMEN: NORMAL
IMM GRANULOCYTES # BLD AUTO: 0.08 10*3/MM3 (ref 0–0.05)
IMM GRANULOCYTES NFR BLD AUTO: 0.7 % (ref 0–0.5)
LYMPHOCYTES # BLD AUTO: 2.9 10*3/MM3 (ref 0.7–3.1)
LYMPHOCYTES NFR BLD AUTO: 25.9 % (ref 19.6–45.3)
MCH RBC QN AUTO: 28.7 PG (ref 26.6–33)
MCHC RBC AUTO-ENTMCNC: 33.5 G/DL (ref 31.5–35.7)
MCV RBC AUTO: 85.7 FL (ref 79–97)
MONOCYTES # BLD AUTO: 0.78 10*3/MM3 (ref 0.1–0.9)
MONOCYTES NFR BLD AUTO: 7 % (ref 5–12)
NEUTROPHILS NFR BLD AUTO: 64.4 % (ref 42.7–76)
NEUTROPHILS NFR BLD AUTO: 7.19 10*3/MM3 (ref 1.7–7)
NRBC BLD AUTO-RTO: 0 /100 WBC (ref 0–0.2)
PLATELET # BLD AUTO: 192 10*3/MM3 (ref 140–450)
PMV BLD AUTO: 11.2 FL (ref 6–12)
POTASSIUM SERPL-SCNC: 4 MMOL/L (ref 3.5–5.2)
PROT SERPL-MCNC: 7 G/DL (ref 6–8.5)
RBC # BLD AUTO: 5.65 10*6/MM3 (ref 4.14–5.8)
SODIUM SERPL-SCNC: 141 MMOL/L (ref 136–145)
WBC # BLD AUTO: 11.18 10*3/MM3 (ref 3.4–10.8)
WHOLE BLOOD HOLD SPECIMEN: NORMAL
WHOLE BLOOD HOLD SPECIMEN: NORMAL

## 2021-11-09 PROCEDURE — 99283 EMERGENCY DEPT VISIT LOW MDM: CPT

## 2021-11-09 PROCEDURE — 93971 EXTREMITY STUDY: CPT

## 2021-11-09 PROCEDURE — 80053 COMPREHEN METABOLIC PANEL: CPT | Performed by: EMERGENCY MEDICINE

## 2021-11-09 PROCEDURE — 73552 X-RAY EXAM OF FEMUR 2/>: CPT

## 2021-11-09 PROCEDURE — 85025 COMPLETE CBC W/AUTO DIFF WBC: CPT | Performed by: EMERGENCY MEDICINE

## 2021-11-09 PROCEDURE — 73590 X-RAY EXAM OF LOWER LEG: CPT

## 2021-11-09 NOTE — ED NOTES
Right calf pain. Grand son states that he thought he got a cramp in it last night and it hasn't let up since. Denies injury or fall. Denies swelling, redness, or warmth.     Patient was placed in face mask during triage process. Patient was wearing facemask when I entered the room and throughout our encounter. I wore full protective equipment throughout this patient encounter including a face mask, eye protection, and gloves. Hand hygiene was performed before donning protective equipment and again following doffing of PPE after leaving the room.       Radha Gutierrez, RN  11/09/21 1512       Radha Gutierrez, PRIYA  11/09/21 151

## 2021-11-10 DIAGNOSIS — I48.0 PAROXYSMAL ATRIAL FIBRILLATION (HCC): Primary | ICD-10-CM

## 2021-11-10 NOTE — ED NOTES
Pt presents to the ER with c/o of rt leg pain. Pt states pain began last night after he brought the groceries into his home. Pain radiates downward from quad to mid calf. Pt describes pain as a severo horse pain when he stands. Pain is not present when he is sitting. Pt is alert x oriented x4 and is [place] and has grandson at the bedside. RN and grandson had to assist pt into bed. Pt VSS.      Karol Aaron RN  11/09/21 6611

## 2021-11-10 NOTE — ED PROVIDER NOTES
EMERGENCY DEPARTMENT ENCOUNTER    Room Number:  28/28  PCP: Uriah Godinez MD  Historian: Patient  History Limited By: Nothing      HPI  Chief Complaint: Right leg pain  Context: Izaiah Garcia is a 89 y.o. male who presents to the ED c/o right leg pain.  Patient states he was standing in his kitchen and had what felt like a charley horse of his right leg.  States he has had pain since then.  Pain with walking.  No pain when he is not bearing weight.  No direct trauma.  No swelling.  No rash.  No back pain.      Location: Right leg  Radiation: Right lower leg  Character: Aching  Duration: 1 day  Severity: Moderate  Progression: Not improving  Aggravating Factors: Standing  Alleviating Factors: Lying down        MEDICAL RECORD REVIEW    Patient with recent hand fracture          PAST MEDICAL HISTORY  Active Ambulatory Problems     Diagnosis Date Noted   • Hyperlipidemia 06/29/2016   • Benign essential hypertension 06/29/2016   • History of gout 06/29/2016   • History of esophageal stricture 05/04/2015   • History of stroke, 6/29/2016--4.9 x 4 x 3 cm inferior left cerebellar infarct 05/04/2015   • Rheumatoid factor positive 05/02/2019   • Impaired fasting glucose 05/02/2019   • At risk for falls 05/02/2019   • Bilateral high frequency sensorineural hearing loss, wears hearing aids 05/02/2019   • Bilateral lower extremity edema 05/02/2019   • Parkinson's disease (HCC) 05/02/2019   • Therapeutic drug monitoring 05/02/2019   • Vitamin D deficiency 05/02/2019   • Macrocytosis 05/02/2019   • Vitamin B12 deficiency 06/06/2019   • History of pulmonary embolus (PE) 02/29/2020   • Chronic anticoagulation, Eliquis.  Saddle pulmonary embolism 05/27/2020   • Paroxysmal atrial fibrillation (HCC) 06/03/2020   • Bone lesion, 6/17/2020--1.2 cm lucent focus with sclerotic margins distal left femur.  Repeat x-ray 6 months. 06/25/2020   • Benign prostatic hyperplasia without lower urinary tract symptoms 12/03/2020   • History of  recent fall 08/27/2021   • Closed head injury 08/27/2021   • Dislocation of PIP joint of finger 08/27/2021   • Laceration of left hand without foreign body 08/27/2021   • Closed nondisplaced fracture of base of fourth metacarpal bone of left hand with routine healing 08/27/2021   • Closed nondisplaced fracture of base of fifth metacarpal bone of left hand with routine healing 08/27/2021   • Periorbital hematoma of left eye 08/27/2021     Resolved Ambulatory Problems     Diagnosis Date Noted   • Dizzy 06/29/2016   • Weakness of both lower extremities 06/29/2016   • Nonintractable headache 06/29/2016   • Polyuria 06/08/2018   • Acute right flank pain 06/08/2018   • History of aspiration pneumonia 05/04/2015   • Generalized weakness 05/02/2019   • Decreased peripheral vision of both eyes 05/02/2019   • Loss of equilibrium 05/02/2019   • Acute diarrhea 02/03/2020   • Hospital-acquired pneumonia 02/17/2020   • Acute UTI (urinary tract infection) 02/17/2020   • Colitis 02/10/2020   • Weakness 02/17/2020   • Aspiration pneumonia of right lower lobe due to vomit ([facility]) 02/18/2020   • Acute saddle pulmonary embolism with acute cor pulmonale ([facility]) 02/29/2020   • Empyema of pleura ([facility]) 03/06/2020   • Hospital discharge follow-up 04/22/2020   • Acute pain of left thigh 06/25/2020   • Meralgia paresthetica of left side 06/25/2020   • Tinea cruris 08/18/2020   • Acute pain of left knee 02/18/2021     Past Medical History:   Diagnosis Date   • Hyperlipidemia 6/29/2016         PAST SURGICAL HISTORY  Past Surgical History:   Procedure Laterality Date   • CARDIAC CATHETERIZATION N/A 2/29/2020    Procedure: Thrombolytic Therapy- EKOS;  Surgeon: Jason Griffiths MD;  Location: [facility] INVASIVE LOCATION;  Service: Cardiovascular;  Laterality: N/A;   • CATARACT EXTRACTION EXTRACAPSULAR W/ INTRAOCULAR LENS IMPLANTATION Bilateral     Bilateral cataract extirpation with intraocular lens implantation   • CHOLECYSTECTOMY     • EKOS  CATHETER PLACEMENT Bilateral 2/29/2020    Procedure: Ekos catheter placement;  Surgeon: Jason Griffiths MD;  Location:  [facility] INVAS[facility] LOCATION;  Service: Cardiovascular;  Laterality: Bilateral;   • ESOPHAGEAL DILATATION  04/26/2016 April 26, 2016--EGD performed for dysphagia reveals a distal esophageal stricture that was dilated 16.5 mm.   • ESOPHAGEAL DILATATION  07/03/2018    July 3, 2018--EGD revealed a distal esophageal stricture that was dilated to 18 mm.  There was a small hiatal hernia.  There was mild streaky erythema in the proximal stomach.  Duodenal bulb normal.   • INTERVENTIONAL RADIOLOGY PROCEDURE Bilateral 2/29/2020    Procedure: Pulmonary Angiogram;  Surgeon: Jason Griffiths MD;  Location:  [facility] INV[facility];  Service: Cardiovascular;  Laterality: Bilateral;         FAMILY HISTORY  Family History   Problem Relation Age of Onset   • No Known Problems Mother    • No Known Problems Father          SOCIAL HISTORY  Social History     Socioeconomic History   • Marital status:    • Number of children: 2   • Highest education level: 9th grade   Tobacco Use   • Smoking status: Never Smoker   • Smokeless tobacco: Never Used   Vaping Use   • Vaping Use: Never used   Substance and Sexual Activity   • Alcohol use: Never     Comment: occ   • Drug use: No   • Sexual activity: Not Currently     Partners: Female         ALLERGIES  Patient has no known allergies.        REVIEW OF SYSTEMS  Review of Systems   Constitutional: Negative for activity change, appetite change and fever.   HENT: Negative for congestion and sore throat.    Eyes: Negative.    Respiratory: Negative for cough and shortness of breath.    Cardiovascular: Negative for chest pain and leg swelling.   Gastrointestinal: Negative for abdominal pain, diarrhea and vomiting.   Endocrine: Negative.    Genitourinary: Negative for decreased urine volume and dysuria.   Musculoskeletal: Positive for gait problem and myalgias.  Negative for neck pain.   Skin: Negative for rash and wound.   Allergic/Immunologic: Negative.    Neurological: Negative for weakness, numbness and headaches.   Hematological: Negative.    Psychiatric/Behavioral: Negative.    All other systems reviewed and are negative.           PHYSICAL EXAM  ED Triage Vitals   Temp Heart Rate Resp BP SpO2   11/09/21 1517 11/09/21 1517 11/09/21 1517 11/09/21 1513 11/09/21 1517   98 °F (36.7 °C) 55 16 138/84 96 %      Temp src Heart Rate Source Patient Position BP Location FiO2 (%)   -- -- -- -- --              Physical Exam  Vitals and nursing note reviewed.   Constitutional:       General: He is not in acute distress.  HENT:      Head: Normocephalic and atraumatic.   Eyes:      Pupils: Pupils are equal, round, and reactive to light.   Cardiovascular:      Rate and Rhythm: Normal rate and regular rhythm.      Heart sounds: Normal heart sounds.   Pulmonary:      Effort: Pulmonary effort is normal. No respiratory distress.      Breath sounds: Normal breath sounds.   Abdominal:      Palpations: Abdomen is soft.      Tenderness: There is no abdominal tenderness. There is no guarding or rebound.   Musculoskeletal:         General: Tenderness present. Normal range of motion.      Cervical back: Normal range of motion and neck supple.   Skin:     General: Skin is warm and dry.   Neurological:      Mental Status: He is alert and oriented to person, place, and time.      Sensory: Sensation is intact.   Psychiatric:         Mood and Affect: Mood and affect normal.     Patient was wearing a face mask when I entered the room and they continued to wear a mask throughout their stay in the ED.  I wore PPE, including  gloves, face mask with shield or face mask with goggles whenever I was in the room with patient.         LAB RESULTS  Recent Results (from the past 24 hour(s))   Duplex Venous Lower Extremity - Right    Collection Time: 11/09/21  6:25 PM   Result Value Ref Range    Right Common  Femoral Spont Y     Right Common Femoral Phasic Y     Right Common Femoral Augment Y     Right Common Femoral Competent Y     Right Common Femoral Compress C     Right Saphenofemoral Junction Compress C     Right Profunda Femoral Compress C     Right Proximal Femoral Compress C     Right Mid Femoral Spont Y     Right Mid Femoral Phasic Y     Right Mid Femoral Augment Y     Right Mid Femoral Competent Y     Right Mid Femoral Compress C     Right Distal Femoral Compress C     Right Popliteal Spont Y     Right Popliteal Phasic Y     Right Popliteal Augment Y     Right Popliteal Competent Y     Right Popliteal Compress C     Right Posterior Tibial Compress C     Right Peroneal Compress C     Right Gastronemius Compress C     Right Greater Saph AK Compress C     Right Greater Saph BK Compress C     Right Lesser Saph Compress C     Left Common Femoral Spont Y     Left Common Femoral Phasic Y     Left Common Femoral Augment Y     Left Common Femoral Competent Y     Left Common Femoral Compress C    Comprehensive Metabolic Panel    Collection Time: 11/09/21  8:43 PM    Specimen: Blood   Result Value Ref Range    Glucose 111 (H) 65 - 99 mg/dL    BUN 15 8 - 23 mg/dL    Creatinine 0.94 0.76 - 1.27 mg/dL    Sodium 141 136 - 145 mmol/L    Potassium 4.0 3.5 - 5.2 mmol/L    Chloride 107 98 - 107 mmol/L    CO2 24.3 22.0 - 29.0 mmol/L    Calcium 9.6 8.2 - 9.6 mg/dL    Total Protein 7.0 6.0 - 8.5 g/dL    Albumin 4.10 3.50 - 5.20 g/dL    ALT (SGPT) <5 1 - 41 U/L    AST (SGOT) 16 1 - 40 U/L    Alkaline Phosphatase 78 39 - 117 U/L    Total Bilirubin 1.9 (H) 0.0 - 1.2 mg/dL    eGFR Non African Amer 75 >60 mL/min/1.73    Globulin 2.9 gm/dL    A/G Ratio 1.4 g/dL    BUN/Creatinine Ratio 16.0 7.0 - 25.0    Anion Gap 9.7 5.0 - 15.0 mmol/L   Green Top (Gel)    Collection Time: 11/09/21  8:43 PM   Result Value Ref Range    Extra Tube Hold for add-ons.    Lavender Top    Collection Time: 11/09/21  8:43 PM   Result Value Ref Range    Extra Tube  hold for add-on    Gold Top - SST    Collection Time: 11/09/21  8:43 PM   Result Value Ref Range    Extra Tube Hold for add-ons.    Light Blue Top    Collection Time: 11/09/21  8:43 PM   Result Value Ref Range    Extra Tube hold for add-on    CBC Auto Differential    Collection Time: 11/09/21  8:43 PM    Specimen: Blood   Result Value Ref Range    WBC 11.18 (H) 3.40 - 10.80 10*3/mm3    RBC 5.65 4.14 - 5.80 10*6/mm3    Hemoglobin 16.2 13.0 - 17.7 g/dL    Hematocrit 48.4 37.5 - 51.0 %    MCV 85.7 79.0 - 97.0 fL    MCH 28.7 26.6 - 33.0 pg    MCHC 33.5 31.5 - 35.7 g/dL    RDW 13.3 12.3 - 15.4 %    RDW-SD 41.0 37.0 - 54.0 fl    MPV 11.2 6.0 - 12.0 fL    Platelets 192 140 - 450 10*3/mm3    Neutrophil % 64.4 42.7 - 76.0 %    Lymphocyte % 25.9 19.6 - 45.3 %    Monocyte % 7.0 5.0 - 12.0 %    Eosinophil % 1.4 0.3 - 6.2 %    Basophil % 0.6 0.0 - 1.5 %    Immature Grans % 0.7 (H) 0.0 - 0.5 %    Neutrophils, Absolute 7.19 (H) 1.70 - 7.00 10*3/mm3    Lymphocytes, Absolute 2.90 0.70 - 3.10 10*3/mm3    Monocytes, Absolute 0.78 0.10 - 0.90 10*3/mm3    Eosinophils, Absolute 0.16 0.00 - 0.40 10*3/mm3    Basophils, Absolute 0.07 0.00 - 0.20 10*3/mm3    Immature Grans, Absolute 0.08 (H) 0.00 - 0.05 10*3/mm3    nRBC 0.0 0.0 - 0.2 /100 WBC       Ordered the above labs and reviewed the results.        RADIOLOGY  XR Tibia Fibula 2 View Right   Final Result         Electronically signed by Rodrigo Pace M.D. on 11-09-21 at 2130      XR Femur 2 View Right   Final Result         Electronically signed by Rodrigo Pace M.D. on 11-09-21 at 2129           Ordered the above noted radiological studies. Reviewed by me in PACS.         PROCEDURES  Procedures            MEDICATIONS GIVEN IN ER  Medications - No data to display          PROGRESS AND CONSULTS  ED Course as of 11/09/21 2243 Tue Nov 09, 2021 2135 21:35 EST  patient presents for right leg pain.  States had muscle cramp last night and has been having pain since then.  Patient has  negative ultrasound so doubt DVT.  Patient has x-rays that are negative doubt fracture.  Not cold, cyanotic and has normal cap refill so doubt arterial issue. Patient has no redness or tenderness to suggest infection.  Does not want anything for pain.  Has a walker at home.  Will discharge home. [SL]      ED Course User Index  [SL] Wenceslao Shearer MD           MEDICAL DECISION MAKING      MDM  Number of Diagnoses or Management Options     Amount and/or Complexity of Data Reviewed  Clinical lab tests: reviewed and ordered (White blood cell count 11)  Tests in the radiology section of CPT®: reviewed and ordered (Doppler negative and x-ray negative)               DIAGNOSIS  Final diagnoses:   Right leg pain           DISPOSITION  DISCHARGE    Patient discharged in stable condition.    Reviewed implications of results, diagnosis, meds, responsibility to follow up, warning signs and symptoms of possible worsening, potential complications and reasons to return to ER, including worsening symptoms    Patient/Family voiced understanding of above instructions.    Discussed plan for discharge, as there is no emergent indication for admission. Patient referred to primary care provider for BP management due to today's BP. Pt/family is agreeable and understands need for follow up and repeat testing.  Pt is aware that discharge does not mean that nothing is wrong but it indicates no emergency is present that requires admission and they must continue care with follow-up as given below or physician of their choice.     FOLLOW-UP  Uriah Godinez MD  [id number] [id number] [phone]    Schedule an appointment as soon as possible for a visit            Medication List      No changes were made to your prescriptions during this visit.             Latest Documented Vital Signs:  As of 22:43 EST  BP- 168/82 HR- 72 Temp- 98 °F (36.7 °C) O2 sat- 96%                         Wenceslao Shearer,  MD  11/09/21 4072

## 2021-11-11 RX ORDER — FUROSEMIDE 40 MG/1
TABLET ORAL
Qty: 90 TABLET | Refills: 1 | Status: SHIPPED | OUTPATIENT
Start: 2021-11-11 | End: 2022-05-19

## 2021-11-15 ENCOUNTER — TELEPHONE (OUTPATIENT)
Dept: INTERNAL MEDICINE | Facility: CLINIC | Age: 86
End: 2021-11-15

## 2021-11-15 NOTE — TELEPHONE ENCOUNTER
Caller: Chago Garcia    Relationship: Emergency Contact    Best call back number: [phone]     What is the best time to reach you: ANY TIME    Who are you requesting to speak with (clinical staff, provider,  specific staff member): CLINICAL    What was the call regarding: PATIENT'S WIFE CALLED SAYING HE WAS IN THE HOSPITAL RECENTLY FOR A ELSY HORSE THAT LED TO FURTHER LEG PROBLEMS. HE IS SCHEDULED FOR LABS ON 12/1/2021 AND SHE WANTED TO KNOW IF HE STILL NEEDED THAT APPOINTMENT SINCE HE HAS DONE DIFFERENT TESTS AT THE HOSPITAL. SHE ALSO WANTED TO SEE IF HE NEEDS TO COME IN SOONER THAN 12/2 SINCE HE HAD BEEN IN THE HOSPITAL.

## 2021-11-16 NOTE — PROGRESS NOTES
The ABCs of the Annual Wellness Visit  Subsequent Medicare Wellness Visit    No chief complaint on file.      Subjective   History of Present Illness:  Izaiah Garcia is a 88 y.o. male who presents for a Subsequent Medicare Wellness Visit.    HEALTH RISK ASSESSMENT    Recent Hospitalizations:  Recently treated at the following:  [facility]    Current Medical Providers:  Patient Care Team:  Uriah Godinez MD as PCP - General (Internal Medicine)    Smoking Status:  Social History     Tobacco Use   Smoking Status Never Smoker   Smokeless Tobacco Never Used       Alcohol Consumption:  Social History     Substance and Sexual Activity   Alcohol Use Never   • Frequency: 2-4 times a month   • Drinks per session: 1 or 2   • Binge frequency: Never    Comment: occ       Depression Screen:   PHQ-2/PHQ-9 Depression Screening 5/27/2020   Little interest or pleasure in doing things 0   Feeling down, depressed, or hopeless 0   Trouble falling or staying asleep, or sleeping too much -   Feeling tired or having little energy -   Poor appetite or overeating -   Feeling bad about yourself - or that you are a failure or have let yourself or your family down -   Trouble concentrating on things, such as reading the newspaper or watching television -   Moving or speaking so slowly that other people could have noticed. Or the opposite - being so fidgety or restless that you have been moving around a lot more than usual -   Thoughts that you would be better off dead, or of hurting yourself in some way -   Total Score 0       Fall Risk Screen:  SOFI Fall Risk Assessment was completed, and patient is at MODERATE risk for falls. Assessment completed on:5/27/2020    Health Habits and Functional and Cognitive Screening:  Functional & Cognitive Status 5/27/2020   Do you have difficulty preparing food and eating? No   Do you have difficulty bathing yourself, getting dressed or grooming yourself? Yes   Do you have difficulty  using the toilet? Yes   Do you have difficulty moving around from place to place? Yes   Do you have trouble with steps or getting out of a bed or a chair? Yes   Current Diet Well Balanced Diet   Dental Exam Up to date   Eye Exam Up to date   Exercise (times per week) 0 times per week   Current Exercise Activities Include None   Do you need help using the phone?  No   Are you deaf or do you have serious difficulty hearing?  Yes   Do you need help with transportation? Yes   Do you need help shopping? Yes   Do you need help preparing meals?  Yes   Do you need help with housework?  Yes   Do you need help with laundry? Yes   Do you need help taking your medications? Yes   Do you need help managing money? Yes   Do you ever drive or ride in a car without wearing a seat belt? No   Have you felt unusual stress, anger or loneliness in the last month? No   Who do you live with? Spouse   If you need help, do you have trouble finding someone available to you? No   Have you been bothered in the last four weeks by sexual problems? No   Do you have difficulty concentrating, remembering or making decisions? No         Does the patient have evidence of cognitive impairment? No    Asprin use counseling:Contraindicated from taking ASA    Age-appropriate Screening Schedule:  Refer to the list below for future screening recommendations based on patient's age, sex and/or medical conditions. Orders for these recommended tests are listed in the plan section. The patient has been provided with a written plan.    Health Maintenance   Topic Date Due   • INFLUENZA VACCINE  08/01/2020   • LIPID PANEL  05/18/2021   • TDAP/TD VACCINES  Discontinued   • ZOSTER VACCINE  Discontinued          The following portions of the patient's history were reviewed and updated as appropriate: allergies, current medications, past family history, past medical history, past social history, past surgical history and problem list.    Outpatient Medications Prior to  Visit   Medication Sig Dispense Refill   • allopurinol (ZYLOPRIM) 300 MG tablet Take 1 p.o. daily for gout 90 tablet 3   • budesonide (PULMICORT) 0.5 MG/2ML nebulizer solution Take 0.5 mg by nebulization 2 (Two) Times a Day.     • carbidopa-levodopa (SINEMET)  MG per tablet Take 1 tablet by mouth 2 (Two) Times a Day.     • ELIQUIS 5 MG tablet tablet TAKE ONE TABLET BY MOUTH EVERY 12 HOURS 60 tablet 0   • furosemide (LASIX) 40 MG tablet TAKE ONE TABLET BY MOUTH DAILY 30 tablet 0   • ipratropium-albuterol (DUO-NEB) 0.5-2.5 mg/3 ml nebulizer Take 3 mL by nebulization 2 (Two) Times a Day.     • lovastatin (MEVACOR) 20 MG tablet Take 1 p.o. daily for high cholesterol 90 tablet 3   • metoprolol tartrate (LOPRESSOR) 25 MG tablet TAKE ONE TABLET BY MOUTH EVERY 12 HOURS 60 tablet 0   • ondansetron (ZOFRAN) 4 MG tablet 1 p.o. every 6 to 8 hours as needed nausea 20 tablet 0   • saccharomyces boulardii (FLORASTOR) 250 MG capsule Take 1 capsule by mouth 2 (Two) Times a Day. 180 capsule 0     Facility-Administered Medications Prior to Visit   Medication Dose Route Frequency Provider Last Rate Last Dose   • cyanocobalamin injection 1,000 mcg  1,000 mcg Subcutaneous Q30 Days Uriah Godinez MD   1,000 mcg at 02/07/20 1016       Patient Active Problem List   Diagnosis   • Hyperlipidemia   • Benign essential hypertension   • History of gout   • History of esophageal stricture   • History of stroke, 6/29/2016--4.9 x 4 x 3 cm inferior left cerebellar infarct   • Rheumatoid factor positive   • Impaired fasting glucose   • At risk for falls   • Bilateral high frequency sensorineural hearing loss, wears hearing aids   • Bilateral lower extremity edema   • Parkinson's disease (CMS/HCC)   • Therapeutic drug monitoring   • Vitamin D deficiency   • Macrocytosis   • Vitamin B12 deficiency   • History of pulmonary embolus (PE)   • Acute saddle pulmonary embolism with acute cor pulmonale (CMS/HCC)       Advanced Care Planning:  ACP  "discussion was held with the patient during this visit. Patient has an advance directive in EMR which is still valid.     Review of Systems   Constitutional: Negative.    HENT: Negative.    Eyes: Negative.    Respiratory: Negative.    Cardiovascular: Negative.    Gastrointestinal: Negative.    Endocrine: Negative.    Genitourinary: Negative.    Musculoskeletal: Positive for gait problem.   Skin: Negative.    Allergic/Immunologic: Negative.    Neurological:        Balance problem from parkinsonism   Hematological: Negative.    Psychiatric/Behavioral: Negative.        Compared to one year ago, the patient feels his physical health is worse.  Compared to one year ago, the patient feels his mental health is the same.    Reviewed chart for potential of high risk medication in the elderly: yes  Reviewed chart for potential of harmful drug interactions in the elderly:yes    Objective         Vitals:    05/27/20 0833   BP: 114/50   Pulse: 56   SpO2: 98%   Weight: 83.9 kg (185 lb)   Height: 180.3 cm (70.98\")       Body mass index is 25.81 kg/m².  Discussed the patient's BMI with him. The BMI is in the acceptable range.    Physical Exam    Lab Results   Component Value Date    CHLPL 144 05/18/2020    TRIG 134 05/18/2020    HGBA1C 5.23 05/18/2020        Assessment/Plan   Medicare Risks and Personalized Health Plan  CMS Preventative Services Quick Reference  Cardiovascular risk  Diabetic Lab Screening   Fall Risk    The above risks/problems have been discussed with the patient.  Pertinent information has been shared with the patient in the After Visit Summary.  Follow up plans and orders are seen below in the Assessment/Plan Section.    Diagnoses and all orders for this visit:    1. Medicare annual wellness visit, subsequent (Primary)    2. History of pulmonary embolus (PE)    3. Acute saddle pulmonary embolism with acute cor pulmonale (CMS/HCC)    4. Impaired fasting glucose    5. Hyperlipidemia    6. Benign essential " hypertension    7. History of gout    8. Macrocytosis    9. Vitamin B12 deficiency    10. History of stroke, 6/29/2016--4.9 x 4 x 3 cm inferior left cerebellar infarct    11. Parkinson's disease (CMS/HCC)    12. At risk for falls    13. Bilateral lower extremity edema    14. Vitamin D deficiency    15. Therapeutic drug monitoring      Follow Up:  No follow-ups on file.     An After Visit Summary and PPPS were given to the patient.              No Decelerations

## 2021-11-16 NOTE — TELEPHONE ENCOUNTER
Patient needs a hospital follow-up and it must be done within 14 days. No lab prior. We can reschedule that later appointment. Needs hospital follow-up. Have someone find a place to put him.

## 2021-11-23 ENCOUNTER — LAB (OUTPATIENT)
Dept: LAB | Facility: HOSPITAL | Age: 86
End: 2021-11-23

## 2021-11-23 ENCOUNTER — OFFICE VISIT (OUTPATIENT)
Dept: INTERNAL MEDICINE | Facility: CLINIC | Age: 86
End: 2021-11-23

## 2021-11-23 VITALS
TEMPERATURE: 97.3 F | WEIGHT: 195 LBS | DIASTOLIC BLOOD PRESSURE: 90 MMHG | OXYGEN SATURATION: 98 % | BODY MASS INDEX: 26.41 KG/M2 | HEIGHT: 72 IN | HEART RATE: 60 BPM | SYSTOLIC BLOOD PRESSURE: 134 MMHG

## 2021-11-23 DIAGNOSIS — E78.2 MIXED HYPERLIPIDEMIA: Chronic | ICD-10-CM

## 2021-11-23 DIAGNOSIS — D75.89 MACROCYTOSIS: Chronic | ICD-10-CM

## 2021-11-23 DIAGNOSIS — E55.9 VITAMIN D DEFICIENCY: Chronic | ICD-10-CM

## 2021-11-23 DIAGNOSIS — R25.2 MUSCLE CRAMPS: Primary | ICD-10-CM

## 2021-11-23 DIAGNOSIS — Z23 NEED FOR INFLUENZA VACCINATION: ICD-10-CM

## 2021-11-23 DIAGNOSIS — Z87.39 HISTORY OF GOUT: Chronic | ICD-10-CM

## 2021-11-23 DIAGNOSIS — R73.01 IMPAIRED FASTING GLUCOSE: Chronic | ICD-10-CM

## 2021-11-23 DIAGNOSIS — E53.8 VITAMIN B12 DEFICIENCY: Chronic | ICD-10-CM

## 2021-11-23 PROBLEM — H05.232 PERIORBITAL HEMATOMA OF LEFT EYE: Status: RESOLVED | Noted: 2021-08-27 | Resolved: 2021-11-23

## 2021-11-23 PROBLEM — S62.345D: Status: RESOLVED | Noted: 2021-08-27 | Resolved: 2021-11-23

## 2021-11-23 PROBLEM — Z91.81 HISTORY OF RECENT FALL: Status: RESOLVED | Noted: 2021-08-27 | Resolved: 2021-11-23

## 2021-11-23 PROBLEM — S61.412A LACERATION OF LEFT HAND WITHOUT FOREIGN BODY: Status: RESOLVED | Noted: 2021-08-27 | Resolved: 2021-11-23

## 2021-11-23 PROBLEM — S62.347D CLOSED NONDISPLACED FRACTURE OF BASE OF FIFTH METACARPAL BONE OF LEFT HAND WITH ROUTINE HEALING: Status: RESOLVED | Noted: 2021-08-27 | Resolved: 2021-11-23

## 2021-11-23 PROBLEM — S63.289A DISLOCATION OF PIP JOINT OF FINGER: Status: RESOLVED | Noted: 2021-08-27 | Resolved: 2021-11-23

## 2021-11-23 PROBLEM — S09.90XA CLOSED HEAD INJURY: Status: RESOLVED | Noted: 2021-08-27 | Resolved: 2021-11-23

## 2021-11-23 LAB
25(OH)D3 SERPL-MCNC: 82.2 NG/ML (ref 30–100)
ALBUMIN SERPL-MCNC: 3.6 G/DL (ref 3.5–5.2)
ALBUMIN/GLOB SERPL: 1.6 G/DL
ALP SERPL-CCNC: 70 U/L (ref 39–117)
ALT SERPL W P-5'-P-CCNC: 22 U/L (ref 1–41)
ANION GAP SERPL CALCULATED.3IONS-SCNC: 4.8 MMOL/L (ref 5–15)
AST SERPL-CCNC: 15 U/L (ref 1–40)
BILIRUB SERPL-MCNC: 1 MG/DL (ref 0–1.2)
BUN SERPL-MCNC: 14 MG/DL (ref 8–23)
BUN/CREAT SERPL: 13.1 (ref 7–25)
CALCIUM SPEC-SCNC: 9.1 MG/DL (ref 8.2–9.6)
CHLORIDE SERPL-SCNC: 105 MMOL/L (ref 98–107)
CK SERPL-CCNC: 28 U/L (ref 20–200)
CO2 SERPL-SCNC: 29.2 MMOL/L (ref 22–29)
CREAT SERPL-MCNC: 1.07 MG/DL (ref 0.76–1.27)
DEPRECATED RDW RBC AUTO: 40.4 FL (ref 37–54)
ERYTHROCYTE [DISTWIDTH] IN BLOOD BY AUTOMATED COUNT: 13.3 % (ref 12.3–15.4)
GFR SERPL CREATININE-BSD FRML MDRD: 65 ML/MIN/1.73
GLOBULIN UR ELPH-MCNC: 2.3 GM/DL
GLUCOSE SERPL-MCNC: 106 MG/DL (ref 65–99)
HBA1C MFR BLD: 5.85 % (ref 4.8–5.6)
HCT VFR BLD AUTO: 45.4 % (ref 37.5–51)
HGB BLD-MCNC: 15.2 G/DL (ref 13–17.7)
MCH RBC QN AUTO: 28.4 PG (ref 26.6–33)
MCHC RBC AUTO-ENTMCNC: 33.5 G/DL (ref 31.5–35.7)
MCV RBC AUTO: 84.9 FL (ref 79–97)
PLATELET # BLD AUTO: 260 10*3/MM3 (ref 140–450)
PMV BLD AUTO: 11.3 FL (ref 6–12)
POTASSIUM SERPL-SCNC: 3.8 MMOL/L (ref 3.5–5.2)
PROT SERPL-MCNC: 5.9 G/DL (ref 6–8.5)
RBC # BLD AUTO: 5.35 10*6/MM3 (ref 4.14–5.8)
SODIUM SERPL-SCNC: 139 MMOL/L (ref 136–145)
T3FREE SERPL-MCNC: 2.7 PG/ML (ref 2–4.4)
T4 FREE SERPL-MCNC: 1.57 NG/DL (ref 0.93–1.7)
TSH SERPL DL<=0.05 MIU/L-ACNC: 1.29 UIU/ML (ref 0.27–4.2)
URATE SERPL-MCNC: 4.4 MG/DL (ref 3.4–7)
WBC NRBC COR # BLD: 9.04 10*3/MM3 (ref 3.4–10.8)

## 2021-11-23 PROCEDURE — 84439 ASSAY OF FREE THYROXINE: CPT

## 2021-11-23 PROCEDURE — 82306 VITAMIN D 25 HYDROXY: CPT

## 2021-11-23 PROCEDURE — G0008 ADMIN INFLUENZA VIRUS VAC: HCPCS | Performed by: INTERNAL MEDICINE

## 2021-11-23 PROCEDURE — 90662 IIV NO PRSV INCREASED AG IM: CPT | Performed by: INTERNAL MEDICINE

## 2021-11-23 PROCEDURE — 84550 ASSAY OF BLOOD/URIC ACID: CPT

## 2021-11-23 PROCEDURE — 82550 ASSAY OF CK (CPK): CPT

## 2021-11-23 PROCEDURE — 80061 LIPID PANEL: CPT

## 2021-11-23 PROCEDURE — 85027 COMPLETE CBC AUTOMATED: CPT

## 2021-11-23 PROCEDURE — 80053 COMPREHEN METABOLIC PANEL: CPT

## 2021-11-23 PROCEDURE — 83036 HEMOGLOBIN GLYCOSYLATED A1C: CPT

## 2021-11-23 PROCEDURE — 84481 FREE ASSAY (FT-3): CPT

## 2021-11-23 PROCEDURE — 84443 ASSAY THYROID STIM HORMONE: CPT

## 2021-11-23 PROCEDURE — 36415 COLL VENOUS BLD VENIPUNCTURE: CPT

## 2021-11-23 PROCEDURE — 83704 LIPOPROTEIN BLD QUAN PART: CPT

## 2021-11-23 PROCEDURE — 99214 OFFICE O/P EST MOD 30 MIN: CPT | Performed by: INTERNAL MEDICINE

## 2021-11-23 NOTE — PROGRESS NOTES
"             11/23/2021    Patient Information  Izaiah Garcia                                                                                          [id number] [facility] [address]      8/31/1931  [unfilled]  There is no work phone number on file.    Chief Complaint:     Right calf pain, complaining of muscle cramps/\"charley horses\".    History of Present Illness:    Patient with history of hyperlipidemia who is on statin therapy, specifically lovastatin 20 mg/day. He presents today with complaints of pain in his right leg related to muscle cramps and history will be described below. His past medical history reviewed and updated were necessary including health maintenance parameters. This reveals he needs influenza vaccine. See below.    The history regarding muscle cramps/right leg/calf pain:    November 23, 2021--patient presented to the emergency room November 9, 2021 with complaints of severe pain in his right leg, specifically the calf region. He was describing charley horses/muscle cramps. He underwent a work-up including x-ray of the tibia and fibula on the right which was negative and also he had a Doppler venous study which was negative for DVT. Patient reports he is continuing to have muscle cramps and pain in this area and it seems to be localized to the posterior aspect of the knee and the calf muscle. The cramps are now intermittent. Patient did have blood work including a CMP which showed no electrolyte abnormalities. His CBC revealed a mild leukocytosis which she has had for some time. On exam patient has 2+ bilateral lower extremity edema below the knees. There is definite tenderness in the popliteal fossa and the right calf in general. No palpable masses noted. Patient is on statin therapy, specifically lovastatin. Plan is as follows: I have recommended the patient stop lovastatin until I reevaluate him. He has an appointment in about 9 days and he had lab work today which we can review " and this includes thyroid function tests which could be playing a role.    Review of Systems   Constitutional: Negative.   HENT: Negative.    Eyes: Negative.    Cardiovascular: Negative.    Respiratory: Negative.    Endocrine: Negative.    Hematologic/Lymphatic: Negative.    Skin: Negative.    Musculoskeletal: Positive for arthritis, joint pain and muscle cramps.   Gastrointestinal: Negative.    Genitourinary: Negative.    Neurological: Negative.    Psychiatric/Behavioral: Negative.    Allergic/Immunologic: Negative.        Active Problems:    Patient Active Problem List   Diagnosis   • Hyperlipidemia   • Benign essential hypertension   • History of gout   • History of esophageal stricture   • History of stroke, 6/29/2016--4.9 x 4 x 3 cm inferior left cerebellar infarct   • Rheumatoid factor positive   • Impaired fasting glucose   • At risk for falls   • Bilateral high frequency sensorineural hearing loss, wears hearing aids   • Bilateral lower extremity edema   • Parkinson's disease (HCC)   • Therapeutic drug monitoring   • Vitamin D deficiency   • Macrocytosis   • Vitamin B12 deficiency   • History of pulmonary embolus (PE)   • Chronic anticoagulation, Eliquis.  Saddle pulmonary embolism   • Paroxysmal atrial fibrillation (HCC)   • Bone lesion, 6/17/2020--1.2 cm lucent focus with sclerotic margins distal left femur.  Repeat x-ray 6 months.   • Benign prostatic hyperplasia without lower urinary tract symptoms   • Muscle cramps         Past Medical History:   Diagnosis Date   • Acute saddle pulmonary embolism with acute cor pulmonale (HCC) 2/29/2020    Admit date: 2/29/2020 Discharge date:3/20/2020 Discharged Condition: good   Discharge Diagnoses:   Acute saddle pulmonary embolism with acute cor pulmonale (CMS/HCC)   Pulmonary emboli (CMS/HCC)   Empyema of pleura (CMS/HCC)  Acute hypoxic respiratory failure BPE s/p ekos catheter on 2/29/2020 with local TPA infusion with good clinical response Troponemia suspect due  "to BPE RV strain Bilateral ple   • At risk for falls 5/2/2019   • Benign essential hypertension 6/29/2016   • Benign prostatic hyperplasia without lower urinary tract symptoms 12/3/2020   • Bilateral high frequency sensorineural hearing loss, wears hearing aids 5/2/2019   • Bilateral lower extremity edema 5/2/2019    May 2, 2019--patient who has hypertension and is on amlodipine 10 mg/day is complaining of progressively worsening swelling of the lower extremities that extends up to about mid calf.  Gets worse at the end of the day and tends to get better overnight when he props his feet up.  I think this is definitely related to the amlodipine although patient may have some venous insufficiency.  Medication ad   • Bone lesion, 6/17/2020--1.2 cm lucent focus with sclerotic margins distal left femur.  Repeat x-ray 6 months. 6/25/2020 June 25, 2020--patient seen in follow-up by Dr. Godinez and he continues to have symptoms involving his left anterior lateral thigh.  He has intermittent numbness, pain, and paresthesias and occasionally there is a burning sensation.  He notices the symptoms are worse if he sits for a prolonged period.  He felt the Flexeril helped but his wife is concerned because he was having some visual hallucina   • Chronic anticoagulation, Eliquis.  Saddle pulmonary embolism 5/27/2020   • Decreased peripheral vision of both eyes 5/2/2019    May 2, 2019--continues to have complaints of \"dizziness\" associated with weakness in his lower extremities.  He is not describing a spinning sensation or anything remotely close to vertigo.  Instead he is describing an off-balance sensation.  He tends to fall forward if not careful and he tends to list towards the right side.  He has marked difficulty going down an incline.  He has to ambulate wit   • History of aspiration pneumonia 5/4/2015   • History of esophageal stricture 5/4/2015    July 3, 2018--EGD revealed a distal esophageal stricture that was dilated " "to 18 mm.  There was a small hiatal hernia.  There was mild streaky erythema in the proximal stomach.  Duodenal bulb normal.  April 26, 2016--EGD performed for dysphagia reveals a distal esophageal stricture that was dilated 16.5 mm.   • History of gout 6/29/2016   • History of pulmonary embolus (PE) 2/29/2020    Admit date: 2/29/2020 Discharge date:3/20/2020 Discharged Condition: good   Discharge Diagnoses:   Acute saddle pulmonary embolism with acute cor pulmonale (CMS/HCC)   Pulmonary emboli (CMS/HCC)   Empyema of pleura (CMS/HCC)  Acute hypoxic respiratory failure BPE s/p ekos catheter on 2/29/2020 with local TPA infusion with good clinical response Troponemia suspect due to BPE RV strain Bilateral ple   • History of stroke, 6/29/2016--4.9 x 4 x 3 cm inferior left cerebellar infarct 5/4/2015 June 29, 2016--MRI of the brain performed for complaints of dizziness and weakness. IMPRESSION: 1. There is susceptibility artifact streaking through the anterior left frontal scalp through the anterior left frontal bone in the anterior tipof the left frontal lobe likely from a tiny piece of metal in the anterior left frontal scalp slightly limits evaluation of these structures. 2. There is mild s   • Hyperlipidemia 6/29/2016   • Impaired fasting glucose 5/2/2019 April 14, 2015--hemoglobin A1c 5.9.   • Macrocytosis 5/2/2019   • Parkinson's disease (HCC) 5/2/2019    May 2, 2019--continues to have complaints of \"dizziness\" associated with weakness in his lower extremities.  He is not describing a spinning sensation or anything remotely close to vertigo.  Instead he is describing an off-balance sensation.  He tends to fall forward if not careful and he tends to list towards the right side.  He has marked difficulty going down an incline.  He has to ambulate wit   • Rheumatoid factor positive 5/2/2019 March 25, 2014--rheumatoid factor returned positive at 95.  However, CCP antibodies normal at 8, SHIV negative, both C&P " ANCA negative, C-reactive protein normal at 0.24, sed rate normal at 2.   • Vitamin B12 deficiency 6/6/2019 June 6, 2019--patient recently had mildly elevated methylmalonic acid of 380.  Repeat methylmalonic acid was upper limit of normal at 356.  Given patient's neurologic symptoms and suspected parkinsonism, I have a low threshold for treating vitamin B12 deficiency.  We will initiate vitamin B12 injections today.  2000 mcg subcutaneously given today.   • Vitamin D deficiency 5/2/2019         Past Surgical History:   Procedure Laterality Date   • CARDIAC CATHETERIZATION N/A 2/29/2020    Procedure: Thrombolytic Therapy- EKOS;  Surgeon: Jason Griffiths MD;  Location: [facility] CATH INVASIVE LOCATION;  Service: Cardiovascular;  Laterality: N/A;   • CATARACT EXTRACTION EXTRACAPSULAR W/ INTRAOCULAR LENS IMPLANTATION Bilateral     Bilateral cataract extirpation with intraocular lens implantation   • CHOLECYSTECTOMY     • EKOS CATHETER PLACEMENT Bilateral 2/29/2020    Procedure: Ekos catheter placement;  Surgeon: Jason Griffiths MD;  Location: [facility] CATH INVASIVE LOCATION;  Service: Cardiovascular;  Laterality: Bilateral;   • ESOPHAGEAL DILATATION  04/26/2016 April 26, 2016--EGD performed for dysphagia reveals a distal esophageal stricture that was dilated 16.5 mm.   • ESOPHAGEAL DILATATION  07/03/2018    July 3, 2018--EGD revealed a distal esophageal stricture that was dilated to 18 mm.  There was a small hiatal hernia.  There was mild streaky erythema in the proximal stomach.  Duodenal bulb normal.   • INTERVENTIONAL RADIOLOGY PROCEDURE Bilateral 2/29/2020    Procedure: Pulmonary Angiogram;  Surgeon: Jason Griffiths MD;  Location: [facility] INVASIVE LOCATION;  Service: Cardiovascular;  Laterality: Bilateral;         No Known Allergies        Current Outpatient Medications:   •  allopurinol (ZYLOPRIM) 300 MG tablet, TAKE 1 TABLET EVERY DAY  FOR  GOUT, Disp: 90 tablet, Rfl: 3  •  carbidopa-levodopa (SINEMET)  " MG per tablet, , Disp: , Rfl:   •  Cholecalciferol (vitamin D3) 125 MCG (5000 UT) capsule capsule, 1 by mouth daily as directed, Disp: 30 capsule, Rfl:   •  fluorometholone (FML) 0.1 % ophthalmic suspension, , Disp: , Rfl:   •  furosemide (LASIX) 40 MG tablet, TAKE 1 TABLET EVERY DAY, Disp: 90 tablet, Rfl: 1  •  lovastatin (MEVACOR) 20 MG tablet, TAKE 1 TABLET EVERY DAY FOR HIGH CHOLESTEROL, Disp: 90 tablet, Rfl: 3  •  metoprolol tartrate (LOPRESSOR) 25 MG tablet, TAKE 1 TABLET BY MOUTH EVERY 12 (TWELVE) HOURS., Disp: 180 tablet, Rfl: 3  •  nystatin-triamcinolone (MYCOLOG II) 283805-0.1 UNIT/GM-% cream, Apply to affected area 2-3 times daily as needed, Disp: 30 g, Rfl: 1  •  rivaroxaban (XARELTO) 15 MG tablet, Take 1 p.o. daily for blood thinner.  Take at same time every day., Disp: 90 tablet, Rfl: 3  •  saccharomyces boulardii (FLORASTOR) 250 MG capsule, Take 1 capsule by mouth 2 (Two) Times a Day., Disp: 180 capsule, Rfl: 0    Current Facility-Administered Medications:   •  cyanocobalamin injection 1,000 mcg, 1,000 mcg, Subcutaneous, Q30 Days, Uriah Godinez MD, 1,000 mcg at 02/07/20 1016      Family History   Problem Relation Age of Onset   • No Known Problems Mother    • No Known Problems Father          Social History     Socioeconomic History   • Marital status:    • Number of children: 2   • Highest education level: 9th grade   Tobacco Use   • Smoking status: Never Smoker   • Smokeless tobacco: Never Used   Vaping Use   • Vaping Use: Never used   Substance and Sexual Activity   • Alcohol use: Never     Comment: occ   • Drug use: No   • Sexual activity: Not Currently     Partners: Female         Vitals:    11/23/21 0908   BP: 134/90   Pulse: 60   Temp: 97.3 °F (36.3 °C)   SpO2: 98%   Weight: 88.5 kg (195 lb)   Height: 182.9 cm (72.01\")        Body mass index is 26.44 kg/m².      Physical Exam:    General: Alert and oriented x 3.  No acute distress. Obese.  Normal affect.  HEENT: Pupils equal, " round, reactive to light; extraocular movements intact; sclerae nonicteric; pharynx, ear canals and TMs normal.  Neck: Without JVD, thyromegaly, bruit, or adenopathy.  Lungs: Clear to auscultation in all fields.  Heart: Regular rate and rhythm without murmur, rub, gallop, or click.  Abdomen: Soft, nontender, without hepatosplenomegaly or hernia.  Bowel sounds normal.  : Deferred.  Rectal: Deferred.  Extremities: Without clubbing, cyanosis,  or pulse deficit. There is 2+ bilateral lower extremity edema below the knees. There is definite tenderness to palpation in popliteal fossa and the right calf. There is no masses or palpable cords noted. Neurologic: Intact without focal deficit. Patient is in a wheelchair. When he does ambulate he does so with great difficulty and is quite unsteady. Skin: Without significant lesion.  Musculoskeletal: Unremarkable.    Lab/other results:      Assessment/Plan:     Diagnosis Plan   1. Muscle cramps     2. Need for influenza vaccination  Fluzone High-Dose 65+yrs ([id number])   3. Hyperlipidemia       November 23, 2021--patient presented to the emergency room November 9, 2021 with complaints of severe pain in his right leg, specifically the calf region. He was describing charley horses/muscle cramps. He underwent a work-up including x-ray of the tibia and fibula on the right which was negative and also he had a Doppler venous study which was negative for DVT. Patient reports he is continuing to have muscle cramps and pain in this area and it seems to be localized to the posterior aspect of the knee and the calf muscle. The cramps are now intermittent. Patient did have blood work including a CMP which showed no electrolyte abnormalities. His CBC revealed a mild leukocytosis which she has had for some time. On exam patient has 2+ bilateral lower extremity edema below the knees. There is definite tenderness in the popliteal fossa and the right calf in general. No palpable masses noted.  Patient is on statin therapy, specifically lovastatin.     Plan is as follows: I have recommended the patient stop lovastatin until I reevaluate him. He has an appointment in about 9 days and he had lab work today which we can review and this includes thyroid function tests which could be playing a role.        Procedures

## 2021-11-26 LAB
CHOLEST SERPL-MCNC: 126 MG/DL (ref 100–199)
HDL SERPL-SCNC: 25.4 UMOL/L
HDLC SERPL-MCNC: 31 MG/DL
LDL SERPL QN: 20 NM
LDL SERPL-SCNC: 835 NMOL/L
LDL SMALL SERPL-SCNC: 587 NMOL/L
LDLC SERPL CALC-MCNC: 72 MG/DL (ref 0–99)
TRIGL SERPL-MCNC: 128 MG/DL (ref 0–149)

## 2021-12-02 ENCOUNTER — OFFICE VISIT (OUTPATIENT)
Dept: INTERNAL MEDICINE | Facility: CLINIC | Age: 86
End: 2021-12-02

## 2021-12-02 VITALS
OXYGEN SATURATION: 95 % | WEIGHT: 200 LBS | SYSTOLIC BLOOD PRESSURE: 124 MMHG | BODY MASS INDEX: 27.09 KG/M2 | HEART RATE: 54 BPM | HEIGHT: 72 IN | RESPIRATION RATE: 18 BRPM | DIASTOLIC BLOOD PRESSURE: 70 MMHG

## 2021-12-02 DIAGNOSIS — E78.2 MIXED HYPERLIPIDEMIA: Chronic | ICD-10-CM

## 2021-12-02 DIAGNOSIS — Z78.9 STATIN INTOLERANCE: ICD-10-CM

## 2021-12-02 DIAGNOSIS — R73.01 IMPAIRED FASTING GLUCOSE: Primary | Chronic | ICD-10-CM

## 2021-12-02 DIAGNOSIS — R60.0 BILATERAL LOWER EXTREMITY EDEMA: Chronic | ICD-10-CM

## 2021-12-02 DIAGNOSIS — I10 BENIGN ESSENTIAL HYPERTENSION: Chronic | ICD-10-CM

## 2021-12-02 DIAGNOSIS — Z51.81 THERAPEUTIC DRUG MONITORING: ICD-10-CM

## 2021-12-02 DIAGNOSIS — Z91.81 AT RISK FOR FALLS: Chronic | ICD-10-CM

## 2021-12-02 DIAGNOSIS — Z79.01 CHRONIC ANTICOAGULATION: Chronic | ICD-10-CM

## 2021-12-02 DIAGNOSIS — E55.9 VITAMIN D DEFICIENCY: Chronic | ICD-10-CM

## 2021-12-02 DIAGNOSIS — Z86.73 HISTORY OF STROKE: Chronic | ICD-10-CM

## 2021-12-02 DIAGNOSIS — E53.8 VITAMIN B12 DEFICIENCY: Chronic | ICD-10-CM

## 2021-12-02 DIAGNOSIS — Z87.39 HISTORY OF GOUT: Chronic | ICD-10-CM

## 2021-12-02 DIAGNOSIS — Z87.19 HISTORY OF ESOPHAGEAL STRICTURE: Chronic | ICD-10-CM

## 2021-12-02 DIAGNOSIS — I48.0 PAROXYSMAL ATRIAL FIBRILLATION (HCC): Chronic | ICD-10-CM

## 2021-12-02 DIAGNOSIS — G20 PARKINSON'S DISEASE (HCC): Chronic | ICD-10-CM

## 2021-12-02 DIAGNOSIS — D75.89 MACROCYTOSIS: Chronic | ICD-10-CM

## 2021-12-02 DIAGNOSIS — N40.0 BENIGN PROSTATIC HYPERPLASIA WITHOUT LOWER URINARY TRACT SYMPTOMS: Chronic | ICD-10-CM

## 2021-12-02 DIAGNOSIS — M89.9 BONE LESION: Chronic | ICD-10-CM

## 2021-12-02 DIAGNOSIS — Z86.711 HISTORY OF PULMONARY EMBOLUS (PE): Chronic | ICD-10-CM

## 2021-12-02 DIAGNOSIS — R25.2 MUSCLE CRAMPS: ICD-10-CM

## 2021-12-02 PROCEDURE — 99214 OFFICE O/P EST MOD 30 MIN: CPT | Performed by: INTERNAL MEDICINE

## 2021-12-02 NOTE — PROGRESS NOTES
12/02/2021    Patient Information  Izaiah Garcia                                                                                          [id number]  [facility] [address]      8/31/1931  [unfilled]  There is no work phone number on file.    Chief Complaint:     Follow-up lab work in order to monitor chronic medical issues listed in history of present illness.  No new acute complaints.    History of Present Illness:    Patient with multiple chronic medical problems including impaired fasting glucose, hyperlipidemia, hypertension, history of gout, history of remote stroke, Parkinson's disease, bilateral lower extremity edema, vitamin D deficiency, macrocytosis and vitamin B12 deficiency, history of pulmonary embolism, paroxysmal atrial fibrillation, chronic anticoagulation with Eliquis, left femur bone lesion, BPH, high risk for falls, history of esophageal stricture, complaints of muscle cramps.  He presents today for a follow-up with lab prior in order to monitor his chronic medical issues.  His past medical history reviewed and updated were necessary including health maintenance parameters.  This reveals he is up-to-date with the exception of Covid-19 booster vaccine which I recommended he get.    Review of Systems   HENT: Negative.    Eyes: Negative.    Cardiovascular: Positive for leg swelling. Negative for chest pain.   Respiratory: Negative.    Endocrine: Negative.    Hematologic/Lymphatic: Negative.    Skin: Negative.    Musculoskeletal: Positive for arthritis and joint pain.   Gastrointestinal: Negative.    Genitourinary: Negative.    Neurological: Positive for disturbances in coordination, loss of balance and weakness. Negative for numbness and paresthesias.   Psychiatric/Behavioral: Negative.    Allergic/Immunologic: Negative.        Active Problems:    Patient Active Problem List   Diagnosis   • Hyperlipidemia   • Benign essential hypertension   • History of gout   • History of  esophageal stricture   • History of stroke, 6/29/2016--4.9 x 4 x 3 cm inferior left cerebellar infarct   • Rheumatoid factor positive   • Impaired fasting glucose   • At risk for falls   • Bilateral high frequency sensorineural hearing loss, wears hearing aids   • Bilateral lower extremity edema   • Parkinson's disease (HCC)   • Therapeutic drug monitoring   • Vitamin D deficiency   • Macrocytosis   • Vitamin B12 deficiency   • History of pulmonary embolus (PE)   • Chronic anticoagulation, Eliquis.  Saddle pulmonary embolism   • Paroxysmal atrial fibrillation (HCC)   • Benign prostatic hyperplasia without lower urinary tract symptoms   • Muscle cramps   • Statin intolerance, muscle pain and cramps         Past Medical History:   Diagnosis Date   • Acute saddle pulmonary embolism with acute cor pulmonale (HCC) 2/29/2020    Admit date: 2/29/2020 Discharge date:3/20/2020 Discharged Condition: good   Discharge Diagnoses:   Acute saddle pulmonary embolism with acute cor pulmonale (CMS/HCC)   Pulmonary emboli (CMS/HCC)   Empyema of pleura (CMS/HCC)  Acute hypoxic respiratory failure BPE s/p ekos catheter on 2/29/2020 with local TPA infusion with good clinical response Troponemia suspect due to BPE RV strain Bilateral ple   • At risk for falls 5/2/2019   • Benign essential hypertension 6/29/2016   • Benign prostatic hyperplasia without lower urinary tract symptoms 12/3/2020   • Bilateral high frequency sensorineural hearing loss, wears hearing aids 5/2/2019   • Bilateral lower extremity edema 5/2/2019    May 2, 2019--patient who has hypertension and is on amlodipine 10 mg/day is complaining of progressively worsening swelling of the lower extremities that extends up to about mid calf.  Gets worse at the end of the day and tends to get better overnight when he props his feet up.  I think this is definitely related to the amlodipine although patient may have some venous insufficiency.  Medication ad   • Chronic  "anticoagulation, Eliquis.  Saddle pulmonary embolism 5/27/2020   • Decreased peripheral vision of both eyes 5/2/2019    May 2, 2019--continues to have complaints of \"dizziness\" associated with weakness in his lower extremities.  He is not describing a spinning sensation or anything remotely close to vertigo.  Instead he is describing an off-balance sensation.  He tends to fall forward if not careful and he tends to list towards the right side.  He has marked difficulty going down an incline.  He has to ambulate wit   • History of aspiration pneumonia 5/4/2015   • History of esophageal stricture 5/4/2015    July 3, 2018--EGD revealed a distal esophageal stricture that was dilated to 18 mm.  There was a small hiatal hernia.  There was mild streaky erythema in the proximal stomach.  Duodenal bulb normal.  April 26, 2016--EGD performed for dysphagia reveals a distal esophageal stricture that was dilated 16.5 mm.   • History of gout 6/29/2016   • History of pulmonary embolus (PE) 2/29/2020    Admit date: 2/29/2020 Discharge date:3/20/2020 Discharged Condition: good   Discharge Diagnoses:   Acute saddle pulmonary embolism with acute cor pulmonale (CMS/HCC)   Pulmonary emboli (CMS/HCC)   Empyema of pleura (CMS/HCC)  Acute hypoxic respiratory failure BPE s/p ekos catheter on 2/29/2020 with local TPA infusion with good clinical response Troponemia suspect due to BPE RV strain Bilateral ple   • History of stroke, 6/29/2016--4.9 x 4 x 3 cm inferior left cerebellar infarct 5/4/2015 June 29, 2016--MRI of the brain performed for complaints of dizziness and weakness. IMPRESSION: 1. There is susceptibility artifact streaking through the anterior left frontal scalp through the anterior left frontal bone in the anterior tipof the left frontal lobe likely from a tiny piece of metal in the anterior left frontal scalp slightly limits evaluation of these structures. 2. There is mild s   • Hyperlipidemia 6/29/2016   • Impaired fasting " "glucose 5/2/2019 April 14, 2015--hemoglobin A1c 5.9.   • Macrocytosis 5/2/2019   • Parkinson's disease (HCC) 5/2/2019    May 2, 2019--continues to have complaints of \"dizziness\" associated with weakness in his lower extremities.  He is not describing a spinning sensation or anything remotely close to vertigo.  Instead he is describing an off-balance sensation.  He tends to fall forward if not careful and he tends to list towards the right side.  He has marked difficulty going down an incline.  He has to ambulate wit   • Rheumatoid factor positive 5/2/2019 March 25, 2014--rheumatoid factor returned positive at 95.  However, CCP antibodies normal at 8, SHIV negative, both C&P ANCA negative, C-reactive protein normal at 0.24, sed rate normal at 2.   • Statin intolerance, muscle pain and cramps 12/2/2021   • Vitamin B12 deficiency 6/6/2019 June 6, 2019--patient recently had mildly elevated methylmalonic acid of 380.  Repeat methylmalonic acid was upper limit of normal at 356.  Given patient's neurologic symptoms and suspected parkinsonism, I have a low threshold for treating vitamin B12 deficiency.  We will initiate vitamin B12 injections today.  2000 mcg subcutaneously given today.   • Vitamin D deficiency 5/2/2019         Past Surgical History:   Procedure Laterality Date   • CARDIAC CATHETERIZATION N/A 2/29/2020    Procedure: Thrombolytic Therapy- EKOS;  Surgeon: Jason Griffiths MD;  Location: [facility] INVASIVE LOCATION;  Service: Cardiovascular;  Laterality: N/A;   • CATARACT EXTRACTION EXTRACAPSULAR W/ INTRAOCULAR LENS IMPLANTATION Bilateral     Bilateral cataract extirpation with intraocular lens implantation   • CHOLECYSTECTOMY     • EKOS CATHETER PLACEMENT Bilateral 2/29/2020    Procedure: Ekos catheter placement;  Surgeon: Jason Griffiths MD;  Location:  [facility] INVASIVE LOCATION;  Service: Cardiovascular;  Laterality: Bilateral;   • ESOPHAGEAL DILATATION  04/26/2016 April 26, 2016--EGD " performed for dysphagia reveals a distal esophageal stricture that was dilated 16.5 mm.   • ESOPHAGEAL DILATATION  07/03/2018    July 3, 2018--EGD revealed a distal esophageal stricture that was dilated to 18 mm.  There was a small hiatal hernia.  There was mild streaky erythema in the proximal stomach.  Duodenal bulb normal.   • INTERVENTIONAL RADIOLOGY PROCEDURE Bilateral 2/29/2020    Procedure: Pulmonary Angiogram;  Surgeon: Jason Griffiths MD;  Location: [facility] INVASIVE LOCATION;  Service: Cardiovascular;  Laterality: Bilateral;         No Known Allergies        Current Outpatient Medications:   •  allopurinol (ZYLOPRIM) 300 MG tablet, TAKE 1 TABLET EVERY DAY  FOR  GOUT, Disp: 90 tablet, Rfl: 3  •  carbidopa-levodopa (SINEMET)  MG per tablet, , Disp: , Rfl:   •  Cholecalciferol (vitamin D3) 125 MCG (5000 UT) capsule capsule, 1 by mouth daily as directed, Disp: 30 capsule, Rfl:   •  fluorometholone (FML) 0.1 % ophthalmic suspension, , Disp: , Rfl:   •  furosemide (LASIX) 40 MG tablet, TAKE 1 TABLET EVERY DAY, Disp: 90 tablet, Rfl: 1  •  metoprolol tartrate (LOPRESSOR) 25 MG tablet, TAKE 1 TABLET BY MOUTH EVERY 12 (TWELVE) HOURS., Disp: 180 tablet, Rfl: 3  •  nystatin-triamcinolone (MYCOLOG II) 550637-8.1 UNIT/GM-% cream, Apply to affected area 2-3 times daily as needed, Disp: 30 g, Rfl: 1  •  rivaroxaban (XARELTO) 15 MG tablet, Take 1 p.o. daily for blood thinner.  Take at same time every day., Disp: 90 tablet, Rfl: 3  •  saccharomyces boulardii (FLORASTOR) 250 MG capsule, Take 1 capsule by mouth 2 (Two) Times a Day., Disp: 180 capsule, Rfl: 0  •  Cyanocobalamin 1000 MCG sublingual tablet, Dissolve 1-2 under tongue daily for low vitamin B12, Disp: , Rfl:   No current facility-administered medications for this visit.      Family History   Problem Relation Age of Onset   • No Known Problems Mother    • No Known Problems Father          Social History     Socioeconomic History   • Marital status:   "  • Number of children: 2   • Highest education level: 9th grade   Tobacco Use   • Smoking status: Never Smoker   • Smokeless tobacco: Never Used   Vaping Use   • Vaping Use: Never used   Substance and Sexual Activity   • Alcohol use: Never     Comment: occ   • Drug use: No   • Sexual activity: Not Currently     Partners: Female         Vitals:    12/02/21 1001   BP: 124/70   Pulse: 54   Resp: 18   SpO2: 95%   Weight: 90.7 kg (200 lb)   Height: 182.9 cm (72\")        Body mass index is 27.12 kg/m².      Physical Exam:    General: Alert and oriented x 3.  No acute distress.  Mildly overweight.  Normal affect.  HEENT: Pupils equal, round, reactive to light; extraocular movements intact; sclerae nonicteric; pharynx, ear canals and TMs normal.  Neck: Without JVD, thyromegaly, bruit, or adenopathy.  Lungs: Clear to auscultation in all fields.  Heart: Irregularly irregular rate and rhythm without murmur, rub, gallop, or click.  Abdomen: Soft, nontender, without hepatosplenomegaly or hernia.  Bowel sounds normal.  : Deferred.  Rectal: Deferred.  Extremities: Without clubbing, cyanosis, edema, or pulse deficit.  Neurologic: Intact without focal deficit.  Patient ambulates with an ataxic parkinsonian gait.  He ambulates with assistance of wheeled walker.  Slight hand tremor noted.  Skin: Without significant lesion.  Musculoskeletal: Unremarkable.    Lab/other results:    Total cholesterol 126.  Triglycerides 128.  LDL particle number excellent 835.  Small LDL particle number elevated at 587.  HDL particle number low at 25.4.  CBC is normal.  CPK normal.  CMP normal except glucose 106, total protein low at 5.9.  Hemoglobin A1c 5.85.  Vitamin D normal at 82.2.  Thyroid function test normal.  Uric acid normal at 4.4.    Assessment/Plan:     Diagnosis Plan   1. Impaired fasting glucose  Comprehensive Metabolic Panel    Hemoglobin A1c   2. Hyperlipidemia  CK    Comprehensive Metabolic Panel    NMR LipoProfile    TSH    T4, Free "    T3, Free    Folate   3. Benign essential hypertension     4. History of gout  Uric Acid   5. History of stroke, 6/29/2016--4.9 x 4 x 3 cm inferior left cerebellar infarct     6. Parkinson's disease (HCC)     7. Bilateral lower extremity edema     8. Vitamin D deficiency  Vitamin D 25 Hydroxy   9. Macrocytosis  CBC (No Diff)    Anemia, Megaloblastic, Serum    Vitamin B12    Folate   10. Vitamin B12 deficiency  CBC (No Diff)    Anemia, Megaloblastic, Serum    Vitamin B12    Cyanocobalamin 1000 MCG sublingual tablet   11. History of pulmonary embolus (PE)     12. Chronic anticoagulation, Eliquis.  Saddle pulmonary embolism     13. Paroxysmal atrial fibrillation (HCC)     14. Bone lesion, 6/17/2020--1.2 cm lucent focus with sclerotic margins distal left femur.  Repeat x-ray 6 months.     15. Benign prostatic hyperplasia without lower urinary tract symptoms  PSA DIAGNOSTIC   16. At risk for falls     17. History of esophageal stricture     18. Muscle cramps     19. Therapeutic drug monitoring     20. Statin intolerance       Patient has mild impaired fasting glucose that does not require medication.  Patient is at risk for diabetes and I recommended that he watch his diet.  Given his advanced age were not going to be too aggressive with this.  Hyperlipidemia is under reasonable control.  He is taking Mevacor and seems to be tolerating it well although he does have complaints of muscle cramps.  I have recommended coenzyme Q 10 400 mg/day in the past and currently.  He has a history of gout and his uric acid levels are in the normal range.  His Parkinson's disease seems to be fairly stable and the carbidopa has been helpful.  He has a history of macrocytosis and has vitamin B12 deficiency and needs B12 injections monthly.  Vitamin D is in the normal range.  Patient has a history of acute saddle embolus in the past and also has paroxysmal atrial fibrillation and is on chronic anticoagulation with Eliquis and tolerating  without signs or symptoms of bleeding.  He also has a distal left femur bone lesion and we can resolve this issue.  He had an x-ray performed 11/9/2021 and there was no evidence of a bone lesion present.  BPH is asymptomatic.  Patient is obviously at risk for falls given his parkinsonism.  He has a history of esophageal stricture but currently is swallowing fine.    Plan is as follows: No change in current medical regimen although I have recommended that he take coenzyme every 10 400 mg/day with food.  This will hopefully help with his cramps.  I will have him follow-up after 6/2/2022 with lab prior and this will also be subsequent Medicare wellness visit.  Otherwise he will follow-up as needed.     Addendum: At the end of the visit I realized that we had taken patient off of his lovastatin because of complaints of muscle cramps and muscle pain.  Patient reports his symptoms have definitely improved since discontinuation of the medication about 2 weeks ago.  Therefore, I am going to formally discontinue the lovastatin and have patient stay off of the cholesterol medication.  We will reassess at the next visit and see if we need to come up with any other options.  Also we have changed his cyanocobalamin from injectable to sublingual for convenience.  Will monitor.      Procedures

## 2022-01-27 NOTE — THERAPY TREATMENT NOTE
History   Chief Complaint:  Fall and Back Pain       The history is provided by the patient.      Alice Gaytan is a 35 year old female with history of epilepsy, back pain, anxiety, and depression who presents with back pain. Earlier today, the patient was leaving work and walking in the parking lot to her car when she slipped. Alice was able to catch herself and avoiding falling to the ground, but she had an onset of significant pain to her mid-back. Due to the pain, she was unable to drive home and she called her mother to drive her to the emergency department. Here, the patient endorses severe pain to her mid-back that worsens with ambulation. She can use the bathroom without difficulty. She also describes a history of back pain due to pulled muscles, however, her current pain is the most severe. Alice denies any pain or weakness to her extremities. She denies any shortness of breath, cough, or abdominal pain.     Review of Systems   Respiratory: Negative for cough and shortness of breath.    Gastrointestinal: Negative for abdominal pain.   Musculoskeletal: Positive for back pain and gait problem.        No pain to extremities   Neurological: Negative for weakness (to extremities).   All other systems reviewed and are negative.    Allergies:  Tape [Adhesive Tape]  Seasonal Allergies    Medications:  busPIRone (BUSPAR) 10 mg tablet   lamoTRIgine (LAMICTAL) 150 mg tablet      Past Medical History:     Allergic state  Depressive disorder  Seizure  Pelvic pain   Normal labor and delivery  Encounter for supervision of other normal pregnancy     Past Surgical History:    Ankle surgery  Dilation and curettage x2  Laparoscopic tubal ligation, bilateral   Orthopedic surgery      Family History:    Cancer (mother)   Cardiovascular (mother)    Social History:  The patient is employed.     Physical Exam     Patient Vitals for the past 24 hrs:   BP Temp Temp src Pulse Resp SpO2 Height Weight   01/27/22 1545 -- -- -- -- --  Patient Name: Izaiah Garcia  : 1931    MRN: [id number]                              Today's Date: 3/16/2020       Admit Date: 2020    Visit Dx:     ICD-10-CM ICD-9-CM   1. Healthcare-associated pneumonia J18.9 486   2. Acute hypoxemic respiratory failure (CMS/HCC) J96.01 518.81   3. Bilateral pulmonary embolism (CMS/HCC) I26.99 415.19   4. Acute saddle pulmonary embolism with acute cor pulmonale (CMS/HCC) I26.02 415.13     415.0     Patient Active Problem List   Diagnosis   • Hyperlipidemia   • Benign essential hypertension   • History of gout   • History of esophageal stricture   • History of stroke, 2016--4.9 x 4 x 3 cm inferior left cerebellar infarct   • Rheumatoid factor positive   • Impaired fasting glucose   • At risk for falls   • Generalized weakness   • Bilateral high frequency sensorineural hearing loss, wears hearing aids   • Bilateral lower extremity edema   • Parkinson's disease (CMS/HCC)   • Therapeutic drug monitoring   • Vitamin D deficiency   • Macrocytosis   • Vitamin B12 deficiency   • Acute diarrhea   • Hospital-acquired pneumonia   • HAP (hospital-acquired pneumonia)   • Acute UTI (urinary tract infection)   • Colitis   • Parkinson disease (CMS/HCC)   • Weakness   • Aspiration pneumonia of right lower lobe due to vomit (CMS/HCC)   • Pulmonary emboli (CMS/HCC)   • Acute saddle pulmonary embolism with acute cor pulmonale (CMS/HCC)   • Empyema of pleura (CMS/HCC)     Past Medical History:   Diagnosis Date   • At risk for falls 2019   • Benign essential hypertension 2016   • Bilateral high frequency sensorineural hearing loss, wears hearing aids 2019   • Bilateral lower extremity edema 2019    May 2, 2019--patient who has hypertension and is on amlodipine 10 mg/day is complaining of progressively worsening swelling of the lower extremities that extends up to about mid calf.  Gets worse at the end of the day and tends to get better overnight when he props his  "-- 1.77 m (5' 9.69\") --   01/27/22 1458 (!) 153/89 98  F (36.7  C) Temporal 92 16 100 % 1.676 m (5' 6\") 77.6 kg (171 lb)       Physical Exam    General: Well appearing, pleasant, resting on exam bed  HEENT: No evidence of trauma.  Conjunctive are clear.  Extraocular eye movements intact.  Neck range of motion intact.  Nose and throat clear.  Respiratory: Good breath sounds bilaterally  Cardiovascular: Normal rate and rhythm   Gastrointestinal: Soft, nontender.   Musculoskeletal: Moderate to severe right paraspinal thoracic muscle tenderness. Upper extremity strength intact but is painful with her right arm. Lower extremity strength intact gross sensation intact.  Skin: Exposed skin clear.  Neurologic: Alert.  Psych:  Patient is cooperative, with normal affect.    Emergency Department Course     Emergency Department Course:         Reviewed:  I reviewed nursing notes, vitals, past medical history, Care Everywhere and [facility]    Assessments:  1523 I obtained history and examined the patient as noted above.   1636 I rechecked the patient and explained findings.     Interventions:  1503: ibuprofen (ADVIL/MOTRIN) tablet 600 mg, PO   1557: diazepam (VALIUM) tablet 5 mg, PO   1557: oxyCODONE-acetaminophen (PERCOCET) 5-325 MG per tablet 1 tablet, PO     Disposition:  The patient was discharged to home.     Impression & Plan     Medical Decision Making:  Alice Gaytan is a 35 year old female presents emergency room today for evaluation of back pain after slipping and almost falling but she caught herself.  She is complaining of mid thoracic right-sided back pain.  Vitals unremarkable.  See HPI.  On exam she has tenderness to the area.  She has good sensation and motor strength to her upper and lower extremities.  She was given the interventions above and feels better.  Likely significant muscle spasm given history and exam.  We will manage her conservatively with rest, Tylenol and ibuprofen.  I do not feel further imaging is " "feet up.  I think this is definitely related to the amlodipine although patient may have some venous insufficiency.  Medication ad   • Decreased peripheral vision of both eyes 5/2/2019    May 2, 2019--continues to have complaints of \"dizziness\" associated with weakness in his lower extremities.  He is not describing a spinning sensation or anything remotely close to vertigo.  Instead he is describing an off-balance sensation.  He tends to fall forward if not careful and he tends to list towards the right side.  He has marked difficulty going down an incline.  He has to ambulate wit   • History of aspiration pneumonia 5/4/2015   • History of esophageal stricture 5/4/2015    July 3, 2018--EGD revealed a distal esophageal stricture that was dilated to 18 mm.  There was a small hiatal hernia.  There was mild streaky erythema in the proximal stomach.  Duodenal bulb normal.  April 26, 2016--EGD performed for dysphagia reveals a distal esophageal stricture that was dilated 16.5 mm.   • History of gout 6/29/2016   • History of stroke, 6/29/2016--4.9 x 4 x 3 cm inferior left cerebellar infarct 5/4/2015 June 29, 2016--MRI of the brain performed for complaints of dizziness and weakness. IMPRESSION: 1. There is susceptibility artifact streaking through the anterior left frontal scalp through the anterior left frontal bone in the anterior tipof the left frontal lobe likely from a tiny piece of metal in the anterior left frontal scalp slightly limits evaluation of these structures. 2. There is mild s   • Hyperlipidemia 6/29/2016   • Impaired fasting glucose 5/2/2019 April 14, 2015--hemoglobin A1c 5.9.   • Macrocytosis 5/2/2019   • Parkinson's disease (CMS/[facility]) 5/2/2019    May 2, 2019--continues to have complaints of \"dizziness\" associated with weakness in his lower extremities.  He is not describing a spinning sensation or anything remotely close to vertigo.  Instead he is describing an off-balance sensation.  He tends to " fall forward if not careful and he tends to list towards the right side.  He has marked difficulty going down an incline.  He has to ambulate wit   • Rheumatoid factor positive 5/2/2019 March 25, 2014--rheumatoid factor returned positive at 95.  However, CCP antibodies normal at 8, SHIV negative, both C&P ANCA negative, C-reactive protein normal at 0.24, sed rate normal at 2.   • Vitamin B12 deficiency 6/6/2019 June 6, 2019--patient recently had mildly elevated methylmalonic acid of 380.  Repeat methylmalonic acid was upper limit of normal at 356.  Given patient's neurologic symptoms and suspected parkinsonism, I have a low threshold for treating vitamin B12 deficiency.  We will initiate vitamin B12 injections today.  2000 mcg subcutaneously given today.   • Vitamin D deficiency 5/2/2019     Past Surgical History:   Procedure Laterality Date   • CARDIAC CATHETERIZATION N/A 2/29/2020    Procedure: Thrombolytic Therapy- EKOS;  Surgeon: Jason Griffiths MD;  Location: [facility] CATH INVASIVE LOCATION;  Service: Cardiovascular;  Laterality: N/A;   • CATARACT EXTRACTION EXTRACAPSULAR W/ INTRAOCULAR LENS IMPLANTATION Bilateral     Bilateral cataract extirpation with intraocular lens implantation   • CHOLECYSTECTOMY     • EKOS CATHETER PLACEMENT Bilateral 2/29/2020    Procedure: Ekos catheter placement;  Surgeon: Jason Griffiths MD;  Location:  [facility] CATH INVASIVE LOCATION;  Service: Cardiovascular;  Laterality: Bilateral;   • ESOPHAGEAL DILATATION  04/26/2016 April 26, 2016--EGD performed for dysphagia reveals a distal esophageal stricture that was dilated 16.5 mm.   • ESOPHAGEAL DILATATION  07/03/2018    July 3, 2018--EGD revealed a distal esophageal stricture that was dilated to 18 mm.  There was a small hiatal hernia.  There was mild streaky erythema in the proximal stomach.  Duodenal bulb normal.   • INTERVENTIONAL RADIOLOGY PROCEDURE Bilateral 2/29/2020    Procedure: Pulmonary Angiogram;  Surgeon: Tunde  warranted at this time.  She is to return with new or worsening symptoms.  No further questions agrees with plan.  She has a ride home.    Diagnosis:    ICD-10-CM    1. Acute back pain, unspecified back location, unspecified back pain laterality  M54.9        Discharge Medications:  New Prescriptions    No medications on file       Scribe Disclosure:  I, Freda Edge, am serving as a scribe at 3:23 PM on 1/27/2022 to document services personally performed by Ivan Driver PA-C based on my observations and the provider's statements to me.        Ivan Driver PA-C  01/27/22 2797     MD Jason;  Location: [facility] INVASIVE LOCATION;  Service: Cardiovascular;  Laterality: Bilateral;     General Information     Row Name 03/16/20 0937          PT Evaluation Time/Intention    Document Type  therapy note (daily note)  -     Mode of Treatment  individual therapy;physical therapy  -     Row Name 03/16/20 0937          General Information    Existing Precautions/Restrictions  fall;oxygen therapy device and L/min  -     Barriers to Rehab  hearing deficit [place]  -     Row Name 03/16/20 0937          Cognitive Assessment/Intervention- PT/OT    Orientation Status (Cognition)  oriented x 3  -     Row Name 03/16/20 0937          Safety Issues, Functional Mobility    Impairments Affecting Function (Mobility)  balance;endurance/activity tolerance;strength;coordination  -       User Key  (r) = Recorded By, (t) = Taken By, (c) = Cosigned By    Initials Name Provider Type     Ivis Magallon PTA Physical Therapy Assistant        Mobility     Row Name 03/16/20 0937          Bed Mobility Assessment/Treatment    Supine-Sit [place] (Bed Mobility)  minimum assist (75% patient effort);2 person assist;verbal cues;nonverbal cues (demo/gesture)  -     Sit-Supine [place] (Bed Mobility)  not tested  -     Assistive Device (Bed Mobility)  bed rails;head of bed elevated  -     Row Name 03/16/20 0937          Transfer Assessment/Treatment    Comment (Transfers)  2 STS   -     Row Name 03/16/20 0937          Bed-Chair Transfer    Assistive Device (Bed-Chair Transfers)  walker, front-wheeled  -[facility] Name 03/16/20 0937          Sit-Stand Transfer    Sit-Stand [place] (Transfers)  minimum assist (75% patient effort);2 person assist;verbal cues  -     Assistive Device (Sit-Stand Transfers)  walker, front-wheeled  -     Row Name 03/16/20 0937          Gait/Stairs Assessment/Training    [place] Level (Gait)  moderate assist (50% patient effort);2 person assist  -     Assistive  Device (Gait)  walker, front-wheeled  -     Distance in Feet (Gait)  1  -     Pattern (Gait)  step-to  -     Deviations/Abnormal Patterns (Gait)  erika decreased;gait speed decreased;stride length decreased  -     Bilateral Gait Deviations  forward flexed posture;weight shift ability decreased;heel strike decreased  -     Comment (Gait/Stairs)  Pt is slowly improving with advancing LEs but still is having difficulties. Sequencing cues given.   -       User Key  (r) = Recorded By, (t) = Taken By, (c) = Cosigned By    Initials Name Provider Type    Ivis Longoria PTA Physical Therapy Assistant        Obj/Interventions     Row Name 03/16/20 0939          Therapeutic Exercise    Lower Extremity (Therapeutic Exercise)  marching while seated;LAQ (long arc quad), bilateral;quad sets, right;heel slides, bilateral  -     Lower Extremity Range of Motion (Therapeutic Exercise)  hip abduction/adduction, bilateral;ankle dorsiflexion/plantar flexion, bilateral  -     Exercise Type (Therapeutic Exercise)  AROM (active range of motion)  -     Position (Therapeutic Exercise)  seated reclined  -     Sets/Reps (Therapeutic Exercise)  2X10 informed pt to perform bilatral LE exercises several times a day to improve strengthening.   -       User Key  (r) = Recorded By, (t) = Taken By, (c) = Cosigned By    Initials Name Provider Type     Ivis Magallon PTA Physical Therapy Assistant        Goals/Plan    No documentation.       Clinical Impression     Row Name 03/16/20 0941          Plan of Care Review    Plan of Care Reviewed With  patient  -     Progress  improving  -     Outcome Summary  Pt tolerated treatment with no complaints. Pt is MinAX2 for bed mobility and STS transfers. Pt is slowly improving on advancing LEs but is still weak and having difficulty. Pt required sequencing cues during ambulation. Pt ambulated 1 foot to the chair with ModAX2 with rwx. Pt fatigues quickly and unable to ambulate  further. Pt performed several bilateral LE exercises and informed to perform these exercises throughout the day to improve strength. Will continue to progress pt as able.   -     Row Name 03/16/20 0941          Vital Signs    O2 Delivery Pre Treatment  supplemental O2  -     O2 Delivery Intra Treatment  supplemental O2  -     O2 Delivery Post Treatment  supplemental O2  -EH     Row Name 03/16/20 0941          Positioning and Restraints    Pre-Treatment Position  in bed  -EH     Post Treatment Position  chair  -     In Chair  reclined;call light within reach;encouraged to call for assist;exit alarm on;with family/caregiver  -       User Key  (r) = Recorded By, (t) = Taken By, (c) = Cosigned By    Initials Name Provider Type     Ivis Magallon PTA Physical Therapy Assistant        Outcome Measures     Row Name 03/16/20 0944          How much help from another person do you currently need...    Turning from your back to your side while in flat bed without using bedrails?  3  -EH     Moving from lying on back to sitting on the side of a flat bed without bedrails?  3  -EH     Moving to and from a bed to a chair (including a wheelchair)?  2  -EH     Standing up from a chair using your arms (e.g., wheelchair, bedside chair)?  3  -EH     Climbing 3-5 steps with a railing?  1  -EH     To walk in hospital room?  2  -EH     AM-PAC 6 Clicks Score (PT)  14  -       User Key  (r) = Recorded By, (t) = Taken By, (c) = Cosigned By    Initials Name Provider Type     Ivis Magallon PTA Physical Therapy Assistant          PT Recommendation and Plan     Plan of Care Reviewed With: patient  Progress: improving  Outcome Summary: Pt tolerated treatment with no complaints. Pt is MinAX2 for bed mobility and STS transfers. Pt is slowly improving on advancing LEs but is still weak and having difficulty. Pt required sequencing cues during ambulation. Pt ambulated 1 foot to the chair with ModAX2 with rwx. Pt fatigues quickly and  unable to ambulate further. Pt performed several bilateral LE exercises and informed to perform these exercises throughout the day to improve strength. Will continue to progress pt as able.      Time Calculation:   PT Charges     Row Name 03/16/20 0936             Time Calculation    Start Time  0830  -      Stop Time  0853  -      Time Calculation (min)  23 min  -      PT Received On  03/16/20  -      PT - Next Appointment  03/17/20  -         Time Calculation- PT    Total Timed Code Minutes- PT  23 minute(s)  -        User Key  (r) = Recorded By, (t) = Taken By, (c) = Cosigned By    Initials Name Provider Type     Ivis Magallon PTA Physical Therapy Assistant        Therapy Charges for Today     Code Description Service Date Service Provider Modifiers Qty    39435063959 HC PT THER PROC EA 15 MIN 3/16/2020 Ivis Magallon PTA GP 1    63701582411 HC PT THERAPEUTIC ACT EA 15 MIN 3/16/2020 Ivis Magallon PTA GP 1    75454638631 HC PT THER SUPP EA 15 MIN 3/16/2020 Ivis Magallon PTA GP 2          PT G-Codes  Outcome Measure Options: AM-PAC 6 Clicks Daily Activity (OT)  AM-PAC 6 Clicks Score (PT): 14  AM-PAC 6 Clicks Score (OT): 12    Ivis Magallon PTA  3/16/2020

## 2022-02-03 ENCOUNTER — TELEPHONE (OUTPATIENT)
Dept: INTERNAL MEDICINE | Facility: CLINIC | Age: 87
End: 2022-02-03

## 2022-02-03 NOTE — TELEPHONE ENCOUNTER
Caller: Chago Garcia    Relationship: Emergency Contact    Best call back number: [phone] (M)    What is the best time to reach you: ANYTIME    Who are you requesting to speak with (clinical staff, provider,  specific staff member): CLINICAL STAFF    What was the call regarding: PATIENTS WIFE CALLING IN REGARDS TO PATIENTS RIGHT UPPER ARM SWELLING WIFE STATES PATIENT HAS ARTHRITIS AND IS INQUIRING IF THAT IS WHAT IS MAKING PATIENTS ARM SWELL WIFE STATES PATIENT HAS BEEN USING DERMOPLAST SPRAY AND STATES HAS BEEN WORKING    Do you require a callback: YES

## 2022-03-23 ENCOUNTER — TELEPHONE (OUTPATIENT)
Dept: INTERNAL MEDICINE | Facility: CLINIC | Age: 87
End: 2022-03-23

## 2022-03-23 NOTE — TELEPHONE ENCOUNTER
PATIENT IS ALMOST OUT OF SAMPLES OF XARELTO 15 MG. WANTED TO KNOW IF YOU HAD MORE SAMPLES.    PLEASE ADVISE  [phone]

## 2022-03-23 NOTE — TELEPHONE ENCOUNTER
Checked for samples, currently we don't have any on hand. Will call pt to give this info later.     **HUB ok to give message in meantime.**

## 2022-04-19 ENCOUNTER — TELEPHONE (OUTPATIENT)
Dept: INTERNAL MEDICINE | Facility: CLINIC | Age: 87
End: 2022-04-19

## 2022-04-19 NOTE — TELEPHONE ENCOUNTER
Caller: Chago Garcia    Relationship: Emergency Contact    Best call back number: [phone]    What was the call regarding: PATIENT IS GOING TO [place] FOR TWO WEEKS AND DOES NOT HAVE ENOUGH XARELTO. PLEASE ADVISE IF PATIENT  SAMPLES THIS WEEK.     Do you require a callback: YES

## 2022-04-20 NOTE — TELEPHONE ENCOUNTER
Spoke to wife, advised that we didn't have any on hand but I would call her when we got samples in.

## 2022-05-19 DIAGNOSIS — I48.0 PAROXYSMAL ATRIAL FIBRILLATION: ICD-10-CM

## 2022-05-19 RX ORDER — FUROSEMIDE 40 MG/1
TABLET ORAL
Qty: 90 TABLET | Refills: 2 | Status: SHIPPED | OUTPATIENT
Start: 2022-05-19

## 2022-05-25 ENCOUNTER — LAB (OUTPATIENT)
Dept: LAB | Facility: HOSPITAL | Age: 87
End: 2022-05-25

## 2022-05-25 DIAGNOSIS — E78.2 MIXED HYPERLIPIDEMIA: Chronic | ICD-10-CM

## 2022-05-25 DIAGNOSIS — E55.9 VITAMIN D DEFICIENCY: Chronic | ICD-10-CM

## 2022-05-25 DIAGNOSIS — D75.89 MACROCYTOSIS: Chronic | ICD-10-CM

## 2022-05-25 DIAGNOSIS — Z87.39 HISTORY OF GOUT: Chronic | ICD-10-CM

## 2022-05-25 DIAGNOSIS — N40.0 BENIGN PROSTATIC HYPERPLASIA WITHOUT LOWER URINARY TRACT SYMPTOMS: Chronic | ICD-10-CM

## 2022-05-25 DIAGNOSIS — R73.01 IMPAIRED FASTING GLUCOSE: Chronic | ICD-10-CM

## 2022-05-25 DIAGNOSIS — E53.8 VITAMIN B12 DEFICIENCY: Chronic | ICD-10-CM

## 2022-05-25 LAB
ALBUMIN SERPL-MCNC: 3.6 G/DL (ref 3.5–5.2)
ALBUMIN/GLOB SERPL: 1.4 G/DL
ALP SERPL-CCNC: 73 U/L (ref 39–117)
ALT SERPL W P-5'-P-CCNC: 9 U/L (ref 1–41)
ANION GAP SERPL CALCULATED.3IONS-SCNC: 11 MMOL/L (ref 5–15)
AST SERPL-CCNC: 15 U/L (ref 1–40)
BILIRUB SERPL-MCNC: 0.7 MG/DL (ref 0–1.2)
BUN SERPL-MCNC: 15 MG/DL (ref 8–23)
BUN/CREAT SERPL: 15.5 (ref 7–25)
CALCIUM SPEC-SCNC: 9 MG/DL (ref 8.2–9.6)
CHLORIDE SERPL-SCNC: 105 MMOL/L (ref 98–107)
CK SERPL-CCNC: 19 U/L (ref 20–200)
CO2 SERPL-SCNC: 25 MMOL/L (ref 22–29)
CREAT SERPL-MCNC: 0.97 MG/DL (ref 0.76–1.27)
DEPRECATED RDW RBC AUTO: 39.1 FL (ref 37–54)
EGFRCR SERPLBLD CKD-EPI 2021: 74.2 ML/MIN/1.73
ERYTHROCYTE [DISTWIDTH] IN BLOOD BY AUTOMATED COUNT: 13 % (ref 12.3–15.4)
GLOBULIN UR ELPH-MCNC: 2.6 GM/DL
GLUCOSE SERPL-MCNC: 150 MG/DL (ref 65–99)
HBA1C MFR BLD: 5.7 % (ref 4.8–5.6)
HCT VFR BLD AUTO: 43.2 % (ref 37.5–51)
HGB BLD-MCNC: 14.1 G/DL (ref 13–17.7)
MCH RBC QN AUTO: 27.8 PG (ref 26.6–33)
MCHC RBC AUTO-ENTMCNC: 32.6 G/DL (ref 31.5–35.7)
MCV RBC AUTO: 85.2 FL (ref 79–97)
PLATELET # BLD AUTO: 284 10*3/MM3 (ref 140–450)
PMV BLD AUTO: 10.8 FL (ref 6–12)
POTASSIUM SERPL-SCNC: 3.6 MMOL/L (ref 3.5–5.2)
PROT SERPL-MCNC: 6.2 G/DL (ref 6–8.5)
PSA SERPL-MCNC: 1.32 NG/ML (ref 0–4)
RBC # BLD AUTO: 5.07 10*6/MM3 (ref 4.14–5.8)
SODIUM SERPL-SCNC: 141 MMOL/L (ref 136–145)
T3FREE SERPL-MCNC: 2.51 PG/ML (ref 2–4.4)
T4 FREE SERPL-MCNC: 1.57 NG/DL (ref 0.93–1.7)
TSH SERPL DL<=0.05 MIU/L-ACNC: 1.11 UIU/ML (ref 0.27–4.2)
URATE SERPL-MCNC: 4.3 MG/DL (ref 3.4–7)
WBC NRBC COR # BLD: 10 10*3/MM3 (ref 3.4–10.8)

## 2022-05-25 PROCEDURE — 83918 ORGANIC ACIDS TOTAL QUANT: CPT

## 2022-05-25 PROCEDURE — 82136 AMINO ACIDS QUANT 2-5: CPT

## 2022-05-25 PROCEDURE — 80053 COMPREHEN METABOLIC PANEL: CPT

## 2022-05-25 PROCEDURE — 36415 COLL VENOUS BLD VENIPUNCTURE: CPT

## 2022-05-25 PROCEDURE — 84481 FREE ASSAY (FT-3): CPT

## 2022-05-25 PROCEDURE — 82550 ASSAY OF CK (CPK): CPT

## 2022-05-25 PROCEDURE — 83704 LIPOPROTEIN BLD QUAN PART: CPT

## 2022-05-25 PROCEDURE — 85027 COMPLETE CBC AUTOMATED: CPT

## 2022-05-25 PROCEDURE — 84550 ASSAY OF BLOOD/URIC ACID: CPT

## 2022-05-25 PROCEDURE — 83036 HEMOGLOBIN GLYCOSYLATED A1C: CPT

## 2022-05-25 PROCEDURE — 80061 LIPID PANEL: CPT

## 2022-05-25 PROCEDURE — 84153 ASSAY OF PSA TOTAL: CPT

## 2022-05-25 PROCEDURE — 84439 ASSAY OF FREE THYROXINE: CPT

## 2022-05-25 PROCEDURE — 84443 ASSAY THYROID STIM HORMONE: CPT

## 2022-05-27 LAB
CHOLEST SERPL-MCNC: 139 MG/DL (ref 100–199)
HDL SERPL-SCNC: 17 UMOL/L
HDLC SERPL-MCNC: 27 MG/DL
LDL SERPL QN: 20.5 NM
LDL SERPL-SCNC: 915 NMOL/L
LDL SMALL SERPL-SCNC: 426 NMOL/L
LDLC SERPL CALC-MCNC: 91 MG/DL (ref 0–99)
TRIGL SERPL-MCNC: 112 MG/DL (ref 0–149)

## 2022-06-03 ENCOUNTER — OFFICE VISIT (OUTPATIENT)
Dept: INTERNAL MEDICINE | Facility: CLINIC | Age: 87
End: 2022-06-03

## 2022-06-03 VITALS
SYSTOLIC BLOOD PRESSURE: 126 MMHG | WEIGHT: 190.4 LBS | HEART RATE: 53 BPM | DIASTOLIC BLOOD PRESSURE: 72 MMHG | RESPIRATION RATE: 18 BRPM | BODY MASS INDEX: 25.79 KG/M2 | HEIGHT: 72 IN | OXYGEN SATURATION: 96 %

## 2022-06-03 DIAGNOSIS — Z23 NEED FOR PNEUMOCOCCAL VACCINATION: ICD-10-CM

## 2022-06-03 DIAGNOSIS — Z79.01 CHRONIC ANTICOAGULATION: Chronic | ICD-10-CM

## 2022-06-03 DIAGNOSIS — Z51.81 THERAPEUTIC DRUG MONITORING: ICD-10-CM

## 2022-06-03 DIAGNOSIS — Z00.00 ENCOUNTER FOR SUBSEQUENT ANNUAL WELLNESS VISIT (AWV) IN MEDICARE PATIENT: Primary | ICD-10-CM

## 2022-06-03 DIAGNOSIS — I48.0 PAROXYSMAL ATRIAL FIBRILLATION: Chronic | ICD-10-CM

## 2022-06-03 DIAGNOSIS — R60.0 BILATERAL LOWER EXTREMITY EDEMA: Chronic | ICD-10-CM

## 2022-06-03 DIAGNOSIS — Z86.73 HISTORY OF STROKE: Chronic | ICD-10-CM

## 2022-06-03 DIAGNOSIS — Z91.81 AT RISK FOR FALLS: Chronic | ICD-10-CM

## 2022-06-03 DIAGNOSIS — E78.2 MIXED HYPERLIPIDEMIA: Chronic | ICD-10-CM

## 2022-06-03 DIAGNOSIS — E53.8 VITAMIN B12 DEFICIENCY: Chronic | ICD-10-CM

## 2022-06-03 DIAGNOSIS — R73.01 IMPAIRED FASTING GLUCOSE: Chronic | ICD-10-CM

## 2022-06-03 DIAGNOSIS — Z87.39 HISTORY OF GOUT: Chronic | ICD-10-CM

## 2022-06-03 DIAGNOSIS — Z78.9 STATIN INTOLERANCE: Chronic | ICD-10-CM

## 2022-06-03 DIAGNOSIS — Z86.711 HISTORY OF PULMONARY EMBOLUS (PE): Chronic | ICD-10-CM

## 2022-06-03 DIAGNOSIS — E55.9 VITAMIN D DEFICIENCY: Chronic | ICD-10-CM

## 2022-06-03 DIAGNOSIS — D75.89 MACROCYTOSIS: Chronic | ICD-10-CM

## 2022-06-03 DIAGNOSIS — N40.0 BENIGN PROSTATIC HYPERPLASIA WITHOUT LOWER URINARY TRACT SYMPTOMS: Chronic | ICD-10-CM

## 2022-06-03 DIAGNOSIS — G20 PARKINSON'S DISEASE: Chronic | ICD-10-CM

## 2022-06-03 DIAGNOSIS — I10 BENIGN ESSENTIAL HYPERTENSION: Chronic | ICD-10-CM

## 2022-06-03 PROCEDURE — 1159F MED LIST DOCD IN RCRD: CPT | Performed by: INTERNAL MEDICINE

## 2022-06-03 PROCEDURE — G0009 ADMIN PNEUMOCOCCAL VACCINE: HCPCS | Performed by: INTERNAL MEDICINE

## 2022-06-03 PROCEDURE — 90677 PCV20 VACCINE IM: CPT | Performed by: INTERNAL MEDICINE

## 2022-06-03 PROCEDURE — G0439 PPPS, SUBSEQ VISIT: HCPCS | Performed by: INTERNAL MEDICINE

## 2022-06-03 PROCEDURE — 1126F AMNT PAIN NOTED NONE PRSNT: CPT | Performed by: INTERNAL MEDICINE

## 2022-06-03 PROCEDURE — 96160 PT-FOCUSED HLTH RISK ASSMT: CPT | Performed by: INTERNAL MEDICINE

## 2022-06-03 PROCEDURE — 1170F FXNL STATUS ASSESSED: CPT | Performed by: INTERNAL MEDICINE

## 2022-06-03 NOTE — PROGRESS NOTES
The ABCs of the Annual Wellness Visit  Subsequent Medicare Wellness Visit    No chief complaint on file.     Subjective    History of Present Illness:  Izaiah Garcia is a 89 y.o. male who presents for a Subsequent Medicare Wellness Visit.    The following portions of the patient's history were reviewed and   updated as appropriate: allergies, current medications, past family history, past medical history, past social history, past surgical history and problem list.    Compared to one year ago, the patient feels his physical   health is worse.    Compared to one year ago, the patient feels his mental   health is the same.    Recent Hospitalizations:  He was not admitted to the hospital during the last year.       Current Medical Providers:  Patient Care Team:  Uriah Godinez MD as PCP - General (Internal Medicine)    Outpatient Medications Prior to Visit   Medication Sig Dispense Refill   • allopurinol (ZYLOPRIM) 300 MG tablet TAKE 1 TABLET EVERY DAY  FOR  GOUT 90 tablet 3   • carbidopa-levodopa (SINEMET)  MG per tablet      • Cholecalciferol (vitamin D3) 125 MCG (5000 UT) capsule capsule 1 by mouth daily as directed 30 capsule    • Cyanocobalamin 1000 MCG sublingual tablet Dissolve 1-2 under tongue daily for low vitamin B12     • fluorometholone (FML) 0.1 % ophthalmic suspension      • furosemide (LASIX) 40 MG tablet TAKE 1 TABLET EVERY DAY 90 tablet 2   • metoprolol tartrate (LOPRESSOR) 25 MG tablet TAKE 1 TABLET BY MOUTH EVERY 12 (TWELVE) HOURS. 180 tablet 3   • nystatin-triamcinolone (MYCOLOG II) 830614-6.1 UNIT/GM-% cream Apply to affected area 2-3 times daily as needed 30 g 1   • rivaroxaban (XARELTO) 15 MG tablet Take 1 p.o. daily for blood thinner.  Take at same time every day. 90 tablet 3   • saccharomyces boulardii (FLORASTOR) 250 MG capsule Take 1 capsule by mouth 2 (Two) Times a Day. 180 capsule 0     No facility-administered medications prior to visit.       No opioid medication identified on  "active medication list. I have reviewed chart for other potential  high risk medication/s and harmful drug interactions in the elderly.          Aspirin is not on active medication list.  Aspirin use is contraindicated for this patient due to: current use of Xarelto.  .    Patient Active Problem List   Diagnosis   • Hyperlipidemia   • Benign essential hypertension   • History of gout   • History of esophageal stricture   • History of stroke, 6/29/2016--4.9 x 4 x 3 cm inferior left cerebellar infarct   • Rheumatoid factor positive   • Impaired fasting glucose   • At risk for falls   • Bilateral high frequency sensorineural hearing loss, wears hearing aids   • Bilateral lower extremity edema   • Parkinson's disease (HCC)   • Therapeutic drug monitoring   • Vitamin D deficiency   • Macrocytosis   • Vitamin B12 deficiency   • History of pulmonary embolus (PE)   • Chronic anticoagulation, Eliquis.  Saddle pulmonary embolism   • Paroxysmal atrial fibrillation (HCC)   • Benign prostatic hyperplasia without lower urinary tract symptoms   • Muscle cramps   • Statin intolerance, muscle pain and cramps     Advance Care Planning  Advance Directive is not on file.  ACP discussion was held with the patient during this visit. Patient does not have an advance directive, information provided.    Review of Systems   HENT: Negative.    Eyes: Negative.    Respiratory: Negative.    Cardiovascular: Negative.    Gastrointestinal: Negative.    Endocrine: Negative.    Genitourinary: Negative.    Musculoskeletal: Positive for arthralgias.   Skin: Negative.    Allergic/Immunologic: Negative.    Neurological: Positive for tremors and weakness.        Ataxic gait   Hematological: Negative.    Psychiatric/Behavioral: Negative.         Objective    Vitals:    06/03/22 1003   BP: 126/72   Pulse: 53   Resp: 18   SpO2: 96%   Weight: 86.4 kg (190 lb 6.4 oz)   Height: 182.9 cm (72\")   PainSc: 0-No pain     Body mass index is 25.82 kg/m².  BMI is >= 25 " and < 30. (Overweight) The following options were offered after discussion: none (medical contraindication)    Does the patient have evidence of cognitive impairment? No    Physical Exam     General: Alert and oriented x 3.  No acute distress.  Normal affect.  HEENT: Pupils equal, round, reactive to light; extraocular movements intact; sclerae nonicteric; pharynx, ear canals and TMs normal.  Neck: Without JVD, thyromegaly, bruit, or adenopathy.  Lungs: Clear to auscultation in all fields.  Heart: Irregularly irregular rate and rhythm without murmur, rub, gallop, or click.  Abdomen: Soft, nontender, without hepatosplenomegaly or hernia.  Bowel sounds normal.  : Deferred.  Rectal: Deferred.  Extremities: Without clubbing, cyanosis,  or pulse deficit.  There is 1-2+ bilateral lower extremity edema below the knees without signs of cellulitis.  Neurologic: Intact without focal deficit.  Patient ambulates with the assistance of a cane and sometimes a wheeled walker and has an obvious ataxic gait.  Hand tremor noted intermittently.  Skin: Without significant lesion.  Musculoskeletal: Unremarkable.      Lab Results   Component Value Date    CHLPL 139 05/25/2022    TRIG 112 05/25/2022    HGBA1C 5.70 (H) 05/25/2022            HEALTH RISK ASSESSMENT    Smoking Status:  Social History     Tobacco Use   Smoking Status Never Smoker   Smokeless Tobacco Never Used     Alcohol Consumption:  Social History     Substance and Sexual Activity   Alcohol Use Never    Comment: occ     Fall Risk Screen:    [facility], and patient is at HIGH risk for falls. Assessment completed on:6/3/2022    Depression Screening:  PHQ-2/PHQ-9 Depression Screening 6/3/2022   Retired PHQ-9 Total Score -   Retired Total Score -   Little Interest or Pleasure in Doing Things 0-->not at all   Feeling Down, Depressed or Hopeless 0-->not at all   Trouble Falling or Staying Asleep, or Sleeping Too Much 0-->not at all   Feeling Tired  or Having Little Energy 0-->not at all   Poor Appetite or Overeating 0-->not at all   Feeling Bad about Yourself - or that You are a Failure or Have Let Yourself or Your Family Down 0-->not at all   Trouble Concentrating on Things, Such as Reading the Newspaper or Watching Television 0-->not at all   Moving or Speaking So Slowly that Other People Could Have Noticed? Or the Opposite - Being So Fidgety 0-->not at all   Thoughts that You Would be Better Off Dead or of Hurting Yourself in Some Way 0-->not at all   PHQ-9: Brief Depression Severity Measure Score 0   If You Checked Off Any Problems, How Difficult Have These Problems Made It For You to Do Your Work, Take Care of Things at Home, or Get Along with Other People? not difficult at all       Health Habits and Functional and Cognitive Screening:  Functional & Cognitive Status 6/3/2022   Do you have difficulty preparing food and eating? No   Do you have difficulty bathing yourself, getting dressed or grooming yourself? No   Do you have difficulty using the toilet? Yes   Do you have difficulty moving around from place to place? Yes   Do you have trouble with steps or getting out of a bed or a chair? Yes   Current Diet -   Dental Exam Up to date   Eye Exam Up to date   Exercise (times per week) -   Current Exercise Activities Include -   Do you need help using the phone?  No   Are you deaf or do you have serious difficulty hearing?  Yes   Do you need help with transportation? Yes   Do you need help shopping? No   Do you need help preparing meals?  No   Do you need help with housework?  No   Do you need help with laundry? No   Do you need help taking your medications? No   Do you need help managing money? No   Do you ever drive or ride in a car without wearing a seat belt? No   Have you felt unusual stress, anger or loneliness in the last month? No   Who do you live with? Spouse   If you need help, do you have trouble finding someone available to you? No   Have you  been bothered in the last four weeks by sexual problems? -   Do you have difficulty concentrating, remembering or making decisions? No       Age-appropriate Screening Schedule:  Refer to the list below for future screening recommendations based on patient's age, sex and/or medical conditions. Orders for these recommended tests are listed in the plan section. The patient has been provided with a written plan.    Health Maintenance   Topic Date Due   • INFLUENZA VACCINE  08/01/2022   • LIPID PANEL  05/25/2023   • TDAP/TD VACCINES  Discontinued   • ZOSTER VACCINE  Discontinued              Assessment & Plan   CMS Preventative Services Quick Reference  Risk Factors Identified During Encounter  Cardiovascular Disease  Chronic Pain   Fall Risk-High or Moderate  Immunizations Discussed/Encouraged (specific Immunizations; Prevnar 20 (Pneumococcal 20-valent conjugate)  Inactivity/Sedentary  The above risks/problems have been discussed with the patient.  Follow up actions/plans if indicated are seen below in the Assessment/Plan Section.  Pertinent information has been shared with the patient in the After Visit Summary.    Diagnoses and all orders for this visit:    1. Encounter for subsequent annual wellness visit (AWV) in Medicare patient (Primary)    2. Impaired fasting glucose  -     Comprehensive Metabolic Panel; Future  -     Hemoglobin A1c; Future    3. Hyperlipidemia  -     Comprehensive Metabolic Panel; Future  -     NMR LipoProfile; Future  -     TSH; Future  -     T4, Free; Future  -     T3, Free; Future    4. Benign essential hypertension    5. History of gout  -     Uric Acid; Future    6. History of stroke, 6/29/2016--4.9 x 4 x 3 cm inferior left cerebellar infarct    7. Bilateral lower extremity edema    8. Parkinson's disease (HCC)    9. At risk for falls    10. Vitamin D deficiency  -     Vitamin D 25 Hydroxy; Future    11. Macrocytosis  -     CBC (No Diff); Future    12. Vitamin B12 deficiency  -     CBC (No  Diff); Future    13. History of pulmonary embolus (PE)    14. Chronic anticoagulation, Eliquis.  Saddle pulmonary embolism    15. Paroxysmal atrial fibrillation (HCC)    16. Benign prostatic hyperplasia without lower urinary tract symptoms    17. Statin intolerance, muscle pain and cramps    18. Need for pneumococcal vaccination  -     Pneumococcal Conjugate Vaccine 20-Valent (PCV20)    19. Therapeutic drug monitoring        Follow Up:   Return in about 6 months (around 12/3/2022) for Next scheduled follow up with lab prior.     An After Visit Summary and PPPS were made available to the patient.

## 2022-06-03 NOTE — PROGRESS NOTES
06/03/2022    Patient Information  Izaiah Garcia                                                                                          [id number]  [facility] [address]      8/31/1931  [unfilled]  There is no work phone number on file.    Chief Complaint:     Medicare wellness visit.  Follow-up lab work in order to monitor chronic medical issues listed in history of present illness.  No new acute complaints.    History of Present Illness:    Patient with multiple medical problems including impaired fasting glucose, hyperlipidemia, hypertension, history of gout, history of stroke, lower extremity edema, Parkinson's disease, high risk for falls, vitamin D deficiency, macrocytosis, vitamin B12 deficiency, history of pulmonary embolism, chronic anticoagulation with Eliquis, paroxysmal atrial fibrillation, asymptomatic BPH, statin intolerance due to muscle pain and cramps.  He presents today for his subsequent Medicare wellness visit.  Patient also had lab work in order to monitor his chronic medical issues.  His past medical history reviewed and updated were necessary including health maintenance parameters.  This reveals he will be up-to-date or else accounted for after today's visit.    Review of Systems   HENT: Negative.    Eyes: Negative.    Cardiovascular: Negative.    Respiratory: Negative.    Endocrine: Negative.    Hematologic/Lymphatic: Negative.    Skin: Negative.    Musculoskeletal: Positive for arthritis.   Gastrointestinal: Negative.    Genitourinary: Negative.    Neurological: Positive for disturbances in coordination, loss of balance and weakness. Negative for brief paralysis.   Psychiatric/Behavioral: Negative.    Allergic/Immunologic: Negative.        Active Problems:    Patient Active Problem List   Diagnosis   • Hyperlipidemia   • Benign essential hypertension   • History of gout   • History of esophageal stricture   • History of stroke, 6/29/2016--4.9 x 4 x 3 cm inferior left  cerebellar infarct   • Rheumatoid factor positive   • Impaired fasting glucose   • At risk for falls   • Bilateral high frequency sensorineural hearing loss, wears hearing aids   • Bilateral lower extremity edema   • Parkinson's disease (HCC)   • Therapeutic drug monitoring   • Vitamin D deficiency   • Macrocytosis   • Vitamin B12 deficiency   • History of pulmonary embolus (PE)   • Chronic anticoagulation, Eliquis.  Saddle pulmonary embolism   • Paroxysmal atrial fibrillation (HCC)   • Benign prostatic hyperplasia without lower urinary tract symptoms   • Muscle cramps   • Statin intolerance, muscle pain and cramps         Past Medical History:   Diagnosis Date   • Acute saddle pulmonary embolism with acute cor pulmonale (HCC) 2/29/2020    Admit date: 2/29/2020 Discharge date:3/20/2020 Discharged Condition: good   Discharge Diagnoses:   Acute saddle pulmonary embolism with acute cor pulmonale (CMS/HCC)   Pulmonary emboli (CMS/HCC)   Empyema of pleura (CMS/HCC)  Acute hypoxic respiratory failure BPE s/p ekos catheter on 2/29/2020 with local TPA infusion with good clinical response Troponemia suspect due to BPE RV strain Bilateral ple   • At risk for falls 5/2/2019   • Benign essential hypertension 6/29/2016   • Benign prostatic hyperplasia without lower urinary tract symptoms 12/3/2020   • Bilateral high frequency sensorineural hearing loss, wears hearing aids 5/2/2019   • Bilateral lower extremity edema 5/2/2019    May 2, 2019--patient who has hypertension and is on amlodipine 10 mg/day is complaining of progressively worsening swelling of the lower extremities that extends up to about mid calf.  Gets worse at the end of the day and tends to get better overnight when he props his feet up.  I think this is definitely related to the amlodipine although patient may have some venous insufficiency.  Medication ad   • Chronic anticoagulation, Eliquis.  Saddle pulmonary embolism 5/27/2020   • Decreased peripheral vision of  "both eyes 5/2/2019    May 2, 2019--continues to have complaints of \"dizziness\" associated with weakness in his lower extremities.  He is not describing a spinning sensation or anything remotely close to vertigo.  Instead he is describing an off-balance sensation.  He tends to fall forward if not careful and he tends to list towards the right side.  He has marked difficulty going down an incline.  He has to ambulate wit   • History of aspiration pneumonia 5/4/2015   • History of esophageal stricture 5/4/2015    July 3, 2018--EGD revealed a distal esophageal stricture that was dilated to 18 mm.  There was a small hiatal hernia.  There was mild streaky erythema in the proximal stomach.  Duodenal bulb normal.  April 26, 2016--EGD performed for dysphagia reveals a distal esophageal stricture that was dilated 16.5 mm.   • History of gout 6/29/2016   • History of pulmonary embolus (PE) 2/29/2020    Admit date: 2/29/2020 Discharge date:3/20/2020 Discharged Condition: good   Discharge Diagnoses:   Acute saddle pulmonary embolism with acute cor pulmonale (CMS/HCC)   Pulmonary emboli (CMS/HCC)   Empyema of pleura (CMS/HCC)  Acute hypoxic respiratory failure BPE s/p ekos catheter on 2/29/2020 with local TPA infusion with good clinical response Troponemia suspect due to BPE RV strain Bilateral ple   • History of stroke, 6/29/2016--4.9 x 4 x 3 cm inferior left cerebellar infarct 5/4/2015 June 29, 2016--MRI of the brain performed for complaints of dizziness and weakness. IMPRESSION: 1. There is susceptibility artifact streaking through the anterior left frontal scalp through the anterior left frontal bone in the anterior tipof the left frontal lobe likely from a tiny piece of metal in the anterior left frontal scalp slightly limits evaluation of these structures. 2. There is mild s   • Hyperlipidemia 6/29/2016   • Impaired fasting glucose 5/2/2019 April 14, 2015--hemoglobin A1c 5.9.   • Macrocytosis 5/2/2019   • Parkinson's " "disease ([facility]) 5/2/2019    May 2, 2019--continues to have complaints of \"dizziness\" associated with weakness in his lower extremities.  He is not describing a spinning sensation or anything remotely close to vertigo.  Instead he is describing an off-balance sensation.  He tends to fall forward if not careful and he tends to list towards the right side.  He has marked difficulty going down an incline.  He has to ambulate wit   • Rheumatoid factor positive 5/2/2019 March 25, 2014--rheumatoid factor returned positive at 95.  However, CCP antibodies normal at 8, SHIV negative, both C&P ANCA negative, C-reactive protein normal at 0.24, sed rate normal at 2.   • Statin intolerance, muscle pain and cramps 12/2/2021   • Vitamin B12 deficiency 6/6/2019 June 6, 2019--patient recently had mildly elevated methylmalonic acid of 380.  Repeat methylmalonic acid was upper limit of normal at 356.  Given patient's neurologic symptoms and suspected parkinsonism, I have a low threshold for treating vitamin B12 deficiency.  We will initiate vitamin B12 injections today.  2000 mcg subcutaneously given today.   • Vitamin D deficiency 5/2/2019         Past Surgical History:   Procedure Laterality Date   • CARDIAC CATHETERIZATION N/A 2/29/2020    Procedure: Thrombolytic Therapy- EKOS;  Surgeon: Jason Griffiths MD;  Location: [facility] INVASIVE LOCATION;  Service: Cardiovascular;  Laterality: N/A;   • CATARACT EXTRACTION EXTRACAPSULAR W/ INTRAOCULAR LENS IMPLANTATION Bilateral     Bilateral cataract extirpation with intraocular lens implantation   • CHOLECYSTECTOMY     • EKOS CATHETER PLACEMENT Bilateral 2/29/2020    Procedure: Ekos catheter placement;  Surgeon: Jason Griffiths MD;  Location: [facility] CATH INVASIVE LOCATION;  Service: Cardiovascular;  Laterality: Bilateral;   • ESOPHAGEAL DILATATION  04/26/2016 April 26, 2016--EGD performed for dysphagia reveals a distal esophageal stricture that was dilated 16.5 mm.   • ESOPHAGEAL " DILATATION  07/03/2018    July 3, 2018--EGD revealed a distal esophageal stricture that was dilated to 18 mm.  There was a small hiatal hernia.  There was mild streaky erythema in the proximal stomach.  Duodenal bulb normal.   • INTERVENTIONAL RADIOLOGY PROCEDURE Bilateral 2/29/2020    Procedure: Pulmonary Angiogram;  Surgeon: Jason Griffiths MD;  Location: [facility] INVASIVE LOC[facility];  Service: Cardiovascular;  Laterality: Bilateral;         No Known Allergies        Current Outpatient Medications:   •  allopurinol (ZYLOPRIM) 300 MG tablet, TAKE 1 TABLET EVERY DAY  FOR  GOUT, Disp: 90 tablet, Rfl: 3  •  carbidopa-levodopa (SINEMET)  MG per tablet, , Disp: , Rfl:   •  Cholecalciferol (vitamin D3) 125 MCG (5000 UT) capsule capsule, 1 by mouth daily as directed, Disp: 30 capsule, Rfl:   •  Cyanocobalamin 1000 MCG sublingual tablet, Dissolve 1-2 under tongue daily for low vitamin B12, Disp: , Rfl:   •  fluorometholone (FML) 0.1 % ophthalmic suspension, , Disp: , Rfl:   •  furosemide (LASIX) 40 MG tablet, TAKE 1 TABLET EVERY DAY, Disp: 90 tablet, Rfl: 2  •  metoprolol tartrate (LOPRESSOR) 25 MG tablet, TAKE 1 TABLET BY MOUTH EVERY 12 (TWELVE) HOURS., Disp: 180 tablet, Rfl: 3  •  nystatin-triamcinolone (MYCOLOG II) 356594-2.1 UNIT/GM-% cream, Apply to affected area 2-3 times daily as needed, Disp: 30 g, Rfl: 1  •  rivaroxaban (XARELTO) 15 MG tablet, Take 1 p.o. daily for blood thinner.  Take at same time every day., Disp: 90 tablet, Rfl: 3  •  saccharomyces boulardii (FLORASTOR) 250 MG capsule, Take 1 capsule by mouth 2 (Two) Times a Day., Disp: 180 capsule, Rfl: 0      Family History   Problem Relation Age of Onset   • No Known Problems Mother    • No Known Problems Father          Social History     Socioeconomic History   • Marital status:    • Number of children: 2   • Highest education level: 9th grade   Tobacco Use   • Smoking status: Never Smoker   • Smokeless tobacco: Never Used   Vaping Use   •  "Vaping Use: Never used   Substance and Sexual Activity   • Alcohol use: Never     Comment: occ   • Drug use: No   • Sexual activity: Not Currently     Partners: Female         Vitals:    06/03/22 1003   BP: 126/72   Pulse: 53   Resp: 18   SpO2: 96%   Weight: 86.4 kg (190 lb 6.4 oz)   Height: 182.9 cm (72\")        Body mass index is 25.82 kg/m².      Physical Exam:    General: Alert and oriented x 3.  No acute distress.  Normal affect.  HEENT: Pupils equal, round, reactive to light; extraocular movements intact; sclerae nonicteric; pharynx, ear canals and TMs normal.  Neck: Without JVD, thyromegaly, bruit, or adenopathy.  Lungs: Clear to auscultation in all fields.  Heart: Irregularly irregular rate and rhythm without murmur, rub, gallop, or click.  Abdomen: Soft, nontender, without hepatosplenomegaly or hernia.  Bowel sounds normal.  : Deferred.  Rectal: Deferred.  Extremities: Without clubbing, cyanosis,  or pulse deficit.  There is 1-2+ bilateral lower extremity edema below the knees without signs of cellulitis.  Neurologic: Intact without focal deficit.  Patient ambulates with the assistance of a cane and sometimes a wheeled walker and has an obvious ataxic gait.  Hand tremor noted intermittently.  Skin: Without significant lesion.  Musculoskeletal: Unremarkable.    Lab/other results:    CBC is normal.  CPK is low at 19.  CMP normal except glucose 150.  Hemoglobin A1c 5.7.  NMR reveals a total cholesterol 139, triglycerides 112, LDL particle #915, HDL particle number low at 17.0.  Thyroid function tests are normal.  PSA normal at 1.32.  Megaloblastic anemia panel is pending.  Uric acid is normal at 4.3.    Assessment/Plan:     Diagnosis Plan   1. Encounter for subsequent annual wellness visit (AWV) in Medicare patient     2. Impaired fasting glucose  Comprehensive Metabolic Panel    Hemoglobin A1c   3. Hyperlipidemia  Comprehensive Metabolic Panel    NMR LipoProfile    TSH    T4, Free    T3, Free   4. Benign " essential hypertension     5. History of gout  Uric Acid   6. History of stroke, 6/29/2016--4.9 x 4 x 3 cm inferior left cerebellar infarct     7. Bilateral lower extremity edema     8. Parkinson's disease (HCC)     9. At risk for falls     10. Vitamin D deficiency  Vitamin D 25 Hydroxy   11. Macrocytosis  CBC (No Diff)   12. Vitamin B12 deficiency  CBC (No Diff)   13. History of pulmonary embolus (PE)     14. Chronic anticoagulation, Eliquis.  Saddle pulmonary embolism     15. Paroxysmal atrial fibrillation (HCC)     16. Benign prostatic hyperplasia without lower urinary tract symptoms     17. Statin intolerance, muscle pain and cramps     18. Need for pneumococcal vaccination  Pneumococcal Conjugate Vaccine 20-Valent (PCV20)   19. Therapeutic drug monitoring       The subsequent Medicare wellness visit is documented on separate note.    Patient has mild impaired fasting glucose that does not require medication.  I have asked patient to cut back on his carbohydrates.  Hyperlipidemia is under as good of control as we can obtain given the fact that he is statin intolerant.  Blood pressure appears to be controlled.  Gout is stable on allopurinol.  His lower extremity edema seems to be adequately controlled with furosemide.  His vitamin D is normal with supplementation.  Macrocytosis is related to vitamin B12 deficiency and patient is taking sublingual vitamin B-12.  Methylmalonic acid as well as homocysteine are pending.  He has paroxysmal atrial fibrillation and history of a saddle pulmonary embolus and is on chronic anticoagulation with Xarelto 15 mg/day.  He is tolerating it well without signs or symptoms of bleeding.  BPH is asymptomatic.    Plan is as follows: Prevnar 20 given.  No changes in current medical regimen.  Patient will follow-up in 6 months with lab prior or follow-up as needed.      Procedures

## 2022-06-08 LAB
2ME-CITRATE SERPL-SCNC: 213 NMOL/L (ref 60–228)
CYSTATHIONIN SERPL-SCNC: 170 NMOL/L (ref 44–342)
HCYS SERPL-SCNC: 10.8 UMOL/L (ref 5.1–13.9)
Lab: NORMAL
METHYLMALONATE SERPL-SCNC: 246 NMOL/L (ref 0–378)

## 2022-06-10 ENCOUNTER — TELEPHONE (OUTPATIENT)
Dept: INTERNAL MEDICINE | Facility: CLINIC | Age: 87
End: 2022-06-10

## 2022-06-10 DIAGNOSIS — Z86.711 HISTORY OF PULMONARY EMBOLUS (PE): Chronic | ICD-10-CM

## 2022-06-13 ENCOUNTER — TELEPHONE (OUTPATIENT)
Dept: INTERNAL MEDICINE | Facility: CLINIC | Age: 87
End: 2022-06-13

## 2022-06-13 NOTE — TELEPHONE ENCOUNTER
Caller: Chago Garcia    Relationship to patient: Emergency Contact    Best call back number: [phone]    Patient is needing: PATIENTS WIFE IS CALLING IN TO SEE IF THERE ARE ANY SAMPLES OF XARELTO AND IF NOT WHAT SHOULD THE PATIENT DO. PLEASE ADVISE.

## 2022-08-01 ENCOUNTER — OFFICE VISIT (OUTPATIENT)
Dept: INTERNAL MEDICINE | Facility: CLINIC | Age: 87
End: 2022-08-01

## 2022-08-01 ENCOUNTER — LAB (OUTPATIENT)
Dept: LAB | Facility: HOSPITAL | Age: 87
End: 2022-08-01

## 2022-08-01 VITALS
WEIGHT: 189.4 LBS | DIASTOLIC BLOOD PRESSURE: 64 MMHG | HEART RATE: 46 BPM | OXYGEN SATURATION: 96 % | HEIGHT: 72 IN | BODY MASS INDEX: 25.65 KG/M2 | SYSTOLIC BLOOD PRESSURE: 118 MMHG

## 2022-08-01 DIAGNOSIS — Z51.81 THERAPEUTIC DRUG MONITORING: ICD-10-CM

## 2022-08-01 DIAGNOSIS — I10 BENIGN ESSENTIAL HYPERTENSION: Chronic | ICD-10-CM

## 2022-08-01 DIAGNOSIS — Z79.01 CHRONIC ANTICOAGULATION: ICD-10-CM

## 2022-08-01 DIAGNOSIS — B35.4 TINEA CORPORIS: Primary | ICD-10-CM

## 2022-08-01 DIAGNOSIS — R23.3 PETECHIAE OR ECCHYMOSES: ICD-10-CM

## 2022-08-01 DIAGNOSIS — R60.0 BILATERAL LOWER EXTREMITY EDEMA: Chronic | ICD-10-CM

## 2022-08-01 DIAGNOSIS — I87.2 CHRONIC VENOUS INSUFFICIENCY: ICD-10-CM

## 2022-08-01 PROBLEM — R25.2 MUSCLE CRAMPS: Status: RESOLVED | Noted: 2021-11-23 | Resolved: 2022-08-01

## 2022-08-01 PROBLEM — L03.115 CELLULITIS OF BOTH LOWER EXTREMITIES: Status: ACTIVE | Noted: 2022-08-01

## 2022-08-01 PROBLEM — L03.116 CELLULITIS OF BOTH LOWER EXTREMITIES: Status: ACTIVE | Noted: 2022-08-01

## 2022-08-01 LAB
DEPRECATED RDW RBC AUTO: 39.8 FL (ref 37–54)
ERYTHROCYTE [DISTWIDTH] IN BLOOD BY AUTOMATED COUNT: 13 % (ref 12.3–15.4)
HCT VFR BLD AUTO: 45 % (ref 37.5–51)
HGB BLD-MCNC: 14.8 G/DL (ref 13–17.7)
MCH RBC QN AUTO: 28.2 PG (ref 26.6–33)
MCHC RBC AUTO-ENTMCNC: 32.9 G/DL (ref 31.5–35.7)
MCV RBC AUTO: 85.7 FL (ref 79–97)
PLATELET # BLD AUTO: 240 10*3/MM3 (ref 140–450)
PMV BLD AUTO: 11.8 FL (ref 6–12)
RBC # BLD AUTO: 5.25 10*6/MM3 (ref 4.14–5.8)
WBC NRBC COR # BLD: 10.06 10*3/MM3 (ref 3.4–10.8)

## 2022-08-01 PROCEDURE — 36415 COLL VENOUS BLD VENIPUNCTURE: CPT | Performed by: INTERNAL MEDICINE

## 2022-08-01 PROCEDURE — 99214 OFFICE O/P EST MOD 30 MIN: CPT | Performed by: INTERNAL MEDICINE

## 2022-08-01 PROCEDURE — 85027 COMPLETE CBC AUTOMATED: CPT | Performed by: INTERNAL MEDICINE

## 2022-08-01 RX ORDER — ESCITALOPRAM OXALATE 5 MG/1
TABLET ORAL
COMMUNITY
Start: 2022-07-17

## 2022-08-01 RX ORDER — FLUCONAZOLE 200 MG/1
TABLET ORAL
Qty: 10 TABLET | Refills: 0 | Status: SHIPPED | OUTPATIENT
Start: 2022-08-01 | End: 2022-09-23

## 2022-08-01 NOTE — PROGRESS NOTES
08/01/2022    Patient Information  Iaziah Garcia                                                                                          [id number] [facility] [address]      8/31/1931  [unfilled]  There is no work phone number on file.    Chief Complaint:     Redness, swelling of both legs.    History of Present Illness:    Patient with a history of multiple chronic medical problems including hyperlipidemia, hypertension, history of gout, esophageal reflux and history of esophageal stricture, history of remote stroke, impaired fasting glucose, parkinsonism and high risk for falls, bilateral lower extremity edema, vitamin D deficiency, macrocytosis and vitamin B12 deficiency, history of pulmonary embolism, chronic anticoagulation with Eliquis and paroxysmal atrial fibrillation.  He presents today with complaints of swelling and discoloration of his lower extremities.    The history regarding today's complaint of discoloration of the lower extremities is documented under the appropriate diagnosis in the problem list and subsequently transferred today to this note by Dr. Godinez as follows:    August 1, 2022--patient with chronic venous insufficiency and lower extremity edema presents with approximately 3-week history of development of a rash on his lower extremities.  The initial rash was just 1 spot and located on the medial aspect of the left calf.  He subsequently began to develop other lesions on both legs.  It itches a little bit on the right leg on the lateral aspect just below the right knee.  No fever, chills, or other systemic signs or symptoms.  Patient is on Xarelto 15 mg/day for pulmonary embolism and paroxysmal atrial fibrillation.  On exam today he has an obvious spot of ringworm/tinia that measures almost 2 inches in diameter and located posterior medial left calf.  He has a few other scattered areas possibly consistent with that but there are some secondary changes.  Also there  are several areas that look to be petechiae or ecchymoses.  Not sure if this is secondary or not.  Does not sound like patient has been scratching or digging at his lower extremities very much.  There is no sign of cellulitis.  He has varicose veins and obvious venous insufficiency and chronic venous stasis skin changes but I see no evidence of cellulitis.  Plan is as follows: Check CBC if platelets.  Apply Mycolog cream twice daily to the affected areas of both legs, particularly the large initial lesion.  Fluconazole 200 mg p.o. daily x10 days.  If patient's symptoms persist or if they change in any significant way then he needs to contact me.    Review of Systems   Constitutional: Negative. Negative for chills and fever.   HENT: Negative.    Eyes: Negative.    Cardiovascular: Positive for leg swelling.   Respiratory: Negative.    Endocrine: Negative.    Hematologic/Lymphatic: Negative.    Skin: Negative.         Discoloration, redness, rash on both lower extremities.   Musculoskeletal: Negative.    Gastrointestinal: Negative.    Genitourinary: Negative.    Neurological: Negative.    Psychiatric/Behavioral: Negative.    Allergic/Immunologic: Negative.        Active Problems:    Patient Active Problem List   Diagnosis   • Hyperlipidemia   • Benign essential hypertension   • History of gout   • History of esophageal stricture   • History of stroke, 6/29/2016--4.9 x 4 x 3 cm inferior left cerebellar infarct   • Rheumatoid factor positive   • Impaired fasting glucose   • At risk for falls   • Bilateral high frequency sensorineural hearing loss, wears hearing aids   • Bilateral lower extremity edema   • Parkinson's disease (HCC)   • Therapeutic drug monitoring   • Vitamin D deficiency   • Macrocytosis   • Vitamin B12 deficiency   • History of pulmonary embolus (PE)   • Chronic anticoagulation, Eliquis.  Saddle pulmonary embolism   • Paroxysmal atrial fibrillation (HCC)   • Benign prostatic hyperplasia without lower  "urinary tract symptoms   • Statin intolerance, muscle pain and cramps   • Tinea corporis   • Chronic venous insufficiency   • Petechiae or ecchymoses, both lower extremities.  Patient on Xarelto.         Past Medical History:   Diagnosis Date   • Acute saddle pulmonary embolism with acute cor pulmonale (HCC) 2/29/2020    Admit date: 2/29/2020 Discharge date:3/20/2020 Discharged Condition: good   Discharge Diagnoses:   Acute saddle pulmonary embolism with acute cor pulmonale (CMS/HCC)   Pulmonary emboli (CMS/HCC)   Empyema of pleura (CMS/HCC)  Acute hypoxic respiratory failure BPE s/p ekos catheter on 2/29/2020 with local TPA infusion with good clinical response Troponemia suspect due to BPE RV strain Bilateral ple   • At risk for falls 5/2/2019   • Benign essential hypertension 6/29/2016   • Benign prostatic hyperplasia without lower urinary tract symptoms 12/3/2020   • Bilateral high frequency sensorineural hearing loss, wears hearing aids 5/2/2019   • Bilateral lower extremity edema 5/2/2019    May 2, 2019--patient who has hypertension and is on amlodipine 10 mg/day is complaining of progressively worsening swelling of the lower extremities that extends up to about mid calf.  Gets worse at the end of the day and tends to get better overnight when he props his feet up.  I think this is definitely related to the amlodipine although patient may have some venous insufficiency.  Medication ad   • Chronic anticoagulation, Eliquis.  Saddle pulmonary embolism 5/27/2020   • Decreased peripheral vision of both eyes 5/2/2019    May 2, 2019--continues to have complaints of \"dizziness\" associated with weakness in his lower extremities.  He is not describing a spinning sensation or anything remotely close to vertigo.  Instead he is describing an off-balance sensation.  He tends to fall forward if not careful and he tends to list towards the right side.  He has marked difficulty going down an incline.  He has to ambulate wit   • " "History of aspiration pneumonia 5/4/2015   • History of esophageal stricture 5/4/2015    July 3, 2018--EGD revealed a distal esophageal stricture that was dilated to 18 mm.  There was a small hiatal hernia.  There was mild streaky erythema in the proximal stomach.  Duodenal bulb normal.  April 26, 2016--EGD performed for dysphagia reveals a distal esophageal stricture that was dilated 16.5 mm.   • History of gout 6/29/2016   • History of pulmonary embolus (PE) 2/29/2020    Admit date: 2/29/2020 Discharge date:3/20/2020 Discharged Condition: good   Discharge Diagnoses:   Acute saddle pulmonary embolism with acute cor pulmonale (CMS/HCC)   Pulmonary emboli (CMS/HCC)   Empyema of pleura (CMS/HCC)  Acute hypoxic respiratory failure BPE s/p ekos catheter on 2/29/2020 with local TPA infusion with good clinical response Troponemia suspect due to BPE RV strain Bilateral ple   • History of stroke, 6/29/2016--4.9 x 4 x 3 cm inferior left cerebellar infarct 5/4/2015 June 29, 2016--MRI of the brain performed for complaints of dizziness and weakness. IMPRESSION: 1. There is susceptibility artifact streaking through the anterior left frontal scalp through the anterior left frontal bone in the anterior tipof the left frontal lobe likely from a tiny piece of metal in the anterior left frontal scalp slightly limits evaluation of these structures. 2. There is mild s   • Hyperlipidemia 6/29/2016   • Impaired fasting glucose 5/2/2019 April 14, 2015--hemoglobin A1c 5.9.   • Macrocytosis 5/2/2019   • Parkinson's disease (HCC) 5/2/2019    May 2, 2019--continues to have complaints of \"dizziness\" associated with weakness in his lower extremities.  He is not describing a spinning sensation or anything remotely close to vertigo.  Instead he is describing an off-balance sensation.  He tends to fall forward if not careful and he tends to list towards the right side.  He has marked difficulty going down an incline.  He has to ambulate wit "   • Rheumatoid factor positive 5/2/2019 March 25, 2014--rheumatoid factor returned positive at 95.  However, CCP antibodies normal at 8, SHIV negative, both C&P ANCA negative, C-reactive protein normal at 0.24, sed rate normal at 2.   • Statin intolerance, muscle pain and cramps 12/2/2021   • Vitamin B12 deficiency 6/6/2019 June 6, 2019--patient recently had mildly elevated methylmalonic acid of 380.  Repeat methylmalonic acid was upper limit of normal at 356.  Given patient's neurologic symptoms and suspected parkinsonism, I have a low threshold for treating vitamin B12 deficiency.  We will initiate vitamin B12 injections today.  2000 mcg subcutaneously given today.   • Vitamin D deficiency 5/2/2019         Past Surgical History:   Procedure Laterality Date   • CARDIAC CATHETERIZATION N/A 2/29/2020    Procedure: Thrombolytic Therapy- EKOS;  Surgeon: Jason Griffiths MD;  Location:  [facility];  Service: Cardiovascular;  Laterality: N/A;   • CATARACT EXTRACTION EXTRACAPSULAR W/ INTRAOCULAR LENS IMPLANTATION Bilateral     Bilateral cataract extirpation with intraocular lens implantation   • CHOLECYSTECTOMY     • EKOS CATHETER PLACEMENT Bilateral 2/29/2020    Procedure: Ekos catheter placement;  Surgeon: Jason Griffiths MD;  Location:  [facility];  Service: Cardiovascular;  Laterality: Bilateral;   • ESOPHAGEAL DILATATION  04/26/2016 April 26, 2016--EGD performed for dysphagia reveals a distal esophageal stricture that was dilated 16.5 mm.   • ESOPHAGEAL DILATATION  07/03/2018    July 3, 2018--EGD revealed a distal esophageal stricture that was dilated to 18 mm.  There was a small hiatal hernia.  There was mild streaky erythema in the proximal stomach.  Duodenal bulb normal.   • INTERVENTIONAL RADIOLOGY PROCEDURE Bilateral 2/29/2020    Procedure: Pulmonary Angiogram;  Surgeon: Jason Griffiths MD;  Location:  [facility];  Service: Cardiovascular;   Laterality: Bilateral;         No Known Allergies        Current Outpatient Medications:   •  allopurinol (ZYLOPRIM) 300 MG tablet, TAKE 1 TABLET EVERY DAY  FOR  GOUT, Disp: 90 tablet, Rfl: 3  •  carbidopa-levodopa (SINEMET)  MG per tablet, , Disp: , Rfl:   •  Cholecalciferol (vitamin D3) 125 MCG (5000 UT) capsule capsule, 1 by mouth daily as directed, Disp: 30 capsule, Rfl:   •  Cyanocobalamin 1000 MCG sublingual tablet, Dissolve 1-2 under tongue daily for low vitamin B12, Disp: , Rfl:   •  escitalopram (LEXAPRO) 5 MG tablet, , Disp: , Rfl:   •  fluorometholone (FML) 0.1 % ophthalmic suspension, , Disp: , Rfl:   •  furosemide (LASIX) 40 MG tablet, TAKE 1 TABLET EVERY DAY, Disp: 90 tablet, Rfl: 2  •  metoprolol tartrate (LOPRESSOR) 25 MG tablet, TAKE 1 TABLET BY MOUTH EVERY 12 (TWELVE) HOURS., Disp: 180 tablet, Rfl: 3  •  nystatin-triamcinolone (MYCOLOG II) 968069-5.1 UNIT/GM-% cream, Apply to affected area 2-3 times daily as needed, Disp: 30 g, Rfl: 1  •  rivaroxaban (XARELTO) 15 MG tablet, Take 1 p.o. daily for blood thinner.  Take at same time every day., Disp: 90 tablet, Rfl: 3  •  saccharomyces boulardii (FLORASTOR) 250 MG capsule, Take 1 capsule by mouth 2 (Two) Times a Day., Disp: 180 capsule, Rfl: 0  •  fluconazole (DIFLUCAN) 200 MG tablet, Take 1 p.o. daily until gone, Disp: 10 tablet, Rfl: 0  •  nystatin-triamcinolone (MYCOLOG II) 590668-8.1 UNIT/GM-% cream, Apply as directed to the affected areas twice daily, Disp: 60 g, Rfl: 0      Family History   Problem Relation Age of Onset   • No Known Problems Mother    • No Known Problems Father          Social History     Socioeconomic History   • Marital status:    • Number of children: 2   • Highest education level: 9th grade   Tobacco Use   • Smoking status: Never Smoker   • Smokeless tobacco: Never Used   Vaping Use   • Vaping Use: Never used   Substance and Sexual Activity   • Alcohol use: Never     Comment: occ   • Drug use: No   • Sexual  "activity: Not Currently     Partners: Female         Vitals:    08/01/22 1528   BP: 118/64   Pulse: (!) 46   SpO2: 96%   Weight: 85.9 kg (189 lb 6.4 oz)   Height: 182.9 cm (72\")        Body mass index is 25.69 kg/m².      Physical Exam:    General: Alert and oriented x 3.  No acute distress.  Normal affect.  HEENT: Pupils equal, round, reactive to light; extraocular movements intact; sclerae nonicteric; pharynx, ear canals and TMs normal.  Neck: Without JVD, thyromegaly, bruit, or adenopathy.  Lungs: Clear to auscultation in all fields.  Heart: Regular rate and rhythm without murmur, rub, gallop, or click.  Abdomen: Soft, nontender, without hepatosplenomegaly or hernia.  Bowel sounds normal.  : Deferred.  Rectal: Deferred.  Extremities: Patient has approximately 1+ bilateral lower extremity edema below the knees, the right may be a little worse than the left. Petechiae/ecchymoses noted on both legs.  There is approximately 2 inch diameter erythematous and somewhat lacy bordered lesion left leg medially about mid calf.  There is scattered areas of possible tenea on both lower extremities but there are some secondary changes.  Neuro: Intact without focal deficit.  Hand tremor noted.  Patient is nonambulatory and is at wheelchair.  Skin: Without significant lesion.  Musculoskeletal: Unremarkable.    Lab/other results:      Assessment/Plan:     Diagnosis Plan   1. Tinea corporis  nystatin-triamcinolone (MYCOLOG II) 833623-9.1 UNIT/GM-% cream    fluconazole (DIFLUCAN) 200 MG tablet   2. Petechiae or ecchymoses  CBC (No Diff)   3. Bilateral lower extremity edema     4. Chronic venous insufficiency     5. Benign essential hypertension     6. Therapeutic drug monitoring  CBC (No Diff)   7. Chronic anticoagulation, Eliquis.  Saddle pulmonary embolism  CBC (No Diff)     August 1, 2022--patient with chronic venous insufficiency and lower extremity edema presents with approximately 3-week history of development of a rash on " his lower extremities.  The initial rash was just 1 spot and located on the medial aspect of the left calf.  He subsequently began to develop other lesions on both legs.  It itches a little bit on the right leg on the lateral aspect just below the right knee.  No fever, chills, or other systemic signs or symptoms.  Patient is on Xarelto 15 mg/day for pulmonary embolism and paroxysmal atrial fibrillation.  On exam today he has an obvious spot of ringworm/tinia that measures almost 2 inches in diameter and located posterior medial left calf.  He has a few other scattered areas possibly consistent with that but there are some secondary changes.  Also there are several areas that look to be petechiae or ecchymoses.  Not sure if this is secondary or not.  Does not sound like patient has been scratching or digging at his lower extremities very much.  There is no sign of cellulitis.  He has varicose veins and obvious venous insufficiency and chronic venous stasis skin changes but I see no evidence of cellulitis.  Patient has hypertension and his blood pressure is a little low today and is also a little bradycardic but is not having any significant symptoms and we will observe for now.    Plan is as follows: Apply Mycolog cream twice daily to the affected areas of both legs, particularly the large initial lesion.  Fluconazole 200 mg p.o. daily x10 days.  If patient's symptoms persist or if they change in any significant way then he needs to contact me.  I will send patient over to check a CBC and platelets due to the concern over petechiae/ecchymoses.            Procedures

## 2022-08-08 ENCOUNTER — TELEPHONE (OUTPATIENT)
Dept: INTERNAL MEDICINE | Facility: CLINIC | Age: 87
End: 2022-08-08

## 2022-08-08 NOTE — TELEPHONE ENCOUNTER
Caller: Chago Garcia    Relationship: Emergency Contact    Best call back number: [phone] (M)    What test was performed: BLOOD WORK     When was the test performed: 08/01/22    Where was the test performed: IN OFFICE    Additional notes:    PLEASE ADVISE

## 2022-08-08 NOTE — TELEPHONE ENCOUNTER
Tell her that the CBC and platelets were perfect.  Easy bruising is at least partially due to his age and likely some of the medications.  Generally not a concern is more cosmetic than anything

## 2022-08-26 DIAGNOSIS — Z87.39 HISTORY OF GOUT: Chronic | ICD-10-CM

## 2022-08-26 DIAGNOSIS — I10 BENIGN ESSENTIAL HYPERTENSION: ICD-10-CM

## 2022-08-26 RX ORDER — ALLOPURINOL 300 MG/1
TABLET ORAL
Qty: 90 TABLET | Refills: 3 | Status: SHIPPED | OUTPATIENT
Start: 2022-08-26

## 2022-09-23 ENCOUNTER — OFFICE VISIT (OUTPATIENT)
Dept: INTERNAL MEDICINE | Facility: CLINIC | Age: 87
End: 2022-09-23

## 2022-09-23 VITALS
HEIGHT: 72 IN | HEART RATE: 56 BPM | DIASTOLIC BLOOD PRESSURE: 68 MMHG | SYSTOLIC BLOOD PRESSURE: 128 MMHG | OXYGEN SATURATION: 95 % | WEIGHT: 190 LBS | BODY MASS INDEX: 25.73 KG/M2

## 2022-09-23 DIAGNOSIS — B35.4 TINEA CORPORIS: Primary | ICD-10-CM

## 2022-09-23 PROBLEM — I87.2 CHRONIC VENOUS INSUFFICIENCY: Chronic | Status: ACTIVE | Noted: 2022-08-01

## 2022-09-23 PROBLEM — R23.3 PETECHIAE OR ECCHYMOSES: Chronic | Status: ACTIVE | Noted: 2022-08-01

## 2022-09-23 PROCEDURE — 99213 OFFICE O/P EST LOW 20 MIN: CPT | Performed by: INTERNAL MEDICINE

## 2022-09-23 RX ORDER — KETOROLAC TROMETHAMINE 5 MG/ML
SOLUTION OPHTHALMIC
COMMUNITY
Start: 2022-08-18

## 2022-09-23 NOTE — PROGRESS NOTES
09/23/2022    Patient Information  Izaiah Garcia                                                                                          [address] [facility] [address]      8/31/1931  [unfilled]  There is no work phone number on file.    Chief Complaint:     Complaining of rash on left leg.    History of Present Illness:    Patient presents today with a complaint of a rash that is not going away with treatment on his left leg/medial calf area.  I felt that this was likely Tenia corporis/ringworm and placed him on Mycolog cream.  He been using this now for 3 months or better and it still not going away.  It occasionally itches but no pain or anything involved.    Review of Systems   Constitutional: Negative.   HENT: Negative.    Eyes: Negative.    Cardiovascular: Negative.    Respiratory: Negative.    Endocrine: Negative.    Hematologic/Lymphatic: Negative.    Skin: Positive for rash.   Musculoskeletal: Negative.    Gastrointestinal: Negative.    Genitourinary: Negative.    Neurological: Negative.    Psychiatric/Behavioral: Negative.    Allergic/Immunologic: Negative.        Active Problems:    Patient Active Problem List   Diagnosis   • Hyperlipidemia   • Benign essential hypertension   • History of gout   • History of esophageal stricture   • History of stroke, 6/29/2016--4.9 x 4 x 3 cm inferior left cerebellar infarct   • Rheumatoid factor positive   • Impaired fasting glucose   • At risk for falls   • Bilateral high frequency sensorineural hearing loss, wears hearing aids   • Bilateral lower extremity edema   • Parkinson's disease (HCC)   • Therapeutic drug monitoring   • Vitamin D deficiency   • Macrocytosis   • Vitamin B12 deficiency   • History of pulmonary embolus (PE)   • Chronic anticoagulation, Eliquis.  Saddle pulmonary embolism   • Paroxysmal atrial fibrillation (HCC)   • Benign prostatic hyperplasia without lower urinary tract symptoms   • Statin intolerance, muscle pain and  "cramps   • Tinea corporis   • Chronic venous insufficiency   • Petechiae or ecchymoses, both lower extremities.  Patient on Xarelto.         Past Medical History:   Diagnosis Date   • Acute saddle pulmonary embolism with acute cor pulmonale (HCC) 2/29/2020    Admit date: 2/29/2020 Discharge date:3/20/2020 Discharged Condition: good   Discharge Diagnoses:   Acute saddle pulmonary embolism with acute cor pulmonale (CMS/HCC)   Pulmonary emboli (CMS/HCC)   Empyema of pleura (CMS/HCC)  Acute hypoxic respiratory failure BPE s/p ekos catheter on 2/29/2020 with local TPA infusion with good clinical response Troponemia suspect due to BPE RV strain Bilateral ple   • At risk for falls 5/2/2019   • Benign essential hypertension 6/29/2016   • Benign prostatic hyperplasia without lower urinary tract symptoms 12/3/2020   • Bilateral high frequency sensorineural hearing loss, wears hearing aids 5/2/2019   • Bilateral lower extremity edema 5/2/2019    May 2, 2019--patient who has hypertension and is on amlodipine 10 mg/day is complaining of progressively worsening swelling of the lower extremities that extends up to about mid calf.  Gets worse at the end of the day and tends to get better overnight when he props his feet up.  I think this is definitely related to the amlodipine although patient may have some venous insufficiency.  Medication ad   • Chronic anticoagulation, Eliquis.  Saddle pulmonary embolism 5/27/2020   • Decreased peripheral vision of both eyes 5/2/2019    May 2, 2019--continues to have complaints of \"dizziness\" associated with weakness in his lower extremities.  He is not describing a spinning sensation or anything remotely close to vertigo.  Instead he is describing an off-balance sensation.  He tends to fall forward if not careful and he tends to list towards the right side.  He has marked difficulty going down an incline.  He has to ambulate wit   • History of aspiration pneumonia 5/4/2015   • History of " "esophageal stricture 5/4/2015    July 3, 2018--EGD revealed a distal esophageal stricture that was dilated to 18 mm.  There was a small hiatal hernia.  There was mild streaky erythema in the proximal stomach.  Duodenal bulb normal.  April 26, 2016--EGD performed for dysphagia reveals a distal esophageal stricture that was dilated 16.5 mm.   • History of gout 6/29/2016   • History of pulmonary embolus (PE) 2/29/2020    Admit date: 2/29/2020 Discharge date:3/20/2020 Discharged Condition: good   Discharge Diagnoses:   Acute saddle pulmonary embolism with acute cor pulmonale (CMS/HCC)   Pulmonary emboli (CMS/HCC)   Empyema of pleura (CMS/HCC)  Acute hypoxic respiratory failure BPE s/p ekos catheter on 2/29/2020 with local TPA infusion with good clinical response Troponemia suspect due to BPE RV strain Bilateral ple   • History of stroke, 6/29/2016--4.9 x 4 x 3 cm inferior left cerebellar infarct 5/4/2015 June 29, 2016--MRI of the brain performed for complaints of dizziness and weakness. IMPRESSION: 1. There is susceptibility artifact streaking through the anterior left frontal scalp through the anterior left frontal bone in the anterior tipof the left frontal lobe likely from a tiny piece of metal in the anterior left frontal scalp slightly limits evaluation of these structures. 2. There is mild s   • Hyperlipidemia 6/29/2016   • Impaired fasting glucose 5/2/2019 April 14, 2015--hemoglobin A1c 5.9.   • Macrocytosis 5/2/2019   • Parkinson's disease (HCC) 5/2/2019    May 2, 2019--continues to have complaints of \"dizziness\" associated with weakness in his lower extremities.  He is not describing a spinning sensation or anything remotely close to vertigo.  Instead he is describing an off-balance sensation.  He tends to fall forward if not careful and he tends to list towards the right side.  He has marked difficulty going down an incline.  He has to ambulate wit   • Rheumatoid factor positive 5/2/2019 March 25, " 2014--rheumatoid factor returned positive at 95.  However, CCP antibodies normal at 8, SHIV negative, both C&P ANCA negative, C-reactive protein normal at 0.24, sed rate normal at 2.   • Statin intolerance, muscle pain and cramps 12/2/2021   • Vitamin B12 deficiency 6/6/2019 June 6, 2019--patient recently had mildly elevated methylmalonic acid of 380.  Repeat methylmalonic acid was upper limit of normal at 356.  Given patient's neurologic symptoms and suspected parkinsonism, I have a low threshold for treating vitamin B12 deficiency.  We will initiate vitamin B12 injections today.  2000 mcg subcutaneously given today.   • Vitamin D deficiency 5/2/2019         Past Surgical History:   Procedure Laterality Date   • CARDIAC CATHETERIZATION N/A 2/29/2020    Procedure: Thrombolytic Therapy- EKOS;  Surgeon: Jason Griffiths MD;  Location: [facility] CATH INVASIVE LOCATION;  Service: Cardiovascular;  Laterality: N/A;   • CATARACT EXTRACTION EXTRACAPSULAR W/ INTRAOCULAR LENS IMPLANTATION Bilateral     Bilateral cataract extirpation with intraocular lens implantation   • CHOLECYSTECTOMY     • EKOS CATHETER PLACEMENT Bilateral 2/29/2020    Procedure: Ekos catheter placement;  Surgeon: Jason Griffiths MD;  Location: [facility] CATH INVASIVE LOCATION;  Service: Cardiovascular;  Laterality: Bilateral;   • ESOPHAGEAL DILATATION  04/26/2016 April 26, 2016--EGD performed for dysphagia reveals a distal esophageal stricture that was dilated 16.5 mm.   • ESOPHAGEAL DILATATION  07/03/2018    July 3, 2018--EGD revealed a distal esophageal stricture that was dilated to 18 mm.  There was a small hiatal hernia.  There was mild streaky erythema in the proximal stomach.  Duodenal bulb normal.   • INTERVENTIONAL RADIOLOGY PROCEDURE Bilateral 2/29/2020    Procedure: Pulmonary Angiogram;  Surgeon: Jason Griffiths MD;  Location: [facility] CATH INVASIVE LOCATION;  Service: Cardiovascular;  Laterality: Bilateral;         No Known  "Allergies        Current Outpatient Medications:   •  allopurinol (ZYLOPRIM) 300 MG tablet, TAKE 1 TABLET EVERY DAY  FOR  GOUT, Disp: 90 tablet, Rfl: 3  •  carbidopa-levodopa (SINEMET)  MG per tablet, , Disp: , Rfl:   •  Cholecalciferol (vitamin D3) 125 MCG (5000 UT) capsule capsule, 1 by mouth daily as directed, Disp: 30 capsule, Rfl:   •  Cyanocobalamin 1000 MCG sublingual tablet, Dissolve 1-2 under tongue daily for low vitamin B12, Disp: , Rfl:   •  escitalopram (LEXAPRO) 5 MG tablet, , Disp: , Rfl:   •  fluorometholone (FML) 0.1 % ophthalmic suspension, , Disp: , Rfl:   •  furosemide (LASIX) 40 MG tablet, TAKE 1 TABLET EVERY DAY, Disp: 90 tablet, Rfl: 2  •  ketorolac (ACULAR) 0.5 % ophthalmic solution, , Disp: , Rfl:   •  metoprolol tartrate (LOPRESSOR) 25 MG tablet, TAKE 1 TABLET BY MOUTH EVERY 12 (TWELVE) HOURS., Disp: 180 tablet, Rfl: 3  •  nystatin-triamcinolone (MYCOLOG II) 878556-2.1 UNIT/GM-% cream, Apply to affected area 2-3 times daily as needed, Disp: 30 g, Rfl: 1  •  rivaroxaban (XARELTO) 15 MG tablet, Take 1 p.o. daily for blood thinner.  Take at same time every day., Disp: 90 tablet, Rfl: 3  •  saccharomyces boulardii (FLORASTOR) 250 MG capsule, Take 1 capsule by mouth 2 (Two) Times a Day., Disp: 180 capsule, Rfl: 0      Family History   Problem Relation Age of Onset   • No Known Problems Mother    • No Known Problems Father          Social History     Socioeconomic History   • Marital status:    • Number of children: 2   • Highest education level: 9th grade   Tobacco Use   • Smoking status: Never Smoker   • Smokeless tobacco: Never Used   Vaping Use   • Vaping Use: Never used   Substance and Sexual Activity   • Alcohol use: Never     Comment: occ   • Drug use: No   • Sexual activity: Not Currently     Partners: Female         Vitals:    09/23/22 1544   BP: 128/68   Pulse: 56   SpO2: 95%   Weight: 86.2 kg (190 lb)   Height: 182.9 cm (72\")        Body mass index is 25.77 " kg/m².      Physical Exam:    Brief general physical exam as unremarkable and not changed from previous.  Examination of his left legs reveals an erythematous and very flaky irregularly bordered slightly elevated skin lesion that is approximately 30 mm in diameter.  There is no sign of cellulitis.  The area is not warm.  There is no pus.    Lab/other results:      Assessment/Plan:     Diagnosis Plan   1. Tinea corporis  Ambulatory Referral to Dermatology     Patient with a persistent rash on his left leg and has failed treatment.  I think dermatology evaluation is indicated.    Plan is as follows: Patient referred to dermatology.  Stop using the ointment/cream but take that to the dermatologist so he will know what you had been using.        Procedures

## 2022-10-19 ENCOUNTER — TELEPHONE (OUTPATIENT)
Dept: INTERNAL MEDICINE | Facility: CLINIC | Age: 87
End: 2022-10-19

## 2022-10-19 DIAGNOSIS — Z86.711 HISTORY OF PULMONARY EMBOLUS (PE): Chronic | ICD-10-CM

## 2022-10-27 ENCOUNTER — TELEPHONE (OUTPATIENT)
Dept: INTERNAL MEDICINE | Facility: CLINIC | Age: 87
End: 2022-10-27

## 2022-10-31 DIAGNOSIS — Z86.711 HISTORY OF PULMONARY EMBOLUS (PE): Chronic | ICD-10-CM

## 2022-11-22 ENCOUNTER — TELEPHONE (OUTPATIENT)
Dept: CARDIOLOGY | Facility: CLINIC | Age: 87
End: 2022-11-22

## 2022-11-22 NOTE — TELEPHONE ENCOUNTER
Dr. Marya Horton calling wanting to know if Izaiah can come off his Xarleto for 2 days to have a Dental extraction or should he continue the Xarleto      [phone]   -currently in [facility] in the setting of diarrheal illness and pancolitis  -will lower dose of metoprolol for rate control, and will increase as needed and BP tolerated  -will AC with pradaxa renally dosed

## 2022-11-22 NOTE — TELEPHONE ENCOUNTER
He has a history of pulmonary embolus.  He also has afib.  If she can extract his tooth while he is on the Xarelto that would be best care scenario.      It also appears he is overdue for follow up as he has not been seen since his telehealth call with Dr. Griffiths in 2020.

## 2022-11-29 ENCOUNTER — OFFICE VISIT (OUTPATIENT)
Dept: CARDIOLOGY | Facility: CLINIC | Age: 87
End: 2022-11-29

## 2022-11-29 VITALS
SYSTOLIC BLOOD PRESSURE: 130 MMHG | WEIGHT: 196.6 LBS | HEIGHT: 71 IN | DIASTOLIC BLOOD PRESSURE: 70 MMHG | BODY MASS INDEX: 27.52 KG/M2 | HEART RATE: 51 BPM

## 2022-11-29 DIAGNOSIS — I48.0 PAROXYSMAL ATRIAL FIBRILLATION: Primary | Chronic | ICD-10-CM

## 2022-11-29 DIAGNOSIS — Z86.711 HISTORY OF PULMONARY EMBOLUS (PE): Chronic | ICD-10-CM

## 2022-11-29 PROCEDURE — 93000 ELECTROCARDIOGRAM COMPLETE: CPT | Performed by: INTERNAL MEDICINE

## 2022-11-29 PROCEDURE — 99213 OFFICE O/P EST LOW 20 MIN: CPT | Performed by: INTERNAL MEDICINE

## 2022-12-08 ENCOUNTER — OFFICE VISIT (OUTPATIENT)
Dept: INTERNAL MEDICINE | Facility: CLINIC | Age: 87
End: 2022-12-08

## 2022-12-08 VITALS
RESPIRATION RATE: 18 BRPM | SYSTOLIC BLOOD PRESSURE: 130 MMHG | BODY MASS INDEX: 27.44 KG/M2 | DIASTOLIC BLOOD PRESSURE: 70 MMHG | HEIGHT: 71 IN | OXYGEN SATURATION: 95 % | HEART RATE: 54 BPM | WEIGHT: 196 LBS

## 2022-12-08 DIAGNOSIS — Z87.39 HISTORY OF GOUT: Chronic | ICD-10-CM

## 2022-12-08 DIAGNOSIS — Z79.01 CHRONIC ANTICOAGULATION: Chronic | ICD-10-CM

## 2022-12-08 DIAGNOSIS — E53.8 VITAMIN B12 DEFICIENCY: Chronic | ICD-10-CM

## 2022-12-08 DIAGNOSIS — R60.0 BILATERAL LOWER EXTREMITY EDEMA: Chronic | ICD-10-CM

## 2022-12-08 DIAGNOSIS — Z23 NEED FOR INFLUENZA VACCINATION: ICD-10-CM

## 2022-12-08 DIAGNOSIS — N40.0 BENIGN PROSTATIC HYPERPLASIA WITHOUT LOWER URINARY TRACT SYMPTOMS: Chronic | ICD-10-CM

## 2022-12-08 DIAGNOSIS — Z91.81 AT RISK FOR FALLS: Chronic | ICD-10-CM

## 2022-12-08 DIAGNOSIS — H90.3 BILATERAL HIGH FREQUENCY SENSORINEURAL HEARING LOSS: Chronic | ICD-10-CM

## 2022-12-08 DIAGNOSIS — Z86.711 HISTORY OF PULMONARY EMBOLUS (PE): Chronic | ICD-10-CM

## 2022-12-08 DIAGNOSIS — E55.9 VITAMIN D DEFICIENCY: Chronic | ICD-10-CM

## 2022-12-08 DIAGNOSIS — I10 BENIGN ESSENTIAL HYPERTENSION: Chronic | ICD-10-CM

## 2022-12-08 DIAGNOSIS — D75.89 MACROCYTOSIS: Chronic | ICD-10-CM

## 2022-12-08 DIAGNOSIS — Z78.9 STATIN INTOLERANCE: Chronic | ICD-10-CM

## 2022-12-08 DIAGNOSIS — R73.01 IMPAIRED FASTING GLUCOSE: Primary | Chronic | ICD-10-CM

## 2022-12-08 DIAGNOSIS — I87.2 CHRONIC VENOUS INSUFFICIENCY: Chronic | ICD-10-CM

## 2022-12-08 DIAGNOSIS — E78.2 MIXED HYPERLIPIDEMIA: Chronic | ICD-10-CM

## 2022-12-08 DIAGNOSIS — Z51.81 THERAPEUTIC DRUG MONITORING: ICD-10-CM

## 2022-12-08 DIAGNOSIS — G20 PARKINSON'S DISEASE: Chronic | ICD-10-CM

## 2022-12-08 DIAGNOSIS — I48.0 PAROXYSMAL ATRIAL FIBRILLATION: Chronic | ICD-10-CM

## 2022-12-08 DIAGNOSIS — Z86.73 HISTORY OF STROKE: Chronic | ICD-10-CM

## 2022-12-08 PROBLEM — B35.4 TINEA CORPORIS: Status: RESOLVED | Noted: 2022-08-01 | Resolved: 2022-12-08

## 2022-12-08 PROCEDURE — 99214 OFFICE O/P EST MOD 30 MIN: CPT | Performed by: INTERNAL MEDICINE

## 2022-12-08 PROCEDURE — G0008 ADMIN INFLUENZA VIRUS VAC: HCPCS | Performed by: INTERNAL MEDICINE

## 2022-12-08 PROCEDURE — 90662 IIV NO PRSV INCREASED AG IM: CPT | Performed by: INTERNAL MEDICINE

## 2022-12-08 NOTE — PROGRESS NOTES
12/08/2022    Patient Information  Izaiah Garcia                                                                                          [id number]  [facility] [address]      8/31/1931  [unfilled]  There is no work phone number on file.    Chief Complaint:     Follow-up multiple medical problems outlined in history of present illness.  No new acute complaints.    History of Present Illness:    Patient with multiple chronic medical problems including impaired fasting glucose, hyperlipidemia, hypertension, history of gout, vitamin D deficiency, macrocytosis, vitamin B12 deficiency, BPH, chronic anticoagulation with Eliquis for paroxysmal atrial fibrillation and history of DVT/pulmonary embolism, statin intolerance, chronic venous insufficiency and lower extremity edema, history of stroke, Parkinson's disease and at high risk for falls, sensorineural hearing loss.  He presents today for a follow-up and was supposed to have lab prior but unfortunately did not.  See below.  His past medical history reviewed and updated were necessary including health maintenance parameters.  This reveals he is up-to-date or else accounted for after today's visit with the exception of possibly needing influenza vaccine.    Review of Systems   HENT: Negative.    Eyes: Negative.    Cardiovascular: Negative.    Respiratory: Negative.    Endocrine: Negative.    Hematologic/Lymphatic: Negative.    Skin: Negative.    Musculoskeletal: Positive for arthritis and joint pain.   Gastrointestinal: Negative.    Genitourinary: Negative.    Neurological: Positive for disturbances in coordination, loss of balance, tremors and weakness.   Psychiatric/Behavioral: Negative.    Allergic/Immunologic: Negative.        Active Problems:    Patient Active Problem List   Diagnosis   • Hyperlipidemia   • Benign essential hypertension   • History of gout   • History of esophageal stricture   • History of stroke, 6/29/2016--4.9 x 4 x 3 cm  inferior left cerebellar infarct   • Rheumatoid factor positive   • Impaired fasting glucose   • At risk for falls   • Bilateral high frequency sensorineural hearing loss, wears hearing aids   • Bilateral lower extremity edema   • Parkinson's disease (HCC)   • Therapeutic drug monitoring   • Vitamin D deficiency   • Macrocytosis   • Vitamin B12 deficiency   • History of pulmonary embolus (PE)   • Chronic anticoagulation, Eliquis.  Saddle pulmonary embolism   • Paroxysmal atrial fibrillation (HCC)   • Benign prostatic hyperplasia without lower urinary tract symptoms   • Statin intolerance, muscle pain and cramps   • Chronic venous insufficiency   • Petechiae or ecchymoses, both lower extremities.  Patient on Xarelto.         Past Medical History:   Diagnosis Date   • Acute saddle pulmonary embolism with acute cor pulmonale (HCC) 2/29/2020    Admit date: 2/29/2020 Discharge date:3/20/2020 Discharged Condition: good   Discharge Diagnoses:   Acute saddle pulmonary embolism with acute cor pulmonale (CMS/HCC)   Pulmonary emboli (CMS/HCC)   Empyema of pleura (CMS/HCC)  Acute hypoxic respiratory failure BPE s/p ekos catheter on 2/29/2020 with local TPA infusion with good clinical response Troponemia suspect due to BPE RV strain Bilateral ple   • At risk for falls 5/2/2019   • Benign essential hypertension 6/29/2016   • Benign prostatic hyperplasia without lower urinary tract symptoms 12/3/2020   • Bilateral high frequency sensorineural hearing loss, wears hearing aids 5/2/2019   • Bilateral lower extremity edema 5/2/2019    May 2, 2019--patient who has hypertension and is on amlodipine 10 mg/day is complaining of progressively worsening swelling of the lower extremities that extends up to about mid calf.  Gets worse at the end of the day and tends to get better overnight when he props his feet up.  I think this is definitely related to the amlodipine although patient may have some venous insufficiency.  Medication ad   •  "Chronic anticoagulation, Eliquis.  Saddle pulmonary embolism 5/27/2020   • Decreased peripheral vision of both eyes 5/2/2019    May 2, 2019--continues to have complaints of \"dizziness\" associated with weakness in his lower extremities.  He is not describing a spinning sensation or anything remotely close to vertigo.  Instead he is describing an off-balance sensation.  He tends to fall forward if not careful and he tends to list towards the right side.  He has marked difficulty going down an incline.  He has to ambulate wit   • History of aspiration pneumonia 5/4/2015   • History of esophageal stricture 5/4/2015    July 3, 2018--EGD revealed a distal esophageal stricture that was dilated to 18 mm.  There was a small hiatal hernia.  There was mild streaky erythema in the proximal stomach.  Duodenal bulb normal.  April 26, 2016--EGD performed for dysphagia reveals a distal esophageal stricture that was dilated 16.5 mm.   • History of gout 6/29/2016   • History of pulmonary embolus (PE) 2/29/2020    Admit date: 2/29/2020 Discharge date:3/20/2020 Discharged Condition: good   Discharge Diagnoses:   Acute saddle pulmonary embolism with acute cor pulmonale (CMS/HCC)   Pulmonary emboli (CMS/HCC)   Empyema of pleura (CMS/HCC)  Acute hypoxic respiratory failure BPE s/p ekos catheter on 2/29/2020 with local TPA infusion with good clinical response Troponemia suspect due to BPE RV strain Bilateral ple   • History of stroke, 6/29/2016--4.9 x 4 x 3 cm inferior left cerebellar infarct 5/4/2015 June 29, 2016--MRI of the brain performed for complaints of dizziness and weakness. IMPRESSION: 1. There is susceptibility artifact streaking through the anterior left frontal scalp through the anterior left frontal bone in the anterior tipof the left frontal lobe likely from a tiny piece of metal in the anterior left frontal scalp slightly limits evaluation of these structures. 2. There is mild s   • Hyperlipidemia 6/29/2016   • Impaired " "fasting glucose 5/2/2019 April 14, 2015--hemoglobin A1c 5.9.   • Macrocytosis 5/2/2019   • Parkinson's disease (HCC) 5/2/2019    May 2, 2019--continues to have complaints of \"dizziness\" associated with weakness in his lower extremities.  He is not describing a spinning sensation or anything remotely close to vertigo.  Instead he is describing an off-balance sensation.  He tends to fall forward if not careful and he tends to list towards the right side.  He has marked difficulty going down an incline.  He has to ambulate wit   • Rheumatoid factor positive 5/2/2019 March 25, 2014--rheumatoid factor returned positive at 95.  However, CCP antibodies normal at 8, SHIV negative, both C&P ANCA negative, C-reactive protein normal at 0.24, sed rate normal at 2.   • Statin intolerance, muscle pain and cramps 12/2/2021   • Vitamin B12 deficiency 6/6/2019 June 6, 2019--patient recently had mildly elevated methylmalonic acid of 380.  Repeat methylmalonic acid was upper limit of normal at 356.  Given patient's neurologic symptoms and suspected parkinsonism, I have a low threshold for treating vitamin B12 deficiency.  We will initiate vitamin B12 injections today.  2000 mcg subcutaneously given today.   • Vitamin D deficiency 5/2/2019         Past Surgical History:   Procedure Laterality Date   • CARDIAC CATHETERIZATION N/A 2/29/2020    Procedure: Thrombolytic Therapy- EKOS;  Surgeon: Jason Griffiths MD;  Location: [facility] INVASIVE LOCATION;  Service: Cardiovascular;  Laterality: N/A;   • CATARACT EXTRACTION EXTRACAPSULAR W/ INTRAOCULAR LENS IMPLANTATION Bilateral     Bilateral cataract extirpation with intraocular lens implantation   • CHOLECYSTECTOMY     • EKOS CATHETER PLACEMENT Bilateral 2/29/2020    Procedure: Ekos catheter placement;  Surgeon: Jason Griffiths MD;  Location:  [facility]VAS[facility] LOC[facility];  Service: Cardiovascular;  Laterality: Bilateral;   • ESOPHAGEAL DILATATION  04/26/2016 April 26, " 2016--EGD performed for dysphagia reveals a distal esophageal stricture that was dilated 16.5 mm.   • ESOPHAGEAL DILATATION  07/03/2018    July 3, 2018--EGD revealed a distal esophageal stricture that was dilated to 18 mm.  There was a small hiatal hernia.  There was mild streaky erythema in the proximal stomach.  Duodenal bulb normal.   • INTERVENTIONAL RADIOLOGY PROCEDURE Bilateral 2/29/2020    Procedure: Pulmonary Angiogram;  Surgeon: Jason Griffiths MD;  Location: [facility] INVASIVE LOCATION;  Service: Cardiovascular;  Laterality: Bilateral;         No Known Allergies        Current Outpatient Medications:   •  allopurinol (ZYLOPRIM) 300 MG tablet, TAKE 1 TABLET EVERY DAY  FOR  GOUT, Disp: 90 tablet, Rfl: 3  •  carbidopa-levodopa (SINEMET)  MG per tablet, , Disp: , Rfl:   •  Cholecalciferol (vitamin D3) 125 MCG (5000 UT) capsule capsule, 1 by mouth daily as directed, Disp: 30 capsule, Rfl:   •  Cyanocobalamin 1000 MCG sublingual tablet, Dissolve 1-2 under tongue daily for low vitamin B12, Disp: , Rfl:   •  escitalopram (LEXAPRO) 5 MG tablet, , Disp: , Rfl:   •  fluorometholone (FML) 0.1 % ophthalmic suspension, , Disp: , Rfl:   •  furosemide (LASIX) 40 MG tablet, TAKE 1 TABLET EVERY DAY, Disp: 90 tablet, Rfl: 2  •  ketorolac (ACULAR) 0.5 % ophthalmic solution, , Disp: , Rfl:   •  metoprolol tartrate (LOPRESSOR) 25 MG tablet, TAKE 1 TABLET BY MOUTH EVERY 12 (TWELVE) HOURS., Disp: 180 tablet, Rfl: 3  •  nystatin-triamcinolone (MYCOLOG II) 478180-0.1 UNIT/GM-% cream, Apply to affected area 2-3 times daily as needed, Disp: 30 g, Rfl: 1  •  rivaroxaban (XARELTO) 15 MG tablet, Take 1 p.o. daily for blood thinner.  Take at same time every day., Disp: 90 tablet, Rfl: 3  •  saccharomyces boulardii (FLORASTOR) 250 MG capsule, Take 1 capsule by mouth 2 (Two) Times a Day., Disp: 180 capsule, Rfl: 0      Family History   Problem Relation Age of Onset   • No Known Problems Mother    • No Known Problems Father   "        Social History     Socioeconomic History   • Marital status:    • Number of children: 2   • Highest education level: 9th grade   Tobacco Use   • Smoking status: Never   • Smokeless tobacco: Never   Vaping Use   • Vaping Use: Never used   Substance and Sexual Activity   • Alcohol use: Never     Comment: occ   • Drug use: No   • Sexual activity: Not Currently     Partners: Female         Vitals:    12/08/22 1022   BP: 130/70   Pulse: 54   Resp: 18   SpO2: 95%   Weight: 88.9 kg (196 lb)   Height: 180.3 cm (71\")        Body mass index is 27.34 kg/m².      Physical Exam:    General: Alert and oriented x 3.  No acute distress.  Normal affect.  HEENT: Pupils equal, round, reactive to light; extraocular movements intact; sclerae nonicteric; pharynx, ear canals and TMs normal.  Neck: Without JVD, thyromegaly, bruit, or adenopathy.  Lungs: Clear to auscultation in all fields.  Heart: Regular rate and rhythm without murmur, rub, gallop, or click.  Abdomen: Soft, nontender, without hepatosplenomegaly or hernia.  Bowel sounds normal.  : Deferred.  Rectal: Deferred.  Extremities: Without clubbing, cyanosis, edema, or pulse deficit.  Neurologic: Intact without focal deficit.  Patient ambulates with an ataxic gait and uses the assistance of a walker.  Tremor from Parkinson's noted.  Skin: Without significant lesion.  Musculoskeletal: Unremarkable.    Lab/other results:      Assessment/Plan:     Diagnosis Plan   1. Impaired fasting glucose  Comprehensive Metabolic Panel    Hemoglobin A1c      2. Hyperlipidemia  Comprehensive Metabolic Panel    NMR LipoProfile    TSH    T4, Free    T3, Free      3. Benign essential hypertension        4. History of gout  Uric Acid      5. Vitamin D deficiency  Vitamin D 25 Hydroxy      6. Macrocytosis  CBC (No Diff)      7. Vitamin B12 deficiency  CBC (No Diff)      8. Benign prostatic hyperplasia without lower urinary tract symptoms        9. Chronic anticoagulation, Eliquis.  " Saddle pulmonary embolism        10. Paroxysmal atrial fibrillation (HCC)        11. Statin intolerance, muscle pain and cramps        12. Bilateral lower extremity edema        13. Chronic venous insufficiency        14. History of stroke, 6/29/2016--4.9 x 4 x 3 cm inferior left cerebellar infarct        15. At risk for falls        16. Parkinson's disease (HCC)        17. Bilateral high frequency sensorineural hearing loss, wears hearing aids        18. History of pulmonary embolus (PE)        19. Therapeutic drug monitoring        20. Need for influenza vaccination  Fluzone High-Dose 65+yrs (2570-1048)        Patient with multiple medical problems as noted above that seem to be fairly stable but cannot be fully assessed due to the fact that he did not have blood work prior to this visit.  Clinically his biggest issue is that of parkinsonism and he is at high risk for falls and taking precautions.  He is being managed by the neurologist in that regard.    Plan is as follows: Obtain lab work today and follow-up on the phone for the results and possible further instructions.  No changes in current medical regimen.  Patient will follow-up on or after Adilene 3, 2023 for his Medicare wellness visit.  Otherwise he will follow-up as needed.  Recommend high-dose influenza vaccine.        Procedures

## 2022-12-09 LAB
25(OH)D3+25(OH)D2 SERPL-MCNC: 84.6 NG/ML (ref 30–100)
ALBUMIN SERPL-MCNC: 4 G/DL (ref 3.5–5.2)
ALBUMIN/GLOB SERPL: 1.9 G/DL
ALP SERPL-CCNC: 80 U/L (ref 39–117)
ALT SERPL-CCNC: 8 U/L (ref 1–41)
AST SERPL-CCNC: 9 U/L (ref 1–40)
BILIRUB SERPL-MCNC: 0.7 MG/DL (ref 0–1.2)
BUN SERPL-MCNC: 19 MG/DL (ref 8–23)
BUN/CREAT SERPL: 17.4 (ref 7–25)
CALCIUM SERPL-MCNC: 9.8 MG/DL (ref 8.2–9.6)
CHLORIDE SERPL-SCNC: 104 MMOL/L (ref 98–107)
CHOLEST SERPL-MCNC: 148 MG/DL (ref 100–199)
CO2 SERPL-SCNC: 28.3 MMOL/L (ref 22–29)
CREAT SERPL-MCNC: 1.09 MG/DL (ref 0.76–1.27)
EGFRCR SERPLBLD CKD-EPI 2021: 64.1 ML/MIN/1.73
ERYTHROCYTE [DISTWIDTH] IN BLOOD BY AUTOMATED COUNT: 13.5 % (ref 12.3–15.4)
GLOBULIN SER CALC-MCNC: 2.1 GM/DL
GLUCOSE SERPL-MCNC: 107 MG/DL (ref 65–99)
HBA1C MFR BLD: 5.9 % (ref 4.8–5.6)
HCT VFR BLD AUTO: 45.3 % (ref 37.5–51)
HDL SERPL-SCNC: 23.7 UMOL/L
HDLC SERPL-MCNC: 33 MG/DL
HGB BLD-MCNC: 15 G/DL (ref 13–17.7)
LDL SERPL QN: 20.6 NM
LDL SERPL-SCNC: 1022 NMOL/L
LDL SMALL SERPL-SCNC: 475 NMOL/L
LDLC SERPL CALC-MCNC: 97 MG/DL (ref 0–99)
MCH RBC QN AUTO: 28.1 PG (ref 26.6–33)
MCHC RBC AUTO-ENTMCNC: 33.1 G/DL (ref 31.5–35.7)
MCV RBC AUTO: 85 FL (ref 79–97)
PLATELET # BLD AUTO: 258 10*3/MM3 (ref 140–450)
POTASSIUM SERPL-SCNC: 4.4 MMOL/L (ref 3.5–5.2)
PROT SERPL-MCNC: 6.1 G/DL (ref 6–8.5)
RBC # BLD AUTO: 5.33 10*6/MM3 (ref 4.14–5.8)
SODIUM SERPL-SCNC: 141 MMOL/L (ref 136–145)
T3FREE SERPL-MCNC: 2.3 PG/ML (ref 2–4.4)
T4 FREE SERPL-MCNC: 1.45 NG/DL (ref 0.93–1.7)
TRIGL SERPL-MCNC: 98 MG/DL (ref 0–149)
TSH SERPL DL<=0.005 MIU/L-ACNC: 1.34 UIU/ML (ref 0.27–4.2)
URATE SERPL-MCNC: 4.1 MG/DL (ref 3.4–7)
WBC # BLD AUTO: 10.5 10*3/MM3 (ref 3.4–10.8)

## 2023-03-27 ENCOUNTER — OFFICE VISIT (OUTPATIENT)
Dept: INTERNAL MEDICINE | Facility: CLINIC | Age: 88
End: 2023-03-27
Payer: MEDICARE

## 2023-03-27 VITALS
HEART RATE: 49 BPM | SYSTOLIC BLOOD PRESSURE: 124 MMHG | OXYGEN SATURATION: 97 % | HEIGHT: 71 IN | WEIGHT: 193 LBS | BODY MASS INDEX: 27.02 KG/M2 | DIASTOLIC BLOOD PRESSURE: 60 MMHG

## 2023-03-27 DIAGNOSIS — I10 BENIGN ESSENTIAL HYPERTENSION: Chronic | ICD-10-CM

## 2023-03-27 DIAGNOSIS — Z86.73 HISTORY OF STROKE: Chronic | ICD-10-CM

## 2023-03-27 DIAGNOSIS — R04.0 EPISTAXIS: Primary | ICD-10-CM

## 2023-03-27 PROBLEM — M54.32 LEFT SCIATIC NERVE PAIN: Status: ACTIVE | Noted: 2023-03-27

## 2023-03-27 PROCEDURE — 99214 OFFICE O/P EST MOD 30 MIN: CPT | Performed by: INTERNAL MEDICINE

## 2023-03-27 PROCEDURE — 1159F MED LIST DOCD IN RCRD: CPT | Performed by: INTERNAL MEDICINE

## 2023-03-27 PROCEDURE — 1160F RVW MEDS BY RX/DR IN RCRD: CPT | Performed by: INTERNAL MEDICINE

## 2023-03-27 NOTE — PROGRESS NOTES
03/27/2023    Patient Information  Izaiah Garcia                                                                                          [id number] [facility] [address]      8/31/1931  [unfilled]  There is no work phone number on file.    Chief Complaint:     Follow-up recent emergency room visit for nosebleed and elevated blood pressure.    History of Present Illness:    Patient with a history of hyperlipidemia, hypertension which has been previously controlled, gout, history of stroke, impaired fasting glucose, Parkinson's disease and high risk of falls, macrocytosis and vitamin B12 deficiency, previous history of pulmonary embolus, chronic anticoagulation with Eliquis, paroxysmal atrial fibrillation, asymptomatic BPH, recent episode of epistaxis requiring emergency room visit and surgical procedure.  He presents today because his blood pressure was significantly elevated in the emergency room at 180/126.  Patient was ordered to come see me regarding this issue.  He had nasal endoscopy with cauterization that day and has had no further nosebleeding.  As noted below, his blood pressure today appears to be normal.  I will check this myself.  See below.    Review of Systems   Constitutional: Negative.   HENT: Negative.         No further bleeding from nose   Eyes: Negative.    Cardiovascular: Negative.    Respiratory: Negative.    Endocrine: Negative.    Hematologic/Lymphatic: Negative.    Skin: Negative.    Musculoskeletal: Negative.    Gastrointestinal: Negative.    Genitourinary: Negative.    Neurological: Negative.    Psychiatric/Behavioral: Negative.    Allergic/Immunologic: Negative.        Active Problems:    Patient Active Problem List   Diagnosis   • Hyperlipidemia   • Benign essential hypertension   • History of gout   • History of esophageal stricture   • History of stroke, 6/29/2016--4.9 x 4 x 3 cm inferior left cerebellar infarct   • Rheumatoid factor positive   • Impaired  fasting glucose   • At risk for falls   • Bilateral high frequency sensorineural hearing loss, wears hearing aids   • Bilateral lower extremity edema   • Parkinson's disease (HCC)   • Therapeutic drug monitoring   • Vitamin D deficiency   • Macrocytosis   • Vitamin B12 deficiency   • History of pulmonary embolus (PE)   • Chronic anticoagulation, Eliquis.  Saddle pulmonary embolism   • Paroxysmal atrial fibrillation (HCC)   • Benign prostatic hyperplasia without lower urinary tract symptoms   • Statin intolerance, muscle pain and cramps   • Chronic venous insufficiency   • Petechiae or ecchymoses, both lower extremities.  Patient on Xarelto.   • Epistaxis         Past Medical History:   Diagnosis Date   • Acute saddle pulmonary embolism with acute cor pulmonale (HCC) 2/29/2020    Admit date: 2/29/2020 Discharge date:3/20/2020 Discharged Condition: good   Discharge Diagnoses:   Acute saddle pulmonary embolism with acute cor pulmonale (CMS/HCC)   Pulmonary emboli (CMS/HCC)   Empyema of pleura (CMS/HCC)  Acute hypoxic respiratory failure BPE s/p ekos catheter on 2/29/2020 with local TPA infusion with good clinical response Troponemia suspect due to BPE RV strain Bilateral ple   • At risk for falls 5/2/2019   • Benign essential hypertension 6/29/2016   • Benign prostatic hyperplasia without lower urinary tract symptoms 12/3/2020   • Bilateral high frequency sensorineural hearing loss, wears hearing aids 5/2/2019   • Bilateral lower extremity edema 5/2/2019    May 2, 2019--patient who has hypertension and is on amlodipine 10 mg/day is complaining of progressively worsening swelling of the lower extremities that extends up to about mid calf.  Gets worse at the end of the day and tends to get better overnight when he props his feet up.  I think this is definitely related to the amlodipine although patient may have some venous insufficiency.  Medication ad   • Chronic anticoagulation, Eliquis.  Saddle pulmonary embolism  "5/27/2020   • Decreased peripheral vision of both eyes 5/2/2019    May 2, 2019--continues to have complaints of \"dizziness\" associated with weakness in his lower extremities.  He is not describing a spinning sensation or anything remotely close to vertigo.  Instead he is describing an off-balance sensation.  He tends to fall forward if not careful and he tends to list towards the right side.  He has marked difficulty going down an incline.  He has to ambulate wit   • History of aspiration pneumonia 5/4/2015   • History of esophageal stricture 5/4/2015    July 3, 2018--EGD revealed a distal esophageal stricture that was dilated to 18 mm.  There was a small hiatal hernia.  There was mild streaky erythema in the proximal stomach.  Duodenal bulb normal.  April 26, 2016--EGD performed for dysphagia reveals a distal esophageal stricture that was dilated 16.5 mm.   • History of gout 6/29/2016   • History of pulmonary embolus (PE) 2/29/2020    Admit date: 2/29/2020 Discharge date:3/20/2020 Discharged Condition: good   Discharge Diagnoses:   Acute saddle pulmonary embolism with acute cor pulmonale (CMS/HCC)   Pulmonary emboli (CMS/HCC)   Empyema of pleura (CMS/HCC)  Acute hypoxic respiratory failure BPE s/p ekos catheter on 2/29/2020 with local TPA infusion with good clinical response Troponemia suspect due to BPE RV strain Bilateral ple   • History of stroke, 6/29/2016--4.9 x 4 x 3 cm inferior left cerebellar infarct 5/4/2015 June 29, 2016--MRI of the brain performed for complaints of dizziness and weakness. IMPRESSION: 1. There is susceptibility artifact streaking through the anterior left frontal scalp through the anterior left frontal bone in the anterior tipof the left frontal lobe likely from a tiny piece of metal in the anterior left frontal scalp slightly limits evaluation of these structures. 2. There is mild s   • Hyperlipidemia 6/29/2016   • Impaired fasting glucose 5/2/2019 April 14, 2015--hemoglobin A1c " "5.9.   • Macrocytosis 5/2/2019   • Parkinson's disease (HCC) 5/2/2019    May 2, 2019--continues to have complaints of \"dizziness\" associated with weakness in his lower extremities.  He is not describing a spinning sensation or anything remotely close to vertigo.  Instead he is describing an off-balance sensation.  He tends to fall forward if not careful and he tends to list towards the right side.  He has marked difficulty going down an incline.  He has to ambulate wit   • Rheumatoid factor positive 5/2/2019 March 25, 2014--rheumatoid factor returned positive at 95.  However, CCP antibodies normal at 8, SHIV negative, both C&P ANCA negative, C-reactive protein normal at 0.24, sed rate normal at 2.   • Statin intolerance, muscle pain and cramps 12/2/2021   • Vitamin B12 deficiency 6/6/2019 June 6, 2019--patient recently had mildly elevated methylmalonic acid of 380.  Repeat methylmalonic acid was upper limit of normal at 356.  Given patient's neurologic symptoms and suspected parkinsonism, I have a low threshold for treating vitamin B12 deficiency.  We will initiate vitamin B12 injections today.  2000 mcg subcutaneously given today.   • Vitamin D deficiency 5/2/2019         Past Surgical History:   Procedure Laterality Date   • CARDIAC CATHETERIZATION N/A 2/29/2020    Procedure: Thrombolytic Therapy- EKOS;  Surgeon: Jason Griffiths MD;  Location: [facility] INVASIVE LOCATION;  Service: Cardiovascular;  Laterality: N/A;   • CATARACT EXTRACTION EXTRACAPSULAR W/ INTRAOCULAR LENS IMPLANTATION Bilateral     Bilateral cataract extirpation with intraocular lens implantation   • CHOLECYSTECTOMY     • EKOS CATHETER PLACEMENT Bilateral 2/29/2020    Procedure: Ekos catheter placement;  Surgeon: Jason Griffiths MD;  Location:  [facility] INVASIVE LOCATION;  Service: Cardiovascular;  Laterality: Bilateral;   • ESOPHAGEAL DILATATION  04/26/2016 April 26, 2016--EGD performed for dysphagia reveals a distal esophageal " stricture that was dilated 16.5 mm.   • ESOPHAGEAL DILATATION  07/03/2018    July 3, 2018--EGD revealed a distal esophageal stricture that was dilated to 18 mm.  There was a small hiatal hernia.  There was mild streaky erythema in the proximal stomach.  Duodenal bulb normal.   • INTERVENTIONAL RADIOLOGY PROCEDURE Bilateral 2/29/2020    Procedure: Pulmonary Angiogram;  Surgeon: Jason Griffiths MD;  Location: [facility] INVASIVE LOCATION;  Service: Cardiovascular;  Laterality: Bilateral;         No Known Allergies        Current Outpatient Medications:   •  allopurinol (ZYLOPRIM) 300 MG tablet, TAKE 1 TABLET EVERY DAY  FOR  GOUT, Disp: 90 tablet, Rfl: 3  •  carbidopa-levodopa (SINEMET)  MG per tablet, Take 1 tablet by mouth 2 (Two) Times a Day., Disp: , Rfl:   •  Cholecalciferol (vitamin D3) 125 MCG (5000 UT) capsule capsule, 1 by mouth daily as directed, Disp: 30 capsule, Rfl:   •  fluorometholone (FML) 0.1 % ophthalmic suspension, Administer 1 drop to both eyes 2 (Two) Times a Day., Disp: , Rfl:   •  furosemide (LASIX) 40 MG tablet, TAKE 1 TABLET EVERY DAY, Disp: 90 tablet, Rfl: 2  •  rivaroxaban (XARELTO) 15 MG tablet, Take 1 p.o. daily for blood thinner.  Take at same time every day., Disp: 90 tablet, Rfl: 3  •  escitalopram (LEXAPRO) 5 MG tablet, , Disp: , Rfl:   •  ketorolac (ACULAR) 0.5 % ophthalmic solution, , Disp: , Rfl:   •  metoprolol tartrate (LOPRESSOR) 25 MG tablet, TAKE 1 TABLET BY MOUTH EVERY 12 (TWELVE) HOURS., Disp: 180 tablet, Rfl: 3      Family History   Problem Relation Age of Onset   • No Known Problems Mother    • No Known Problems Father          Social History     Socioeconomic History   • Marital status:    • Number of children: 2   • Highest education level: 9th grade   Tobacco Use   • Smoking status: Never   • Smokeless tobacco: Never   Vaping Use   • Vaping Use: Never used   Substance and Sexual Activity   • Alcohol use: Never     Comment: occ   • Drug use: No   • Sexual  "activity: Not Currently     Partners: Female         Vitals:    03/27/23 1129 03/27/23 1144   BP: 124/56 124/60   BP Location: Left arm Right arm   Patient Position: Sitting Sitting   Cuff Size: Adult Adult   Pulse: (!) 49    SpO2: 97%    Weight: 87.5 kg (193 lb)    Height: 180.3 cm (71\")         Body mass index is 26.92 kg/m².      Physical Exam:    General: Alert and oriented x 3.  No acute distress.  Normal affect.  HEENT: Pupils equal, round, reactive to light; extraocular movements intact; sclerae nonicteric; pharynx, ear canals and TMs normal.  Neck: Without JVD, thyromegaly, bruit, or adenopathy.  Lungs: Clear to auscultation in all fields.  Heart: Regular rate and rhythm without murmur, rub, gallop, or click.  Abdomen: Soft, nontender, without hepatosplenomegaly or hernia.  Bowel sounds normal.  : Deferred.  Rectal: Deferred.  Extremities: Without clubbing, cyanosis, edema, or pulse deficit.  Neurologic: Intact without focal deficit.  Patient is nonambulatory and in a wheelchair today.  Skin: Without significant lesion.  Musculoskeletal: Unremarkable.    Lab/other results:    I reviewed the documentation from the emergency room visit as well as the procedure note performed by the ENT.    Assessment/Plan:     Diagnosis Plan   1. Epistaxis        2. Benign essential hypertension        3. History of stroke, 6/29/2016--4.9 x 4 x 3 cm inferior left cerebellar infarct          Patient had right-sided nosebleed which was perfused particular given the fact that he is on anticoagulation with Eliquis.  He had an angiodysplastic lesion that was cauterized by ENT and has had no further bleeding.  His blood pressure was significantly elevated while in the emergency room and I think this is related to the stress of that.  His blood pressure is absolutely fine.  I took his blood pressure personally and it is excellent as documented.    Plan is as follows: No change in current medical regimen.  Patient will keep " previously scheduled follow-up appointment            Procedures

## 2023-04-26 DIAGNOSIS — I48.0 PAROXYSMAL ATRIAL FIBRILLATION: ICD-10-CM

## 2023-04-26 RX ORDER — FUROSEMIDE 40 MG/1
TABLET ORAL
Qty: 90 TABLET | Refills: 2 | Status: SHIPPED | OUTPATIENT
Start: 2023-04-26

## 2023-05-01 ENCOUNTER — TELEPHONE (OUTPATIENT)
Dept: INTERNAL MEDICINE | Facility: CLINIC | Age: 88
End: 2023-05-01
Payer: MEDICARE

## 2023-05-01 DIAGNOSIS — Z74.1 REQUIRES ASSISTANCE WITH ACTIVITIES OF DAILY LIVING (ADL): ICD-10-CM

## 2023-05-01 DIAGNOSIS — R53.1 GENERALIZED WEAKNESS: Primary | ICD-10-CM

## 2023-05-01 NOTE — TELEPHONE ENCOUNTER
Caller: Lianne Ward    Relationship to patient: Emergency Contact    Best call back number: [phone]    Patient is needing: PATIENT'S POA IS CALLING TO STATE BOTH OF HIS GRANDPARENTS ARE IN NEED OF PERSONAL CARE, AND COOKING AND CLEANING.  HE STATES HIS GRANDMOTHER IS TRYING TO DO THESE THINGS FOR BOTH OF THERM, BUT IT IT GETTING MORE DIFFICULT.  LIANNE IS WANTING TO KNOW IF ANY OF THOSE SERVICES ARE AVAILABLE.  IF SO HE WANTS TO KNOW HOW TO GO ABOUT MAKING THOSE HAPPEN AND IF DR AZUL WOULD BE ABLE TO HELP GET THOSE STARTED.    PLEASE ADVISE.

## 2023-05-02 ENCOUNTER — HOME HEALTH ADMISSION (OUTPATIENT)
Dept: HOME HEALTH SERVICES | Facility: HOME HEALTHCARE | Age: 88
End: 2023-05-02
Payer: MEDICARE

## 2023-05-03 ENCOUNTER — LAB (OUTPATIENT)
Dept: LAB | Facility: HOSPITAL | Age: 88
End: 2023-05-03
Payer: MEDICARE

## 2023-05-03 ENCOUNTER — HOSPITAL ENCOUNTER (OUTPATIENT)
Dept: GENERAL RADIOLOGY | Facility: HOSPITAL | Age: 88
Discharge: HOME | End: 2023-05-03
Payer: MEDICARE

## 2023-05-03 ENCOUNTER — OFFICE VISIT (OUTPATIENT)
Dept: INTERNAL MEDICINE | Facility: CLINIC | Age: 88
End: 2023-05-03
Payer: MEDICARE

## 2023-05-03 VITALS
RESPIRATION RATE: 18 BRPM | TEMPERATURE: 97 F | OXYGEN SATURATION: 93 % | HEART RATE: 46 BPM | WEIGHT: 192 LBS | HEIGHT: 71 IN | DIASTOLIC BLOOD PRESSURE: 82 MMHG | BODY MASS INDEX: 26.88 KG/M2 | SYSTOLIC BLOOD PRESSURE: 124 MMHG

## 2023-05-03 DIAGNOSIS — R44.1 VISUAL HALLUCINATIONS: ICD-10-CM

## 2023-05-03 DIAGNOSIS — R05.8 PRODUCTIVE COUGH: Primary | ICD-10-CM

## 2023-05-03 PROBLEM — R04.0 EPISTAXIS: Status: RESOLVED | Noted: 2023-03-27 | Resolved: 2023-05-03

## 2023-05-03 LAB
ALBUMIN SERPL-MCNC: 3.5 G/DL (ref 3.5–5.2)
ALBUMIN/GLOB SERPL: 1.3 G/DL
ALP SERPL-CCNC: 72 U/L (ref 39–117)
ALT SERPL W P-5'-P-CCNC: 5 U/L (ref 1–41)
ANION GAP SERPL CALCULATED.3IONS-SCNC: 7.9 MMOL/L (ref 5–15)
AST SERPL-CCNC: 11 U/L (ref 1–40)
BASOPHILS # BLD AUTO: 0.06 10*3/MM3 (ref 0–0.2)
BASOPHILS NFR BLD AUTO: 0.7 % (ref 0–1.5)
BILIRUB SERPL-MCNC: 0.5 MG/DL (ref 0–1.2)
BUN SERPL-MCNC: 19 MG/DL (ref 8–23)
BUN/CREAT SERPL: 14.4 (ref 7–25)
CALCIUM SPEC-SCNC: 9.4 MG/DL (ref 8.2–9.6)
CHLORIDE SERPL-SCNC: 104 MMOL/L (ref 98–107)
CO2 SERPL-SCNC: 25.1 MMOL/L (ref 22–29)
CREAT SERPL-MCNC: 1.32 MG/DL (ref 0.76–1.27)
DEPRECATED RDW RBC AUTO: 35.9 FL (ref 37–54)
EGFRCR SERPLBLD CKD-EPI 2021: 50.9 ML/MIN/1.73
EOSINOPHIL # BLD AUTO: 0.18 10*3/MM3 (ref 0–0.4)
EOSINOPHIL NFR BLD AUTO: 2.1 % (ref 0.3–6.2)
ERYTHROCYTE [DISTWIDTH] IN BLOOD BY AUTOMATED COUNT: 12.2 % (ref 12.3–15.4)
GLOBULIN UR ELPH-MCNC: 2.7 GM/DL
GLUCOSE SERPL-MCNC: 93 MG/DL (ref 65–99)
HCT VFR BLD AUTO: 39.7 % (ref 37.5–51)
HGB BLD-MCNC: 13.1 G/DL (ref 13–17.7)
IMM GRANULOCYTES # BLD AUTO: 0.04 10*3/MM3 (ref 0–0.05)
IMM GRANULOCYTES NFR BLD AUTO: 0.5 % (ref 0–0.5)
LYMPHOCYTES # BLD AUTO: 2.5 10*3/MM3 (ref 0.7–3.1)
LYMPHOCYTES NFR BLD AUTO: 28.7 % (ref 19.6–45.3)
MCH RBC QN AUTO: 27 PG (ref 26.6–33)
MCHC RBC AUTO-ENTMCNC: 33 G/DL (ref 31.5–35.7)
MCV RBC AUTO: 81.9 FL (ref 79–97)
MONOCYTES # BLD AUTO: 1.22 10*3/MM3 (ref 0.1–0.9)
MONOCYTES NFR BLD AUTO: 14 % (ref 5–12)
NEUTROPHILS NFR BLD AUTO: 4.72 10*3/MM3 (ref 1.7–7)
NEUTROPHILS NFR BLD AUTO: 54 % (ref 42.7–76)
NRBC BLD AUTO-RTO: 0 /100 WBC (ref 0–0.2)
PLATELET # BLD AUTO: 237 10*3/MM3 (ref 140–450)
PMV BLD AUTO: 11.5 FL (ref 6–12)
POTASSIUM SERPL-SCNC: 4.2 MMOL/L (ref 3.5–5.2)
PROT SERPL-MCNC: 6.2 G/DL (ref 6–8.5)
RBC # BLD AUTO: 4.85 10*6/MM3 (ref 4.14–5.8)
SODIUM SERPL-SCNC: 137 MMOL/L (ref 136–145)
WBC NRBC COR # BLD: 8.72 10*3/MM3 (ref 3.4–10.8)

## 2023-05-03 PROCEDURE — 80053 COMPREHEN METABOLIC PANEL: CPT | Performed by: INTERNAL MEDICINE

## 2023-05-03 PROCEDURE — 36415 COLL VENOUS BLD VENIPUNCTURE: CPT | Performed by: INTERNAL MEDICINE

## 2023-05-03 PROCEDURE — 71046 X-RAY EXAM CHEST 2 VIEWS: CPT

## 2023-05-03 PROCEDURE — 1159F MED LIST DOCD IN RCRD: CPT | Performed by: INTERNAL MEDICINE

## 2023-05-03 PROCEDURE — 1160F RVW MEDS BY RX/DR IN RCRD: CPT | Performed by: INTERNAL MEDICINE

## 2023-05-03 PROCEDURE — 85025 COMPLETE CBC W/AUTO DIFF WBC: CPT | Performed by: INTERNAL MEDICINE

## 2023-05-03 PROCEDURE — 99214 OFFICE O/P EST MOD 30 MIN: CPT | Performed by: INTERNAL MEDICINE

## 2023-05-03 RX ORDER — AZITHROMYCIN 250 MG/1
TABLET, FILM COATED ORAL
Qty: 6 TABLET | Refills: 0 | Status: SHIPPED | OUTPATIENT
Start: 2023-05-03

## 2023-05-03 RX ORDER — METHYLPREDNISOLONE 4 MG/1
TABLET ORAL
Qty: 21 TABLET | Refills: 0 | Status: SHIPPED | OUTPATIENT
Start: 2023-05-03

## 2023-05-03 NOTE — PROGRESS NOTES
05/03/2023    Patient Information  Izaiah Garcia                                                                                          [id number] [place]  [facility] [address]      8/31/1931  [unfilled]  There is no work phone number on file.    Chief Complaint:       History of Present Illness:      ROS    Active Problems:    Patient Active Problem List   Diagnosis   • Hyperlipidemia   • Benign essential hypertension   • History of gout   • History of esophageal stricture   • History of stroke, 6/29/2016--4.9 x 4 x 3 cm inferior left cerebellar infarct   • Rheumatoid factor positive   • Impaired fasting glucose   • At risk for falls   • Bilateral high frequency sensorineural hearing loss, wears hearing aids   • Bilateral lower extremity edema   • Parkinson's disease   • Therapeutic drug monitoring   • Vitamin D deficiency   • Macrocytosis   • Vitamin B12 deficiency   • History of pulmonary embolus (PE)   • Chronic anticoagulation, Eliquis.  Saddle pulmonary embolism   • Paroxysmal atrial fibrillation   • Benign prostatic hyperplasia without lower urinary tract symptoms   • Statin intolerance, muscle pain and cramps   • Chronic venous insufficiency   • Petechiae or ecchymoses, both lower extremities.  Patient on Xarelto.   • Productive cough   • Visual hallucinations         Past Medical History:   Diagnosis Date   • Acute saddle pulmonary embolism with acute cor pulmonale 2/29/2020    Admit date: 2/29/2020 Discharge date:3/20/2020 Discharged Condition: good   Discharge Diagnoses:   Acute saddle pulmonary embolism with acute cor pulmonale (CMS/HCC)   Pulmonary emboli (CMS/HCC)   Empyema of pleura (CMS/HCC)  Acute hypoxic respiratory failure BPE s/p ekos catheter on 2/29/2020 with local TPA infusion with good clinical response Troponemia suspect due to BPE RV strain Bilateral ple   • At risk for falls 5/2/2019   • Benign essential hypertension 6/29/2016   • Benign prostatic hyperplasia without lower  "urinary tract symptoms 12/3/2020   • Bilateral high frequency sensorineural hearing loss, wears hearing aids 5/2/2019   • Bilateral lower extremity edema 5/2/2019    May 2, 2019--patient who has hypertension and is on amlodipine 10 mg/day is complaining of progressively worsening swelling of the lower extremities that extends up to about mid calf.  Gets worse at the end of the day and tends to get better overnight when he props his feet up.  I think this is definitely related to the amlodipine although patient may have some venous insufficiency.  Medication ad   • Chronic anticoagulation, Eliquis.  Saddle pulmonary embolism 5/27/2020   • Decreased peripheral vision of both eyes 5/2/2019    May 2, 2019--continues to have complaints of \"dizziness\" associated with weakness in his lower extremities.  He is not describing a spinning sensation or anything remotely close to vertigo.  Instead he is describing an off-balance sensation.  He tends to fall forward if not careful and he tends to list towards the right side.  He has marked difficulty going down an incline.  He has to ambulate wit   • History of aspiration pneumonia 5/4/2015   • History of esophageal stricture 5/4/2015    July 3, 2018--EGD revealed a distal esophageal stricture that was dilated to 18 mm.  There was a small hiatal hernia.  There was mild streaky erythema in the proximal stomach.  Duodenal bulb normal.  April 26, 2016--EGD performed for dysphagia reveals a distal esophageal stricture that was dilated 16.5 mm.   • History of gout 6/29/2016   • History of pulmonary embolus (PE) 2/29/2020    Admit date: 2/29/2020 Discharge date:3/20/2020 Discharged Condition: good   Discharge Diagnoses:   Acute saddle pulmonary embolism with acute cor pulmonale (CMS/HCC)   Pulmonary emboli (CMS/HCC)   Empyema of pleura (CMS/HCC)  Acute hypoxic respiratory failure BPE s/p ekos catheter on 2/29/2020 with local TPA infusion with good clinical response Troponemia suspect " "due to BPE RV strain Bilateral ple   • History of stroke, 6/29/2016--4.9 x 4 x 3 cm inferior left cerebellar infarct 5/4/2015 June 29, 2016--MRI of the brain performed for complaints of dizziness and weakness. IMPRESSION: 1. There is susceptibility artifact streaking through the anterior left frontal scalp through the anterior left frontal bone in the anterior tipof the left frontal lobe likely from a tiny piece of metal in the anterior left frontal scalp slightly limits evaluation of these structures. 2. There is mild s   • Hyperlipidemia 6/29/2016   • Impaired fasting glucose 5/2/2019 April 14, 2015--hemoglobin A1c 5.9.   • Macrocytosis 5/2/2019   • Parkinson's disease 5/2/2019    May 2, 2019--continues to have complaints of \"dizziness\" associated with weakness in his lower extremities.  He is not describing a spinning sensation or anything remotely close to vertigo.  Instead he is describing an off-balance sensation.  He tends to fall forward if not careful and he tends to list towards the right side.  He has marked difficulty going down an incline.  He has to ambulate wit   • Rheumatoid factor positive 5/2/2019 March 25, 2014--rheumatoid factor returned positive at 95.  However, CCP antibodies normal at 8, SHIV negative, both C&P ANCA negative, C-reactive protein normal at 0.24, sed rate normal at 2.   • Statin intolerance, muscle pain and cramps 12/2/2021   • Vitamin B12 deficiency 6/6/2019 June 6, 2019--patient recently had mildly elevated methylmalonic acid of 380.  Repeat methylmalonic acid was upper limit of normal at 356.  Given patient's neurologic symptoms and suspected parkinsonism, I have a low threshold for treating vitamin B12 deficiency.  We will initiate vitamin B12 injections today.  2000 mcg subcutaneously given today.   • Vitamin D deficiency 5/2/2019         Past Surgical History:   Procedure Laterality Date   • CARDIAC CATHETERIZATION N/A 2/29/2020    Procedure: Thrombolytic " Therapy- EKOS;  Surgeon: Jason Griffiths MD;  Location:  [facility] INVASIVE LOCATION;  Service: Cardiovascular;  Laterality: N/A;   • CATARACT EXTRACTION EXTRACAPSULAR W/ INTRAOCULAR LENS IMPLANTATION Bilateral     Bilateral cataract extirpation with intraocular lens implantation   • CHOLECYSTECTOMY     • EKOS CATHETER PLACEMENT Bilateral 2/29/2020    Procedure: Ekos catheter placement;  Surgeon: Jason Griffiths MD;  Location:  [facility] INVAS[facility] LOCATION;  Service: Cardiovascular;  Laterality: Bilateral;   • ESOPHAGEAL DILATATION  04/26/2016 April 26, 2016--EGD performed for dysphagia reveals a distal esophageal stricture that was dilated 16.5 mm.   • ESOPHAGEAL DILATATION  07/03/2018    July 3, 2018--EGD revealed a distal esophageal stricture that was dilated to 18 mm.  There was a small hiatal hernia.  There was mild streaky erythema in the proximal stomach.  Duodenal bulb normal.   • INTERVENTIONAL RADIOLOGY PROCEDURE Bilateral 2/29/2020    Procedure: Pulmonary Angiogram;  Surgeon: Jason Griffiths MD;  Location: [facility] CATH INVASIVE LOCATION;  Service: Cardiovascular;  Laterality: Bilateral;         No Known Allergies        Current Outpatient Medications:   •  allopurinol (ZYLOPRIM) 300 MG tablet, TAKE 1 TABLET EVERY DAY  FOR  GOUT, Disp: 90 tablet, Rfl: 3  •  carbidopa-levodopa (SINEMET)  MG per tablet, Take 1 tablet by mouth 2 (Two) Times a Day., Disp: , Rfl:   •  Cholecalciferol (vitamin D3) 125 MCG (5000 UT) capsule capsule, 1 by mouth daily as directed, Disp: 30 capsule, Rfl:   •  fluorometholone (FML) 0.1 % ophthalmic suspension, Administer 1 drop to both eyes 2 (Two) Times a Day., Disp: , Rfl:   •  furosemide (LASIX) 40 MG tablet, TAKE 1 TABLET EVERY DAY, Disp: 90 tablet, Rfl: 2  •  ketorolac (ACULAR) 0.5 % ophthalmic solution, , Disp: , Rfl:   •  metoprolol tartrate (LOPRESSOR) 25 MG tablet, TAKE 1 TABLET BY MOUTH EVERY 12 (TWELVE) HOURS., Disp: 180 tablet, Rfl: 3  •  Nutritional  "Supplements (COLD AND FLU PO), Take  by mouth., Disp: , Rfl:   •  rivaroxaban (XARELTO) 15 MG tablet, Take 1 p.o. daily for blood thinner.  Take at same time every day., Disp: 90 tablet, Rfl: 3  •  azithromycin (Zithromax) 250 MG tablet, Take 2 p.o. now and then 1 daily for 4 more days., Disp: 6 tablet, Rfl: 0  •  methylPREDNISolone (MEDROL) 4 MG dose pack, Take as directed on package instructions., Disp: 21 tablet, Rfl: 0      Family History   Problem Relation Age of Onset   • No Known Problems Mother    • No Known Problems Father          Social History     Socioeconomic History   • Marital status:    • Number of children: 2   • Highest education level: 9th grade   Tobacco Use   • Smoking status: Never   • Smokeless tobacco: Never   Vaping Use   • Vaping Use: Never used   Substance and Sexual Activity   • Alcohol use: Never     Comment: occ   • Drug use: No   • Sexual activity: Not Currently     Partners: Female         Vitals:    05/03/23 1031   BP: 124/82   Pulse: (!) 46   Resp: 18   Temp: 97 °F (36.1 °C)   SpO2: 93%   Weight: 87.1 kg (192 lb)   Height: 180 cm (70.87\")        Body mass index is 26.88 kg/m².      Physical Exam:    General: Alert and oriented x 2.  No acute distress.  Normal affect.  HEENT: Pupils equal, round, reactive to light; extraocular movements intact; sclerae nonicteric; pharynx, ear canals and TMs normal.  Neck: Without JVD, thyromegaly, bruit, or adenopathy.  Lungs: Decreased breath sounds bilaterally.  No definite consolidation.  Does not seem to be very much in the way of airflow bilaterally.  Heart: Regular rate and rhythm without murmur, rub, gallop, or click.  Abdomen: Soft, nontender, without hepatosplenomegaly or hernia.  Bowel sounds normal.  : Deferred.  Rectal: Deferred.  Extremities: Without clubbing, cyanosis, edema, or pulse deficit.  Neurologic: Intact without focal deficit.  Patient is nonambulatory and is in a wheelchair.  Skin: Without significant lesion.  " Musculoskeletal: Unremarkable.    Lab/other results:      Assessment/Plan:     Diagnosis Plan   1. Productive cough  CBC & Differential    Comprehensive Metabolic Panel    XR Chest PA & Lateral    azithromycin (Zithromax) 250 MG tablet    methylPREDNISolone (MEDROL) 4 MG dose pack      2. Visual hallucinations  CBC & Differential    Comprehensive Metabolic Panel        May 3, 2023--patient presents with about a 1 week history of a cough which is scantly productive of clearish phlegm.  Just last night he started having some visual hallucinations which continued into the morning but then resolved in the afternoon.  He was seeing cats, ants on, bumblebee's and the like.  Also was refusing to take his medications.  He had no fever or chills.  He appeared to be short of breath this morning per his wife.  On exam he seems oriented times at least 2.  Does not appear to be in any overt distress.  His pulse is 46 and his baseline is generally low 50s.  His blood pressure looks good at 124/82 but his oxygen saturation is down from his baseline and is currently 93%.  At the last visit in March it was 97%.  On exam his lungs are difficult to examine as he does not take in a very deep breath when asked to.  There does not seem to be very good airflow bilaterally but I do not notice any definite consolidation.      Plan is as follows: Empiric treatment with Z-Norman and Medrol Dosepak.  We will contact patient in the morning with the results of the x-ray.  I will also obtain a CBC with differential and CMP.  He is not having any urinary symptoms at the present time and I will do a urinalysis or urine culture.  Possible further to follow.            Procedures

## 2023-06-12 NOTE — TELEPHONE ENCOUNTER
Miguelito from [facility] called and asked if it's ok for the pt to have Nursing and OT. She said the pt was released from the hospital with colitis, a UTI, and pneumonia   Posterior Auricular Interpolation Flap Text: A decision was made to reconstruct the defect utilizing an interpolation axial flap and a staged reconstruction.  A telfa template was made of the defect.  This telfa template was then used to outline the posterior auricular interpolation flap.  The donor area for the pedicle flap was then injected with anesthesia.  The flap was excised through the skin and subcutaneous tissue down to the layer of the underlying musculature.  The pedicle flap was carefully excised within this deep plane to maintain its blood supply.  The edges of the donor site were undermined.   The donor site was closed in a primary fashion.  The pedicle was then rotated into position and sutured.  Once the tube was sutured into place, adequate blood supply was confirmed with blanching and refill.  The pedicle was then wrapped with xeroform gauze and dressed appropriately with a telfa and gauze bandage to ensure continued blood supply and protect the attached pedicle.

## 2023-06-16 ENCOUNTER — TELEPHONE (OUTPATIENT)
Dept: INTERNAL MEDICINE | Facility: CLINIC | Age: 88
End: 2023-06-16

## 2023-06-16 NOTE — TELEPHONE ENCOUNTER
Caller: JM    Relationship: [employer]    Best call back number: [phone]     What was the call regarding: JM WILL BE FAXING ORDERS NEEDING TO BE SIGNED AND SENT BACK. 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

## 2023-08-28 NOTE — TELEPHONE ENCOUNTER
Bryant Fleming MD         Patient Information   Date:3/6/2023   Name : Kwabena Madrigal 39 y.o.       YOB: 1981                 Chief Complaint   Patient presents with    Diabetes           History of Present Illness: Kwabena Madrigal is a 39 y.o. female here  for fu of  Type 2 Diabetes Mellitus . She was diagnosed with diabetes in February 2016.      She is on metformin, discontinued trulicity due to nausea in July 2023   Lifting weights   No vision issues   No chest pain    No issues with feet   Staying hydrated   Not planning any pregnancy - tubal ligation     Maintaining weight ,goal weight is 160 lbs    No hypoglycemia                Review of Systems:    Per HPI      Physical Examination:      General: pleasant, no distress, good eye contact    HEENT: no exophthalmos, no periorbital edema, EOMI    CVS -S1-S2 regular    RS: Normal respiratory effort    Musculoskeletal: no tremors    Neurological: alert and oriented    Psychiatric: normal mood and affect    Skin: Normal color      Diabetic foot exam: Sept 2021      Left:      Vibratory sensation normal     Filament test normal sensation with micro filament    Pulse DP: 1+     Deformities: None   Right:     Vibratory sensation normal    Filament test normal sensation with micro filament    Pulse DP: 1+    Deformities: None         Data Reviewed:          Lab Results   Component Value Date    GFRAA 98 01/19/2022        Lab Results   Component Value Date    LABA1C 5.7 (H) 08/21/2023    LABA1C 5.7 (H) 02/27/2023    LABA1C 5.3 01/19/2022         Lab Results   Component Value Date     08/21/2023    K 4.6 08/21/2023     08/21/2023    CO2 25 08/21/2023    BUN 12 08/21/2023    CREATININE 0.78 08/21/2023    GFRAA 98 01/19/2022    GLUCOSE 90 08/21/2023    CALCIUM 9.1 08/21/2023    MALBCR <16 02/27/2023              Assessment/Plan:                 1. Type 2 Diabetes Mellitus      Lab Friday will be fine.  That blood pressure is not dangerous in the short run.

## 2023-09-12 ENCOUNTER — OFFICE VISIT (OUTPATIENT)
Dept: INTERNAL MEDICINE | Facility: CLINIC | Age: 88
End: 2023-09-12
Payer: MEDICARE

## 2023-09-12 VITALS
HEART RATE: 73 BPM | HEIGHT: 71 IN | TEMPERATURE: 96.7 F | SYSTOLIC BLOOD PRESSURE: 118 MMHG | BODY MASS INDEX: 26.6 KG/M2 | OXYGEN SATURATION: 97 % | DIASTOLIC BLOOD PRESSURE: 70 MMHG | WEIGHT: 190 LBS

## 2023-09-12 DIAGNOSIS — I10 BENIGN ESSENTIAL HYPERTENSION: Chronic | ICD-10-CM

## 2023-09-12 DIAGNOSIS — G20 PARKINSON'S DISEASE: Chronic | ICD-10-CM

## 2023-09-12 DIAGNOSIS — R23.3 PETECHIAE OR ECCHYMOSES: Chronic | ICD-10-CM

## 2023-09-12 DIAGNOSIS — E78.2 MIXED HYPERLIPIDEMIA: Chronic | ICD-10-CM

## 2023-09-12 DIAGNOSIS — Z78.9 STATIN INTOLERANCE: Chronic | ICD-10-CM

## 2023-09-12 DIAGNOSIS — Z86.711 HISTORY OF PULMONARY EMBOLUS (PE): Chronic | ICD-10-CM

## 2023-09-12 DIAGNOSIS — R60.0 BILATERAL LOWER EXTREMITY EDEMA: Chronic | ICD-10-CM

## 2023-09-12 DIAGNOSIS — H90.3 BILATERAL HIGH FREQUENCY SENSORINEURAL HEARING LOSS: Chronic | ICD-10-CM

## 2023-09-12 DIAGNOSIS — R76.8 RHEUMATOID FACTOR POSITIVE: Chronic | ICD-10-CM

## 2023-09-12 DIAGNOSIS — Z79.01 CHRONIC ANTICOAGULATION: Chronic | ICD-10-CM

## 2023-09-12 DIAGNOSIS — Z87.39 HISTORY OF GOUT: Chronic | ICD-10-CM

## 2023-09-12 DIAGNOSIS — E53.8 VITAMIN B12 DEFICIENCY: Chronic | ICD-10-CM

## 2023-09-12 DIAGNOSIS — Z00.00 ENCOUNTER FOR SUBSEQUENT ANNUAL WELLNESS VISIT (AWV) IN MEDICARE PATIENT: Primary | ICD-10-CM

## 2023-09-12 DIAGNOSIS — I48.0 PAROXYSMAL ATRIAL FIBRILLATION: Chronic | ICD-10-CM

## 2023-09-12 DIAGNOSIS — Z87.19 HISTORY OF ESOPHAGEAL STRICTURE: Chronic | ICD-10-CM

## 2023-09-12 DIAGNOSIS — Z91.81 AT RISK FOR FALLS: Chronic | ICD-10-CM

## 2023-09-12 DIAGNOSIS — E55.9 VITAMIN D DEFICIENCY: Chronic | ICD-10-CM

## 2023-09-12 DIAGNOSIS — N40.0 BENIGN PROSTATIC HYPERPLASIA WITHOUT LOWER URINARY TRACT SYMPTOMS: Chronic | ICD-10-CM

## 2023-09-12 DIAGNOSIS — D75.89 MACROCYTOSIS: Chronic | ICD-10-CM

## 2023-09-12 DIAGNOSIS — Z51.81 THERAPEUTIC DRUG MONITORING: ICD-10-CM

## 2023-09-12 DIAGNOSIS — I87.2 CHRONIC VENOUS INSUFFICIENCY: Chronic | ICD-10-CM

## 2023-09-12 DIAGNOSIS — Z86.73 HISTORY OF STROKE: Chronic | ICD-10-CM

## 2023-09-12 PROBLEM — R05.8 PRODUCTIVE COUGH: Status: RESOLVED | Noted: 2023-05-03 | Resolved: 2023-09-12

## 2023-09-12 PROBLEM — R44.1 VISUAL HALLUCINATIONS: Status: RESOLVED | Noted: 2023-05-03 | Resolved: 2023-09-12

## 2023-09-14 DIAGNOSIS — Z87.39 HISTORY OF GOUT: Chronic | ICD-10-CM

## 2023-09-14 RX ORDER — ALLOPURINOL 300 MG/1
TABLET ORAL
Qty: 90 TABLET | Refills: 3 | Status: SHIPPED | OUTPATIENT
Start: 2023-09-14

## 2023-09-18 LAB
25(OH)D3+25(OH)D2 SERPL-MCNC: 91.4 NG/ML (ref 30–100)
ALBUMIN SERPL-MCNC: 3.9 G/DL (ref 3.6–4.6)
ALBUMIN/GLOB SERPL: 1.6 {RATIO} (ref 1.2–2.2)
ALP SERPL-CCNC: 70 IU/L (ref 44–121)
ALT SERPL-CCNC: 7 IU/L (ref 0–44)
AST SERPL-CCNC: 10 IU/L (ref 0–40)
BILIRUB SERPL-MCNC: 1.1 MG/DL (ref 0–1.2)
BUN SERPL-MCNC: 19 MG/DL (ref 10–36)
BUN/CREAT SERPL: 16 (ref 10–24)
CALCIUM SERPL-MCNC: 9.5 MG/DL (ref 8.6–10.2)
CHLORIDE SERPL-SCNC: 105 MMOL/L (ref 96–106)
CHOLEST SERPL-MCNC: 143 MG/DL (ref 100–199)
CO2 SERPL-SCNC: 24 MMOL/L (ref 20–29)
CREAT SERPL-MCNC: 1.19 MG/DL (ref 0.76–1.27)
EGFRCR SERPLBLD CKD-EPI 2021: 57 ML/MIN/1.73
ERYTHROCYTE [DISTWIDTH] IN BLOOD BY AUTOMATED COUNT: 14 % (ref 11.6–15.4)
GLOBULIN SER CALC-MCNC: 2.4 G/DL (ref 1.5–4.5)
GLUCOSE SERPL-MCNC: 82 MG/DL (ref 70–99)
HCT VFR BLD AUTO: 47.1 % (ref 37.5–51)
HCYS SERPL-SCNC: 20.1 UMOL/L (ref 0–21.3)
HDL SERPL-SCNC: 22.1 UMOL/L
HDLC SERPL-MCNC: 32 MG/DL
HGB BLD-MCNC: 14.9 G/DL (ref 13–17.7)
LDL SERPL QN: 20.4 NM
LDL SERPL-SCNC: 1038 NMOL/L
LDL SMALL SERPL-SCNC: 583 NMOL/L
LDLC SERPL CALC-MCNC: 89 MG/DL (ref 0–99)
MCH RBC QN AUTO: 26.5 PG (ref 26.6–33)
MCHC RBC AUTO-ENTMCNC: 31.6 G/DL (ref 31.5–35.7)
MCV RBC AUTO: 84 FL (ref 79–97)
METHYLMALONATE SERPL-SCNC: 381 NMOL/L (ref 0–378)
PLATELET # BLD AUTO: 286 X10E3/UL (ref 150–450)
POTASSIUM SERPL-SCNC: 4.1 MMOL/L (ref 3.5–5.2)
PROT SERPL-MCNC: 6.3 G/DL (ref 6–8.5)
RBC # BLD AUTO: 5.62 X10E6/UL (ref 4.14–5.8)
SODIUM SERPL-SCNC: 142 MMOL/L (ref 134–144)
T3FREE SERPL-MCNC: 2.8 PG/ML (ref 2–4.4)
T4 FREE SERPL-MCNC: 1.57 NG/DL (ref 0.82–1.77)
TRIGL SERPL-MCNC: 123 MG/DL (ref 0–149)
TSH SERPL DL<=0.005 MIU/L-ACNC: 0.62 UIU/ML (ref 0.45–4.5)
URATE SERPL-MCNC: 4.1 MG/DL (ref 3.8–8.4)
WBC # BLD AUTO: 11 X10E3/UL (ref 3.4–10.8)

## 2023-10-18 DIAGNOSIS — Z86.711 HISTORY OF PULMONARY EMBOLUS (PE): Chronic | ICD-10-CM

## 2023-10-24 DIAGNOSIS — Z86.711 HISTORY OF PULMONARY EMBOLUS (PE): Chronic | ICD-10-CM

## 2023-11-30 ENCOUNTER — OFFICE VISIT (OUTPATIENT)
Dept: CARDIOLOGY | Facility: CLINIC | Age: 88
End: 2023-11-30
Payer: MEDICARE

## 2023-11-30 VITALS
WEIGHT: 187 LBS | HEIGHT: 70 IN | DIASTOLIC BLOOD PRESSURE: 74 MMHG | BODY MASS INDEX: 26.77 KG/M2 | SYSTOLIC BLOOD PRESSURE: 132 MMHG | HEART RATE: 98 BPM

## 2023-11-30 DIAGNOSIS — I10 BENIGN ESSENTIAL HYPERTENSION: Chronic | ICD-10-CM

## 2023-11-30 DIAGNOSIS — I49.3 FREQUENT PVCS: ICD-10-CM

## 2023-11-30 DIAGNOSIS — Z86.711 HISTORY OF PULMONARY EMBOLUS (PE): Chronic | ICD-10-CM

## 2023-11-30 DIAGNOSIS — I48.11 LONGSTANDING PERSISTENT ATRIAL FIBRILLATION: Primary | ICD-10-CM

## 2023-11-30 PROCEDURE — 99214 OFFICE O/P EST MOD 30 MIN: CPT | Performed by: NURSE PRACTITIONER

## 2023-11-30 PROCEDURE — 1160F RVW MEDS BY RX/DR IN RCRD: CPT | Performed by: NURSE PRACTITIONER

## 2023-11-30 PROCEDURE — 1159F MED LIST DOCD IN RCRD: CPT | Performed by: NURSE PRACTITIONER

## 2023-11-30 PROCEDURE — 93000 ELECTROCARDIOGRAM COMPLETE: CPT | Performed by: NURSE PRACTITIONER

## 2023-11-30 NOTE — PROGRESS NOTES
Date of Office Visit: 2023  Encounter Provider: EBEN Aguirre  Place of Service: [facility] CARDIOLOGY  Patient Name: Izaiah Garcia  :1931    Chief Complaint   Patient presents with    Atrial Fibrillation   :     HPI: Izaiah Garcia is a 89 y.o. male patient of Dr. Griffiths's who suffered a submassive pulmonary embolus in  which was treated with EKOS therapy.  He has persistent atrial fibrillation and has also had a prior stroke.  Long-term anticoagulation has been recommended.    He was last seen in the office by Dr. Griffiths in 2022 at which time he was doing well.  No changes were made to his regimen, and he was advised to follow-up in 1 year.    In , [facility] called the office to report low heart rates in the 40s.  Dr. Griffiths advised he stop the metoprolol.    Overall he has been feeling well.  He denies any chest pain, shortness of breath, palpitations, edema, dizziness, syncope, bleeding difficulties or melena.  The wife reports he has fallen twice in the last month.  The first time he simply lost his balance and fell backwards.  He did hit his head and unfortunately he did not go to the emergency room.  The second fall he actually fell out of his bed trying to reach something on the nightstand.  The wife reports a lot of recent stress.  She was sadly the victim of a scam and lost a significant amount of money.  She also buried her sister yesterday.    Past Medical History:   Diagnosis Date    Acute saddle pulmonary embolism with acute cor pulmonale 2020    Admit date: 2020 Discharge date:3/20/2020 Discharged Condition: good   Discharge Diagnoses:   Acute saddle pulmonary embolism with acute cor pulmonale (CMS/HCC)   Pulmonary emboli (CMS/HCC)   Empyema of pleura (CMS/HCC)  Acute hypoxic respiratory failure BPE s/p ekos catheter on 2020 with local TPA infusion with good clinical response Troponemia suspect due to BPE RV  "strain Bilateral ple    At risk for falls 5/2/2019    Benign essential hypertension 6/29/2016    Benign prostatic hyperplasia without lower urinary tract symptoms 12/3/2020    Bilateral high frequency sensorineural hearing loss, wears hearing aids 5/2/2019    Bilateral lower extremity edema 5/2/2019    May 2, 2019--patient who has hypertension and is on amlodipine 10 mg/day is complaining of progressively worsening swelling of the lower extremities that extends up to about mid calf.  Gets worse at the end of the day and tends to get better overnight when he props his feet up.  I think this is definitely related to the amlodipine although patient may have some venous insufficiency.  Medication ad    Chronic anticoagulation, Eliquis.  Saddle pulmonary embolism 5/27/2020    Decreased peripheral vision of both eyes 5/2/2019    May 2, 2019--continues to have complaints of \"dizziness\" associated with weakness in his lower extremities.  He is not describing a spinning sensation or anything remotely close to vertigo.  Instead he is describing an off-balance sensation.  He tends to fall forward if not careful and he tends to list towards the right side.  He has marked difficulty going down an incline.  He has to ambulate wit    History of aspiration pneumonia 5/4/2015    History of esophageal stricture 5/4/2015    July 3, 2018--EGD revealed a distal esophageal stricture that was dilated to 18 mm.  There was a small hiatal hernia.  There was mild streaky erythema in the proximal stomach.  Duodenal bulb normal.  April 26, 2016--EGD performed for dysphagia reveals a distal esophageal stricture that was dilated 16.5 mm.    History of gout 6/29/2016    History of pulmonary embolus (PE) 2/29/2020    Admit date: 2/29/2020 Discharge date:3/20/2020 Discharged Condition: good   Discharge Diagnoses:   Acute saddle pulmonary embolism with acute cor pulmonale (CMS/HCC)   Pulmonary emboli (CMS/HCC)   Empyema of pleura (CMS/HCC)  Acute " "hypoxic respiratory failure BPE s/p ekos catheter on 2/29/2020 with local TPA infusion with good clinical response Troponemia suspect due to BPE RV strain Bilateral ple    History of stroke, 6/29/2016--4.9 x 4 x 3 cm inferior left cerebellar infarct 5/4/2015 June 29, 2016--MRI of the brain performed for complaints of dizziness and weakness. IMPRESSION: 1. There is susceptibility artifact streaking through the anterior left frontal scalp through the anterior left frontal bone in the anterior tipof the left frontal lobe likely from a tiny piece of metal in the anterior left frontal scalp slightly limits evaluation of these structures. 2. There is mild s    Hyperlipidemia 6/29/2016    Impaired fasting glucose 5/2/2019 April 14, 2015--hemoglobin A1c 5.9.    Macrocytosis 5/2/2019    Parkinson's disease 5/2/2019    May 2, 2019--continues to have complaints of \"dizziness\" associated with weakness in his lower extremities.  He is not describing a spinning sensation or anything remotely close to vertigo.  Instead he is describing an off-balance sensation.  He tends to fall forward if not careful and he tends to list towards the right side.  He has marked difficulty going down an incline.  He has to ambulate wit    Rheumatoid factor positive 5/2/2019 March 25, 2014--rheumatoid factor returned positive at 95.  However, CCP antibodies normal at 8, SHIV negative, both C&P ANCA negative, C-reactive protein normal at 0.24, sed rate normal at 2.    Statin intolerance, muscle pain and cramps 12/2/2021    Vitamin B12 deficiency 6/6/2019 June 6, 2019--patient recently had mildly elevated methylmalonic acid of 380.  Repeat methylmalonic acid was upper limit of normal at 356.  Given patient's neurologic symptoms and suspected parkinsonism, I have a low threshold for treating vitamin B12 deficiency.  We will initiate vitamin B12 injections today.  2000 mcg subcutaneously given today.    Vitamin D deficiency 5/2/2019       Past " Surgical History:   Procedure Laterality Date    CARDIAC CATHETERIZATION N/A 2/29/2020    Procedure: Thrombolytic Therapy- EKOS;  Surgeon: Jason Griffiths MD;  Location:  [facility];  Service: Cardiovascular;  Laterality: N/A;    CATARACT EXTRACTION EXTRACAPSULAR W/ INTRAOCULAR LENS IMPLANTATION Bilateral     Bilateral cataract extirpation with intraocular lens implantation    CHOLECYSTECTOMY      EKOS CATHETER PLACEMENT Bilateral 2/29/2020    Procedure: Ekos catheter placement;  Surgeon: Jason Griffiths MD;  Location:  [facility];  Service: Cardiovascular;  Laterality: Bilateral;    ESOPHAGEAL DILATATION  04/26/2016 April 26, 2016--EGD performed for dysphagia reveals a distal esophageal stricture that was dilated 16.5 mm.    ESOPHAGEAL DILATATION  07/03/2018    July 3, 2018--EGD revealed a distal esophageal stricture that was dilated to 18 mm.  There was a small hiatal hernia.  There was mild streaky erythema in the proximal stomach.  Duodenal bulb normal.    INTERVENTIONAL RADIOLOGY PROCEDURE Bilateral 2/29/2020    Procedure: Pulmonary Angiogram;  Surgeon: Jason Griffiths MD;  Location: [facility];  Service: Cardiovascular;  Laterality: Bilateral;       Social History     Socioeconomic History    Marital status:     Number of children: 2    Highest education level: 9th grade   Tobacco Use    Smoking status: Never    Smokeless tobacco: Never   Vaping Use    Vaping Use: Never used   Substance and Sexual Activity    Alcohol use: Never     Comment: occ    Drug use: No    Sexual activity: Not Currently     Partners: Female       Family History   Problem Relation Age of Onset    No Known Problems Mother     No Known Problems Father        Review of Systems   Constitutional: Negative.   Cardiovascular: Negative.  Negative for chest pain, dyspnea on exertion, leg swelling, orthopnea, paroxysmal nocturnal dyspnea and syncope.   Respiratory: Negative.    "  Hematologic/Lymphatic: Negative for bleeding problem.   Musculoskeletal:  Positive for falls.   Gastrointestinal:  Negative for melena.   Neurological:  Negative for dizziness and light-headedness.       No Known Allergies      Current Outpatient Medications:     allopurinol (ZYLOPRIM) 300 MG tablet, TAKE 1 TABLET EVERY DAY  FOR  GOUT, Disp: 90 tablet, Rfl: 3    carbidopa-levodopa (SINEMET)  MG per tablet, Take 1 tablet by mouth 2 (Two) Times a Day., Disp: , Rfl:     Cholecalciferol (vitamin D3) 125 MCG (5000 UT) capsule capsule, 1 by mouth daily as directed, Disp: 30 capsule, Rfl:     fluorometholone (FML) 0.1 % ophthalmic suspension, Administer 1 drop to both eyes 2 (Two) Times a Day., Disp: , Rfl:     furosemide (LASIX) 40 MG tablet, TAKE 1 TABLET EVERY DAY, Disp: 90 tablet, Rfl: 2    ketorolac (ACULAR) 0.5 % ophthalmic solution, , Disp: , Rfl:     Nutritional Supplements (COLD AND FLU PO), Take  by mouth., Disp: , Rfl:     rivaroxaban (Xarelto) 15 MG tablet, TAKE ONE TABLET BY MOUTH DAILY FOR BLODD THINNER. TAKE AT SAME TIME EVERY DAY, Disp: 30 tablet, Rfl: 11      Objective:     Vitals:    11/30/23 1109   BP: 132/74   Pulse: 98   Weight: 84.8 kg (187 lb)   Height: 177.8 cm (70\")     Body mass index is 26.83 kg/m².    PHYSICAL EXAM:    Neck:      Vascular: No JVD.   Pulmonary:      Effort: Pulmonary effort is normal.      Breath sounds: Normal breath sounds.   Cardiovascular:      Normal rate. Irregular rhythm.      Murmurs: There is no murmur.      No gallop.  No click. No rub.   Pulses:     Intact distal pulses.           ECG 12 Lead    Date/Time: 11/30/2023 11:18 AM  Performed by: Gia Estrada APRN    Authorized by: Gia Estrada APRN  Comparison: compared with previous ECG from 11/29/2022  Similar to previous ECG  Rhythm: atrial fibrillation  Ectopy: unifocal PVCs  Rate: normal  BPM: 98            Assessment:       Diagnosis Plan   1. Longstanding persistent atrial fibrillation  ECG 12 " Lead    Holter Monitor - 72 Hour Up To 15 Days      2. History of pulmonary embolus (PE)        3. Benign essential hypertension        4. Frequent PVCs  Holter Monitor - 72 Hour Up To 15 Days        Orders Placed This Encounter   Procedures    Holter Monitor - 72 Hour Up To 15 Days     Standing Status:   Future     Standing Expiration Date:   11/29/2024     Order Specific Question:   Reason for exam?     Answer:   Palpitations     Order Specific Question:   Release to patient     Answer:   Routine Release [[id number]]    ECG 12 Lead     This order was created via procedure documentation     Order Specific Question:   Release to patient     Answer:   Routine Release [[id number]]          Plan:       1.  Persistent atrial fibrillation.  His heart rate today is 98.  In addition, he is having frequent PVCs.  Part of me wonders if the low heart rate reported in June was actually secondary to nonperfused PVCs.  I recommended a ZIO for assessment of his average heart rate and PVC burden.   We may need to resume the metoprolol.  Continue Xarelto.      2.  History of PE.  Long-term anticoagulation has been recommended.      3.  Hypertension.  His blood pressure is stable.  Continue current regimen.      Overall I think he is stable.  I had a discussion with both he and his wife regarding the anticoagulation given his recent falls.  We will need to continue to evaluate the risk versus benefit.  I also advised them to go to the emergency room should he fall and hit his head again.  I will call him with the results of the ZIO when available.      As always, it has been a pleasure to participate in your patient's care.      Sincerely,         EBEN Richardson

## 2023-12-20 ENCOUNTER — TELEPHONE (OUTPATIENT)
Dept: CARDIOLOGY | Facility: CLINIC | Age: 88
End: 2023-12-20
Payer: MEDICARE

## 2023-12-20 NOTE — TELEPHONE ENCOUNTER
I discussed the monitor results with Dr. Griffiths.  Given that he is asymptomatic, we are not recommending any changes.     I spoke with his wife regarding this information and she verbalized understanding.    Please schedule a 1 year follow-up with Dr. Griffiths.

## 2024-03-19 DIAGNOSIS — I48.0 PAROXYSMAL ATRIAL FIBRILLATION: ICD-10-CM

## 2024-03-19 RX ORDER — FUROSEMIDE 40 MG/1
TABLET ORAL
Qty: 90 TABLET | Refills: 3 | Status: SHIPPED | OUTPATIENT
Start: 2024-03-19

## 2024-03-19 NOTE — TELEPHONE ENCOUNTER
Rx Refill Note  Requested Prescriptions     Pending Prescriptions Disp Refills    furosemide (LASIX) 40 MG tablet [Pharmacy Med Name: FUROSEMIDE 40 MG Tablet] 90 tablet 3     Sig: TAKE 1 TABLET EVERY DAY      Last office visit with prescribing clinician: 9/12/2023   Last telemedicine visit with prescribing clinician: Visit date not found   Next office visit with prescribing clinician: 3/25/2024                         Would you like a call back once the refill request has been completed: [] Yes [] No    If the office needs to give you a call back, can they leave a voicemail: [] Yes [] No    Emerson Jordan MA  03/19/24, 15:02 EDT

## 2024-03-25 ENCOUNTER — OFFICE VISIT (OUTPATIENT)
Dept: INTERNAL MEDICINE | Facility: CLINIC | Age: 89
End: 2024-03-25
Payer: MEDICARE

## 2024-03-25 VITALS
SYSTOLIC BLOOD PRESSURE: 118 MMHG | WEIGHT: 186 LBS | DIASTOLIC BLOOD PRESSURE: 72 MMHG | RESPIRATION RATE: 16 BRPM | HEIGHT: 70 IN | OXYGEN SATURATION: 97 % | TEMPERATURE: 97.6 F | HEART RATE: 66 BPM | BODY MASS INDEX: 26.63 KG/M2

## 2024-03-25 DIAGNOSIS — Z91.81 AT RISK FOR FALLS: Chronic | ICD-10-CM

## 2024-03-25 DIAGNOSIS — Z87.39 HISTORY OF GOUT: Chronic | ICD-10-CM

## 2024-03-25 DIAGNOSIS — G20.A1 PARKINSON'S DISEASE WITHOUT FLUCTUATING MANIFESTATIONS, UNSPECIFIED WHETHER DYSKINESIA PRESENT: Chronic | ICD-10-CM

## 2024-03-25 DIAGNOSIS — Z86.711 HISTORY OF PULMONARY EMBOLUS (PE): Chronic | ICD-10-CM

## 2024-03-25 DIAGNOSIS — E55.9 VITAMIN D DEFICIENCY: Chronic | ICD-10-CM

## 2024-03-25 DIAGNOSIS — I87.2 CHRONIC VENOUS INSUFFICIENCY: Chronic | ICD-10-CM

## 2024-03-25 DIAGNOSIS — E53.8 VITAMIN B12 DEFICIENCY: Chronic | ICD-10-CM

## 2024-03-25 DIAGNOSIS — H90.3 BILATERAL HIGH FREQUENCY SENSORINEURAL HEARING LOSS: Chronic | ICD-10-CM

## 2024-03-25 DIAGNOSIS — Z79.01 CHRONIC ANTICOAGULATION: Chronic | ICD-10-CM

## 2024-03-25 DIAGNOSIS — Z51.81 THERAPEUTIC DRUG MONITORING: ICD-10-CM

## 2024-03-25 DIAGNOSIS — Z87.19 HISTORY OF ESOPHAGEAL STRICTURE: Chronic | ICD-10-CM

## 2024-03-25 DIAGNOSIS — I48.11 LONGSTANDING PERSISTENT ATRIAL FIBRILLATION: ICD-10-CM

## 2024-03-25 DIAGNOSIS — E78.2 MIXED HYPERLIPIDEMIA: Chronic | ICD-10-CM

## 2024-03-25 DIAGNOSIS — Z78.9 STATIN INTOLERANCE: Chronic | ICD-10-CM

## 2024-03-25 DIAGNOSIS — R23.3 PETECHIAE OR ECCHYMOSES: Chronic | ICD-10-CM

## 2024-03-25 DIAGNOSIS — Z86.73 HISTORY OF STROKE: Chronic | ICD-10-CM

## 2024-03-25 DIAGNOSIS — R73.01 IMPAIRED FASTING GLUCOSE: Primary | Chronic | ICD-10-CM

## 2024-03-25 DIAGNOSIS — I10 BENIGN ESSENTIAL HYPERTENSION: Chronic | ICD-10-CM

## 2024-03-25 DIAGNOSIS — N40.0 BENIGN PROSTATIC HYPERPLASIA WITHOUT LOWER URINARY TRACT SYMPTOMS: Chronic | ICD-10-CM

## 2024-03-25 DIAGNOSIS — D75.89 MACROCYTOSIS: Chronic | ICD-10-CM

## 2024-03-25 DIAGNOSIS — R76.8 RHEUMATOID FACTOR POSITIVE: Chronic | ICD-10-CM

## 2024-03-25 DIAGNOSIS — R60.0 BILATERAL LOWER EXTREMITY EDEMA: Chronic | ICD-10-CM

## 2024-03-25 NOTE — PROGRESS NOTES
03/25/2024    Patient Information  Izaiah Garcia                                                                                          [address] [facility] [address]      8/31/1931  [unfilled]  There is no work phone number on file.    Chief Complaint:     Follow-up medical problems as outlined below.  Parkinson's tremor may be a little worse.    History of Present Illness:    Patient with multiple medical problems as outlined below presents for follow-up.  Patient did not have lab work prior to this visit which we will obtain today and then follow-up on the phone for patient convenience given his multiple diagnoses and age.  Patient's wife feels that his Parkinson's may be a little bit worse.  He does see a neurologist and I encouraged her to get the neurologist that information to see if there is an adjustment that he can make.  Also patient's wife is concerned about his earwax.  He wears hearing aids and she has to clean his hearing aids frequently due to the wax.  She has not been using earwax removal kit such as [employer].  I have suggested this.    Review of Systems   Constitutional: Negative.   HENT: Negative.     Eyes: Negative.    Cardiovascular: Negative.    Respiratory: Negative.     Endocrine: Negative.    Hematologic/Lymphatic: Negative.    Skin: Negative.    Musculoskeletal: Negative.  Positive for arthritis and joint pain.   Gastrointestinal: Negative.    Genitourinary: Negative.    Neurological: Negative.  Positive for disturbances in coordination, loss of balance and tremors.   Psychiatric/Behavioral: Negative.     Allergic/Immunologic: Negative.        Active Problems:    Patient Active Problem List   Diagnosis    Hyperlipidemia    Benign essential hypertension    History of gout    History of esophageal stricture    History of stroke, 6/29/2016--4.9 x 4 x 3 cm inferior left cerebellar infarct    Rheumatoid factor positive    Impaired fasting glucose    At risk for falls     Bilateral high frequency sensorineural hearing loss, wears hearing aids    Bilateral lower extremity edema    Parkinson's disease    Therapeutic drug monitoring    Vitamin D deficiency    Macrocytosis    Vitamin B12 deficiency    History of pulmonary embolus (PE)    Chronic anticoagulation, Eliquis.  Saddle pulmonary embolism    Longstanding persistent atrial fibrillation    Benign prostatic hyperplasia without lower urinary tract symptoms    Statin intolerance, muscle pain and cramps    Chronic venous insufficiency    Petechiae or ecchymoses, both lower extremities.  Patient on Xarelto.         Past Medical History:   Diagnosis Date    Acute saddle pulmonary embolism with acute cor pulmonale 2/29/2020    Admit date: 2/29/2020 Discharge date:3/20/2020 Discharged Condition: good   Discharge Diagnoses:   Acute saddle pulmonary embolism with acute cor pulmonale (CMS/HCC)   Pulmonary emboli (CMS/HCC)   Empyema of pleura (CMS/HCC)  Acute hypoxic respiratory failure BPE s/p ekos catheter on 2/29/2020 with local TPA infusion with good clinical response Troponemia suspect due to BPE RV strain Bilateral ple    At risk for falls 5/2/2019    Benign essential hypertension 6/29/2016    Benign prostatic hyperplasia without lower urinary tract symptoms 12/3/2020    Bilateral high frequency sensorineural hearing loss, wears hearing aids 5/2/2019    Bilateral lower extremity edema 5/2/2019    May 2, 2019--patient who has hypertension and is on amlodipine 10 mg/day is complaining of progressively worsening swelling of the lower extremities that extends up to about mid calf.  Gets worse at the end of the day and tends to get better overnight when he props his feet up.  I think this is definitely related to the amlodipine although patient may have some venous insufficiency.  Medication ad    Chronic anticoagulation, Eliquis.  Saddle pulmonary embolism 5/27/2020    Decreased peripheral vision of both eyes 5/2/2019    May 2,  "2019--continues to have complaints of \"dizziness\" associated with weakness in his lower extremities.  He is not describing a spinning sensation or anything remotely close to vertigo.  Instead he is describing an off-balance sensation.  He tends to fall forward if not careful and he tends to list towards the right side.  He has marked difficulty going down an incline.  He has to ambulate wit    History of aspiration pneumonia 5/4/2015    History of esophageal stricture 5/4/2015    July 3, 2018--EGD revealed a distal esophageal stricture that was dilated to 18 mm.  There was a small hiatal hernia.  There was mild streaky erythema in the proximal stomach.  Duodenal bulb normal.  April 26, 2016--EGD performed for dysphagia reveals a distal esophageal stricture that was dilated 16.5 mm.    History of gout 6/29/2016    History of pulmonary embolus (PE) 2/29/2020    Admit date: 2/29/2020 Discharge date:3/20/2020 Discharged Condition: good   Discharge Diagnoses:   Acute saddle pulmonary embolism with acute cor pulmonale (CMS/HCC)   Pulmonary emboli (CMS/HCC)   Empyema of pleura (CMS/HCC)  Acute hypoxic respiratory failure BPE s/p ekos catheter on 2/29/2020 with local TPA infusion with good clinical response Troponemia suspect due to BPE RV strain Bilateral ple    History of stroke, 6/29/2016--4.9 x 4 x 3 cm inferior left cerebellar infarct 5/4/2015 June 29, 2016--MRI of the brain performed for complaints of dizziness and weakness. IMPRESSION: 1. There is susceptibility artifact streaking through the anterior left frontal scalp through the anterior left frontal bone in the anterior tipof the left frontal lobe likely from a tiny piece of metal in the anterior left frontal scalp slightly limits evaluation of these structures. 2. There is mild s    Hyperlipidemia 6/29/2016    Impaired fasting glucose 5/2/2019 April 14, 2015--hemoglobin A1c 5.9.    Macrocytosis 5/2/2019    Parkinson's disease 5/2/2019 May 2, " "2019--continues to have complaints of \"dizziness\" associated with weakness in his lower extremities.  He is not describing a spinning sensation or anything remotely close to vertigo.  Instead he is describing an off-balance sensation.  He tends to fall forward if not careful and he tends to list towards the right side.  He has marked difficulty going down an incline.  He has to ambulate wit    Rheumatoid factor positive 5/2/2019 March 25, 2014--rheumatoid factor returned positive at 95.  However, CCP antibodies normal at 8, SHIV negative, both C&P ANCA negative, C-reactive protein normal at 0.24, sed rate normal at 2.    Statin intolerance, muscle pain and cramps 12/2/2021    Vitamin B12 deficiency 6/6/2019 June 6, 2019--patient recently had mildly elevated methylmalonic acid of 380.  Repeat methylmalonic acid was upper limit of normal at 356.  Given patient's neurologic symptoms and suspected parkinsonism, I have a low threshold for treating vitamin B12 deficiency.  We will initiate vitamin B12 injections today.  2000 mcg subcutaneously given today.    Vitamin D deficiency 5/2/2019         Past Surgical History:   Procedure Laterality Date    CARDIAC CATHETERIZATION N/A 2/29/2020    Procedure: Thrombolytic Therapy- EKOS;  Surgeon: Jason Griffiths MD;  Location: [facility] CATH INVASIVE LOCATION;  Service: Cardiovascular;  Laterality: N/A;    CATARACT EXTRACTION EXTRACAPSULAR W/ INTRAOCULAR LENS IMPLANTATION Bilateral     Bilateral cataract extirpation with intraocular lens implantation    CHOLECYSTECTOMY      EKOS CATHETER PLACEMENT Bilateral 2/29/2020    Procedure: Ekos catheter placement;  Surgeon: Jason Griffiths MD;  Location: [facility] CATH INVASIVE LOCATION;  Service: Cardiovascular;  Laterality: Bilateral;    ESOPHAGEAL DILATATION  04/26/2016 April 26, 2016--EGD performed for dysphagia reveals a distal esophageal stricture that was dilated 16.5 mm.    ESOPHAGEAL DILATATION  07/03/2018    July 3, 2018--EGD " revealed a distal esophageal stricture that was dilated to 18 mm.  There was a small hiatal hernia.  There was mild streaky erythema in the proximal stomach.  Duodenal bulb normal.    INTERVENTIONAL RADIOLOGY PROCEDURE Bilateral 2/29/2020    Procedure: Pulmonary Angiogram;  Surgeon: Jason Griffiths MD;  Location: [facility] INVASIVE LOCATION;  Service: Cardiovascular;  Laterality: Bilateral;         No Known Allergies        Current Outpatient Medications:     allopurinol (ZYLOPRIM) 300 MG tablet, TAKE 1 TABLET EVERY DAY  FOR  GOUT, Disp: 90 tablet, Rfl: 3    carbidopa-levodopa (SINEMET)  MG per tablet, Take 1 tablet by mouth 2 (Two) Times a Day., Disp: , Rfl:     Cholecalciferol (vitamin D3) 125 MCG (5000 UT) capsule capsule, 1 by mouth daily as directed, Disp: 30 capsule, Rfl:     fluorometholone (FML) 0.1 % ophthalmic suspension, Administer 1 drop to both eyes 2 (Two) Times a Day., Disp: , Rfl:     furosemide (LASIX) 40 MG tablet, TAKE 1 TABLET EVERY DAY, Disp: 90 tablet, Rfl: 3    ketorolac (ACULAR) 0.5 % ophthalmic solution, , Disp: , Rfl:     Nutritional Supplements (COLD AND FLU PO), Take  by mouth., Disp: , Rfl:     rivaroxaban (Xarelto) 15 MG tablet, TAKE ONE TABLET BY MOUTH DAILY FOR BLODD THINNER. TAKE AT SAME TIME EVERY DAY, Disp: 30 tablet, Rfl: 11    Cyanocobalamin 1000 MCG sublingual tablet, Dissolve 1000 mcg sublingual under the tongue every day as directed for vitamin B12 deficiency, Disp: , Rfl:       Family History   Problem Relation Age of Onset    No Known Problems Mother     No Known Problems Father          Social History     Socioeconomic History    Marital status:     Number of children: 2    Highest education level: 9th grade   Tobacco Use    Smoking status: Never    Smokeless tobacco: Never   Vaping Use    Vaping status: Never Used   Substance and Sexual Activity    Alcohol use: Never     Comment: occ    Drug use: No    Sexual activity: Not Currently     Partners: Female  "        Vitals:    03/25/24 1041   BP: 118/72   Pulse: 66   Resp: 16   Temp: 97.6 °F (36.4 °C)   TempSrc: Temporal   SpO2: 97%   Weight: 84.4 kg (186 lb)   Height: 177.8 cm (70\")        Body mass index is 26.69 kg/m².      Physical Exam:    General: Alert and oriented x 3.  No acute distress.  Normal affect.  HEENT: Pupils equal, round, reactive to light; extraocular movements intact; sclerae nonicteric; pharynx, ear canals and TMs normal.  Neck: Without JVD, thyromegaly, bruit, or adenopathy.  Lungs: Clear to auscultation in all fields.  Heart: Regular rate and rhythm without murmur, rub, gallop, or click.  Abdomen: Soft, nontender, without hepatosplenomegaly or hernia.  Bowel sounds normal.  : Deferred.  Rectal: Deferred.  Extremities: Without clubbing, cyanosis, edema, or pulse deficit.  Neurologic: Intact without focal deficit.  Patient ambulates with the assistance of wheeled walker.  Has parkinsonian features including a mild right hand tremor and somewhat slow shuffling gait.  Skin: Without significant lesion.  Musculoskeletal: Unremarkable.    Lab/other results:      Assessment/Plan:     Diagnosis Plan   1. Impaired fasting glucose  Comprehensive Metabolic Panel    Hemoglobin A1c      2. Hyperlipidemia  Comprehensive Metabolic Panel    NMR LipoProfile    TSH    T4, Free    T3, Free    Homocysteine      3. Macrocytosis  CBC (No Diff)      4. Vitamin B12 deficiency  CBC (No Diff)    Methylmalonic Acid, Serum    Cyanocobalamin 1000 MCG sublingual tablet      5. Vitamin D deficiency  Vitamin D,25-Hydroxy      6. History of gout  Uric Acid      7. Chronic anticoagulation, Eliquis.  Saddle pulmonary embolism        8. History of pulmonary embolus (PE)        9. History of stroke, 6/29/2016--4.9 x 4 x 3 cm inferior left cerebellar infarct        10. Longstanding persistent atrial fibrillation        11. Benign essential hypertension  Comprehensive Metabolic Panel      12. Benign prostatic hyperplasia without " lower urinary tract symptoms  PSA DIAGNOSTIC      13. Bilateral high frequency sensorineural hearing loss, wears hearing aids        14. Bilateral lower extremity edema        15. Chronic venous insufficiency        16. History of esophageal stricture        17. Parkinson's disease without fluctuating manifestations, unspecified whether dyskinesia present        18. Petechiae or ecchymoses, both lower extremities.  Patient on Xarelto.        19. Rheumatoid factor positive        20. Statin intolerance, muscle pain and cramps        21. At risk for falls        22. Therapeutic drug monitoring          Patient with multiple medical problems as noted above that seem to be fairly stable.  His vitamin D was reaching upper limit of normal about 6 months ago and patient has been off of vitamin D for about 6 months and we need to recheck it.  Also it appeared he was not getting enough cyanocobalamin and I will be able to advise him better on this problem after we obtain the results of the blood work.    Plan is as follows: Lab work as noted above today.  I will contact patient and his wife in about 8 to 10 days once the results are back for possible further instructions.  Will have him follow-up on or after September 12, 2024 for his subsequent Medicare wellness visit.  Will do lab work that day.        Procedures

## 2024-04-03 ENCOUNTER — TELEPHONE (OUTPATIENT)
Dept: INTERNAL MEDICINE | Facility: CLINIC | Age: 89
End: 2024-04-03

## 2024-04-03 NOTE — TELEPHONE ENCOUNTER
Dr. Godinez,  For some reason these did not upload to epic.  I printed them from [employer] and Jason scanned them in and routed them to you.

## 2024-04-03 NOTE — TELEPHONE ENCOUNTER
Caller: RadhaRosycarisa    Relationship: Emergency Contact    Best call back number: [phone]    What test was performed: LABS     When was the test performed: 03/25/2024    Where was the test performed: IN OFFICE     Additional notes: PATIENTS SPOUSE STATES THEY HAVE NOT RECEIVED THE RESULTS FROM THE PATIENTS LAB WORK YET.     PATIENTS SPOUSE IS REQUESTING A CALL BACK.

## 2024-04-04 LAB
25(OH)D3+25(OH)D2 SERPL-MCNC: 42.8 NG/ML (ref 30–100)
ALBUMIN SERPL-MCNC: 3.8 G/DL (ref 3.6–4.6)
ALBUMIN/GLOB SERPL: 1.8 {RATIO} (ref 1.2–2.2)
ALP SERPL-CCNC: 78 IU/L (ref 44–121)
ALT SERPL-CCNC: 10 IU/L (ref 0–44)
AST SERPL-CCNC: 15 IU/L (ref 0–40)
BILIRUB SERPL-MCNC: 1 MG/DL (ref 0–1.2)
BUN SERPL-MCNC: 16 MG/DL (ref 10–36)
BUN/CREAT SERPL: 13 (ref 10–24)
CALCIUM SERPL-MCNC: 9.4 MG/DL (ref 8.6–10.2)
CHLORIDE SERPL-SCNC: 108 MMOL/L (ref 96–106)
CHOLEST SERPL-MCNC: 141 MG/DL (ref 100–199)
CO2 SERPL-SCNC: 24 MMOL/L (ref 20–29)
CREAT SERPL-MCNC: 1.25 MG/DL (ref 0.76–1.27)
EGFRCR SERPLBLD CKD-EPI 2021: 54 ML/MIN/1.73
ERYTHROCYTE [DISTWIDTH] IN BLOOD BY AUTOMATED COUNT: 13.7 % (ref 11.6–15.4)
GLOBULIN SER CALC-MCNC: 2.1 G/DL (ref 1.5–4.5)
GLUCOSE SERPL-MCNC: 127 MG/DL (ref 70–99)
HBA1C MFR BLD: 5.9 % (ref 4.8–5.6)
HCT VFR BLD AUTO: 48.8 % (ref 37.5–51)
HCYS SERPL-SCNC: 14.6 UMOL/L (ref 0–21.3)
HDL SERPL-SCNC: 25.3 UMOL/L
HDLC SERPL-MCNC: 39 MG/DL
HGB BLD-MCNC: 15.7 G/DL (ref 13–17.7)
LDL SERPL QN: 20.5 NM
LDL SERPL-SCNC: 958 NMOL/L
LDL SMALL SERPL-SCNC: 549 NMOL/L
LDLC SERPL CALC-MCNC: 85 MG/DL (ref 0–99)
MCH RBC QN AUTO: 27.8 PG (ref 26.6–33)
MCHC RBC AUTO-ENTMCNC: 32.2 G/DL (ref 31.5–35.7)
MCV RBC AUTO: 86 FL (ref 79–97)
METHYLMALONATE SERPL-SCNC: 194 NMOL/L (ref 0–378)
PLATELET # BLD AUTO: 267 X10E3/UL (ref 150–450)
POTASSIUM SERPL-SCNC: 4.3 MMOL/L (ref 3.5–5.2)
PROT SERPL-MCNC: 5.9 G/DL (ref 6–8.5)
PSA SERPL-MCNC: 1.6 NG/ML (ref 0–4)
RBC # BLD AUTO: 5.65 X10E6/UL (ref 4.14–5.8)
SODIUM SERPL-SCNC: 147 MMOL/L (ref 134–144)
T3FREE SERPL-MCNC: 2.8 PG/ML (ref 2–4.4)
T4 FREE SERPL-MCNC: 1.51 NG/DL (ref 0.82–1.77)
TRIGL SERPL-MCNC: 87 MG/DL (ref 0–149)
TSH SERPL DL<=0.005 MIU/L-ACNC: 0.89 UIU/ML (ref 0.45–4.5)
URATE SERPL-MCNC: 3.5 MG/DL (ref 3.8–8.4)
WBC # BLD AUTO: 11 X10E3/UL (ref 3.4–10.8)

## 2024-09-16 ENCOUNTER — OFFICE VISIT (OUTPATIENT)
Dept: INTERNAL MEDICINE | Facility: CLINIC | Age: 89
End: 2024-09-16
Payer: MEDICARE

## 2024-09-16 VITALS
DIASTOLIC BLOOD PRESSURE: 66 MMHG | SYSTOLIC BLOOD PRESSURE: 136 MMHG | WEIGHT: 183 LBS | BODY MASS INDEX: 26.2 KG/M2 | HEART RATE: 53 BPM | OXYGEN SATURATION: 97 % | HEIGHT: 70 IN | RESPIRATION RATE: 16 BRPM | TEMPERATURE: 98.9 F

## 2024-09-16 DIAGNOSIS — R73.01 IMPAIRED FASTING GLUCOSE: Chronic | ICD-10-CM

## 2024-09-16 DIAGNOSIS — G20.A1 PARKINSON'S DISEASE WITHOUT DYSKINESIA OR FLUCTUATING MANIFESTATIONS: Chronic | ICD-10-CM

## 2024-09-16 DIAGNOSIS — Z86.73 HISTORY OF STROKE: Chronic | ICD-10-CM

## 2024-09-16 DIAGNOSIS — N40.0 BENIGN PROSTATIC HYPERPLASIA WITHOUT LOWER URINARY TRACT SYMPTOMS: Chronic | ICD-10-CM

## 2024-09-16 DIAGNOSIS — D75.89 MACROCYTOSIS: Chronic | ICD-10-CM

## 2024-09-16 DIAGNOSIS — Z87.39 HISTORY OF GOUT: Chronic | ICD-10-CM

## 2024-09-16 DIAGNOSIS — E53.8 VITAMIN B12 DEFICIENCY: Chronic | ICD-10-CM

## 2024-09-16 DIAGNOSIS — R60.0 BILATERAL LOWER EXTREMITY EDEMA: Chronic | ICD-10-CM

## 2024-09-16 DIAGNOSIS — R76.8 RHEUMATOID FACTOR POSITIVE: Chronic | ICD-10-CM

## 2024-09-16 DIAGNOSIS — I10 BENIGN ESSENTIAL HYPERTENSION: Chronic | ICD-10-CM

## 2024-09-16 DIAGNOSIS — R23.3 PETECHIAE OR ECCHYMOSES: Chronic | ICD-10-CM

## 2024-09-16 DIAGNOSIS — Z51.81 THERAPEUTIC DRUG MONITORING: ICD-10-CM

## 2024-09-16 DIAGNOSIS — Z79.01 CHRONIC ANTICOAGULATION: Chronic | ICD-10-CM

## 2024-09-16 DIAGNOSIS — Z87.19 HISTORY OF ESOPHAGEAL STRICTURE: Chronic | ICD-10-CM

## 2024-09-16 DIAGNOSIS — Z78.9 STATIN INTOLERANCE: Chronic | ICD-10-CM

## 2024-09-16 DIAGNOSIS — E78.2 MIXED HYPERLIPIDEMIA: Chronic | ICD-10-CM

## 2024-09-16 DIAGNOSIS — Z00.00 ENCOUNTER FOR SUBSEQUENT ANNUAL WELLNESS VISIT (AWV) IN MEDICARE PATIENT: Primary | ICD-10-CM

## 2024-09-16 DIAGNOSIS — Z86.711 HISTORY OF PULMONARY EMBOLUS (PE): Chronic | ICD-10-CM

## 2024-09-16 DIAGNOSIS — I48.11 LONGSTANDING PERSISTENT ATRIAL FIBRILLATION: Chronic | ICD-10-CM

## 2024-09-16 DIAGNOSIS — H90.3 BILATERAL HIGH FREQUENCY SENSORINEURAL HEARING LOSS: Chronic | ICD-10-CM

## 2024-09-16 DIAGNOSIS — E55.9 VITAMIN D DEFICIENCY: Chronic | ICD-10-CM

## 2024-09-16 DIAGNOSIS — I87.2 CHRONIC VENOUS INSUFFICIENCY: Chronic | ICD-10-CM

## 2024-09-16 DIAGNOSIS — Z91.81 AT RISK FOR FALLS: Chronic | ICD-10-CM

## 2024-09-16 PROCEDURE — 1160F RVW MEDS BY RX/DR IN RCRD: CPT | Performed by: INTERNAL MEDICINE

## 2024-09-16 PROCEDURE — 1159F MED LIST DOCD IN RCRD: CPT | Performed by: INTERNAL MEDICINE

## 2024-09-16 PROCEDURE — 96160 PT-FOCUSED HLTH RISK ASSMT: CPT | Performed by: INTERNAL MEDICINE

## 2024-09-16 PROCEDURE — 1170F FXNL STATUS ASSESSED: CPT | Performed by: INTERNAL MEDICINE

## 2024-09-16 PROCEDURE — 1125F AMNT PAIN NOTED PAIN PRSNT: CPT | Performed by: INTERNAL MEDICINE

## 2024-09-16 PROCEDURE — G0439 PPPS, SUBSEQ VISIT: HCPCS | Performed by: INTERNAL MEDICINE

## 2024-09-21 LAB
25(OH)D3+25(OH)D2 SERPL-MCNC: 47.8 NG/ML (ref 30–100)
ALBUMIN SERPL-MCNC: 3.9 G/DL (ref 3.6–4.6)
ALP SERPL-CCNC: 87 IU/L (ref 44–121)
ALT SERPL-CCNC: 6 IU/L (ref 0–44)
AST SERPL-CCNC: 15 IU/L (ref 0–40)
BILIRUB SERPL-MCNC: 0.9 MG/DL (ref 0–1.2)
BUN SERPL-MCNC: 24 MG/DL (ref 10–36)
BUN/CREAT SERPL: 18 (ref 10–24)
CALCIUM SERPL-MCNC: 9.5 MG/DL (ref 8.6–10.2)
CHLORIDE SERPL-SCNC: 103 MMOL/L (ref 96–106)
CHOLEST SERPL-MCNC: 122 MG/DL (ref 100–199)
CHOLEST/HDLC SERPL: 3.8 RATIO (ref 0–5)
CO2 SERPL-SCNC: 24 MMOL/L (ref 20–29)
CREAT SERPL-MCNC: 1.37 MG/DL (ref 0.76–1.27)
EGFRCR SERPLBLD CKD-EPI 2021: 48 ML/MIN/1.73
ERYTHROCYTE [DISTWIDTH] IN BLOOD BY AUTOMATED COUNT: 13.4 % (ref 11.6–15.4)
GLOBULIN SER CALC-MCNC: 2.2 G/DL (ref 1.5–4.5)
GLUCOSE SERPL-MCNC: 100 MG/DL (ref 70–99)
HBA1C MFR BLD: 6 % (ref 4.8–5.6)
HCT VFR BLD AUTO: 48.9 % (ref 37.5–51)
HCYS SERPL-SCNC: 14.2 UMOL/L (ref 0–21.3)
HDLC SERPL-MCNC: 32 MG/DL
HGB BLD-MCNC: 15.1 G/DL (ref 13–17.7)
LDLC SERPL CALC-MCNC: 72 MG/DL (ref 0–99)
MCH RBC QN AUTO: 27.6 PG (ref 26.6–33)
MCHC RBC AUTO-ENTMCNC: 30.9 G/DL (ref 31.5–35.7)
MCV RBC AUTO: 89 FL (ref 79–97)
METHYLMALONATE SERPL-SCNC: 190 NMOL/L (ref 0–378)
PLATELET # BLD AUTO: 301 X10E3/UL (ref 150–450)
POTASSIUM SERPL-SCNC: 4 MMOL/L (ref 3.5–5.2)
PROT SERPL-MCNC: 6.1 G/DL (ref 6–8.5)
RBC # BLD AUTO: 5.47 X10E6/UL (ref 4.14–5.8)
SODIUM SERPL-SCNC: 140 MMOL/L (ref 134–144)
T3FREE SERPL-MCNC: 2.6 PG/ML (ref 2–4.4)
T4 FREE SERPL-MCNC: 1.5 NG/DL (ref 0.82–1.77)
TRIGL SERPL-MCNC: 91 MG/DL (ref 0–149)
TSH SERPL DL<=0.005 MIU/L-ACNC: 0.4 UIU/ML (ref 0.45–4.5)
VLDLC SERPL CALC-MCNC: 18 MG/DL (ref 5–40)
WBC # BLD AUTO: 9.7 X10E3/UL (ref 3.4–10.8)

## 2024-09-27 ENCOUNTER — HOSPITAL ENCOUNTER (EMERGENCY)
Facility: HOSPITAL | Age: 89
Discharge: HOME | End: 2024-09-27
Attending: EMERGENCY MEDICINE
Payer: MEDICARE

## 2024-09-27 VITALS
SYSTOLIC BLOOD PRESSURE: 188 MMHG | HEART RATE: 57 BPM | OXYGEN SATURATION: 97 % | HEIGHT: 70 IN | WEIGHT: 180 LBS | DIASTOLIC BLOOD PRESSURE: 79 MMHG | BODY MASS INDEX: 25.77 KG/M2 | RESPIRATION RATE: 16 BRPM | TEMPERATURE: 96.4 F

## 2024-09-27 DIAGNOSIS — Z79.01 CHRONIC ANTICOAGULATION: ICD-10-CM

## 2024-09-27 DIAGNOSIS — R03.0 ELEVATED BLOOD PRESSURE READING: ICD-10-CM

## 2024-09-27 DIAGNOSIS — S01.80XA OPEN WOUND OF FACE, INITIAL ENCOUNTER: Primary | ICD-10-CM

## 2024-09-27 PROCEDURE — 99282 EMERGENCY DEPT VISIT SF MDM: CPT

## 2024-09-27 RX ORDER — LIDOCAINE HYDROCHLORIDE AND EPINEPHRINE 10; 10 MG/ML; UG/ML
10 INJECTION, SOLUTION INFILTRATION; PERINEURAL ONCE
Status: COMPLETED | OUTPATIENT
Start: 2024-09-27 | End: 2024-09-27

## 2024-09-27 RX ADMIN — LIDOCAINE HYDROCHLORIDE AND EPINEPHRINE 10 ML: 10; 10 INJECTION, SOLUTION INFILTRATION; PERINEURAL at 18:04

## 2024-09-27 NOTE — ED PROVIDER NOTES
EMERGENCY DEPARTMENT ENCOUNTER  Room Number:  12/12  PCP: Uriah Godinez MD  Independent Historians: Patient and Family      HPI:  Chief Complaint: Bleeding from facial wound    A complete HPI/ROS/PMH/PSH/SH/FH are unobtainable due to: None    Chronic or social conditions impacting patient care (Social Determinants of Health): None    Context: Izaiah Garcia is a 89 y.o. male with a medical history of hyperlipidemia, Parkinson's disease and chronic anticoagulation due to remote history of DVT/PE who presents to the ED c/o acute persistent bleeding from a right facial wound after a dermatologic procedure performed yesterday.  Patient had a biopsy of a skin lesion on his right cheek yesterday in the dermatology office.  Since that time, he has had persistent bleeding that they cannot get to stop at home.  He and his wife have been holding persistent pressure with gauze bandages to the cheek but it simply continues to bleed rather briskly.  Patient has been taking his blood thinner, Xarelto, every day throughout this week.  He denies any other areas of injury or concern.  Has not had any blood loss elsewhere from the body.  He tried to call the dermatology physician Today but could not get in touch with anyone.  Therefore he came here for further assistance.    Review of prior external notes (non-ED) -and- Review of prior external test results outside of this encounter: I independently reviewed the internal medicine office progress note from September 16, 2024    Prescription drug monitoring program review: EBONI reviewed by Drew Porras MD       PAST MEDICAL HISTORY  Active Ambulatory Problems     Diagnosis Date Noted    Hyperlipidemia 06/29/2016    Benign essential hypertension 06/29/2016    History of gout 06/29/2016    History of esophageal stricture 05/04/2015    History of stroke, 6/29/2016--4.9 x 4 x 3 cm inferior left cerebellar infarct 05/04/2015    Rheumatoid factor positive 05/02/2019    Impaired  fasting glucose 05/02/2019    At risk for falls 05/02/2019    Bilateral high frequency sensorineural hearing loss, wears hearing aids 05/02/2019    Bilateral lower extremity edema 05/02/2019    Parkinson's disease 05/02/2019    Therapeutic drug monitoring 05/02/2019    Vitamin D deficiency 05/02/2019    Macrocytosis 05/02/2019    Vitamin B12 deficiency 06/06/2019    History of pulmonary embolus (PE) 02/29/2020    Chronic anticoagulation, Eliquis.  Saddle pulmonary embolism 05/27/2020    Longstanding persistent atrial fibrillation 06/03/2020    Benign prostatic hyperplasia without lower urinary tract symptoms 12/03/2020    Statin intolerance, muscle pain and cramps 12/02/2021    Chronic venous insufficiency 08/01/2022    Petechiae or ecchymoses, both lower extremities.  Patient on Xarelto. 08/01/2022     Resolved Ambulatory Problems     Diagnosis Date Noted    Dizzy 06/29/2016    Weakness of both lower extremities 06/29/2016    Nonintractable headache 06/29/2016    Polyuria 06/08/2018    Acute right flank pain 06/08/2018    History of aspiration pneumonia 05/04/2015    Generalized weakness 05/02/2019    Decreased peripheral vision of both eyes 05/02/2019    Loss of equilibrium 05/02/2019    Acute diarrhea 02/03/2020    Hospital-acquired pneumonia 02/17/2020    Acute UTI (urinary tract infection) 02/17/2020    Colitis 02/10/2020    Weakness 02/17/2020    Aspiration pneumonia of right lower lobe due to vomit 02/18/2020    Acute saddle pulmonary embolism with acute cor pulmonale 02/29/2020    Empyema of pleura 03/06/2020    Hospital discharge follow-up 04/22/2020    Acute pain of left thigh 06/25/2020    Bone lesion, 6/17/2020--1.2 cm lucent focus with sclerotic margins distal left femur.  Repeat x-ray 6 months. 06/25/2020    Meralgia paresthetica of left side 06/25/2020    Tinea cruris 08/18/2020    Acute pain of left knee 02/18/2021    History of recent fall 08/27/2021    Closed head injury 08/27/2021    Dislocation  of PIP joint of finger 08/27/2021    Laceration of left hand without foreign body 08/27/2021    Closed nondisplaced fracture of base of fourth metacarpal bone of left hand with routine healing 08/27/2021    Closed nondisplaced fracture of base of fifth metacarpal bone of left hand with routine healing 08/27/2021    Periorbital hematoma of left eye 08/27/2021    Muscle cramps 11/23/2021    Tinea corporis 08/01/2022    Epistaxis 03/27/2023    Productive cough 05/03/2023    Visual hallucinations 05/03/2023     Past Medical History:   Diagnosis Date    Hyperlipidemia 06/29/2016         PAST SURGICAL HISTORY  Past Surgical History:   Procedure Laterality Date    CARDIAC CATHETERIZATION N/A 2/29/2020    Procedure: Thrombolytic Therapy- EKOS;  Surgeon: Jason Griffiths MD;  Location: [facility] INVASIVE LOCATION;  Service: Cardiovascular;  Laterality: N/A;    CATARACT EXTRACTION EXTRACAPSULAR W/ INTRAOCULAR LENS IMPLANTATION Bilateral     Bilateral cataract extirpation with intraocular lens implantation    CHOLECYSTECTOMY      EKOS CATHETER PLACEMENT Bilateral 2/29/2020    Procedure: Ekos catheter placement;  Surgeon: Jason Griffiths MD;  Location: [facility] INVASIVE LOCATION;  Service: Cardiovascular;  Laterality: Bilateral;    ESOPHAGEAL DILATATION  04/26/2016 April 26, 2016--EGD performed for dysphagia reveals a distal esophageal stricture that was dilated 16.5 mm.    ESOPHAGEAL DILATATION  07/03/2018    July 3, 2018--EGD revealed a distal esophageal stricture that was dilated to 18 mm.  There was a small hiatal hernia.  There was mild streaky erythema in the proximal stomach.  Duodenal bulb normal.    INTERVENTIONAL RADIOLOGY PROCEDURE Bilateral 2/29/2020    Procedure: Pulmonary Angiogram;  Surgeon: Jason Griffiths MD;  Location: [facility] INVASIVE LOCATION;  Service: Cardiovascular;  Laterality: Bilateral;         FAMILY HISTORY  Family History   Problem Relation Age of Onset    No Known Problems Mother      No Known Problems Father          SOCIAL HISTORY  Social History     Socioeconomic History    Marital status:     Number of children: 2    Highest education level: 9th grade   Tobacco Use    Smoking status: Never    Smokeless tobacco: Never   Vaping Use    Vaping status: Never Used   Substance and Sexual Activity    Alcohol use: Never     Comment: occ    Drug use: No    Sexual activity: Not Currently     Partners: Female         ALLERGIES  Patient has no known allergies.      REVIEW OF SYSTEMS  Review of Systems  Included in HPI  All systems reviewed and negative except for those discussed in HPI.      PHYSICAL EXAM    I have reviewed the triage vital signs and nursing notes.    ED Triage Vitals   Temp Heart Rate Resp BP SpO2   09/27/24 1727 09/27/24 1727 09/27/24 1727 09/27/24 1740 09/27/24 1727   96.4 °F (35.8 °C) 62 16 (!) 193/80 92 %      Temp src Heart Rate Source Patient Position BP Location FiO2 (%)   -- -- 09/27/24 1740 09/27/24 1740 --     Lying Left arm        Physical Exam  GENERAL: alert, no acute distress  SKIN: Warm, dry  HENT: Normocephalic, atraumatic, moist mucous membranes.  There is a wound notable to the right cheek which does show some slow and steady oozing of dark red blood.  The wound margins are not erythematous or indurated.  EYES: no scleral icterus, normal conjunctivae  CV: regular rhythm, regular rate, normal perfusion  RESPIRATORY: normal effort, lungs clear bilaterally  ABDOMEN: soft, nondistended, nontender  MUSCULOSKELETAL: no deformity, no edema or asymmetry of extremities  NEURO: alert, moves all extremities, follows commands      LAB RESULTS  No results found for this or any previous visit (from the past 24 hour(s)).      RADIOLOGY  No Radiology Exams Resulted Within Past 24 Hours      MEDICATIONS GIVEN IN ER  Medications   lidocaine 1% - EPINEPHrine 1:097504 (XYLOCAINE W/EPI) 1 %-1:441095 injection 10 mL (10 mL Injection Given by Other 9/27/24 [id number])         ORDERS PLACED  DURING THIS VISIT:  Orders Placed This Encounter   Procedures    Laceration Repair    Monitor Blood Pressure    Wound Dressing         OUTPATIENT MEDICATION MANAGEMENT:  No current Epic-ordered facility-administered medications on file.     Current Outpatient Medications Ordered in Epic   Medication Sig Dispense Refill    allopurinol (ZYLOPRIM) 300 MG tablet TAKE 1 TABLET EVERY DAY  FOR  GOUT 90 tablet 3    carbidopa-levodopa (SINEMET)  MG per tablet Take 1 tablet by mouth 2 (Two) Times a Day.      Cholecalciferol (vitamin D3) 125 MCG (5000 UT) capsule capsule 1 by mouth daily as directed 30 capsule     Cyanocobalamin 1000 MCG sublingual tablet Dissolve 1000 mcg sublingual under the tongue every day as directed for vitamin B12 deficiency      fluorometholone (FML) 0.1 % ophthalmic suspension Administer 1 drop to both eyes 2 (Two) Times a Day.      furosemide (LASIX) 40 MG tablet TAKE 1 TABLET EVERY DAY 90 tablet 3    ketorolac (ACULAR) 0.5 % ophthalmic solution       Nutritional Supplements (COLD AND FLU PO) Take  by mouth.      rivaroxaban (Xarelto) 15 MG tablet TAKE ONE TABLET BY MOUTH DAILY FOR BLODD THINNER. TAKE AT SAME TIME EVERY DAY 30 tablet 11         PROCEDURES  Laceration Repair    Date/Time: 9/27/2024 6:22 PM    Performed by: Drew Porras MD  Authorized by: Drew Porras MD    Consent:     Consent obtained:  Verbal    Consent given by:  Patient    Risks, benefits, and alternatives were discussed: yes      Risks discussed:  Infection, pain, poor cosmetic result, need for additional repair and poor wound healing  Universal protocol:     Procedure explained and questions answered to patient or proxy's satisfaction: yes      Patient identity confirmed:  Verbally with patient  Anesthesia:     Anesthesia method:  Local infiltration    Local anesthetic:  Lidocaine 1% WITH epi  Laceration details:     Location:  Face    Face location:  R cheek    Length (cm):  1.7  Pre-procedure details:      Preparation:  Patient was prepped and draped in usual sterile fashion  Exploration:     Hemostasis achieved with:  Epinephrine    Wound exploration: entire depth of wound visualized      Contaminated: no    Treatment:     Area cleansed with:  Saline    Amount of cleaning:  Standard    Irrigation solution:  Sterile saline    Debridement:  None    Undermining:  None  Skin repair:     Repair method:  Sutures    Suture size:  6-0    Wound skin closure material used: Vicryl.    Suture technique:  Simple interrupted    Number of sutures:  2  Approximation:     Approximation:  Loose  Repair type:     Repair type:  Simple  Post-procedure details:     Dressing:  Non-adherent dressing    Procedure completion:  Tolerated well, no immediate complications          PROGRESS, DATA ANALYSIS, CONSULTS, AND MEDICAL DECISION MAKING  All labs have been independently interpreted by me.  All radiology studies have been reviewed by me. All EKG's have been independently viewed and interpreted by me.  Discussion below represents my analysis of pertinent findings related to patient's condition, differential diagnosis, treatment plan and final disposition.    Differential diagnosis includes but is not limited to anemia, coagulopathy, vascular injury thrombocytopenia.    Clinical Scores:                   ED Course as of 09/27/24 1826   Fri Sep 27, 2024   1824 I anesthetized the wound area with lidocaine and epinephrine.  This did help to slow down the bleeding but it did not achieve hemostasis.  Therefore I applied to simple interrupted sutures to the wound to control the bleeding.  Finally, we did achieve hemostasis.  We dressed the wound with a nonadherent gauze.  I think at this time he is stable for discharge home.  There is not any other clinical indication for further testing or observation time in the department.  I will encourage him to follow-up with his dermatologist for repeat evaluation as soon as possible next week.  Will review  "with him and his grandson the usual \"return to ER\" instructions prior to discharge as well. [BETINA]      ED Course User Index  [BETINA] Drew Porras MD             AS OF 18:26 EDT VITALS:    BP - (!) 188/79  HR - 57  TEMP - 96.4 °F (35.8 °C)  O2 SATS - 97%    COMPLEXITY OF CARE  Admission was considered but after careful review of the patient's presentation, physical examination, diagnostic results, and response to treatment the patient may be safely discharged with outpatient follow-up.      DIAGNOSIS  Final diagnoses:   Open wound of face, initial encounter   Chronic anticoagulation   Elevated blood pressure reading         DISPOSITION  ED Disposition       ED Disposition   Discharge    Condition   Stable    Comment   --                Please note that portions of this document were completed with a voice recognition program.    Note Disclaimer: At [facility], we believe that sharing information builds trust and better relationships. You are receiving this note because you recently visited [facility]. It is possible you will see health information before a provider has talked with you about it. This kind of information can be easy to misunderstand. To help you fully understand what it means for your health, we urge you to discuss this note with your provider.         Drew Porras MD  09/27/24 1826    "

## 2024-09-27 NOTE — DISCHARGE INSTRUCTIONS
Keep dressing on the wound until it is well-healed.  The sutures will slowly dissolve over the next 7 to 10 days.  Must follow-up with your dermatologist as we discussed.  Please return to the emergency room for any worsening pain, swelling, bleeding, redness, fevers or any other concerns.

## 2024-09-27 NOTE — ED TRIAGE NOTES
Pt to ED from home via pv accompanied by family. Pt and family report having spot on right side of face biopsied yesterday by MD. Pt and family have been unable to get bleeding to stop. Pt denies pain. Pt takes xarelto.

## 2024-10-02 ENCOUNTER — PATIENT OUTREACH (OUTPATIENT)
Dept: CASE MANAGEMENT | Facility: OTHER | Age: 89
End: 2024-10-02
Payer: MEDICARE

## 2024-10-02 NOTE — OUTREACH NOTE
"AMBULATORY CASE MANAGEMENT NOTE    Names and Relationships of Patient/Support Persons: Contact: Chago Garcia; Relationship: Emergency Contact -     Patient Outreach  RN-ACM outreach with patient's spouse. Discussed 9/27/24 ED visit regarding   open wound of face; chronic anticoagulation and elevated blood pressure. Patient treated and discharged. Spouse states patient compliant with ED recommendations and states symptoms have improved. Spouse states to be doing dressing changes as directed; states no difficulty with drainage or redness and has 10/14/24 dermatology appointment scheduled. Spouse states patient doing \"fine\". Spouse states patient is compliant with medications. Reviewed with spouse ED AVS recommendations; education; role of RN-ACM and HRCM case management services. Spouse verbalized understanding. Spouse states to appreciate outreach and declines needs for further outreach at this time. No further questions voiced at this time.   Adult Patient Profile  Questions/Answers      Flowsheet Row Most Recent Value   Symptoms/Conditions Managed at Home other (see comments), skin  [Open wound of face]   Skin Management Strategies dressing changes, other (see comments)  [Physician follow up]   Barriers to Taking Medication as Prescribed none   Primary Source of Support/Comfort spouse   People in Home spouse        Social Work Assessment  Questions/Answers      Flowsheet Row Most Recent Value   People in Home spouse        Send Education  Questions/Answers      Flowsheet Row Most Recent Value   Annual Wellness Visit:  Patient Has Completed   Other Patient Education/Resources  24/7 [facility] Nurse Call Line            Education Documentation  Unresolved/Worsening Symptoms, taught by Elyse Wahl, RN at 10/2/2024 12:16 PM.  Learner: Family  Readiness: Acceptance  Method: Explanation  Response: Verbalizes Understanding    Site Care, taught by Elyse Wahl, RN at 10/2/2024 12:16 PM.  Learner: " Family  Readiness: Acceptance  Method: Explanation  Response: Verbalizes Understanding    Provider Follow-Up, taught by Elyse Wahl, RN at 10/2/2024 12:16 PM.  Learner: Family  Readiness: Acceptance  Method: Explanation  Response: Verbalizes Understanding    Signs/Symptoms, taught by Elyse Wahl, RN at 10/2/2024 12:16 PM.  Learner: Family  Readiness: Acceptance  Method: Explanation  Response: Verbalizes Understanding          Elyse DELGADO  Ambulatory Case Management    10/2/2024, 12:16 EDT

## 2024-10-11 DIAGNOSIS — Z86.711 HISTORY OF PULMONARY EMBOLUS (PE): Chronic | ICD-10-CM

## 2024-10-30 ENCOUNTER — PATIENT OUTREACH (OUTPATIENT)
Dept: CASE MANAGEMENT | Facility: OTHER | Age: 89
End: 2024-10-30
Payer: MEDICARE

## 2024-10-30 NOTE — OUTREACH NOTE
AMBULATORY CASE MANAGEMENT NOTE    Names and Relationships of Patient/Support Persons: Contact: Chago Garcia; Relationship: Emergency Contact -     Adult Patient Profile  RN-ACM outreach with patient's spouse . Spouse states patient is doing well following 9/27/24 ED visit regarding facial bleeding/bruising following dermatology procedure. Spouse states no further difficulty with bleeding. Spouse states patient compliant with medications and no difficulty with fever. Reviewed with spouse 24/7 Nurse Line Telephone number. Spouse verbalized understanding and declines needs for further outreach. No further questions voiced at this time.           Elyse DELGADO  Ambulatory Case Management    10/30/2024, 16:51 EDT

## 2024-12-31 DIAGNOSIS — Z87.39 HISTORY OF GOUT: Chronic | ICD-10-CM

## 2024-12-31 RX ORDER — ALLOPURINOL 300 MG/1
TABLET ORAL
Qty: 90 TABLET | Refills: 1 | Status: SHIPPED | OUTPATIENT
Start: 2024-12-31

## 2025-01-31 ENCOUNTER — OFFICE VISIT (OUTPATIENT)
Dept: CARDIOLOGY | Facility: CLINIC | Age: OVER 89
End: 2025-01-31
Payer: MEDICARE

## 2025-01-31 VITALS
DIASTOLIC BLOOD PRESSURE: 60 MMHG | HEIGHT: 70 IN | HEART RATE: 68 BPM | WEIGHT: 181.2 LBS | SYSTOLIC BLOOD PRESSURE: 136 MMHG | BODY MASS INDEX: 25.94 KG/M2

## 2025-01-31 DIAGNOSIS — Z86.711 HISTORY OF PULMONARY EMBOLUS (PE): Chronic | ICD-10-CM

## 2025-01-31 DIAGNOSIS — I48.11 LONGSTANDING PERSISTENT ATRIAL FIBRILLATION: Primary | Chronic | ICD-10-CM

## 2025-01-31 PROCEDURE — 1159F MED LIST DOCD IN RCRD: CPT | Performed by: NURSE PRACTITIONER

## 2025-01-31 PROCEDURE — 93000 ELECTROCARDIOGRAM COMPLETE: CPT | Performed by: NURSE PRACTITIONER

## 2025-01-31 PROCEDURE — 99214 OFFICE O/P EST MOD 30 MIN: CPT | Performed by: NURSE PRACTITIONER

## 2025-01-31 PROCEDURE — 1160F RVW MEDS BY RX/DR IN RCRD: CPT | Performed by: NURSE PRACTITIONER

## 2025-01-31 RX ORDER — DOXYCYCLINE 50 MG/1
50 CAPSULE ORAL 2 TIMES DAILY
COMMUNITY

## 2025-01-31 NOTE — PROGRESS NOTES
Date of Office Visit: 2025  Encounter Provider: EBEN Aguirre  Place of Service: [facility] CARDIOLOGY  Patient Name: Izaiah Garcia  :1931    Chief Complaint   Patient presents with    Atrial Fibrillation   :     HPI: Izaiah Garcia is a 89 y.o. male patient of Dr. Koch who suffered a submassive pulmonary embolus in  which was treated with EKOS therapy. He has persistent atrial fibrillation and has also had a prior stroke. Long-term anticoagulation has been recommended.     Metoprolol was stopped secondary to bradycardia.    I last saw him in the office in 2023 at which time he was doing okay.  Reportedly he had been falling.  We discussed the risk and benefit of Eliquis and noted we would continually evaluate this.  I also noted frequent PVCs on his EKG for which a Holter was ordered.  This demonstrated a 10% PVC burden.  No changes were made to his regimen.  He was advised to follow-up in 1 year.    He has been getting along okay.  He denies any chest pain, shortness of breath, palpitations, edema, dizziness, syncope, bleeding difficulties or melena.  He fell a few weeks ago while getting in the bed.  No other significant falls to report.    Past Medical History:   Diagnosis Date    Acute saddle pulmonary embolism with acute cor pulmonale 2020    Admit date: 2020 Discharge date:3/20/2020 Discharged Condition: good   Discharge Diagnoses:   Acute saddle pulmonary embolism with acute cor pulmonale (CMS/HCC)   Pulmonary emboli (CMS/HCC)   Empyema of pleura (CMS/HCC)  Acute hypoxic respiratory failure BPE s/p ekos catheter on 2020 with local TPA infusion with good clinical response Troponemia suspect due to BPE RV strain Bilateral ple    At risk for falls 2019    Benign essential hypertension 2016    Benign prostatic hyperplasia without lower urinary tract symptoms 2020    Bilateral high frequency sensorineural  "hearing loss, wears hearing aids 05/02/2019    Bilateral lower extremity edema 05/02/2019    May 2, 2019--patient who has hypertension and is on amlodipine 10 mg/day is complaining of progressively worsening swelling of the lower extremities that extends up to about mid calf.  Gets worse at the end of the day and tends to get better overnight when he props his feet up.  I think this is definitely related to the amlodipine although patient may have some venous insufficiency.  Medication ad    Chronic anticoagulation, Eliquis.  Saddle pulmonary embolism 05/27/2020    Decreased peripheral vision of both eyes 05/02/2019    May 2, 2019--continues to have complaints of \"dizziness\" associated with weakness in his lower extremities.  He is not describing a spinning sensation or anything remotely close to vertigo.  Instead he is describing an off-balance sensation.  He tends to fall forward if not careful and he tends to list towards the right side.  He has marked difficulty going down an incline.  He has to ambulate wit    History of aspiration pneumonia 05/04/2015    History of esophageal stricture 05/04/2015    July 3, 2018--EGD revealed a distal esophageal stricture that was dilated to 18 mm.  There was a small hiatal hernia.  There was mild streaky erythema in the proximal stomach.  Duodenal bulb normal.  April 26, 2016--EGD performed for dysphagia reveals a distal esophageal stricture that was dilated 16.5 mm.    History of gout 06/29/2016    History of pulmonary embolus (PE) 02/29/2020    Admit date: 2/29/2020 Discharge date:3/20/2020 Discharged Condition: good   Discharge Diagnoses:   Acute saddle pulmonary embolism with acute cor pulmonale (CMS/HCC)   Pulmonary emboli (CMS/HCC)   Empyema of pleura (CMS/HCC)  Acute hypoxic respiratory failure BPE s/p ekos catheter on 2/29/2020 with local TPA infusion with good clinical response Troponemia suspect due to BPE RV strain Bilateral ple    History of stroke, 6/29/2016--4.9 " "x 4 x 3 cm inferior left cerebellar infarct 05/04/2015 June 29, 2016--MRI of the brain performed for complaints of dizziness and weakness. IMPRESSION: 1. There is susceptibility artifact streaking through the anterior left frontal scalp through the anterior left frontal bone in the anterior tipof the left frontal lobe likely from a tiny piece of metal in the anterior left frontal scalp slightly limits evaluation of these structures. 2. There is mild s    Hyperlipidemia 06/29/2016    Impaired fasting glucose 05/02/2019 April 14, 2015--hemoglobin A1c 5.9.    Longstanding persistent atrial fibrillation 06/03/2020    Macrocytosis 05/02/2019    Parkinson's disease 05/02/2019    May 2, 2019--continues to have complaints of \"dizziness\" associated with weakness in his lower extremities.  He is not describing a spinning sensation or anything remotely close to vertigo.  Instead he is describing an off-balance sensation.  He tends to fall forward if not careful and he tends to list towards the right side.  He has marked difficulty going down an incline.  He has to ambulate wit    Rheumatoid factor positive 05/02/2019 March 25, 2014--rheumatoid factor returned positive at 95.  However, CCP antibodies normal at 8, SHIV negative, both C&P ANCA negative, C-reactive protein normal at 0.24, sed rate normal at 2.    Statin intolerance, muscle pain and cramps 12/02/2021    Vitamin B12 deficiency 06/06/2019 June 6, 2019--patient recently had mildly elevated methylmalonic acid of 380.  Repeat methylmalonic acid was upper limit of normal at 356.  Given patient's neurologic symptoms and suspected parkinsonism, I have a low threshold for treating vitamin B12 deficiency.  We will initiate vitamin B12 injections today.  2000 mcg subcutaneously given today.    Vitamin D deficiency 05/02/2019       Past Surgical History:   Procedure Laterality Date    CARDIAC CATHETERIZATION N/A 2/29/2020    Procedure: Thrombolytic Therapy- EKOS;  " Surgeon: Jason Griffiths MD;  Location:  [facility];  Service: Cardiovascular;  Laterality: N/A;    CATARACT EXTRACTION EXTRACAPSULAR W/ INTRAOCULAR LENS IMPLANTATION Bilateral     Bilateral cataract extirpation with intraocular lens implantation    CHOLECYSTECTOMY      EKOS CATHETER PLACEMENT Bilateral 2/29/2020    Procedure: Ekos catheter placement;  Surgeon: Jason Griffiths MD;  Location:  [facility];  Service: Cardiovascular;  Laterality: Bilateral;    ESOPHAGEAL DILATATION  04/26/2016 April 26, 2016--EGD performed for dysphagia reveals a distal esophageal stricture that was dilated 16.5 mm.    ESOPHAGEAL DILATATION  07/03/2018    July 3, 2018--EGD revealed a distal esophageal stricture that was dilated to 18 mm.  There was a small hiatal hernia.  There was mild streaky erythema in the proximal stomach.  Duodenal bulb normal.    INTERVENTIONAL RADIOLOGY PROCEDURE Bilateral 2/29/2020    Procedure: Pulmonary Angiogram;  Surgeon: Jason Griffiths MD;  Location:  [facility];  Service: Cardiovascular;  Laterality: Bilateral;       Social History     Socioeconomic History    Marital status:     Number of children: 2    Highest education level: 9th grade   Tobacco Use    Smoking status: Never    Smokeless tobacco: Never   Vaping Use    Vaping status: Never Used   Substance and Sexual Activity    Alcohol use: Never     Comment: occ    Drug use: No    Sexual activity: Not Currently     Partners: Female       Family History   Problem Relation Age of Onset    No Known Problems Mother     No Known Problems Father        Review of Systems   Constitutional: Negative.   Cardiovascular: Negative.  Negative for chest pain, dyspnea on exertion, leg swelling, orthopnea, paroxysmal nocturnal dyspnea and syncope.   Respiratory: Negative.     Hematologic/Lymphatic: Negative for bleeding problem.   Musculoskeletal:  Negative for falls.   Gastrointestinal:  Negative for  "melena.   Neurological:  Negative for dizziness and light-headedness.       No Known Allergies      Current Outpatient Medications:     allopurinol (ZYLOPRIM) 300 MG tablet, TAKE 1 TABLET EVERY DAY FOR GOUT, Disp: 90 tablet, Rfl: 1    carbidopa-levodopa (SINEMET)  MG per tablet, Take 1 tablet by mouth 2 (Two) Times a Day., Disp: , Rfl:     Cyanocobalamin 1000 MCG sublingual tablet, Dissolve 1000 mcg sublingual under the tongue every day as directed for vitamin B12 deficiency, Disp: , Rfl:     doxycycline (MONODOX) 50 MG capsule, Take 1 capsule by mouth 2 (Two) Times a Day., Disp: , Rfl:     fluorometholone (FML) 0.1 % ophthalmic suspension, Administer 1 drop to both eyes 2 (Two) Times a Day., Disp: , Rfl:     furosemide (LASIX) 40 MG tablet, TAKE 1 TABLET EVERY DAY, Disp: 90 tablet, Rfl: 3    ketorolac (ACULAR) 0.5 % ophthalmic solution, , Disp: , Rfl:     rivaroxaban (Xarelto) 15 MG tablet, TAKE 1 TABLET BY MOUTH DAILY FOR BLOOD THINNER TAKE AT SAME TIME EVERY DAY, Disp: 30 tablet, Rfl: 11      Objective:     Vitals:    01/31/25 1453   BP: 136/60   Pulse: 68   Weight: 82.2 kg (181 lb 3.2 oz)   Height: 177.8 cm (70\")     Body mass index is 26 kg/m².    PHYSICAL EXAM:    Neck:      Vascular: No JVD.   Pulmonary:      Effort: Pulmonary effort is normal.      Breath sounds: Normal breath sounds.   Cardiovascular:      Normal rate. Regular rhythm.      Murmurs: There is no murmur.      No gallop.  No click. No rub.   Pulses:     Intact distal pulses.           ECG 12 Lead    Date/Time: 1/31/2025 3:13 PM  Performed by: Gia Estrada APRN    Authorized by: Gia Estrada APRN  Comparison: compared with previous ECG from 11/30/2023  Comparison to previous ECG: In sinus   Rhythm: sinus rhythm  Ectopy: atrial premature contractions  Rate: normal  BPM: 68            Assessment:       Diagnosis Plan   1. Longstanding persistent atrial fibrillation  ECG 12 Lead      2. History of pulmonary embolus (PE)      "     Orders Placed This Encounter   Procedures    ECG 12 Lead     This order was created via procedure documentation     Order Specific Question:   Release to patient     Answer:   Routine Release [[id number]]          Plan:       1.  Persistent atrial fibrillation.  He is actually in sinus rhythm today.  He is anticoagulated with Xarelto.      2.  History of PE.  Continue Xarelto.      I think he is doing well.  I am not making any changes, and he will follow-up with Dr. Griffiths in 1 year.      As always, it has been a pleasure to participate in your patient's care.      Sincerely,         EBEN Richardson

## 2025-03-05 DIAGNOSIS — I48.0 PAROXYSMAL ATRIAL FIBRILLATION: ICD-10-CM

## 2025-03-05 RX ORDER — FUROSEMIDE 40 MG/1
TABLET ORAL
Qty: 90 TABLET | Refills: 3 | Status: SHIPPED | OUTPATIENT
Start: 2025-03-05

## 2025-03-27 ENCOUNTER — OFFICE VISIT (OUTPATIENT)
Dept: INTERNAL MEDICINE | Facility: CLINIC | Age: OVER 89
End: 2025-03-27
Payer: MEDICARE

## 2025-03-27 VITALS
DIASTOLIC BLOOD PRESSURE: 60 MMHG | RESPIRATION RATE: 16 BRPM | TEMPERATURE: 98.1 F | HEIGHT: 70 IN | BODY MASS INDEX: 25.62 KG/M2 | OXYGEN SATURATION: 83 % | SYSTOLIC BLOOD PRESSURE: 134 MMHG | WEIGHT: 179 LBS | HEART RATE: 76 BPM

## 2025-03-27 DIAGNOSIS — E78.2 MIXED HYPERLIPIDEMIA: Chronic | ICD-10-CM

## 2025-03-27 DIAGNOSIS — G20.A1 PARKINSON'S DISEASE WITHOUT DYSKINESIA OR FLUCTUATING MANIFESTATIONS: Chronic | ICD-10-CM

## 2025-03-27 DIAGNOSIS — H90.3 BILATERAL HIGH FREQUENCY SENSORINEURAL HEARING LOSS: Chronic | ICD-10-CM

## 2025-03-27 DIAGNOSIS — Z79.01 CHRONIC ANTICOAGULATION: Chronic | ICD-10-CM

## 2025-03-27 DIAGNOSIS — Z78.9 STATIN INTOLERANCE: Chronic | ICD-10-CM

## 2025-03-27 DIAGNOSIS — I48.11 LONGSTANDING PERSISTENT ATRIAL FIBRILLATION: Chronic | ICD-10-CM

## 2025-03-27 DIAGNOSIS — R23.3 PETECHIAE OR ECCHYMOSES: Chronic | ICD-10-CM

## 2025-03-27 DIAGNOSIS — Z91.81 AT RISK FOR FALLS: Chronic | ICD-10-CM

## 2025-03-27 DIAGNOSIS — E53.8 VITAMIN B12 DEFICIENCY: Chronic | ICD-10-CM

## 2025-03-27 DIAGNOSIS — Z86.73 HISTORY OF STROKE: Chronic | ICD-10-CM

## 2025-03-27 DIAGNOSIS — R60.0 BILATERAL LOWER EXTREMITY EDEMA: Chronic | ICD-10-CM

## 2025-03-27 DIAGNOSIS — R73.01 IMPAIRED FASTING GLUCOSE: Primary | Chronic | ICD-10-CM

## 2025-03-27 DIAGNOSIS — R76.8 RHEUMATOID FACTOR POSITIVE: Chronic | ICD-10-CM

## 2025-03-27 DIAGNOSIS — I87.2 CHRONIC VENOUS INSUFFICIENCY: Chronic | ICD-10-CM

## 2025-03-27 DIAGNOSIS — I10 BENIGN ESSENTIAL HYPERTENSION: Chronic | ICD-10-CM

## 2025-03-27 DIAGNOSIS — E55.9 VITAMIN D DEFICIENCY: Chronic | ICD-10-CM

## 2025-03-27 DIAGNOSIS — Z87.39 HISTORY OF GOUT: Chronic | ICD-10-CM

## 2025-03-27 DIAGNOSIS — Z86.711 HISTORY OF PULMONARY EMBOLUS (PE): Chronic | ICD-10-CM

## 2025-03-27 DIAGNOSIS — Z87.19 HISTORY OF ESOPHAGEAL STRICTURE: Chronic | ICD-10-CM

## 2025-03-27 DIAGNOSIS — D75.89 MACROCYTOSIS: Chronic | ICD-10-CM

## 2025-03-27 DIAGNOSIS — Z51.81 THERAPEUTIC DRUG MONITORING: ICD-10-CM

## 2025-03-27 DIAGNOSIS — N40.0 BENIGN PROSTATIC HYPERPLASIA WITHOUT LOWER URINARY TRACT SYMPTOMS: Chronic | ICD-10-CM

## 2025-03-27 NOTE — PROGRESS NOTES
03/27/2025    Patient Information  Izaiah Garcia                                                                                          [address] [facility] [address]      8/31/1931  [unfilled]  There is no work phone number on file.    Chief Complaint:     Follow-up multiple chronic medical problems.  No new acute complaints.    History of Present Illness:    Patient with a multitude of chronic medical problems as outlined below in assessment and plan presents today for follow-up.  His past medical history reviewed and updated were necessary including health maintenance parameters.  This reveals he will be up-to-date or else accounted for after today's visit.    Review of Systems   HENT: Negative.     Eyes: Negative.    Cardiovascular: Negative.    Respiratory: Negative.     Endocrine: Negative.    Hematologic/Lymphatic: Negative.    Skin: Negative.    Musculoskeletal:  Positive for arthritis and joint pain.   Gastrointestinal: Negative.    Genitourinary: Negative.    Neurological:  Positive for disturbances in coordination, loss of balance, vertigo and weakness. Negative for brief paralysis, light-headedness, numbness, paresthesias and seizures.   Psychiatric/Behavioral: Negative.     Allergic/Immunologic: Negative.        Active Problems:    Patient Active Problem List   Diagnosis    Hyperlipidemia    Benign essential hypertension    History of gout    History of esophageal stricture    History of stroke, 6/29/2016--4.9 x 4 x 3 cm inferior left cerebellar infarct    Rheumatoid factor positive    Impaired fasting glucose    At risk for falls    Bilateral high frequency sensorineural hearing loss, wears hearing aids    Bilateral lower extremity edema    Parkinson's disease    Therapeutic drug monitoring    Vitamin D deficiency    Macrocytosis    Vitamin B12 deficiency    History of pulmonary embolus (PE)    Chronic anticoagulation, Eliquis.  Saddle pulmonary embolism    Longstanding  "persistent atrial fibrillation    Benign prostatic hyperplasia without lower urinary tract symptoms    Statin intolerance, muscle pain and cramps    Chronic venous insufficiency    Petechiae or ecchymoses, both lower extremities.  Patient on Xarelto.         Past Medical History:   Diagnosis Date    Acute saddle pulmonary embolism with acute cor pulmonale 02/29/2020    Admit date: 2/29/2020 Discharge date:3/20/2020 Discharged Condition: good   Discharge Diagnoses:   Acute saddle pulmonary embolism with acute cor pulmonale (CMS/HCC)   Pulmonary emboli (CMS/HCC)   Empyema of pleura (CMS/HCC)  Acute hypoxic respiratory failure BPE s/p ekos catheter on 2/29/2020 with local TPA infusion with good clinical response Troponemia suspect due to BPE RV strain Bilateral ple    At risk for falls 05/02/2019    Benign essential hypertension 06/29/2016    Benign prostatic hyperplasia without lower urinary tract symptoms 12/03/2020    Bilateral high frequency sensorineural hearing loss, wears hearing aids 05/02/2019    Bilateral lower extremity edema 05/02/2019    May 2, 2019--patient who has hypertension and is on amlodipine 10 mg/day is complaining of progressively worsening swelling of the lower extremities that extends up to about mid calf.  Gets worse at the end of the day and tends to get better overnight when he props his feet up.  I think this is definitely related to the amlodipine although patient may have some venous insufficiency.  Medication ad    Chronic anticoagulation, Eliquis.  Saddle pulmonary embolism 05/27/2020    Decreased peripheral vision of both eyes 05/02/2019    May 2, 2019--continues to have complaints of \"dizziness\" associated with weakness in his lower extremities.  He is not describing a spinning sensation or anything remotely close to vertigo.  Instead he is describing an off-balance sensation.  He tends to fall forward if not careful and he tends to list towards the right side.  He has marked " "difficulty going down an incline.  He has to ambulate wit    History of aspiration pneumonia 05/04/2015    History of esophageal stricture 05/04/2015    July 3, 2018--EGD revealed a distal esophageal stricture that was dilated to 18 mm.  There was a small hiatal hernia.  There was mild streaky erythema in the proximal stomach.  Duodenal bulb normal.  April 26, 2016--EGD performed for dysphagia reveals a distal esophageal stricture that was dilated 16.5 mm.    History of gout 06/29/2016    History of pulmonary embolus (PE) 02/29/2020    Admit date: 2/29/2020 Discharge date:3/20/2020 Discharged Condition: good   Discharge Diagnoses:   Acute saddle pulmonary embolism with acute cor pulmonale (CMS/HCC)   Pulmonary emboli (CMS/HCC)   Empyema of pleura (CMS/HCC)  Acute hypoxic respiratory failure BPE s/p ekos catheter on 2/29/2020 with local TPA infusion with good clinical response Troponemia suspect due to BPE RV strain Bilateral ple    History of stroke, 6/29/2016--4.9 x 4 x 3 cm inferior left cerebellar infarct 05/04/2015 June 29, 2016--MRI of the brain performed for complaints of dizziness and weakness. IMPRESSION: 1. There is susceptibility artifact streaking through the anterior left frontal scalp through the anterior left frontal bone in the anterior tipof the left frontal lobe likely from a tiny piece of metal in the anterior left frontal scalp slightly limits evaluation of these structures. 2. There is mild s    Hyperlipidemia 06/29/2016    Impaired fasting glucose 05/02/2019 April 14, 2015--hemoglobin A1c 5.9.    Longstanding persistent atrial fibrillation 06/03/2020    Macrocytosis 05/02/2019    Parkinson's disease 05/02/2019    May 2, 2019--continues to have complaints of \"dizziness\" associated with weakness in his lower extremities.  He is not describing a spinning sensation or anything remotely close to vertigo.  Instead he is describing an off-balance sensation.  He tends to fall forward if not " careful and he tends to list towards the right side.  He has marked difficulty going down an incline.  He has to ambulate wit    Rheumatoid factor positive 05/02/2019 March 25, 2014--rheumatoid factor returned positive at 95.  However, CCP antibodies normal at 8, SHIV negative, both C&P ANCA negative, C-reactive protein normal at 0.24, sed rate normal at 2.    Statin intolerance, muscle pain and cramps 12/02/2021    Vitamin B12 deficiency 06/06/2019 June 6, 2019--patient recently had mildly elevated methylmalonic acid of 380.  Repeat methylmalonic acid was upper limit of normal at 356.  Given patient's neurologic symptoms and suspected parkinsonism, I have a low threshold for treating vitamin B12 deficiency.  We will initiate vitamin B12 injections today.  2000 mcg subcutaneously given today.    Vitamin D deficiency 05/02/2019         Past Surgical History:   Procedure Laterality Date    CARDIAC CATHETERIZATION N/A 2/29/2020    Procedure: Thrombolytic Therapy- EKOS;  Surgeon: Jason Griffiths MD;  Location: [facility] CATH INVASIVE LOCATION;  Service: Cardiovascular;  Laterality: N/A;    CATARACT EXTRACTION EXTRACAPSULAR W/ INTRAOCULAR LENS IMPLANTATION Bilateral     Bilateral cataract extirpation with intraocular lens implantation    CHOLECYSTECTOMY      EKOS CATHETER PLACEMENT Bilateral 2/29/2020    Procedure: Ekos catheter placement;  Surgeon: Jason Griffiths MD;  Location:  [facility] INVAS[facility];  Service: Cardiovascular;  Laterality: Bilateral;    ESOPHAGEAL DILATATION  04/26/2016 April 26, 2016--EGD performed for dysphagia reveals a distal esophageal stricture that was dilated 16.5 mm.    ESOPHAGEAL DILATATION  07/03/2018    July 3, 2018--EGD revealed a distal esophageal stricture that was dilated to 18 mm.  There was a small hiatal hernia.  There was mild streaky erythema in the proximal stomach.  Duodenal bulb normal.    INTERVENTIONAL RADIOLOGY PROCEDURE Bilateral 2/29/2020    Procedure:  "Pulmonary Angiogram;  Surgeon: Jason Griffiths MD;  Location: [facility] INVASIVE LOCATION;  Service: Cardiovascular;  Laterality: Bilateral;         No Known Allergies        Current Outpatient Medications:     allopurinol (ZYLOPRIM) 300 MG tablet, TAKE 1 TABLET EVERY DAY FOR GOUT, Disp: 90 tablet, Rfl: 1    carbidopa-levodopa (SINEMET)  MG per tablet, Take 1 tablet by mouth 2 (Two) Times a Day., Disp: , Rfl:     Cyanocobalamin 1000 MCG sublingual tablet, Dissolve 1000 mcg sublingual under the tongue every day as directed for vitamin B12 deficiency, Disp: , Rfl:     fluorometholone (FML) 0.1 % ophthalmic suspension, Administer 1 drop to both eyes 2 (Two) Times a Day., Disp: , Rfl:     furosemide (LASIX) 40 MG tablet, TAKE 1 TABLET EVERY DAY, Disp: 90 tablet, Rfl: 3    ketorolac (ACULAR) 0.5 % ophthalmic solution, , Disp: , Rfl:     rivaroxaban (Xarelto) 15 MG tablet, TAKE 1 TABLET BY MOUTH DAILY FOR BLOOD THINNER TAKE AT SAME TIME EVERY DAY, Disp: 30 tablet, Rfl: 11      Family History   Problem Relation Age of Onset    No Known Problems Mother     No Known Problems Father          Social History     Socioeconomic History    Marital status:     Number of children: 2    Highest education level: 9th grade   Tobacco Use    Smoking status: Never    Smokeless tobacco: Never   Vaping Use    Vaping status: Never Used   Substance and Sexual Activity    Alcohol use: Never     Comment: occ    Drug use: No    Sexual activity: Not Currently     Partners: Female         Vitals:    03/27/25 1021   BP: 134/60   Pulse: 76   Resp: 16   Temp: 98.1 °F (36.7 °C)   TempSrc: Oral   SpO2: (!) 83%   Weight: 81.2 kg (179 lb)   Height: 177.8 cm (70\")        Body mass index is 25.68 kg/m².      Physical Exam:    General: Alert and oriented x 3.  No acute distress.  Normal affect.  HEENT: Pupils equal, round, reactive to light; extraocular movements intact; sclerae nonicteric; pharynx, ear canals and TMs normal.  Neck: Without " JVD, thyromegaly, bruit, or adenopathy.  Lungs: Clear to auscultation in all fields.  Heart: Regular rate and rhythm without murmur, rub, gallop, or click.  Abdomen: Soft, nontender, without hepatosplenomegaly or hernia.  Bowel sounds normal.  : Deferred.  Rectal: Deferred.  Extremities: Without clubbing, cyanosis, edema, or pulse deficit.  Neurologic: Intact without focal deficit.  Patient is ambulation with the assistance of a wheeled walker.  Has obvious ataxic gait and parkinsonism..  Skin: Without significant lesion.  Musculoskeletal: Unremarkable.    Lab/other results:      Assessment/Plan:     Diagnosis Plan   1. Impaired fasting glucose  Comprehensive Metabolic Panel    Hemoglobin A1c    Urinalysis With Microscopic If Indicated (No Culture) - Urine, Clean Catch      2. History of gout  Uric Acid      3. Benign essential hypertension  Comprehensive Metabolic Panel      4. Hyperlipidemia  CK    Comprehensive Metabolic Panel    NMR LipoProfile    T4, Free    T3, Free    TSH    Homocysteine      5. Vitamin D deficiency  Vitamin D,25-Hydroxy      6. Macrocytosis  CBC (No Diff)    Methylmalonic Acid, Serum    Homocysteine      7. Vitamin B12 deficiency  CBC (No Diff)      8. History of pulmonary embolus (PE)        9. Chronic anticoagulation, Eliquis.  Saddle pulmonary embolism        10. Longstanding persistent atrial fibrillation        11. Benign prostatic hyperplasia without lower urinary tract symptoms  PSA DIAGNOSTIC      12. Statin intolerance, muscle pain and cramps        13. Petechiae or ecchymoses, both lower extremities.  Patient on Xarelto.        14. Chronic venous insufficiency        15. Parkinson's disease without dyskinesia or fluctuating manifestations        16. At risk for falls        17. Bilateral lower extremity edema        18. Bilateral high frequency sensorineural hearing loss, wears hearing aids        19. History of esophageal stricture        20. History of stroke, 6/29/2016--4.9 x  4 x 3 cm inferior left cerebellar infarct        21. Rheumatoid factor positive        22. Therapeutic drug monitoring          Patient with multiple medical problems as noted above that seem to be fairly stable.  He has parkinsonism and is followed by the neurology service.  He needs lab work to better assess his chronic medical problems as noted above.    Plan is as follows: Check lab work today and I will contact patient and his wife when the results are available and possible further instructions.  Will set him up for his subsequent Medicare wellness visit on or after September 16, 2025.  Otherwise he will follow-up as needed.        Procedures

## 2025-04-01 LAB
25(OH)D3+25(OH)D2 SERPL-MCNC: 33.8 NG/ML (ref 30–100)
ALBUMIN SERPL-MCNC: 3.9 G/DL (ref 3.6–4.6)
ALP SERPL-CCNC: 75 IU/L (ref 44–121)
ALT SERPL-CCNC: 6 IU/L (ref 0–44)
APPEARANCE UR: CLEAR
AST SERPL-CCNC: 19 IU/L (ref 0–40)
BILIRUB SERPL-MCNC: 1 MG/DL (ref 0–1.2)
BILIRUB UR QL STRIP: NEGATIVE
BUN SERPL-MCNC: 16 MG/DL (ref 10–36)
BUN/CREAT SERPL: 14 (ref 10–24)
CALCIUM SERPL-MCNC: 9.1 MG/DL (ref 8.6–10.2)
CHLORIDE SERPL-SCNC: 106 MMOL/L (ref 96–106)
CHOLEST SERPL-MCNC: 134 MG/DL (ref 100–199)
CK SERPL-CCNC: 26 U/L (ref 30–208)
CO2 SERPL-SCNC: 24 MMOL/L (ref 20–29)
COLOR UR: YELLOW
CREAT SERPL-MCNC: 1.16 MG/DL (ref 0.76–1.27)
EGFRCR SERPLBLD CKD-EPI 2021: 59 ML/MIN/1.73
ERYTHROCYTE [DISTWIDTH] IN BLOOD BY AUTOMATED COUNT: 13.9 % (ref 11.6–15.4)
GLOBULIN SER CALC-MCNC: 2.2 G/DL (ref 1.5–4.5)
GLUCOSE SERPL-MCNC: 89 MG/DL (ref 70–99)
GLUCOSE UR QL STRIP: NEGATIVE
HBA1C MFR BLD: 5.8 % (ref 4.8–5.6)
HCT VFR BLD AUTO: 49.8 % (ref 37.5–51)
HCYS SERPL-SCNC: 14.8 UMOL/L (ref 0–21.3)
HDL SERPL-SCNC: 20.3 UMOL/L
HDLC SERPL-MCNC: 33 MG/DL
HGB BLD-MCNC: 15.5 G/DL (ref 13–17.7)
HGB UR QL STRIP: NEGATIVE
KETONES UR QL STRIP: NEGATIVE
LDL SERPL QN: 20.5 NM
LDL SERPL-SCNC: 834 NMOL/L
LDL SMALL SERPL-SCNC: 372 NMOL/L
LDLC SERPL CALC-MCNC: 82 MG/DL (ref 0–99)
LEUKOCYTE ESTERASE UR QL STRIP: NEGATIVE
MCH RBC QN AUTO: 27.6 PG (ref 26.6–33)
MCHC RBC AUTO-ENTMCNC: 31.1 G/DL (ref 31.5–35.7)
MCV RBC AUTO: 89 FL (ref 79–97)
METHYLMALONATE SERPL-SCNC: 181 NMOL/L (ref 0–378)
MICRO URNS: NORMAL
NITRITE UR QL STRIP: NEGATIVE
PH UR STRIP: 6 [PH] (ref 5–7.5)
PLATELET # BLD AUTO: 314 X10E3/UL (ref 150–450)
POTASSIUM SERPL-SCNC: 4 MMOL/L (ref 3.5–5.2)
PROT SERPL-MCNC: 6.1 G/DL (ref 6–8.5)
PROT UR QL STRIP: NEGATIVE
PSA SERPL-MCNC: 1.4 NG/ML (ref 0–4)
RBC # BLD AUTO: 5.61 X10E6/UL (ref 4.14–5.8)
SODIUM SERPL-SCNC: 144 MMOL/L (ref 134–144)
SP GR UR STRIP: 1.01 (ref 1–1.03)
T3FREE SERPL-MCNC: 2.5 PG/ML (ref 2–4.4)
T4 FREE SERPL-MCNC: 1.47 NG/DL (ref 0.82–1.77)
TRIGL SERPL-MCNC: 99 MG/DL (ref 0–149)
TSH SERPL DL<=0.005 MIU/L-ACNC: 0.49 UIU/ML (ref 0.45–4.5)
URATE SERPL-MCNC: 4.2 MG/DL (ref 3.8–8.4)
UROBILINOGEN UR STRIP-MCNC: 0.2 MG/DL (ref 0.2–1)
WBC # BLD AUTO: 9.6 X10E3/UL (ref 3.4–10.8)

## 2025-05-08 ENCOUNTER — OFFICE VISIT (OUTPATIENT)
Dept: INTERNAL MEDICINE | Facility: CLINIC | Age: OVER 89
End: 2025-05-08
Payer: MEDICARE

## 2025-05-08 VITALS
HEIGHT: 70 IN | BODY MASS INDEX: 25.48 KG/M2 | OXYGEN SATURATION: 97 % | DIASTOLIC BLOOD PRESSURE: 68 MMHG | WEIGHT: 178 LBS | TEMPERATURE: 98.2 F | RESPIRATION RATE: 16 BRPM | HEART RATE: 67 BPM | SYSTOLIC BLOOD PRESSURE: 136 MMHG

## 2025-05-08 DIAGNOSIS — L81.9 BLEEDING PIGMENTED SKIN LESION: ICD-10-CM

## 2025-05-08 DIAGNOSIS — I95.1 AUTONOMIC ORTHOSTATIC HYPOTENSION: ICD-10-CM

## 2025-05-08 DIAGNOSIS — I10 BENIGN ESSENTIAL HYPERTENSION: Chronic | ICD-10-CM

## 2025-05-08 DIAGNOSIS — Z51.81 THERAPEUTIC DRUG MONITORING: ICD-10-CM

## 2025-05-08 DIAGNOSIS — R03.0 ELEVATED BLOOD PRESSURE READING: ICD-10-CM

## 2025-05-08 DIAGNOSIS — G20.A1 PARKINSON'S DISEASE WITHOUT DYSKINESIA OR FLUCTUATING MANIFESTATIONS: Chronic | ICD-10-CM

## 2025-05-08 DIAGNOSIS — Z09 HOSPITAL DISCHARGE FOLLOW-UP: Primary | ICD-10-CM

## 2025-05-08 NOTE — PROGRESS NOTES
"             05/08/2025    Patient Information  Izaiah Garcia                                                                                          [address] [facility] [address]      8/31/1931  [unfilled]  There is no work phone number on file.    Chief Complaint:     Hospital discharge/emergency room follow-up/transition of care.    History of Present Illness:    Date of admission and discharge May 7, 2025    Discharge diagnoses hypertension and elevated blood pressure.  Bleeding surgical wound from skin lesion.  Orthostatic hypotension.  Parkinsonism.    Patient with multiple medical problems including parkinsonism and autonomic orthostatic hypotension and hypertension.  He was evaluated yesterday in the emergency room at [facility] when he presented with a \"cancerous spot\" removed from his right upper arm yesterday and subsequently had bleeding.  The bleeding started the night before around 7 PM.  They tried to control the bleeding at home but unsuccessfully.  Patient is on Xarelto.  His CBC showed a mild anemia but nothing significant.  However, his blood pressure was 180/84 and patient was instructed to follow-up with his primary care the following day to have this reassessed.    Review of Systems   Constitutional: Negative.   HENT: Negative.     Eyes: Negative.    Cardiovascular: Negative.    Respiratory: Negative.     Endocrine: Negative.    Hematologic/Lymphatic: Negative.    Skin: Negative.    Musculoskeletal: Negative.    Gastrointestinal: Negative.    Genitourinary: Negative.    Neurological:  Positive for disturbances in coordination and tremors.   Psychiatric/Behavioral: Negative.     Allergic/Immunologic: Negative.        Active Problems:    Patient Active Problem List   Diagnosis    Hyperlipidemia    Benign essential hypertension    History of gout    History of esophageal stricture    History of stroke, 6/29/2016--4.9 x 4 x 3 cm inferior left cerebellar infarct    Rheumatoid " factor positive    Impaired fasting glucose    At risk for falls    Bilateral high frequency sensorineural hearing loss, wears hearing aids    Bilateral lower extremity edema    Parkinson's disease    Therapeutic drug monitoring    Vitamin D deficiency    Macrocytosis    Vitamin B12 deficiency    History of pulmonary embolus (PE)    Chronic anticoagulation, Eliquis.  Saddle pulmonary embolism    Longstanding persistent atrial fibrillation    Benign prostatic hyperplasia without lower urinary tract symptoms    Statin intolerance, muscle pain and cramps    Chronic venous insufficiency    Petechiae or ecchymoses, both lower extremities.  Patient on Xarelto.    Hospital discharge follow-up    Elevated blood pressure reading    Orthostatic hypotension    Bleeding pigmented skin lesion         Past Medical History:   Diagnosis Date    Acute saddle pulmonary embolism with acute cor pulmonale 02/29/2020    Admit date: 2/29/2020 Discharge date:3/20/2020 Discharged Condition: good   Discharge Diagnoses:   Acute saddle pulmonary embolism with acute cor pulmonale (CMS/HCC)   Pulmonary emboli (CMS/HCC)   Empyema of pleura (CMS/HCC)  Acute hypoxic respiratory failure BPE s/p ekos catheter on 2/29/2020 with local TPA infusion with good clinical response Troponemia suspect due to BPE RV strain Bilateral ple    At risk for falls 05/02/2019    Benign essential hypertension 06/29/2016    Benign prostatic hyperplasia without lower urinary tract symptoms 12/03/2020    Bilateral high frequency sensorineural hearing loss, wears hearing aids 05/02/2019    Bilateral lower extremity edema 05/02/2019    May 2, 2019--patient who has hypertension and is on amlodipine 10 mg/day is complaining of progressively worsening swelling of the lower extremities that extends up to about mid calf.  Gets worse at the end of the day and tends to get better overnight when he props his feet up.  I think this is definitely related to the amlodipine although  "patient may have some venous insufficiency.  Medication ad    Chronic anticoagulation, Eliquis.  Saddle pulmonary embolism 05/27/2020    Decreased peripheral vision of both eyes 05/02/2019    May 2, 2019--continues to have complaints of \"dizziness\" associated with weakness in his lower extremities.  He is not describing a spinning sensation or anything remotely close to vertigo.  Instead he is describing an off-balance sensation.  He tends to fall forward if not careful and he tends to list towards the right side.  He has marked difficulty going down an incline.  He has to ambulate wit    History of aspiration pneumonia 05/04/2015    History of esophageal stricture 05/04/2015    July 3, 2018--EGD revealed a distal esophageal stricture that was dilated to 18 mm.  There was a small hiatal hernia.  There was mild streaky erythema in the proximal stomach.  Duodenal bulb normal.  April 26, 2016--EGD performed for dysphagia reveals a distal esophageal stricture that was dilated 16.5 mm.    History of gout 06/29/2016    History of pulmonary embolus (PE) 02/29/2020    Admit date: 2/29/2020 Discharge date:3/20/2020 Discharged Condition: good   Discharge Diagnoses:   Acute saddle pulmonary embolism with acute cor pulmonale (CMS/HCC)   Pulmonary emboli (CMS/HCC)   Empyema of pleura (CMS/HCC)  Acute hypoxic respiratory failure BPE s/p ekos catheter on 2/29/2020 with local TPA infusion with good clinical response Troponemia suspect due to BPE RV strain Bilateral ple    History of stroke, 6/29/2016--4.9 x 4 x 3 cm inferior left cerebellar infarct 05/04/2015 June 29, 2016--MRI of the brain performed for complaints of dizziness and weakness. IMPRESSION: 1. There is susceptibility artifact streaking through the anterior left frontal scalp through the anterior left frontal bone in the anterior tipof the left frontal lobe likely from a tiny piece of metal in the anterior left frontal scalp slightly limits evaluation of these " "structures. 2. There is mild s    Hyperlipidemia 06/29/2016    Impaired fasting glucose 05/02/2019 April 14, 2015--hemoglobin A1c 5.9.    Longstanding persistent atrial fibrillation 06/03/2020    Macrocytosis 05/02/2019    Parkinson's disease 05/02/2019    May 2, 2019--continues to have complaints of \"dizziness\" associated with weakness in his lower extremities.  He is not describing a spinning sensation or anything remotely close to vertigo.  Instead he is describing an off-balance sensation.  He tends to fall forward if not careful and he tends to list towards the right side.  He has marked difficulty going down an incline.  He has to ambulate wit    Rheumatoid factor positive 05/02/2019 March 25, 2014--rheumatoid factor returned positive at 95.  However, CCP antibodies normal at 8, SHIV negative, both C&P ANCA negative, C-reactive protein normal at 0.24, sed rate normal at 2.    Statin intolerance, muscle pain and cramps 12/02/2021    Vitamin B12 deficiency 06/06/2019 June 6, 2019--patient recently had mildly elevated methylmalonic acid of 380.  Repeat methylmalonic acid was upper limit of normal at 356.  Given patient's neurologic symptoms and suspected parkinsonism, I have a low threshold for treating vitamin B12 deficiency.  We will initiate vitamin B12 injections today.  2000 mcg subcutaneously given today.    Vitamin D deficiency 05/02/2019         Past Surgical History:   Procedure Laterality Date    CARDIAC CATHETERIZATION N/A 2/29/2020    Procedure: Thrombolytic Therapy- EKOS;  Surgeon: Jason Griffiths MD;  Location: [facility] INVASIVE LOCATION;  Service: Cardiovascular;  Laterality: N/A;    CATARACT EXTRACTION EXTRACAPSULAR W/ INTRAOCULAR LENS IMPLANTATION Bilateral     Bilateral cataract extirpation with intraocular lens implantation    CHOLECYSTECTOMY      EKOS CATHETER PLACEMENT Bilateral 2/29/2020    Procedure: Ekos catheter placement;  Surgeon: Jason Griffiths MD;  Location: [facility] " INVASIVE LOCATION;  Service: Cardiovascular;  Laterality: Bilateral;    ESOPHAGEAL DILATATION  04/26/2016 April 26, 2016--EGD performed for dysphagia reveals a distal esophageal stricture that was dilated 16.5 mm.    ESOPHAGEAL DILATATION  07/03/2018    July 3, 2018--EGD revealed a distal esophageal stricture that was dilated to 18 mm.  There was a small hiatal hernia.  There was mild streaky erythema in the proximal stomach.  Duodenal bulb normal.    INTERVENTIONAL RADIOLOGY PROCEDURE Bilateral 2/29/2020    Procedure: Pulmonary Angiogram;  Surgeon: Jason Griffiths MD;  Location: [facility] INVASIVE LOCATION;  Service: Cardiovascular;  Laterality: Bilateral;         No Known Allergies        Current Outpatient Medications:     allopurinol (ZYLOPRIM) 300 MG tablet, TAKE 1 TABLET EVERY DAY FOR GOUT, Disp: 90 tablet, Rfl: 1    carbidopa-levodopa (SINEMET)  MG per tablet, Take 1 tablet by mouth 2 (Two) Times a Day., Disp: , Rfl:     Cyanocobalamin 1000 MCG sublingual tablet, Dissolve 1000 mcg sublingual under the tongue every day as directed for vitamin B12 deficiency, Disp: , Rfl:     fluorometholone (FML) 0.1 % ophthalmic suspension, Administer 1 drop to both eyes 2 (Two) Times a Day., Disp: , Rfl:     furosemide (LASIX) 40 MG tablet, TAKE 1 TABLET EVERY DAY, Disp: 90 tablet, Rfl: 3    ketorolac (ACULAR) 0.5 % ophthalmic solution, , Disp: , Rfl:     rivaroxaban (Xarelto) 15 MG tablet, TAKE 1 TABLET BY MOUTH DAILY FOR BLOOD THINNER TAKE AT SAME TIME EVERY DAY, Disp: 30 tablet, Rfl: 11      Family History   Problem Relation Age of Onset    No Known Problems Mother     No Known Problems Father          Social History     Socioeconomic History    Marital status:     Number of children: 2    Highest education level: 9th grade   Tobacco Use    Smoking status: Never    Smokeless tobacco: Never   Vaping Use    Vaping status: Never Used   Substance and Sexual Activity    Alcohol use: Never     Comment: occ     "Drug use: No    Sexual activity: Not Currently     Partners: Female         Vitals:    05/08/25 1102   BP: 136/68   Pulse: 67   Resp: 16   Temp: 98.2 °F (36.8 °C)   TempSrc: Oral   SpO2: 97%   Weight: 80.7 kg (178 lb)   Height: 177.8 cm (70\")        Body mass index is 25.54 kg/m².      Physical Exam:    General: Alert and oriented x 3.  No acute distress.  Normal affect.  HEENT: Pupils equal, round, reactive to light; extraocular movements intact; sclerae nonicteric; pharynx, ear canals and TMs normal.  Neck: Without JVD, thyromegaly, bruit, or adenopathy.  Lungs: Clear to auscultation in all fields.  Heart: Regular rate and rhythm without murmur, rub, gallop, or click.  Abdomen: Soft, nontender, without hepatosplenomegaly or hernia.  Bowel sounds normal.  : Deferred.  Rectal: Deferred.  Extremities: Without clubbing, cyanosis, edema, or pulse deficit.  Neurologic: Intact without focal deficit.  Nonambulatory and in a wheelchair.  Tremor noted.  Skin: Patient has a surgical wound in the posterior aspect of the right upper extremity.  I see no active bleeding.  Sutures appear intact.  Musculoskeletal: Unremarkable.    Lab/other results:    I reviewed the documentation from the emergency room/hospital visit including the history and physical, laboratory radiographic studies, discharge instructions and discharge medications.    Assessment/Plan:     Diagnosis Plan   1. Hospital discharge follow-up        2. Elevated blood pressure reading        3. Benign essential hypertension        4. Parkinson's disease without dyskinesia or fluctuating manifestations        5. Autonomic orthostatic hypotension        6. Therapeutic drug monitoring        7. Bleeding pigmented skin lesion          Current outpatient and discharge medications have been reconciled for the patient.  Reviewed by: Uriah Godinez MD     Patient seen in transition of care after being evaluated emergency room yesterday and subsequently discharged.  His " blood pressure looks better today and it was significantly elevated yesterday in the emergency room.  However, he was noted to be orthostatic as well.  This is most certainly related to his parkinsonism.  Patient is definitely symptomatic but not all the time.  He gets lightheaded when he stands up.  Patient has a follow-up appointment with neurology next week and I think they should be the ones to address this autonomic orthostasis.    Plan is as follows: I am not going to make any changes to the current medical regimen at this time.  Instead we are going to defer this to the neurologist which she is seeing next week.  I cautioned patient to be careful and try not to fall.  He should stay in the wheelchair or use assistance or rollator/cane if necessary.  He also has a follow-up with Dr. Casillas the dermatologist.    Procedures

## 2025-05-26 DIAGNOSIS — Z87.39 HISTORY OF GOUT: Chronic | ICD-10-CM

## 2025-05-27 RX ORDER — ALLOPURINOL 300 MG/1
TABLET ORAL
Qty: 90 TABLET | Refills: 3 | Status: SHIPPED | OUTPATIENT
Start: 2025-05-27

## (undated) DEVICE — INTRO SHEATH ART/FEM ENGAGE .035 7F12CM

## (undated) DEVICE — CATH EKOSONIC MACH4 DS 5.2F 12X106CM

## (undated) DEVICE — CATH PULM WEDGE PRESS 7F

## (undated) DEVICE — RADIFOCUS GLIDEWIRE ADVANTAGE GUIDEWIRE: Brand: GLIDEWIRE ADVANTAGE

## (undated) DEVICE — GW EMR FIX EXCHG J STD .035 3MM 260CM

## (undated) DEVICE — KT MANIFLD CARDIAC

## (undated) DEVICE — PK CATH CARD 40

## (undated) DEVICE — CATH DIAG IMPULSE FR4 6F 100CM

## (undated) DEVICE — Device

## (undated) DEVICE — HI-TORQUE SUPRA CORE .035 PERIPHERAL GUIDE WIRE .035 X 190 CM: Brand: HI-TORQUE SUPRA CORE